# Patient Record
Sex: MALE | Race: BLACK OR AFRICAN AMERICAN | Employment: OTHER
[De-identification: names, ages, dates, MRNs, and addresses within clinical notes are randomized per-mention and may not be internally consistent; named-entity substitution may affect disease eponyms.]

---

## 2017-01-03 DIAGNOSIS — I48.20 CHRONIC ATRIAL FIBRILLATION (HCC): ICD-10-CM

## 2017-01-03 RX ORDER — AMIODARONE HYDROCHLORIDE 200 MG/1
TABLET ORAL
Qty: 60 TABLET | Refills: 2 | Status: SHIPPED | OUTPATIENT
Start: 2017-01-03 | End: 2017-01-12

## 2017-01-04 ENCOUNTER — OFFICE VISIT (OUTPATIENT)
Dept: BARIATRICS/WEIGHT MGMT | Facility: CLINIC | Age: 47
End: 2017-01-04

## 2017-01-04 VITALS
HEIGHT: 70 IN | DIASTOLIC BLOOD PRESSURE: 69 MMHG | SYSTOLIC BLOOD PRESSURE: 113 MMHG | WEIGHT: 282 LBS | BODY MASS INDEX: 40.37 KG/M2 | HEART RATE: 83 BPM

## 2017-01-04 DIAGNOSIS — N18.6 ESRD ON HEMODIALYSIS (HCC): Primary | ICD-10-CM

## 2017-01-04 DIAGNOSIS — I10 ESSENTIAL HYPERTENSION: ICD-10-CM

## 2017-01-04 DIAGNOSIS — I48.20 CHRONIC ATRIAL FIBRILLATION (HCC): ICD-10-CM

## 2017-01-04 DIAGNOSIS — Z99.2 ESRD ON HEMODIALYSIS (HCC): Primary | ICD-10-CM

## 2017-01-04 PROCEDURE — 99213 OFFICE O/P EST LOW 20 MIN: CPT | Performed by: SURGERY

## 2017-01-04 RX ORDER — MIDODRINE HYDROCHLORIDE 5 MG/1
10 TABLET ORAL 3 TIMES DAILY
COMMUNITY
Start: 2016-12-15 | End: 2018-03-19 | Stop reason: DRUGHIGH

## 2017-01-25 ENCOUNTER — TELEPHONE (OUTPATIENT)
Dept: BARIATRICS/WEIGHT MGMT | Facility: CLINIC | Age: 47
End: 2017-01-25

## 2017-01-26 ENCOUNTER — OFFICE VISIT (OUTPATIENT)
Dept: BARIATRICS/WEIGHT MGMT | Facility: CLINIC | Age: 47
End: 2017-01-26

## 2017-01-26 VITALS
RESPIRATION RATE: 20 BRPM | DIASTOLIC BLOOD PRESSURE: 70 MMHG | HEIGHT: 70 IN | SYSTOLIC BLOOD PRESSURE: 110 MMHG | WEIGHT: 276 LBS | HEART RATE: 70 BPM | BODY MASS INDEX: 39.51 KG/M2

## 2017-01-26 DIAGNOSIS — I48.20 CHRONIC ATRIAL FIBRILLATION (HCC): ICD-10-CM

## 2017-01-26 DIAGNOSIS — N18.6 ESRD ON HEMODIALYSIS (HCC): Primary | ICD-10-CM

## 2017-01-26 DIAGNOSIS — Z99.2 ESRD ON HEMODIALYSIS (HCC): Primary | ICD-10-CM

## 2017-01-26 DIAGNOSIS — I10 ESSENTIAL HYPERTENSION: ICD-10-CM

## 2017-01-26 PROCEDURE — 99024 POSTOP FOLLOW-UP VISIT: CPT | Performed by: SURGERY

## 2017-01-26 RX ORDER — LEVOTHYROXINE SODIUM 0.03 MG/1
1 TABLET ORAL DAILY
COMMUNITY
Start: 2017-01-13 | End: 2017-10-02

## 2017-01-30 ENCOUNTER — CARE COORDINATION (OUTPATIENT)
Dept: CARE COORDINATION | Facility: CLINIC | Age: 47
End: 2017-01-30

## 2017-01-31 ENCOUNTER — NURSE ONLY (OUTPATIENT)
Dept: BARIATRICS/WEIGHT MGMT | Facility: CLINIC | Age: 47
End: 2017-01-31

## 2017-01-31 VITALS — BODY MASS INDEX: 39.75 KG/M2 | WEIGHT: 277 LBS

## 2017-01-31 DIAGNOSIS — E66.01 OBESITY, MORBID, BMI 40.0-49.9 (HCC): Primary | ICD-10-CM

## 2017-02-06 ENCOUNTER — NURSE ONLY (OUTPATIENT)
Dept: BARIATRICS/WEIGHT MGMT | Facility: CLINIC | Age: 47
End: 2017-02-06

## 2017-02-06 DIAGNOSIS — E66.01 OBESITY, MORBID, BMI 40.0-49.9 (HCC): Primary | ICD-10-CM

## 2017-02-10 ENCOUNTER — HOSPITAL ENCOUNTER (OUTPATIENT)
Age: 47
Setting detail: SPECIMEN
Discharge: HOME | End: 2017-02-10
Payer: MEDICARE

## 2017-02-10 DIAGNOSIS — Z99.2 ESRD ON HEMODIALYSIS (HCC): ICD-10-CM

## 2017-02-10 DIAGNOSIS — N18.6 ESRD ON HEMODIALYSIS (HCC): ICD-10-CM

## 2017-02-10 LAB
ABSOLUTE EOS #: 0.1 K/UL (ref 0–0.4)
ABSOLUTE LYMPH #: 1.4 K/UL (ref 1–4.8)
ABSOLUTE MONO #: 0.3 K/UL (ref 0.1–1.2)
ALBUMIN SERPL-MCNC: 4.2 G/DL (ref 3.5–5.2)
ALBUMIN/GLOBULIN RATIO: 1.1 (ref 1–2.5)
ALP BLD-CCNC: 61 U/L (ref 40–129)
ALT SERPL-CCNC: 12 U/L (ref 5–41)
ANION GAP SERPL CALCULATED.3IONS-SCNC: 20 MMOL/L (ref 9–17)
AST SERPL-CCNC: 20 U/L
BASOPHILS # BLD: 1 % (ref 0–2)
BASOPHILS ABSOLUTE: 0 K/UL (ref 0–0.2)
BILIRUB SERPL-MCNC: 0.97 MG/DL (ref 0.3–1.2)
BUN BLDV-MCNC: 25 MG/DL (ref 6–20)
BUN/CREAT BLD: ABNORMAL (ref 9–20)
CALCIUM SERPL-MCNC: 9.7 MG/DL (ref 8.6–10.4)
CHLORIDE BLD-SCNC: 92 MMOL/L (ref 98–107)
CHOLESTEROL/HDL RATIO: 3.6
CHOLESTEROL: 284 MG/DL
CO2: 25 MMOL/L (ref 20–31)
CREAT SERPL-MCNC: 9.11 MG/DL (ref 0.7–1.2)
DIFFERENTIAL TYPE: ABNORMAL
EOSINOPHILS RELATIVE PERCENT: 2 % (ref 1–4)
FOLATE: 16.7 NG/ML
GFR AFRICAN AMERICAN: 8 ML/MIN
GFR NON-AFRICAN AMERICAN: 6 ML/MIN
GFR SERPL CREATININE-BSD FRML MDRD: ABNORMAL ML/MIN/{1.73_M2}
GFR SERPL CREATININE-BSD FRML MDRD: ABNORMAL ML/MIN/{1.73_M2}
GLUCOSE BLD-MCNC: 89 MG/DL (ref 70–99)
HCT VFR BLD CALC: 30.2 % (ref 41–53)
HDLC SERPL-MCNC: 80 MG/DL
HEMOGLOBIN: 10.6 G/DL (ref 13.5–17.5)
LDL CHOLESTEROL: 183 MG/DL (ref 0–130)
LYMPHOCYTES # BLD: 32 % (ref 24–44)
MAGNESIUM: 2.2 MG/DL (ref 1.6–2.6)
MCH RBC QN AUTO: 36.5 PG (ref 26–34)
MCHC RBC AUTO-ENTMCNC: 35.3 G/DL (ref 31–37)
MCV RBC AUTO: 103.5 FL (ref 80–100)
MONOCYTES # BLD: 7 % (ref 2–11)
PDW BLD-RTO: 17.3 % (ref 12.5–15.4)
PLATELET # BLD: 175 K/UL (ref 140–450)
PLATELET ESTIMATE: ABNORMAL
PMV BLD AUTO: 8.8 FL (ref 6–12)
POTASSIUM SERPL-SCNC: 4.3 MMOL/L (ref 3.7–5.3)
RBC # BLD: 2.92 M/UL (ref 4.5–5.9)
RBC # BLD: ABNORMAL 10*6/UL
SEG NEUTROPHILS: 58 % (ref 36–66)
SEGMENTED NEUTROPHILS ABSOLUTE COUNT: 2.5 K/UL (ref 1.8–7.7)
SODIUM BLD-SCNC: 137 MMOL/L (ref 135–144)
TOTAL PROTEIN: 7.9 G/DL (ref 6.4–8.3)
TRIGL SERPL-MCNC: 105 MG/DL
VITAMIN B-12: 572 PG/ML (ref 211–946)
VLDLC SERPL CALC-MCNC: ABNORMAL MG/DL (ref 1–30)
WBC # BLD: 4.4 K/UL (ref 3.5–11)
WBC # BLD: ABNORMAL 10*3/UL

## 2017-02-12 LAB
RETINYL PALMITATE: 0.02 MG/L (ref 0–0.1)
VITAMIN A LEVEL: 1.14 MG/L (ref 0.3–1.2)
VITAMIN A, INTERP: NORMAL

## 2017-02-13 LAB — VITAMIN B1 WHOLE BLOOD: 65 NMOL/L (ref 70–180)

## 2017-02-16 ENCOUNTER — OFFICE VISIT (OUTPATIENT)
Dept: BARIATRICS/WEIGHT MGMT | Facility: CLINIC | Age: 47
End: 2017-02-16

## 2017-02-16 VITALS
HEART RATE: 80 BPM | WEIGHT: 277 LBS | BODY MASS INDEX: 39.65 KG/M2 | HEIGHT: 70 IN | DIASTOLIC BLOOD PRESSURE: 74 MMHG | SYSTOLIC BLOOD PRESSURE: 128 MMHG | RESPIRATION RATE: 18 BRPM

## 2017-02-16 DIAGNOSIS — K21.9 GASTROESOPHAGEAL REFLUX DISEASE, ESOPHAGITIS PRESENCE NOT SPECIFIED: ICD-10-CM

## 2017-02-16 DIAGNOSIS — E03.9 HYPOTHYROIDISM, UNSPECIFIED TYPE: ICD-10-CM

## 2017-02-16 DIAGNOSIS — E66.9 OBESITY (BMI 30-39.9): ICD-10-CM

## 2017-02-16 DIAGNOSIS — I26.09 COR PULMONALE, ACUTE (HCC): Chronic | ICD-10-CM

## 2017-02-16 DIAGNOSIS — I48.91 ATRIAL FIBRILLATION, UNSPECIFIED TYPE (HCC): ICD-10-CM

## 2017-02-16 DIAGNOSIS — I89.0 LYMPHEDEMA OF LOWER EXTREMITY, UNSPECIFIED LATERALITY: ICD-10-CM

## 2017-02-16 DIAGNOSIS — Z99.2 ESRD ON HEMODIALYSIS (HCC): ICD-10-CM

## 2017-02-16 DIAGNOSIS — I10 ESSENTIAL HYPERTENSION: Primary | ICD-10-CM

## 2017-02-16 DIAGNOSIS — N18.6 ESRD ON HEMODIALYSIS (HCC): ICD-10-CM

## 2017-02-16 PROCEDURE — G8484 FLU IMMUNIZE NO ADMIN: HCPCS | Performed by: NURSE PRACTITIONER

## 2017-02-16 PROCEDURE — G8427 DOCREV CUR MEDS BY ELIG CLIN: HCPCS | Performed by: NURSE PRACTITIONER

## 2017-02-16 PROCEDURE — 99213 OFFICE O/P EST LOW 20 MIN: CPT | Performed by: NURSE PRACTITIONER

## 2017-02-16 PROCEDURE — G8417 CALC BMI ABV UP PARAM F/U: HCPCS | Performed by: NURSE PRACTITIONER

## 2017-02-16 PROCEDURE — 1036F TOBACCO NON-USER: CPT | Performed by: NURSE PRACTITIONER

## 2017-03-02 ENCOUNTER — TELEPHONE (OUTPATIENT)
Dept: PHARMACY | Age: 47
End: 2017-03-02

## 2017-03-07 ENCOUNTER — CARE COORDINATION (OUTPATIENT)
Dept: CARE COORDINATION | Facility: CLINIC | Age: 47
End: 2017-03-07

## 2017-03-10 ENCOUNTER — HOSPITAL ENCOUNTER (OUTPATIENT)
Age: 47
Discharge: HOME | End: 2017-03-10
Payer: MEDICARE

## 2017-03-10 LAB
PROLACTIN: 2.67 UG/L (ref 4.04–15.2)
TESTOSTERONE TOTAL: 1036 NG/DL (ref 220–1000)
THYROXINE, FREE: 0.93 NG/DL (ref 0.93–1.7)
TSH SERPL DL<=0.05 MIU/L-ACNC: 3.57 MIU/L (ref 0.3–5)

## 2017-03-10 PROCEDURE — 84146 ASSAY OF PROLACTIN: CPT

## 2017-03-10 PROCEDURE — 84439 ASSAY OF FREE THYROXINE: CPT

## 2017-03-10 PROCEDURE — 84443 ASSAY THYROID STIM HORMONE: CPT

## 2017-03-10 PROCEDURE — 36415 COLL VENOUS BLD VENIPUNCTURE: CPT

## 2017-03-10 PROCEDURE — 84403 ASSAY OF TOTAL TESTOSTERONE: CPT

## 2017-03-14 ENCOUNTER — OFFICE VISIT (OUTPATIENT)
Dept: BARIATRICS/WEIGHT MGMT | Age: 47
End: 2017-03-14
Payer: MEDICARE

## 2017-03-14 VITALS
BODY MASS INDEX: 39.94 KG/M2 | SYSTOLIC BLOOD PRESSURE: 126 MMHG | HEIGHT: 70 IN | HEART RATE: 76 BPM | WEIGHT: 279 LBS | RESPIRATION RATE: 18 BRPM | DIASTOLIC BLOOD PRESSURE: 74 MMHG

## 2017-03-14 DIAGNOSIS — E03.9 HYPOTHYROIDISM, UNSPECIFIED TYPE: ICD-10-CM

## 2017-03-14 DIAGNOSIS — K21.9 GASTROESOPHAGEAL REFLUX DISEASE, ESOPHAGITIS PRESENCE NOT SPECIFIED: ICD-10-CM

## 2017-03-14 DIAGNOSIS — I10 ESSENTIAL HYPERTENSION: Primary | ICD-10-CM

## 2017-03-14 DIAGNOSIS — E66.01 MORBID OBESITY WITH BODY MASS INDEX OF 40.0-49.9 (HCC): Chronic | ICD-10-CM

## 2017-03-14 DIAGNOSIS — Z99.2 ESRD ON HEMODIALYSIS (HCC): ICD-10-CM

## 2017-03-14 DIAGNOSIS — I89.0 LYMPHEDEMA OF LOWER EXTREMITY, UNSPECIFIED LATERALITY: ICD-10-CM

## 2017-03-14 DIAGNOSIS — N18.6 ESRD ON HEMODIALYSIS (HCC): ICD-10-CM

## 2017-03-14 PROCEDURE — 99213 OFFICE O/P EST LOW 20 MIN: CPT | Performed by: NURSE PRACTITIONER

## 2017-03-14 RX ORDER — TESTOSTERONE CYPIONATE 200 MG/ML
INJECTION INTRAMUSCULAR
COMMUNITY
Start: 2017-02-20 | End: 2017-10-02

## 2017-03-15 ENCOUNTER — HOSPITAL ENCOUNTER (OUTPATIENT)
Dept: PHARMACY | Age: 47
Setting detail: THERAPIES SERIES
Discharge: HOME | End: 2017-03-15
Payer: MEDICARE

## 2017-03-15 DIAGNOSIS — I48.91 ATRIAL FIBRILLATION, UNSPECIFIED TYPE (HCC): ICD-10-CM

## 2017-03-15 LAB
INR BLD: 3.1
PROTIME: 37.1 SECONDS

## 2017-03-15 PROCEDURE — 85610 PROTHROMBIN TIME: CPT

## 2017-03-15 PROCEDURE — G0463 HOSPITAL OUTPT CLINIC VISIT: HCPCS

## 2017-03-22 ENCOUNTER — OFFICE VISIT (OUTPATIENT)
Dept: INTERNAL MEDICINE CLINIC | Age: 47
End: 2017-03-22
Payer: MEDICARE

## 2017-03-22 VITALS
DIASTOLIC BLOOD PRESSURE: 74 MMHG | HEART RATE: 67 BPM | RESPIRATION RATE: 17 BRPM | WEIGHT: 286 LBS | BODY MASS INDEX: 40.94 KG/M2 | OXYGEN SATURATION: 96 % | HEIGHT: 70 IN | SYSTOLIC BLOOD PRESSURE: 130 MMHG | TEMPERATURE: 98.1 F

## 2017-03-22 DIAGNOSIS — E22.1 HYPERPROLACTINEMIA (HCC): ICD-10-CM

## 2017-03-22 DIAGNOSIS — Z99.2 ESRD ON HEMODIALYSIS (HCC): ICD-10-CM

## 2017-03-22 DIAGNOSIS — I10 ESSENTIAL HYPERTENSION: Primary | ICD-10-CM

## 2017-03-22 DIAGNOSIS — K21.9 GASTROESOPHAGEAL REFLUX DISEASE, ESOPHAGITIS PRESENCE NOT SPECIFIED: ICD-10-CM

## 2017-03-22 DIAGNOSIS — E03.9 HYPOTHYROIDISM, UNSPECIFIED TYPE: ICD-10-CM

## 2017-03-22 DIAGNOSIS — N52.01 ERECTILE DYSFUNCTION DUE TO ARTERIAL INSUFFICIENCY: ICD-10-CM

## 2017-03-22 DIAGNOSIS — E29.1 HYPOGONADISM IN MALE: ICD-10-CM

## 2017-03-22 DIAGNOSIS — E20.8 PTH-RELATED FAMILIAL ISOLATED HYPOPARATHYROIDISM, AUTOSOMAL DOMINANT (HCC): ICD-10-CM

## 2017-03-22 DIAGNOSIS — I48.91 ATRIAL FIBRILLATION, UNSPECIFIED TYPE (HCC): ICD-10-CM

## 2017-03-22 DIAGNOSIS — N18.6 ESRD ON HEMODIALYSIS (HCC): ICD-10-CM

## 2017-03-22 DIAGNOSIS — N40.1 BENIGN PROSTATIC HYPERPLASIA WITH LOWER URINARY TRACT SYMPTOMS, UNSPECIFIED MORPHOLOGY: ICD-10-CM

## 2017-03-22 PROCEDURE — G8484 FLU IMMUNIZE NO ADMIN: HCPCS | Performed by: FAMILY MEDICINE

## 2017-03-22 PROCEDURE — G8427 DOCREV CUR MEDS BY ELIG CLIN: HCPCS | Performed by: FAMILY MEDICINE

## 2017-03-22 PROCEDURE — 99214 OFFICE O/P EST MOD 30 MIN: CPT | Performed by: FAMILY MEDICINE

## 2017-03-22 PROCEDURE — G8417 CALC BMI ABV UP PARAM F/U: HCPCS | Performed by: FAMILY MEDICINE

## 2017-03-22 PROCEDURE — 1036F TOBACCO NON-USER: CPT | Performed by: FAMILY MEDICINE

## 2017-03-22 RX ORDER — MIDODRINE HYDROCHLORIDE 10 MG/1
TABLET ORAL
COMMUNITY
Start: 2017-03-17 | End: 2017-04-17

## 2017-03-22 ASSESSMENT — ENCOUNTER SYMPTOMS
COLOR CHANGE: 0
STRIDOR: 0
COUGH: 0
EYE REDNESS: 0
SORE THROAT: 0
ABDOMINAL DISTENTION: 0
ANAL BLEEDING: 0
SHORTNESS OF BREATH: 0
ABDOMINAL PAIN: 0
BACK PAIN: 0
EYE PAIN: 0
WHEEZING: 0
CONSTIPATION: 0
TROUBLE SWALLOWING: 0
CHEST TIGHTNESS: 0
EYE DISCHARGE: 0
FACIAL SWELLING: 0

## 2017-03-22 ASSESSMENT — PATIENT HEALTH QUESTIONNAIRE - PHQ9
1. LITTLE INTEREST OR PLEASURE IN DOING THINGS: 0
SUM OF ALL RESPONSES TO PHQ9 QUESTIONS 1 & 2: 0
SUM OF ALL RESPONSES TO PHQ QUESTIONS 1-9: 0
2. FEELING DOWN, DEPRESSED OR HOPELESS: 0

## 2017-03-29 ENCOUNTER — HOSPITAL ENCOUNTER (OUTPATIENT)
Dept: PHARMACY | Age: 47
Setting detail: THERAPIES SERIES
Discharge: HOME | End: 2017-03-29
Payer: MEDICARE

## 2017-03-29 DIAGNOSIS — I48.91 ATRIAL FIBRILLATION, UNSPECIFIED TYPE (HCC): ICD-10-CM

## 2017-03-29 LAB
INR BLD: 4.6
PROTIME: 54.9 SECONDS

## 2017-03-29 PROCEDURE — G0463 HOSPITAL OUTPT CLINIC VISIT: HCPCS

## 2017-03-29 PROCEDURE — 85610 PROTHROMBIN TIME: CPT

## 2017-03-31 ENCOUNTER — CARE COORDINATION (OUTPATIENT)
Dept: CARE COORDINATION | Age: 47
End: 2017-03-31

## 2017-04-05 ENCOUNTER — HOSPITAL ENCOUNTER (OUTPATIENT)
Age: 47
Setting detail: SPECIMEN
Discharge: HOME | End: 2017-04-05
Payer: MEDICARE

## 2017-04-05 LAB
PROLACTIN: 4.2 UG/L (ref 4.04–15.2)
TESTOSTERONE TOTAL: 635 NG/DL (ref 220–1000)

## 2017-04-12 ENCOUNTER — TELEPHONE (OUTPATIENT)
Dept: PHARMACY | Age: 47
End: 2017-04-12

## 2017-04-17 RX ORDER — VIT B COMP NO.3/FOLIC/C/BIOTIN 1 MG-60 MG
TABLET ORAL
Qty: 30 TABLET | Refills: 3 | Status: SHIPPED | OUTPATIENT
Start: 2017-04-17 | End: 2017-12-01 | Stop reason: SDUPTHER

## 2017-04-18 ENCOUNTER — OFFICE VISIT (OUTPATIENT)
Dept: BARIATRICS/WEIGHT MGMT | Age: 47
End: 2017-04-18
Payer: MEDICARE

## 2017-04-18 VITALS
WEIGHT: 283 LBS | HEART RATE: 68 BPM | RESPIRATION RATE: 18 BRPM | BODY MASS INDEX: 40.52 KG/M2 | DIASTOLIC BLOOD PRESSURE: 74 MMHG | HEIGHT: 70 IN | SYSTOLIC BLOOD PRESSURE: 126 MMHG

## 2017-04-18 DIAGNOSIS — E03.9 HYPOTHYROIDISM, UNSPECIFIED TYPE: ICD-10-CM

## 2017-04-18 DIAGNOSIS — I48.91 ATRIAL FIBRILLATION, UNSPECIFIED TYPE (HCC): ICD-10-CM

## 2017-04-18 DIAGNOSIS — I10 ESSENTIAL HYPERTENSION: Primary | ICD-10-CM

## 2017-04-18 DIAGNOSIS — E66.01 MORBID OBESITY WITH BODY MASS INDEX OF 40.0-49.9 (HCC): Chronic | ICD-10-CM

## 2017-04-18 DIAGNOSIS — N18.6 ESRD ON HEMODIALYSIS (HCC): ICD-10-CM

## 2017-04-18 DIAGNOSIS — Z99.2 ESRD ON HEMODIALYSIS (HCC): ICD-10-CM

## 2017-04-18 DIAGNOSIS — I89.0 LYMPHEDEMA OF RIGHT LOWER EXTREMITY: ICD-10-CM

## 2017-04-18 DIAGNOSIS — K21.9 GASTROESOPHAGEAL REFLUX DISEASE, ESOPHAGITIS PRESENCE NOT SPECIFIED: ICD-10-CM

## 2017-04-18 PROCEDURE — 99213 OFFICE O/P EST LOW 20 MIN: CPT | Performed by: NURSE PRACTITIONER

## 2017-04-18 PROCEDURE — G8427 DOCREV CUR MEDS BY ELIG CLIN: HCPCS | Performed by: NURSE PRACTITIONER

## 2017-04-18 PROCEDURE — 1036F TOBACCO NON-USER: CPT | Performed by: NURSE PRACTITIONER

## 2017-04-18 PROCEDURE — G8417 CALC BMI ABV UP PARAM F/U: HCPCS | Performed by: NURSE PRACTITIONER

## 2017-04-20 ENCOUNTER — TELEPHONE (OUTPATIENT)
Dept: PHARMACY | Age: 47
End: 2017-04-20

## 2017-05-01 ENCOUNTER — CARE COORDINATION (OUTPATIENT)
Dept: CARE COORDINATION | Age: 47
End: 2017-05-01

## 2017-05-10 ENCOUNTER — HOSPITAL ENCOUNTER (OUTPATIENT)
Dept: PHARMACY | Age: 47
Setting detail: THERAPIES SERIES
Discharge: HOME | End: 2017-05-10
Payer: MEDICARE

## 2017-05-10 DIAGNOSIS — I48.91 ATRIAL FIBRILLATION, UNSPECIFIED TYPE (HCC): ICD-10-CM

## 2017-05-10 LAB
INR BLD: 4.3
PROTIME: 51.8 SECONDS

## 2017-05-10 PROCEDURE — 85610 PROTHROMBIN TIME: CPT

## 2017-05-10 PROCEDURE — 99211 OFF/OP EST MAY X REQ PHY/QHP: CPT

## 2017-05-15 ENCOUNTER — OFFICE VISIT (OUTPATIENT)
Dept: BARIATRICS/WEIGHT MGMT | Age: 47
End: 2017-05-15
Payer: MEDICARE

## 2017-05-15 VITALS
WEIGHT: 289 LBS | HEIGHT: 70 IN | HEART RATE: 68 BPM | SYSTOLIC BLOOD PRESSURE: 102 MMHG | DIASTOLIC BLOOD PRESSURE: 66 MMHG | BODY MASS INDEX: 41.37 KG/M2

## 2017-05-15 DIAGNOSIS — N18.6 ESRD ON HEMODIALYSIS (HCC): ICD-10-CM

## 2017-05-15 DIAGNOSIS — I89.0 LYMPHEDEMA OF LOWER EXTREMITY, UNSPECIFIED LATERALITY: ICD-10-CM

## 2017-05-15 DIAGNOSIS — I95.1 ORTHOSTATIC HYPOTENSION: ICD-10-CM

## 2017-05-15 DIAGNOSIS — K21.9 GASTROESOPHAGEAL REFLUX DISEASE, ESOPHAGITIS PRESENCE NOT SPECIFIED: Primary | ICD-10-CM

## 2017-05-15 DIAGNOSIS — E03.9 HYPOTHYROIDISM, UNSPECIFIED TYPE: ICD-10-CM

## 2017-05-15 DIAGNOSIS — I48.91 ATRIAL FIBRILLATION, UNSPECIFIED TYPE (HCC): ICD-10-CM

## 2017-05-15 DIAGNOSIS — E66.01 MORBID OBESITY WITH BODY MASS INDEX OF 40.0-49.9 (HCC): Chronic | ICD-10-CM

## 2017-05-15 DIAGNOSIS — Z99.2 ESRD ON HEMODIALYSIS (HCC): ICD-10-CM

## 2017-05-15 PROBLEM — I95.9 HYPOTENSION: Status: ACTIVE | Noted: 2017-05-15

## 2017-05-15 PROCEDURE — 1036F TOBACCO NON-USER: CPT | Performed by: NURSE PRACTITIONER

## 2017-05-15 PROCEDURE — G8417 CALC BMI ABV UP PARAM F/U: HCPCS | Performed by: NURSE PRACTITIONER

## 2017-05-15 PROCEDURE — G8427 DOCREV CUR MEDS BY ELIG CLIN: HCPCS | Performed by: NURSE PRACTITIONER

## 2017-05-15 PROCEDURE — 99213 OFFICE O/P EST LOW 20 MIN: CPT | Performed by: NURSE PRACTITIONER

## 2017-05-24 ENCOUNTER — HOSPITAL ENCOUNTER (OUTPATIENT)
Dept: PHARMACY | Age: 47
Setting detail: THERAPIES SERIES
Discharge: HOME | End: 2017-05-24
Payer: MEDICARE

## 2017-05-24 DIAGNOSIS — I48.91 ATRIAL FIBRILLATION, UNSPECIFIED TYPE (HCC): ICD-10-CM

## 2017-05-24 LAB
INR BLD: 2.8
PROTIME: 33.9 SECONDS

## 2017-05-24 PROCEDURE — 99211 OFF/OP EST MAY X REQ PHY/QHP: CPT

## 2017-05-24 PROCEDURE — 85610 PROTHROMBIN TIME: CPT

## 2017-05-26 ENCOUNTER — NURSE ONLY (OUTPATIENT)
Dept: BARIATRICS/WEIGHT MGMT | Age: 47
End: 2017-05-26

## 2017-05-26 DIAGNOSIS — E66.01 MORBID OBESITY DUE TO EXCESS CALORIES (HCC): Primary | ICD-10-CM

## 2017-05-31 PROBLEM — R39.15 URGENCY OF URINATION: Status: ACTIVE | Noted: 2017-05-31

## 2017-06-05 DIAGNOSIS — I48.20 CHRONIC ATRIAL FIBRILLATION (HCC): ICD-10-CM

## 2017-06-06 RX ORDER — WARFARIN SODIUM 5 MG/1
TABLET ORAL
Qty: 45 TABLET | Refills: 5 | Status: SHIPPED | OUTPATIENT
Start: 2017-06-06 | End: 2017-09-13

## 2017-06-14 ENCOUNTER — HOSPITAL ENCOUNTER (OUTPATIENT)
Dept: PHARMACY | Age: 47
Setting detail: THERAPIES SERIES
Discharge: HOME | End: 2017-06-14
Payer: MEDICARE

## 2017-06-14 DIAGNOSIS — I48.91 ATRIAL FIBRILLATION, UNSPECIFIED TYPE (HCC): ICD-10-CM

## 2017-06-14 LAB
INR BLD: 1.9
PROTIME: 22.6 SECONDS

## 2017-06-14 PROCEDURE — 99211 OFF/OP EST MAY X REQ PHY/QHP: CPT

## 2017-06-14 PROCEDURE — 85610 PROTHROMBIN TIME: CPT

## 2017-06-20 ENCOUNTER — OFFICE VISIT (OUTPATIENT)
Dept: BARIATRICS/WEIGHT MGMT | Age: 47
End: 2017-06-20
Payer: MEDICARE

## 2017-06-20 VITALS
DIASTOLIC BLOOD PRESSURE: 70 MMHG | BODY MASS INDEX: 40.52 KG/M2 | HEIGHT: 70 IN | SYSTOLIC BLOOD PRESSURE: 122 MMHG | HEART RATE: 70 BPM | RESPIRATION RATE: 20 BRPM | WEIGHT: 283 LBS

## 2017-06-20 DIAGNOSIS — I89.0 LYMPHEDEMA OF LOWER EXTREMITY, UNSPECIFIED LATERALITY: ICD-10-CM

## 2017-06-20 DIAGNOSIS — I10 ESSENTIAL HYPERTENSION: Primary | ICD-10-CM

## 2017-06-20 DIAGNOSIS — E03.9 HYPOTHYROIDISM, UNSPECIFIED TYPE: ICD-10-CM

## 2017-06-20 DIAGNOSIS — Z99.2 ESRD ON HEMODIALYSIS (HCC): ICD-10-CM

## 2017-06-20 DIAGNOSIS — E66.01 MORBID OBESITY WITH BODY MASS INDEX OF 40.0-49.9 (HCC): Chronic | ICD-10-CM

## 2017-06-20 DIAGNOSIS — I48.91 ATRIAL FIBRILLATION, UNSPECIFIED TYPE (HCC): ICD-10-CM

## 2017-06-20 DIAGNOSIS — N18.6 ESRD ON HEMODIALYSIS (HCC): ICD-10-CM

## 2017-06-20 PROCEDURE — G8427 DOCREV CUR MEDS BY ELIG CLIN: HCPCS | Performed by: NURSE PRACTITIONER

## 2017-06-20 PROCEDURE — 1036F TOBACCO NON-USER: CPT | Performed by: NURSE PRACTITIONER

## 2017-06-20 PROCEDURE — G8417 CALC BMI ABV UP PARAM F/U: HCPCS | Performed by: NURSE PRACTITIONER

## 2017-06-20 PROCEDURE — 99213 OFFICE O/P EST LOW 20 MIN: CPT | Performed by: NURSE PRACTITIONER

## 2017-06-28 ENCOUNTER — HOSPITAL ENCOUNTER (OUTPATIENT)
Dept: PHARMACY | Age: 47
Setting detail: THERAPIES SERIES
Discharge: HOME | End: 2017-06-28
Payer: MEDICARE

## 2017-06-28 DIAGNOSIS — I48.91 ATRIAL FIBRILLATION, UNSPECIFIED TYPE (HCC): ICD-10-CM

## 2017-06-28 LAB
INR BLD: 2.4
PROTIME: 28.8 SECONDS

## 2017-06-28 PROCEDURE — 99211 OFF/OP EST MAY X REQ PHY/QHP: CPT

## 2017-06-28 PROCEDURE — 85610 PROTHROMBIN TIME: CPT

## 2017-07-12 ENCOUNTER — TELEPHONE (OUTPATIENT)
Dept: PHARMACY | Age: 47
End: 2017-07-12

## 2017-07-13 ENCOUNTER — HOSPITAL ENCOUNTER (OUTPATIENT)
Age: 47
Setting detail: SPECIMEN
Discharge: HOME | End: 2017-07-13
Payer: MEDICARE

## 2017-07-13 LAB
HCT VFR BLD CALC: 35.8 % (ref 41–53)
HEMOGLOBIN: 11.9 G/DL (ref 13.5–17.5)
MCH RBC QN AUTO: 33.4 PG (ref 26–34)
MCHC RBC AUTO-ENTMCNC: 33.2 G/DL (ref 31–37)
MCV RBC AUTO: 100.9 FL (ref 80–100)
PDW BLD-RTO: 18 % (ref 12.5–15.4)
PLATELET # BLD: 200 K/UL (ref 140–450)
PMV BLD AUTO: 8.8 FL (ref 6–12)
PROLACTIN: 3.18 UG/L (ref 4.04–15.2)
PROSTATE SPECIFIC ANTIGEN: 0.86 UG/L
RBC # BLD: 3.55 M/UL (ref 4.5–5.9)
TESTOSTERONE TOTAL: 754 NG/DL (ref 220–1000)
THYROXINE, FREE: 1.06 NG/DL (ref 0.93–1.7)
TSH SERPL DL<=0.05 MIU/L-ACNC: 4.27 MIU/L (ref 0.3–5)
VITAMIN D 25-HYDROXY: 35.2 NG/ML (ref 30–100)
WBC # BLD: 4.9 K/UL (ref 3.5–11)

## 2017-07-17 ENCOUNTER — TELEPHONE (OUTPATIENT)
Dept: PHARMACY | Age: 47
End: 2017-07-17

## 2017-07-18 ENCOUNTER — OFFICE VISIT (OUTPATIENT)
Dept: BARIATRICS/WEIGHT MGMT | Age: 47
End: 2017-07-18
Payer: MEDICARE

## 2017-07-18 VITALS
SYSTOLIC BLOOD PRESSURE: 118 MMHG | DIASTOLIC BLOOD PRESSURE: 60 MMHG | HEART RATE: 80 BPM | BODY MASS INDEX: 40.52 KG/M2 | WEIGHT: 283 LBS | HEIGHT: 70 IN

## 2017-07-18 DIAGNOSIS — I48.91 ATRIAL FIBRILLATION, UNSPECIFIED TYPE (HCC): ICD-10-CM

## 2017-07-18 DIAGNOSIS — E03.9 HYPOTHYROIDISM, UNSPECIFIED TYPE: ICD-10-CM

## 2017-07-18 DIAGNOSIS — E66.01 MORBID OBESITY WITH BODY MASS INDEX OF 40.0-49.9 (HCC): Chronic | ICD-10-CM

## 2017-07-18 DIAGNOSIS — I89.0 LYMPHEDEMA OF RIGHT LOWER EXTREMITY: ICD-10-CM

## 2017-07-18 DIAGNOSIS — I10 ESSENTIAL HYPERTENSION: Primary | ICD-10-CM

## 2017-07-18 DIAGNOSIS — Z99.2 ESRD ON HEMODIALYSIS (HCC): ICD-10-CM

## 2017-07-18 DIAGNOSIS — N18.6 ESRD ON HEMODIALYSIS (HCC): ICD-10-CM

## 2017-07-18 DIAGNOSIS — K21.9 GASTROESOPHAGEAL REFLUX DISEASE, ESOPHAGITIS PRESENCE NOT SPECIFIED: ICD-10-CM

## 2017-07-18 PROCEDURE — G8427 DOCREV CUR MEDS BY ELIG CLIN: HCPCS | Performed by: NURSE PRACTITIONER

## 2017-07-18 PROCEDURE — G8417 CALC BMI ABV UP PARAM F/U: HCPCS | Performed by: NURSE PRACTITIONER

## 2017-07-18 PROCEDURE — 99213 OFFICE O/P EST LOW 20 MIN: CPT | Performed by: NURSE PRACTITIONER

## 2017-07-18 PROCEDURE — 1036F TOBACCO NON-USER: CPT | Performed by: NURSE PRACTITIONER

## 2017-07-24 ENCOUNTER — TELEPHONE (OUTPATIENT)
Dept: PHARMACY | Age: 47
End: 2017-07-24

## 2017-07-26 ENCOUNTER — HOSPITAL ENCOUNTER (OUTPATIENT)
Dept: PHARMACY | Age: 47
Setting detail: THERAPIES SERIES
Discharge: HOME | End: 2017-07-26
Payer: MEDICARE

## 2017-07-26 DIAGNOSIS — I48.91 ATRIAL FIBRILLATION, UNSPECIFIED TYPE (HCC): ICD-10-CM

## 2017-07-26 LAB
INR BLD: 2
PROTIME: 24.1 SECONDS

## 2017-07-26 PROCEDURE — 85610 PROTHROMBIN TIME: CPT

## 2017-07-26 PROCEDURE — 99211 OFF/OP EST MAY X REQ PHY/QHP: CPT

## 2017-08-03 DIAGNOSIS — I48.20 CHRONIC ATRIAL FIBRILLATION (HCC): ICD-10-CM

## 2017-08-04 RX ORDER — ASPIRIN 81 MG/1
TABLET ORAL
Qty: 30 TABLET | Refills: 2 | Status: SHIPPED | OUTPATIENT
Start: 2017-08-04 | End: 2017-12-18 | Stop reason: SDUPTHER

## 2017-08-09 ENCOUNTER — HOSPITAL ENCOUNTER (EMERGENCY)
Age: 47
Discharge: HOME | End: 2017-08-09
Attending: EMERGENCY MEDICINE
Payer: MEDICARE

## 2017-08-09 VITALS
SYSTOLIC BLOOD PRESSURE: 129 MMHG | HEIGHT: 70 IN | OXYGEN SATURATION: 98 % | BODY MASS INDEX: 40.52 KG/M2 | DIASTOLIC BLOOD PRESSURE: 73 MMHG | HEART RATE: 88 BPM | RESPIRATION RATE: 16 BRPM | TEMPERATURE: 98.1 F | WEIGHT: 283 LBS

## 2017-08-09 DIAGNOSIS — M25.562 ACUTE PAIN OF LEFT KNEE: Primary | ICD-10-CM

## 2017-08-09 PROCEDURE — G0382 LEV 3 HOSP TYPE B ED VISIT: HCPCS

## 2017-08-09 PROCEDURE — 6370000000 HC RX 637 (ALT 250 FOR IP): Performed by: EMERGENCY MEDICINE

## 2017-08-09 RX ORDER — IBUPROFEN 800 MG/1
800 TABLET ORAL EVERY 8 HOURS PRN
Qty: 12 TABLET | Refills: 0 | Status: SHIPPED | OUTPATIENT
Start: 2017-08-09 | End: 2017-09-13

## 2017-08-09 RX ORDER — IBUPROFEN 800 MG/1
800 TABLET ORAL ONCE
Status: COMPLETED | OUTPATIENT
Start: 2017-08-09 | End: 2017-08-09

## 2017-08-09 RX ADMIN — IBUPROFEN 800 MG: 800 TABLET, FILM COATED ORAL at 13:33

## 2017-08-09 ASSESSMENT — PAIN DESCRIPTION - PAIN TYPE: TYPE: ACUTE PAIN

## 2017-08-09 ASSESSMENT — PAIN DESCRIPTION - LOCATION: LOCATION: KNEE

## 2017-08-09 ASSESSMENT — PAIN SCALES - GENERAL
PAINLEVEL_OUTOF10: 6
PAINLEVEL_OUTOF10: 6

## 2017-08-09 ASSESSMENT — PAIN DESCRIPTION - DESCRIPTORS: DESCRIPTORS: CONSTANT

## 2017-08-09 ASSESSMENT — PAIN DESCRIPTION - FREQUENCY: FREQUENCY: CONTINUOUS

## 2017-08-09 ASSESSMENT — PAIN DESCRIPTION - PROGRESSION: CLINICAL_PROGRESSION: GRADUALLY WORSENING

## 2017-08-09 ASSESSMENT — ENCOUNTER SYMPTOMS
BACK PAIN: 0
COLOR CHANGE: 0

## 2017-08-09 ASSESSMENT — PAIN DESCRIPTION - ORIENTATION: ORIENTATION: LEFT

## 2017-08-10 ENCOUNTER — TELEPHONE (OUTPATIENT)
Dept: INTERNAL MEDICINE CLINIC | Age: 47
End: 2017-08-10

## 2017-08-10 ENCOUNTER — OFFICE VISIT (OUTPATIENT)
Dept: BARIATRICS/WEIGHT MGMT | Age: 47
End: 2017-08-10
Payer: MEDICARE

## 2017-08-10 VITALS
WEIGHT: 283 LBS | HEIGHT: 70 IN | RESPIRATION RATE: 20 BRPM | SYSTOLIC BLOOD PRESSURE: 128 MMHG | BODY MASS INDEX: 40.52 KG/M2 | HEART RATE: 76 BPM | DIASTOLIC BLOOD PRESSURE: 78 MMHG

## 2017-08-10 DIAGNOSIS — E03.9 HYPOTHYROIDISM, UNSPECIFIED TYPE: ICD-10-CM

## 2017-08-10 DIAGNOSIS — Z99.2 ESRD ON HEMODIALYSIS (HCC): ICD-10-CM

## 2017-08-10 DIAGNOSIS — E66.01 MORBID OBESITY WITH BODY MASS INDEX OF 40.0-49.9 (HCC): Chronic | ICD-10-CM

## 2017-08-10 DIAGNOSIS — I89.0 LYMPHEDEMA OF RIGHT LOWER EXTREMITY: ICD-10-CM

## 2017-08-10 DIAGNOSIS — N18.6 ESRD ON HEMODIALYSIS (HCC): ICD-10-CM

## 2017-08-10 DIAGNOSIS — K21.9 GASTROESOPHAGEAL REFLUX DISEASE, ESOPHAGITIS PRESENCE NOT SPECIFIED: ICD-10-CM

## 2017-08-10 DIAGNOSIS — I10 ESSENTIAL HYPERTENSION: Primary | ICD-10-CM

## 2017-08-10 PROCEDURE — G8417 CALC BMI ABV UP PARAM F/U: HCPCS | Performed by: NURSE PRACTITIONER

## 2017-08-10 PROCEDURE — G8427 DOCREV CUR MEDS BY ELIG CLIN: HCPCS | Performed by: NURSE PRACTITIONER

## 2017-08-10 PROCEDURE — 1036F TOBACCO NON-USER: CPT | Performed by: NURSE PRACTITIONER

## 2017-08-10 PROCEDURE — 99213 OFFICE O/P EST LOW 20 MIN: CPT | Performed by: NURSE PRACTITIONER

## 2017-08-14 ENCOUNTER — TELEPHONE (OUTPATIENT)
Dept: INTERNAL MEDICINE CLINIC | Age: 47
End: 2017-08-14

## 2017-08-14 DIAGNOSIS — M25.569 KNEE PAIN, UNSPECIFIED CHRONICITY, UNSPECIFIED LATERALITY: Primary | ICD-10-CM

## 2017-08-16 ENCOUNTER — TELEPHONE (OUTPATIENT)
Dept: BARIATRICS/WEIGHT MGMT | Age: 47
End: 2017-08-16

## 2017-08-17 ENCOUNTER — TELEPHONE (OUTPATIENT)
Dept: BARIATRICS/WEIGHT MGMT | Age: 47
End: 2017-08-17

## 2017-08-17 ENCOUNTER — HOSPITAL ENCOUNTER (EMERGENCY)
Age: 47
Discharge: HOME | End: 2017-08-17
Attending: EMERGENCY MEDICINE
Payer: MEDICARE

## 2017-08-17 VITALS
BODY MASS INDEX: 40.52 KG/M2 | WEIGHT: 283 LBS | RESPIRATION RATE: 18 BRPM | SYSTOLIC BLOOD PRESSURE: 124 MMHG | OXYGEN SATURATION: 98 % | HEART RATE: 96 BPM | HEIGHT: 70 IN | TEMPERATURE: 98.8 F | DIASTOLIC BLOOD PRESSURE: 78 MMHG

## 2017-08-17 DIAGNOSIS — L73.9 FOLLICULITIS: Primary | ICD-10-CM

## 2017-08-17 PROCEDURE — 6370000000 HC RX 637 (ALT 250 FOR IP): Performed by: EMERGENCY MEDICINE

## 2017-08-17 PROCEDURE — G0381 LEV 2 HOSP TYPE B ED VISIT: HCPCS

## 2017-08-17 RX ORDER — CEPHALEXIN 500 MG/1
500 CAPSULE ORAL 4 TIMES DAILY
Qty: 40 CAPSULE | Refills: 0 | Status: SHIPPED | OUTPATIENT
Start: 2017-08-17 | End: 2017-08-27

## 2017-08-17 RX ORDER — CEPHALEXIN 250 MG/1
500 CAPSULE ORAL ONCE
Status: COMPLETED | OUTPATIENT
Start: 2017-08-17 | End: 2017-08-17

## 2017-08-17 RX ADMIN — CEPHALEXIN 500 MG: 250 CAPSULE ORAL at 19:16

## 2017-08-17 ASSESSMENT — ENCOUNTER SYMPTOMS
BLOOD IN STOOL: 0
EYE PAIN: 0
COUGH: 0
VOMITING: 0
SORE THROAT: 0
NAUSEA: 0
SHORTNESS OF BREATH: 0
ABDOMINAL PAIN: 0
DIARRHEA: 0
SINUS PRESSURE: 0
PHOTOPHOBIA: 0
RHINORRHEA: 0

## 2017-08-18 ENCOUNTER — CARE COORDINATION (OUTPATIENT)
Dept: CARE COORDINATION | Age: 47
End: 2017-08-18

## 2017-08-30 ENCOUNTER — HOSPITAL ENCOUNTER (OUTPATIENT)
Dept: PHARMACY | Age: 47
Setting detail: THERAPIES SERIES
Discharge: HOME | End: 2017-08-30
Payer: MEDICARE

## 2017-08-30 DIAGNOSIS — I48.91 ATRIAL FIBRILLATION, UNSPECIFIED TYPE (HCC): ICD-10-CM

## 2017-08-30 LAB
INR BLD: 1.5
PROTIME: 18.5 SECONDS

## 2017-08-30 PROCEDURE — 99211 OFF/OP EST MAY X REQ PHY/QHP: CPT

## 2017-08-30 PROCEDURE — 85610 PROTHROMBIN TIME: CPT

## 2017-09-06 ENCOUNTER — HOSPITAL ENCOUNTER (OUTPATIENT)
Dept: PHARMACY | Age: 47
Setting detail: THERAPIES SERIES
Discharge: HOME | End: 2017-09-06
Payer: MEDICARE

## 2017-09-06 DIAGNOSIS — I48.91 ATRIAL FIBRILLATION, UNSPECIFIED TYPE (HCC): ICD-10-CM

## 2017-09-06 LAB
INR BLD: 2.1
PROTIME: 25 SECONDS

## 2017-09-06 PROCEDURE — 85610 PROTHROMBIN TIME: CPT

## 2017-09-06 PROCEDURE — 99211 OFF/OP EST MAY X REQ PHY/QHP: CPT

## 2017-09-11 ENCOUNTER — NURSE ONLY (OUTPATIENT)
Dept: BARIATRICS/WEIGHT MGMT | Age: 47
End: 2017-09-11

## 2017-09-11 DIAGNOSIS — E66.01 MORBID OBESITY DUE TO EXCESS CALORIES (HCC): Primary | ICD-10-CM

## 2017-09-13 ENCOUNTER — HOSPITAL ENCOUNTER (OUTPATIENT)
Dept: PREADMISSION TESTING | Age: 47
Discharge: HOME | End: 2017-09-13
Payer: MEDICARE

## 2017-09-13 ENCOUNTER — HOSPITAL ENCOUNTER (OUTPATIENT)
Dept: GENERAL RADIOLOGY | Age: 47
Discharge: HOME | End: 2017-09-13
Payer: MEDICARE

## 2017-09-13 VITALS
WEIGHT: 291.45 LBS | BODY MASS INDEX: 41.72 KG/M2 | OXYGEN SATURATION: 98 % | TEMPERATURE: 98.1 F | RESPIRATION RATE: 20 BRPM | DIASTOLIC BLOOD PRESSURE: 73 MMHG | HEIGHT: 70 IN | SYSTOLIC BLOOD PRESSURE: 112 MMHG | HEART RATE: 70 BPM

## 2017-09-13 LAB
ANION GAP SERPL CALCULATED.3IONS-SCNC: 20 MMOL/L (ref 9–17)
BUN BLDV-MCNC: 60 MG/DL (ref 6–20)
BUN/CREAT BLD: ABNORMAL (ref 9–20)
CALCIUM SERPL-MCNC: 9.4 MG/DL (ref 8.6–10.4)
CHLORIDE BLD-SCNC: 88 MMOL/L (ref 98–107)
CO2: 27 MMOL/L (ref 20–31)
CREAT SERPL-MCNC: 11.99 MG/DL (ref 0.7–1.2)
GFR AFRICAN AMERICAN: 5 ML/MIN
GFR NON-AFRICAN AMERICAN: 5 ML/MIN
GFR SERPL CREATININE-BSD FRML MDRD: ABNORMAL ML/MIN/{1.73_M2}
GFR SERPL CREATININE-BSD FRML MDRD: ABNORMAL ML/MIN/{1.73_M2}
GLUCOSE BLD-MCNC: 79 MG/DL (ref 70–99)
HCT VFR BLD CALC: 36.5 % (ref 41–53)
HEMOGLOBIN: 12.2 G/DL (ref 13.5–17.5)
INR BLD: 1.7
MCH RBC QN AUTO: 32.9 PG (ref 26–34)
MCHC RBC AUTO-ENTMCNC: 33.3 G/DL (ref 31–37)
MCV RBC AUTO: 98.7 FL (ref 80–100)
PDW BLD-RTO: 18.6 % (ref 12.5–15.4)
PLATELET # BLD: 180 K/UL (ref 140–450)
PMV BLD AUTO: 8.7 FL (ref 6–12)
POTASSIUM SERPL-SCNC: 5.4 MMOL/L (ref 3.7–5.3)
PROTHROMBIN TIME: 18.5 SEC (ref 9.4–12.6)
RBC # BLD: 3.7 M/UL (ref 4.5–5.9)
SODIUM BLD-SCNC: 135 MMOL/L (ref 135–144)
WBC # BLD: 5.1 K/UL (ref 3.5–11)

## 2017-09-13 PROCEDURE — 80048 BASIC METABOLIC PNL TOTAL CA: CPT

## 2017-09-13 PROCEDURE — 85027 COMPLETE CBC AUTOMATED: CPT

## 2017-09-13 PROCEDURE — G0480 DRUG TEST DEF 1-7 CLASSES: HCPCS

## 2017-09-13 PROCEDURE — 85610 PROTHROMBIN TIME: CPT

## 2017-09-13 PROCEDURE — 71020 XR CHEST STANDARD TWO VW: CPT

## 2017-09-13 PROCEDURE — 93005 ELECTROCARDIOGRAM TRACING: CPT

## 2017-09-13 RX ORDER — WARFARIN SODIUM 5 MG/1
5 TABLET ORAL DAILY
COMMUNITY
End: 2018-04-04 | Stop reason: SDUPTHER

## 2017-09-13 RX ORDER — SODIUM CHLORIDE 9 MG/ML
INJECTION, SOLUTION INTRAVENOUS CONTINUOUS
Status: CANCELLED | OUTPATIENT
Start: 2017-09-13

## 2017-09-14 ENCOUNTER — TELEPHONE (OUTPATIENT)
Dept: BARIATRICS/WEIGHT MGMT | Age: 47
End: 2017-09-14

## 2017-09-14 LAB
EKG ATRIAL RATE: 69 BPM
EKG P AXIS: 48 DEGREES
EKG P-R INTERVAL: 182 MS
EKG Q-T INTERVAL: 418 MS
EKG QRS DURATION: 84 MS
EKG QTC CALCULATION (BAZETT): 447 MS
EKG R AXIS: 6 DEGREES
EKG T AXIS: 41 DEGREES
EKG VENTRICULAR RATE: 69 BPM

## 2017-09-17 LAB
3-OH-COTININE: <2 NG/ML
COTININE: <2 NG/ML
NICOTINE: <2 NG/ML

## 2017-09-18 ENCOUNTER — OFFICE VISIT (OUTPATIENT)
Dept: INTERNAL MEDICINE CLINIC | Age: 47
End: 2017-09-18
Payer: MEDICARE

## 2017-09-18 VITALS
SYSTOLIC BLOOD PRESSURE: 106 MMHG | WEIGHT: 291 LBS | DIASTOLIC BLOOD PRESSURE: 72 MMHG | OXYGEN SATURATION: 98 % | TEMPERATURE: 97.4 F | HEART RATE: 75 BPM | BODY MASS INDEX: 41.66 KG/M2 | HEIGHT: 70 IN

## 2017-09-18 DIAGNOSIS — D68.32 WARFARIN-INDUCED COAGULOPATHY (HCC): ICD-10-CM

## 2017-09-18 DIAGNOSIS — Z01.818 PREOPERATIVE CLEARANCE: ICD-10-CM

## 2017-09-18 DIAGNOSIS — E66.01 MORBID OBESITY WITH BODY MASS INDEX OF 40.0-49.9 (HCC): Chronic | ICD-10-CM

## 2017-09-18 DIAGNOSIS — Z99.2 ESRD ON HEMODIALYSIS (HCC): ICD-10-CM

## 2017-09-18 DIAGNOSIS — T45.515A WARFARIN-INDUCED COAGULOPATHY (HCC): ICD-10-CM

## 2017-09-18 DIAGNOSIS — I48.20 CHRONIC ATRIAL FIBRILLATION (HCC): Primary | ICD-10-CM

## 2017-09-18 DIAGNOSIS — N18.6 ESRD ON HEMODIALYSIS (HCC): ICD-10-CM

## 2017-09-18 DIAGNOSIS — E22.1 HYPERPROLACTINEMIA (HCC): ICD-10-CM

## 2017-09-18 PROCEDURE — G8417 CALC BMI ABV UP PARAM F/U: HCPCS | Performed by: FAMILY MEDICINE

## 2017-09-18 PROCEDURE — G8427 DOCREV CUR MEDS BY ELIG CLIN: HCPCS | Performed by: FAMILY MEDICINE

## 2017-09-18 PROCEDURE — 1036F TOBACCO NON-USER: CPT | Performed by: FAMILY MEDICINE

## 2017-09-18 PROCEDURE — 99214 OFFICE O/P EST MOD 30 MIN: CPT | Performed by: FAMILY MEDICINE

## 2017-09-18 ASSESSMENT — ENCOUNTER SYMPTOMS
EYES NEGATIVE: 1
ALLERGIC/IMMUNOLOGIC NEGATIVE: 1
RESPIRATORY NEGATIVE: 1

## 2017-09-21 ENCOUNTER — TELEPHONE (OUTPATIENT)
Dept: BARIATRICS/WEIGHT MGMT | Age: 47
End: 2017-09-21

## 2017-09-21 ENCOUNTER — TELEPHONE (OUTPATIENT)
Dept: INTERNAL MEDICINE CLINIC | Age: 47
End: 2017-09-21

## 2017-09-25 ENCOUNTER — ANESTHESIA EVENT (OUTPATIENT)
Dept: OPERATING ROOM | Age: 47
DRG: 619 | End: 2017-09-25
Payer: MEDICARE

## 2017-09-25 ENCOUNTER — HOSPITAL ENCOUNTER (INPATIENT)
Age: 47
LOS: 1 days | Discharge: HOME | DRG: 619 | End: 2017-09-26
Attending: SURGERY | Admitting: SURGERY
Payer: MEDICARE

## 2017-09-25 ENCOUNTER — ANESTHESIA (OUTPATIENT)
Dept: OPERATING ROOM | Age: 47
DRG: 619 | End: 2017-09-25
Payer: MEDICARE

## 2017-09-25 VITALS — OXYGEN SATURATION: 100 % | TEMPERATURE: 95.4 F | DIASTOLIC BLOOD PRESSURE: 77 MMHG | SYSTOLIC BLOOD PRESSURE: 132 MMHG

## 2017-09-25 LAB
POC CHLORIDE: 93 MMOL/L (ref 98–107)
POC INR: 1.3
POC POTASSIUM: 4.5 MMOL/L (ref 3.5–4.5)
POC SODIUM: 130 MMOL/L (ref 138–146)
PROTHROMBIN TIME, POC: 15.1 SEC (ref 10.4–14.2)
TOTAL CK: 579 U/L (ref 39–308)

## 2017-09-25 PROCEDURE — 3700000001 HC ADD 15 MINUTES (ANESTHESIA): Performed by: SURGERY

## 2017-09-25 PROCEDURE — S0028 INJECTION, FAMOTIDINE, 20 MG: HCPCS | Performed by: SURGERY

## 2017-09-25 PROCEDURE — 2580000003 HC RX 258: Performed by: SURGERY

## 2017-09-25 PROCEDURE — 2500000003 HC RX 250 WO HCPCS: Performed by: SURGERY

## 2017-09-25 PROCEDURE — 7100000000 HC PACU RECOVERY - FIRST 15 MIN: Performed by: SURGERY

## 2017-09-25 PROCEDURE — 0DB64Z3 EXCISION OF STOMACH, PERCUTANEOUS ENDOSCOPIC APPROACH, VERTICAL: ICD-10-PCS | Performed by: SURGERY

## 2017-09-25 PROCEDURE — 84295 ASSAY OF SERUM SODIUM: CPT

## 2017-09-25 PROCEDURE — 6360000002 HC RX W HCPCS: Performed by: SURGERY

## 2017-09-25 PROCEDURE — 6370000000 HC RX 637 (ALT 250 FOR IP): Performed by: SURGERY

## 2017-09-25 PROCEDURE — 6360000002 HC RX W HCPCS: Performed by: NURSE ANESTHETIST, CERTIFIED REGISTERED

## 2017-09-25 PROCEDURE — 8E0W4CZ ROBOTIC ASSISTED PROCEDURE OF TRUNK REGION, PERCUTANEOUS ENDOSCOPIC APPROACH: ICD-10-PCS | Performed by: SURGERY

## 2017-09-25 PROCEDURE — 2580000003 HC RX 258: Performed by: ANESTHESIOLOGY

## 2017-09-25 PROCEDURE — 88307 TISSUE EXAM BY PATHOLOGIST: CPT

## 2017-09-25 PROCEDURE — 6360000002 HC RX W HCPCS: Performed by: ANESTHESIOLOGY

## 2017-09-25 PROCEDURE — 85610 PROTHROMBIN TIME: CPT

## 2017-09-25 PROCEDURE — 2580000003 HC RX 258: Performed by: NURSE ANESTHETIST, CERTIFIED REGISTERED

## 2017-09-25 PROCEDURE — 82550 ASSAY OF CK (CPK): CPT

## 2017-09-25 PROCEDURE — 2500000003 HC RX 250 WO HCPCS: Performed by: NURSE ANESTHETIST, CERTIFIED REGISTERED

## 2017-09-25 PROCEDURE — 2720000003 HC MISC SUTURE/STAPLES/RELOADS/ETC: Performed by: SURGERY

## 2017-09-25 PROCEDURE — 1200000000 HC SEMI PRIVATE

## 2017-09-25 PROCEDURE — 7100000001 HC PACU RECOVERY - ADDTL 15 MIN: Performed by: SURGERY

## 2017-09-25 PROCEDURE — 3600000009 HC SURGERY ROBOT BASE: Performed by: SURGERY

## 2017-09-25 PROCEDURE — 84132 ASSAY OF SERUM POTASSIUM: CPT

## 2017-09-25 PROCEDURE — 82435 ASSAY OF BLOOD CHLORIDE: CPT

## 2017-09-25 PROCEDURE — 3700000000 HC ANESTHESIA ATTENDED CARE: Performed by: SURGERY

## 2017-09-25 PROCEDURE — S2900 ROBOTIC SURGICAL SYSTEM: HCPCS | Performed by: SURGERY

## 2017-09-25 PROCEDURE — 2720000010 HC SURG SUPPLY STERILE: Performed by: SURGERY

## 2017-09-25 PROCEDURE — 3600000019 HC SURGERY ROBOT ADDTL 15MIN: Performed by: SURGERY

## 2017-09-25 PROCEDURE — L3650 SO 8 ABD RESTRAINT PRE OTS: HCPCS | Performed by: SURGERY

## 2017-09-25 PROCEDURE — 36416 COLLJ CAPILLARY BLOOD SPEC: CPT

## 2017-09-25 PROCEDURE — 43775 LAP SLEEVE GASTRECTOMY: CPT | Performed by: SURGERY

## 2017-09-25 RX ORDER — OXYCODONE HCL 5 MG/5 ML
5 SOLUTION, ORAL ORAL EVERY 4 HOURS PRN
Status: DISCONTINUED | OUTPATIENT
Start: 2017-09-25 | End: 2017-09-26 | Stop reason: HOSPADM

## 2017-09-25 RX ORDER — MEPERIDINE HYDROCHLORIDE 50 MG/ML
12.5 INJECTION INTRAMUSCULAR; INTRAVENOUS; SUBCUTANEOUS EVERY 5 MIN PRN
Status: DISCONTINUED | OUTPATIENT
Start: 2017-09-25 | End: 2017-09-25 | Stop reason: HOSPADM

## 2017-09-25 RX ORDER — ROCURONIUM BROMIDE 10 MG/ML
INJECTION, SOLUTION INTRAVENOUS PRN
Status: DISCONTINUED | OUTPATIENT
Start: 2017-09-25 | End: 2017-09-25 | Stop reason: SDUPTHER

## 2017-09-25 RX ORDER — EPHEDRINE SULFATE 50 MG/ML
INJECTION, SOLUTION INTRAVENOUS PRN
Status: DISCONTINUED | OUTPATIENT
Start: 2017-09-25 | End: 2017-09-25 | Stop reason: SDUPTHER

## 2017-09-25 RX ORDER — SODIUM CHLORIDE, SODIUM LACTATE, POTASSIUM CHLORIDE, CALCIUM CHLORIDE 600; 310; 30; 20 MG/100ML; MG/100ML; MG/100ML; MG/100ML
INJECTION, SOLUTION INTRAVENOUS CONTINUOUS PRN
Status: DISCONTINUED | OUTPATIENT
Start: 2017-09-25 | End: 2017-09-25

## 2017-09-25 RX ORDER — MIDAZOLAM HYDROCHLORIDE 1 MG/ML
1 INJECTION INTRAMUSCULAR; INTRAVENOUS EVERY 10 MIN PRN
Status: DISCONTINUED | OUTPATIENT
Start: 2017-09-25 | End: 2017-09-25 | Stop reason: HOSPADM

## 2017-09-25 RX ORDER — SODIUM CHLORIDE 0.9 % (FLUSH) 0.9 %
10 SYRINGE (ML) INJECTION EVERY 12 HOURS SCHEDULED
Status: DISCONTINUED | OUTPATIENT
Start: 2017-09-25 | End: 2017-09-26 | Stop reason: HOSPADM

## 2017-09-25 RX ORDER — FENTANYL CITRATE 50 UG/ML
25 INJECTION, SOLUTION INTRAMUSCULAR; INTRAVENOUS EVERY 5 MIN PRN
Status: COMPLETED | OUTPATIENT
Start: 2017-09-25 | End: 2017-09-25

## 2017-09-25 RX ORDER — SODIUM CHLORIDE 9 MG/ML
INJECTION, SOLUTION INTRAVENOUS CONTINUOUS
Status: DISCONTINUED | OUTPATIENT
Start: 2017-09-25 | End: 2017-09-25

## 2017-09-25 RX ORDER — ONDANSETRON 2 MG/ML
INJECTION INTRAMUSCULAR; INTRAVENOUS PRN
Status: DISCONTINUED | OUTPATIENT
Start: 2017-09-25 | End: 2017-09-25 | Stop reason: SDUPTHER

## 2017-09-25 RX ORDER — ONDANSETRON 2 MG/ML
4 INJECTION INTRAMUSCULAR; INTRAVENOUS EVERY 6 HOURS PRN
Status: DISCONTINUED | OUTPATIENT
Start: 2017-09-25 | End: 2017-09-26 | Stop reason: HOSPADM

## 2017-09-25 RX ORDER — GLYCOPYRROLATE 0.2 MG/ML
INJECTION INTRAMUSCULAR; INTRAVENOUS PRN
Status: DISCONTINUED | OUTPATIENT
Start: 2017-09-25 | End: 2017-09-25 | Stop reason: SDUPTHER

## 2017-09-25 RX ORDER — SODIUM CHLORIDE 0.9 % (FLUSH) 0.9 %
10 SYRINGE (ML) INJECTION PRN
Status: DISCONTINUED | OUTPATIENT
Start: 2017-09-25 | End: 2017-09-26 | Stop reason: HOSPADM

## 2017-09-25 RX ORDER — OXYCODONE HCL 5 MG/5 ML
10 SOLUTION, ORAL ORAL EVERY 4 HOURS PRN
Status: DISCONTINUED | OUTPATIENT
Start: 2017-09-25 | End: 2017-09-26 | Stop reason: HOSPADM

## 2017-09-25 RX ORDER — LEVOTHYROXINE SODIUM 0.03 MG/1
25 TABLET ORAL DAILY
Status: DISCONTINUED | OUTPATIENT
Start: 2017-09-25 | End: 2017-09-26 | Stop reason: HOSPADM

## 2017-09-25 RX ORDER — SODIUM CHLORIDE 0.9 % (FLUSH) 0.9 %
10 SYRINGE (ML) INJECTION PRN
Status: DISCONTINUED | OUTPATIENT
Start: 2017-09-25 | End: 2017-09-25 | Stop reason: HOSPADM

## 2017-09-25 RX ORDER — SODIUM CHLORIDE 9 MG/ML
INJECTION, SOLUTION INTRAVENOUS CONTINUOUS
Status: DISCONTINUED | OUTPATIENT
Start: 2017-09-25 | End: 2017-09-26 | Stop reason: HOSPADM

## 2017-09-25 RX ORDER — SODIUM CHLORIDE 0.9 % (FLUSH) 0.9 %
10 SYRINGE (ML) INJECTION EVERY 12 HOURS SCHEDULED
Status: DISCONTINUED | OUTPATIENT
Start: 2017-09-25 | End: 2017-09-25 | Stop reason: HOSPADM

## 2017-09-25 RX ORDER — HEPARIN SODIUM 5000 [USP'U]/ML
5000 INJECTION, SOLUTION INTRAVENOUS; SUBCUTANEOUS EVERY 8 HOURS SCHEDULED
Status: DISCONTINUED | OUTPATIENT
Start: 2017-09-25 | End: 2017-09-26 | Stop reason: HOSPADM

## 2017-09-25 RX ORDER — BUPIVACAINE HYDROCHLORIDE 5 MG/ML
INJECTION, SOLUTION EPIDURAL; INTRACAUDAL PRN
Status: DISCONTINUED | OUTPATIENT
Start: 2017-09-25 | End: 2017-09-25 | Stop reason: HOSPADM

## 2017-09-25 RX ORDER — HEPARIN SODIUM 5000 [USP'U]/ML
5000 INJECTION, SOLUTION INTRAVENOUS; SUBCUTANEOUS
Status: COMPLETED | OUTPATIENT
Start: 2017-09-25 | End: 2017-09-25

## 2017-09-25 RX ORDER — FENTANYL CITRATE 50 UG/ML
INJECTION, SOLUTION INTRAMUSCULAR; INTRAVENOUS PRN
Status: DISCONTINUED | OUTPATIENT
Start: 2017-09-25 | End: 2017-09-25 | Stop reason: SDUPTHER

## 2017-09-25 RX ORDER — MAGNESIUM HYDROXIDE 1200 MG/15ML
LIQUID ORAL CONTINUOUS PRN
Status: DISCONTINUED | OUTPATIENT
Start: 2017-09-25 | End: 2017-09-25 | Stop reason: HOSPADM

## 2017-09-25 RX ORDER — AMIODARONE HYDROCHLORIDE 200 MG/1
200 TABLET ORAL DAILY
Status: DISCONTINUED | OUTPATIENT
Start: 2017-09-25 | End: 2017-09-26 | Stop reason: HOSPADM

## 2017-09-25 RX ORDER — PROPOFOL 10 MG/ML
INJECTION, EMULSION INTRAVENOUS PRN
Status: DISCONTINUED | OUTPATIENT
Start: 2017-09-25 | End: 2017-09-25 | Stop reason: SDUPTHER

## 2017-09-25 RX ORDER — LIDOCAINE HYDROCHLORIDE 10 MG/ML
INJECTION, SOLUTION INFILTRATION; PERINEURAL PRN
Status: DISCONTINUED | OUTPATIENT
Start: 2017-09-25 | End: 2017-09-25 | Stop reason: SDUPTHER

## 2017-09-25 RX ORDER — FENTANYL CITRATE 50 UG/ML
50 INJECTION, SOLUTION INTRAMUSCULAR; INTRAVENOUS EVERY 5 MIN PRN
Status: DISCONTINUED | OUTPATIENT
Start: 2017-09-25 | End: 2017-09-25 | Stop reason: HOSPADM

## 2017-09-25 RX ADMIN — FENTANYL CITRATE 25 MCG: 50 INJECTION, SOLUTION INTRAMUSCULAR; INTRAVENOUS at 13:12

## 2017-09-25 RX ADMIN — HEPARIN SODIUM 5000 UNITS: 5000 INJECTION, SOLUTION INTRAVENOUS; SUBCUTANEOUS at 07:46

## 2017-09-25 RX ADMIN — PHENYLEPHRINE HYDROCHLORIDE 100 MCG: 10 INJECTION INTRAMUSCULAR; INTRAVENOUS; SUBCUTANEOUS at 11:11

## 2017-09-25 RX ADMIN — PHENYLEPHRINE HYDROCHLORIDE 100 MCG: 10 INJECTION INTRAMUSCULAR; INTRAVENOUS; SUBCUTANEOUS at 11:19

## 2017-09-25 RX ADMIN — ONDANSETRON 4 MG: 2 INJECTION, SOLUTION INTRAMUSCULAR; INTRAVENOUS at 12:24

## 2017-09-25 RX ADMIN — LIDOCAINE HYDROCHLORIDE 40 MG: 10 INJECTION, SOLUTION INFILTRATION; PERINEURAL at 11:00

## 2017-09-25 RX ADMIN — FENTANYL CITRATE 25 MCG: 50 INJECTION, SOLUTION INTRAMUSCULAR; INTRAVENOUS at 13:02

## 2017-09-25 RX ADMIN — ROCURONIUM BROMIDE 50 MG: 10 INJECTION INTRAVENOUS at 11:00

## 2017-09-25 RX ADMIN — OXYCODONE HYDROCHLORIDE 10 MG: 5 SOLUTION ORAL at 19:13

## 2017-09-25 RX ADMIN — HEPARIN SODIUM 5000 UNITS: 5000 INJECTION, SOLUTION INTRAVENOUS; SUBCUTANEOUS at 21:45

## 2017-09-25 RX ADMIN — SODIUM CHLORIDE: 9 INJECTION, SOLUTION INTRAVENOUS at 07:42

## 2017-09-25 RX ADMIN — PROPOFOL 200 MG: 10 INJECTION, EMULSION INTRAVENOUS at 11:00

## 2017-09-25 RX ADMIN — EPHEDRINE SULFATE 10 MG: 50 INJECTION, SOLUTION INTRAMUSCULAR; INTRAVENOUS; SUBCUTANEOUS at 11:48

## 2017-09-25 RX ADMIN — SODIUM CHLORIDE: 9 INJECTION, SOLUTION INTRAVENOUS at 15:09

## 2017-09-25 RX ADMIN — OXYCODONE HYDROCHLORIDE 10 MG: 5 SOLUTION ORAL at 15:05

## 2017-09-25 RX ADMIN — PHENYLEPHRINE HYDROCHLORIDE 100 MCG: 10 INJECTION INTRAMUSCULAR; INTRAVENOUS; SUBCUTANEOUS at 11:27

## 2017-09-25 RX ADMIN — DEXTROSE MONOHYDRATE 3 G: 50 INJECTION, SOLUTION INTRAVENOUS at 19:38

## 2017-09-25 RX ADMIN — GLYCOPYRROLATE 0.6 MG: 0.2 INJECTION, SOLUTION INTRAMUSCULAR; INTRAVENOUS at 12:24

## 2017-09-25 RX ADMIN — Medication 3 G: at 11:10

## 2017-09-25 RX ADMIN — FENTANYL CITRATE 25 MCG: 50 INJECTION, SOLUTION INTRAMUSCULAR; INTRAVENOUS at 13:07

## 2017-09-25 RX ADMIN — PHENYLEPHRINE HYDROCHLORIDE 100 MCG: 10 INJECTION INTRAMUSCULAR; INTRAVENOUS; SUBCUTANEOUS at 11:07

## 2017-09-25 RX ADMIN — PHENYLEPHRINE HYDROCHLORIDE 20 MCG/MIN: 10 INJECTION INTRAMUSCULAR; INTRAVENOUS; SUBCUTANEOUS at 12:02

## 2017-09-25 RX ADMIN — PHENYLEPHRINE HYDROCHLORIDE 100 MCG: 10 INJECTION INTRAMUSCULAR; INTRAVENOUS; SUBCUTANEOUS at 12:21

## 2017-09-25 RX ADMIN — PHENYLEPHRINE HYDROCHLORIDE 100 MCG: 10 INJECTION INTRAMUSCULAR; INTRAVENOUS; SUBCUTANEOUS at 11:01

## 2017-09-25 RX ADMIN — NEOSTIGMINE METHYLSULFATE 2 MG: 1 INJECTION, SOLUTION INTRAMUSCULAR; INTRAVENOUS; SUBCUTANEOUS at 12:24

## 2017-09-25 RX ADMIN — FENTANYL CITRATE 25 MCG: 50 INJECTION, SOLUTION INTRAMUSCULAR; INTRAVENOUS at 13:17

## 2017-09-25 RX ADMIN — FAMOTIDINE 20 MG: 10 INJECTION, SOLUTION INTRAVENOUS at 19:37

## 2017-09-25 RX ADMIN — EPHEDRINE SULFATE 10 MG: 50 INJECTION, SOLUTION INTRAMUSCULAR; INTRAVENOUS; SUBCUTANEOUS at 11:27

## 2017-09-25 RX ADMIN — FENTANYL CITRATE 100 MCG: 50 INJECTION INTRAMUSCULAR; INTRAVENOUS at 11:00

## 2017-09-25 RX ADMIN — PHENYLEPHRINE HYDROCHLORIDE 200 MCG: 10 INJECTION INTRAMUSCULAR; INTRAVENOUS; SUBCUTANEOUS at 11:24

## 2017-09-25 RX ADMIN — CHLORASEPTIC 1 SPRAY: 1.5 LIQUID ORAL at 21:46

## 2017-09-25 RX ADMIN — OXYCODONE HYDROCHLORIDE 10 MG: 5 SOLUTION ORAL at 23:51

## 2017-09-25 RX ADMIN — FENTANYL CITRATE 25 MCG: 50 INJECTION INTRAMUSCULAR; INTRAVENOUS at 12:40

## 2017-09-25 ASSESSMENT — PAIN SCALES - GENERAL
PAINLEVEL_OUTOF10: 0
PAINLEVEL_OUTOF10: 7
PAINLEVEL_OUTOF10: 7
PAINLEVEL_OUTOF10: 5
PAINLEVEL_OUTOF10: 4
PAINLEVEL_OUTOF10: 2
PAINLEVEL_OUTOF10: 5
PAINLEVEL_OUTOF10: 4
PAINLEVEL_OUTOF10: 6
PAINLEVEL_OUTOF10: 4
PAINLEVEL_OUTOF10: 5
PAINLEVEL_OUTOF10: 7
PAINLEVEL_OUTOF10: 7
PAINLEVEL_OUTOF10: 0
PAINLEVEL_OUTOF10: 6

## 2017-09-25 ASSESSMENT — PAIN DESCRIPTION - PROGRESSION

## 2017-09-25 ASSESSMENT — ENCOUNTER SYMPTOMS: SHORTNESS OF BREATH: 1

## 2017-09-25 ASSESSMENT — PAIN - FUNCTIONAL ASSESSMENT: PAIN_FUNCTIONAL_ASSESSMENT: 0-10

## 2017-09-25 ASSESSMENT — PAIN DESCRIPTION - PAIN TYPE
TYPE: SURGICAL PAIN
TYPE: ACUTE PAIN;SURGICAL PAIN
TYPE: ACUTE PAIN;SURGICAL PAIN

## 2017-09-25 ASSESSMENT — PAIN DESCRIPTION - ORIENTATION: ORIENTATION: RIGHT

## 2017-09-25 ASSESSMENT — PAIN DESCRIPTION - LOCATION
LOCATION: ABDOMEN

## 2017-09-25 ASSESSMENT — PAIN DESCRIPTION - FREQUENCY: FREQUENCY: CONTINUOUS

## 2017-09-25 ASSESSMENT — PAIN DESCRIPTION - DESCRIPTORS: DESCRIPTORS: ACHING;DISCOMFORT

## 2017-09-26 VITALS
HEART RATE: 80 BPM | WEIGHT: 282.19 LBS | SYSTOLIC BLOOD PRESSURE: 112 MMHG | DIASTOLIC BLOOD PRESSURE: 78 MMHG | BODY MASS INDEX: 45.35 KG/M2 | RESPIRATION RATE: 16 BRPM | OXYGEN SATURATION: 96 % | HEIGHT: 66 IN | TEMPERATURE: 98.1 F

## 2017-09-26 LAB
ANION GAP SERPL CALCULATED.3IONS-SCNC: 27 MMOL/L (ref 9–17)
BUN BLDV-MCNC: 63 MG/DL (ref 6–20)
BUN/CREAT BLD: ABNORMAL (ref 9–20)
CALCIUM SERPL-MCNC: 7.9 MG/DL (ref 8.6–10.4)
CHLORIDE BLD-SCNC: 86 MMOL/L (ref 98–107)
CO2: 18 MMOL/L (ref 20–31)
CREAT SERPL-MCNC: 15.72 MG/DL (ref 0.7–1.2)
GFR AFRICAN AMERICAN: 4 ML/MIN
GFR NON-AFRICAN AMERICAN: 3 ML/MIN
GFR SERPL CREATININE-BSD FRML MDRD: ABNORMAL ML/MIN/{1.73_M2}
GFR SERPL CREATININE-BSD FRML MDRD: ABNORMAL ML/MIN/{1.73_M2}
GLUCOSE BLD-MCNC: 89 MG/DL (ref 70–99)
HCT VFR BLD CALC: 33 % (ref 41–53)
HEMOGLOBIN: 11.3 G/DL (ref 13.5–17.5)
MCH RBC QN AUTO: 33.1 PG (ref 26–34)
MCHC RBC AUTO-ENTMCNC: 34.1 G/DL (ref 31–37)
MCV RBC AUTO: 96.8 FL (ref 80–100)
PDW BLD-RTO: 16.7 % (ref 12.5–15.4)
PLATELET # BLD: 142 K/UL (ref 140–450)
PMV BLD AUTO: 8.6 FL (ref 6–12)
POTASSIUM SERPL-SCNC: 5 MMOL/L (ref 3.7–5.3)
RBC # BLD: 3.4 M/UL (ref 4.5–5.9)
SODIUM BLD-SCNC: 131 MMOL/L (ref 135–144)
SURGICAL PATHOLOGY REPORT: NORMAL
TOTAL CK: 634 U/L (ref 39–308)
WBC # BLD: 4.8 K/UL (ref 3.5–11)

## 2017-09-26 PROCEDURE — 5A1D00Z PERFORMANCE OF URINARY FILTRATION, SINGLE: ICD-10-PCS | Performed by: INTERNAL MEDICINE

## 2017-09-26 PROCEDURE — 82550 ASSAY OF CK (CPK): CPT

## 2017-09-26 PROCEDURE — 90937 HEMODIALYSIS REPEATED EVAL: CPT

## 2017-09-26 PROCEDURE — S0028 INJECTION, FAMOTIDINE, 20 MG: HCPCS | Performed by: SURGERY

## 2017-09-26 PROCEDURE — 2500000003 HC RX 250 WO HCPCS: Performed by: SURGERY

## 2017-09-26 PROCEDURE — 36415 COLL VENOUS BLD VENIPUNCTURE: CPT

## 2017-09-26 PROCEDURE — 2580000003 HC RX 258: Performed by: SURGERY

## 2017-09-26 PROCEDURE — 6360000002 HC RX W HCPCS: Performed by: INTERNAL MEDICINE

## 2017-09-26 PROCEDURE — 80048 BASIC METABOLIC PNL TOTAL CA: CPT

## 2017-09-26 PROCEDURE — 85027 COMPLETE CBC AUTOMATED: CPT

## 2017-09-26 PROCEDURE — 6360000002 HC RX W HCPCS: Performed by: SURGERY

## 2017-09-26 PROCEDURE — 6370000000 HC RX 637 (ALT 250 FOR IP): Performed by: SURGERY

## 2017-09-26 RX ORDER — 0.9 % SODIUM CHLORIDE 0.9 %
250 INTRAVENOUS SOLUTION INTRAVENOUS PRN
Status: DISCONTINUED | OUTPATIENT
Start: 2017-09-26 | End: 2017-09-26 | Stop reason: HOSPADM

## 2017-09-26 RX ORDER — 0.9 % SODIUM CHLORIDE 0.9 %
150 INTRAVENOUS SOLUTION INTRAVENOUS PRN
Status: DISCONTINUED | OUTPATIENT
Start: 2017-09-26 | End: 2017-09-26 | Stop reason: HOSPADM

## 2017-09-26 RX ORDER — OXYCODONE HCL 5 MG/5 ML
5 SOLUTION, ORAL ORAL EVERY 6 HOURS PRN
Qty: 140 ML | Refills: 0 | Status: SHIPPED | OUTPATIENT
Start: 2017-09-26 | End: 2017-10-02

## 2017-09-26 RX ORDER — ONDANSETRON 4 MG/1
4 TABLET, FILM COATED ORAL DAILY PRN
Qty: 30 TABLET | Refills: 0 | Status: SHIPPED | OUTPATIENT
Start: 2017-09-26 | End: 2017-10-02

## 2017-09-26 RX ADMIN — DOXERCALCIFEROL 3 MCG: 2 INJECTION, SOLUTION INTRAVENOUS at 15:02

## 2017-09-26 RX ADMIN — DEXTROSE MONOHYDRATE 3 G: 50 INJECTION, SOLUTION INTRAVENOUS at 04:38

## 2017-09-26 RX ADMIN — HEPARIN SODIUM 5000 UNITS: 5000 INJECTION, SOLUTION INTRAVENOUS; SUBCUTANEOUS at 05:38

## 2017-09-26 RX ADMIN — OXYCODONE HYDROCHLORIDE 10 MG: 5 SOLUTION ORAL at 04:38

## 2017-09-26 RX ADMIN — FAMOTIDINE 20 MG: 10 INJECTION, SOLUTION INTRAVENOUS at 10:10

## 2017-09-26 RX ADMIN — LEVOTHYROXINE SODIUM 25 MCG: 25 TABLET ORAL at 10:10

## 2017-09-26 RX ADMIN — OXYCODONE HYDROCHLORIDE 10 MG: 5 SOLUTION ORAL at 16:10

## 2017-09-26 RX ADMIN — OXYCODONE HYDROCHLORIDE 10 MG: 5 SOLUTION ORAL at 10:10

## 2017-09-26 RX ADMIN — AMIODARONE HYDROCHLORIDE 200 MG: 200 TABLET ORAL at 10:10

## 2017-09-26 ASSESSMENT — PAIN DESCRIPTION - PROGRESSION

## 2017-09-26 ASSESSMENT — PAIN SCALES - GENERAL
PAINLEVEL_OUTOF10: 7
PAINLEVEL_OUTOF10: 2
PAINLEVEL_OUTOF10: 5
PAINLEVEL_OUTOF10: 7
PAINLEVEL_OUTOF10: 7
PAINLEVEL_OUTOF10: 4
PAINLEVEL_OUTOF10: 7

## 2017-09-26 ASSESSMENT — PAIN DESCRIPTION - ORIENTATION: ORIENTATION: RIGHT

## 2017-09-26 ASSESSMENT — PAIN DESCRIPTION - LOCATION: LOCATION: ABDOMEN

## 2017-09-26 ASSESSMENT — PAIN DESCRIPTION - PAIN TYPE: TYPE: ACUTE PAIN;SURGICAL PAIN

## 2017-09-27 ENCOUNTER — CARE COORDINATION (OUTPATIENT)
Dept: CASE MANAGEMENT | Age: 47
End: 2017-09-27

## 2017-09-27 ENCOUNTER — TELEPHONE (OUTPATIENT)
Dept: BARIATRICS/WEIGHT MGMT | Age: 47
End: 2017-09-27

## 2017-09-27 ENCOUNTER — TELEPHONE (OUTPATIENT)
Dept: PHARMACY | Age: 47
End: 2017-09-27

## 2017-10-02 ENCOUNTER — HOSPITAL ENCOUNTER (OUTPATIENT)
Dept: PHARMACY | Age: 47
Setting detail: THERAPIES SERIES
Discharge: HOME | End: 2017-10-02
Payer: MEDICARE

## 2017-10-02 DIAGNOSIS — I48.20 CHRONIC ATRIAL FIBRILLATION (HCC): ICD-10-CM

## 2017-10-02 LAB
INR BLD: 2.6
PROTIME: 31.4 SECONDS

## 2017-10-02 PROCEDURE — 99211 OFF/OP EST MAY X REQ PHY/QHP: CPT

## 2017-10-02 PROCEDURE — 85610 PROTHROMBIN TIME: CPT

## 2017-10-02 RX ORDER — AMIODARONE HYDROCHLORIDE 200 MG/1
TABLET ORAL
Qty: 60 TABLET | Refills: 2 | Status: SHIPPED | OUTPATIENT
Start: 2017-10-02 | End: 2018-05-04 | Stop reason: SDUPTHER

## 2017-10-02 NOTE — PROGRESS NOTES
Patient states compliant with regimen. Bleeding or thromboembolic side effects:  none. Significant med or dietary changes:  none. Significant recent illness or disease state changes:  none. PT/INR done in office per protocol. INR today is 2.6, within therapeutic range. Will continue current regimen and RTC in 3 weeks. Patient seen in room # 3. Patient understands dosing directions and information discussed. Dosing schedule given to patient. Progress note sent to referring office.

## 2017-10-04 ENCOUNTER — CARE COORDINATION (OUTPATIENT)
Dept: CASE MANAGEMENT | Age: 47
End: 2017-10-04

## 2017-10-04 ENCOUNTER — OFFICE VISIT (OUTPATIENT)
Dept: BARIATRICS/WEIGHT MGMT | Age: 47
End: 2017-10-04

## 2017-10-04 VITALS
WEIGHT: 280 LBS | BODY MASS INDEX: 40.09 KG/M2 | HEART RATE: 79 BPM | RESPIRATION RATE: 14 BRPM | SYSTOLIC BLOOD PRESSURE: 120 MMHG | TEMPERATURE: 98.1 F | DIASTOLIC BLOOD PRESSURE: 60 MMHG | HEIGHT: 70 IN

## 2017-10-04 DIAGNOSIS — I48.20 CHRONIC ATRIAL FIBRILLATION (HCC): Primary | ICD-10-CM

## 2017-10-04 DIAGNOSIS — N18.6 ESRD ON HEMODIALYSIS (HCC): ICD-10-CM

## 2017-10-04 DIAGNOSIS — Z99.2 ESRD ON HEMODIALYSIS (HCC): ICD-10-CM

## 2017-10-04 PROCEDURE — 99024 POSTOP FOLLOW-UP VISIT: CPT | Performed by: SURGERY

## 2017-10-04 NOTE — PROGRESS NOTES
status    Plan:  Patient to continue to strive for at least 60-80 grams of protien/day, and at least 48-64oz of fluid daily  Reinforced need for regular exercise  Reinforced need for consistency with MVI and Ca  Return in 4 weeks

## 2017-10-04 NOTE — PATIENT INSTRUCTIONS
Multivitamin/Mineral Recommendations    I will. .     Begin taking 2 bariatric multivitamins per day, or as prescribed on the bottle   Begin taking calcium citrate, working up to 3 times daily   Separate all multivitamin and calcium pill/chews by at least 2 hours

## 2017-10-04 NOTE — CARE COORDINATION
[facility] Transitions Follow Up Call    10/4/2017    Patient: Becka Avila  Patient : 1970   MRN: [id number]  Reason for Admission: S/P Gastric Sleeve  ischarge Date: 17 RARS: Risk Score: 15.5       Attempted to reach patient for subsequent transitional call. VM left to return call to [phone] or [phone]. Noted after opening chart that patient has f/u with surgeon today at 1:15 pm.  Left message with contact information and reminding of today's appointment  Care Transitions Subsequent and Final Call    Subsequent and Final Calls   Care Transitions Interventions   Other Interventions:                               Follow Up  Future Appointments  Date Time Provider Jonathan Castro   10/4/2017 1:15 PM Piter Rosales MD bariatric salgado [id number]   10/23/2017 9:20 AM [facility] MEDICATION MGMT [facility]   2017 8:45 AM Cathryn Unger MD Adult pc [facility]   2018 8:00 AM Halima Lara MD [facility] Marquez Perry RN

## 2017-10-04 NOTE — MR AVS SNAPSHOT
After Visit Summary             Dionte Avila   10/4/2017 1:15 PM   Office Visit    Description:  Male : 1970   Provider:  Rubio Ragsdale MD; [facility] BARIATRIC DIETICIAN   Department:  [facility]              Your Follow-Up and Future Appointments         Below is a list of your follow-up and future appointments. This may not be a complete list as you may have made appointments directly with providers that we are not aware of or your providers may have made some for you. Please call your providers to confirm appointments. It is important to keep your appointments. Please bring your current insurance card, photo ID, co-pay, and all medication bottles to your appointment. If self-pay, payment is expected at the time of service. Your To-Do List     Future Appointments Provider Department Dept Phone    10/23/2017 9:20 AM [address] Medication Management [phone]    2017 1:15 PM Rubio Ragsdale MD; [facility] [phone]    Please arrive 15 minutes prior to appointment time, bring insurance card and photo ID.     2017 8:45 AM Ozzy Solano MD [facility] Adult Primary Care [phone]    Please arrive 15 minutes prior to appointment, bring photo ID and insurance card. 2018 8:00 AM Claudette Almaraz MD [facility] [phone]    Please arrive 15 minutes prior to appointment time, bring insurance card and photo ID. Follow-Up    Return in about 1 month (around 2017).          Information from Your Visit        Department     Name Address Phone Fax    [facility] [address] [place] [address] [address] [phone]      You Were Seen for:         Comments    Chronic atrial fibrillation [facility])   [[id number]]         Vital Signs     Blood Pressure Pulse Temperature Respirations Height Weight 120/60 (Site: Left Arm, Position: Sitting, Cuff Size: Small Adult) 79 98.1 °F (36.7 °C) (Oral) 14 5' 10\" (1.778 m) 280 lb (127 kg)    Body Mass Index Smoking Status                40.18 kg/m2 Never Smoker          Additional Information about your Body Mass Index (BMI)           Your BMI as listed above is considered obese (30 or more). BMI is an estimate of body fat, calculated from your height and weight. The higher your BMI, the greater your risk of heart disease, high blood pressure, type 2 diabetes, stroke, gallstones, arthritis, sleep apnea, and certain cancers. BMI is not perfect. It may overestimate body fat in athletes and people who are more muscular. Even a small weight loss (between 5 and 10 percent of your current weight) by decreasing your calorie intake and becoming more physically active will help lower your risk of developing or worsening diseases associated with obesity. Learn more at: [email or web address]          Instructions    Multivitamin/Mineral Recommendations    I will. .     Begin taking 2 bariatric multivitamins per day, or as prescribed on the bottle   Begin taking calcium citrate, working up to 3 times daily   Separate all multivitamin and calcium pill/chews by at least 2 hours              Medications and Orders      Your Current Medications Are              amiodarone (CORDARONE) 200 MG tablet TAKE ONE TABLET BY MOUTH DAILY    warfarin (COUMADIN) 5 MG tablet Take 5 mg by mouth daily Coumadin clinic    ASPIR-LOW 81 MG EC tablet TAKE ONE TABLET BY MOUTH DAILY    B complex-vitamin C-folic acid (NEPHRO-JENNIFER) 1 MG tablet TAKE ONE TABLET BY MOUTH DAILY WITH BREAKFAST    midodrine (PROAMATINE) 5 MG tablet 10 mg 3 times daily For low blood pressure during dialysis    Cholecalciferol (VITAMIN D3) 66606 UNITS CAPS Take 1 capsule by mouth every 30 days       Allergies           No Known Allergies         Additional Information        Basic Information Date Of Birth Sex Race Ethnicity Preferred Language    1970 Male Black Non-/Non  English      Problem List as of 10/4/2017  Date Reviewed: 10/4/2017                Cor pulmonale, acute (HCC) (Chronic)    Morbid obesity with body mass index of 40.0-49.9 (HCC) (Chronic)    Chronic atrial fibrillation (HCC)    Warfarin-induced coagulopathy (HCC)    Urgency of urination    Hypotension    Hyperprolactinemia (HCC)    Erectile dysfunction due to arterial insufficiency    Gastroesophageal reflux disease    ESRD on hemodialysis ([address]    Hypothyroidism    Benign prostatic hyperplasia with lower urinary tract symptoms    Need for vaccination for H flu type B    Encounter regarding vascular access for dialysis for ESRD ([address]    SOB (shortness of breath)    Septic arthritis of shoulder, right ([address]    Cellulitis of lower leg    Lymphedema of leg    Gout      Your Goals as of 10/4/2017 at 1:43 PM                 Lifestyle    get bariatric procedure     Notes    Pt states he already has lap band but that he is getting work up for \"some type of surgery by Dr Augustus Foster". Immunizations as of 10/4/2017     Name Date    Influenza Vaccine, unspecified formulation 10/1/2015    Influenza, Mela Ebbs, 3 Years and older, IM 9/7/2016    Pneumococcal 13-valent Conjugate (Shella Burkitt) 5/6/2016      Preventive Care        Date Due    HIV screening is recommended for all people regardless of risk factors  aged 15-65 years at least once (lifetime) who have never been HIV tested. 6/24/1983    Tetanus Combination Vaccine (1 - Tdap) 6/24/1987    Yearly Flu Vaccine (1) 9/1/2017    Pneumococcal Vaccines (two) for adults aged 19-64  years who are immunocompromised or whose spleen is missing or not working  (2 of 3 - PPSV23) 2/21/2018 (Originally 7/1/2016)    Cholesterol Screening 2/10/2022            MyChart Signup           Our records indicate that you have an active [employer] account. You can view your After Visit Summary by going to [email or web address] and logging in with your Sling username and password. If you don't have a Sling username and password but a parent or guardian has access to your record, the parent or guardian should login with their own Sling username and password and access your record to view the After Visit Summary. Additional Information  If you have questions, please contact the physician practice where you receive care. Remember, Sling is NOT to be used for urgent needs. For medical emergencies, dial 911. For questions regarding your Sling account call [phone]. If you have a clinical question, please call your doctor's office.

## 2017-10-10 ENCOUNTER — CARE COORDINATION (OUTPATIENT)
Dept: CASE MANAGEMENT | Age: 47
End: 2017-10-10

## 2017-10-23 ENCOUNTER — HOSPITAL ENCOUNTER (OUTPATIENT)
Dept: PHARMACY | Age: 47
Setting detail: THERAPIES SERIES
Discharge: HOME | End: 2017-10-23
Payer: MEDICARE

## 2017-10-23 ENCOUNTER — HOSPITAL ENCOUNTER (EMERGENCY)
Age: 47
Discharge: HOME | End: 2017-10-23
Attending: EMERGENCY MEDICINE
Payer: MEDICARE

## 2017-10-23 VITALS
RESPIRATION RATE: 16 BRPM | DIASTOLIC BLOOD PRESSURE: 83 MMHG | BODY MASS INDEX: 38.17 KG/M2 | OXYGEN SATURATION: 100 % | SYSTOLIC BLOOD PRESSURE: 119 MMHG | TEMPERATURE: 97.3 F | HEART RATE: 77 BPM | WEIGHT: 266 LBS

## 2017-10-23 DIAGNOSIS — B02.9 HERPES ZOSTER WITHOUT COMPLICATION: Primary | ICD-10-CM

## 2017-10-23 LAB
INR BLD: 2.6
PROTIME: 30.6 SECONDS

## 2017-10-23 PROCEDURE — 99211 OFF/OP EST MAY X REQ PHY/QHP: CPT

## 2017-10-23 PROCEDURE — 99283 EMERGENCY DEPT VISIT LOW MDM: CPT

## 2017-10-23 PROCEDURE — 85610 PROTHROMBIN TIME: CPT

## 2017-10-23 PROCEDURE — 6370000000 HC RX 637 (ALT 250 FOR IP): Performed by: EMERGENCY MEDICINE

## 2017-10-23 RX ORDER — PROPARACAINE HYDROCHLORIDE 5 MG/ML
1 SOLUTION/ DROPS OPHTHALMIC ONCE
Status: COMPLETED | OUTPATIENT
Start: 2017-10-23 | End: 2017-10-23

## 2017-10-23 RX ORDER — ACYCLOVIR 200 MG/1
800 CAPSULE ORAL ONCE
Status: DISCONTINUED | OUTPATIENT
Start: 2017-10-23 | End: 2017-10-23 | Stop reason: HOSPADM

## 2017-10-23 RX ORDER — CIPROFLOXACIN HYDROCHLORIDE 3.5 MG/ML
1 SOLUTION/ DROPS TOPICAL
COMMUNITY
End: 2017-12-18 | Stop reason: ALTCHOICE

## 2017-10-23 RX ORDER — ACYCLOVIR 800 MG/1
800 TABLET ORAL
Qty: 46 TABLET | Refills: 0 | Status: SHIPPED | OUTPATIENT
Start: 2017-10-23 | End: 2017-11-02

## 2017-10-23 RX ADMIN — PROPARACAINE HYDROCHLORIDE 1 DROP: 5 SOLUTION/ DROPS OPHTHALMIC at 08:07

## 2017-10-23 ASSESSMENT — PAIN DESCRIPTION - DESCRIPTORS: DESCRIPTORS: DISCOMFORT

## 2017-10-23 ASSESSMENT — VISUAL ACUITY
OU: 20/25
OS: 20/40
OD: 20/40

## 2017-10-23 ASSESSMENT — PAIN SCALES - GENERAL: PAINLEVEL_OUTOF10: 7

## 2017-10-23 ASSESSMENT — ENCOUNTER SYMPTOMS
EYE PAIN: 0
EYE REDNESS: 1
SORE THROAT: 0
BACK PAIN: 0
NAUSEA: 0
VOMITING: 0
SHORTNESS OF BREATH: 0

## 2017-10-23 ASSESSMENT — PAIN DESCRIPTION - PAIN TYPE: TYPE: ACUTE PAIN

## 2017-10-23 ASSESSMENT — PAIN DESCRIPTION - ORIENTATION: ORIENTATION: LEFT;RIGHT

## 2017-10-23 ASSESSMENT — PAIN DESCRIPTION - LOCATION: LOCATION: EYE;HEAD

## 2017-10-23 NOTE — ED NOTES
Fluorescein and proparacaine placed at bedside      [facility] [place]  10/23/17 [id number]

## 2017-10-23 NOTE — ED PROVIDER NOTES
[facility]  Faculty Handoff       Handoff taken on the following patient    Pt Name: Coltete Sims  PCP:  Mere Cabello MD      [facility]       Chief Complaint   Patient presents with    Eye Pain    Headache         CURRENT MEDICATIONS     Previous Medications  Previous Medications    AMIODARONE (CORDARONE) 200 MG TABLET    TAKE ONE TABLET BY MOUTH DAILY    ASPIR-LOW 81 MG EC TABLET    TAKE ONE TABLET BY MOUTH DAILY    B COMPLEX-VITAMIN C-FOLIC ACID (NEPHRO-JENNIFER) 1 MG TABLET    TAKE ONE TABLET BY MOUTH DAILY WITH BREAKFAST    CHOLECALCIFEROL (VITAMIN D3) 23607 UNITS CAPS    Take 1 capsule by mouth every 30 days     CIPROFLOXACIN (CILOXAN) 0.3 % OPHTHALMIC SOLUTION    1 drop every 2 hours    GENTAMICIN (GARAMYCIN) 0.3 % OPHTHALMIC OINTMENT    0.5 inches 3 times daily 3 times daily. MIDODRINE (PROAMATINE) 5 MG TABLET    10 mg 3 times daily For low blood pressure during dialysis    WARFARIN (COUMADIN) 5 MG TABLET    Take 5 mg by mouth daily Coumadin clinic       Encounter Medications  Orders Placed This Encounter   Medications    fluorescein ophthalmic strip 1 mg    proparacaine (ALCAINE) 0.5 % ophthalmic solution 1 drop    acyclovir (ZOVIRAX) 800 MG tablet     Sig: Take 1 tablet by mouth 5 times daily for 10 days     Dispense:  46 tablet     Refill:  0    acyclovir (ZOVIRAX) capsule 800 mg       ALLERGIES     has No Known Allergies. RECENT VITALS:   Temp: 97.3 °F (36.3 °C),  Pulse: 77, Resp: 16, BP: 119/83      LABS:  Labs Reviewed - No data to display        PLAN/ TASKS OUTSTANDING     Handoff patient  Verbal report taken  No outstanding tasks    Awaiiting disposition        (Please note that portions of this note were completed with a voice recognition program.  Efforts were made to edit the dictations but occasionally words are mis-transcribed.)    Torres MD, F.A.C.E.P.   Attending Emergency Physician                   Grazyna Cadet MD  10/23/17 0521

## 2017-10-23 NOTE — ED PROVIDER NOTES
[facility]  ED  Emergency Department Encounter  Emergency Medicine Resident     Pt Name: Francine Rivera  MRN: [id number]  Lincolngfjerry 1970  Date of evaluation: 10/23/17  PCP:  Rafat Anne MD    [address] [place] [address]       Chief Complaint   Patient presents with    Eye Pain    Headache       HISTORY OF PRESENT ILLNESS  (Location/Symptom, Timing/Onset, Context/Setting, Quality, Duration, Modifying Factors, Severity.)      Francine Rivera is a 50 y.o. male who presents with Right eye pain. Patient states that this started on Thursday with some discomfort while he was getting his haircut. Patient thought that he had formed body in his eye and was seen at urgent care on Saturday. Patient states that he had dye and examination of his right eye and was discharged home with Cipro and gentamicin drops. Patient states that the pain persisted and now is going to the right side of his forehead into his hairline. Patient denies any vision changes. PAST MEDICAL / SURGICAL / SOCIAL / FAMILY HISTORY      has a past medical history of Anemia; Arthritis; Atrial fibrillation ([address] BPH (benign prostatic hypertrophy); CAD (coronary artery disease); Caffeine use; Cellulitis of lower leg; Chronic back pain; Cor pulmonale, acute ([address] CRF (chronic renal failure); Erectile dysfunction; Gout; Hemodialysis patient ([address] History of blood transfusion; Hyperkalemia; Hypertension; Hypothyroidism; Lymphedema of leg; Obesity; and Thyroid disease. has a past surgical history that includes Appendectomy; Lap Band (2007); Abscess Drainage (Right, 01/20/2014);  cath power picc single (1/24/2014); Dialysis fistula creation (Right, 3/27/15); shoulder surgery (Right, 2014); Bariatric Surgery (01/17/2017); vascular surgery (Right); Endoscopy, colon, diagnostic; Sleeve Gastrectomy (09/25/2017); and Sleeve Gastrectomy (N/A, 9/25/2017).     Social History     Social History    Marital status: Single     Spouse name: N/A    Number of children: N/A    Years of education: N/A     Occupational History    Not on file. Social History Main Topics    Smoking status: Never Smoker    Smokeless tobacco: Never Used    Alcohol use Yes      Comment: 2 drinks friday and sat.  Drug use: No    Sexual activity: Yes     Partners: Female     Other Topics Concern    Not on file     Social History Narrative    No narrative on file       Family History   Problem Relation Age of Onset    Cancer Father      leukemia    Heart Failure Mother     Arthritis Mother     High Blood Pressure Mother     Heart Disease Maternal Grandmother     Stroke Paternal Grandfather        Allergies:  Review of patient's allergies indicates no known allergies. Home Medications:  Prior to Admission medications    Medication Sig Start Date End Date Taking? Authorizing Provider   ciprofloxacin (CILOXAN) 0.3 % ophthalmic solution 1 drop every 2 hours   Yes Historical Provider, MD   gentamicin (GARAMYCIN) 0.3 % ophthalmic ointment 0.5 inches 3 times daily 3 times daily.    Yes Historical Provider, MD   acyclovir (ZOVIRAX) 800 MG tablet Take 1 tablet by mouth 5 times daily for 10 days 10/23/17 11/2/17 Yes John Wilcox MD   amiodarone (CORDARONE) 200 MG tablet TAKE ONE TABLET BY MOUTH DAILY 10/2/17   Zonia Andrew MD   warfarin (COUMADIN) 5 MG tablet Take 5 mg by mouth daily Coumadin clinic    Historical Provider, MD   ASPIR-LOW 81 MG EC tablet TAKE ONE TABLET BY MOUTH DAILY 8/4/17   Chelly Dyer MD   B complex-vitamin C-folic acid (NEPHRO-JENNIFER) 1 MG tablet TAKE ONE TABLET BY MOUTH DAILY WITH BREAKFAST 4/17/17   Chelly Dyer MD   midodrine (PROAMATINE) 5 MG tablet 10 mg 3 times daily For low blood pressure during dialysis 12/15/16   Historical Provider, MD   Cholecalciferol (VITAMIN D3) 43113 UNITS CAPS Take 1 capsule by mouth every 30 days     Historical Provider, MD       REVIEW OF SYSTEMS    (2-9 systems for level 4, 10 or more for level 5)      Review of Systems   Constitutional: Negative for chills and fever. HENT: Negative for sore throat. Eyes: Positive for redness. Negative for pain and visual disturbance. Respiratory: Negative for shortness of breath. Cardiovascular: Negative for chest pain. Gastrointestinal: Negative for nausea and vomiting. Musculoskeletal: Negative for back pain. Skin: Positive for rash. Neurological: Positive for headaches. Negative for dizziness, syncope and weakness. PHYSICAL EXAM   (up to 7 for level 4, 8 or more for level 5)      INITIAL VITALS:   /83   Pulse 77   Temp 97.3 °F (36.3 °C) (Oral)   Resp 16   Wt 266 lb (120.7 kg)   SpO2 100%   BMI 38.17 kg/m²     Physical Exam   Constitutional: He is oriented to person, place, and time. He appears well-developed and well-nourished. HENT:   Head: Normocephalic and atraumatic. Eyes: EOM are normal. Pupils are equal, round, and reactive to light. Right eye exhibits no discharge. Left eye exhibits no discharge. Right conjunctiva is injected. Right conjunctiva has no hemorrhage. No scleral icterus. Fundoscopic exam:       The right eye shows no exudate and no papilledema. Slit lamp exam:       The right eye shows no corneal abrasion, no corneal flare, no corneal ulcer, no foreign body, no fluorescein uptake and no anterior chamber bulge. IOP OD 22, OS 22, Visual acuity OD 20/40, OS 20/40 20/25 bilateral   Neurological: He is alert and oriented to person, place, and time. Skin: Skin is warm. No rash noted.         DIFFERENTIAL  DIAGNOSIS     PLAN (LABS / IMAGING / EKG):  Orders Placed This Encounter   Procedures    Visual acuity screening       MEDICATIONS ORDERED:  Orders Placed This Encounter   Medications    DISCONTD: fluorescein ophthalmic strip 1 mg    proparacaine (ALCAINE) 0.5 % ophthalmic solution 1 drop    acyclovir (ZOVIRAX) 800 MG tablet     Sig: Take 1 tablet by mouth 5 times daily for 10 days     Dispense:  46 tablet     Refill:  0    DISCONTD: acyclovir (ZOVIRAX) capsule 800 mg       DDX: Shingles, corneal abrasion, uveitis, keratitis, EKC    DIAGNOSTIC RESULTS / EMERGENCY DEPARTMENT COURSE / [facility]     LABS:  No results found for this visit on 10/23/17. IMPRESSION: 68-year-old male presenting with right-sided eye pain. Plan for sit lamp examination staining of the eye intraocular pressures visual acuity. Impression possible shingles given that patient is having lesions to in the V1 distribution on the right side of the face with multiple ossicles. RADIOLOGY:  None     EKG  None    EMERGENCY DEPARTMENT COURSE:  723: Patient updated on examination findings. Patient to be started on acyclovir and have follow-up with ophthalmology for further evaluation of his symptoms. Advised patient to continue using the drops and ointment. Patient agreeable with this plan and wishes to be discharged at this time. PROCEDURES:  Procedures    CONSULTS:  None    CRITICAL CARE:  None    FINAL IMPRESSION      1.  Herpes zoster without complication          DISPOSITION / PLAN     DISPOSITION Decision to Discharge    PATIENT REFERRED TO:  Acacia Anderson MD  [address] [address] [address]  [address] [facility] [address]      for further evaluation of your right eye      DISCHARGE MEDICATIONS:  Discharge Medication List as of 10/23/2017  7:35 AM      START taking these medications    Details   acyclovir (ZOVIRAX) 800 MG tablet Take 1 tablet by mouth 5 times daily for 10 days, Disp-46 tablet, R-0Print             Johnna Hood MD  Emergency Medicine Resident    (Please note that portions of this note were completed with a voice recognition program.  Efforts were made to edit the dictations but occasionally words are mis-transcribed.)        Johnna Hood MD  Resident  10/23/17 8981

## 2017-10-24 ENCOUNTER — CARE COORDINATION (OUTPATIENT)
Dept: CARE COORDINATION | Age: 47
End: 2017-10-24

## 2017-10-26 ENCOUNTER — HOSPITAL ENCOUNTER (EMERGENCY)
Age: 47
Discharge: HOME | End: 2017-10-26
Attending: EMERGENCY MEDICINE
Payer: MEDICARE

## 2017-10-26 VITALS
DIASTOLIC BLOOD PRESSURE: 81 MMHG | BODY MASS INDEX: 38.08 KG/M2 | HEIGHT: 70 IN | WEIGHT: 266 LBS | RESPIRATION RATE: 15 BRPM | HEART RATE: 95 BPM | OXYGEN SATURATION: 97 % | TEMPERATURE: 98.2 F | SYSTOLIC BLOOD PRESSURE: 123 MMHG

## 2017-10-26 DIAGNOSIS — B02.9 HERPES ZOSTER WITHOUT COMPLICATION: Primary | ICD-10-CM

## 2017-10-26 PROCEDURE — 99283 EMERGENCY DEPT VISIT LOW MDM: CPT

## 2017-10-26 RX ORDER — PROPARACAINE HYDROCHLORIDE 5 MG/ML
1 SOLUTION/ DROPS OPHTHALMIC ONCE
Status: DISCONTINUED | OUTPATIENT
Start: 2017-10-26 | End: 2017-10-26 | Stop reason: HOSPADM

## 2017-10-26 ASSESSMENT — ENCOUNTER SYMPTOMS
SORE THROAT: 0
COLOR CHANGE: 0
WHEEZING: 0
COUGH: 0
FACIAL SWELLING: 0
PHOTOPHOBIA: 0
SHORTNESS OF BREATH: 0
DIARRHEA: 0
CONSTIPATION: 0
VOMITING: 0
ABDOMINAL PAIN: 0
CHEST TIGHTNESS: 0
NAUSEA: 0
BLOOD IN STOOL: 0
ABDOMINAL DISTENTION: 0
RECTAL PAIN: 0
EYE DISCHARGE: 1
EYE REDNESS: 1

## 2017-10-26 ASSESSMENT — VISUAL ACUITY
OD: 20/30
OS: 20/40

## 2017-10-26 ASSESSMENT — PAIN DESCRIPTION - DESCRIPTORS: DESCRIPTORS: DULL;PRESSURE

## 2017-10-26 ASSESSMENT — PAIN DESCRIPTION - PAIN TYPE: TYPE: ACUTE PAIN

## 2017-10-26 ASSESSMENT — PAIN SCALES - GENERAL: PAINLEVEL_OUTOF10: 4

## 2017-10-26 ASSESSMENT — PAIN DESCRIPTION - LOCATION: LOCATION: EYE

## 2017-10-26 ASSESSMENT — PAIN DESCRIPTION - ORIENTATION: ORIENTATION: RIGHT

## 2017-10-26 NOTE — ED PROVIDER NOTES
diagnostic; Sleeve Gastrectomy (09/25/2017); and Sleeve Gastrectomy (N/A, 9/25/2017). Social History     Social History    Marital status: Single     Spouse name: N/A    Number of children: N/A    Years of education: N/A     Occupational History    Not on file. Social History Main Topics    Smoking status: Never Smoker    Smokeless tobacco: Never Used    Alcohol use Yes      Comment: 2 drinks friday and sat.  Drug use: No    Sexual activity: Yes     Partners: Female     Other Topics Concern    Not on file     Social History Narrative    No narrative on file       Family History   Problem Relation Age of Onset    Cancer Father      leukemia    Heart Failure Mother     Arthritis Mother     High Blood Pressure Mother     Heart Disease Maternal Grandmother     Stroke Paternal Grandfather        Allergies:  Review of patient's allergies indicates no known allergies. Home Medications:  Prior to Admission medications    Medication Sig Start Date End Date Taking? Authorizing Provider   ciprofloxacin (CILOXAN) 0.3 % ophthalmic solution 1 drop every 2 hours    Historical Provider, MD   gentamicin (GARAMYCIN) 0.3 % ophthalmic ointment 0.5 inches 3 times daily 3 times daily.     Historical Provider, MD   acyclovir (ZOVIRAX) 800 MG tablet Take 1 tablet by mouth 5 times daily for 10 days 10/23/17 11/2/17  Bart Jang MD   amiodarone (CORDARONE) 200 MG tablet TAKE ONE TABLET BY MOUTH DAILY 10/2/17   Ermalene Bamberger, MD   warfarin (COUMADIN) 5 MG tablet Take 5 mg by mouth daily Coumadin clinic    Historical Provider, MD   ASPIR-LOW 81 MG EC tablet TAKE ONE TABLET BY MOUTH DAILY 8/4/17   Anu Castañeda MD   B complex-vitamin C-folic acid (NEPHRO-JENNIFER) 1 MG tablet TAKE ONE TABLET BY MOUTH DAILY WITH BREAKFAST 4/17/17   Anu Castañeda MD   midodrine (PROAMATINE) 5 MG tablet 10 mg 3 times daily For low blood pressure during dialysis 12/15/16   Historical Provider, MD   Cholecalciferol yesterday, the patient was in agreement with this plan and discharged in stable condition. PROCEDURES:  None    CONSULTS:  None    CRITICAL CARE:  None    FINAL IMPRESSION      1.  Herpes zoster without complication          DISPOSITION / PLAN     DISPOSITION Decision To Discharge    PATIENT REFERRED TO:  Mina Dorsey MD  [address] [address] [address] [place] [phone]    Schedule an appointment as soon as possible for a visit   As needed    Emma Smith MD  [facility] [address]    Schedule an appointment as soon as possible for a visit today        DISCHARGE MEDICATIONS:  Discharge Medication List as of 10/26/2017  6:20 AM          Wei Castillo DO  Emergency Medicine Resident    (Please note that portions of this note were completed with a voice recognition program.  Efforts were made to edit the dictations but occasionally words are mis-transcribed.)        Wei Castillo DO  10/26/17 6161

## 2017-10-26 NOTE — ED TRIAGE NOTES
Pt arrived to ED reporting right eye swelling that started Thursday 10/19. Pt went to urgent care Saturday 10/21 and was given antibiotics. Pt reports by Sunday he started having headaches and no improvement in symptoms. Pt reports he came to ER Monday 10/23 and was told he has shingles and given script. Pt here today stating symptoms have not improved.

## 2017-10-27 ENCOUNTER — CARE COORDINATION (OUTPATIENT)
Dept: CARE COORDINATION | Age: 47
End: 2017-10-27

## 2017-10-27 NOTE — CARE COORDINATION
Patient was called to follow up with most recent ER visit. There was no answer. A message was left on voicemail to have patient call back regarding ER visit. Office number given [phone].

## 2017-11-01 ENCOUNTER — OFFICE VISIT (OUTPATIENT)
Dept: BARIATRICS/WEIGHT MGMT | Age: 47
End: 2017-11-01

## 2017-11-01 VITALS
DIASTOLIC BLOOD PRESSURE: 70 MMHG | HEART RATE: 70 BPM | BODY MASS INDEX: 38.08 KG/M2 | HEIGHT: 70 IN | WEIGHT: 266 LBS | RESPIRATION RATE: 20 BRPM | SYSTOLIC BLOOD PRESSURE: 110 MMHG

## 2017-11-01 DIAGNOSIS — T45.515A WARFARIN-INDUCED COAGULOPATHY (HCC): ICD-10-CM

## 2017-11-01 DIAGNOSIS — N18.6 ESRD ON HEMODIALYSIS (HCC): ICD-10-CM

## 2017-11-01 DIAGNOSIS — I10 ESSENTIAL HYPERTENSION: ICD-10-CM

## 2017-11-01 DIAGNOSIS — K21.9 GASTROESOPHAGEAL REFLUX DISEASE WITHOUT ESOPHAGITIS: Primary | ICD-10-CM

## 2017-11-01 DIAGNOSIS — Z99.2 ESRD ON HEMODIALYSIS (HCC): ICD-10-CM

## 2017-11-01 DIAGNOSIS — D68.32 WARFARIN-INDUCED COAGULOPATHY (HCC): ICD-10-CM

## 2017-11-01 PROCEDURE — 99024 POSTOP FOLLOW-UP VISIT: CPT | Performed by: SURGERY

## 2017-11-02 NOTE — PROGRESS NOTES
Medical Nutrition Therapy  Metabolic and Bariatric surgery  1 month after surgery follow up note         Pt reports: no issues tolerating anything. Discussed reducing protein shakes to add it protein foods. Pt agreeable. Reviewed multivitamin and calcium recommendtion        Vitals:   Vitals:    11/01/17 1314   BP: 110/70   Site: Left Arm   Position: Sitting   Cuff Size: Large Adult   Pulse: 70   Resp: 20   Weight: 266 lb (120.7 kg)   Height: 5' 10\" (1.778 m)      Body mass index is 38.17 kg/m². Labs reviewed:     Multivitamin/mineral intake:2 Celebrate Multicomplete with iron chewable lozenge daily  Calcium intake:   2BA calcium citrate 500mg chewable lozenge daily  Other:            Nutrition Assessment:   PES: Inadequate food and beverage intake r/t WLS as evidenced by loss of excess body weight lost 25 lbs over 1 month.       Goals   60-80gm of protein  48-64oz of fluid     [x] met     []  Not met        Plan:  Questions answered re diet advancement and tolerance  F/u 3 months after surgery         Sue Cortes
of protien/day, and at least 48-64oz of fluid daily  Reinforced need for regular exercise  Reinforced need for consistency with MVI and Ca  Return in 2 months

## 2017-11-20 ENCOUNTER — APPOINTMENT (OUTPATIENT)
Dept: PHARMACY | Age: 47
End: 2017-11-20
Payer: MEDICARE

## 2017-11-21 ENCOUNTER — HOSPITAL ENCOUNTER (OUTPATIENT)
Dept: PHARMACY | Age: 47
Setting detail: THERAPIES SERIES
Discharge: HOME | End: 2017-11-21
Payer: MEDICARE

## 2017-11-21 DIAGNOSIS — I82.409 DEEP VEIN THROMBOSIS (DVT) OF LOWER EXTREMITY, UNSPECIFIED CHRONICITY, UNSPECIFIED LATERALITY, UNSPECIFIED VEIN (HCC): ICD-10-CM

## 2017-11-21 LAB
INR BLD: 1.3
PROTIME: 16.1 SECONDS

## 2017-11-21 PROCEDURE — 99211 OFF/OP EST MAY X REQ PHY/QHP: CPT

## 2017-11-21 PROCEDURE — 85610 PROTHROMBIN TIME: CPT

## 2017-11-21 NOTE — PROGRESS NOTES
Patient states NON-compliant with regimen. Missed 3 doses because he was sick. Bleeding or thromboembolic side effects:  none. Significant med or dietary changes:  Used ryan seltzer while sick last week. Significant recent illness or disease state changes:  none. PT/INR done in office per protocol. INR today is 1.3, below therapeutic goal 2.0-3.0, explained by 3 missed doses. Will boost today with 10mg and tomorrow with 7.5mg then continue current regimen 5mg every day and RTC in 1 week. Patient seen in room # 2. Patient understands dosing directions and information discussed. Dosing schedule given to patient. Progress note sent to referring office.

## 2017-11-29 ENCOUNTER — HOSPITAL ENCOUNTER (OUTPATIENT)
Dept: PHARMACY | Age: 47
Setting detail: THERAPIES SERIES
Discharge: HOME | End: 2017-11-29
Payer: MEDICARE

## 2017-11-29 DIAGNOSIS — I82.409 DEEP VEIN THROMBOSIS (DVT) OF LOWER EXTREMITY, UNSPECIFIED CHRONICITY, UNSPECIFIED LATERALITY, UNSPECIFIED VEIN (HCC): ICD-10-CM

## 2017-11-29 LAB
INR BLD: 2.2
PROTIME: 27 SECONDS

## 2017-11-29 PROCEDURE — 85610 PROTHROMBIN TIME: CPT

## 2017-11-29 PROCEDURE — 99211 OFF/OP EST MAY X REQ PHY/QHP: CPT

## 2017-11-29 NOTE — PROGRESS NOTES
Patient states compliant with regimen. Bleeding or thromboembolic side effects:  none. Significant med or dietary changes:  none. Significant recent illness or disease state changes:  none. PT/INR done in office per protocol. INR today is 2.2, within therapeutic range 2.0-3.0. Will continue current regimen 5mg every day and RTC in 3 weeks as patient was previously stable on this regimen and the low INR was an isolated event due to missed doses. Patient seen in room # 2. Patient understands dosing directions and information discussed. Dosing schedule given to patient. Progress note sent to referring office.

## 2017-12-01 RX ORDER — VIT B COMP NO.3/FOLIC/C/BIOTIN 1 MG-60 MG
TABLET ORAL
Qty: 30 TABLET | Refills: 1 | Status: SHIPPED | OUTPATIENT
Start: 2017-12-01 | End: 2018-01-31 | Stop reason: SDUPTHER

## 2017-12-18 ENCOUNTER — OFFICE VISIT (OUTPATIENT)
Dept: INTERNAL MEDICINE CLINIC | Age: 47
End: 2017-12-18
Payer: MEDICARE

## 2017-12-18 VITALS
OXYGEN SATURATION: 96 % | RESPIRATION RATE: 15 BRPM | BODY MASS INDEX: 37.14 KG/M2 | HEART RATE: 58 BPM | SYSTOLIC BLOOD PRESSURE: 90 MMHG | WEIGHT: 259.4 LBS | HEIGHT: 70 IN | DIASTOLIC BLOOD PRESSURE: 61 MMHG

## 2017-12-18 DIAGNOSIS — T45.515A WARFARIN-INDUCED COAGULOPATHY (HCC): ICD-10-CM

## 2017-12-18 DIAGNOSIS — M10.00 IDIOPATHIC GOUT, UNSPECIFIED CHRONICITY, UNSPECIFIED SITE: ICD-10-CM

## 2017-12-18 DIAGNOSIS — Z98.84 S/P LAPAROSCOPIC SLEEVE GASTRECTOMY: ICD-10-CM

## 2017-12-18 DIAGNOSIS — E03.8 OTHER SPECIFIED HYPOTHYROIDISM: ICD-10-CM

## 2017-12-18 DIAGNOSIS — I48.20 CHRONIC ATRIAL FIBRILLATION (HCC): ICD-10-CM

## 2017-12-18 DIAGNOSIS — N40.1 BENIGN PROSTATIC HYPERPLASIA WITH LOWER URINARY TRACT SYMPTOMS, SYMPTOM DETAILS UNSPECIFIED: ICD-10-CM

## 2017-12-18 DIAGNOSIS — K21.9 GASTROESOPHAGEAL REFLUX DISEASE WITHOUT ESOPHAGITIS: ICD-10-CM

## 2017-12-18 DIAGNOSIS — D68.32 WARFARIN-INDUCED COAGULOPATHY (HCC): ICD-10-CM

## 2017-12-18 DIAGNOSIS — Z99.2 CKD (CHRONIC KIDNEY DISEASE) REQUIRING CHRONIC DIALYSIS (HCC): ICD-10-CM

## 2017-12-18 DIAGNOSIS — N18.6 CKD (CHRONIC KIDNEY DISEASE) REQUIRING CHRONIC DIALYSIS (HCC): ICD-10-CM

## 2017-12-18 DIAGNOSIS — I10 ESSENTIAL HYPERTENSION: Primary | ICD-10-CM

## 2017-12-18 DIAGNOSIS — R55 NEUROCARDIOGENIC SYNCOPE: ICD-10-CM

## 2017-12-18 PROBLEM — R39.15 URGENCY OF URINATION: Status: RESOLVED | Noted: 2017-05-31 | Resolved: 2017-12-18

## 2017-12-18 PROCEDURE — G8427 DOCREV CUR MEDS BY ELIG CLIN: HCPCS | Performed by: FAMILY MEDICINE

## 2017-12-18 PROCEDURE — 1036F TOBACCO NON-USER: CPT | Performed by: FAMILY MEDICINE

## 2017-12-18 PROCEDURE — 99214 OFFICE O/P EST MOD 30 MIN: CPT | Performed by: FAMILY MEDICINE

## 2017-12-18 PROCEDURE — G8417 CALC BMI ABV UP PARAM F/U: HCPCS | Performed by: FAMILY MEDICINE

## 2017-12-18 PROCEDURE — G8484 FLU IMMUNIZE NO ADMIN: HCPCS | Performed by: FAMILY MEDICINE

## 2017-12-18 RX ORDER — ASPIRIN 81 MG/1
81 TABLET ORAL DAILY
Qty: 90 TABLET | Refills: 1 | Status: SHIPPED | OUTPATIENT
Start: 2017-12-18 | End: 2018-08-02 | Stop reason: SDUPTHER

## 2017-12-18 RX ORDER — ACYCLOVIR 400 MG/1
TABLET ORAL
Refills: 0 | COMMUNITY
Start: 2017-11-13 | End: 2017-12-18 | Stop reason: ALTCHOICE

## 2017-12-18 RX ORDER — CINACALCET HYDROCHLORIDE 30 MG/1
TABLET, COATED ORAL
Status: ON HOLD | COMMUNITY
Start: 2017-11-20 | End: 2018-11-15

## 2017-12-18 RX ORDER — SUCROFERRIC OXYHYDROXIDE 500 MG/1
TABLET, CHEWABLE ORAL
Status: ON HOLD | COMMUNITY
Start: 2017-12-05 | End: 2018-11-15

## 2017-12-18 ASSESSMENT — ENCOUNTER SYMPTOMS
GASTROINTESTINAL NEGATIVE: 1
EYES NEGATIVE: 1
ALLERGIC/IMMUNOLOGIC NEGATIVE: 1
RESPIRATORY NEGATIVE: 1

## 2017-12-18 NOTE — PROGRESS NOTES
Chronic Disease Visit Information    BP Readings from Last 3 Encounters:   12/18/17 90/61   11/01/17 110/70   10/26/17 123/81          Hemoglobin A1C (%)   Date Value   09/07/2016 4.9   12/13/2013 5.2   11/13/2013 5.6     Microalb/Crt. Ratio (mcg/mg creat)   Date Value   10/04/2011 591     LDL Cholesterol (mg/dL)   Date Value   02/10/2017 183 (H)     HDL (mg/dL)   Date Value   02/10/2017 80     BUN (mg/dL)   Date Value   09/26/2017 63 (H)     CREATININE (mg/dL)   Date Value   09/26/2017 15.72 (HH)     Glucose (mg/dL)   Date Value   09/26/2017 89   10/04/2011 91            Have you changed or started any medications since your last visit including any over-the-counter medicines, vitamins, or herbal medicines? no   Are you having any side effects from any of your medications? -  no  Have you stopped taking any of your medications? Is so, why? -  no    Have you seen any other physician or provider since your last visit? No  Have you had any other diagnostic tests since your last visit? No  Have you been seen in the emergency room and/or had an admission to a hospital since we last saw you? No  Have you had your annual diabetic retinal (eye) exam? Yes - Records Requested  Have you had your routine dental cleaning in the past 6 months? no    Have you activated your [facility] account? If not, what are your barriers?  Yes     Patient Care Team:  Kaity Bocanegra MD as PCP - General (Family Medicine)  Kaity Bocanegra MD as PCP - [facility] Attributed Provider  [facility] as Pharmacist  Esa Montero MD as Consulting Physician (Urology)  Caitlyn Lobo MD as Consulting Physician (Nephrology)  Dariela Hackett MD as Consulting Physician (Cardiology)         Medical History Review  Past Medical, Family, and Social History reviewed and does contribute to the patient presenting condition    Health Maintenance   Topic Date Due    HIV screen  06/24/1983    DTaP/Tdap/Td vaccine (1 - Tdap) 06/24/1987    Flu vaccine (1) 09/01/2017    Pneumococcal highest risk (2 of 3 - PPSV23) 02/21/2018 (Originally 7/1/2016)    Lipid screen  02/10/2022

## 2017-12-18 NOTE — PROGRESS NOTES
Subjective:      Patient ID: Peter Feldman is a 50 y.o. male. Hypertension   This is a chronic problem. The current episode started more than 1 month ago. The problem has been gradually improving since onset. The problem is controlled. Associated symptoms include anxiety. Risk factors for coronary artery disease include male gender. Past treatments include beta blockers. The current treatment provides moderate improvement. There are no compliance problems. Hypertensive end-organ damage includes kidney disease. Identifiable causes of hypertension include chronic renal disease. Review of Systems   Constitutional: Negative. HENT: Negative. Eyes: Negative. Respiratory: Negative. Cardiovascular: Negative. Gastrointestinal: Negative. Endocrine: Negative. Musculoskeletal: Negative. Skin: Negative. Allergic/Immunologic: Negative. Neurological: Negative. Hematological: Negative. Psychiatric/Behavioral: Negative. Past family and social history unremarkable. Objective:   Physical Exam   Constitutional: He is oriented to person, place, and time. He appears well-developed and well-nourished. HENT:   Head: Normocephalic and atraumatic. Right Ear: External ear normal.   Left Ear: External ear normal.   Nose: Nose normal.   Mouth/Throat: Oropharynx is clear and moist.   Eyes: Conjunctivae and EOM are normal. Pupils are equal, round, and reactive to light. Neck: Normal range of motion. Neck supple. Cardiovascular: Normal rate, regular rhythm, normal heart sounds and intact distal pulses. Pulmonary/Chest: Effort normal and breath sounds normal.   Abdominal: Soft. Bowel sounds are normal.   Musculoskeletal: Normal range of motion. Neurological: He is alert and oriented to person, place, and time. He has normal reflexes. Skin: Skin is warm and dry. Psychiatric: He has a normal mood and affect.  His behavior is normal. Thought content normal.       Assessment:

## 2017-12-20 ENCOUNTER — HOSPITAL ENCOUNTER (OUTPATIENT)
Dept: PHARMACY | Age: 47
Setting detail: THERAPIES SERIES
Discharge: HOME | End: 2017-12-20
Payer: MEDICARE

## 2017-12-20 DIAGNOSIS — I82.409 DEEP VEIN THROMBOSIS (DVT) OF LOWER EXTREMITY, UNSPECIFIED CHRONICITY, UNSPECIFIED LATERALITY, UNSPECIFIED VEIN (HCC): ICD-10-CM

## 2017-12-20 LAB
INR BLD: 2.7
PROTIME: 32.9 SECONDS

## 2017-12-20 PROCEDURE — 85610 PROTHROMBIN TIME: CPT

## 2017-12-20 PROCEDURE — 99211 OFF/OP EST MAY X REQ PHY/QHP: CPT

## 2017-12-20 NOTE — PROGRESS NOTES
Patient states compliant with regimen. Bleeding or thromboembolic side effects:  none. Significant med or dietary changes:  none. Significant recent illness or disease state changes:  none. PT/INR done in office per protocol. INR today is 2.7, within therapeutic range. Will continue current regimen and RTC in 4 weeks. Patient seen in room # 1. Patient understands dosing directions and information discussed. Dosing schedule given to patient. Progress note sent to referring office.

## 2018-01-02 ENCOUNTER — OFFICE VISIT (OUTPATIENT)
Dept: BARIATRICS/WEIGHT MGMT | Age: 48
End: 2018-01-02
Payer: MEDICARE

## 2018-01-02 VITALS
BODY MASS INDEX: 35.65 KG/M2 | RESPIRATION RATE: 20 BRPM | WEIGHT: 249 LBS | SYSTOLIC BLOOD PRESSURE: 112 MMHG | HEART RATE: 72 BPM | DIASTOLIC BLOOD PRESSURE: 70 MMHG | HEIGHT: 70 IN

## 2018-01-02 DIAGNOSIS — N18.6 ESRD ON HEMODIALYSIS (HCC): Primary | ICD-10-CM

## 2018-01-02 DIAGNOSIS — I48.20 CHRONIC ATRIAL FIBRILLATION (HCC): ICD-10-CM

## 2018-01-02 DIAGNOSIS — Z99.2 ESRD ON HEMODIALYSIS (HCC): Primary | ICD-10-CM

## 2018-01-02 PROCEDURE — 99213 OFFICE O/P EST LOW 20 MIN: CPT | Performed by: SURGERY

## 2018-01-02 NOTE — PROGRESS NOTES
Patient is 3 months s/p sleeve gastrectomy. Overall, doing well. Total weight loss:  42 lbs    Complaints:  none    Consistent with MVI/Ca:  yes    Adequate hydration:  yes    Goal sheet reviewed with patient    Physical Exam:  Vitals:    01/02/18 1059   BP: 112/70   Site: Left Arm   Position: Sitting   Cuff Size: Large Adult   Pulse: 72   Resp: 20   Weight: 249 lb (112.9 kg)   Height: 5' 10\" (1.778 m)     Constitutional:  Vital signs are normal. The patient appears well-developed and well-nourished. Abdominal: Soft. No tenderness. Musculoskeletal:        Right lower leg: Normal. No tenderness and no edema. Left lower leg: Normal. No tenderness and no edema. Lymphadenopathy:     No cervical adenopathy, No Exrtemity Adenopathy. Neurological: The patient is alert and oriented. Skin: Skin is warm, dry and intact. Psychiatric: The patient has a normal mood and affect.  Speech is normal and behavior is normal. Judgment and thought content normal. Cognition and memory are normal.     Assessment:  Patient Active Problem List   Diagnosis    Gout    Cor pulmonale, acute ([address]    Morbid obesity with body mass index of 40.0-49.9 ([address]    Essential hypertension    Encounter regarding vascular access for dialysis for ESRD ([address]    Hypothyroidism    Benign prostatic hyperplasia with lower urinary tract symptoms    Need for vaccination for H flu type B    ESRD on hemodialysis ([address]    Gastroesophageal reflux disease    Hyperprolactinemia ([address]    Erectile dysfunction due to arterial insufficiency    Hypotension    Chronic atrial fibrillation ([address]    Warfarin-induced coagulopathy ([address]    DVT (deep venous thrombosis) ([address]    S/P laparoscopic sleeve gastrectomy     ESRD-on HD-stable  HTN-resolved  Bariatric Surgery status    Plan:  Patient to continue to strive for at least 60-80 grams of protien/day, and at least 48-64oz of fluid daily  Reinforced need for regular exercise  Reinforced need

## 2018-01-03 ENCOUNTER — HOSPITAL ENCOUNTER (OUTPATIENT)
Age: 48
Setting detail: SPECIMEN
Discharge: HOME | End: 2018-01-03
Payer: MEDICARE

## 2018-01-03 DIAGNOSIS — Z99.2 ESRD ON HEMODIALYSIS (HCC): ICD-10-CM

## 2018-01-03 DIAGNOSIS — N18.6 ESRD ON HEMODIALYSIS (HCC): ICD-10-CM

## 2018-01-03 DIAGNOSIS — K21.9 GASTROESOPHAGEAL REFLUX DISEASE WITHOUT ESOPHAGITIS: ICD-10-CM

## 2018-01-03 LAB
ALBUMIN SERPL-MCNC: 4.4 G/DL (ref 3.5–5.2)
ALBUMIN/GLOBULIN RATIO: 1 (ref 1–2.5)
ALP BLD-CCNC: 58 U/L (ref 40–129)
ALT SERPL-CCNC: 13 U/L (ref 5–41)
ANION GAP SERPL CALCULATED.3IONS-SCNC: 23 MMOL/L (ref 9–17)
AST SERPL-CCNC: 20 U/L
BILIRUB SERPL-MCNC: 0.35 MG/DL (ref 0.3–1.2)
BUN BLDV-MCNC: 36 MG/DL (ref 6–20)
BUN/CREAT BLD: ABNORMAL (ref 9–20)
CALCIUM SERPL-MCNC: 8.8 MG/DL (ref 8.6–10.4)
CHLORIDE BLD-SCNC: 88 MMOL/L (ref 98–107)
CO2: 23 MMOL/L (ref 20–31)
CREAT SERPL-MCNC: 9.93 MG/DL (ref 0.7–1.2)
FOLATE: >20 NG/ML
GFR AFRICAN AMERICAN: 7 ML/MIN
GFR NON-AFRICAN AMERICAN: 6 ML/MIN
GFR SERPL CREATININE-BSD FRML MDRD: ABNORMAL ML/MIN/{1.73_M2}
GFR SERPL CREATININE-BSD FRML MDRD: ABNORMAL ML/MIN/{1.73_M2}
GLUCOSE BLD-MCNC: 97 MG/DL (ref 70–99)
HCT VFR BLD CALC: 37.1 % (ref 40.7–50.3)
HEMOGLOBIN: 12.2 G/DL (ref 13–17)
IRON SATURATION: 47 % (ref 20–55)
IRON: 99 UG/DL (ref 59–158)
MAGNESIUM: 2.4 MG/DL (ref 1.6–2.6)
MCH RBC QN AUTO: 32.9 PG (ref 25.2–33.5)
MCHC RBC AUTO-ENTMCNC: 32.9 G/DL (ref 28.4–34.8)
MCV RBC AUTO: 100 FL (ref 82.6–102.9)
PDW BLD-RTO: 15.8 % (ref 11.8–14.4)
PLATELET # BLD: 225 K/UL (ref 138–453)
PMV BLD AUTO: 10.9 FL (ref 8.1–13.5)
POTASSIUM SERPL-SCNC: 4.4 MMOL/L (ref 3.7–5.3)
PTH INTACT: 103.4 PG/ML (ref 15–65)
RBC # BLD: 3.71 M/UL (ref 4.21–5.77)
SODIUM BLD-SCNC: 134 MMOL/L (ref 135–144)
TOTAL IRON BINDING CAPACITY: 210 UG/DL (ref 250–450)
TOTAL PROTEIN: 8.6 G/DL (ref 6.4–8.3)
UNSATURATED IRON BINDING CAPACITY: 111 UG/DL (ref 112–347)
VITAMIN B-12: 702 PG/ML (ref 232–1245)
VITAMIN D 25-HYDROXY: 39.4 NG/ML (ref 30–100)
WBC # BLD: 5.7 K/UL (ref 3.5–11.3)

## 2018-01-05 LAB
RETINYL PALMITATE: 0.03 MG/L (ref 0–0.1)
VITAMIN A LEVEL: 1.52 MG/L (ref 0.3–1.2)
VITAMIN A, INTERP: ABNORMAL
VITAMIN B1 WHOLE BLOOD: 145 NMOL/L (ref 70–180)

## 2018-01-17 ENCOUNTER — TELEPHONE (OUTPATIENT)
Dept: PHARMACY | Age: 48
End: 2018-01-17

## 2018-02-14 ENCOUNTER — HOSPITAL ENCOUNTER (OUTPATIENT)
Dept: PHARMACY | Age: 48
Setting detail: THERAPIES SERIES
Discharge: HOME | End: 2018-02-14
Payer: MEDICARE

## 2018-02-14 DIAGNOSIS — I82.409 DEEP VEIN THROMBOSIS (DVT) OF LOWER EXTREMITY, UNSPECIFIED CHRONICITY, UNSPECIFIED LATERALITY, UNSPECIFIED VEIN (HCC): ICD-10-CM

## 2018-02-14 LAB
INR BLD: 3.3
PROTIME: 39.4 SECONDS

## 2018-02-14 PROCEDURE — 99211 OFF/OP EST MAY X REQ PHY/QHP: CPT

## 2018-02-14 PROCEDURE — 85610 PROTHROMBIN TIME: CPT

## 2018-02-14 NOTE — PROGRESS NOTES
Patient states compliant with regimen. Bleeding or thromboembolic side effects:  none. Significant med or dietary changes:  none. Significant recent illness or disease state changes:  none. PT/INR done in office per protocol. INR goal:  2-3. INR today:  3.3. Plan: Patient had bariatric surgery in September. INR has been on an upward trend since then, therefore I will reduce maintenance regimen at this time by 7%. New regimen of 2.5mg on Wed. only and 5mg all other days of the week. Next INR check in:   2 week(s). Patient seen in room # 3. Patient acknowledges working in [address] [place] [address] with Pharmacist as referred by his/her physician. Patient understands dosing directions and information discussed. Dosing schedule given to patient. Progress note sent to referring office.

## 2018-02-28 ENCOUNTER — HOSPITAL ENCOUNTER (OUTPATIENT)
Dept: PHARMACY | Age: 48
Setting detail: THERAPIES SERIES
Discharge: HOME | End: 2018-02-28
Payer: MEDICARE

## 2018-02-28 DIAGNOSIS — I82.409 DEEP VEIN THROMBOSIS (DVT) OF LOWER EXTREMITY, UNSPECIFIED CHRONICITY, UNSPECIFIED LATERALITY, UNSPECIFIED VEIN (HCC): ICD-10-CM

## 2018-02-28 LAB
INR BLD: 3.4
PROTIME: 41 SECONDS

## 2018-02-28 PROCEDURE — 85610 PROTHROMBIN TIME: CPT

## 2018-02-28 PROCEDURE — 99211 OFF/OP EST MAY X REQ PHY/QHP: CPT

## 2018-03-07 LAB — PROTIME: 27.5 SECONDS

## 2018-03-14 ENCOUNTER — HOSPITAL ENCOUNTER (OUTPATIENT)
Dept: PHARMACY | Age: 48
Setting detail: THERAPIES SERIES
Discharge: HOME | End: 2018-03-14
Payer: MEDICARE

## 2018-03-14 DIAGNOSIS — I82.409 DEEP VEIN THROMBOSIS (DVT) OF LOWER EXTREMITY, UNSPECIFIED CHRONICITY, UNSPECIFIED LATERALITY, UNSPECIFIED VEIN (HCC): ICD-10-CM

## 2018-03-14 LAB — INR BLD: 2.3

## 2018-03-14 PROCEDURE — 99211 OFF/OP EST MAY X REQ PHY/QHP: CPT

## 2018-03-14 PROCEDURE — 85610 PROTHROMBIN TIME: CPT

## 2018-03-14 NOTE — PROGRESS NOTES
Patient states compliant with regimen. Bleeding or thromboembolic side effects:  none. Significant med or dietary changes:  none. Significant recent illness or disease state changes:  none. PT/INR done in office per protocol. Indication for warfarin: Atrial Fibrillation   INR goal:  2-3   INR today:  2.3   Plan: Continue current regimen of 2.5 mg on Monday and Wednesday, and 5 mg all other days. Next INR check in: two week(s). Patient seen in room # two. Patient acknowledges working in [address] [place] [address] with Pharmacist as referred by his/her physician. Patient understands dosing directions and information discussed. Dosing schedule given to patient. Progress note sent to referring office.

## 2018-03-19 ENCOUNTER — OFFICE VISIT (OUTPATIENT)
Dept: INTERNAL MEDICINE CLINIC | Age: 48
End: 2018-03-19
Payer: MEDICARE

## 2018-03-19 ENCOUNTER — HOSPITAL ENCOUNTER (OUTPATIENT)
Age: 48
Setting detail: SPECIMEN
Discharge: HOME | End: 2018-03-19
Payer: MEDICARE

## 2018-03-19 VITALS
WEIGHT: 252 LBS | BODY MASS INDEX: 36.08 KG/M2 | OXYGEN SATURATION: 96 % | HEIGHT: 70 IN | SYSTOLIC BLOOD PRESSURE: 90 MMHG | HEART RATE: 56 BPM | DIASTOLIC BLOOD PRESSURE: 60 MMHG | RESPIRATION RATE: 15 BRPM

## 2018-03-19 DIAGNOSIS — N18.6 ESRD (END STAGE RENAL DISEASE) ON DIALYSIS (HCC): ICD-10-CM

## 2018-03-19 DIAGNOSIS — E66.01 MORBID OBESITY WITH BODY MASS INDEX OF 40.0-49.9 (HCC): Chronic | ICD-10-CM

## 2018-03-19 DIAGNOSIS — I48.20 CHRONIC ATRIAL FIBRILLATION (HCC): ICD-10-CM

## 2018-03-19 DIAGNOSIS — I50.22 HEART FAILURE, SYSTOLIC, CHRONIC (HCC): Primary | ICD-10-CM

## 2018-03-19 DIAGNOSIS — R06.83 SNORES: ICD-10-CM

## 2018-03-19 DIAGNOSIS — R40.0 SOMNOLENCE, DAYTIME: ICD-10-CM

## 2018-03-19 DIAGNOSIS — Z98.84 S/P LAPAROSCOPIC SLEEVE GASTRECTOMY: ICD-10-CM

## 2018-03-19 DIAGNOSIS — Z99.2 ESRD (END STAGE RENAL DISEASE) ON DIALYSIS (HCC): ICD-10-CM

## 2018-03-19 DIAGNOSIS — I10 ESSENTIAL HYPERTENSION: ICD-10-CM

## 2018-03-19 DIAGNOSIS — N25.81 SECONDARY HYPERPARATHYROIDISM OF RENAL ORIGIN (HCC): ICD-10-CM

## 2018-03-19 DIAGNOSIS — T45.515A WARFARIN-INDUCED COAGULOPATHY (HCC): ICD-10-CM

## 2018-03-19 DIAGNOSIS — G47.35 CONGENITAL CENTRAL ALVEOLAR HYPOVENTILATION SYNDROME: ICD-10-CM

## 2018-03-19 DIAGNOSIS — D68.32 WARFARIN-INDUCED COAGULOPATHY (HCC): ICD-10-CM

## 2018-03-19 DIAGNOSIS — M10.00 IDIOPATHIC GOUT, UNSPECIFIED CHRONICITY, UNSPECIFIED SITE: ICD-10-CM

## 2018-03-19 DIAGNOSIS — R55 NEUROCARDIOGENIC SYNCOPE: ICD-10-CM

## 2018-03-19 DIAGNOSIS — E03.8 OTHER SPECIFIED HYPOTHYROIDISM: ICD-10-CM

## 2018-03-19 DIAGNOSIS — K21.9 GASTROESOPHAGEAL REFLUX DISEASE WITHOUT ESOPHAGITIS: ICD-10-CM

## 2018-03-19 LAB — TSH SERPL DL<=0.05 MIU/L-ACNC: 3.88 MIU/L (ref 0.3–5)

## 2018-03-19 PROCEDURE — G8484 FLU IMMUNIZE NO ADMIN: HCPCS | Performed by: FAMILY MEDICINE

## 2018-03-19 PROCEDURE — 1036F TOBACCO NON-USER: CPT | Performed by: FAMILY MEDICINE

## 2018-03-19 PROCEDURE — G8417 CALC BMI ABV UP PARAM F/U: HCPCS | Performed by: FAMILY MEDICINE

## 2018-03-19 PROCEDURE — G8427 DOCREV CUR MEDS BY ELIG CLIN: HCPCS | Performed by: FAMILY MEDICINE

## 2018-03-19 PROCEDURE — 90670 PCV13 VACCINE IM: CPT | Performed by: FAMILY MEDICINE

## 2018-03-19 PROCEDURE — 99214 OFFICE O/P EST MOD 30 MIN: CPT | Performed by: FAMILY MEDICINE

## 2018-03-19 PROCEDURE — G0009 ADMIN PNEUMOCOCCAL VACCINE: HCPCS | Performed by: FAMILY MEDICINE

## 2018-03-19 RX ORDER — MIDODRINE HYDROCHLORIDE 10 MG/1
TABLET ORAL
COMMUNITY
Start: 2018-02-21 | End: 2018-04-04 | Stop reason: SDUPTHER

## 2018-03-19 ASSESSMENT — ENCOUNTER SYMPTOMS
EYES NEGATIVE: 1
GASTROINTESTINAL NEGATIVE: 1
RESPIRATORY NEGATIVE: 1
ALLERGIC/IMMUNOLOGIC NEGATIVE: 1

## 2018-03-19 NOTE — PROGRESS NOTES
Subjective:      Patient ID: Larry Ward is a 50 y.o. male. Hypertension   This is a recurrent problem. The current episode started more than 1 month ago. The problem has been gradually improving since onset. The problem is controlled. Risk factors for coronary artery disease include male gender, obesity, dyslipidemia and sedentary lifestyle. Past treatments include central alpha agonists. The current treatment provides moderate improvement. Hypertensive end-organ damage includes kidney disease. Identifiable causes of hypertension include chronic renal disease and sleep apnea. Review of Systems   Constitutional: Negative. HENT: Negative. Eyes: Negative. Respiratory: Negative. Cardiovascular: Negative. Gastrointestinal: Negative. Endocrine: Negative. Musculoskeletal: Negative. Skin: Negative. Allergic/Immunologic: Negative. Neurological: Negative. Hematological: Negative. Psychiatric/Behavioral: Negative. Past family and social history unremarkable. Objective:   Physical Exam   Constitutional: He is oriented to person, place, and time. He appears well-developed and well-nourished. Morbid obesity with clinical suspicion for sleep apnea   HENT:   Head: Normocephalic and atraumatic. Right Ear: External ear normal.   Left Ear: External ear normal.   Nose: Nose normal.   Mouth/Throat: Oropharynx is clear and moist.   Eyes: Conjunctivae and EOM are normal. Pupils are equal, round, and reactive to light. Neck: Normal range of motion. Neck supple. Cardiovascular: Normal rate, regular rhythm, normal heart sounds and intact distal pulses. Pulmonary/Chest: Effort normal and breath sounds normal.   Abdominal: Soft. Bowel sounds are normal.   Musculoskeletal: Normal range of motion. Neurological: He is alert and oriented to person, place, and time. He has normal reflexes. Skin: Skin is warm and dry. Psychiatric: He has a normal mood and affect.  His behavior

## 2018-03-19 NOTE — PROGRESS NOTES
Chronic Disease Visit Information    BP Readings from Last 3 Encounters:   03/19/18 90/60   01/02/18 112/70   12/18/17 90/61          Hemoglobin A1C (%)   Date Value   09/07/2016 4.9   12/13/2013 5.2   11/13/2013 5.6     Microalb/Crt. Ratio (mcg/mg creat)   Date Value   10/04/2011 591     LDL Cholesterol (mg/dL)   Date Value   02/10/2017 183 (H)     HDL (mg/dL)   Date Value   02/10/2017 80     BUN (mg/dL)   Date Value   01/03/2018 36 (H)     CREATININE (mg/dL)   Date Value   01/03/2018 9.93 (HH)     Glucose (mg/dL)   Date Value   01/03/2018 97   10/04/2011 91            Have you changed or started any medications since your last visit including any over-the-counter medicines, vitamins, or herbal medicines? no   Are you having any side effects from any of your medications? -  no  Have you stopped taking any of your medications? Is so, why? -  no    Have you seen any other physician or provider since your last visit? Yes - Records Obtained  Have you had any other diagnostic tests since your last visit? No  Have you been seen in the emergency room and/or had an admission to a hospital since we last saw you? No  Have you had your annual diabetic retinal (eye) exam? Yes - Records Requested  Have you had your routine dental cleaning in the past 6 months? yes -     Have you activated your [email or web address] account? If not, what are your barriers?  Yes     Patient Care Team:  Karen Overton MD as PCP - General (Family Medicine)  Karen Overton MD as PCP - [facility] Attributed Provider  [facility] as Pharmacist  Marva Andrews MD as Consulting Physician (Urology)  John Damon MD as Consulting Physician (Nephrology)  Morena Lazaro MD as Consulting Physician (Cardiology)         Medical History Review  Past Medical, Family, and Social History reviewed and does contribute to the patient presenting condition    Health Maintenance   Topic Date Due    Pneumococcal highest risk (2 of 3 - PPSV23) 07/01/2016    DTaP/Tdap/Td

## 2018-03-28 ENCOUNTER — HOSPITAL ENCOUNTER (OUTPATIENT)
Dept: PHARMACY | Age: 48
Setting detail: THERAPIES SERIES
Discharge: HOME | End: 2018-03-28
Payer: MEDICARE

## 2018-03-28 DIAGNOSIS — I82.409 DEEP VEIN THROMBOSIS (DVT) OF LOWER EXTREMITY, UNSPECIFIED CHRONICITY, UNSPECIFIED LATERALITY, UNSPECIFIED VEIN (HCC): ICD-10-CM

## 2018-03-28 LAB
INR BLD: 2
INR BLD: 2
PROTIME: 24.6 SECONDS

## 2018-03-28 PROCEDURE — 85610 PROTHROMBIN TIME: CPT

## 2018-03-28 PROCEDURE — 99211 OFF/OP EST MAY X REQ PHY/QHP: CPT

## 2018-03-28 NOTE — PROGRESS NOTES
Medication Management Service  [facility]  [phone]    Visit Date: 3/28/2018     Subjective:       Patient: Melony Lee, 50 y.o., male    Patient seen in clinic for anticoagulation management due to atrial fibrillation. INR goal of 2.0-3.0 with a duration of therapy of indefinite. Patient reports the following:  Adherent with regimen   Missed or extra doses:  none   Bleeding or thromboembolic side effects:  none   Significant medication, dietary, alcohol, or tobacco changes:  none    Significant recent illness, disease state changes, or hospitalization:  none    Upcoming surgeries or procedures:  none             Assessment:     PT/INR done in office per protocol. INR today is 2.0, therapeutic. Plan:      Continue with current regimen of warfarin 2.5 mg on Monday, Wednesday and 5 mg all other days. Using warfarin 5 mg tablets. Recheck INR in three week(s). Patient verbalized understanding of dosing directions and information discussed. Dosing schedule given to patient. Progress note sent to referring office. Patient seen in room # one. Patient acknowledges working in consult agreement with pharmacist as referred by his/her physician.

## 2018-04-03 ENCOUNTER — OFFICE VISIT (OUTPATIENT)
Dept: BARIATRICS/WEIGHT MGMT | Age: 48
End: 2018-04-03
Payer: MEDICARE

## 2018-04-03 VITALS
DIASTOLIC BLOOD PRESSURE: 74 MMHG | BODY MASS INDEX: 39.08 KG/M2 | HEIGHT: 70 IN | RESPIRATION RATE: 20 BRPM | WEIGHT: 273 LBS | HEART RATE: 68 BPM | SYSTOLIC BLOOD PRESSURE: 118 MMHG

## 2018-04-03 DIAGNOSIS — I48.20 CHRONIC ATRIAL FIBRILLATION (HCC): Primary | ICD-10-CM

## 2018-04-03 DIAGNOSIS — Z98.84 S/P LAPAROSCOPIC SLEEVE GASTRECTOMY: ICD-10-CM

## 2018-04-03 DIAGNOSIS — Z99.2 ESRD ON HEMODIALYSIS (HCC): ICD-10-CM

## 2018-04-03 DIAGNOSIS — N18.6 ESRD ON HEMODIALYSIS (HCC): ICD-10-CM

## 2018-04-03 DIAGNOSIS — E55.9 VITAMIN D DEFICIENCY: ICD-10-CM

## 2018-04-03 PROCEDURE — 99213 OFFICE O/P EST LOW 20 MIN: CPT | Performed by: NURSE PRACTITIONER

## 2018-04-03 PROCEDURE — G8427 DOCREV CUR MEDS BY ELIG CLIN: HCPCS | Performed by: NURSE PRACTITIONER

## 2018-04-03 PROCEDURE — 1036F TOBACCO NON-USER: CPT | Performed by: NURSE PRACTITIONER

## 2018-04-03 PROCEDURE — G8417 CALC BMI ABV UP PARAM F/U: HCPCS | Performed by: NURSE PRACTITIONER

## 2018-04-04 RX ORDER — WARFARIN SODIUM 5 MG/1
5 TABLET ORAL DAILY
Qty: 30 TABLET | Refills: 2 | Status: SHIPPED | OUTPATIENT
Start: 2018-04-04 | End: 2018-05-07 | Stop reason: SDUPTHER

## 2018-04-04 RX ORDER — MIDODRINE HYDROCHLORIDE 10 MG/1
10 TABLET ORAL 3 TIMES DAILY
Qty: 90 TABLET | Refills: 1 | Status: SHIPPED | OUTPATIENT
Start: 2018-04-04 | End: 2018-08-02 | Stop reason: SDUPTHER

## 2018-04-18 ENCOUNTER — HOSPITAL ENCOUNTER (OUTPATIENT)
Dept: PHARMACY | Age: 48
Setting detail: THERAPIES SERIES
Discharge: HOME | End: 2018-04-18
Payer: MEDICARE

## 2018-04-18 DIAGNOSIS — I82.409 DEEP VEIN THROMBOSIS (DVT) OF LOWER EXTREMITY, UNSPECIFIED CHRONICITY, UNSPECIFIED LATERALITY, UNSPECIFIED VEIN (HCC): ICD-10-CM

## 2018-04-18 LAB
INR BLD: 2
PROTIME: 23.9 SECONDS

## 2018-04-18 PROCEDURE — 85610 PROTHROMBIN TIME: CPT

## 2018-04-18 PROCEDURE — 99211 OFF/OP EST MAY X REQ PHY/QHP: CPT

## 2018-05-04 DIAGNOSIS — I48.20 CHRONIC ATRIAL FIBRILLATION (HCC): ICD-10-CM

## 2018-05-04 RX ORDER — AMIODARONE HYDROCHLORIDE 200 MG/1
TABLET ORAL
Qty: 60 TABLET | Refills: 2 | Status: SHIPPED | OUTPATIENT
Start: 2018-05-04 | End: 2019-01-02 | Stop reason: SDUPTHER

## 2018-05-07 ENCOUNTER — HOSPITAL ENCOUNTER (OUTPATIENT)
Dept: PHARMACY | Age: 48
Setting detail: THERAPIES SERIES
Discharge: HOME | End: 2018-05-07
Payer: MEDICARE

## 2018-05-07 DIAGNOSIS — I82.409 DEEP VEIN THROMBOSIS (DVT) OF LOWER EXTREMITY, UNSPECIFIED CHRONICITY, UNSPECIFIED LATERALITY, UNSPECIFIED VEIN (HCC): ICD-10-CM

## 2018-05-07 LAB
INR BLD: 2.4
PROTIME: 28.5 SECONDS

## 2018-05-07 PROCEDURE — 85610 PROTHROMBIN TIME: CPT

## 2018-05-07 PROCEDURE — 99211 OFF/OP EST MAY X REQ PHY/QHP: CPT

## 2018-05-07 RX ORDER — WARFARIN SODIUM 5 MG/1
TABLET ORAL
Qty: 90 TABLET | Refills: 1 | Status: SHIPPED | OUTPATIENT
Start: 2018-05-07 | End: 2018-12-03 | Stop reason: SDUPTHER

## 2018-06-04 ENCOUNTER — HOSPITAL ENCOUNTER (OUTPATIENT)
Dept: PHARMACY | Age: 48
Setting detail: THERAPIES SERIES
Discharge: HOME | End: 2018-06-04
Payer: MEDICARE

## 2018-06-04 DIAGNOSIS — I82.409 DEEP VEIN THROMBOSIS (DVT) OF LOWER EXTREMITY, UNSPECIFIED CHRONICITY, UNSPECIFIED LATERALITY, UNSPECIFIED VEIN (HCC): ICD-10-CM

## 2018-06-04 LAB
INR BLD: 1.7
PROTIME: 21 SECONDS

## 2018-06-04 PROCEDURE — 99211 OFF/OP EST MAY X REQ PHY/QHP: CPT

## 2018-06-04 PROCEDURE — 85610 PROTHROMBIN TIME: CPT

## 2018-06-18 ENCOUNTER — OFFICE VISIT (OUTPATIENT)
Dept: INTERNAL MEDICINE CLINIC | Age: 48
End: 2018-06-18
Payer: MEDICARE

## 2018-06-18 VITALS
SYSTOLIC BLOOD PRESSURE: 104 MMHG | RESPIRATION RATE: 16 BRPM | BODY MASS INDEX: 36.19 KG/M2 | OXYGEN SATURATION: 94 % | HEART RATE: 64 BPM | WEIGHT: 252.8 LBS | DIASTOLIC BLOOD PRESSURE: 64 MMHG | HEIGHT: 70 IN

## 2018-06-18 DIAGNOSIS — I10 ESSENTIAL HYPERTENSION: Primary | ICD-10-CM

## 2018-06-18 DIAGNOSIS — I11.0 LVH (LEFT VENTRICULAR HYPERTROPHY) DUE TO HYPERTENSIVE DISEASE, WITH HEART FAILURE (HCC): ICD-10-CM

## 2018-06-18 DIAGNOSIS — E55.9 VITAMIN D DEFICIENCY: ICD-10-CM

## 2018-06-18 DIAGNOSIS — Z99.2 ESRD ON HEMODIALYSIS (HCC): ICD-10-CM

## 2018-06-18 DIAGNOSIS — G47.33 OBSTRUCTIVE SLEEP APNEA OF ADULT: ICD-10-CM

## 2018-06-18 DIAGNOSIS — T45.515A WARFARIN-INDUCED COAGULOPATHY (HCC): ICD-10-CM

## 2018-06-18 DIAGNOSIS — N18.6 ESRD ON HEMODIALYSIS (HCC): ICD-10-CM

## 2018-06-18 DIAGNOSIS — I50.810 RIGHT HEART FAILURE (HCC): ICD-10-CM

## 2018-06-18 DIAGNOSIS — D68.32 WARFARIN-INDUCED COAGULOPATHY (HCC): ICD-10-CM

## 2018-06-18 DIAGNOSIS — E11.21 CONTROLLED TYPE 2 DIABETES MELLITUS WITH DIABETIC NEPHROPATHY, WITHOUT LONG-TERM CURRENT USE OF INSULIN (HCC): ICD-10-CM

## 2018-06-18 DIAGNOSIS — I48.20 CHRONIC ATRIAL FIBRILLATION (HCC): ICD-10-CM

## 2018-06-18 PROBLEM — I89.0 LYMPHEDEMA: Status: ACTIVE | Noted: 2018-06-18

## 2018-06-18 PROBLEM — E11.9 DIABETES MELLITUS (HCC): Status: ACTIVE | Noted: 2018-06-18

## 2018-06-18 PROBLEM — I20.9 ANGINA PECTORIS (HCC): Status: ACTIVE | Noted: 2018-06-18

## 2018-06-18 PROCEDURE — 3046F HEMOGLOBIN A1C LEVEL >9.0%: CPT | Performed by: FAMILY MEDICINE

## 2018-06-18 PROCEDURE — G8417 CALC BMI ABV UP PARAM F/U: HCPCS | Performed by: FAMILY MEDICINE

## 2018-06-18 PROCEDURE — 2022F DILAT RTA XM EVC RTNOPTHY: CPT | Performed by: FAMILY MEDICINE

## 2018-06-18 PROCEDURE — G8427 DOCREV CUR MEDS BY ELIG CLIN: HCPCS | Performed by: FAMILY MEDICINE

## 2018-06-18 PROCEDURE — G8598 ASA/ANTIPLAT THER USED: HCPCS | Performed by: FAMILY MEDICINE

## 2018-06-18 PROCEDURE — 1036F TOBACCO NON-USER: CPT | Performed by: FAMILY MEDICINE

## 2018-06-18 PROCEDURE — 99214 OFFICE O/P EST MOD 30 MIN: CPT | Performed by: FAMILY MEDICINE

## 2018-06-18 ASSESSMENT — ENCOUNTER SYMPTOMS
GASTROINTESTINAL NEGATIVE: 1
ALLERGIC/IMMUNOLOGIC NEGATIVE: 1
EYES NEGATIVE: 1
RESPIRATORY NEGATIVE: 1

## 2018-06-20 ENCOUNTER — HOSPITAL ENCOUNTER (OUTPATIENT)
Dept: PHARMACY | Age: 48
Setting detail: THERAPIES SERIES
Discharge: HOME | End: 2018-06-20
Payer: MEDICARE

## 2018-06-20 DIAGNOSIS — I82.409 DEEP VEIN THROMBOSIS (DVT) OF LOWER EXTREMITY, UNSPECIFIED CHRONICITY, UNSPECIFIED LATERALITY, UNSPECIFIED VEIN (HCC): ICD-10-CM

## 2018-06-20 LAB
INR BLD: 1.9
PROTIME: 22.6 SECONDS

## 2018-06-20 PROCEDURE — 99211 OFF/OP EST MAY X REQ PHY/QHP: CPT

## 2018-06-20 PROCEDURE — 85610 PROTHROMBIN TIME: CPT

## 2018-06-27 ENCOUNTER — HOSPITAL ENCOUNTER (OUTPATIENT)
Dept: NON INVASIVE DIAGNOSTICS | Age: 48
Discharge: HOME | End: 2018-06-27
Payer: MEDICARE

## 2018-06-27 DIAGNOSIS — I50.810 RIGHT HEART FAILURE (HCC): ICD-10-CM

## 2018-06-27 DIAGNOSIS — G47.33 OBSTRUCTIVE SLEEP APNEA OF ADULT: ICD-10-CM

## 2018-06-27 DIAGNOSIS — Z99.2 ESRD ON HEMODIALYSIS (HCC): ICD-10-CM

## 2018-06-27 DIAGNOSIS — I11.0 LVH (LEFT VENTRICULAR HYPERTROPHY) DUE TO HYPERTENSIVE DISEASE, WITH HEART FAILURE (HCC): ICD-10-CM

## 2018-06-27 DIAGNOSIS — N18.6 ESRD ON HEMODIALYSIS (HCC): ICD-10-CM

## 2018-06-27 LAB
LV EF: 55 %
LVEF MODALITY: NORMAL

## 2018-06-27 PROCEDURE — 93306 TTE W/DOPPLER COMPLETE: CPT

## 2018-07-02 ENCOUNTER — OFFICE VISIT (OUTPATIENT)
Dept: BARIATRICS/WEIGHT MGMT | Age: 48
End: 2018-07-02
Payer: MEDICARE

## 2018-07-02 ENCOUNTER — TELEPHONE (OUTPATIENT)
Dept: BARIATRICS/WEIGHT MGMT | Age: 48
End: 2018-07-02

## 2018-07-02 ENCOUNTER — HOSPITAL ENCOUNTER (OUTPATIENT)
Age: 48
Setting detail: SPECIMEN
Discharge: HOME | End: 2018-07-02
Payer: MEDICARE

## 2018-07-02 VITALS
DIASTOLIC BLOOD PRESSURE: 70 MMHG | HEIGHT: 70 IN | RESPIRATION RATE: 20 BRPM | WEIGHT: 246 LBS | SYSTOLIC BLOOD PRESSURE: 112 MMHG | HEART RATE: 72 BPM | BODY MASS INDEX: 35.22 KG/M2

## 2018-07-02 DIAGNOSIS — N18.6 ESRD ON HEMODIALYSIS (HCC): Primary | ICD-10-CM

## 2018-07-02 DIAGNOSIS — E55.9 VITAMIN D DEFICIENCY: ICD-10-CM

## 2018-07-02 DIAGNOSIS — Z99.2 ESRD ON HEMODIALYSIS (HCC): Primary | ICD-10-CM

## 2018-07-02 DIAGNOSIS — D68.32 WARFARIN-INDUCED COAGULOPATHY (HCC): ICD-10-CM

## 2018-07-02 DIAGNOSIS — Z98.84 S/P LAPAROSCOPIC SLEEVE GASTRECTOMY: ICD-10-CM

## 2018-07-02 DIAGNOSIS — I48.20 CHRONIC ATRIAL FIBRILLATION (HCC): ICD-10-CM

## 2018-07-02 DIAGNOSIS — I89.0 LYMPHEDEMA: ICD-10-CM

## 2018-07-02 DIAGNOSIS — N18.6 ESRD ON HEMODIALYSIS (HCC): ICD-10-CM

## 2018-07-02 DIAGNOSIS — Z99.2 ESRD ON HEMODIALYSIS (HCC): ICD-10-CM

## 2018-07-02 DIAGNOSIS — T45.515A WARFARIN-INDUCED COAGULOPATHY (HCC): ICD-10-CM

## 2018-07-02 LAB
ABSOLUTE EOS #: 0.07 K/UL (ref 0–0.44)
ABSOLUTE IMMATURE GRANULOCYTE: <0.03 K/UL (ref 0–0.3)
ABSOLUTE LYMPH #: 1.52 K/UL (ref 1.1–3.7)
ABSOLUTE MONO #: 0.69 K/UL (ref 0.1–1.2)
ALBUMIN SERPL-MCNC: 4 G/DL (ref 3.5–5.2)
ALBUMIN/GLOBULIN RATIO: 1.2 (ref 1–2.5)
ALP BLD-CCNC: 79 U/L (ref 40–129)
ALT SERPL-CCNC: 17 U/L (ref 5–41)
ANION GAP SERPL CALCULATED.3IONS-SCNC: 18 MMOL/L (ref 9–17)
AST SERPL-CCNC: 17 U/L
BASOPHILS # BLD: 0 % (ref 0–2)
BASOPHILS ABSOLUTE: <0.03 K/UL (ref 0–0.2)
BILIRUB SERPL-MCNC: 0.41 MG/DL (ref 0.3–1.2)
BUN BLDV-MCNC: 51 MG/DL (ref 6–20)
BUN/CREAT BLD: ABNORMAL (ref 9–20)
CALCIUM SERPL-MCNC: 9.4 MG/DL (ref 8.6–10.4)
CHLORIDE BLD-SCNC: 92 MMOL/L (ref 98–107)
CHOLESTEROL/HDL RATIO: 3.3
CHOLESTEROL: 221 MG/DL
CO2: 26 MMOL/L (ref 20–31)
CREAT SERPL-MCNC: 11.05 MG/DL (ref 0.7–1.2)
DIFFERENTIAL TYPE: ABNORMAL
EOSINOPHILS RELATIVE PERCENT: 1 % (ref 1–4)
FERRITIN: 1602 UG/L (ref 30–400)
FOLATE: 10.9 NG/ML
GFR AFRICAN AMERICAN: 6 ML/MIN
GFR NON-AFRICAN AMERICAN: 5 ML/MIN
GFR SERPL CREATININE-BSD FRML MDRD: ABNORMAL ML/MIN/{1.73_M2}
GFR SERPL CREATININE-BSD FRML MDRD: ABNORMAL ML/MIN/{1.73_M2}
GLUCOSE BLD-MCNC: 84 MG/DL (ref 70–99)
HCT VFR BLD CALC: 32 % (ref 40.7–50.3)
HDLC SERPL-MCNC: 66 MG/DL
HEMOGLOBIN: 10.2 G/DL (ref 13–17)
IMMATURE GRANULOCYTES: 0 %
IRON SATURATION: 35 % (ref 20–55)
IRON: 69 UG/DL (ref 59–158)
LDL CHOLESTEROL: 141 MG/DL (ref 0–130)
LYMPHOCYTES # BLD: 23 % (ref 24–43)
MAGNESIUM: 2.6 MG/DL (ref 1.6–2.6)
MCH RBC QN AUTO: 33.7 PG (ref 25.2–33.5)
MCHC RBC AUTO-ENTMCNC: 31.9 G/DL (ref 28.4–34.8)
MCV RBC AUTO: 105.6 FL (ref 82.6–102.9)
MONOCYTES # BLD: 11 % (ref 3–12)
NRBC AUTOMATED: 0 PER 100 WBC
PDW BLD-RTO: 14.8 % (ref 11.8–14.4)
PLATELET # BLD: 152 K/UL (ref 138–453)
PLATELET ESTIMATE: ABNORMAL
PMV BLD AUTO: 10.9 FL (ref 8.1–13.5)
POTASSIUM SERPL-SCNC: 4.8 MMOL/L (ref 3.7–5.3)
PTH INTACT: 330.6 PG/ML (ref 15–65)
RBC # BLD: 3.03 M/UL (ref 4.21–5.77)
RBC # BLD: ABNORMAL 10*6/UL
SEG NEUTROPHILS: 65 % (ref 36–65)
SEGMENTED NEUTROPHILS ABSOLUTE COUNT: 4.23 K/UL (ref 1.5–8.1)
SODIUM BLD-SCNC: 136 MMOL/L (ref 135–144)
TOTAL IRON BINDING CAPACITY: 198 UG/DL (ref 250–450)
TOTAL PROTEIN: 7.3 G/DL (ref 6.4–8.3)
TRIGL SERPL-MCNC: 72 MG/DL
TSH SERPL DL<=0.05 MIU/L-ACNC: 2.24 MIU/L (ref 0.3–5)
UNSATURATED IRON BINDING CAPACITY: 129 UG/DL (ref 112–347)
VITAMIN B-12: 424 PG/ML (ref 232–1245)
VITAMIN D 25-HYDROXY: 32.2 NG/ML (ref 30–100)
VLDLC SERPL CALC-MCNC: ABNORMAL MG/DL (ref 1–30)
WBC # BLD: 6.6 K/UL (ref 3.5–11.3)
WBC # BLD: ABNORMAL 10*3/UL

## 2018-07-02 PROCEDURE — G8417 CALC BMI ABV UP PARAM F/U: HCPCS | Performed by: NURSE PRACTITIONER

## 2018-07-02 PROCEDURE — G8427 DOCREV CUR MEDS BY ELIG CLIN: HCPCS | Performed by: NURSE PRACTITIONER

## 2018-07-02 PROCEDURE — 99213 OFFICE O/P EST LOW 20 MIN: CPT | Performed by: NURSE PRACTITIONER

## 2018-07-02 PROCEDURE — 1036F TOBACCO NON-USER: CPT | Performed by: NURSE PRACTITIONER

## 2018-07-02 PROCEDURE — G8598 ASA/ANTIPLAT THER USED: HCPCS | Performed by: NURSE PRACTITIONER

## 2018-07-02 PROCEDURE — 3017F COLORECTAL CA SCREEN DOC REV: CPT | Performed by: NURSE PRACTITIONER

## 2018-07-02 NOTE — PROGRESS NOTES
facility-administered medications for this visit. Vital Signs:  /70 (Site: Left Arm, Position: Sitting, Cuff Size: Large Adult)   Pulse 72   Resp 20   Ht 5' 10\" (1.778 m)   Wt 246 lb (111.6 kg)   BMI 35.30 kg/m²     BMI/Height/Weight:  Body mass index is 35.3 kg/m². Review of Systems - A review of systems was performed. All was negative unless otherwise documented in HPI. Constitutional: Negative for fever, chills and diaphoresis. HENT: Negative for hearing loss and trouble swallowing. Eyes: Negative for photophobia and visual disturbance. Respiratory: Negative for cough, shortness of breath and wheezing. Cardiovascular: Negative for chest pain and palpitations. Gastrointestinal: Negative for nausea, vomiting, abdominal pain, diarrhea, constipation, blood in stool and abdominal distention. Endocrine: Negative for polydipsia, polyphagia and polyuria. Genitourinary: Negative for dysuria, frequency, hematuria and difficulty urinating. Musculoskeletal: Negative for myalgias, joint swelling. Skin: Negative for pallor and rash. Neurological: Negative for dizziness, tremors, light-headedness and headaches. Psychiatric/Behavioral: Negative for sleep disturbance and dysphoric mood. Objective:      Physical Exam   Vital signs reviewed. General: Well-developed and well-nourished. No acute distress. Skin: Warm, dry and intact. HEENT: Normocephalic. EOMs intact. Conjunctivae normal. Neck supple. Cardiovascular: Normal rate, regular rhythm. No murmur. Pulmonary/Chest: Normal effort. Lungs clear to auscultation. No rales, rhonchi or wheezing. Abdominal: Positive bowel sounds. Soft, nontender. Nondistended. No rigidity, rebound, or guarding. Musculoskeletal: Movement x4. Lower extremity edema, right > left. Compression stockings on. Neurological: Gait normal. Alert and oriented to person, place, and time. Psychiatric: Normal mood and affect.  Speech and behavior normal. Judgment and thought content normal. Cognition and memory intact. Assessment:       Diagnosis Orders   1. ESRD on hemodialysis (HCC)  CBC    Comprehensive Metabolic Panel    Ferritin    Iron and TIBC    Lipid Panel    Magnesium    Vitamin D 25 Hydroxy    Zinc    Vitamin B12 & Folate    Vitamin B1    Vitamin A    TSH with Reflex    PTH, Intact    [facility] [address], Jesi Maw, DO*   2. Chronic atrial fibrillation (HCC)  CBC    Comprehensive Metabolic Panel    Ferritin    Iron and TIBC    Lipid Panel    Magnesium    Vitamin D 25 Hydroxy    Zinc    Vitamin B12 & Folate    Vitamin B1    Vitamin A    TSH with Reflex    PTH, Intact    [facility] [address], Jesi Maw, DO*   3. Warfarin-induced coagulopathy (HCC)  CBC    Comprehensive Metabolic Panel    Ferritin    Iron and TIBC    Lipid Panel    Magnesium    Vitamin D 25 Hydroxy    Zinc    Vitamin B12 & Folate    Vitamin B1    Vitamin A    TSH with Reflex    PTH, Intact    [facility] [address], Jesi Maw, DO*   4. S/P laparoscopic sleeve gastrectomy  CBC    Comprehensive Metabolic Panel    Ferritin    Iron and TIBC    Lipid Panel    Magnesium    Vitamin D 25 Hydroxy    Zinc    Vitamin B12 & Folate    Vitamin B1    Vitamin A    TSH with Reflex    PTH, Intact    [facility] [address], Jesi Maw, DO*   5.  Lymphedema  CBC    Comprehensive Metabolic Panel    Ferritin    Iron and TIBC    Lipid Panel    Magnesium    Vitamin D 25 Hydroxy    Zinc    Vitamin B12 & Folate    Vitamin B1    Vitamin A    TSH with Reflex    PTH, Intact    [facility] [address], Jesi Maw, DO*   6. Obesity, Class II, BMI 35-39.9, with comorbidity  CBC    Comprehensive Metabolic Panel    Ferritin    Iron and TIBC    Lipid Panel    Magnesium    Vitamin D 25 Hydroxy    Zinc    Vitamin B12 & Folate    Vitamin B1    Vitamin A    TSH with Reflex    PTH, Intact    [facility] [address] [place], [place]*       Plan:    Dietitian visit today. Patient to continue to increase protein to obtain 60-80g/day, at least 48-64oz of fluid daily, and gradually increase exercise regimen. Referral to GI for screening colonscopy. Follow-up  Return in about 3 months (around 10/2/2018). Orders this encounter:  Orders Placed This Encounter   Procedures    CBC     Standing Status:   Future     Standing Expiration Date:   9/14/2019    Comprehensive Metabolic Panel     Standing Status:   Future     Standing Expiration Date:   9/14/2019    Ferritin     Standing Status:   Future     Standing Expiration Date:   9/14/2019    Iron and TIBC     Standing Status:   Future     Standing Expiration Date:   9/14/2019     Order Specific Question:   Is Patient Fasting? Answer:   no     Order Specific Question:   No of Hours?      Answer:   12    Lipid Panel     Standing Status:   Future     Standing Expiration Date:   9/14/2019     Order Specific Question:   Is Patient Fasting?/# of Hours     Answer:   12hour fast    Magnesium     Standing Status:   Future     Standing Expiration Date:   9/14/2019    Vitamin D 25 Hydroxy     Standing Status:   Future     Standing Expiration Date:   9/14/2019    Zinc     Standing Status:   Future     Standing Expiration Date:   9/14/2019    Vitamin B12 & Folate     Standing Status:   Future     Standing Expiration Date:   9/14/2019    Vitamin B1     Standing Status:   Future     Standing Expiration Date:   9/14/2019    Vitamin A     Standing Status:   Future     Standing Expiration Date:   9/14/2019    TSH with Reflex     Standing Status:   Future     Standing Expiration Date:   9/14/2019    PTH, Intact     Standing Status:   Future     Standing Expiration Date:   9/14/2019    [facility] [address] [place], [place]*     Referral Priority:   Routine     Referral Type:   Consult for Advice and Opinion     Referral Reason:   Specialty Services Required     Referred to Provider:   Caren Hassan MD     Requested Specialty:   Gastroenterology     Number of Visits Requested:   1       Prescriptions this encounter:  No orders of the defined types were placed in this encounter.       Electronically signed by:  James Thibodeaux CNP

## 2018-07-04 LAB
RETINYL PALMITATE: 0.03 MG/L (ref 0–0.1)
VITAMIN A LEVEL: 1.49 MG/L (ref 0.3–1.2)
VITAMIN A, INTERP: ABNORMAL
ZINC: 66 UG/DL (ref 60–120)

## 2018-07-05 LAB — VITAMIN B1 WHOLE BLOOD: 95 NMOL/L (ref 70–180)

## 2018-07-06 ENCOUNTER — HOSPITAL ENCOUNTER (OUTPATIENT)
Dept: PHARMACY | Age: 48
Setting detail: THERAPIES SERIES
Discharge: HOME | End: 2018-07-06
Payer: MEDICARE

## 2018-07-06 DIAGNOSIS — I82.409 DEEP VEIN THROMBOSIS (DVT) OF LOWER EXTREMITY, UNSPECIFIED CHRONICITY, UNSPECIFIED LATERALITY, UNSPECIFIED VEIN (HCC): ICD-10-CM

## 2018-07-06 LAB
INR BLD: 1.9
PROTIME: 22.3 SECONDS

## 2018-07-06 PROCEDURE — 85610 PROTHROMBIN TIME: CPT

## 2018-07-06 PROCEDURE — 99211 OFF/OP EST MAY X REQ PHY/QHP: CPT

## 2018-07-06 NOTE — PROGRESS NOTES
Medication Management Service  [facility]  [phone]    Visit Date: 7/6/2018   Subjective:       Rachel Ugarte is a 46 y.o. male who presents to clinic today for anticoagulation monitoring and adjustment. Patient seen in clinic for warfarin management due to  Indication:   atrial fibrillation and DVT. INR goal: of 2.0-3.0. Duration of therapy: indefinite. Patient reports the following:   Adherent with regimen  Missed or extra doses:  None   Bleeding or thromboembolic side effects:  None  Significant medication, dietary, alcohol, or tobacco changes:  None  Significant recent illness, disease state changes, or hospitalization:  None  Upcoming surgeries or procedures:  None           Assessment and PLAN     PT/INR done in office per protocol. INR today is 1.9, subtherapeutic. No usual cause could be determined. Patient's INR has been consecutively subtherapeutic for 3 visits. Plan: Will increase to warfarin 7.5 mg on Fridays and 5 mg all other days (7.1% increase). Using warfarin 5 mg tablets. Recheck INR in two week(s). Patient seen in room # two. Patient verbalized understanding of dosing directions and information discussed. Dosing schedule given to patient. Progress note sent to referring office. Patient acknowledges working in consult agreement with pharmacist as referred by his/her physician.         Electronically signed by Geoffrey Stockton on 7/6/18 at 8:47 AM

## 2018-07-12 ENCOUNTER — HOSPITAL ENCOUNTER (OUTPATIENT)
Dept: SLEEP CENTER | Age: 48
Discharge: HOME | End: 2018-07-14
Payer: MEDICARE

## 2018-07-12 DIAGNOSIS — G47.33 OSA (OBSTRUCTIVE SLEEP APNEA): Primary | ICD-10-CM

## 2018-07-12 PROCEDURE — 95810 POLYSOM 6/> YRS 4/> PARAM: CPT

## 2018-07-13 VITALS — HEIGHT: 69 IN | WEIGHT: 240 LBS | BODY MASS INDEX: 35.55 KG/M2

## 2018-07-13 ASSESSMENT — SLEEP AND FATIGUE QUESTIONNAIRES
HOW LIKELY ARE YOU TO NOD OFF OR FALL ASLEEP WHILE LYING DOWN TO REST IN THE AFTERNOON WHEN CIRCUMSTANCES PERMIT: 3
HOW LIKELY ARE YOU TO NOD OFF OR FALL ASLEEP WHILE WATCHING TV: 3
HOW LIKELY ARE YOU TO NOD OFF OR FALL ASLEEP WHILE SITTING AND TALKING TO SOMEONE: 0
HOW LIKELY ARE YOU TO NOD OFF OR FALL ASLEEP IN A CAR, WHILE STOPPED FOR A FEW MINUTES IN TRAFFIC: 0
NECK CIRCUMFERENCE (INCHES): 44
ESS TOTAL SCORE: 7
HOW LIKELY ARE YOU TO NOD OFF OR FALL ASLEEP WHEN YOU ARE A PASSENGER IN A CAR FOR AN HOUR WITHOUT A BREAK: 1
HOW LIKELY ARE YOU TO NOD OFF OR FALL ASLEEP WHILE SITTING QUIETLY AFTER LUNCH WITHOUT ALCOHOL: 0
HOW LIKELY ARE YOU TO NOD OFF OR FALL ASLEEP WHILE SITTING AND READING: 0
HOW LIKELY ARE YOU TO NOD OFF OR FALL ASLEEP WHILE SITTING INACTIVE IN A PUBLIC PLACE: 0

## 2018-07-20 ENCOUNTER — HOSPITAL ENCOUNTER (OUTPATIENT)
Dept: PHARMACY | Age: 48
Setting detail: THERAPIES SERIES
Discharge: HOME | End: 2018-07-20
Payer: MEDICARE

## 2018-07-20 DIAGNOSIS — I82.409 DEEP VEIN THROMBOSIS (DVT) OF LOWER EXTREMITY, UNSPECIFIED CHRONICITY, UNSPECIFIED LATERALITY, UNSPECIFIED VEIN (HCC): ICD-10-CM

## 2018-07-20 LAB
INR BLD: 2.8
PROTIME: 33.4 SECONDS

## 2018-07-20 PROCEDURE — 85610 PROTHROMBIN TIME: CPT

## 2018-07-20 PROCEDURE — 99211 OFF/OP EST MAY X REQ PHY/QHP: CPT

## 2018-07-22 LAB — STATUS: NORMAL

## 2018-08-02 DIAGNOSIS — I48.20 CHRONIC ATRIAL FIBRILLATION (HCC): ICD-10-CM

## 2018-08-02 RX ORDER — ASPIRIN 81 MG
TABLET, DELAYED RELEASE (ENTERIC COATED) ORAL
Qty: 90 TABLET | Refills: 1 | Status: SHIPPED | OUTPATIENT
Start: 2018-08-02 | End: 2018-12-28

## 2018-08-02 RX ORDER — MIDODRINE HYDROCHLORIDE 10 MG/1
TABLET ORAL
Qty: 270 TABLET | Refills: 1 | Status: SHIPPED | OUTPATIENT
Start: 2018-08-02 | End: 2019-05-03 | Stop reason: SDUPTHER

## 2018-08-03 ENCOUNTER — HOSPITAL ENCOUNTER (OUTPATIENT)
Dept: PHARMACY | Age: 48
Setting detail: THERAPIES SERIES
Discharge: HOME | End: 2018-08-03
Payer: MEDICARE

## 2018-08-03 DIAGNOSIS — I82.409 DEEP VEIN THROMBOSIS (DVT) OF LOWER EXTREMITY, UNSPECIFIED CHRONICITY, UNSPECIFIED LATERALITY, UNSPECIFIED VEIN (HCC): ICD-10-CM

## 2018-08-03 LAB
INR BLD: 2.4
PROTIME: 29.4 SECONDS

## 2018-08-03 PROCEDURE — 85610 PROTHROMBIN TIME: CPT

## 2018-08-03 PROCEDURE — 99211 OFF/OP EST MAY X REQ PHY/QHP: CPT

## 2018-08-24 ENCOUNTER — HOSPITAL ENCOUNTER (OUTPATIENT)
Dept: PHARMACY | Age: 48
Setting detail: THERAPIES SERIES
Discharge: HOME | End: 2018-08-24
Payer: MEDICARE

## 2018-08-24 DIAGNOSIS — I82.409 DEEP VEIN THROMBOSIS (DVT) OF LOWER EXTREMITY, UNSPECIFIED CHRONICITY, UNSPECIFIED LATERALITY, UNSPECIFIED VEIN (HCC): ICD-10-CM

## 2018-08-24 LAB
INR BLD: 2.6
PROTIME: 30.8 SECONDS

## 2018-08-24 PROCEDURE — 85610 PROTHROMBIN TIME: CPT

## 2018-08-24 PROCEDURE — 99211 OFF/OP EST MAY X REQ PHY/QHP: CPT

## 2018-08-24 NOTE — PROGRESS NOTES
Medication Management Service  [facility]  [phone]    Visit Date: 8/24/2018   Subjective:       Monalisa Johnson is a 46 y.o. male who presents to clinic today for anticoagulation monitoring and adjustment. Patient seen in clinic for warfarin management due to  Indication:   atrial fibrillation and DVT. INR goal: of 2.0-3.0. Duration of therapy: indefinite. Patient reports the following:   Adherent with regimen  Missed or extra doses:  None   Bleeding or thromboembolic side effects:  None  Significant medication, dietary, alcohol, or tobacco changes:  None  Significant recent illness, disease state changes, or hospitalization:  None  Upcoming surgeries or procedures:  None           Assessment and PLAN     PT/INR done in office per protocol. INR today is 2.6, therapeutic. Plan: Will continue current regimen of warfarin 7.5 mg on Friday and 5 mg on all other days. Using warfarin 5 mg tablets. Recheck INR in four week(s). Patient seen in room # two. Patient verbalized understanding of dosing directions and information discussed. Dosing schedule given to patient. Progress note sent to referring office. Patient acknowledges working in consult agreement with pharmacist as referred by his/her physician.         Electronically signed by Pedro Larkin on 8/24/18 at 8:12 AM

## 2018-09-20 ENCOUNTER — TELEPHONE (OUTPATIENT)
Dept: PHARMACY | Age: 48
End: 2018-09-20

## 2018-09-24 ENCOUNTER — HOSPITAL ENCOUNTER (OUTPATIENT)
Dept: PHARMACY | Age: 48
Setting detail: THERAPIES SERIES
Discharge: HOME | End: 2018-09-24
Payer: MEDICARE

## 2018-09-24 DIAGNOSIS — I82.409 DEEP VEIN THROMBOSIS (DVT) OF LOWER EXTREMITY, UNSPECIFIED CHRONICITY, UNSPECIFIED LATERALITY, UNSPECIFIED VEIN (HCC): ICD-10-CM

## 2018-09-24 LAB
INR BLD: 5
PROTIME: 59.9 SECONDS

## 2018-09-24 PROCEDURE — 85610 PROTHROMBIN TIME: CPT

## 2018-09-24 PROCEDURE — 99212 OFFICE O/P EST SF 10 MIN: CPT

## 2018-09-25 ENCOUNTER — TELEPHONE (OUTPATIENT)
Dept: BARIATRICS/WEIGHT MGMT | Age: 48
End: 2018-09-25

## 2018-09-25 ENCOUNTER — HOSPITAL ENCOUNTER (OUTPATIENT)
Age: 48
Setting detail: SPECIMEN
Discharge: HOME | End: 2018-09-25
Payer: MEDICARE

## 2018-09-25 DIAGNOSIS — Z98.84 S/P LAPAROSCOPIC SLEEVE GASTRECTOMY: ICD-10-CM

## 2018-09-25 DIAGNOSIS — I89.0 LYMPHEDEMA: ICD-10-CM

## 2018-09-25 DIAGNOSIS — N18.6 ESRD ON HEMODIALYSIS (HCC): ICD-10-CM

## 2018-09-25 DIAGNOSIS — E11.21 CONTROLLED TYPE 2 DIABETES MELLITUS WITH DIABETIC NEPHROPATHY, WITHOUT LONG-TERM CURRENT USE OF INSULIN (HCC): ICD-10-CM

## 2018-09-25 DIAGNOSIS — I48.20 CHRONIC ATRIAL FIBRILLATION (HCC): ICD-10-CM

## 2018-09-25 DIAGNOSIS — I10 ESSENTIAL HYPERTENSION: ICD-10-CM

## 2018-09-25 DIAGNOSIS — Z99.2 ESRD ON HEMODIALYSIS (HCC): ICD-10-CM

## 2018-09-25 DIAGNOSIS — D68.32 WARFARIN-INDUCED COAGULOPATHY (HCC): ICD-10-CM

## 2018-09-25 DIAGNOSIS — T45.515A WARFARIN-INDUCED COAGULOPATHY (HCC): ICD-10-CM

## 2018-09-25 LAB
ALBUMIN SERPL-MCNC: 5.2 G/DL (ref 3.5–5.2)
ALBUMIN SERPL-MCNC: 5.2 G/DL (ref 3.5–5.2)
ALBUMIN/GLOBULIN RATIO: 1.3 (ref 1–2.5)
ALBUMIN/GLOBULIN RATIO: 1.3 (ref 1–2.5)
ALP BLD-CCNC: 76 U/L (ref 40–129)
ALP BLD-CCNC: 76 U/L (ref 40–129)
ALT SERPL-CCNC: 20 U/L (ref 5–41)
ALT SERPL-CCNC: 20 U/L (ref 5–41)
ANION GAP SERPL CALCULATED.3IONS-SCNC: 19 MMOL/L (ref 9–17)
ANION GAP SERPL CALCULATED.3IONS-SCNC: 19 MMOL/L (ref 9–17)
AST SERPL-CCNC: 30 U/L
AST SERPL-CCNC: 30 U/L
BILIRUB SERPL-MCNC: 0.34 MG/DL (ref 0.3–1.2)
BILIRUB SERPL-MCNC: 0.34 MG/DL (ref 0.3–1.2)
BUN BLDV-MCNC: 18 MG/DL (ref 6–20)
BUN BLDV-MCNC: 18 MG/DL (ref 6–20)
BUN/CREAT BLD: ABNORMAL (ref 9–20)
BUN/CREAT BLD: ABNORMAL (ref 9–20)
CALCIUM SERPL-MCNC: 10.4 MG/DL (ref 8.6–10.4)
CALCIUM SERPL-MCNC: 10.4 MG/DL (ref 8.6–10.4)
CHLORIDE BLD-SCNC: 91 MMOL/L (ref 98–107)
CHLORIDE BLD-SCNC: 91 MMOL/L (ref 98–107)
CHOLESTEROL/HDL RATIO: 3
CHOLESTEROL/HDL RATIO: 3
CHOLESTEROL: 273 MG/DL
CHOLESTEROL: 273 MG/DL
CO2: 31 MMOL/L (ref 20–31)
CO2: 31 MMOL/L (ref 20–31)
CREAT SERPL-MCNC: 6.16 MG/DL (ref 0.7–1.2)
CREAT SERPL-MCNC: 6.16 MG/DL (ref 0.7–1.2)
ESTIMATED AVERAGE GLUCOSE: 80 MG/DL
FERRITIN: 1721 UG/L (ref 30–400)
FOLATE: 16.3 NG/ML
GFR AFRICAN AMERICAN: 12 ML/MIN
GFR AFRICAN AMERICAN: 12 ML/MIN
GFR NON-AFRICAN AMERICAN: 10 ML/MIN
GFR NON-AFRICAN AMERICAN: 10 ML/MIN
GFR SERPL CREATININE-BSD FRML MDRD: ABNORMAL ML/MIN/{1.73_M2}
GLUCOSE BLD-MCNC: 91 MG/DL (ref 70–99)
GLUCOSE BLD-MCNC: 91 MG/DL (ref 70–99)
HAV IGM SER IA-ACNC: ABNORMAL
HBA1C MFR BLD: 4.4 % (ref 4–6)
HCT VFR BLD CALC: 37.9 % (ref 40.7–50.3)
HCT VFR BLD CALC: 37.9 % (ref 40.7–50.3)
HDLC SERPL-MCNC: 90 MG/DL
HDLC SERPL-MCNC: 90 MG/DL
HEMOGLOBIN: 12 G/DL (ref 13–17)
HEMOGLOBIN: 12 G/DL (ref 13–17)
HEPATITIS B CORE IGM ANTIBODY: NONREACTIVE
HEPATITIS B SURFACE ANTIGEN: NONREACTIVE
HEPATITIS C ANTIBODY: NONREACTIVE
HIV AG/AB: NONREACTIVE
IRON SATURATION: 52 % (ref 20–55)
IRON: 156 UG/DL (ref 59–158)
LDL CHOLESTEROL: 165 MG/DL (ref 0–130)
LDL CHOLESTEROL: 165 MG/DL (ref 0–130)
MAGNESIUM: 2.3 MG/DL (ref 1.6–2.6)
MCH RBC QN AUTO: 33.3 PG (ref 25.2–33.5)
MCH RBC QN AUTO: 33.3 PG (ref 25.2–33.5)
MCHC RBC AUTO-ENTMCNC: 31.7 G/DL (ref 28.4–34.8)
MCHC RBC AUTO-ENTMCNC: 31.7 G/DL (ref 28.4–34.8)
MCV RBC AUTO: 105.3 FL (ref 82.6–102.9)
MCV RBC AUTO: 105.3 FL (ref 82.6–102.9)
NRBC AUTOMATED: 0 PER 100 WBC
NRBC AUTOMATED: 0 PER 100 WBC
PDW BLD-RTO: 15.9 % (ref 11.8–14.4)
PDW BLD-RTO: 15.9 % (ref 11.8–14.4)
PLATELET # BLD: 259 K/UL (ref 138–453)
PLATELET # BLD: 259 K/UL (ref 138–453)
PMV BLD AUTO: 10.8 FL (ref 8.1–13.5)
PMV BLD AUTO: 10.8 FL (ref 8.1–13.5)
POTASSIUM SERPL-SCNC: 3.8 MMOL/L (ref 3.7–5.3)
POTASSIUM SERPL-SCNC: 3.8 MMOL/L (ref 3.7–5.3)
PROSTATE SPECIFIC ANTIGEN: 0.52 UG/L
PTH INTACT: 321.5 PG/ML (ref 15–65)
RBC # BLD: 3.6 M/UL (ref 4.21–5.77)
RBC # BLD: 3.6 M/UL (ref 4.21–5.77)
SODIUM BLD-SCNC: 141 MMOL/L (ref 135–144)
SODIUM BLD-SCNC: 141 MMOL/L (ref 135–144)
TOTAL IRON BINDING CAPACITY: 302 UG/DL (ref 250–450)
TOTAL PROTEIN: 9.2 G/DL (ref 6.4–8.3)
TOTAL PROTEIN: 9.2 G/DL (ref 6.4–8.3)
TRIGL SERPL-MCNC: 92 MG/DL
TRIGL SERPL-MCNC: 92 MG/DL
TSH SERPL DL<=0.05 MIU/L-ACNC: 3.94 MIU/L (ref 0.3–5)
TSH SERPL DL<=0.05 MIU/L-ACNC: 3.94 MIU/L (ref 0.3–5)
UNSATURATED IRON BINDING CAPACITY: 146 UG/DL (ref 112–347)
VITAMIN B-12: 641 PG/ML (ref 232–1245)
VITAMIN D 25-HYDROXY: 41.5 NG/ML (ref 30–100)
VITAMIN D 25-HYDROXY: 41.5 NG/ML (ref 30–100)
VLDLC SERPL CALC-MCNC: ABNORMAL MG/DL (ref 1–30)
VLDLC SERPL CALC-MCNC: ABNORMAL MG/DL (ref 1–30)
WBC # BLD: 5.3 K/UL (ref 3.5–11.3)
WBC # BLD: 5.3 K/UL (ref 3.5–11.3)

## 2018-09-27 ENCOUNTER — TELEPHONE (OUTPATIENT)
Dept: INTERNAL MEDICINE CLINIC | Age: 48
End: 2018-09-27

## 2018-09-27 LAB — VDRL, QUANTITATIVE: NEGATIVE

## 2018-09-28 ENCOUNTER — HOSPITAL ENCOUNTER (OUTPATIENT)
Dept: PHARMACY | Age: 48
Setting detail: THERAPIES SERIES
Discharge: HOME | End: 2018-09-28
Payer: MEDICARE

## 2018-09-28 DIAGNOSIS — I82.409 DEEP VEIN THROMBOSIS (DVT) OF LOWER EXTREMITY, UNSPECIFIED CHRONICITY, UNSPECIFIED LATERALITY, UNSPECIFIED VEIN (HCC): ICD-10-CM

## 2018-09-28 LAB
INR BLD: 2.4
PROTIME: 28.5 SECONDS
SEND OUT REPORT: NORMAL
TEST NAME: NORMAL
VITAMIN B1 WHOLE BLOOD: 112 NMOL/L (ref 70–180)

## 2018-09-28 PROCEDURE — 99211 OFF/OP EST MAY X REQ PHY/QHP: CPT

## 2018-09-28 PROCEDURE — 85610 PROTHROMBIN TIME: CPT

## 2018-09-28 RX ORDER — PRAVASTATIN SODIUM 40 MG
40 TABLET ORAL NIGHTLY
COMMUNITY
End: 2021-04-19 | Stop reason: SDUPTHER

## 2018-09-29 LAB
RETINYL PALMITATE: 0.02 MG/L (ref 0–0.1)
VITAMIN A LEVEL: 1.36 MG/L (ref 0.3–1.2)
VITAMIN A, INTERP: ABNORMAL
ZINC: 98 UG/DL (ref 60–120)

## 2018-10-05 ENCOUNTER — HOSPITAL ENCOUNTER (OUTPATIENT)
Dept: PHARMACY | Age: 48
Setting detail: THERAPIES SERIES
Discharge: HOME | End: 2018-10-05
Payer: MEDICARE

## 2018-10-05 DIAGNOSIS — I82.409 DEEP VEIN THROMBOSIS (DVT) OF LOWER EXTREMITY, UNSPECIFIED CHRONICITY, UNSPECIFIED LATERALITY, UNSPECIFIED VEIN (HCC): ICD-10-CM

## 2018-10-05 LAB
INR BLD: 2
PROTIME: 23.7 SECONDS

## 2018-10-05 PROCEDURE — 99211 OFF/OP EST MAY X REQ PHY/QHP: CPT

## 2018-10-05 PROCEDURE — 85610 PROTHROMBIN TIME: CPT

## 2018-10-05 NOTE — PROGRESS NOTES
Medication Management Service  [facility]UL [facility]  [phone]    Visit Date: 10/5/2018   Subjective:       Cb Thompson is a 46 y.o. male who presents to clinic today for anticoagulation monitoring and adjustment. Patient seen in clinic for warfarin management due to  Indication:   atrial fibrillation and DVT  INR goal: of 2.0-3.0. Duration of therapy: indefinite. Patient reports the following:   Adherent with regimen  Missed or extra doses:  None   Bleeding or thromboembolic side effects:  Some bruising that is improving from last visit. Significant medication, dietary, alcohol, or tobacco changes:  None  Significant recent illness, disease state changes, or hospitalization:  None  Upcoming surgeries or procedures:  None           Assessment and PLAN     PT/INR done in office per protocol. INR today is 2.0, therapeutic. Plan: Will continue current regimen of warfarin 2.5 mg Tuesday and 5 mg all other days. Using warfarin 5 mg tablets. Recheck INR in 2 week(s). Patient seen in room # 1. Patient verbalized understanding of dosing directions and information discussed. Dosing schedule given to patient. Progress note sent to referring office. Patient acknowledges working in consult agreement with pharmacist as referred by his/her physician.       Brenden Bryant, PharmD  PGY-2 Ambulatory Care Pharmacy Resident  [facility] ([facility]) Medication Management  Phone: [phone]  10/5/2018 9:20 AM    Electronically signed by Brenden Bryant, [address] [place] [address] on 10/5/18 at 9:19 AM

## 2018-10-16 ENCOUNTER — NURSE ONLY (OUTPATIENT)
Dept: BARIATRICS/WEIGHT MGMT | Age: 48
End: 2018-10-16

## 2018-10-16 ENCOUNTER — OFFICE VISIT (OUTPATIENT)
Dept: BARIATRICS/WEIGHT MGMT | Age: 48
End: 2018-10-16
Payer: MEDICARE

## 2018-10-16 VITALS
HEIGHT: 70 IN | HEART RATE: 70 BPM | BODY MASS INDEX: 34.22 KG/M2 | DIASTOLIC BLOOD PRESSURE: 60 MMHG | WEIGHT: 239 LBS | SYSTOLIC BLOOD PRESSURE: 124 MMHG | RESPIRATION RATE: 20 BRPM

## 2018-10-16 DIAGNOSIS — E55.9 VITAMIN D DEFICIENCY: ICD-10-CM

## 2018-10-16 DIAGNOSIS — N18.6 ESRD ON HEMODIALYSIS (HCC): ICD-10-CM

## 2018-10-16 DIAGNOSIS — E08.00 DIABETES MELLITUS DUE TO UNDERLYING CONDITION WITH HYPEROSMOLARITY WITHOUT COMA, WITHOUT LONG-TERM CURRENT USE OF INSULIN (HCC): Primary | ICD-10-CM

## 2018-10-16 DIAGNOSIS — I48.20 CHRONIC ATRIAL FIBRILLATION (HCC): ICD-10-CM

## 2018-10-16 DIAGNOSIS — Z99.2 ESRD ON HEMODIALYSIS (HCC): ICD-10-CM

## 2018-10-16 PROCEDURE — 1036F TOBACCO NON-USER: CPT | Performed by: SURGERY

## 2018-10-16 PROCEDURE — G8427 DOCREV CUR MEDS BY ELIG CLIN: HCPCS | Performed by: SURGERY

## 2018-10-16 PROCEDURE — 3017F COLORECTAL CA SCREEN DOC REV: CPT | Performed by: SURGERY

## 2018-10-16 PROCEDURE — G8484 FLU IMMUNIZE NO ADMIN: HCPCS | Performed by: SURGERY

## 2018-10-16 PROCEDURE — G8417 CALC BMI ABV UP PARAM F/U: HCPCS | Performed by: SURGERY

## 2018-10-16 PROCEDURE — 99213 OFFICE O/P EST LOW 20 MIN: CPT | Performed by: SURGERY

## 2018-10-16 PROCEDURE — G8598 ASA/ANTIPLAT THER USED: HCPCS | Performed by: SURGERY

## 2018-10-16 NOTE — PROGRESS NOTES
status    Plan:  Patient to continue to strive for at least 60-80 grams of protien/day, and at least 48-64oz of fluid daily  Reinforced need for regular exercise  Reinforced need for consistency with MVI and Ca  Return in 1 year

## 2018-10-17 VITALS — WEIGHT: 239 LBS | BODY MASS INDEX: 34.29 KG/M2

## 2018-10-17 NOTE — PROGRESS NOTES
Medical Nutrition Therapy  Metabolic and Bariatric surgery  One year after surgery         Pt reports: Needs to lose 7lb to get on transplant list        Vitals:   Vitals:    10/17/18 0902   Weight: 239 lb (108.4 kg)      Body mass index is 34.29 kg/m². Labs reviewed:     Multivitamin/mineral intake:2 Celebrate Multicomplete with iron chewable lozenge daily  Calcium intake:   2 calcium daily  Other:            Nutrition Assessment:   PES: Inadequate food and beverage intake r/t WLS as evidenced by loss of excess body weight lost 7 lbs over 3 months. Goals   60-80gm of protein  48-64oz of fluid  Vitamin adherance  Basic adherance to WLS behavious and this document has been scanned into the chart.        [x] met     []  Not met        Plan: Closer follow up was offered to patient at any time; call if desired or let  know at checkout  F/u 18 months post op         Pino Conner

## 2018-10-19 ENCOUNTER — OFFICE VISIT (OUTPATIENT)
Dept: INTERNAL MEDICINE CLINIC | Age: 48
End: 2018-10-19
Payer: MEDICARE

## 2018-10-19 VITALS
WEIGHT: 240 LBS | OXYGEN SATURATION: 99 % | RESPIRATION RATE: 24 BRPM | BODY MASS INDEX: 34.36 KG/M2 | DIASTOLIC BLOOD PRESSURE: 80 MMHG | HEART RATE: 89 BPM | SYSTOLIC BLOOD PRESSURE: 110 MMHG | HEIGHT: 70 IN

## 2018-10-19 DIAGNOSIS — E55.9 VITAMIN D DEFICIENCY: ICD-10-CM

## 2018-10-19 DIAGNOSIS — Z99.2 ESRD ON HEMODIALYSIS (HCC): ICD-10-CM

## 2018-10-19 DIAGNOSIS — I25.10 CORONARY ARTERY DISEASE INVOLVING NATIVE CORONARY ARTERY OF NATIVE HEART WITHOUT ANGINA PECTORIS: ICD-10-CM

## 2018-10-19 DIAGNOSIS — N18.6 ESRD ON HEMODIALYSIS (HCC): ICD-10-CM

## 2018-10-19 DIAGNOSIS — I95.3 HEMODIALYSIS-ASSOCIATED HYPOTENSION: ICD-10-CM

## 2018-10-19 DIAGNOSIS — I50.812 CHRONIC RIGHT-SIDED HEART FAILURE (HCC): ICD-10-CM

## 2018-10-19 DIAGNOSIS — T45.515A WARFARIN-INDUCED COAGULOPATHY (HCC): ICD-10-CM

## 2018-10-19 DIAGNOSIS — N40.1 BENIGN PROSTATIC HYPERPLASIA WITH LOWER URINARY TRACT SYMPTOMS, SYMPTOM DETAILS UNSPECIFIED: ICD-10-CM

## 2018-10-19 DIAGNOSIS — D68.32 WARFARIN-INDUCED COAGULOPATHY (HCC): ICD-10-CM

## 2018-10-19 DIAGNOSIS — I48.20 CHRONIC ATRIAL FIBRILLATION (HCC): Primary | ICD-10-CM

## 2018-10-19 PROBLEM — E22.1 HYPERPROLACTINEMIA (HCC): Status: RESOLVED | Noted: 2017-03-22 | Resolved: 2018-10-19

## 2018-10-19 PROBLEM — G47.33 OBSTRUCTIVE SLEEP APNEA OF ADULT: Status: RESOLVED | Noted: 2018-06-18 | Resolved: 2018-10-19

## 2018-10-19 PROBLEM — E11.9 DIABETES MELLITUS (HCC): Status: RESOLVED | Noted: 2018-06-18 | Resolved: 2018-10-19

## 2018-10-19 PROCEDURE — G8427 DOCREV CUR MEDS BY ELIG CLIN: HCPCS | Performed by: FAMILY MEDICINE

## 2018-10-19 PROCEDURE — G8417 CALC BMI ABV UP PARAM F/U: HCPCS | Performed by: FAMILY MEDICINE

## 2018-10-19 PROCEDURE — 3017F COLORECTAL CA SCREEN DOC REV: CPT | Performed by: FAMILY MEDICINE

## 2018-10-19 PROCEDURE — 1036F TOBACCO NON-USER: CPT | Performed by: FAMILY MEDICINE

## 2018-10-19 PROCEDURE — G8484 FLU IMMUNIZE NO ADMIN: HCPCS | Performed by: FAMILY MEDICINE

## 2018-10-19 PROCEDURE — 99214 OFFICE O/P EST MOD 30 MIN: CPT | Performed by: FAMILY MEDICINE

## 2018-10-19 PROCEDURE — G8598 ASA/ANTIPLAT THER USED: HCPCS | Performed by: FAMILY MEDICINE

## 2018-10-19 ASSESSMENT — PATIENT HEALTH QUESTIONNAIRE - PHQ9
SUM OF ALL RESPONSES TO PHQ QUESTIONS 1-9: 0
SUM OF ALL RESPONSES TO PHQ9 QUESTIONS 1 & 2: 0
1. LITTLE INTEREST OR PLEASURE IN DOING THINGS: 0
SUM OF ALL RESPONSES TO PHQ QUESTIONS 1-9: 0
2. FEELING DOWN, DEPRESSED OR HOPELESS: 0

## 2018-10-22 ENCOUNTER — HOSPITAL ENCOUNTER (OUTPATIENT)
Dept: PHARMACY | Age: 48
Setting detail: THERAPIES SERIES
Discharge: HOME | End: 2018-10-22
Payer: MEDICARE

## 2018-10-22 DIAGNOSIS — I82.409 DEEP VEIN THROMBOSIS (DVT) OF LOWER EXTREMITY, UNSPECIFIED CHRONICITY, UNSPECIFIED LATERALITY, UNSPECIFIED VEIN (HCC): ICD-10-CM

## 2018-10-22 LAB
INR BLD: 2.9
PROTIME: 25.3 SECONDS

## 2018-10-22 PROCEDURE — 99211 OFF/OP EST MAY X REQ PHY/QHP: CPT

## 2018-10-22 PROCEDURE — 85610 PROTHROMBIN TIME: CPT

## 2018-10-22 NOTE — PROGRESS NOTES
Medication Management Service  [facility]  [phone]    Visit Date: 10/22/2018   Subjective:       Bolivar Goldman is a 46 y.o. male who presents to clinic today for anticoagulation monitoring and adjustment. Patient seen in clinic for warfarin management due to  Indication:   atrial fibrillation. INR goal: of 2.0-3.0. Duration of therapy: indefinite. Patient reports the following:   Adherent with regimen  Missed or extra doses:  None   Bleeding or thromboembolic side effects:  None  Significant medication, dietary, alcohol, or tobacco changes:  None  Significant recent illness, disease state changes, or hospitalization:  None  Upcoming surgeries or procedures:  None           Assessment and PLAN     PT/INR done in office per protocol. INR today is 2.9, therapeutic. Plan: Will continue current regimen of warfarin 2.5mg on Tuesdays only and 5mg all other days of the week until he needs to hold in prep for colonoscopy. Needs INR checked the day before. Using warfarin 5 mg tablets. Recheck INR in 3 week(s). Patient seen in room # 3. Patient verbalized understanding of dosing directions and information discussed. Dosing schedule given to patient. Progress note sent to referring office. Patient acknowledges working in consult agreement with pharmacist as referred by his/her physician.       Electronically signed by VIELKA Waggoner [facility] on 10/22/18 at 9:03 AM

## 2018-10-24 RX ORDER — VIT B COMP NO.3/FOLIC/C/BIOTIN 1 MG-60 MG
TABLET ORAL
Qty: 90 TABLET | Refills: 1 | Status: SHIPPED | OUTPATIENT
Start: 2018-10-24 | End: 2019-01-02 | Stop reason: SDUPTHER

## 2018-11-14 ENCOUNTER — HOSPITAL ENCOUNTER (OUTPATIENT)
Dept: PHARMACY | Age: 48
Setting detail: THERAPIES SERIES
Discharge: HOME | End: 2018-11-14
Payer: MEDICARE

## 2018-11-14 DIAGNOSIS — I82.409 DEEP VEIN THROMBOSIS (DVT) OF LOWER EXTREMITY, UNSPECIFIED CHRONICITY, UNSPECIFIED LATERALITY, UNSPECIFIED VEIN (HCC): ICD-10-CM

## 2018-11-14 LAB
INR BLD: 1.8
PROTIME: 21.4 SECONDS

## 2018-11-14 PROCEDURE — 99212 OFFICE O/P EST SF 10 MIN: CPT

## 2018-11-14 PROCEDURE — 85610 PROTHROMBIN TIME: CPT

## 2018-11-14 NOTE — PROGRESS NOTES
Medication Management Service  [facility]UL [facility]  [phone]    Visit Date: 11/14/2018   Subjective:       Blanche Sneed is a 46 y.o. male who presents to clinic today for anticoagulation monitoring and adjustment. Patient seen in clinic for warfarin management due to  Indication:   atrial fibrillation and DVT. INR goal: of 2.0-3.0. Duration of therapy: indefinite. Patient reports the following:   Adherent with regimen  Missed or extra doses:  None   Bleeding or thromboembolic side effects:  None  Significant medication, dietary, alcohol, or tobacco changes:  None  Significant recent illness, disease state changes, or hospitalization:  None  Upcoming surgeries or procedures:  None           Assessment and PLAN     PT/INR done in office per protocol. INR today is 1.8, subtherapeutic as expected since he has been holding warfarin starting this past Sunday due to colonoscopy scheduled for tomorrow. Plan: Will hold dose again today, then if OK with surgeon tomorrow will resume warfarin. Will boost dose to 7.5mg on Saturday and Sunday then continue current regimen of warfarin 2.5mg on Tuesday only and 5mg all other days of the week. Using warfarin 5 mg tablets. Recheck INR in 1 week(s). Patient seen in room # 3. Patient verbalized understanding of dosing directions and information discussed. Dosing schedule given to patient. Progress note sent to referring office. Patient acknowledges working in consult agreement with pharmacist as referred by his/her physician.       Electronically signed by Sumit Pate, [address] [place] [address] on 11/14/18 at 2:12 PM

## 2018-11-15 ENCOUNTER — HOSPITAL ENCOUNTER (OUTPATIENT)
Age: 48
Setting detail: OUTPATIENT SURGERY
Discharge: HOME | End: 2018-11-15
Attending: INTERNAL MEDICINE | Admitting: INTERNAL MEDICINE
Payer: MEDICARE

## 2018-11-15 ENCOUNTER — ANESTHESIA EVENT (OUTPATIENT)
Dept: ENDOSCOPY | Age: 48
End: 2018-11-15
Payer: MEDICARE

## 2018-11-15 ENCOUNTER — ANESTHESIA (OUTPATIENT)
Dept: ENDOSCOPY | Age: 48
End: 2018-11-15
Payer: MEDICARE

## 2018-11-15 VITALS
OXYGEN SATURATION: 99 % | DIASTOLIC BLOOD PRESSURE: 60 MMHG | RESPIRATION RATE: 17 BRPM | SYSTOLIC BLOOD PRESSURE: 79 MMHG

## 2018-11-15 VITALS
TEMPERATURE: 97.7 F | RESPIRATION RATE: 16 BRPM | BODY MASS INDEX: 34.79 KG/M2 | WEIGHT: 243 LBS | OXYGEN SATURATION: 100 % | SYSTOLIC BLOOD PRESSURE: 117 MMHG | HEART RATE: 72 BPM | HEIGHT: 70 IN | DIASTOLIC BLOOD PRESSURE: 75 MMHG

## 2018-11-15 LAB — POC POTASSIUM: 4.1 MMOL/L (ref 3.5–4.5)

## 2018-11-15 PROCEDURE — 7100000010 HC PHASE II RECOVERY - FIRST 15 MIN: Performed by: INTERNAL MEDICINE

## 2018-11-15 PROCEDURE — 2580000003 HC RX 258: Performed by: INTERNAL MEDICINE

## 2018-11-15 PROCEDURE — 3700000001 HC ADD 15 MINUTES (ANESTHESIA): Performed by: INTERNAL MEDICINE

## 2018-11-15 PROCEDURE — 6360000002 HC RX W HCPCS: Performed by: NURSE ANESTHETIST, CERTIFIED REGISTERED

## 2018-11-15 PROCEDURE — 2500000003 HC RX 250 WO HCPCS: Performed by: NURSE ANESTHETIST, CERTIFIED REGISTERED

## 2018-11-15 PROCEDURE — 84132 ASSAY OF SERUM POTASSIUM: CPT

## 2018-11-15 PROCEDURE — 3700000000 HC ANESTHESIA ATTENDED CARE: Performed by: INTERNAL MEDICINE

## 2018-11-15 PROCEDURE — C1773 RET DEV, INSERTABLE: HCPCS | Performed by: INTERNAL MEDICINE

## 2018-11-15 PROCEDURE — 88305 TISSUE EXAM BY PATHOLOGIST: CPT

## 2018-11-15 PROCEDURE — 2709999900 HC NON-CHARGEABLE SUPPLY: Performed by: INTERNAL MEDICINE

## 2018-11-15 PROCEDURE — 3609010600 HC COLONOSCOPY POLYPECTOMY SNARE/COLD BIOPSY: Performed by: INTERNAL MEDICINE

## 2018-11-15 PROCEDURE — 7100000011 HC PHASE II RECOVERY - ADDTL 15 MIN: Performed by: INTERNAL MEDICINE

## 2018-11-15 RX ORDER — LIDOCAINE HYDROCHLORIDE 10 MG/ML
INJECTION, SOLUTION EPIDURAL; INFILTRATION; INTRACAUDAL; PERINEURAL PRN
Status: DISCONTINUED | OUTPATIENT
Start: 2018-11-15 | End: 2018-11-15 | Stop reason: SDUPTHER

## 2018-11-15 RX ORDER — SODIUM CHLORIDE 9 MG/ML
INJECTION, SOLUTION INTRAVENOUS ONCE
Status: COMPLETED | OUTPATIENT
Start: 2018-11-15 | End: 2018-11-15

## 2018-11-15 RX ORDER — PROPOFOL 10 MG/ML
INJECTION, EMULSION INTRAVENOUS PRN
Status: DISCONTINUED | OUTPATIENT
Start: 2018-11-15 | End: 2018-11-15 | Stop reason: SDUPTHER

## 2018-11-15 RX ADMIN — LIDOCAINE HYDROCHLORIDE 50 MG: 10 INJECTION, SOLUTION EPIDURAL; INFILTRATION; INTRACAUDAL at 07:48

## 2018-11-15 RX ADMIN — SODIUM CHLORIDE: 9 INJECTION, SOLUTION INTRAVENOUS at 07:11

## 2018-11-15 RX ADMIN — PROPOFOL 200 MG: 10 INJECTION, EMULSION INTRAVENOUS at 07:48

## 2018-11-15 RX ADMIN — PHENYLEPHRINE HYDROCHLORIDE 100 MCG: 10 INJECTION INTRAVENOUS at 08:19

## 2018-11-15 RX ADMIN — PHENYLEPHRINE HYDROCHLORIDE 100 MCG: 10 INJECTION INTRAVENOUS at 08:22

## 2018-11-15 RX ADMIN — PROPOFOL 200 MG: 10 INJECTION, EMULSION INTRAVENOUS at 08:05

## 2018-11-15 RX ADMIN — PROPOFOL 200 MG: 10 INJECTION, EMULSION INTRAVENOUS at 07:55

## 2018-11-15 RX ADMIN — PHENYLEPHRINE HYDROCHLORIDE 150 MCG: 10 INJECTION INTRAVENOUS at 08:23

## 2018-11-15 ASSESSMENT — PAIN SCALES - GENERAL
PAINLEVEL_OUTOF10: 0
PAINLEVEL_OUTOF10: 0

## 2018-11-15 ASSESSMENT — PAIN - FUNCTIONAL ASSESSMENT
PAIN_FUNCTIONAL_ASSESSMENT: 0-10
PAIN_FUNCTIONAL_ASSESSMENT: 0-10

## 2018-11-15 NOTE — H&P
Specified)    PGF (Not Specified)         Social History  Social History     Social History    Marital status: Single     Spouse name: N/A    Number of children: N/A    Years of education: N/A     Occupational History    Not on file. Social History Main Topics    Smoking status: Never Smoker    Smokeless tobacco: Never Used    Alcohol use Yes      Comment: 2 drinks friday and sat.  Drug use: No    Sexual activity: Yes     Partners: Female     Other Topics Concern    Not on file     Social History Narrative    No narrative on file        Service:No         Hobbies:   [employer] watch on TV     OBJECTIVE:   VITALS:  height is 5' 10\" (1.778 m) and weight is 243 lb (110.2 kg). His oral temperature is 98.1 °F (36.7 °C). His blood pressure is 111/73 and his pulse is 71. His respiration is 12 and oxygen saturation is 97%. CONSTITUTIONAL: Alert and oriented times 3, no acute distress and cooperative to examination. friendly and pleasant , stocky build     SKIN: rash No    HEENT: Head is normocephalic, atraumatic. EOMI, PERRLA    Oral air way :slightly narrow Yes    NECK: neck supple, no lymphadenopathy noted, trachea midline and straight       2+ carotid, no bruit    LUNGS: Chest expands equally bilaterally upon respiration, no accessory muscle used. Ausculation reveals no adventitious breath sounds. CARDIOVASCULAR: \"Heart sounds are normal.  Regular rate and rhythm without murmur,    ABDOMEN: Bowel sounds are present in all four quadrants      GENATALIA:Deferred. NEUROLOGIC: \"CN II-XII are grossly intact.        EXTREMITIES: Pitting edema:  Yes, slight  , + thrill and bruit of right arm fistula   Varicose veins: No     Dorsal pedal/posterior tibial pulses palpable: Yes         Strength:  Normal       Patient Active Problem List   Diagnosis    Obesity, Class II, BMI 35-39.9, with comorbidity    Benign prostatic hyperplasia with lower urinary tract symptoms    ESRD on

## 2018-11-15 NOTE — ANESTHESIA PRE PROCEDURE
Department of Anesthesiology  Preprocedure Note       Name:  Sydney Hughes   Age:  46 y.o.  :  1970                                          MRN:  [id number]         Date:  11/15/2018      Surgeon: Ciaran Bruce):  Joseph Osborn MD    Procedure: Procedure(s):  GASTRECTOMY SLEEVE LAPAROSCOPIC SI ROBOTIC, ENDOSEALER    Medications prior to admission:   Prior to Admission medications    Medication Sig Start Date End Date Taking? Authorizing Provider   B complex-vitamin C-folic acid (NEPHRO-JENNIFER) 1 MG tablet TAKE ONE TABLET BY MOUTH DAILY WITH BREAKFAST 10/24/18   Maria Dolores Mckeon MD   pravastatin (PRAVACHOL) 40 MG tablet Take 40 mg by mouth daily    Historical Provider, MD   ASPIRIN LOW DOSE 81 MG EC tablet TAKE ONE TABLET BY MOUTH DAILY 18   Maria Dolores Mckeno MD   midodrine (PROAMATINE) 10 MG tablet TAKE ONE TABLET BY MOUTH THREE TIMES DAILY 18   Maria Dolores Mckeon MD   warfarin (COUMADIN) 5 MG tablet Take 1/2 to 1 tablet by mouth once daily as directed by Medication Management. 90 tablets = 90 days supply 18   Maria Dolores Mckeon MD   amiodarone (CORDARONE) 200 MG tablet TAKE ONE TABLET BY MOUTH DAILY 18   Maria Dolores Mckeon MD   Elastic Bandages & Supports ([address] [place] [address]) [address] [facility] 1 each by Does not apply route daily as needed (as needed) Right leg below the knee 20-30 HH MM DX 82.409 3/19/18   Maria Dolores Mckeon MD   Multiple Vitamin (MVI, CELEBRATE, CHEWABLE TABLET) Take 1 tablet by mouth 2 times daily     Historical Provider, MD   Calcium-Phosphorus-Vitamin D (CALCIUM GUMMIES PO) Take 500 mg by mouth 2 times daily     Historical Provider, MD   Cholecalciferol (VITAMIN D3) [address]75 UNITS CAPS Take 1 capsule by mouth every 30 days     Historical Provider, MD       Current medications:    No current facility-administered medications for this visit. No current outpatient prescriptions on file.      Facility-Administered Medications Ordered in Other Visits   Medication Dose Route Frequency Provider Last Rate

## 2018-11-16 LAB — SURGICAL PATHOLOGY REPORT: NORMAL

## 2018-11-26 ENCOUNTER — HOSPITAL ENCOUNTER (OUTPATIENT)
Dept: PHARMACY | Age: 48
Setting detail: THERAPIES SERIES
Discharge: HOME | End: 2018-11-26
Payer: MEDICARE

## 2018-11-26 DIAGNOSIS — I82.409 DEEP VEIN THROMBOSIS (DVT) OF LOWER EXTREMITY, UNSPECIFIED CHRONICITY, UNSPECIFIED LATERALITY, UNSPECIFIED VEIN (HCC): ICD-10-CM

## 2018-11-26 LAB
INR BLD: 1.2
PROTIME: 14.2 SECONDS

## 2018-11-26 PROCEDURE — 85610 PROTHROMBIN TIME: CPT

## 2018-11-26 PROCEDURE — 99212 OFFICE O/P EST SF 10 MIN: CPT

## 2018-11-29 PROBLEM — I73.9 PERIPHERAL VASCULAR DISEASE (HCC): Status: ACTIVE | Noted: 2018-08-02

## 2018-12-03 ENCOUNTER — HOSPITAL ENCOUNTER (OUTPATIENT)
Dept: PHARMACY | Age: 48
Setting detail: THERAPIES SERIES
Discharge: HOME | End: 2018-12-03
Payer: MEDICARE

## 2018-12-03 DIAGNOSIS — I82.409 DEEP VEIN THROMBOSIS (DVT) OF LOWER EXTREMITY, UNSPECIFIED CHRONICITY, UNSPECIFIED LATERALITY, UNSPECIFIED VEIN (HCC): ICD-10-CM

## 2018-12-03 LAB
INR BLD: 2.2
PROTIME: 26.7 SECONDS

## 2018-12-03 PROCEDURE — 85610 PROTHROMBIN TIME: CPT

## 2018-12-03 PROCEDURE — 99211 OFF/OP EST MAY X REQ PHY/QHP: CPT

## 2018-12-03 RX ORDER — WARFARIN SODIUM 5 MG/1
TABLET ORAL
Qty: 90 TABLET | Refills: 0 | Status: SHIPPED | OUTPATIENT
Start: 2018-12-03 | End: 2018-12-28

## 2018-12-03 NOTE — TELEPHONE ENCOUNTER
Last visit:10/19/18  Last Med refill:5/7/18    Next Visit Date:  Future Appointments  Date Time Provider Jonathan Castro   12/24/2018 11:00 AM [facility]ICATION [facility]   1/18/2019 10:15 AM Abby Del Real MD Adult pc [address] Maintenance   Topic Date Due    Diabetic foot exam  06/24/1978    Diabetic retinal exam  06/24/1978    Shingles Vaccine (1 of 2 - 2 Dose Series) 06/24/2018    DTaP/Tdap/Td vaccine (1 - Tdap) 01/08/2019 (Originally 6/24/1987)    Flu vaccine (1) 01/15/2019 (Originally 9/1/2018)    Pneumococcal highest risk (2 of 3 - PPSV23) 07/09/2019 (Originally 5/14/2018)    A1C test (Diabetic or Prediabetic)  09/25/2019    Lipid screen  09/25/2019    TSH testing  09/25/2019    Colon cancer screen colonoscopy  11/15/2028    HIV screen  Completed       Hemoglobin A1C (%)   Date Value   09/25/2018 4.4   09/07/2016 4.9   12/13/2013 5.2             ( goal A1C is < 7)   Microalb/Crt.  Ratio (mcg/mg creat)   Date Value   10/04/2011 591     LDL Cholesterol (mg/dL)   Date Value   09/25/2018 165 (H)   09/25/2018 165 (H)       (goal LDL is <100)   AST (U/L)   Date Value   09/25/2018 30   09/25/2018 30     ALT (U/L)   Date Value   09/25/2018 20   09/25/2018 20     BUN (mg/dL)   Date Value   09/25/2018 18   09/25/2018 18     BP Readings from Last 3 Encounters:   11/15/18 117/75   11/15/18 (!) 79/60   10/19/18 110/80          (goal 120/80)    All Future Testing planned in [facility]  Lab Frequency Next Occurrence   CBC Once 01/16/2019   Comprehensive Metabolic Panel Once 47/82/6998   Hemoglobin A1C Once 01/16/2019   Iron and TIBC Once 01/16/2019   Lipid Panel Once 01/16/2019   Magnesium Once 01/16/2019   PTH, Intact Once 01/16/2019   T4 Once 01/16/2019   TSH with Reflex Once 01/16/2019   Vitamin A Once 01/16/2019   Vitamin B1 Once 01/16/2019   Vitamin B12 & Folate Once 01/16/2019   Vitamin D 25 Hydroxy Once 01/16/2019   Zinc Once 01/16/2019               Patient Active Problem

## 2018-12-10 PROBLEM — C18.4 MALIGNANT NEOPLASM OF TRANSVERSE COLON (HCC): Status: ACTIVE | Noted: 2018-12-10

## 2018-12-21 ENCOUNTER — HOSPITAL ENCOUNTER (OUTPATIENT)
Age: 48
Setting detail: SURGERY ADMIT
End: 2018-12-21
Attending: COLON & RECTAL SURGERY | Admitting: COLON & RECTAL SURGERY
Payer: MEDICARE

## 2018-12-24 ENCOUNTER — HOSPITAL ENCOUNTER (OUTPATIENT)
Dept: PHARMACY | Age: 48
Setting detail: THERAPIES SERIES
Discharge: HOME | End: 2018-12-24
Payer: MEDICARE

## 2018-12-24 DIAGNOSIS — I82.409 DEEP VEIN THROMBOSIS (DVT) OF LOWER EXTREMITY, UNSPECIFIED CHRONICITY, UNSPECIFIED LATERALITY, UNSPECIFIED VEIN (HCC): ICD-10-CM

## 2018-12-24 LAB
INR BLD: 2.5
PROTIME: 30.3 SECONDS

## 2018-12-24 PROCEDURE — 85610 PROTHROMBIN TIME: CPT

## 2018-12-24 PROCEDURE — 99211 OFF/OP EST MAY X REQ PHY/QHP: CPT

## 2018-12-28 ENCOUNTER — HOSPITAL ENCOUNTER (OUTPATIENT)
Dept: PREADMISSION TESTING | Age: 48
Discharge: HOME | End: 2019-01-01
Payer: MEDICARE

## 2018-12-28 VITALS
BODY MASS INDEX: 35.79 KG/M2 | HEIGHT: 70 IN | TEMPERATURE: 98.1 F | SYSTOLIC BLOOD PRESSURE: 82 MMHG | WEIGHT: 250 LBS | HEART RATE: 76 BPM | RESPIRATION RATE: 18 BRPM | OXYGEN SATURATION: 97 % | DIASTOLIC BLOOD PRESSURE: 54 MMHG

## 2018-12-28 LAB
ANION GAP SERPL CALCULATED.3IONS-SCNC: 14 MMOL/L (ref 9–17)
BUN BLDV-MCNC: 47 MG/DL (ref 6–20)
CHLORIDE BLD-SCNC: 96 MMOL/L (ref 98–107)
CO2: 30 MMOL/L (ref 20–31)
CREAT SERPL-MCNC: 10.5 MG/DL (ref 0.7–1.2)
EKG ATRIAL RATE: 68 BPM
EKG P AXIS: 47 DEGREES
EKG P-R INTERVAL: 186 MS
EKG Q-T INTERVAL: 418 MS
EKG QRS DURATION: 72 MS
EKG QTC CALCULATION (BAZETT): 444 MS
EKG R AXIS: 15 DEGREES
EKG T AXIS: 59 DEGREES
EKG VENTRICULAR RATE: 68 BPM
GFR AFRICAN AMERICAN: 6 ML/MIN
GFR NON-AFRICAN AMERICAN: 5 ML/MIN
GFR SERPL CREATININE-BSD FRML MDRD: ABNORMAL ML/MIN/{1.73_M2}
GFR SERPL CREATININE-BSD FRML MDRD: ABNORMAL ML/MIN/{1.73_M2}
GLUCOSE BLD-MCNC: 92 MG/DL (ref 70–99)
HCT VFR BLD CALC: 38.8 % (ref 40.7–50.3)
HEMOGLOBIN: 12.6 G/DL (ref 13–17)
INR BLD: 1.7
MCH RBC QN AUTO: 34.2 PG (ref 25.2–33.5)
MCHC RBC AUTO-ENTMCNC: 32.5 G/DL (ref 28.4–34.8)
MCV RBC AUTO: 105.4 FL (ref 82.6–102.9)
NRBC AUTOMATED: 0 PER 100 WBC
PDW BLD-RTO: 14.9 % (ref 11.8–14.4)
PLATELET # BLD: 188 K/UL (ref 138–453)
PMV BLD AUTO: 10.6 FL (ref 8.1–13.5)
POTASSIUM SERPL-SCNC: 5.3 MMOL/L (ref 3.7–5.3)
PROTHROMBIN TIME: 17.1 SEC (ref 9–12)
RBC # BLD: 3.68 M/UL (ref 4.21–5.77)
SODIUM BLD-SCNC: 140 MMOL/L (ref 135–144)
WBC # BLD: 5.5 K/UL (ref 3.5–11.3)

## 2018-12-28 PROCEDURE — 80051 ELECTROLYTE PANEL: CPT

## 2018-12-28 PROCEDURE — 82947 ASSAY GLUCOSE BLOOD QUANT: CPT

## 2018-12-28 PROCEDURE — 82565 ASSAY OF CREATININE: CPT

## 2018-12-28 PROCEDURE — 86901 BLOOD TYPING SEROLOGIC RH(D): CPT

## 2018-12-28 PROCEDURE — 86850 RBC ANTIBODY SCREEN: CPT

## 2018-12-28 PROCEDURE — 85027 COMPLETE CBC AUTOMATED: CPT

## 2018-12-28 PROCEDURE — 86920 COMPATIBILITY TEST SPIN: CPT

## 2018-12-28 PROCEDURE — 93005 ELECTROCARDIOGRAM TRACING: CPT

## 2018-12-28 PROCEDURE — 84520 ASSAY OF UREA NITROGEN: CPT

## 2018-12-28 PROCEDURE — 86900 BLOOD TYPING SEROLOGIC ABO: CPT

## 2018-12-28 PROCEDURE — 85610 PROTHROMBIN TIME: CPT

## 2018-12-28 PROCEDURE — 36415 COLL VENOUS BLD VENIPUNCTURE: CPT

## 2018-12-28 RX ORDER — SODIUM CHLORIDE, SODIUM LACTATE, POTASSIUM CHLORIDE, CALCIUM CHLORIDE 600; 310; 30; 20 MG/100ML; MG/100ML; MG/100ML; MG/100ML
1000 INJECTION, SOLUTION INTRAVENOUS CONTINUOUS
Status: CANCELLED | OUTPATIENT
Start: 2018-12-28

## 2018-12-28 RX ORDER — WARFARIN SODIUM 5 MG/1
5 TABLET ORAL
COMMUNITY
End: 2019-02-27 | Stop reason: DRUGHIGH

## 2018-12-28 RX ORDER — ASPIRIN 81 MG/1
81 TABLET ORAL DAILY
COMMUNITY
End: 2019-08-05 | Stop reason: SDUPTHER

## 2018-12-28 RX ORDER — WARFARIN SODIUM 5 MG/1
2.5 TABLET ORAL
COMMUNITY
End: 2019-02-27 | Stop reason: DRUGHIGH

## 2019-01-02 ENCOUNTER — OFFICE VISIT (OUTPATIENT)
Dept: INTERNAL MEDICINE CLINIC | Age: 49
End: 2019-01-02
Payer: MEDICARE

## 2019-01-02 VITALS
WEIGHT: 253.8 LBS | HEIGHT: 70 IN | BODY MASS INDEX: 36.33 KG/M2 | OXYGEN SATURATION: 100 % | DIASTOLIC BLOOD PRESSURE: 80 MMHG | SYSTOLIC BLOOD PRESSURE: 120 MMHG | RESPIRATION RATE: 16 BRPM | HEART RATE: 64 BPM

## 2019-01-02 DIAGNOSIS — N18.6 ESRD ON HEMODIALYSIS (HCC): ICD-10-CM

## 2019-01-02 DIAGNOSIS — C18.4 MALIGNANT NEOPLASM OF TRANSVERSE COLON (HCC): ICD-10-CM

## 2019-01-02 DIAGNOSIS — Z98.84 S/P LAPAROSCOPIC SLEEVE GASTRECTOMY: ICD-10-CM

## 2019-01-02 DIAGNOSIS — D68.32 WARFARIN-INDUCED COAGULOPATHY (HCC): ICD-10-CM

## 2019-01-02 DIAGNOSIS — Z99.2 ESRD ON HEMODIALYSIS (HCC): ICD-10-CM

## 2019-01-02 DIAGNOSIS — E55.9 VITAMIN D DEFICIENCY: ICD-10-CM

## 2019-01-02 DIAGNOSIS — N40.1 BENIGN PROSTATIC HYPERPLASIA WITH LOWER URINARY TRACT SYMPTOMS, SYMPTOM DETAILS UNSPECIFIED: ICD-10-CM

## 2019-01-02 DIAGNOSIS — Z01.818 PRE-OP EXAMINATION: ICD-10-CM

## 2019-01-02 DIAGNOSIS — I20.9 ANGINA PECTORIS (HCC): ICD-10-CM

## 2019-01-02 DIAGNOSIS — I50.812 CHRONIC RIGHT-SIDED HEART FAILURE (HCC): ICD-10-CM

## 2019-01-02 DIAGNOSIS — I48.20 CHRONIC ATRIAL FIBRILLATION (HCC): Primary | ICD-10-CM

## 2019-01-02 DIAGNOSIS — T45.515A WARFARIN-INDUCED COAGULOPATHY (HCC): ICD-10-CM

## 2019-01-02 PROCEDURE — 99214 OFFICE O/P EST MOD 30 MIN: CPT | Performed by: FAMILY MEDICINE

## 2019-01-02 PROCEDURE — G8417 CALC BMI ABV UP PARAM F/U: HCPCS | Performed by: FAMILY MEDICINE

## 2019-01-02 PROCEDURE — G8427 DOCREV CUR MEDS BY ELIG CLIN: HCPCS | Performed by: FAMILY MEDICINE

## 2019-01-02 PROCEDURE — 1036F TOBACCO NON-USER: CPT | Performed by: FAMILY MEDICINE

## 2019-01-02 PROCEDURE — G8598 ASA/ANTIPLAT THER USED: HCPCS | Performed by: FAMILY MEDICINE

## 2019-01-02 PROCEDURE — G8484 FLU IMMUNIZE NO ADMIN: HCPCS | Performed by: FAMILY MEDICINE

## 2019-01-02 PROCEDURE — 3017F COLORECTAL CA SCREEN DOC REV: CPT | Performed by: FAMILY MEDICINE

## 2019-01-02 RX ORDER — AMIODARONE HYDROCHLORIDE 200 MG/1
TABLET ORAL
Qty: 60 TABLET | Refills: 2 | Status: SHIPPED | OUTPATIENT
Start: 2019-01-02 | End: 2019-08-19 | Stop reason: SDUPTHER

## 2019-01-02 RX ORDER — CHOLECALCIFEROL (VITAMIN D3) 1250 MCG
1 CAPSULE ORAL
Qty: 1 CAPSULE | Refills: 2 | Status: SHIPPED | OUTPATIENT
Start: 2019-01-02 | End: 2019-05-09 | Stop reason: SDUPTHER

## 2019-01-02 RX ORDER — VIT B COMP NO.3/FOLIC/C/BIOTIN 1 MG-60 MG
TABLET ORAL
Qty: 90 TABLET | Refills: 1 | Status: ON HOLD | OUTPATIENT
Start: 2019-01-02 | End: 2019-01-27 | Stop reason: HOSPADM

## 2019-01-02 ASSESSMENT — ENCOUNTER SYMPTOMS
RESPIRATORY NEGATIVE: 1
ALLERGIC/IMMUNOLOGIC NEGATIVE: 1
BACK PAIN: 1
GASTROINTESTINAL NEGATIVE: 1
EYES NEGATIVE: 1

## 2019-01-17 ENCOUNTER — TELEPHONE (OUTPATIENT)
Dept: PHARMACY | Age: 49
End: 2019-01-17

## 2019-01-21 ENCOUNTER — ANESTHESIA EVENT (OUTPATIENT)
Dept: OPERATING ROOM | Age: 49
DRG: 329 | End: 2019-01-21
Payer: MEDICARE

## 2019-01-22 ENCOUNTER — ANESTHESIA (OUTPATIENT)
Dept: OPERATING ROOM | Age: 49
DRG: 329 | End: 2019-01-22
Payer: MEDICARE

## 2019-01-22 ENCOUNTER — HOSPITAL ENCOUNTER (INPATIENT)
Age: 49
LOS: 6 days | Discharge: HOME | DRG: 329 | End: 2019-01-28
Attending: COLON & RECTAL SURGERY | Admitting: COLON & RECTAL SURGERY
Payer: MEDICARE

## 2019-01-22 VITALS — TEMPERATURE: 96.8 F | SYSTOLIC BLOOD PRESSURE: 100 MMHG | OXYGEN SATURATION: 100 % | DIASTOLIC BLOOD PRESSURE: 57 MMHG

## 2019-01-22 DIAGNOSIS — G89.18 PAIN ASSOCIATED WITH SURGICAL PROCEDURE: Primary | ICD-10-CM

## 2019-01-22 PROBLEM — Z90.49 STATUS POST PARTIAL COLECTOMY: Status: ACTIVE | Noted: 2019-01-22

## 2019-01-22 LAB
ANION GAP SERPL CALCULATED.3IONS-SCNC: 18 MMOL/L (ref 9–17)
BUN BLDV-MCNC: 37 MG/DL (ref 6–20)
BUN/CREAT BLD: ABNORMAL (ref 9–20)
CALCIUM IONIZED: 1.06 MMOL/L (ref 1.13–1.33)
CALCIUM SERPL-MCNC: 8.3 MG/DL (ref 8.6–10.4)
CHLORIDE BLD-SCNC: 96 MMOL/L (ref 98–107)
CO2: 20 MMOL/L (ref 20–31)
CREAT SERPL-MCNC: 10.31 MG/DL (ref 0.7–1.2)
GFR AFRICAN AMERICAN: 6 ML/MIN
GFR NON-AFRICAN AMERICAN: 5 ML/MIN
GFR SERPL CREATININE-BSD FRML MDRD: ABNORMAL ML/MIN/{1.73_M2}
GFR SERPL CREATININE-BSD FRML MDRD: ABNORMAL ML/MIN/{1.73_M2}
GLUCOSE BLD-MCNC: 154 MG/DL (ref 70–99)
HCT VFR BLD CALC: 35.3 %
HEMOGLOBIN: 11.4 GM/DL
MAGNESIUM: 1.8 MG/DL (ref 1.6–2.6)
POC INR: 1.2
POC POTASSIUM: 4.1 MMOL/L (ref 3.5–4.5)
POTASSIUM SERPL-SCNC: 4.5 MMOL/L (ref 3.7–5.3)
PROTHROMBIN TIME, POC: 14.5 SEC (ref 10.4–14.2)
SODIUM BLD-SCNC: 134 MMOL/L (ref 135–144)

## 2019-01-22 PROCEDURE — 2580000003 HC RX 258: Performed by: SURGERY

## 2019-01-22 PROCEDURE — 2500000003 HC RX 250 WO HCPCS: Performed by: SPECIALIST

## 2019-01-22 PROCEDURE — 80048 BASIC METABOLIC PNL TOTAL CA: CPT

## 2019-01-22 PROCEDURE — 85610 PROTHROMBIN TIME: CPT

## 2019-01-22 PROCEDURE — 83735 ASSAY OF MAGNESIUM: CPT

## 2019-01-22 PROCEDURE — 6360000002 HC RX W HCPCS: Performed by: SURGERY

## 2019-01-22 PROCEDURE — 3700000001 HC ADD 15 MINUTES (ANESTHESIA): Performed by: COLON & RECTAL SURGERY

## 2019-01-22 PROCEDURE — 2700000000 HC OXYGEN THERAPY PER DAY

## 2019-01-22 PROCEDURE — 6360000002 HC RX W HCPCS

## 2019-01-22 PROCEDURE — 2000000000 HC ICU R&B

## 2019-01-22 PROCEDURE — 2720000010 HC SURG SUPPLY STERILE: Performed by: COLON & RECTAL SURGERY

## 2019-01-22 PROCEDURE — 84132 ASSAY OF SERUM POTASSIUM: CPT

## 2019-01-22 PROCEDURE — 6370000000 HC RX 637 (ALT 250 FOR IP): Performed by: SURGERY

## 2019-01-22 PROCEDURE — 2709999900 HC NON-CHARGEABLE SUPPLY: Performed by: COLON & RECTAL SURGERY

## 2019-01-22 PROCEDURE — P9041 ALBUMIN (HUMAN),5%, 50ML: HCPCS | Performed by: ANESTHESIOLOGY

## 2019-01-22 PROCEDURE — 2500000003 HC RX 250 WO HCPCS: Performed by: ANESTHESIOLOGY

## 2019-01-22 PROCEDURE — 7100000000 HC PACU RECOVERY - FIRST 15 MIN: Performed by: COLON & RECTAL SURGERY

## 2019-01-22 PROCEDURE — 88309 TISSUE EXAM BY PATHOLOGIST: CPT

## 2019-01-22 PROCEDURE — L0625 LO FLEX L1-BELOW L5 PRE OTS: HCPCS | Performed by: COLON & RECTAL SURGERY

## 2019-01-22 PROCEDURE — 88341 IMHCHEM/IMCYTCHM EA ADD ANTB: CPT

## 2019-01-22 PROCEDURE — 2580000003 HC RX 258: Performed by: COLON & RECTAL SURGERY

## 2019-01-22 PROCEDURE — 7100000001 HC PACU RECOVERY - ADDTL 15 MIN: Performed by: COLON & RECTAL SURGERY

## 2019-01-22 PROCEDURE — 85014 HEMATOCRIT: CPT

## 2019-01-22 PROCEDURE — 6360000002 HC RX W HCPCS: Performed by: ANESTHESIOLOGY

## 2019-01-22 PROCEDURE — 0DTG0ZZ RESECTION OF LEFT LARGE INTESTINE, OPEN APPROACH: ICD-10-PCS | Performed by: COLON & RECTAL SURGERY

## 2019-01-22 PROCEDURE — 6360000002 HC RX W HCPCS: Performed by: SPECIALIST

## 2019-01-22 PROCEDURE — 87641 MR-STAPH DNA AMP PROBE: CPT

## 2019-01-22 PROCEDURE — 3600000016 HC SURGERY LEVEL 6 ADDTL 15MIN: Performed by: COLON & RECTAL SURGERY

## 2019-01-22 PROCEDURE — 3600000006 HC SURGERY LEVEL 6 BASE: Performed by: COLON & RECTAL SURGERY

## 2019-01-22 PROCEDURE — 3700000000 HC ANESTHESIA ATTENDED CARE: Performed by: COLON & RECTAL SURGERY

## 2019-01-22 PROCEDURE — 0DJD8ZZ INSPECTION OF LOWER INTESTINAL TRACT, VIA NATURAL OR ARTIFICIAL OPENING ENDOSCOPIC: ICD-10-PCS | Performed by: COLON & RECTAL SURGERY

## 2019-01-22 PROCEDURE — 88342 IMHCHEM/IMCYTCHM 1ST ANTB: CPT

## 2019-01-22 PROCEDURE — C9113 INJ PANTOPRAZOLE SODIUM, VIA: HCPCS | Performed by: SURGERY

## 2019-01-22 PROCEDURE — 85018 HEMOGLOBIN: CPT

## 2019-01-22 PROCEDURE — 2580000003 HC RX 258: Performed by: SPECIALIST

## 2019-01-22 PROCEDURE — 82330 ASSAY OF CALCIUM: CPT

## 2019-01-22 RX ORDER — CALCIUM CHLORIDE 100 MG/ML
1 INJECTION INTRAVENOUS; INTRAVENTRICULAR ONCE
Status: DISCONTINUED | OUTPATIENT
Start: 2019-01-23 | End: 2019-01-23

## 2019-01-22 RX ORDER — SODIUM CHLORIDE 9 MG/ML
INJECTION, SOLUTION INTRAVENOUS CONTINUOUS
Status: DISCONTINUED | OUTPATIENT
Start: 2019-01-22 | End: 2019-01-22

## 2019-01-22 RX ORDER — MAGNESIUM SULFATE 1 G/100ML
1 INJECTION INTRAVENOUS PRN
Status: DISCONTINUED | OUTPATIENT
Start: 2019-01-22 | End: 2019-01-23

## 2019-01-22 RX ORDER — HYDROCODONE BITARTRATE AND ACETAMINOPHEN 5; 325 MG/1; MG/1
1 TABLET ORAL EVERY 6 HOURS PRN
Qty: 40 TABLET | Refills: 0 | Status: SHIPPED | OUTPATIENT
Start: 2019-01-22 | End: 2019-01-29

## 2019-01-22 RX ORDER — PROPOFOL 10 MG/ML
INJECTION, EMULSION INTRAVENOUS PRN
Status: DISCONTINUED | OUTPATIENT
Start: 2019-01-22 | End: 2019-01-22 | Stop reason: SDUPTHER

## 2019-01-22 RX ORDER — LIDOCAINE HYDROCHLORIDE 10 MG/ML
INJECTION, SOLUTION INFILTRATION; PERINEURAL PRN
Status: DISCONTINUED | OUTPATIENT
Start: 2019-01-22 | End: 2019-01-22 | Stop reason: SDUPTHER

## 2019-01-22 RX ORDER — FENTANYL CITRATE 50 UG/ML
INJECTION, SOLUTION INTRAMUSCULAR; INTRAVENOUS
Status: COMPLETED
Start: 2019-01-22 | End: 2019-01-22

## 2019-01-22 RX ORDER — SODIUM CHLORIDE 9 MG/ML
INJECTION, SOLUTION INTRAVENOUS CONTINUOUS PRN
Status: DISCONTINUED | OUTPATIENT
Start: 2019-01-22 | End: 2019-01-22 | Stop reason: SDUPTHER

## 2019-01-22 RX ORDER — FENTANYL CITRATE 50 UG/ML
25 INJECTION, SOLUTION INTRAMUSCULAR; INTRAVENOUS EVERY 5 MIN PRN
Status: DISCONTINUED | OUTPATIENT
Start: 2019-01-22 | End: 2019-01-22 | Stop reason: HOSPADM

## 2019-01-22 RX ORDER — ROCURONIUM BROMIDE 10 MG/ML
INJECTION, SOLUTION INTRAVENOUS PRN
Status: DISCONTINUED | OUTPATIENT
Start: 2019-01-22 | End: 2019-01-22 | Stop reason: SDUPTHER

## 2019-01-22 RX ORDER — DOCUSATE SODIUM 100 MG/1
100 CAPSULE, LIQUID FILLED ORAL 2 TIMES DAILY PRN
Qty: 20 CAPSULE | Refills: 0 | Status: ON HOLD | OUTPATIENT
Start: 2019-01-22 | End: 2019-02-19 | Stop reason: ALTCHOICE

## 2019-01-22 RX ORDER — KETOROLAC TROMETHAMINE 15 MG/ML
15 INJECTION, SOLUTION INTRAMUSCULAR; INTRAVENOUS EVERY 6 HOURS
Status: DISCONTINUED | OUTPATIENT
Start: 2019-01-22 | End: 2019-01-24

## 2019-01-22 RX ORDER — MIDAZOLAM HYDROCHLORIDE 1 MG/ML
INJECTION INTRAMUSCULAR; INTRAVENOUS PRN
Status: DISCONTINUED | OUTPATIENT
Start: 2019-01-22 | End: 2019-01-22 | Stop reason: SDUPTHER

## 2019-01-22 RX ORDER — SODIUM CHLORIDE, SODIUM LACTATE, POTASSIUM CHLORIDE, CALCIUM CHLORIDE 600; 310; 30; 20 MG/100ML; MG/100ML; MG/100ML; MG/100ML
INJECTION, SOLUTION INTRAVENOUS CONTINUOUS
Status: DISCONTINUED | OUTPATIENT
Start: 2019-01-22 | End: 2019-01-22

## 2019-01-22 RX ORDER — FENTANYL CITRATE 50 UG/ML
25 INJECTION, SOLUTION INTRAMUSCULAR; INTRAVENOUS
Status: DISCONTINUED | OUTPATIENT
Start: 2019-01-22 | End: 2019-01-28 | Stop reason: HOSPADM

## 2019-01-22 RX ORDER — MORPHINE SULFATE 4 MG/ML
4 INJECTION, SOLUTION INTRAMUSCULAR; INTRAVENOUS
Status: DISCONTINUED | OUTPATIENT
Start: 2019-01-22 | End: 2019-01-22

## 2019-01-22 RX ORDER — SODIUM CHLORIDE 0.9 % (FLUSH) 0.9 %
10 SYRINGE (ML) INJECTION EVERY 12 HOURS SCHEDULED
Status: DISCONTINUED | OUTPATIENT
Start: 2019-01-22 | End: 2019-01-28 | Stop reason: HOSPADM

## 2019-01-22 RX ORDER — EPHEDRINE SULFATE 50 MG/ML
INJECTION, SOLUTION INTRAVENOUS PRN
Status: DISCONTINUED | OUTPATIENT
Start: 2019-01-22 | End: 2019-01-22 | Stop reason: SDUPTHER

## 2019-01-22 RX ORDER — ALVIMOPAN 12 MG/1
12 CAPSULE ORAL ONCE
Status: COMPLETED | OUTPATIENT
Start: 2019-01-22 | End: 2019-01-22

## 2019-01-22 RX ORDER — PANTOPRAZOLE SODIUM 40 MG/10ML
40 INJECTION, POWDER, LYOPHILIZED, FOR SOLUTION INTRAVENOUS DAILY
Status: DISCONTINUED | OUTPATIENT
Start: 2019-01-22 | End: 2019-01-24

## 2019-01-22 RX ORDER — FENTANYL CITRATE 50 UG/ML
50 INJECTION, SOLUTION INTRAMUSCULAR; INTRAVENOUS
Status: DISCONTINUED | OUTPATIENT
Start: 2019-01-22 | End: 2019-01-28 | Stop reason: HOSPADM

## 2019-01-22 RX ORDER — MIDODRINE HYDROCHLORIDE 5 MG/1
10 TABLET ORAL 3 TIMES DAILY
Status: DISCONTINUED | OUTPATIENT
Start: 2019-01-22 | End: 2019-01-28 | Stop reason: HOSPADM

## 2019-01-22 RX ORDER — HYDROCODONE BITARTRATE AND ACETAMINOPHEN 5; 325 MG/1; MG/1
2 TABLET ORAL EVERY 4 HOURS PRN
Status: DISCONTINUED | OUTPATIENT
Start: 2019-01-22 | End: 2019-01-28 | Stop reason: HOSPADM

## 2019-01-22 RX ORDER — POTASSIUM CHLORIDE 20MEQ/15ML
40 LIQUID (ML) ORAL PRN
Status: DISCONTINUED | OUTPATIENT
Start: 2019-01-22 | End: 2019-01-23

## 2019-01-22 RX ORDER — FENTANYL CITRATE 50 UG/ML
25 INJECTION, SOLUTION INTRAMUSCULAR; INTRAVENOUS ONCE
Status: COMPLETED | OUTPATIENT
Start: 2019-01-22 | End: 2019-01-22

## 2019-01-22 RX ORDER — POTASSIUM CHLORIDE 7.45 MG/ML
10 INJECTION INTRAVENOUS PRN
Status: DISCONTINUED | OUTPATIENT
Start: 2019-01-22 | End: 2019-01-23

## 2019-01-22 RX ORDER — ALVIMOPAN 12 MG/1
12 CAPSULE ORAL 2 TIMES DAILY
Status: DISCONTINUED | OUTPATIENT
Start: 2019-01-23 | End: 2019-01-25

## 2019-01-22 RX ORDER — FENTANYL CITRATE 50 UG/ML
INJECTION, SOLUTION INTRAMUSCULAR; INTRAVENOUS PRN
Status: DISCONTINUED | OUTPATIENT
Start: 2019-01-22 | End: 2019-01-22 | Stop reason: SDUPTHER

## 2019-01-22 RX ORDER — SODIUM CHLORIDE 0.9 % (FLUSH) 0.9 %
10 SYRINGE (ML) INJECTION PRN
Status: DISCONTINUED | OUTPATIENT
Start: 2019-01-22 | End: 2019-01-28 | Stop reason: HOSPADM

## 2019-01-22 RX ORDER — POTASSIUM CHLORIDE 20 MEQ/1
40 TABLET, EXTENDED RELEASE ORAL PRN
Status: DISCONTINUED | OUTPATIENT
Start: 2019-01-22 | End: 2019-01-23

## 2019-01-22 RX ORDER — 0.9 % SODIUM CHLORIDE 0.9 %
10 VIAL (ML) INJECTION DAILY
Status: DISCONTINUED | OUTPATIENT
Start: 2019-01-22 | End: 2019-01-24

## 2019-01-22 RX ORDER — ONDANSETRON 2 MG/ML
4 INJECTION INTRAMUSCULAR; INTRAVENOUS EVERY 6 HOURS PRN
Status: DISCONTINUED | OUTPATIENT
Start: 2019-01-22 | End: 2019-01-28 | Stop reason: HOSPADM

## 2019-01-22 RX ORDER — ALBUMIN, HUMAN INJ 5% 5 %
25 SOLUTION INTRAVENOUS ONCE
Status: COMPLETED | OUTPATIENT
Start: 2019-01-22 | End: 2019-01-22

## 2019-01-22 RX ORDER — MORPHINE SULFATE 10 MG/ML
6 INJECTION, SOLUTION INTRAMUSCULAR; INTRAVENOUS
Status: DISCONTINUED | OUTPATIENT
Start: 2019-01-22 | End: 2019-01-22

## 2019-01-22 RX ORDER — ONDANSETRON 4 MG/1
4 TABLET, FILM COATED ORAL EVERY 8 HOURS PRN
Qty: 20 TABLET | Refills: 0 | Status: SHIPPED | OUTPATIENT
Start: 2019-01-22 | End: 2019-08-05

## 2019-01-22 RX ORDER — HYDROCODONE BITARTRATE AND ACETAMINOPHEN 5; 325 MG/1; MG/1
1 TABLET ORAL EVERY 4 HOURS PRN
Status: DISCONTINUED | OUTPATIENT
Start: 2019-01-22 | End: 2019-01-28 | Stop reason: HOSPADM

## 2019-01-22 RX ORDER — SODIUM CHLORIDE, SODIUM LACTATE, POTASSIUM CHLORIDE, CALCIUM CHLORIDE 600; 310; 30; 20 MG/100ML; MG/100ML; MG/100ML; MG/100ML
INJECTION, SOLUTION INTRAVENOUS CONTINUOUS PRN
Status: COMPLETED | OUTPATIENT
Start: 2019-01-22 | End: 2019-01-22

## 2019-01-22 RX ORDER — MORPHINE SULFATE 4 MG/ML
INJECTION, SOLUTION INTRAMUSCULAR; INTRAVENOUS
Status: COMPLETED
Start: 2019-01-22 | End: 2019-01-22

## 2019-01-22 RX ORDER — LIDOCAINE HYDROCHLORIDE 10 MG/ML
1 INJECTION, SOLUTION EPIDURAL; INFILTRATION; INTRACAUDAL; PERINEURAL
Status: DISCONTINUED | OUTPATIENT
Start: 2019-01-22 | End: 2019-01-22 | Stop reason: HOSPADM

## 2019-01-22 RX ORDER — GLYCOPYRROLATE 1 MG/5 ML
SYRINGE (ML) INTRAVENOUS PRN
Status: DISCONTINUED | OUTPATIENT
Start: 2019-01-22 | End: 2019-01-22 | Stop reason: SDUPTHER

## 2019-01-22 RX ORDER — SODIUM CHLORIDE 9 MG/ML
INJECTION, SOLUTION INTRAVENOUS CONTINUOUS
Status: DISCONTINUED | OUTPATIENT
Start: 2019-01-22 | End: 2019-01-24

## 2019-01-22 RX ADMIN — PHENYLEPHRINE HYDROCHLORIDE 100 MCG: 10 INJECTION INTRAVENOUS at 15:02

## 2019-01-22 RX ADMIN — CEFOXITIN SODIUM 2 G: 2 POWDER, FOR SOLUTION INTRAVENOUS at 22:31

## 2019-01-22 RX ADMIN — PHENYLEPHRINE HYDROCHLORIDE 200 MCG: 10 INJECTION INTRAVENOUS at 13:24

## 2019-01-22 RX ADMIN — MORPHINE SULFATE 4 MG: 4 INJECTION, SOLUTION INTRAMUSCULAR; INTRAVENOUS at 18:28

## 2019-01-22 RX ADMIN — MIDAZOLAM HYDROCHLORIDE 1 MG: 1 INJECTION, SOLUTION INTRAMUSCULAR; INTRAVENOUS at 13:00

## 2019-01-22 RX ADMIN — FENTANYL CITRATE 50 MCG: 50 INJECTION INTRAMUSCULAR; INTRAVENOUS at 13:20

## 2019-01-22 RX ADMIN — PROPOFOL 40 MG: 10 INJECTION, EMULSION INTRAVENOUS at 13:01

## 2019-01-22 RX ADMIN — PANTOPRAZOLE SODIUM 40 MG: 40 INJECTION, POWDER, FOR SOLUTION INTRAVENOUS at 19:28

## 2019-01-22 RX ADMIN — PHENYLEPHRINE HYDROCHLORIDE 200 MCG: 10 INJECTION INTRAVENOUS at 13:27

## 2019-01-22 RX ADMIN — Medication 10 ML: at 19:28

## 2019-01-22 RX ADMIN — Medication 10 ML: at 20:28

## 2019-01-22 RX ADMIN — CEFOXITIN SODIUM 2 G: 2 POWDER, FOR SOLUTION INTRAVENOUS at 15:08

## 2019-01-22 RX ADMIN — SODIUM CHLORIDE: 9 INJECTION, SOLUTION INTRAVENOUS at 11:34

## 2019-01-22 RX ADMIN — PHENYLEPHRINE HYDROCHLORIDE 100 MCG: 10 INJECTION INTRAVENOUS at 15:07

## 2019-01-22 RX ADMIN — HYDROCODONE BITARTRATE AND ACETAMINOPHEN 2 TABLET: 5; 325 TABLET ORAL at 20:22

## 2019-01-22 RX ADMIN — ALVIMOPAN 12 MG: 12 CAPSULE ORAL at 12:02

## 2019-01-22 RX ADMIN — PROPOFOL 40 MG: 10 INJECTION, EMULSION INTRAVENOUS at 13:06

## 2019-01-22 RX ADMIN — EPHEDRINE SULFATE 10 MG: 50 INJECTION, SOLUTION INTRAMUSCULAR; INTRAVENOUS; SUBCUTANEOUS at 14:08

## 2019-01-22 RX ADMIN — FENTANYL CITRATE 50 MCG: 50 INJECTION INTRAMUSCULAR; INTRAVENOUS at 16:03

## 2019-01-22 RX ADMIN — EPHEDRINE SULFATE 10 MG: 50 INJECTION, SOLUTION INTRAMUSCULAR; INTRAVENOUS; SUBCUTANEOUS at 15:54

## 2019-01-22 RX ADMIN — PHENYLEPHRINE HYDROCHLORIDE 200 MCG: 10 INJECTION INTRAVENOUS at 14:16

## 2019-01-22 RX ADMIN — MORPHINE SULFATE 4 MG: 4 INJECTION INTRAVENOUS at 18:28

## 2019-01-22 RX ADMIN — FENTANYL CITRATE 25 MCG: 50 INJECTION, SOLUTION INTRAMUSCULAR; INTRAVENOUS at 23:23

## 2019-01-22 RX ADMIN — FENTANYL CITRATE 50 MCG: 50 INJECTION INTRAMUSCULAR; INTRAVENOUS at 14:53

## 2019-01-22 RX ADMIN — PROPOFOL 100 MG: 10 INJECTION, EMULSION INTRAVENOUS at 13:20

## 2019-01-22 RX ADMIN — PHENYLEPHRINE HYDROCHLORIDE 200 MCG: 10 INJECTION INTRAVENOUS at 13:11

## 2019-01-22 RX ADMIN — Medication 1 MG: at 16:37

## 2019-01-22 RX ADMIN — MIDAZOLAM HYDROCHLORIDE 1 MG: 1 INJECTION, SOLUTION INTRAMUSCULAR; INTRAVENOUS at 13:20

## 2019-01-22 RX ADMIN — PHENYLEPHRINE HYDROCHLORIDE 200 MCG: 10 INJECTION INTRAVENOUS at 13:33

## 2019-01-22 RX ADMIN — ALBUMIN (HUMAN) 25 G: 12.5 INJECTION, SOLUTION INTRAVENOUS at 17:26

## 2019-01-22 RX ADMIN — EPHEDRINE SULFATE 10 MG: 50 INJECTION, SOLUTION INTRAMUSCULAR; INTRAVENOUS; SUBCUTANEOUS at 14:25

## 2019-01-22 RX ADMIN — PHENYLEPHRINE HYDROCHLORIDE 50 MCG/MIN: 10 INJECTION INTRAVENOUS at 13:53

## 2019-01-22 RX ADMIN — PROPOFOL 40 MG: 10 INJECTION, EMULSION INTRAVENOUS at 13:10

## 2019-01-22 RX ADMIN — ROCURONIUM BROMIDE 50 MG: 10 INJECTION INTRAVENOUS at 13:20

## 2019-01-22 RX ADMIN — EPHEDRINE SULFATE 10 MG: 50 INJECTION, SOLUTION INTRAMUSCULAR; INTRAVENOUS; SUBCUTANEOUS at 15:01

## 2019-01-22 RX ADMIN — SODIUM CHLORIDE: 9 INJECTION, SOLUTION INTRAVENOUS at 12:56

## 2019-01-22 RX ADMIN — CEFOXITIN SODIUM 2 G: 2 POWDER, FOR SOLUTION INTRAVENOUS at 13:13

## 2019-01-22 RX ADMIN — PHENYLEPHRINE HYDROCHLORIDE 100 MCG: 10 INJECTION INTRAVENOUS at 15:54

## 2019-01-22 RX ADMIN — Medication 1 MG: at 16:36

## 2019-01-22 RX ADMIN — FENTANYL CITRATE 50 MCG: 50 INJECTION INTRAMUSCULAR; INTRAVENOUS at 13:01

## 2019-01-22 RX ADMIN — FENTANYL CITRATE 50 MCG: 50 INJECTION INTRAMUSCULAR; INTRAVENOUS at 13:43

## 2019-01-22 RX ADMIN — SUGAMMADEX 200 MG: 100 INJECTION, SOLUTION INTRAVENOUS at 17:07

## 2019-01-22 RX ADMIN — KETOROLAC TROMETHAMINE 15 MG: 15 INJECTION, SOLUTION INTRAMUSCULAR; INTRAVENOUS at 19:24

## 2019-01-22 RX ADMIN — MIDODRINE HYDROCHLORIDE 10 MG: 5 TABLET ORAL at 20:22

## 2019-01-22 RX ADMIN — LIDOCAINE HYDROCHLORIDE 50 MG: 10 INJECTION, SOLUTION INFILTRATION; PERINEURAL at 13:20

## 2019-01-22 RX ADMIN — EPHEDRINE SULFATE 10 MG: 50 INJECTION, SOLUTION INTRAMUSCULAR; INTRAVENOUS; SUBCUTANEOUS at 14:16

## 2019-01-22 RX ADMIN — PROPOFOL 40 MG: 10 INJECTION, EMULSION INTRAVENOUS at 13:03

## 2019-01-22 RX ADMIN — ROCURONIUM BROMIDE 30 MG: 10 INJECTION INTRAVENOUS at 14:53

## 2019-01-22 RX ADMIN — ROCURONIUM BROMIDE 25 MG: 10 INJECTION INTRAVENOUS at 15:28

## 2019-01-22 ASSESSMENT — PULMONARY FUNCTION TESTS
PIF_VALUE: 21
PIF_VALUE: 20
PIF_VALUE: 21
PIF_VALUE: 2
PIF_VALUE: 21
PIF_VALUE: 21
PIF_VALUE: 22
PIF_VALUE: 18
PIF_VALUE: 21
PIF_VALUE: 23
PIF_VALUE: 18
PIF_VALUE: 23
PIF_VALUE: 44
PIF_VALUE: 23
PIF_VALUE: 21
PIF_VALUE: 20
PIF_VALUE: 21
PIF_VALUE: 18
PIF_VALUE: 23
PIF_VALUE: 22
PIF_VALUE: 23
PIF_VALUE: 22
PIF_VALUE: 22
PIF_VALUE: 0
PIF_VALUE: 20
PIF_VALUE: 20
PIF_VALUE: 21
PIF_VALUE: 22
PIF_VALUE: 20
PIF_VALUE: 21
PIF_VALUE: 20
PIF_VALUE: 7
PIF_VALUE: 21
PIF_VALUE: 22
PIF_VALUE: 20
PIF_VALUE: 0
PIF_VALUE: 21
PIF_VALUE: 23
PIF_VALUE: 21
PIF_VALUE: 21
PIF_VALUE: 20
PIF_VALUE: 22
PIF_VALUE: 21
PIF_VALUE: 22
PIF_VALUE: 23
PIF_VALUE: 21
PIF_VALUE: 20
PIF_VALUE: 21
PIF_VALUE: 22
PIF_VALUE: 24
PIF_VALUE: 0
PIF_VALUE: 2
PIF_VALUE: 22
PIF_VALUE: 30
PIF_VALUE: 21
PIF_VALUE: 21
PIF_VALUE: 23
PIF_VALUE: 21
PIF_VALUE: 22
PIF_VALUE: 21
PIF_VALUE: 22
PIF_VALUE: 22
PIF_VALUE: 23
PIF_VALUE: 22
PIF_VALUE: 21
PIF_VALUE: 23
PIF_VALUE: 17
PIF_VALUE: 21
PIF_VALUE: 22
PIF_VALUE: 22
PIF_VALUE: 0
PIF_VALUE: 21
PIF_VALUE: 22
PIF_VALUE: 21
PIF_VALUE: 22
PIF_VALUE: 8
PIF_VALUE: 21
PIF_VALUE: 22
PIF_VALUE: 9
PIF_VALUE: 21
PIF_VALUE: 0
PIF_VALUE: 23
PIF_VALUE: 0
PIF_VALUE: 0
PIF_VALUE: 22
PIF_VALUE: 1
PIF_VALUE: 21
PIF_VALUE: 18
PIF_VALUE: 21
PIF_VALUE: 21
PIF_VALUE: 22
PIF_VALUE: 21
PIF_VALUE: 22
PIF_VALUE: 22
PIF_VALUE: 6
PIF_VALUE: 18
PIF_VALUE: 0
PIF_VALUE: 20
PIF_VALUE: 18
PIF_VALUE: 0
PIF_VALUE: 22
PIF_VALUE: 9
PIF_VALUE: 21
PIF_VALUE: 21
PIF_VALUE: 18
PIF_VALUE: 23
PIF_VALUE: 22
PIF_VALUE: 24
PIF_VALUE: 22
PIF_VALUE: 21
PIF_VALUE: 21
PIF_VALUE: 20
PIF_VALUE: 22
PIF_VALUE: 23
PIF_VALUE: 21
PIF_VALUE: 8
PIF_VALUE: 18
PIF_VALUE: 22
PIF_VALUE: 22
PIF_VALUE: 20
PIF_VALUE: 22
PIF_VALUE: 22
PIF_VALUE: 20
PIF_VALUE: 21
PIF_VALUE: 22
PIF_VALUE: 20
PIF_VALUE: 23
PIF_VALUE: 0
PIF_VALUE: 24
PIF_VALUE: 0
PIF_VALUE: 1
PIF_VALUE: 22
PIF_VALUE: 21
PIF_VALUE: 18
PIF_VALUE: 21
PIF_VALUE: 20
PIF_VALUE: 20
PIF_VALUE: 22
PIF_VALUE: 0
PIF_VALUE: 22
PIF_VALUE: 20
PIF_VALUE: 22
PIF_VALUE: 21
PIF_VALUE: 3
PIF_VALUE: 26
PIF_VALUE: 21
PIF_VALUE: 0
PIF_VALUE: 22
PIF_VALUE: 21
PIF_VALUE: 18
PIF_VALUE: 14
PIF_VALUE: 22
PIF_VALUE: 21
PIF_VALUE: 22
PIF_VALUE: 22
PIF_VALUE: 23
PIF_VALUE: 21
PIF_VALUE: 22
PIF_VALUE: 22
PIF_VALUE: 0
PIF_VALUE: 23
PIF_VALUE: 22
PIF_VALUE: 21
PIF_VALUE: 0
PIF_VALUE: 23
PIF_VALUE: 21
PIF_VALUE: 2
PIF_VALUE: 21
PIF_VALUE: 22
PIF_VALUE: 21
PIF_VALUE: 21
PIF_VALUE: 22
PIF_VALUE: 23
PIF_VALUE: 21
PIF_VALUE: 21
PIF_VALUE: 22
PIF_VALUE: 20
PIF_VALUE: 21
PIF_VALUE: 8
PIF_VALUE: 22
PIF_VALUE: 21
PIF_VALUE: 0
PIF_VALUE: 20
PIF_VALUE: 22
PIF_VALUE: 0
PIF_VALUE: 0
PIF_VALUE: 23
PIF_VALUE: 39
PIF_VALUE: 23
PIF_VALUE: 20
PIF_VALUE: 21
PIF_VALUE: 22
PIF_VALUE: 21
PIF_VALUE: 23
PIF_VALUE: 21
PIF_VALUE: 1
PIF_VALUE: 21
PIF_VALUE: 0
PIF_VALUE: 22
PIF_VALUE: 22
PIF_VALUE: 21
PIF_VALUE: 21
PIF_VALUE: 22
PIF_VALUE: 23
PIF_VALUE: 23
PIF_VALUE: 20
PIF_VALUE: 22
PIF_VALUE: 21
PIF_VALUE: 22
PIF_VALUE: 6
PIF_VALUE: 23

## 2019-01-22 ASSESSMENT — PAIN DESCRIPTION - LOCATION
LOCATION: ABDOMEN

## 2019-01-22 ASSESSMENT — PAIN DESCRIPTION - PAIN TYPE
TYPE: ACUTE PAIN;SURGICAL PAIN

## 2019-01-22 ASSESSMENT — PAIN SCALES - GENERAL
PAINLEVEL_OUTOF10: 8
PAINLEVEL_OUTOF10: 10
PAINLEVEL_OUTOF10: 10
PAINLEVEL_OUTOF10: 8
PAINLEVEL_OUTOF10: 8
PAINLEVEL_OUTOF10: 10
PAINLEVEL_OUTOF10: 8

## 2019-01-22 ASSESSMENT — PAIN - FUNCTIONAL ASSESSMENT: PAIN_FUNCTIONAL_ASSESSMENT: 0-10

## 2019-01-22 NOTE — OP NOTE
without any difficulty. At 70 centimeter kristen, residual polyp was identified. There was clear tattoo still that was visualized from previous colonoscopy. Scope was withdrawn slowly. No additional polyp was noted. Colon was desufflated and colonoscopy was removed. Patient was then positioned on the OR table in supine position. General anesthesia was started per anesthesia. IV Cefoxitin was given pre-operatively. Time out was called. Abdomen was prepped and draped in sterile fashion. Midline incision was made using #10 blade from subxiphoid down to omayra-umbilicus region. Electrocautery was used to get through subcutaneous tissue down to fascia layer in careful manner. Fascia was divided and abdomen was entered in safe manner. We did identify the tattooed region in distal transverse colon as seen on colonoscopy. Decision was made to proceed with left hemicolectomy. Of note, there was no sign of liver mets on gross exam.    Left hemicolectomy was done by first mobilizing proximal transverse colon by taking down omentum and pericolonic fat from right abdominal wall and hepatic flexure. Then we created a mesenteric window where resection was going to be made within mid proximal transverse colon. Mesentery was carefully divided using LigaSure device. Mid proximal transverse colon was then divided using 75 mm MARK stapler on blue load. We then mobilized distal transverse colon towards splenic flexure I taking down gastrocolic ligament and greater omentum. Distal descending colon was then mobilized by taking down line of Toldt. Mesenteric window was created on distal descending colon. Distal descending colon was then divided using the countour curved stapler on green load. Mesentery of proximal descending colon was then divided using LigaSure and careful manner. Good amount of time was spent on taking down splenic flexure due to omental adhesion to spleen.   Amount of time spent was approximated to be around 30-45 minutes. Once splenic flexure was completely mobilized, specimen was then removed and was sent to pathology for further evaluation. Good hemostasis was obtained within left upper quadrant area. Our attention was geared towards proceeding with an anastomosis. As stated previously, proximal transverse colon was already mobilized and proximal transverse colon stump reached left lower quadrant without much tension. Distal descending colonic stump was also mobilized for safe anastomosis. Functional end-to-end colonic anastomosis was carried out by throwing a stay suture using 4-0 silk about 7 cm distal to stapled line on tenia coli. Colotomy was then created on both colonic stump by opening up the tip of staple line. Anastomosis was then done using MARK stapler on blue load by lining up tenia coli for the anastomosis. Then TA stapler was used to close the common channel. Staple line was reapproximated using 4-0 silk at the anastomotic site in Lembert fashion, anteriorly. Then 4-0 silk was used to thoroughly in figure-of-eight stitches on common channel staple line. Left upper quadrant area was once again checked. Excellent stasis was obtained. Copious irrigation was done using warm normal saline. Gowns, gloves and instruments were changed her bowel protocol. Fascia was then closed using 0 Prolene suture. Then subcutaneous tissue was irrigated using normal saline. Staple was used to close the skin. [place] dressing was then applied over the incision site. All instrument, needle, and sponge counts were correct. Patient tolerated the procedure well without any complications. Patient was extubated and transferred to PACU in stable condition.       Dr. Pat Landau was present for the entirety of case.        Zoey Heaton DO  Date: 1/22/2019  Time: 4:27 PM  I Dr. Pat Landau was present throughout and performed the entire procedure. Garcia Taveras  Colorectal Surgery

## 2019-01-22 NOTE — DISCHARGE INSTR - COC
Continuity of Care Form    Patient Name: Dot Kehr   :  1970  MRN:  [id number]    Admit date:  2019  Discharge date:  19    Code Status Order: Prior   Advance Directives:   [address] [facility] Documentation     Date/Time Healthcare Directive Type of Healthcare Directive Copy in [address] Agent's Name Healthcare Agent's Phone Number    19 1102  No, patient does not have an advance directive for healthcare treatment -- -- -- -- --          Admitting Physician:  Heydi Sanford MD  PCP: Radha Stockton MD    Discharging Nurse: [facility] Unit/Room#: 02998 [facility] Phone Number: [phone]    Emergency Contact:   Extended Emergency Contact Information  Primary Emergency Contact: Linda Mendozasolitario  Address: N/A   [address] Phone: [phone]  Work Phone: [phone]  Mobile Phone: [phone]  Relation: Brother/Sister    Past Surgical History:  Past Surgical History:   Procedure Laterality Date    ABSCESS DRAINAGE Right 2014    I&D Right Shoulder, Right shoulder arthroscopy, I&D AC Joint    APPENDECTOMY      COLONOSCOPY      DIALYSIS FISTULA CREATION Right 3/27/15    rt wrist    ENDOSCOPY, COLON, DIAGNOSTIC      UGI    GASTRIC BAND REMOVAL  2017    subcutaneous port    HC CATH POWER PICC SINGLE  2014         LAP BAND  2007    GA COLSC FLX W/RMVL OF TUMOR POLYP LESION SNARE TQ N/A 11/15/2018    COLONOSCOPY POLYPECTOMY SNARE/COLD BIOPSY performed by Isi Roberts MD at [address] [place] [address] Right     SLEEVE GASTRECTOMY N/A 2017    GASTRECTOMY SLEEVE LAPAROSCOPIC SI ROBOTIC, ENDOSEALER performed by Lucinda Hinojosa MD at [address] [place] [address] Right     leg tumor removal/ dr. Garald Lesches       Immunization History:   Immunization History   Administered Date(s) Administered    Influenza Vaccine, unspecified formulation 10/01/2015    Influenza,

## 2019-01-23 LAB
ANION GAP SERPL CALCULATED.3IONS-SCNC: 17 MMOL/L (ref 9–17)
BUN BLDV-MCNC: 42 MG/DL (ref 6–20)
BUN/CREAT BLD: ABNORMAL (ref 9–20)
CALCIUM IONIZED: 1.14 MMOL/L (ref 1.13–1.33)
CALCIUM SERPL-MCNC: 9.4 MG/DL (ref 8.6–10.4)
CHLORIDE BLD-SCNC: 97 MMOL/L (ref 98–107)
CO2: 21 MMOL/L (ref 20–31)
CREAT SERPL-MCNC: 11.39 MG/DL (ref 0.7–1.2)
GFR AFRICAN AMERICAN: 6 ML/MIN
GFR NON-AFRICAN AMERICAN: 5 ML/MIN
GFR SERPL CREATININE-BSD FRML MDRD: ABNORMAL ML/MIN/{1.73_M2}
GFR SERPL CREATININE-BSD FRML MDRD: ABNORMAL ML/MIN/{1.73_M2}
GLUCOSE BLD-MCNC: 124 MG/DL (ref 70–99)
HCT VFR BLD CALC: 29.6 % (ref 40.7–50.3)
HEMOGLOBIN: 9.6 G/DL (ref 13–17)
INR BLD: 1.1
MAGNESIUM: 1.8 MG/DL (ref 1.6–2.6)
MCH RBC QN AUTO: 34.2 PG (ref 25.2–33.5)
MCHC RBC AUTO-ENTMCNC: 32.4 G/DL (ref 28.4–34.8)
MCV RBC AUTO: 105.3 FL (ref 82.6–102.9)
MRSA, DNA, NASAL: NORMAL
NRBC AUTOMATED: 0 PER 100 WBC
PDW BLD-RTO: 14.9 % (ref 11.8–14.4)
PLATELET # BLD: 178 K/UL (ref 138–453)
PMV BLD AUTO: 9.9 FL (ref 8.1–13.5)
POTASSIUM SERPL-SCNC: 5.3 MMOL/L (ref 3.7–5.3)
PROTHROMBIN TIME: 11.6 SEC (ref 9–12)
RBC # BLD: 2.81 M/UL (ref 4.21–5.77)
SODIUM BLD-SCNC: 135 MMOL/L (ref 135–144)
SPECIMEN DESCRIPTION: NORMAL
WBC # BLD: 18.7 K/UL (ref 3.5–11.3)

## 2019-01-23 PROCEDURE — 6360000002 HC RX W HCPCS: Performed by: SURGERY

## 2019-01-23 PROCEDURE — 99291 CRITICAL CARE FIRST HOUR: CPT | Performed by: INTERNAL MEDICINE

## 2019-01-23 PROCEDURE — 97162 PT EVAL MOD COMPLEX 30 MIN: CPT

## 2019-01-23 PROCEDURE — 5A1D70Z PERFORMANCE OF URINARY FILTRATION, INTERMITTENT, LESS THAN 6 HOURS PER DAY: ICD-10-PCS | Performed by: INTERNAL MEDICINE

## 2019-01-23 PROCEDURE — 97530 THERAPEUTIC ACTIVITIES: CPT

## 2019-01-23 PROCEDURE — 82330 ASSAY OF CALCIUM: CPT

## 2019-01-23 PROCEDURE — 83735 ASSAY OF MAGNESIUM: CPT

## 2019-01-23 PROCEDURE — 85027 COMPLETE CBC AUTOMATED: CPT

## 2019-01-23 PROCEDURE — 2580000003 HC RX 258: Performed by: SURGERY

## 2019-01-23 PROCEDURE — 90937 HEMODIALYSIS REPEATED EVAL: CPT

## 2019-01-23 PROCEDURE — 6370000000 HC RX 637 (ALT 250 FOR IP): Performed by: SURGERY

## 2019-01-23 PROCEDURE — 6360000002 HC RX W HCPCS: Performed by: STUDENT IN AN ORGANIZED HEALTH CARE EDUCATION/TRAINING PROGRAM

## 2019-01-23 PROCEDURE — 6360000002 HC RX W HCPCS: Performed by: INTERNAL MEDICINE

## 2019-01-23 PROCEDURE — P9046 ALBUMIN (HUMAN), 25%, 20 ML: HCPCS | Performed by: INTERNAL MEDICINE

## 2019-01-23 PROCEDURE — 2500000003 HC RX 250 WO HCPCS: Performed by: STUDENT IN AN ORGANIZED HEALTH CARE EDUCATION/TRAINING PROGRAM

## 2019-01-23 PROCEDURE — 85610 PROTHROMBIN TIME: CPT

## 2019-01-23 PROCEDURE — 80048 BASIC METABOLIC PNL TOTAL CA: CPT

## 2019-01-23 PROCEDURE — 2060000000 HC ICU INTERMEDIATE R&B

## 2019-01-23 PROCEDURE — 2580000003 HC RX 258: Performed by: STUDENT IN AN ORGANIZED HEALTH CARE EDUCATION/TRAINING PROGRAM

## 2019-01-23 PROCEDURE — C9113 INJ PANTOPRAZOLE SODIUM, VIA: HCPCS | Performed by: SURGERY

## 2019-01-23 PROCEDURE — 2000000000 HC ICU R&B

## 2019-01-23 PROCEDURE — 90935 HEMODIALYSIS ONE EVALUATION: CPT

## 2019-01-23 RX ORDER — 0.9 % SODIUM CHLORIDE 0.9 %
150 INTRAVENOUS SOLUTION INTRAVENOUS PRN
Status: DISCONTINUED | OUTPATIENT
Start: 2019-01-23 | End: 2019-01-28 | Stop reason: HOSPADM

## 2019-01-23 RX ORDER — ALBUMIN (HUMAN) 12.5 G/50ML
25 SOLUTION INTRAVENOUS ONCE
Status: COMPLETED | OUTPATIENT
Start: 2019-01-23 | End: 2019-01-23

## 2019-01-23 RX ORDER — 0.9 % SODIUM CHLORIDE 0.9 %
250 INTRAVENOUS SOLUTION INTRAVENOUS PRN
Status: DISCONTINUED | OUTPATIENT
Start: 2019-01-23 | End: 2019-01-28 | Stop reason: HOSPADM

## 2019-01-23 RX ADMIN — PANTOPRAZOLE SODIUM 40 MG: 40 INJECTION, POWDER, FOR SOLUTION INTRAVENOUS at 09:25

## 2019-01-23 RX ADMIN — SODIUM CHLORIDE: 9 INJECTION, SOLUTION INTRAVENOUS at 18:02

## 2019-01-23 RX ADMIN — CEFOXITIN SODIUM 1 G: 1 POWDER, FOR SOLUTION INTRAVENOUS at 21:05

## 2019-01-23 RX ADMIN — ALBUMIN (HUMAN) 25 G: 0.25 INJECTION, SOLUTION INTRAVENOUS at 18:21

## 2019-01-23 RX ADMIN — FENTANYL CITRATE 50 MCG: 50 INJECTION, SOLUTION INTRAMUSCULAR; INTRAVENOUS at 06:59

## 2019-01-23 RX ADMIN — FENTANYL CITRATE 50 MCG: 50 INJECTION, SOLUTION INTRAMUSCULAR; INTRAVENOUS at 03:46

## 2019-01-23 RX ADMIN — Medication 10 ML: at 21:07

## 2019-01-23 RX ADMIN — ALVIMOPAN 12 MG: 12 CAPSULE ORAL at 09:29

## 2019-01-23 RX ADMIN — FENTANYL CITRATE 50 MCG: 50 INJECTION, SOLUTION INTRAMUSCULAR; INTRAVENOUS at 01:02

## 2019-01-23 RX ADMIN — HYDROCODONE BITARTRATE AND ACETAMINOPHEN 1 TABLET: 5; 325 TABLET ORAL at 15:50

## 2019-01-23 RX ADMIN — CALCIUM CHLORIDE 1 G: 100 INJECTION, SOLUTION INTRAVENOUS; INTRAVENTRICULAR at 01:06

## 2019-01-23 RX ADMIN — HYDROCODONE BITARTRATE AND ACETAMINOPHEN 1 TABLET: 5; 325 TABLET ORAL at 11:02

## 2019-01-23 RX ADMIN — MIDODRINE HYDROCHLORIDE 10 MG: 5 TABLET ORAL at 18:52

## 2019-01-23 RX ADMIN — ALVIMOPAN 12 MG: 12 CAPSULE ORAL at 21:05

## 2019-01-23 RX ADMIN — MIDODRINE HYDROCHLORIDE 10 MG: 5 TABLET ORAL at 09:51

## 2019-01-23 RX ADMIN — KETOROLAC TROMETHAMINE 15 MG: 15 INJECTION, SOLUTION INTRAMUSCULAR; INTRAVENOUS at 06:58

## 2019-01-23 RX ADMIN — Medication 10 ML: at 09:19

## 2019-01-23 RX ADMIN — KETOROLAC TROMETHAMINE 15 MG: 15 INJECTION, SOLUTION INTRAMUSCULAR; INTRAVENOUS at 12:20

## 2019-01-23 RX ADMIN — CEFOXITIN SODIUM 2 G: 2 POWDER, FOR SOLUTION INTRAVENOUS at 06:14

## 2019-01-23 RX ADMIN — KETOROLAC TROMETHAMINE 15 MG: 15 INJECTION, SOLUTION INTRAMUSCULAR; INTRAVENOUS at 01:03

## 2019-01-23 RX ADMIN — MIDODRINE HYDROCHLORIDE 10 MG: 5 TABLET ORAL at 13:04

## 2019-01-23 RX ADMIN — KETOROLAC TROMETHAMINE 15 MG: 15 INJECTION, SOLUTION INTRAMUSCULAR; INTRAVENOUS at 18:54

## 2019-01-23 RX ADMIN — HYDROCODONE BITARTRATE AND ACETAMINOPHEN 2 TABLET: 5; 325 TABLET ORAL at 21:05

## 2019-01-23 RX ADMIN — Medication 10 ML: at 09:26

## 2019-01-23 ASSESSMENT — PAIN SCALES - GENERAL
PAINLEVEL_OUTOF10: 0
PAINLEVEL_OUTOF10: 7
PAINLEVEL_OUTOF10: 10
PAINLEVEL_OUTOF10: 7
PAINLEVEL_OUTOF10: 8
PAINLEVEL_OUTOF10: 8
PAINLEVEL_OUTOF10: 7
PAINLEVEL_OUTOF10: 7
PAINLEVEL_OUTOF10: 10
PAINLEVEL_OUTOF10: 10
PAINLEVEL_OUTOF10: 9
PAINLEVEL_OUTOF10: 9
PAINLEVEL_OUTOF10: 5
PAINLEVEL_OUTOF10: 8
PAINLEVEL_OUTOF10: 9
PAINLEVEL_OUTOF10: 5

## 2019-01-23 ASSESSMENT — PAIN DESCRIPTION - PAIN TYPE
TYPE: ACUTE PAIN;SURGICAL PAIN

## 2019-01-23 ASSESSMENT — PAIN DESCRIPTION - LOCATION
LOCATION: ABDOMEN

## 2019-01-23 NOTE — PROGRESS NOTES
Physical Therapy    Facility/Department: [address] MICU  Initial Assessment    NAME: Sally Avila  : 1970  MRN: [id number]    Date of Service: 2019    Discharge Recommendations:  Home with assist PRN   PT Equipment Recommendations  Equipment Needed: No    Patient Diagnosis(es): The encounter diagnosis was Pain associated with surgical procedure. has a past medical history of Anemia; Arthritis; Atrial fibrillation ([facility] [address] BPH (benign prostatic hypertrophy); CAD (coronary artery disease); Caffeine use; Cellulitis of lower leg; Chronic back pain; Colon cancer ([facility] [address] Cor pulmonale, acute ([facility] [address] CRF (chronic renal failure); Erectile dysfunction; Gout; Hemodialysis patient ([facility] [address] History of blood transfusion; Hyperkalemia; Hyperlipidemia; Hypertension; Hypotension; Hypothyroidism; Lymphedema of leg; Obesity; and Thyroid disease. has a past surgical history that includes Appendectomy; Lap Band (); Abscess Drainage (Right, 2014); hc cath power picc single (2014); Dialysis fistula creation (Right, 3/27/15); shoulder surgery (Right, ); Bariatric Surgery (2017); Endoscopy, colon, diagnostic; Sleeve Gastrectomy (N/A, 2017); pr colsc flx w/rmvl of tumor polyp lesion snare tq (N/A, 11/15/2018); vascular surgery (Right); Colonoscopy; Abdominal exploration surgery (2019); and Colonoscopy (2019).     Restrictions  Restrictions/Precautions  Restrictions/Precautions: Surgical Protocols  Required Braces or Orthoses?: No  Vision/Hearing  Vision: Within Functional Limits  Hearing: Within functional limits     Subjective  General  Patient assessed for rehabilitation services?: Yes  Family / Caregiver Present: No  Follows Commands: Within Functional Limits  Pain Screening  Patient Currently in Pain: Yes  Pain Assessment  Pain Assessment: 0-10  Pain Level: 9  Pain Type: Acute pain;Surgical pain  Pain Location: Abdomen  Vital Signs  Patient Currently in Pain: Yes Orientation  Orientation  Overall Orientation Status: Within Normal Limits  Social/Functional History  Social/Functional History  Lives With: Alone  Type of Home: House  Home Layout: One level  Home Access: Stairs to enter with rails  Entrance Stairs - Number of Steps: 4  Bathroom Shower/Tub: Tub/Shower unit  Bathroom Toilet: Standard  Bathroom Accessibility: Accessible  ADL Assistance: Independent  Homemaking Assistance: Independent  Homemaking Responsibilities: No  Ambulation Assistance: Independent  Transfer Assistance: Independent  Active : Yes  Occupation: Unemployed    Objective    AROM RLE (degrees)  RLE AROM: WNL  AROM LLE (degrees)  LLE AROM : WNL  AROM RUE (degrees)  RUE AROM : WNL  AROM LUE (degrees)  LUE AROM : WNL  Strength RLE  Strength RLE: WFL  Strength LLE  Strength LLE: WFL  Strength RUE  Strength RUE: WFL  Strength LUE  Strength LUE: WFL  Motor Control  Gross Motor?: WNL  Sensation  Overall Sensation Status: WNL  Bed mobility  Supine to Sit: Supervision  Sit to Supine: Supervision  Scooting: Supervision  Transfers  Sit to Stand: Supervision  Stand to sit: Supervision  Ambulation  Ambulation?: Yes  Ambulation 1  Surface: level tile  Device: No Device  Assistance: Supervision  Quality of Gait: Normal fabio  Distance: 800 ft  Stairs/Curb  Stairs?: No     Balance  Posture: Good  Sitting - Static: Good  Sitting - Dynamic: Good  Standing - Static: Good  Standing - Dynamic: Good        Assessment   Assessment: Pt demonstrated safe independent mobility  Prognosis: Good  Decision Making: Medium Complexity  Patient Education: PT POC, encouraged ambulation  REQUIRES PT FOLLOW UP: No  Activity Tolerance  Activity Tolerance: Patient Tolerated treatment well         Plan   Plan  Plan Comment: Pt is discharged from PT  Safety Devices  Type of devices: Left in chair, Call light within reach, Nurse notified    AM-PAC Score     AM-PAC Inpatient Mobility without Stair Climbing Raw Score : 20  AM-PAC

## 2019-01-23 NOTE — CONSULTS
Mother     Heart Disease Maternal Grandmother     Stroke Paternal Grandfather            REVIEW OF SYSTEMS (ROS):  Review of Systems - Negative except mentioned in HPI   General ROS: negative  Respiratory ROS: no cough, shortness of breath, or wheezing  Cardiovascular ROS: no chest pain or dyspnea on exertion  Gastrointestinal ROS: abd pain  Musculoskeletal ROS: negative  Neurological ROS: negative  Dermatological ROS: negative      Physical Exam:    Vitals: BP (!) 86/50   Pulse 87   Temp 97.7 °F (36.5 °C)   Resp 17   Ht 5' 10\" (1.778 m)   Wt 247 lb 3.2 oz (112.1 kg)   SpO2 98%   BMI 35.47 kg/m²     Body weight:   Wt Readings from Last 3 Encounters:   01/22/19 247 lb 3.2 oz (112.1 kg)   01/02/19 253 lb 12.8 oz (115.1 kg)   12/28/18 250 lb (113.4 kg)       Body Mass Index : Body mass index is 35.47 kg/m². PHYSICAL EXAMINATION :  Constitutional: Appears well, NAD  EENT: EOMI, dry oral membranes  Neck: Supple, symmetrical, trachea midline  Respiratory: clear to auscultation, no wheezes or rales and unlabored breathing  Cardiovascular: regular rate and rhythm, normal S1, S2, no murmur noted and 2+ pulses throughout  Abdomen: soft, midline dressing CDI, TTP around dressing, minimal BS  Neurological: alert, oriented x3  Extremities:  peripheral pulses normal, no pedal edema, no clubbing or cyanosis      Laboratory findings:-    CBC:   Recent Labs      01/22/19   1537   HGB  11.4     BMP:  No results for input(s): NA, K, CL, CO2, BUN, CREATININE, GLUCOSE in the last 72 hours. S. Calcium:No results for input(s): CALCIUM in the last 72 hours. S. Ionized Calcium:No results for input(s): IONCA in the last 72 hours. S. Magnesium:No results for input(s): MG in the last 72 hours. S. Phosphorus:No results for input(s): PHOS in the last 72 hours. S. Glucose:No results for input(s): POCGLU in the last 72 hours. Glycosylated hemoglobin A1C: No results for input(s): LABA1C in the last 72 hours.   INR:   Recent

## 2019-01-23 NOTE — PROGRESS NOTES
General Surgery:  Daily Progress Note          POD # 1 s/p exploratory laparotomy, left hemicolectomy, mobilization of splenic flexure, colonoscopy          PATIENT NAME: Genaro Avila     TODAY'S DATE: 1/23/2019, 6:07 AM    SUBJECTIVE:     Pt seen and examined at bedside. Patient became hypotensive with SBP 80-90s, responded with increase in fluids. Other VS normal and stable. Pt states overall pain is getting better. Denies fever, chills, nausea, vomiting, chest pain, and SOB. Tolerating ice chips. Has not been OOB yet. Denies flatus or BM yet. OBJECTIVE:   VITALS:  /65   Pulse 88   Temp 98.2 °F (36.8 °C) (Oral)   Resp 18   Ht 5' 10\" (1.778 m)   Wt 247 lb 3.2 oz (112.1 kg)   SpO2 98%   BMI 35.47 kg/m²      INTAKE/OUTPUT:      Intake/Output Summary (Last 24 hours) at 01/23/19 5723  Last data filed at 01/23/19 0000   Gross per 24 hour   Intake             3448 ml   Output              500 ml   Net             2948 ml       PHYSICAL EXAM:  General Appearance: awake, alert, oriented, in no acute distress  HEENT:  Normocephalic, atraumatic, mucus membranes moist   Heart: Heart regular rate and rhythm  Lungs: No chest wall tenderness. Abdomen: soft, nondistended, appropriately TTP; no guarding, rebound or rigidity; dressing intact without obvious bleeding or drainage   Incision: clean dry and intact   Extremities: No cyanosis, pitting edema, rashes noted. Skin: Skin color, texture, turgor normal. No rashes or lesions. Data:  CBC:   Recent Labs      01/22/19   1537  01/23/19   0505   WBC   --   18.7*   HGB  11.4  9.6*   PLT   --   178     BMP:  Recent Labs      01/22/19   2048  01/23/19   0505   NA  134*  135   K  4.5  5.3   CL  96*  97*   CO2  20  21   BUN  37*  42*   CREATININE  10.31*  11.39*   GLUCOSE  154*  124*     Hepatic: No results for input(s): AST, ALT, ALB, ALKPHOS, BILITOT, BILIDIR in the last 72 hours.   Coagulation:   Recent Labs      01/22/19   1131  01/23/19   0505   INR

## 2019-01-23 NOTE — CARE COORDINATION
Case Management Initial Discharge Plan  Cindy Avila,             Met with:patient to discuss discharge plans. Information verified: address, contacts, phone number, , insurance Yes  PCP: Sebastian Murillo MD  Date of last visit: 19    Insurance Provider: Medicare & Medicaid    Discharge Planning    Living Arrangements:  Alone   Support Systems:  Family Members, Friends/Neighbors    Home has 1 stories  4 stairs to climb to get into front door, 0 stairs to climb to reach second floor  Location of bedroom/bathroom in home same level    Patient able to perform ADL's:Independent    Current Services (outpatient & in home) na  DME equipment: na  DME provider: [facility]eron    Pharmacy: [address] [place] [address]   Potential Assistance Purchasing Medications:  No  Does patient want to participate in local refill/ meds to beds program?  No    Potential Assistance Needed:       Patient agreeable to home care: No  [place] of choice provided:  n/a    Prior SNF/Rehab Placement and Facility: na  Agreeable to SNF/Rehab: No  [place] of choice provided: n/a   Evaluation: n/a    Expected Discharge date:     Patient expects to be discharged to:  Home  Follow Up Appointment: Best Day/ Time:      Transportation provider: Self or Family  Transportation arrangements needed for discharge: No    Readmission Risk              Risk of Unplanned Readmission:        10             Does patient have a readmission risk score greater than 14?: No  If yes, follow-up appointment must be made within 7 days of discharge. Discharge Plan: Declines needs at this time, will continue to follow if needs change.           Electronically signed by Clementine Marshall RN on 19 at 9:20 AM

## 2019-01-23 NOTE — CONSULTS
Nephrology Consult Note    Reason for Consult:  ESRD  Requesting Physician: Critical care  Chief Complaint:  S/P Colectomy  History Obtained From: Patient and chart review  History of Present Illness: This is a 46 y.o. male who has a long history of diabetes, morbid obesity status post bariatric surgery, history of atrial fibrillation. Patient has end-stage renal disease and requires hemodialysis on Monday Wednesday Friday. Patient was planning for transplant evaluation. For the procedure/transplantation he underwent colonoscopy which shows colonic mass. He underwent left hemicolectomy on 1/22/19. Postprocedure his blood pressure was low. Patient chronically runs low blood pressure on hemodialysis. Patient was admitted to ICU. He received albumin as well as normal saline that resulted in improvement in his blood pressure. Nephrology was consulted for continuation of hemodialysis    Patient denies any nausea and vomiting. He denies any increase in abdominal pain. His blood pressure remained stable he's not on any pressors at this point. Past Medical History:        Diagnosis Date    Anemia     Arthritis     right shoulder/ PCP Dr. Anthony Villareal    Atrial fibrillation ([address]     [address] BPH (benign prostatic hypertrophy)     CAD (coronary artery disease)     Dr. Ramon Moeller    Caffeine use     1 coffee, 2 tea/day    Cellulitis of lower leg     right    Chronic back pain     Colon cancer ([address]     colon    Cor pulmonale, acute ([address] 12/30/2014    CRF (chronic renal failure)     [facility]un [facility]. Tues/ thurs/ sat/ right arm fistula    Erectile dysfunction     Gout     Hemodialysis patient ([address]     tues/ thurs/ sat/ DR. Petersen/ fistula right lower arm    History of blood transfusion     no reaction    Hyperkalemia 12/24/2013    Hyperlipidemia     Hypertension     Hypotension     Hypothyroidism     Lymphedema of leg 500 ml   Net             3211 ml     Patient Vitals for the past 96 hrs (Last 3 readings):   Weight   01/23/19 0700 247 lb 12.8 oz (112.4 kg)   01/22/19 1815 247 lb 3.2 oz (112.1 kg)   01/22/19 1101 250 lb (113.4 kg)     Physical Exam:  General appearance:awake and alert ×3  Skin: warm and dry, no rash or erythema  Eyes: conjunctivae normal and sclera anicteric  ENT: :no thrush or pharyngeal congestion    Neck: no carotid bruit  Pulmonary:bilateral air entry and clear to auscultation  Cardiovascular:S1 and S2 audible no S3   Abdomen: soft nontender, bowel sounds present, no organomegaly,  no ascites  Extremities: no edema    Labs:   CBC:  Recent Labs      01/22/19   1537  01/23/19   0505   WBC   --   18.7*   RBC   --   2.81*   HGB  11.4  9.6*   HCT  35.3  29.6*   MCV   --   105.3*   MCH   --   34.2*   MCHC   --   32.4   RDW   --   14.9*   PLT   --   178   MPV   --   9.9      BMP: Recent Labs      01/22/19   2048  01/23/19   0505   NA  134*  135   K  4.5  5.3   CL  96*  97*   CO2  20  21   BUN  37*  42*   CREATININE  10.31*  11.39*   GLUCOSE  154*  124*   CALCIUM  8.3*  9.4        Phosphorus:  No results for input(s): PHOS in the last 72 hours. Magnesium:   Recent Labs      01/22/19 2048 01/23/19   0505   MG  1.8  1.8     Assessment:  1. End-stage renal disease on maintenance hemodialysis regular dialysis days are Monday Wednesday Friday  2. Status post left hemicolectomy  3. Chronic hypertension  4. Post surgery hypertension treated with albumin and normal saline  5. History of paroxysmal A. Fib  6. Diabetes    Plan:  1. Dialysis today as ordered  2. Patient can be transferred out of ICU from renal standpoint  3. Will dialyze him without heparin  Thank you for the consultation. Please do not hesitate to call with questions.     Electronically signed by Memo Freire MD on 1/23/2019 at 11:04 AM

## 2019-01-23 NOTE — PROGRESS NOTES
12/20/2013    GLUCOSEU NEGATIVE 12/20/2013    KETUA NEGATIVE 12/20/2013    AMORPHOUS NOT REPORTED 12/20/2013       HgBA1c:    Lab Results   Component Value Date    LABA1C 4.4 09/25/2018     TSH:    Lab Results   Component Value Date    TSH 3.94 09/25/2018    TSH 3.94 09/25/2018     Lactic Acid: No results found for: LACTA   Troponin: No results for input(s): TROPONINI in the last 72 hours. Patient Active Problem List   Diagnosis    Obesity, Class II, BMI 35-39.9, with comorbidity    Benign prostatic hyperplasia with lower urinary tract symptoms    [facility]    Erectile dysfunction due to arterial insufficiency    Hypotension    Chronic atrial fibrillation (HCC)    Warfarin-induced coagulopathy ([facility]    DVT (deep venous thrombosis) (HCC)    S/P laparoscopic sleeve gastrectomy    Vitamin D deficiency    Angina pectoris ([facility]    Lymphedema    Right heart failure ([facility]    Peripheral vascular disease ([facility]    Malignant neoplasm of transverse colon ([facility]    Status post partial colectomy       ASSESSMENT/PLAN:     WEAN PER PROTOCOL:  [] No   [] Yes  [x] N/A    ICU PROPHYLAXIS:  Stress ulcer:  [x] PPI Agent  [] [id number] [] Sucralfate  [] Other:  VTE:   [] Enoxaparin  [] Unfract. Heparin Subcut  [x] EPC Cuffs    NUTRITION:  [x] NPO [] Tube Feeding (Specify: ) [] TPN  [] PO    HOME MEDS RECONCILED: [x] No  [] Yes    CONSULTATION NEEDED:  [] No  [x] Yes    FAMILY UPDATED:    [x] No  [] Yes    TRANSFER OUT OF ICU:   [] No  [x] Yes      Distal transverse colon cancer s/p ex lap, left hemicolectomy, mobilization of splenic flexure, colonoscopy  -Gen surg following  -Diet: NPO, ok sips with meds  -Pain control: toradol, fentanyl prn, norco prn  -Post op abx  -Hgb stable     Post operative hypotension. Improving.  -500cc blood loss from surg. Received 25g albumin. Started on Arjun.  -Weaned off arjun once in ICU.   -IVF changed to 50cc/hr via Nephro team  -Patient runs low BP at home.  Restart home midodrine. Monitor.     ESRD on HD Tu, Th, Sat  -Last session Mon 1/21  -Nephro following     Paroxysmal Afib  -HR currently NSR  -Resume home amio  -Communicate with surg team about when ok to resume full dose home warfarin     HLD  -On home statin     GI Prophylaxis: Protonix     DVT Prophylaxis: EPC, defer to surg team for restart of home warfarin.        Dispo: BP improved, monitor. Doing well post op. Defer to surg for start of home warfarin. Ok to transfer to step down.         Onur Lowery DO  Critical Care Resident, PGY-1  [place] [id number], [facility]

## 2019-01-23 NOTE — CARE COORDINATION
[address] Transitions Interview     2019    Patient: Severo Avila Patient : 1970   MRN: [id number]  Reason for Admission: Left Hemicolectomy  RARS: Readmission Risk Score: 10       Spoke with: Armond Avila    Met with patient in room at bedside, he is feeling much better today. Patient is known to care transitions from previous admissions. Patient plans to return home at discharge, declines need for home care. Contact information for CTC given to patient, will follow patient at discharge. Readmission Risk  Patient Active Problem List   Diagnosis    Obesity, Class II, BMI 35-39.9, with comorbidity    Benign prostatic hyperplasia with lower urinary tract symptoms    ESRD on hemodialysis ([address]    Erectile dysfunction due to arterial insufficiency    Hypotension    Chronic atrial fibrillation (HCC)    Warfarin-induced coagulopathy (HCC)    DVT (deep venous thrombosis) (HCC)    S/P laparoscopic sleeve gastrectomy    Vitamin D deficiency    [facility]    Lymphedema    Right heart failure ([facility]    Peripheral vascular disease ([facility]    Malignant neoplasm of transverse colon ([facility]    Status post partial colectomy       Inpatient Assessment  Care Transitions Summary    Care Transitions Inpatient Review  Medication Review  Do you have all of your prescriptions and are they filled?:  Yes   Barriers to Medication Adherence:  None  Are you able to afford your medications?:  Yes  How often do you have difficulty taking your medications?:  I always take them as prescribed. Housing Review  Who do you live with?:  Alone  Are you an active caregiver in your home?:  No  Social Support  Do you have a ?:  No  Do you have a [facility]?:  No  Durable Medical Equipment  Functional Review  Ability to seek help/take action for Emergent/Urgent situations i.e. fire, crime, inclement weather or health crisis. :  Independent  Ability handle personal hygiene

## 2019-01-23 NOTE — PROGRESS NOTES
Smoking Cessation - topics covered   []  Health Risks  []  Benefits of Quitting   []  Smoking Cessation  [x]  Patient has no history of tobacco use  []  Patient is former smoker. [x]  No need for tobacco cessation education. []  Booklet given  []  Patient verbalizes understanding. []  Patient denies need for tobacco cessation education. []  Unable to meet with patient today. Will follow up as able.   Kelvin Proctor  6:52 AM

## 2019-01-24 LAB
ANION GAP SERPL CALCULATED.3IONS-SCNC: 16 MMOL/L (ref 9–17)
BUN BLDV-MCNC: 24 MG/DL (ref 6–20)
BUN/CREAT BLD: ABNORMAL (ref 9–20)
CALCIUM IONIZED: 1.06 MMOL/L (ref 1.13–1.33)
CALCIUM SERPL-MCNC: 8.5 MG/DL (ref 8.6–10.4)
CHLORIDE BLD-SCNC: 97 MMOL/L (ref 98–107)
CO2: 24 MMOL/L (ref 20–31)
CREAT SERPL-MCNC: 7.21 MG/DL (ref 0.7–1.2)
GFR AFRICAN AMERICAN: 10 ML/MIN
GFR NON-AFRICAN AMERICAN: 8 ML/MIN
GFR SERPL CREATININE-BSD FRML MDRD: ABNORMAL ML/MIN/{1.73_M2}
GFR SERPL CREATININE-BSD FRML MDRD: ABNORMAL ML/MIN/{1.73_M2}
GLUCOSE BLD-MCNC: 90 MG/DL (ref 70–99)
HCT VFR BLD CALC: 21.8 % (ref 40.7–50.3)
HCT VFR BLD CALC: 24.7 % (ref 40.7–50.3)
HEMOGLOBIN: 7.4 G/DL (ref 13–17)
HEMOGLOBIN: 7.8 G/DL (ref 13–17)
INR BLD: 1.3
MAGNESIUM: 1.6 MG/DL (ref 1.6–2.6)
MCH RBC QN AUTO: 34.4 PG (ref 25.2–33.5)
MCHC RBC AUTO-ENTMCNC: 31.6 G/DL (ref 28.4–34.8)
MCV RBC AUTO: 108.8 FL (ref 82.6–102.9)
NRBC AUTOMATED: 0 PER 100 WBC
PDW BLD-RTO: 15.1 % (ref 11.8–14.4)
PLATELET # BLD: 153 K/UL (ref 138–453)
PMV BLD AUTO: 10.2 FL (ref 8.1–13.5)
POTASSIUM SERPL-SCNC: 4.4 MMOL/L (ref 3.7–5.3)
PROTHROMBIN TIME: 13.3 SEC (ref 9–12)
RBC # BLD: 2.27 M/UL (ref 4.21–5.77)
SODIUM BLD-SCNC: 137 MMOL/L (ref 135–144)
WBC # BLD: 17 K/UL (ref 3.5–11.3)

## 2019-01-24 PROCEDURE — 80048 BASIC METABOLIC PNL TOTAL CA: CPT

## 2019-01-24 PROCEDURE — 2580000003 HC RX 258: Performed by: STUDENT IN AN ORGANIZED HEALTH CARE EDUCATION/TRAINING PROGRAM

## 2019-01-24 PROCEDURE — 36416 COLLJ CAPILLARY BLOOD SPEC: CPT

## 2019-01-24 PROCEDURE — 2580000003 HC RX 258: Performed by: COLON & RECTAL SURGERY

## 2019-01-24 PROCEDURE — 83735 ASSAY OF MAGNESIUM: CPT

## 2019-01-24 PROCEDURE — 6370000000 HC RX 637 (ALT 250 FOR IP): Performed by: SURGERY

## 2019-01-24 PROCEDURE — 2000000000 HC ICU R&B

## 2019-01-24 PROCEDURE — 85014 HEMATOCRIT: CPT

## 2019-01-24 PROCEDURE — 36415 COLL VENOUS BLD VENIPUNCTURE: CPT

## 2019-01-24 PROCEDURE — C9113 INJ PANTOPRAZOLE SODIUM, VIA: HCPCS | Performed by: SURGERY

## 2019-01-24 PROCEDURE — 6360000002 HC RX W HCPCS: Performed by: SURGERY

## 2019-01-24 PROCEDURE — 85610 PROTHROMBIN TIME: CPT

## 2019-01-24 PROCEDURE — 86900 BLOOD TYPING SEROLOGIC ABO: CPT

## 2019-01-24 PROCEDURE — 2580000003 HC RX 258: Performed by: SURGERY

## 2019-01-24 PROCEDURE — 85018 HEMOGLOBIN: CPT

## 2019-01-24 PROCEDURE — 99291 CRITICAL CARE FIRST HOUR: CPT | Performed by: INTERNAL MEDICINE

## 2019-01-24 PROCEDURE — 82330 ASSAY OF CALCIUM: CPT

## 2019-01-24 PROCEDURE — 85027 COMPLETE CBC AUTOMATED: CPT

## 2019-01-24 PROCEDURE — P9016 RBC LEUKOCYTES REDUCED: HCPCS

## 2019-01-24 PROCEDURE — 36430 TRANSFUSION BLD/BLD COMPNT: CPT

## 2019-01-24 RX ORDER — 0.9 % SODIUM CHLORIDE 0.9 %
500 INTRAVENOUS SOLUTION INTRAVENOUS ONCE
Status: COMPLETED | OUTPATIENT
Start: 2019-01-24 | End: 2019-01-24

## 2019-01-24 RX ORDER — AMIODARONE HYDROCHLORIDE 200 MG/1
200 TABLET ORAL DAILY
Status: DISCONTINUED | OUTPATIENT
Start: 2019-01-24 | End: 2019-01-28 | Stop reason: HOSPADM

## 2019-01-24 RX ORDER — MAGNESIUM SULFATE 1 G/100ML
2 INJECTION INTRAVENOUS ONCE
Status: COMPLETED | OUTPATIENT
Start: 2019-01-24 | End: 2019-01-24

## 2019-01-24 RX ORDER — 0.9 % SODIUM CHLORIDE 0.9 %
250 INTRAVENOUS SOLUTION INTRAVENOUS ONCE
Status: COMPLETED | OUTPATIENT
Start: 2019-01-24 | End: 2019-01-24

## 2019-01-24 RX ORDER — 0.9 % SODIUM CHLORIDE 0.9 %
250 INTRAVENOUS SOLUTION INTRAVENOUS ONCE
Status: COMPLETED | OUTPATIENT
Start: 2019-01-24 | End: 2019-01-25

## 2019-01-24 RX ORDER — PRAVASTATIN SODIUM 20 MG
40 TABLET ORAL NIGHTLY
Status: DISCONTINUED | OUTPATIENT
Start: 2019-01-24 | End: 2019-01-28 | Stop reason: HOSPADM

## 2019-01-24 RX ADMIN — ALVIMOPAN 12 MG: 12 CAPSULE ORAL at 08:31

## 2019-01-24 RX ADMIN — PANTOPRAZOLE SODIUM 40 MG: 40 INJECTION, POWDER, FOR SOLUTION INTRAVENOUS at 08:31

## 2019-01-24 RX ADMIN — SODIUM CHLORIDE 500 ML: 9 INJECTION, SOLUTION INTRAVENOUS at 13:39

## 2019-01-24 RX ADMIN — CEFOXITIN SODIUM 1 G: 1 POWDER, FOR SOLUTION INTRAVENOUS at 05:18

## 2019-01-24 RX ADMIN — SODIUM CHLORIDE 500 ML: 9 INJECTION, SOLUTION INTRAVENOUS at 21:30

## 2019-01-24 RX ADMIN — KETOROLAC TROMETHAMINE 15 MG: 15 INJECTION, SOLUTION INTRAMUSCULAR; INTRAVENOUS at 00:30

## 2019-01-24 RX ADMIN — PRAVASTATIN SODIUM 40 MG: 20 TABLET ORAL at 21:22

## 2019-01-24 RX ADMIN — MAGNESIUM SULFATE HEPTAHYDRATE 2 G: 1 INJECTION, SOLUTION INTRAVENOUS at 06:32

## 2019-01-24 RX ADMIN — CALCIUM GLUCONATE 1 G: 98 INJECTION, SOLUTION INTRAVENOUS at 18:51

## 2019-01-24 RX ADMIN — MIDODRINE HYDROCHLORIDE 10 MG: 5 TABLET ORAL at 13:43

## 2019-01-24 RX ADMIN — MIDODRINE HYDROCHLORIDE 10 MG: 5 TABLET ORAL at 21:22

## 2019-01-24 RX ADMIN — Medication 10 ML: at 08:07

## 2019-01-24 RX ADMIN — SODIUM CHLORIDE 250 ML: 9 INJECTION, SOLUTION INTRAVENOUS at 12:45

## 2019-01-24 RX ADMIN — MIDODRINE HYDROCHLORIDE 10 MG: 5 TABLET ORAL at 08:19

## 2019-01-24 RX ADMIN — SODIUM CHLORIDE: 9 INJECTION, SOLUTION INTRAVENOUS at 04:15

## 2019-01-24 RX ADMIN — Medication 10 ML: at 21:22

## 2019-01-24 RX ADMIN — KETOROLAC TROMETHAMINE 15 MG: 15 INJECTION, SOLUTION INTRAMUSCULAR; INTRAVENOUS at 06:11

## 2019-01-24 RX ADMIN — SODIUM CHLORIDE 250 ML: 9 INJECTION, SOLUTION INTRAVENOUS at 08:45

## 2019-01-24 RX ADMIN — CALCIUM GLUCONATE 1 G: 98 INJECTION, SOLUTION INTRAVENOUS at 08:07

## 2019-01-24 RX ADMIN — ALVIMOPAN 12 MG: 12 CAPSULE ORAL at 21:22

## 2019-01-24 RX ADMIN — SODIUM CHLORIDE 250 ML: 9 INJECTION, SOLUTION INTRAVENOUS at 18:10

## 2019-01-24 ASSESSMENT — PAIN SCALES - GENERAL
PAINLEVEL_OUTOF10: 4
PAINLEVEL_OUTOF10: 4
PAINLEVEL_OUTOF10: 0
PAINLEVEL_OUTOF10: 0
PAINLEVEL_OUTOF10: 3
PAINLEVEL_OUTOF10: 4
PAINLEVEL_OUTOF10: 2
PAINLEVEL_OUTOF10: 0

## 2019-01-24 ASSESSMENT — ENCOUNTER SYMPTOMS
WHEEZING: 0
SHORTNESS OF BREATH: 0
VOMITING: 0
ABDOMINAL PAIN: 1
COUGH: 0
NAUSEA: 0

## 2019-01-24 ASSESSMENT — PAIN DESCRIPTION - PAIN TYPE
TYPE: ACUTE PAIN;SURGICAL PAIN

## 2019-01-24 ASSESSMENT — PAIN DESCRIPTION - ORIENTATION: ORIENTATION: MID

## 2019-01-24 ASSESSMENT — PAIN DESCRIPTION - LOCATION
LOCATION: ABDOMEN

## 2019-01-24 NOTE — PROGRESS NOTES
Critical Care Team - Daily Progress Note      Date and time: 1/24/2019 4:04 PM  Patient's name:  Rowena Boo  Medical Record Number: [id number]  Patient's account/billing number: [de-identified]  Patient's YOB: 1970  Age: 46 y.o. Date of Admission: 1/22/2019 10:41 AM  Length of stay during current admission: 2      Primary Care Physician: Vickie King MD  ICU Attending Physician: Dr. Edilma Mccarty Status: Full Code    Reason for ICU admission: Postoperative hypotension      SUBJECTIVE:     OVERNIGHT EVENTS:         No acute events overnight  Abdominal pain is better, started on clear liquid diet, passing gas, no bowel movement yet  Hypotensive this morning, SBP in 60s, complained of lightheadedness, dizzy, given 250 mL bolus and 10 mg Midodrine, improved to 80s.   Received another 250 mL bolus for hypotension, surgery came by and ordered 500 ml bolus  Tachycardic on sitting, concern for orthostatic hypotension  Last dialysis yesterday for 2 hr 40 minutes   Potassium normal, 4.4, bicarb 24, leukocytosis stable 17, afebrile      AWAKE & FOLLOWING COMMANDS:  [] No   [x] Yes    CURRENT VENTILATION STATUS:     [] Ventilator  [] BIPAP  [x] Nasal Cannula [] Room Air      IF INTUBATED, ET TUBE MARKING AT LOWER LIP:       cms    SECRETIONS Amount:  [] Small [] Moderate  [] Large  [] None  Color:     [] White [] Colored  [] Bloody    SEDATION:  RAAS Score:  [] Propofol gtt  [] Versed gtt  [] Ativan gtt   [x] No Sedation    PARALYZED:  [x] No    [] Yes    DIARRHEA:                [x] No                [] Yes  (C. Difficile status: [] positive                                                                                                                       [] negative                                                                                                                     [] pending)    VASOPRESSORS:  [x] No    [] Yes    If yes -   [] Levophed       [] Dopamine     [] Vasopressin       [] Dobutamine  [] Phenylephrine         [] Epinephrine    CENTRAL LINES:     [x] No   [] Yes   (Date of Insertion:   )           If yes -     [] Right IJ     [] Left IJ [] Right Femoral [] Left Femoral                   [] Right Subclavian [] Left Subclavian       BERRY'S CATHETER:   [] No   [] Yes  (Date of Insertion:   )     URINE OUTPUT:            [] Good   [] Low              [] Anuric      OBJECTIVE:     VITAL SIGNS:  BP (!) 86/59   Pulse 89   Temp 98.4 °F (36.9 °C) (Oral)   Resp 18   Ht 5' 10\" (1.778 m)   Wt 250 lb 3.6 oz (113.5 kg)   SpO2 93%   BMI 35.90 kg/m²   Tmax over 24 hours:  Temp (24hrs), Av.4 °F (36.9 °C), Min:98.2 °F (36.8 °C), Max:98.4 °F (36.9 °C)      Patient Vitals for the past 6 hrs:   BP Temp Temp src Pulse Resp SpO2   19 1545 (!) 86/59 - - 89 18 93 %   19 1530 (!) 89/52 - - 90 16 (!) 86 %   19 1515 (!) 81/58 - - 88 20 90 %   19 1500 82/61 - - 89 17 (!) 89 %   19 1445 (!) 72/45 - - 88 20 92 %   19 1430 96/77 - - 92 17 95 %   19 1415 107/68 - - 93 20 93 %   19 1400 (!) 103/59 - - 93 20 95 %   19 1345 107/75 - - 96 18 95 %   19 1330 87/63 - - 99 22 94 %   19 1325 84/60 - - 90 19 95 %   19 1315 (!) 67/49 - - 89 19 93 %   19 1300 (!) 73/49 - - 106 19 -   19 1247 (!) 77/49 - - - - -   19 1246 - - - 113 22 96 %   19 1245 (!) 66/47 - - 112 22 95 %   19 1230 (!) 79/47 - - 113 19 95 %   19 1215 (!) 85/47 - - 117 20 98 %   19 1200 (!) 90/57 98.4 °F (36.9 °C) Oral 116 18 96 %   19 1145 (!) 83/50 - - 97 24 93 %   19 1130 (!) 87/41 - - 93 19 94 %   19 1115 (!) 80/42 - - 92 26 93 %   19 1110 (!) 89/41 - - 97 24 94 %   19 1104 104/63 - - 100 17 94 %   19 1100 88/60 - - 92 19 94 %   19 1045 87/62 - - 92 19 93 %   19 1030 (!) 99/59 - - 92 19 -   19 1015 96/60 - - 91 20 -         Intake/Output Summary (Last 24 hours) at 19 1604  Last data filed at 01/24/19 1545   Gross per 24 hour   Intake             3959 ml   Output             1700 ml   Net             2259 ml     Wt Readings from Last 2 Encounters:   01/24/19 250 lb 3.6 oz (113.5 kg)   01/02/19 253 lb 12.8 oz (115.1 kg)     Body mass index is 35.9 kg/m². Review of Systems   Constitutional: Negative for chills and fever. Respiratory: Negative for cough, shortness of breath and wheezing. Cardiovascular: Negative for chest pain and leg swelling. Gastrointestinal: Positive for abdominal pain. Negative for nausea and vomiting. Genitourinary: Negative for flank pain. Neurological: Negative for dizziness, seizures, weakness and light-headedness. Psychiatric/Behavioral: Negative for agitation and confusion.          PHYSICAL EXAMINATION:    General appearance - alert, well appearing, and in no distress  Mental status - alert, oriented to person, place, and time  Eyes - pupils equal and reactive, extraocular eye movements intact  Mouth - mucous membranes moist, pharynx normal without lesions  Neck - supple, no significant adenopathy  Chest - clear to auscultation, no wheezes, rales or rhonchi, symmetric air entry  Heart - normal rate, regular rhythm, normal S1, S2, no murmurs, rubs, clicks or gallops  Abdomen - soft, nontender, mildly distended, no masses or organomegaly  Neurological - alert, oriented, normal speech, no focal findings or movement disorder noted}  Extremities - peripheral pulses normal, no pedal edema, no clubbing or cyanosis     Any additional physical findings:    MEDICATIONS:    Scheduled Meds:   amiodarone  200 mg Oral Daily    pravastatin  40 mg Oral Nightly    midodrine  10 mg Oral TID    sodium chloride flush  10 mL Intravenous 2 times per day    alvimopan  12 mg Oral BID     Continuous Infusions:    PRN Meds:     sodium chloride 250 mL PRN   sodium chloride 150 mL PRN   sodium chloride flush 10 mL PRN   ondansetron 4 mg Q6H PRN   HYDROcodone 5 mg - acetaminophen 1 tablet Q4H PRN   Or     HYDROcodone 5 mg - acetaminophen 2 tablet Q4H PRN   fentanNYL 25 mcg Q1H PRN   Or     fentanNYL 50 mcg Q1H PRN         VENT SETTINGS (Comprehensive) (if applicable): Additional Respiratory  Assessments  Pulse: 89  Resp: 18  SpO2: 93 %  Oral Care: Mouthwash    ABGs:     Laboratory findings:    Complete Blood Count: Recent Labs      01/23/19 0505  01/24/19   0516  01/24/19   1431   WBC  18.7*  17.0*   --    HGB  9.6*  7.8*  7.4*   HCT  29.6*  24.7*  21.8*   PLT  178  153   --         Last 3 Blood Glucose:   Recent Labs      01/22/19 2048 01/23/19   0505  01/24/19   0516   GLUCOSE  154*  124*  90        PT/INR:    Lab Results   Component Value Date    PROTIME 13.3 01/24/2019    PROTIME 14.5 01/22/2019    INR 1.3 01/24/2019     PTT:    Lab Results   Component Value Date    APTT 32.3 12/04/2015       Comprehensive Metabolic Profile:   Recent Labs      01/22/19 2048 01/23/19   0505  01/24/19   0516   NA  134*  135  137   K  4.5  5.3  4.4   CL  96*  97*  97*   CO2  20  21  24   BUN  37*  42*  24*   CREATININE  10.31*  11.39*  7.21*   GLUCOSE  154*  124*  90   CALCIUM  8.3*  9.4  8.5*      Magnesium:   Lab Results   Component Value Date    MG 1.6 01/24/2019     Phosphorus:   Lab Results   Component Value Date    PHOS 5.2 01/05/2015     Ionized Calcium:   Lab Results   Component Value Date    CAION 1.06 01/24/2019        Urinalysis:     Troponin: No results for input(s): TROPONINI in the last 72 hours.     Microbiology:    Cultures during this admission:     Blood cultures:                 [] None drawn      [] Negative             []  Positive (Details:  )  Urine Culture:                   [] None drawn      [] Negative             []  Positive (Details:  )  Sputum Culture:               [] None drawn       [] Negative             []  Positive (Details:  )   Endotracheal aspirate:     [] None drawn       [] Negative             []  Positive (Details:  )     Other pertinent Labs: PM    Please note that this chart was generated using voice recognition Dragon dictation software. Although every effort was made to ensure the accuracy of this automated transcription, some errors in transcription may have occurred.

## 2019-01-24 NOTE — PROGRESS NOTES
NEPHROLOGY PROGRESS NOTE      SUBJECTIVE     Patient going for dialysis again today. Blood pressures low in the am. Given 250 ml of Saline bolus. Patient stated they usually run low in 110 range. Wound care per Gen Surg. Tolerating orally liquids sips now.     OBJECTIVE     Vitals:    01/24/19 0815 01/24/19 0830 01/24/19 0845 01/24/19 0900   BP: (!) 74/34 (!) 80/39 (!) 87/51 90/60   Pulse: 98 95 96 105   Resp: 16 19 21 26   Temp:       TempSrc:       SpO2: 94%      Weight:       Height:         24HR INTAKE/OUTPUT:    Intake/Output Summary (Last 24 hours) at 01/24/19 1002  Last data filed at 01/24/19 0400   Gross per 24 hour   Intake             3141 ml   Output             1700 ml   Net             1441 ml       Physical Exam:  General appearance: Awake and alertx3  Skin: Warm to touch  ENT:No congestion   Neck: no carotid bruit  Pulmonary:bilateral air entry and clear to auscultation  Cardiovascular:S1 and S2 audible no S3   Abdomen: soft nontender, bowel sounds present, no organomegaly,  no ascites  Extremities: no edema    MEDICATIONS     Scheduled Meds:    amiodarone  200 mg Oral Daily    pravastatin  40 mg Oral Nightly    midodrine  10 mg Oral TID    sodium chloride flush  10 mL Intravenous 2 times per day    pantoprazole  40 mg Intravenous Daily    And    sodium chloride (PF)  10 mL Intravenous Daily    alvimopan  12 mg Oral BID     Continuous Infusions:   PRN Meds:  sodium chloride, sodium chloride, sodium chloride flush, ondansetron, HYDROcodone 5 mg - acetaminophen **OR** HYDROcodone 5 mg - acetaminophen, fentanNYL **OR** fentanNYL  Home Meds:                Prescriptions Prior to Admission: amiodarone (CORDARONE) 200 MG tablet, TAKE ONE TABLET BY MOUTH DAILY  B complex-vitamin C-folic acid (NEPHRO-JENNIFER) 1 MG tablet, TAKE ONE TABLET BY MOUTH DAILY WITH BREAKFAST  pravastatin (PRAVACHOL) 40 MG tablet, Take 40 mg by mouth nightly   midodrine (PROAMATINE) 10 MG tablet, TAKE ONE TABLET BY MOUTH THREE TIMES DAILY  Multiple Vitamin (MVI, CELEBRATE, CHEWABLE TABLET), Take 1 tablet by mouth 2 times daily   Calcium-Phosphorus-Vitamin D (CALCIUM GUMMIES PO), Take 500 mg by mouth 2 times daily   Cholecalciferol (VITAMIN D3) 77343 units CAPS, Take 1 capsule by mouth every 30 days  aspirin EC 81 MG EC tablet, Take 81 mg by mouth daily Pt. Not instructed to stop prior to OR  warfarin (COUMADIN) 5 MG tablet, Take 2.5 mg by mouth every 7 days Tuesday/ pt. Will stop 1-4-18 for OR  warfarin (COUMADIN) 5 MG tablet, Take 5 mg by mouth Six times weekly Mon/wed/thurs/fri/sat/sun/  Pt. Will stop 1-4-18 for OR/ coumadin clinic [facility]  Elastic Bandages & Supports (MEDICAL COMPRESSION STOCKINGS) MISC, 1 each by Does not apply route daily as needed (as needed) Right leg below the knee 20-30 HH MM DX 82.409    INVESTIGATIONS     Last 3 CMP:  Recent Labs      01/22/19   2048  01/23/19   0505  01/24/19   0516   NA  134*  135  137   K  4.5  5.3  4.4   CL  96*  97*  97*   CO2  20  21  24   BUN  37*  42*  24*   CREATININE  10.31*  11.39*  7.21*   CALCIUM  8.3*  9.4  8.5*       Last 3 CBC:  Recent Labs      01/22/19   1537  01/23/19   0505  01/24/19   0516   WBC   --   18.7*  17.0*   RBC   --   2.81*  2.27*   HGB  11.4  9.6*  7.8*   HCT  35.3  29.6*  24.7*   MCV   --   105.3*  108.8*   MCH   --   34.2*  34.4*   MCHC   --   32.4  31.6   RDW   --   14.9*  15.1*   PLT   --   178  153   MPV   --   9.9  10.2       ASSESSMENT   1. End-stage renal disease on maintenance hemodialysis regular dialysis days are Monday Wednesday Friday  2. Status post left hemicolectomy Post Op day 2.  3.  Chronic hypotension: This morning his blood pressure was as low as 60 but patient was asymptomatic. He did receive fluid bolus with improvement in symptoms  4. Post surgery hypertension treated with albumin and normal saline  5. History of paroxysmal A. Fib on anticoagulation with coumadin. Hold for now. 6.  Diabetes    PLAN   1.  no dialysis today  2.

## 2019-01-24 NOTE — PLAN OF CARE
Problem: Falls - Risk of:  Goal: Absence of physical injury  Absence of physical injury   Outcome: Ongoing      Problem: Anxiety/Stress:  Goal: Level of anxiety will decrease  Level of anxiety will decrease   Outcome: Ongoing      Problem:  Bowel Function - Altered:  Goal: Bowel elimination is within specified parameters  Bowel elimination is within specified parameters   Outcome: Ongoing      Problem: Cardiac Output - Decreased:  Goal: Hemodynamic stability will improve  Hemodynamic stability will improve   Outcome: Ongoing      Problem: Nutrition Deficit:  Goal: Ability to achieve adequate nutritional intake will improve  Ability to achieve adequate nutritional intake will improve   Outcome: Ongoing      Problem: Skin Integrity - Impaired:  Goal: Will show no infection signs and symptoms  Will show no infection signs and symptoms   Outcome: Ongoing      Problem: Sleep Pattern Disturbance:  Goal: Appears well-rested  Appears well-rested   Outcome: Ongoing      Problem: Pain:  Goal: Control of acute pain  Control of acute pain   Outcome: Ongoing

## 2019-01-24 NOTE — PROGRESS NOTES
Surgery Progress Note            PATIENT NAME: Maye Avila     TODAY'S DATE: 1/24/2019, 6:12 AM      SUBJECTIVE:    Pt seen and examined at bedside. Tolerable pain. Tolerating sips of water. Ambulating. Denies N/V/F/C. No flatus/BM yet. Had HD yesterday. No other complaints noted.        OBJECTIVE:   VITALS:  BP (!) 88/52   Pulse 93   Temp 98.2 °F (36.8 °C) (Oral)   Resp 21   Ht 5' 10\" (1.778 m)   Wt 250 lb 3.6 oz (113.5 kg)   SpO2 94%   BMI 35.90 kg/m²      INTAKE/OUTPUT:      Intake/Output Summary (Last 24 hours) at 01/24/19 0612  Last data filed at 01/24/19 0400   Gross per 24 hour   Intake             3141 ml   Output             1700 ml   Net             1441 ml                     CONSTITUTIONAL:  NAD, A&O x 3  LUNGS:  CTA b/l  CARDIOVASCULAR:  S1S2  ABDOMEN: Soft, nondistended, appropriately TTP, midline incision C/D/I      Data:  CBC:   Lab Results   Component Value Date    WBC 17.0 01/24/2019    RBC 2.27 01/24/2019    HGB 7.8 01/24/2019    HCT 24.7 01/24/2019    .8 01/24/2019    MCH 34.4 01/24/2019    MCHC 31.6 01/24/2019    RDW 15.1 01/24/2019     01/24/2019    MPV 10.2 01/24/2019     BMP:    Lab Results   Component Value Date     01/24/2019    K 4.4 01/24/2019    CL 97 01/24/2019    CO2 24 01/24/2019    BUN 24 01/24/2019    LABALBU 5.2 09/25/2018    LABALBU 5.2 09/25/2018    LABALBU 4.2 10/04/2011    CREATININE 7.21 01/24/2019    CALCIUM 8.5 01/24/2019    GFRAA 10 01/24/2019    LABGLOM 8 01/24/2019    GLUCOSE 90 01/24/2019    GLUCOSE 91 10/04/2011             ASSESSMENT   Patient Active Problem List   Diagnosis    Obesity, Class II, BMI 35-39.9, with comorbidity    Benign prostatic hyperplasia with lower urinary tract symptoms    ESRD on hemodialysis ([address]    Erectile dysfunction due to arterial insufficiency    Hypotension    Chronic atrial fibrillation (HCC)    Warfarin-induced coagulopathy (HCC)    DVT (deep venous thrombosis) (HCC)    S/P laparoscopic sleeve gastrectomy    Vitamin D deficiency    Angina pectoris (HCC)    Lymphedema    Right heart failure (HCC)    Peripheral vascular disease (HCC)    Malignant neoplasm of transverse colon (HCC)    Status post partial colectomy    Pain associated with surgical procedure       1. 46 y.o. male POD#2 s/p exploratory laparotomy, left hemicolectomy, mobilization of splenic flexure, and intraop colonoscopy due to distal transverse colon residual polyp, well differentiated adenocarcinoma with positive margin and submucosal invasion  2. Hx of ESRD, on HD. 3. Hx of a fib  4. Acute blood loss anemia, 500 cc EBL intraop      Plan  1. Will start CLD. 2. Continue Entereg until BM occurs. 3. Heplock. 4. Pain control with Norco and Fentanyl PRN. 5. Encourage ambulation and IS usages. PT/OT. 6. Resume home meds. 7. Will hold off Coumadin and DVT prophylaxis for one more day due to H/H of 7.8 (9.6). 500 cc EBL intraop. If stable tomorrow, will start AC. 8. Transfer to step/down. Waiting for bed. 9. Nephrology on board for HD during this admission. Appreciate your input. 10. Critical care on board for medical management. Appreciate your input. 11. F/u path report. Electronically signed by Carmella Head DO  on 1/24/2019 at [address] AM   I Dr. Lorenzo Wilde saw and examined the patient. I have edited the above and agree with the above. Garcia Taveras  Colorectal Surgery

## 2019-01-25 LAB
ABO/RH: NORMAL
ABO/RH: NORMAL
ANION GAP SERPL CALCULATED.3IONS-SCNC: 18 MMOL/L (ref 9–17)
ANTIBODY SCREEN: NEGATIVE
ANTIBODY SCREEN: NEGATIVE
ARM BAND NUMBER: NORMAL
ARM BAND NUMBER: NORMAL
BLD PROD TYP BPU: NORMAL
BLOOD BANK SPECIMEN: NORMAL
BUN BLDV-MCNC: 36 MG/DL (ref 6–20)
BUN/CREAT BLD: ABNORMAL (ref 9–20)
CALCIUM SERPL-MCNC: 8.9 MG/DL (ref 8.6–10.4)
CHLORIDE BLD-SCNC: 94 MMOL/L (ref 98–107)
CO2: 22 MMOL/L (ref 20–31)
CREAT SERPL-MCNC: 10.11 MG/DL (ref 0.7–1.2)
CROSSMATCH RESULT: NORMAL
DISPENSE STATUS BLOOD BANK: NORMAL
EXPIRATION DATE: NORMAL
EXPIRATION DATE: NORMAL
GFR AFRICAN AMERICAN: 7 ML/MIN
GFR NON-AFRICAN AMERICAN: 5 ML/MIN
GFR SERPL CREATININE-BSD FRML MDRD: ABNORMAL ML/MIN/{1.73_M2}
GFR SERPL CREATININE-BSD FRML MDRD: ABNORMAL ML/MIN/{1.73_M2}
GLUCOSE BLD-MCNC: 81 MG/DL (ref 70–99)
HCT VFR BLD CALC: 23.4 % (ref 40.7–50.3)
HCT VFR BLD CALC: 25 % (ref 40.7–50.3)
HEMOGLOBIN: 7.7 G/DL (ref 13–17)
HEMOGLOBIN: 8.3 G/DL (ref 13–17)
INR BLD: 1.3
MCH RBC QN AUTO: 33.6 PG (ref 25.2–33.5)
MCHC RBC AUTO-ENTMCNC: 32.9 G/DL (ref 28.4–34.8)
MCV RBC AUTO: 102.2 FL (ref 82.6–102.9)
NRBC AUTOMATED: 0 PER 100 WBC
PDW BLD-RTO: 16.2 % (ref 11.8–14.4)
PLATELET # BLD: 167 K/UL (ref 138–453)
PMV BLD AUTO: 10.4 FL (ref 8.1–13.5)
POTASSIUM SERPL-SCNC: 4.5 MMOL/L (ref 3.7–5.3)
PROTHROMBIN TIME: 13.2 SEC (ref 9–12)
RBC # BLD: 2.29 M/UL (ref 4.21–5.77)
SODIUM BLD-SCNC: 134 MMOL/L (ref 135–144)
TRANSFUSION STATUS: NORMAL
UNIT DIVISION: 0
UNIT NUMBER: NORMAL
WBC # BLD: 19.7 K/UL (ref 3.5–11.3)

## 2019-01-25 PROCEDURE — 86850 RBC ANTIBODY SCREEN: CPT

## 2019-01-25 PROCEDURE — 2580000003 HC RX 258: Performed by: SURGERY

## 2019-01-25 PROCEDURE — 6370000000 HC RX 637 (ALT 250 FOR IP): Performed by: SURGERY

## 2019-01-25 PROCEDURE — 80048 BASIC METABOLIC PNL TOTAL CA: CPT

## 2019-01-25 PROCEDURE — 85018 HEMOGLOBIN: CPT

## 2019-01-25 PROCEDURE — 85014 HEMATOCRIT: CPT

## 2019-01-25 PROCEDURE — 85027 COMPLETE CBC AUTOMATED: CPT

## 2019-01-25 PROCEDURE — 86900 BLOOD TYPING SEROLOGIC ABO: CPT

## 2019-01-25 PROCEDURE — 36415 COLL VENOUS BLD VENIPUNCTURE: CPT

## 2019-01-25 PROCEDURE — 6370000000 HC RX 637 (ALT 250 FOR IP): Performed by: INTERNAL MEDICINE

## 2019-01-25 PROCEDURE — 86901 BLOOD TYPING SEROLOGIC RH(D): CPT

## 2019-01-25 PROCEDURE — 94762 N-INVAS EAR/PLS OXIMTRY CONT: CPT

## 2019-01-25 PROCEDURE — 85610 PROTHROMBIN TIME: CPT

## 2019-01-25 PROCEDURE — 99233 SBSQ HOSP IP/OBS HIGH 50: CPT | Performed by: INTERNAL MEDICINE

## 2019-01-25 PROCEDURE — 90935 HEMODIALYSIS ONE EVALUATION: CPT

## 2019-01-25 PROCEDURE — 2060000000 HC ICU INTERMEDIATE R&B

## 2019-01-25 PROCEDURE — 90937 HEMODIALYSIS REPEATED EVAL: CPT

## 2019-01-25 RX ORDER — MIDODRINE HYDROCHLORIDE 5 MG/1
10 TABLET ORAL PRN
Status: ACTIVE | OUTPATIENT
Start: 2019-01-25 | End: 2019-01-25

## 2019-01-25 RX ORDER — MIDODRINE HYDROCHLORIDE 5 MG/1
10 TABLET ORAL PRN
Status: DISPENSED | OUTPATIENT
Start: 2019-01-25 | End: 2019-01-25

## 2019-01-25 RX ADMIN — AMIODARONE HYDROCHLORIDE 200 MG: 200 TABLET ORAL at 07:51

## 2019-01-25 RX ADMIN — MIDODRINE HYDROCHLORIDE 10 MG: 5 TABLET ORAL at 09:19

## 2019-01-25 RX ADMIN — HYDROCODONE BITARTRATE AND ACETAMINOPHEN 1 TABLET: 5; 325 TABLET ORAL at 17:58

## 2019-01-25 RX ADMIN — Medication 10 ML: at 20:36

## 2019-01-25 RX ADMIN — MIDODRINE HYDROCHLORIDE 10 MG: 5 TABLET ORAL at 15:15

## 2019-01-25 RX ADMIN — MIDODRINE HYDROCHLORIDE 10 MG: 5 TABLET ORAL at 07:50

## 2019-01-25 RX ADMIN — MIDODRINE HYDROCHLORIDE 10 MG: 5 TABLET ORAL at 20:36

## 2019-01-25 RX ADMIN — Medication 10 ML: at 07:50

## 2019-01-25 RX ADMIN — PRAVASTATIN SODIUM 40 MG: 20 TABLET ORAL at 20:36

## 2019-01-25 ASSESSMENT — PAIN DESCRIPTION - LOCATION: LOCATION: ABDOMEN

## 2019-01-25 ASSESSMENT — PAIN SCALES - GENERAL
PAINLEVEL_OUTOF10: 2
PAINLEVEL_OUTOF10: 4
PAINLEVEL_OUTOF10: 4
PAINLEVEL_OUTOF10: 0

## 2019-01-25 ASSESSMENT — ENCOUNTER SYMPTOMS
COUGH: 0
NAUSEA: 0
SHORTNESS OF BREATH: 0
VOMITING: 0
ABDOMINAL PAIN: 1
WHEEZING: 0

## 2019-01-25 ASSESSMENT — PAIN DESCRIPTION - PAIN TYPE: TYPE: SURGICAL PAIN

## 2019-01-25 NOTE — PROGRESS NOTES
Calcium-Phosphorus-Vitamin D (CALCIUM GUMMIES PO), Take 500 mg by mouth 2 times daily   Cholecalciferol (VITAMIN D3) 92055 units CAPS, Take 1 capsule by mouth every 30 days  aspirin EC 81 MG EC tablet, Take 81 mg by mouth daily Pt. Not instructed to stop prior to OR  warfarin (COUMADIN) 5 MG tablet, Take 2.5 mg by mouth every 7 days Tuesday/ pt. Will stop 1-4-18 for OR  warfarin (COUMADIN) 5 MG tablet, Take 5 mg by mouth Six times weekly Mon/wed/thurs/fri/sat/sun/  Pt. Will stop 1-4-18 for OR/ coumadin clinic [facility]  Elastic Bandages & Supports (MEDICAL COMPRESSION STOCKINGS) MISC, 1 each by Does not apply route daily as needed (as needed) Right leg below the knee 20-30 HH MM DX 82.409    INVESTIGATIONS     Last 3 CMP:    Recent Labs      01/23/19   0505  01/24/19   0516  01/25/19   0556   NA  135  137  134*   K  5.3  4.4  4.5   CL  97*  97*  94*   CO2  21  24  22   BUN  42*  24*  36*   CREATININE  11.39*  7.21*  10.11*   CALCIUM  9.4  8.5*  8.9       Last 3 CBC:  Recent Labs      01/23/19   0505  01/24/19   0516  01/24/19   1431  01/25/19   0556   WBC  18.7*  17.0*   --   19.7*   RBC  2.81*  2.27*   --   2.29*   HGB  9.6*  7.8*  7.4*  7.7*   HCT  29.6*  24.7*  21.8*  23.4*   MCV  105.3*  108.8*   --   102.2   MCH  34.2*  34.4*   --   33.6*   MCHC  32.4  31.6   --   32.9   RDW  14.9*  15.1*   --   16.2*   PLT  178  153   --   167   MPV  9.9  10.2   --   10.4       ASSESSMENT   1. End-stage renal disease on maintenance hemodialysis regular dialysis days are Monday Wednesday Friday. Patient is off his schedule. We will dialyze him on Saturday and then his next dialysis will be on Monday. 2.  Status post left hemicolectomy Post Op day 3. Postprocedure patient developed hypotension which is under adequate control  3. Chronic hypotension: Slowly improving  4. Post surgery hypertension treated with albumin and normal saline  5. History of paroxysmal A. Fib on anticoagulation with coumadin. Hold for now.   6.

## 2019-01-25 NOTE — PROGRESS NOTES
pravastatin     6.   EPC cuffs for DVT prophylaxis      Lalita Rubalcava DO            Critical care resident,  Department of Internal Medicine/ Critical care  [facility])             1/25/2019, 11:28 AM

## 2019-01-25 NOTE — PLAN OF CARE
Problem: Risk for Impaired Skin Integrity  Goal: Tissue integrity - skin and mucous membranes  Structural intactness and normal physiological function of skin and  mucous membranes. Outcome: Ongoing      Problem: Falls - Risk of:  Goal: Will remain free from falls  Will remain free from falls   Outcome: Ongoing      Problem: Discharge Planning:  Goal: Discharged to appropriate level of care  Discharged to appropriate level of care   Outcome: Ongoing      Problem:  Bowel Function - Altered:  Goal: Bowel elimination is within specified parameters  Bowel elimination is within specified parameters   Outcome: Ongoing      Problem: Cardiac Output - Decreased:  Goal: Hemodynamic stability will improve  Hemodynamic stability will improve   Outcome: Ongoing

## 2019-01-25 NOTE — PROGRESS NOTES
[facility]  Occupational Therapy Not Seen Note    DATE: 2019  Name: Miller Avila  : 1970  MRN: [id number]    Patient not available for Occupational Therapy due to:    [] Testing:    [] Hemodialysis    [] Blood Transfusion in Progress    []Refusal by Patient:    [] Surgery/Procedure:    [] Strict Bedrest    [] Sedation    [] Spine Precautions     [] Pt being transferred to palliative care at this time. Spoke with pt/family and OT services to be defered. [x] per pt, pt independent with functional mobility and functional tasks. Pt reported no concerns with ability to complete daily tasks once d/c.  Pt with no OT acute care needs at this time, will defer OT eval.    Signature: Juan Mosley OTR/L

## 2019-01-25 NOTE — PROGRESS NOTES
DVT (deep venous thrombosis) (HCC)    S/P laparoscopic sleeve gastrectomy    Vitamin D deficiency    Angina pectoris (HCC)    Lymphedema    Right heart failure (HCC)    Peripheral vascular disease (HCC)    Malignant neoplasm of transverse colon (HCC)    Status post partial colectomy    Pain associated with surgical procedure       1. 46 y.o. male POD#3 s/p exploratory laparotomy, left hemicolectomy, mobilization of splenic flexure, and intraop colonoscopy due to distal transverse colon residual polyp, well differentiated adenocarcinoma with positive margin and submucosal invasion  2. Hx of ESRD, on HD. 3. Hx of a fib  4. Acute blood loss anemia, 500 cc EBL intraop      Plan  1. Will advance to FLD. 2. D/c Entereg. 3. Pain control with Norco and Fentanyl PRN. 4. Encourage ambulation and IS usages. PT/OT. 5. Resume home meds. 6. Patient's s/p 1U of pRBC transfusion yesterday evening. H/H this AM at 7.7. Will check H/H at noon again. Will hold off starting AC at this time. 7. Nephrology on board for HD during this admission. Appreciate your input. 8. Critical care on board for medical management. Appreciate your input. 9. F/u path report. 10. Transfer to step/down unit today if patient continues to do well and critical care in agreement. Electronically signed by Davis Rodirguez DO  on 1/25/2019 at 6:20 AM   I Dr. Jb Rivera saw and examined the patient. I have edited the above and agree with the above. Garcia Taveras  Colorectal Surgery

## 2019-01-26 PROBLEM — I95.89 CHRONIC HYPOTENSION: Status: ACTIVE | Noted: 2017-05-15

## 2019-01-26 LAB
ANION GAP SERPL CALCULATED.3IONS-SCNC: 18 MMOL/L (ref 9–17)
BUN BLDV-MCNC: 28 MG/DL (ref 6–20)
BUN/CREAT BLD: ABNORMAL (ref 9–20)
CALCIUM SERPL-MCNC: 9.2 MG/DL (ref 8.6–10.4)
CHLORIDE BLD-SCNC: 96 MMOL/L (ref 98–107)
CO2: 23 MMOL/L (ref 20–31)
CREAT SERPL-MCNC: 7.75 MG/DL (ref 0.7–1.2)
GFR AFRICAN AMERICAN: 9 ML/MIN
GFR NON-AFRICAN AMERICAN: 7 ML/MIN
GFR SERPL CREATININE-BSD FRML MDRD: ABNORMAL ML/MIN/{1.73_M2}
GFR SERPL CREATININE-BSD FRML MDRD: ABNORMAL ML/MIN/{1.73_M2}
GLUCOSE BLD-MCNC: 78 MG/DL (ref 70–99)
HCT VFR BLD CALC: 23.7 % (ref 40.7–50.3)
HEMOGLOBIN: 7.6 G/DL (ref 13–17)
INR BLD: 1.2
MCH RBC QN AUTO: 33.9 PG (ref 25.2–33.5)
MCHC RBC AUTO-ENTMCNC: 32.1 G/DL (ref 28.4–34.8)
MCV RBC AUTO: 105.8 FL (ref 82.6–102.9)
NRBC AUTOMATED: 0.1 PER 100 WBC
PDW BLD-RTO: 16.2 % (ref 11.8–14.4)
PLATELET # BLD: 222 K/UL (ref 138–453)
PMV BLD AUTO: 10.5 FL (ref 8.1–13.5)
POTASSIUM SERPL-SCNC: 4.6 MMOL/L (ref 3.7–5.3)
PROTHROMBIN TIME: 12.2 SEC (ref 9–12)
RBC # BLD: 2.24 M/UL (ref 4.21–5.77)
SODIUM BLD-SCNC: 137 MMOL/L (ref 135–144)
WBC # BLD: 17.4 K/UL (ref 3.5–11.3)

## 2019-01-26 PROCEDURE — 6370000000 HC RX 637 (ALT 250 FOR IP): Performed by: SURGERY

## 2019-01-26 PROCEDURE — 6370000000 HC RX 637 (ALT 250 FOR IP): Performed by: INTERNAL MEDICINE

## 2019-01-26 PROCEDURE — 2060000000 HC ICU INTERMEDIATE R&B

## 2019-01-26 PROCEDURE — 90937 HEMODIALYSIS REPEATED EVAL: CPT

## 2019-01-26 PROCEDURE — 94762 N-INVAS EAR/PLS OXIMTRY CONT: CPT

## 2019-01-26 PROCEDURE — 85610 PROTHROMBIN TIME: CPT

## 2019-01-26 PROCEDURE — 36415 COLL VENOUS BLD VENIPUNCTURE: CPT

## 2019-01-26 PROCEDURE — 99232 SBSQ HOSP IP/OBS MODERATE 35: CPT | Performed by: INTERNAL MEDICINE

## 2019-01-26 PROCEDURE — 85027 COMPLETE CBC AUTOMATED: CPT

## 2019-01-26 PROCEDURE — 90935 HEMODIALYSIS ONE EVALUATION: CPT

## 2019-01-26 PROCEDURE — 2580000003 HC RX 258: Performed by: SURGERY

## 2019-01-26 PROCEDURE — 80048 BASIC METABOLIC PNL TOTAL CA: CPT

## 2019-01-26 RX ORDER — MIDODRINE HYDROCHLORIDE 5 MG/1
10 TABLET ORAL PRN
Status: DISCONTINUED | OUTPATIENT
Start: 2019-01-26 | End: 2019-01-28 | Stop reason: HOSPADM

## 2019-01-26 RX ADMIN — MIDODRINE HYDROCHLORIDE 10 MG: 5 TABLET ORAL at 12:12

## 2019-01-26 RX ADMIN — MIDODRINE HYDROCHLORIDE 10 MG: 5 TABLET ORAL at 21:22

## 2019-01-26 RX ADMIN — AMIODARONE HYDROCHLORIDE 200 MG: 200 TABLET ORAL at 09:16

## 2019-01-26 RX ADMIN — Medication 10 ML: at 21:23

## 2019-01-26 RX ADMIN — Medication 10 ML: at 09:18

## 2019-01-26 RX ADMIN — MIDODRINE HYDROCHLORIDE 10 MG: 5 TABLET ORAL at 09:16

## 2019-01-26 RX ADMIN — PRAVASTATIN SODIUM 40 MG: 20 TABLET ORAL at 21:47

## 2019-01-26 NOTE — PROGRESS NOTES
hypertension treated with albumin and normal saline  5. History of paroxysmal A. Fib on anticoagulation with coumadin. Hold for now. 6.  Diabetes    PLAN   1. Dialysis as ordered  2. Continue ProAmatine  3. Short and modified dialysis today Will continue to follow. Please do not hesitate to call with questions      MD Luis Fernando Hunter         1/26/2019, 11:38 AM  [facility]    . Attending Physician Statement  I have discussed the care of [address] [place] [address]., including pertinent history and exam findings with the resident/fellow. I have reviewed the key elements of all parts of the encounter with the resident/fellow. I have seen and examined the patient with the resident/fellow. I agree with the assessment and plan and status of the problem list as documented.    Carla Drew

## 2019-01-27 LAB
ANION GAP SERPL CALCULATED.3IONS-SCNC: 17 MMOL/L (ref 9–17)
BUN BLDV-MCNC: 31 MG/DL (ref 6–20)
BUN/CREAT BLD: ABNORMAL (ref 9–20)
CALCIUM SERPL-MCNC: 9.5 MG/DL (ref 8.6–10.4)
CHLORIDE BLD-SCNC: 93 MMOL/L (ref 98–107)
CO2: 25 MMOL/L (ref 20–31)
CREAT SERPL-MCNC: 7.65 MG/DL (ref 0.7–1.2)
GFR AFRICAN AMERICAN: 9 ML/MIN
GFR NON-AFRICAN AMERICAN: 8 ML/MIN
GFR SERPL CREATININE-BSD FRML MDRD: ABNORMAL ML/MIN/{1.73_M2}
GFR SERPL CREATININE-BSD FRML MDRD: ABNORMAL ML/MIN/{1.73_M2}
GLUCOSE BLD-MCNC: 74 MG/DL (ref 70–99)
HCT VFR BLD CALC: 22.4 % (ref 40.7–50.3)
HEMOGLOBIN: 7.3 G/DL (ref 13–17)
INR BLD: 1.2
MCH RBC QN AUTO: 33.2 PG (ref 25.2–33.5)
MCHC RBC AUTO-ENTMCNC: 32.6 G/DL (ref 28.4–34.8)
MCV RBC AUTO: 101.8 FL (ref 82.6–102.9)
NRBC AUTOMATED: 0.5 PER 100 WBC
PDW BLD-RTO: 15.8 % (ref 11.8–14.4)
PLATELET # BLD: 313 K/UL (ref 138–453)
PMV BLD AUTO: 9.9 FL (ref 8.1–13.5)
POTASSIUM SERPL-SCNC: 4.2 MMOL/L (ref 3.7–5.3)
PROTHROMBIN TIME: 13 SEC (ref 9–12)
RBC # BLD: 2.2 M/UL (ref 4.21–5.77)
SODIUM BLD-SCNC: 135 MMOL/L (ref 135–144)
WBC # BLD: 16.7 K/UL (ref 3.5–11.3)

## 2019-01-27 PROCEDURE — 85027 COMPLETE CBC AUTOMATED: CPT

## 2019-01-27 PROCEDURE — 6360000002 HC RX W HCPCS: Performed by: SURGERY

## 2019-01-27 PROCEDURE — 85610 PROTHROMBIN TIME: CPT

## 2019-01-27 PROCEDURE — 36415 COLL VENOUS BLD VENIPUNCTURE: CPT

## 2019-01-27 PROCEDURE — 80048 BASIC METABOLIC PNL TOTAL CA: CPT

## 2019-01-27 PROCEDURE — 2060000000 HC ICU INTERMEDIATE R&B

## 2019-01-27 PROCEDURE — 6370000000 HC RX 637 (ALT 250 FOR IP): Performed by: SURGERY

## 2019-01-27 PROCEDURE — 99232 SBSQ HOSP IP/OBS MODERATE 35: CPT | Performed by: INTERNAL MEDICINE

## 2019-01-27 PROCEDURE — 94760 N-INVAS EAR/PLS OXIMETRY 1: CPT

## 2019-01-27 PROCEDURE — 2580000003 HC RX 258: Performed by: SURGERY

## 2019-01-27 RX ORDER — LANOLIN ALCOHOL/MO/W.PET/CERES
325 CREAM (GRAM) TOPICAL
Qty: 90 TABLET | Refills: 0 | Status: ON HOLD | OUTPATIENT
Start: 2019-01-27 | End: 2019-02-19 | Stop reason: ALTCHOICE

## 2019-01-27 RX ORDER — MULTIVITAMIN WITH FOLIC ACID 400 MCG
1 TABLET ORAL 2 TIMES DAILY
Status: DISCONTINUED | OUTPATIENT
Start: 2019-01-27 | End: 2019-01-28 | Stop reason: HOSPADM

## 2019-01-27 RX ORDER — WARFARIN SODIUM 7.5 MG/1
7.5 TABLET ORAL
Status: COMPLETED | OUTPATIENT
Start: 2019-01-27 | End: 2019-01-27

## 2019-01-27 RX ORDER — ASPIRIN 81 MG/1
81 TABLET ORAL DAILY
Status: DISCONTINUED | OUTPATIENT
Start: 2019-01-27 | End: 2019-01-28 | Stop reason: HOSPADM

## 2019-01-27 RX ORDER — HEPARIN SODIUM 5000 [USP'U]/ML
5000 INJECTION, SOLUTION INTRAVENOUS; SUBCUTANEOUS EVERY 8 HOURS SCHEDULED
Status: DISCONTINUED | OUTPATIENT
Start: 2019-01-27 | End: 2019-01-28 | Stop reason: HOSPADM

## 2019-01-27 RX ORDER — CHOLECALCIFEROL (VITAMIN D3) 10 MCG
TABLET ORAL DAILY
Status: DISCONTINUED | OUTPATIENT
Start: 2019-01-27 | End: 2019-01-28 | Stop reason: HOSPADM

## 2019-01-27 RX ORDER — WARFARIN SODIUM 2.5 MG/1
2.5 TABLET ORAL
Status: DISCONTINUED | OUTPATIENT
Start: 2019-01-27 | End: 2019-01-27 | Stop reason: ALTCHOICE

## 2019-01-27 RX ORDER — WARFARIN SODIUM 5 MG/1
5 TABLET ORAL DAILY
Status: DISCONTINUED | OUTPATIENT
Start: 2019-01-27 | End: 2019-01-27 | Stop reason: ALTCHOICE

## 2019-01-27 RX ADMIN — VITAMIN D, TAB 1000IU (100/BT) 1000 UNITS: 25 TAB at 10:20

## 2019-01-27 RX ADMIN — THERA TABS 1 TABLET: TAB at 20:20

## 2019-01-27 RX ADMIN — MIDODRINE HYDROCHLORIDE 10 MG: 5 TABLET ORAL at 20:20

## 2019-01-27 RX ADMIN — HEPARIN SODIUM 5000 UNITS: 5000 INJECTION INTRAVENOUS; SUBCUTANEOUS at 20:21

## 2019-01-27 RX ADMIN — HEPARIN SODIUM 5000 UNITS: 5000 INJECTION INTRAVENOUS; SUBCUTANEOUS at 10:21

## 2019-01-27 RX ADMIN — Medication 10 ML: at 09:00

## 2019-01-27 RX ADMIN — Medication 1 TABLET: at 20:21

## 2019-01-27 RX ADMIN — THERA TABS 1 TABLET: TAB at 10:20

## 2019-01-27 RX ADMIN — PRAVASTATIN SODIUM 40 MG: 20 TABLET ORAL at 20:20

## 2019-01-27 RX ADMIN — ASCORBIC ACID, THIAMINE MONONITRATE,RIBOFLAVIN, NIACINAMIDE, PYRIDOXINE HYDROCHLORIDE, FOLIC ACID, CYANOCOBALAMIN, BIOTIN, CALCIUM PANTOTHENATE, 1 MG: 100; 1.5; 1.7; 20; 10; 1; 6000; 150000; 5 CAPSULE, LIQUID FILLED ORAL at 10:24

## 2019-01-27 RX ADMIN — ASPIRIN 81 MG: 81 TABLET ORAL at 10:21

## 2019-01-27 RX ADMIN — Medication 1 TABLET: at 13:14

## 2019-01-27 RX ADMIN — HEPARIN SODIUM 5000 UNITS: 5000 INJECTION INTRAVENOUS; SUBCUTANEOUS at 13:14

## 2019-01-27 RX ADMIN — MIDODRINE HYDROCHLORIDE 10 MG: 5 TABLET ORAL at 13:14

## 2019-01-27 RX ADMIN — AMIODARONE HYDROCHLORIDE 200 MG: 200 TABLET ORAL at 08:17

## 2019-01-27 RX ADMIN — MIDODRINE HYDROCHLORIDE 10 MG: 5 TABLET ORAL at 08:17

## 2019-01-27 RX ADMIN — WARFARIN SODIUM 7.5 MG: 7.5 TABLET ORAL at 18:30

## 2019-01-27 RX ADMIN — Medication 10 ML: at 20:21

## 2019-01-27 NOTE — PROGRESS NOTES
NEPHROLOGY PROGRESS NOTE      SUBJECTIVE     Patient was seen and examined on hemodialysis. He was lying comfortably. His blood pressure was under good control. Dialysis orders were reviewed.     OBJECTIVE     Vitals:    01/26/19 2350 01/27/19 0418 01/27/19 0431 01/27/19 0722   BP: (!) 98/56 (!) 69/31 (!) 85/39 104/63   Pulse: 80 73 74 76   Resp: 18 16 17 15   Temp: 98.5 °F (36.9 °C) 98.6 °F (37 °C)  98.7 °F (37.1 °C)   TempSrc: Oral Oral  Oral   SpO2: 99% 100%  100%   Weight:       Height:         24HR INTAKE/OUTPUT:      Intake/Output Summary (Last 24 hours) at 01/27/19 1148  Last data filed at 01/26/19 1249   Gross per 24 hour   Intake              350 ml   Output             2440 ml   Net            -2090 ml       Physical Exam:  General appearance: Lying flat alert and oriented ×3 Skin: Warm to touch  ENT:No congestion   Neck: no carotid bruit  Pulmonary:bilateral air entry and clear to auscultation  Cardiovascular:S1 and S2 audible no S3   Abdomen: soft nontender, bowel sounds present, no organomegaly,  no ascites  Extremities: no edema    MEDICATIONS     Scheduled Meds:    heparin (porcine)  5,000 Units Subcutaneous 3 times per day    aspirin EC  81 mg Oral Daily    multivitamin  1 tablet Oral BID    warfarin  2.5 mg Oral Q7 Days    warfarin  5 mg Oral Daily    vitamin D  1,000 Units Oral Q30 Days    calcium carbonate-vitamin D  1 tablet Oral BID    b complex-C-folic acid   Oral Daily    amiodarone  200 mg Oral Daily    pravastatin  40 mg Oral Nightly    midodrine  10 mg Oral TID    sodium chloride flush  10 mL Intravenous 2 times per day     Continuous Infusions:   PRN Meds:  midodrine, sodium chloride, sodium chloride, sodium chloride flush, ondansetron, HYDROcodone 5 mg - acetaminophen **OR** HYDROcodone 5 mg - acetaminophen, fentanNYL **OR** fentanNYL  Home Meds:                Prescriptions Prior to Admission: amiodarone (CORDARONE) 200 MG tablet, TAKE ONE TABLET BY MOUTH DAILY  pravastatin (PRAVACHOL) 40 MG tablet, Take 40 mg by mouth nightly   midodrine (PROAMATINE) 10 MG tablet, TAKE ONE TABLET BY MOUTH THREE TIMES DAILY  Multiple Vitamin (MVI, CELEBRATE, CHEWABLE TABLET), Take 1 tablet by mouth 2 times daily   Calcium-Phosphorus-Vitamin D (CALCIUM GUMMIES PO), Take 500 mg by mouth 2 times daily   Cholecalciferol (VITAMIN D3) 29551 units CAPS, Take 1 capsule by mouth every 30 days  [DISCONTINUED] B complex-vitamin C-folic acid (NEPHRO-JENNIFER) 1 MG tablet, TAKE ONE TABLET BY MOUTH DAILY WITH BREAKFAST  aspirin EC 81 MG EC tablet, Take 81 mg by mouth daily Pt. Not instructed to stop prior to OR  warfarin (COUMADIN) 5 MG tablet, Take 2.5 mg by mouth every 7 days Tuesday/ pt. Will stop 1-4-18 for OR  warfarin (COUMADIN) 5 MG tablet, Take 5 mg by mouth Six times weekly Mon/wed/thurs/fri/sat/sun/  Pt. Will stop 1-4-18 for OR/ coumadin clinic [facility]  Elastic Bandages & Supports (MEDICAL COMPRESSION STOCKINGS) MISC, 1 each by Does not apply route daily as needed (as needed) Right leg below the knee 20-30 HH MM DX 82.409    INVESTIGATIONS     Last 3 CMP:    Recent Labs      01/25/19   0556  01/26/19   0545  01/27/19   0448   NA  134*  137  135   K  4.5  4.6  4.2   CL  94*  96*  93*   CO2  22  23  25   BUN  36*  28*  31*   CREATININE  10.11*  7.75*  7.65*   CALCIUM  8.9  9.2  9.5       Last 3 CBC:  Recent Labs      01/25/19   0556  01/25/19   1303  01/26/19   0545  01/27/19   0448   WBC  19.7*   --   17.4*  16.7*   RBC  2.29*   --   2.24*  2.20*   HGB  7.7*  8.3*  7.6*  7.3*   HCT  23.4*  25.0*  23.7*  22.4*   MCV  102.2   --   105.8*  101.8   MCH  33.6*   --   33.9*  33.2   MCHC  32.9   --   32.1  32.6   RDW  16.2*   --   16.2*  15.8*   PLT  167   --   222  313   MPV  10.4   --   10.5  9.9       ASSESSMENT   1. End-stage renal disease on maintenance hemodialysis regular dialysis days are Monday Wednesday Friday. Patient is off his schedule. Patient was seen on dialysis and ultrafiltration was reviewed.   2. Status post left hemicolectomy Post Op day 4.  3.  Chronic hypotension: This morning his blood pressure was as low as 60 but patient was asymptomatic. He did receive fluid bolus with improvement in symptoms  4. Post surgery hypertension treated with albumin and normal saline  5. History of paroxysmal A. Fib on anticoagulation with coumadin. Hold for now. 6.  Diabetes    PLAN   1. Dialysis as ordered  2. Continue ProAmatine  3. Short and modified dialysis today Will continue to follow.       Please do not hesitate to call with questions        Guille Stanton

## 2019-01-27 NOTE — PLAN OF CARE
Problem: Falls - Risk of:  Goal: Will remain free from falls  Will remain free from falls   Outcome: Ongoing  Patient assessed for fall risk and fall precautions initiated as needed. Patient instructed about safety devices and allowed to make decisions related to safey. Environment kept free of clutter and adequate lighting provided. Bed in lowest position with brakes locked. Call light in reach. Patient ID band correct and in place. Patient transferred with appropriate methods. Patient free of falls during shift. Will continue to monitor.

## 2019-01-27 NOTE — PROGRESS NOTES
Surgery Progress Note            PATIENT NAME: Charlie Avila     TODAY'S DATE: 1/27/2019, 5:58 AM      SUBJECTIVE:    Pt seen and examined at bedside. No acute events overnight. Afebrile. Pt states pain is well controlled. Denies nausea, vomiting, fever, and chills. Tolerating soft diet. Having BMs. Had HD yesterday. OBJECTIVE:   VITALS:  BP (!) 85/39   Pulse 74   Temp 98.6 °F (37 °C) (Oral)   Resp 17   Ht 5' 10\" (1.778 m)   Wt 243 lb 6.2 oz (110.4 kg)   SpO2 100%   BMI 34.92 kg/m²      INTAKE/OUTPUT:      Intake/Output Summary (Last 24 hours) at 01/27/19 0558  Last data filed at 01/26/19 1249   Gross per 24 hour   Intake              820 ml   Output             2440 ml   Net            -1620 ml                     CONSTITUTIONAL:  NAD, A&O x 3  LUNGS:  No increased WOB  CARDIOVASCULAR:  RRR  ABDOMEN: Soft, nondistended, appropriately TTP, midline incision C/D/I. No induration, erythema or drainage noted. Data:  CBC:   Lab Results   Component Value Date    WBC 17.4 01/26/2019    RBC 2.24 01/26/2019    HGB 7.6 01/26/2019    HCT 23.7 01/26/2019    .8 01/26/2019    MCH 33.9 01/26/2019    MCHC 32.1 01/26/2019    RDW 16.2 01/26/2019     01/26/2019    MPV 10.5 01/26/2019     BMP:    Lab Results   Component Value Date     01/26/2019    K 4.6 01/26/2019    CL 96 01/26/2019    CO2 23 01/26/2019    BUN 28 01/26/2019    LABALBU 5.2 09/25/2018    LABALBU 5.2 09/25/2018    LABALBU 4.2 10/04/2011    CREATININE 7.75 01/26/2019    CALCIUM 9.2 01/26/2019    GFRAA 9 01/26/2019    LABGLOM 7 01/26/2019    GLUCOSE 78 01/26/2019    GLUCOSE 91 10/04/2011     PATH:  Low-grade colonic adenocarcinoma.       Carcinoma invades through the muscularis propria into subserosal soft   tissue.       Margins, negative for tumor.       Mesenteric lymph nodes, negative for metastatic carcinoma (0/13).        Diverticulosis.           ASSESSMENT   Patient Active Problem List   Diagnosis    Obesity, Class

## 2019-01-28 ENCOUNTER — TELEPHONE (OUTPATIENT)
Dept: PHARMACY | Age: 49
End: 2019-01-28

## 2019-01-28 VITALS
WEIGHT: 243.39 LBS | BODY MASS INDEX: 34.84 KG/M2 | RESPIRATION RATE: 18 BRPM | TEMPERATURE: 98 F | OXYGEN SATURATION: 99 % | DIASTOLIC BLOOD PRESSURE: 54 MMHG | HEIGHT: 70 IN | SYSTOLIC BLOOD PRESSURE: 98 MMHG | HEART RATE: 79 BPM

## 2019-01-28 LAB
ANION GAP SERPL CALCULATED.3IONS-SCNC: 30 MMOL/L (ref 9–17)
BUN BLDV-MCNC: 53 MG/DL (ref 6–20)
BUN/CREAT BLD: ABNORMAL (ref 9–20)
CALCIUM SERPL-MCNC: 10.3 MG/DL (ref 8.6–10.4)
CHLORIDE BLD-SCNC: 91 MMOL/L (ref 98–107)
CO2: 22 MMOL/L (ref 20–31)
CREAT SERPL-MCNC: 10.1 MG/DL (ref 0.7–1.2)
GFR AFRICAN AMERICAN: 7 ML/MIN
GFR NON-AFRICAN AMERICAN: 5 ML/MIN
GFR SERPL CREATININE-BSD FRML MDRD: ABNORMAL ML/MIN/{1.73_M2}
GFR SERPL CREATININE-BSD FRML MDRD: ABNORMAL ML/MIN/{1.73_M2}
GLUCOSE BLD-MCNC: 87 MG/DL (ref 70–99)
HCT VFR BLD CALC: 24.6 % (ref 40.7–50.3)
HEMOGLOBIN: 8 G/DL (ref 13–17)
INR BLD: 1.3
MCH RBC QN AUTO: 33.2 PG (ref 25.2–33.5)
MCHC RBC AUTO-ENTMCNC: 32.5 G/DL (ref 28.4–34.8)
MCV RBC AUTO: 102.1 FL (ref 82.6–102.9)
NRBC AUTOMATED: 0.6 PER 100 WBC
PDW BLD-RTO: 15.6 % (ref 11.8–14.4)
PLATELET # BLD: 422 K/UL (ref 138–453)
PMV BLD AUTO: 9.5 FL (ref 8.1–13.5)
POTASSIUM SERPL-SCNC: 4.7 MMOL/L (ref 3.7–5.3)
PROTHROMBIN TIME: 13.2 SEC (ref 9–12)
RBC # BLD: 2.41 M/UL (ref 4.21–5.77)
SODIUM BLD-SCNC: 143 MMOL/L (ref 135–144)
WBC # BLD: 14.5 K/UL (ref 3.5–11.3)

## 2019-01-28 PROCEDURE — 80048 BASIC METABOLIC PNL TOTAL CA: CPT

## 2019-01-28 PROCEDURE — 85610 PROTHROMBIN TIME: CPT

## 2019-01-28 PROCEDURE — 85027 COMPLETE CBC AUTOMATED: CPT

## 2019-01-28 PROCEDURE — 36415 COLL VENOUS BLD VENIPUNCTURE: CPT

## 2019-01-28 PROCEDURE — 99232 SBSQ HOSP IP/OBS MODERATE 35: CPT | Performed by: INTERNAL MEDICINE

## 2019-01-28 PROCEDURE — 6360000002 HC RX W HCPCS: Performed by: SURGERY

## 2019-01-28 RX ADMIN — HEPARIN SODIUM 5000 UNITS: 5000 INJECTION INTRAVENOUS; SUBCUTANEOUS at 05:25

## 2019-01-28 ASSESSMENT — ENCOUNTER SYMPTOMS
ABDOMINAL PAIN: 1
SHORTNESS OF BREATH: 0
CONSTIPATION: 0
COUGH: 0
BLOOD IN STOOL: 0
NAUSEA: 0
VOMITING: 0

## 2019-01-28 NOTE — CARE COORDINATION
[place] [facility]  Written by: Mariposa Murrell RN    Patient Name: Camelia Haider  Attending Provider: No att. providers found  Admit Date: 2019 10:41 AM  MRN: [id number]  Account: [de-identified]                     : 1970  Discharge Date: 2019      Disposition: home  Follows with coumadin clinic at [place] [facility], RN

## 2019-01-28 NOTE — PROGRESS NOTES
([phone]       No results found for: POCPH, PHART, PH, POCPCO2, DYK6XWM, PCO2, POCPO2, PO2ART, PO2, POCHCO3, FBW6HVD, HCO3, NBEA, PBEA, BEART, BE, THGBART, THB, AWD8KOV, POEO8QON, Y6NVXAFG, O2SAT, FIO2    Radiology / [place]:  See above      Assessment:        Primary Problem  Principal Problem:    Adenocarcinoma, colon ([facility] [address]  Active Problems:    ESRD on hemodialysis ([facility] [address]    Chronic hypotension    Chronic atrial fibrillation ([facility] [address]    Malignant neoplasm of transverse colon ([facility] [address]    Status post partial colectomy    Pain associated with surgical procedure  Resolved Problems:    * No resolved hospital problems. *      Plan:        Discharge planning in progress  Surgical follow-up as scheduled  Blood Pressure - Monitor and control, midodrine  Anticoagulation per cardiology  Dialysis / renal evaluation as scheduled  Blood Pressure - Monitor and control   Anemia w/u on outpatient basis is suggested    Titrate Coumadin to maintain INR between 2 and 3, Coumadin clinic follow-up  Will discharge when arrangements complete and ok with other services.   Follow-up with PCP in one week, Hemalatha Nichols MD  Notify PCP of discharge     IP CONSULT TO NEPHROLOGY  IP CONSULT TO CRITICAL CARE  IP CONSULT TO CASE MANAGEMENT  PHARMACY TO SANNA Michelle DO    1/28/2019    10:30 AM

## 2019-01-29 ENCOUNTER — CARE COORDINATION (OUTPATIENT)
Dept: CASE MANAGEMENT | Age: 49
End: 2019-01-29

## 2019-01-29 DIAGNOSIS — Z90.49 STATUS POST PARTIAL COLECTOMY: Primary | ICD-10-CM

## 2019-01-29 LAB — SURGICAL PATHOLOGY REPORT: NORMAL

## 2019-01-29 PROCEDURE — 1111F DSCHRG MED/CURRENT MED MERGE: CPT | Performed by: FAMILY MEDICINE

## 2019-01-31 ENCOUNTER — HOSPITAL ENCOUNTER (INPATIENT)
Age: 49
LOS: 1 days | Discharge: HOME | DRG: 204 | End: 2019-02-01
Attending: EMERGENCY MEDICINE | Admitting: INTERNAL MEDICINE
Payer: MEDICARE

## 2019-01-31 ENCOUNTER — APPOINTMENT (OUTPATIENT)
Dept: GENERAL RADIOLOGY | Age: 49
DRG: 204 | End: 2019-01-31
Payer: MEDICARE

## 2019-01-31 DIAGNOSIS — R77.8 ELEVATED TROPONIN: ICD-10-CM

## 2019-01-31 DIAGNOSIS — N18.6 ESRD (END STAGE RENAL DISEASE) ON DIALYSIS (HCC): ICD-10-CM

## 2019-01-31 DIAGNOSIS — J18.9 PNEUMONIA DUE TO ORGANISM: Primary | ICD-10-CM

## 2019-01-31 DIAGNOSIS — Z99.2 ESRD (END STAGE RENAL DISEASE) ON DIALYSIS (HCC): ICD-10-CM

## 2019-01-31 DIAGNOSIS — C18.4 MALIGNANT NEOPLASM OF TRANSVERSE COLON (HCC): ICD-10-CM

## 2019-01-31 DIAGNOSIS — Z98.890 POSTOPERATIVE STATE: ICD-10-CM

## 2019-01-31 PROBLEM — R91.8 LUNG INFILTRATE: Status: ACTIVE | Noted: 2019-01-31

## 2019-01-31 LAB
ABSOLUTE EOS #: 0.19 K/UL (ref 0–0.44)
ABSOLUTE IMMATURE GRANULOCYTE: 0.14 K/UL (ref 0–0.3)
ABSOLUTE LYMPH #: 1.48 K/UL (ref 1.1–3.7)
ABSOLUTE MONO #: 0.96 K/UL (ref 0.1–1.2)
ALBUMIN SERPL-MCNC: 3.9 G/DL (ref 3.5–5.2)
ALBUMIN/GLOBULIN RATIO: 0.8 (ref 1–2.5)
ALP BLD-CCNC: 68 U/L (ref 40–129)
ALT SERPL-CCNC: 21 U/L (ref 5–41)
ANION GAP SERPL CALCULATED.3IONS-SCNC: 19 MMOL/L (ref 9–17)
AST SERPL-CCNC: 26 U/L
BASOPHILS # BLD: 0 % (ref 0–2)
BASOPHILS ABSOLUTE: 0.05 K/UL (ref 0–0.2)
BILIRUB SERPL-MCNC: 0.34 MG/DL (ref 0.3–1.2)
BNP INTERPRETATION: ABNORMAL
BUN BLDV-MCNC: 22 MG/DL (ref 6–20)
BUN/CREAT BLD: ABNORMAL (ref 9–20)
CALCIUM SERPL-MCNC: 9.2 MG/DL (ref 8.6–10.4)
CHLORIDE BLD-SCNC: 90 MMOL/L (ref 98–107)
CO2: 26 MMOL/L (ref 20–31)
CREAT SERPL-MCNC: 5.91 MG/DL (ref 0.7–1.2)
CULTURE: NORMAL
CULTURE: NORMAL
DIFFERENTIAL TYPE: ABNORMAL
EKG ATRIAL RATE: 79 BPM
EKG P AXIS: 33 DEGREES
EKG P-R INTERVAL: 184 MS
EKG Q-T INTERVAL: 392 MS
EKG QRS DURATION: 76 MS
EKG QTC CALCULATION (BAZETT): 449 MS
EKG R AXIS: 15 DEGREES
EKG T AXIS: 85 DEGREES
EKG VENTRICULAR RATE: 79 BPM
EOSINOPHILS RELATIVE PERCENT: 1 % (ref 1–4)
GFR AFRICAN AMERICAN: 12 ML/MIN
GFR NON-AFRICAN AMERICAN: 10 ML/MIN
GFR SERPL CREATININE-BSD FRML MDRD: ABNORMAL ML/MIN/{1.73_M2}
GFR SERPL CREATININE-BSD FRML MDRD: ABNORMAL ML/MIN/{1.73_M2}
GLUCOSE BLD-MCNC: 94 MG/DL (ref 70–99)
HCT VFR BLD CALC: 27.7 % (ref 40.7–50.3)
HEMOGLOBIN: 8.9 G/DL (ref 13–17)
IMMATURE GRANULOCYTES: 1 %
INR BLD: 2.3
LACTIC ACID, SEPSIS WHOLE BLOOD: 1.8 MMOL/L (ref 0.5–1.9)
LACTIC ACID, SEPSIS WHOLE BLOOD: 1.8 MMOL/L (ref 0.5–1.9)
LACTIC ACID, SEPSIS: NORMAL MMOL/L (ref 0.5–1.9)
LACTIC ACID, SEPSIS: NORMAL MMOL/L (ref 0.5–1.9)
LIPASE: 117 U/L (ref 13–60)
LYMPHOCYTES # BLD: 11 % (ref 24–43)
Lab: NORMAL
Lab: NORMAL
MAGNESIUM: 2 MG/DL (ref 1.6–2.6)
MCH RBC QN AUTO: 33 PG (ref 25.2–33.5)
MCHC RBC AUTO-ENTMCNC: 32.1 G/DL (ref 28.4–34.8)
MCV RBC AUTO: 102.6 FL (ref 82.6–102.9)
MONOCYTES # BLD: 7 % (ref 3–12)
MRSA, DNA, NASAL: NORMAL
NRBC AUTOMATED: 0.2 PER 100 WBC
PARTIAL THROMBOPLASTIN TIME: 48.9 SEC (ref 20.5–30.5)
PDW BLD-RTO: 15.7 % (ref 11.8–14.4)
PLATELET # BLD: 644 K/UL (ref 138–453)
PLATELET ESTIMATE: ABNORMAL
PMV BLD AUTO: 9.9 FL (ref 8.1–13.5)
POTASSIUM SERPL-SCNC: 3.9 MMOL/L (ref 3.7–5.3)
PRO-BNP: 5001 PG/ML
PROCALCITONIN: 1.92 NG/ML
PROTHROMBIN TIME: 23.7 SEC (ref 9–12)
RBC # BLD: 2.7 M/UL (ref 4.21–5.77)
RBC # BLD: ABNORMAL 10*6/UL
SEG NEUTROPHILS: 79 % (ref 36–65)
SEGMENTED NEUTROPHILS ABSOLUTE COUNT: 10.57 K/UL (ref 1.5–8.1)
SODIUM BLD-SCNC: 135 MMOL/L (ref 135–144)
SPECIMEN DESCRIPTION: NORMAL
STATUS: NORMAL
STATUS: NORMAL
TOTAL PROTEIN: 8.5 G/DL (ref 6.4–8.3)
TROPONIN INTERP: ABNORMAL
TROPONIN INTERP: ABNORMAL
TROPONIN T: ABNORMAL NG/ML
TROPONIN T: ABNORMAL NG/ML
TROPONIN, HIGH SENSITIVITY: 42 NG/L (ref 0–22)
TROPONIN, HIGH SENSITIVITY: 61 NG/L (ref 0–22)
WBC # BLD: 13.4 K/UL (ref 3.5–11.3)
WBC # BLD: ABNORMAL 10*3/UL

## 2019-01-31 PROCEDURE — 2580000003 HC RX 258: Performed by: EMERGENCY MEDICINE

## 2019-01-31 PROCEDURE — 83605 ASSAY OF LACTIC ACID: CPT

## 2019-01-31 PROCEDURE — 1200000000 HC SEMI PRIVATE

## 2019-01-31 PROCEDURE — 87086 URINE CULTURE/COLONY COUNT: CPT

## 2019-01-31 PROCEDURE — 6370000000 HC RX 637 (ALT 250 FOR IP): Performed by: INTERNAL MEDICINE

## 2019-01-31 PROCEDURE — 84484 ASSAY OF TROPONIN QUANT: CPT

## 2019-01-31 PROCEDURE — 83690 ASSAY OF LIPASE: CPT

## 2019-01-31 PROCEDURE — 71045 X-RAY EXAM CHEST 1 VIEW: CPT

## 2019-01-31 PROCEDURE — 85730 THROMBOPLASTIN TIME PARTIAL: CPT

## 2019-01-31 PROCEDURE — 85610 PROTHROMBIN TIME: CPT

## 2019-01-31 PROCEDURE — 85025 COMPLETE CBC W/AUTO DIFF WBC: CPT

## 2019-01-31 PROCEDURE — 99222 1ST HOSP IP/OBS MODERATE 55: CPT | Performed by: INTERNAL MEDICINE

## 2019-01-31 PROCEDURE — 84145 PROCALCITONIN (PCT): CPT

## 2019-01-31 PROCEDURE — 76937 US GUIDE VASCULAR ACCESS: CPT

## 2019-01-31 PROCEDURE — 87040 BLOOD CULTURE FOR BACTERIA: CPT

## 2019-01-31 PROCEDURE — 80053 COMPREHEN METABOLIC PANEL: CPT

## 2019-01-31 PROCEDURE — 6360000002 HC RX W HCPCS: Performed by: INTERNAL MEDICINE

## 2019-01-31 PROCEDURE — 36415 COLL VENOUS BLD VENIPUNCTURE: CPT

## 2019-01-31 PROCEDURE — 96365 THER/PROPH/DIAG IV INF INIT: CPT

## 2019-01-31 PROCEDURE — 6360000002 HC RX W HCPCS: Performed by: EMERGENCY MEDICINE

## 2019-01-31 PROCEDURE — 83735 ASSAY OF MAGNESIUM: CPT

## 2019-01-31 PROCEDURE — 87641 MR-STAPH DNA AMP PROBE: CPT

## 2019-01-31 PROCEDURE — 83880 ASSAY OF NATRIURETIC PEPTIDE: CPT

## 2019-01-31 PROCEDURE — 2580000003 HC RX 258: Performed by: INTERNAL MEDICINE

## 2019-01-31 PROCEDURE — 99285 EMERGENCY DEPT VISIT HI MDM: CPT

## 2019-01-31 PROCEDURE — 93005 ELECTROCARDIOGRAM TRACING: CPT

## 2019-01-31 RX ORDER — ACETAMINOPHEN 325 MG/1
650 TABLET ORAL EVERY 4 HOURS PRN
Status: DISCONTINUED | OUTPATIENT
Start: 2019-01-31 | End: 2019-02-01 | Stop reason: HOSPADM

## 2019-01-31 RX ORDER — SODIUM CHLORIDE 0.9 % (FLUSH) 0.9 %
10 SYRINGE (ML) INJECTION EVERY 12 HOURS SCHEDULED
Status: DISCONTINUED | OUTPATIENT
Start: 2019-01-31 | End: 2019-02-01 | Stop reason: HOSPADM

## 2019-01-31 RX ORDER — SODIUM CHLORIDE 9 MG/ML
INJECTION, SOLUTION INTRAVENOUS CONTINUOUS
Status: DISCONTINUED | OUTPATIENT
Start: 2019-01-31 | End: 2019-01-31

## 2019-01-31 RX ORDER — AMIODARONE HYDROCHLORIDE 200 MG/1
200 TABLET ORAL DAILY
Status: DISCONTINUED | OUTPATIENT
Start: 2019-02-01 | End: 2019-02-01 | Stop reason: HOSPADM

## 2019-01-31 RX ORDER — SODIUM CHLORIDE 0.9 % (FLUSH) 0.9 %
10 SYRINGE (ML) INJECTION PRN
Status: DISCONTINUED | OUTPATIENT
Start: 2019-01-31 | End: 2019-02-01 | Stop reason: HOSPADM

## 2019-01-31 RX ORDER — ONDANSETRON 2 MG/ML
4 INJECTION INTRAMUSCULAR; INTRAVENOUS EVERY 6 HOURS PRN
Status: DISCONTINUED | OUTPATIENT
Start: 2019-01-31 | End: 2019-02-01 | Stop reason: HOSPADM

## 2019-01-31 RX ORDER — NICOTINE 21 MG/24HR
1 PATCH, TRANSDERMAL 24 HOURS TRANSDERMAL DAILY PRN
Status: DISCONTINUED | OUTPATIENT
Start: 2019-01-31 | End: 2019-02-01 | Stop reason: HOSPADM

## 2019-01-31 RX ORDER — HYDROCODONE BITARTRATE AND ACETAMINOPHEN 5; 325 MG/1; MG/1
1 TABLET ORAL EVERY 6 HOURS PRN
COMMUNITY
End: 2021-01-08 | Stop reason: ALTCHOICE

## 2019-01-31 RX ORDER — HEPARIN SODIUM 5000 [USP'U]/ML
5000 INJECTION, SOLUTION INTRAVENOUS; SUBCUTANEOUS EVERY 8 HOURS SCHEDULED
Status: DISCONTINUED | OUTPATIENT
Start: 2019-01-31 | End: 2019-01-31

## 2019-01-31 RX ORDER — IPRATROPIUM BROMIDE AND ALBUTEROL SULFATE 2.5; .5 MG/3ML; MG/3ML
1 SOLUTION RESPIRATORY (INHALATION) EVERY 6 HOURS PRN
Status: DISCONTINUED | OUTPATIENT
Start: 2019-01-31 | End: 2019-02-01 | Stop reason: HOSPADM

## 2019-01-31 RX ORDER — PRAVASTATIN SODIUM 20 MG
40 TABLET ORAL NIGHTLY
Status: DISCONTINUED | OUTPATIENT
Start: 2019-01-31 | End: 2019-02-01 | Stop reason: HOSPADM

## 2019-01-31 RX ORDER — IPRATROPIUM BROMIDE AND ALBUTEROL SULFATE 2.5; .5 MG/3ML; MG/3ML
1 SOLUTION RESPIRATORY (INHALATION)
Status: DISCONTINUED | OUTPATIENT
Start: 2019-01-31 | End: 2019-01-31

## 2019-01-31 RX ORDER — HYDROCODONE BITARTRATE AND ACETAMINOPHEN 5; 325 MG/1; MG/1
1 TABLET ORAL EVERY 4 HOURS PRN
Status: DISCONTINUED | OUTPATIENT
Start: 2019-01-31 | End: 2019-02-01 | Stop reason: HOSPADM

## 2019-01-31 RX ORDER — HYDROCODONE BITARTRATE AND ACETAMINOPHEN 5; 325 MG/1; MG/1
2 TABLET ORAL EVERY 4 HOURS PRN
Status: DISCONTINUED | OUTPATIENT
Start: 2019-01-31 | End: 2019-02-01 | Stop reason: HOSPADM

## 2019-01-31 RX ORDER — ALBUTEROL SULFATE 2.5 MG/3ML
2.5 SOLUTION RESPIRATORY (INHALATION)
Status: DISCONTINUED | OUTPATIENT
Start: 2019-01-31 | End: 2019-02-01 | Stop reason: HOSPADM

## 2019-01-31 RX ORDER — WARFARIN SODIUM 2.5 MG/1
2.5 TABLET ORAL
Status: DISCONTINUED | OUTPATIENT
Start: 2019-02-05 | End: 2019-02-01

## 2019-01-31 RX ORDER — MIDODRINE HYDROCHLORIDE 5 MG/1
10 TABLET ORAL 3 TIMES DAILY
Status: DISCONTINUED | OUTPATIENT
Start: 2019-01-31 | End: 2019-02-01 | Stop reason: HOSPADM

## 2019-01-31 RX ORDER — BISACODYL 10 MG
10 SUPPOSITORY, RECTAL RECTAL DAILY PRN
Status: DISCONTINUED | OUTPATIENT
Start: 2019-01-31 | End: 2019-02-01 | Stop reason: HOSPADM

## 2019-01-31 RX ORDER — WARFARIN SODIUM 5 MG/1
5 TABLET ORAL
Status: DISCONTINUED | OUTPATIENT
Start: 2019-01-31 | End: 2019-02-01

## 2019-01-31 RX ORDER — DOCUSATE SODIUM 100 MG/1
100 CAPSULE, LIQUID FILLED ORAL 2 TIMES DAILY
Status: DISCONTINUED | OUTPATIENT
Start: 2019-01-31 | End: 2019-02-01 | Stop reason: HOSPADM

## 2019-01-31 RX ADMIN — PIPERACILLIN AND TAZOBACTAM 3.38 G: 3; .375 INJECTION, POWDER, LYOPHILIZED, FOR SOLUTION INTRAVENOUS; PARENTERAL at 23:34

## 2019-01-31 RX ADMIN — VANCOMYCIN HYDROCHLORIDE 1750 MG: 1 INJECTION, POWDER, LYOPHILIZED, FOR SOLUTION INTRAVENOUS at 14:47

## 2019-01-31 RX ADMIN — PIPERACILLIN AND TAZOBACTAM 3.38 G: 3; .375 INJECTION, POWDER, LYOPHILIZED, FOR SOLUTION INTRAVENOUS; PARENTERAL at 13:49

## 2019-01-31 RX ADMIN — PRAVASTATIN SODIUM 40 MG: 20 TABLET ORAL at 22:07

## 2019-01-31 RX ADMIN — WARFARIN SODIUM 5 MG: 5 TABLET ORAL at 22:36

## 2019-01-31 RX ADMIN — HYDROCODONE BITARTRATE AND ACETAMINOPHEN 1 TABLET: 5; 325 TABLET ORAL at 18:51

## 2019-01-31 RX ADMIN — DOCUSATE SODIUM 100 MG: 100 CAPSULE, LIQUID FILLED ORAL at 22:07

## 2019-01-31 RX ADMIN — IPRATROPIUM BROMIDE AND ALBUTEROL SULFATE 1 AMPULE: .5; 3 SOLUTION RESPIRATORY (INHALATION) at 17:08

## 2019-01-31 RX ADMIN — MIDODRINE HYDROCHLORIDE 10 MG: 5 TABLET ORAL at 22:09

## 2019-01-31 RX ADMIN — Medication 10 ML: at 22:07

## 2019-01-31 ASSESSMENT — ENCOUNTER SYMPTOMS
NAUSEA: 0
VOMITING: 0
ABDOMINAL PAIN: 1
DIARRHEA: 0
SORE THROAT: 0
EYE PAIN: 0
COUGH: 0
SHORTNESS OF BREATH: 1

## 2019-01-31 ASSESSMENT — PAIN DESCRIPTION - ORIENTATION: ORIENTATION: RIGHT

## 2019-01-31 ASSESSMENT — PAIN DESCRIPTION - FREQUENCY: FREQUENCY: INTERMITTENT

## 2019-01-31 ASSESSMENT — PAIN DESCRIPTION - DESCRIPTORS: DESCRIPTORS: SHARP

## 2019-01-31 ASSESSMENT — PAIN SCALES - GENERAL
PAINLEVEL_OUTOF10: 0
PAINLEVEL_OUTOF10: 4
PAINLEVEL_OUTOF10: 6
PAINLEVEL_OUTOF10: 4
PAINLEVEL_OUTOF10: 6

## 2019-01-31 ASSESSMENT — PAIN DESCRIPTION - LOCATION: LOCATION: ABDOMEN

## 2019-01-31 ASSESSMENT — PAIN DESCRIPTION - PAIN TYPE
TYPE: ACUTE PAIN
TYPE: ACUTE PAIN

## 2019-01-31 ASSESSMENT — PAIN DESCRIPTION - ONSET: ONSET: ON-GOING

## 2019-02-01 ENCOUNTER — APPOINTMENT (OUTPATIENT)
Dept: GENERAL RADIOLOGY | Age: 49
DRG: 204 | End: 2019-02-01
Payer: MEDICARE

## 2019-02-01 ENCOUNTER — APPOINTMENT (OUTPATIENT)
Dept: CT IMAGING | Age: 49
DRG: 204 | End: 2019-02-01
Payer: MEDICARE

## 2019-02-01 VITALS
HEIGHT: 70 IN | BODY MASS INDEX: 35.36 KG/M2 | SYSTOLIC BLOOD PRESSURE: 110 MMHG | WEIGHT: 247 LBS | TEMPERATURE: 97.8 F | DIASTOLIC BLOOD PRESSURE: 58 MMHG | RESPIRATION RATE: 17 BRPM | HEART RATE: 75 BPM | OXYGEN SATURATION: 99 %

## 2019-02-01 LAB
ANION GAP SERPL CALCULATED.3IONS-SCNC: 18 MMOL/L (ref 9–17)
BUN BLDV-MCNC: 32 MG/DL (ref 6–20)
BUN/CREAT BLD: ABNORMAL (ref 9–20)
CALCIUM SERPL-MCNC: 9.4 MG/DL (ref 8.6–10.4)
CHLORIDE BLD-SCNC: 91 MMOL/L (ref 98–107)
CO2: 27 MMOL/L (ref 20–31)
CREAT SERPL-MCNC: 8.24 MG/DL (ref 0.7–1.2)
CULTURE: NORMAL
GFR AFRICAN AMERICAN: 8 ML/MIN
GFR NON-AFRICAN AMERICAN: 7 ML/MIN
GFR SERPL CREATININE-BSD FRML MDRD: ABNORMAL ML/MIN/{1.73_M2}
GFR SERPL CREATININE-BSD FRML MDRD: ABNORMAL ML/MIN/{1.73_M2}
GLUCOSE BLD-MCNC: 84 MG/DL (ref 70–99)
HCT VFR BLD CALC: 24.5 % (ref 40.7–50.3)
HEMOGLOBIN: 8 G/DL (ref 13–17)
INR BLD: 3.2
Lab: NORMAL
MCH RBC QN AUTO: 33.5 PG (ref 25.2–33.5)
MCHC RBC AUTO-ENTMCNC: 32.7 G/DL (ref 28.4–34.8)
MCV RBC AUTO: 102.5 FL (ref 82.6–102.9)
NRBC AUTOMATED: 0 PER 100 WBC
PDW BLD-RTO: 15.5 % (ref 11.8–14.4)
PLATELET # BLD: 684 K/UL (ref 138–453)
PMV BLD AUTO: 8.8 FL (ref 8.1–13.5)
POTASSIUM SERPL-SCNC: 3.9 MMOL/L (ref 3.7–5.3)
PROTHROMBIN TIME: 31.6 SEC (ref 9–12)
RBC # BLD: 2.39 M/UL (ref 4.21–5.77)
SODIUM BLD-SCNC: 136 MMOL/L (ref 135–144)
SPECIMEN DESCRIPTION: NORMAL
STATUS: NORMAL
WBC # BLD: 12.4 K/UL (ref 3.5–11.3)

## 2019-02-01 PROCEDURE — 97162 PT EVAL MOD COMPLEX 30 MIN: CPT

## 2019-02-01 PROCEDURE — 85027 COMPLETE CBC AUTOMATED: CPT

## 2019-02-01 PROCEDURE — 94762 N-INVAS EAR/PLS OXIMTRY CONT: CPT

## 2019-02-01 PROCEDURE — 97165 OT EVAL LOW COMPLEX 30 MIN: CPT

## 2019-02-01 PROCEDURE — 99222 1ST HOSP IP/OBS MODERATE 55: CPT | Performed by: INTERNAL MEDICINE

## 2019-02-01 PROCEDURE — 99232 SBSQ HOSP IP/OBS MODERATE 35: CPT | Performed by: INTERNAL MEDICINE

## 2019-02-01 PROCEDURE — 71046 X-RAY EXAM CHEST 2 VIEWS: CPT

## 2019-02-01 PROCEDURE — 6360000004 HC RX CONTRAST MEDICATION: Performed by: INTERNAL MEDICINE

## 2019-02-01 PROCEDURE — 97535 SELF CARE MNGMENT TRAINING: CPT

## 2019-02-01 PROCEDURE — 85610 PROTHROMBIN TIME: CPT

## 2019-02-01 PROCEDURE — 80048 BASIC METABOLIC PNL TOTAL CA: CPT

## 2019-02-01 PROCEDURE — 97530 THERAPEUTIC ACTIVITIES: CPT

## 2019-02-01 PROCEDURE — 2580000003 HC RX 258: Performed by: INTERNAL MEDICINE

## 2019-02-01 PROCEDURE — 36415 COLL VENOUS BLD VENIPUNCTURE: CPT

## 2019-02-01 PROCEDURE — 6370000000 HC RX 637 (ALT 250 FOR IP): Performed by: INTERNAL MEDICINE

## 2019-02-01 PROCEDURE — 6360000002 HC RX W HCPCS: Performed by: INTERNAL MEDICINE

## 2019-02-01 PROCEDURE — 71260 CT THORAX DX C+: CPT

## 2019-02-01 RX ADMIN — MIDODRINE HYDROCHLORIDE 10 MG: 5 TABLET ORAL at 14:16

## 2019-02-01 RX ADMIN — IOVERSOL 75 ML: 741 INJECTION INTRA-ARTERIAL; INTRAVENOUS at 13:30

## 2019-02-01 RX ADMIN — DOCUSATE SODIUM 100 MG: 100 CAPSULE, LIQUID FILLED ORAL at 08:18

## 2019-02-01 RX ADMIN — MIDODRINE HYDROCHLORIDE 10 MG: 5 TABLET ORAL at 08:18

## 2019-02-01 RX ADMIN — PIPERACILLIN AND TAZOBACTAM 3.38 G: 3; .375 INJECTION, POWDER, LYOPHILIZED, FOR SOLUTION INTRAVENOUS; PARENTERAL at 07:28

## 2019-02-01 ASSESSMENT — PAIN DESCRIPTION - LOCATION: LOCATION: ABDOMEN

## 2019-02-01 ASSESSMENT — PAIN DESCRIPTION - ORIENTATION: ORIENTATION: MID

## 2019-02-01 ASSESSMENT — PAIN DESCRIPTION - PAIN TYPE: TYPE: ACUTE PAIN

## 2019-02-01 ASSESSMENT — PAIN SCALES - GENERAL: PAINLEVEL_OUTOF10: 2

## 2019-02-01 ASSESSMENT — PAIN DESCRIPTION - ONSET: ONSET: ON-GOING

## 2019-02-01 ASSESSMENT — PAIN DESCRIPTION - DESCRIPTORS: DESCRIPTORS: ACHING

## 2019-02-01 ASSESSMENT — PAIN DESCRIPTION - FREQUENCY: FREQUENCY: INTERMITTENT

## 2019-02-02 ENCOUNTER — CARE COORDINATION (OUTPATIENT)
Dept: CASE MANAGEMENT | Age: 49
End: 2019-02-02

## 2019-02-02 DIAGNOSIS — R91.8 LUNG INFILTRATE: Primary | ICD-10-CM

## 2019-02-02 PROCEDURE — 1111F DSCHRG MED/CURRENT MED MERGE: CPT | Performed by: FAMILY MEDICINE

## 2019-02-04 ENCOUNTER — HOSPITAL ENCOUNTER (OUTPATIENT)
Dept: PHARMACY | Age: 49
Setting detail: THERAPIES SERIES
Discharge: HOME | End: 2019-02-04
Payer: COMMERCIAL

## 2019-02-04 DIAGNOSIS — I82.409 DEEP VEIN THROMBOSIS (DVT) OF LOWER EXTREMITY, UNSPECIFIED CHRONICITY, UNSPECIFIED LATERALITY, UNSPECIFIED VEIN (HCC): ICD-10-CM

## 2019-02-04 LAB
INR BLD: 4.9
PROTIME: 58.3 SECONDS

## 2019-02-04 PROCEDURE — 99212 OFFICE O/P EST SF 10 MIN: CPT

## 2019-02-04 PROCEDURE — 85610 PROTHROMBIN TIME: CPT

## 2019-02-06 ENCOUNTER — CARE COORDINATION (OUTPATIENT)
Dept: CASE MANAGEMENT | Age: 49
End: 2019-02-06

## 2019-02-06 LAB
CULTURE: NORMAL
CULTURE: NORMAL
Lab: NORMAL
Lab: NORMAL
SPECIMEN DESCRIPTION: NORMAL
SPECIMEN DESCRIPTION: NORMAL
STATUS: NORMAL
STATUS: NORMAL

## 2019-02-11 ENCOUNTER — HOSPITAL ENCOUNTER (OUTPATIENT)
Dept: PHARMACY | Age: 49
Setting detail: THERAPIES SERIES
Discharge: HOME | End: 2019-02-11
Payer: COMMERCIAL

## 2019-02-11 DIAGNOSIS — I82.409 DEEP VEIN THROMBOSIS (DVT) OF LOWER EXTREMITY, UNSPECIFIED CHRONICITY, UNSPECIFIED LATERALITY, UNSPECIFIED VEIN (HCC): ICD-10-CM

## 2019-02-11 LAB
INR BLD: 5.9
PROTIME: 71 SECONDS

## 2019-02-11 PROCEDURE — 85610 PROTHROMBIN TIME: CPT

## 2019-02-11 PROCEDURE — 99212 OFFICE O/P EST SF 10 MIN: CPT

## 2019-02-18 ENCOUNTER — TELEPHONE (OUTPATIENT)
Dept: INTERNAL MEDICINE CLINIC | Age: 49
End: 2019-02-18

## 2019-02-18 ENCOUNTER — HOSPITAL ENCOUNTER (OUTPATIENT)
Dept: PHARMACY | Age: 49
Setting detail: THERAPIES SERIES
Discharge: HOME | End: 2019-02-18
Payer: COMMERCIAL

## 2019-02-18 DIAGNOSIS — I82.409 DEEP VEIN THROMBOSIS (DVT) OF LOWER EXTREMITY, UNSPECIFIED CHRONICITY, UNSPECIFIED LATERALITY, UNSPECIFIED VEIN (HCC): ICD-10-CM

## 2019-02-18 LAB
INR BLD: 7.2
PROTHROMBIN TIME: 64.9 SEC (ref 9–12)

## 2019-02-18 PROCEDURE — 85610 PROTHROMBIN TIME: CPT

## 2019-02-18 PROCEDURE — 99212 OFFICE O/P EST SF 10 MIN: CPT

## 2019-02-18 PROCEDURE — 36415 COLL VENOUS BLD VENIPUNCTURE: CPT

## 2019-02-19 ENCOUNTER — HOSPITAL ENCOUNTER (OUTPATIENT)
Age: 49
Setting detail: OBSERVATION
Discharge: HOME | End: 2019-02-20
Attending: EMERGENCY MEDICINE | Admitting: EMERGENCY MEDICINE
Payer: COMMERCIAL

## 2019-02-19 DIAGNOSIS — T82.838A HEMORRHAGE OF ARTERIOVENOUS FISTULA, INITIAL ENCOUNTER (HCC): Primary | ICD-10-CM

## 2019-02-19 DIAGNOSIS — R79.1 ELEVATED INR: ICD-10-CM

## 2019-02-19 PROBLEM — T82.9XXA COMPLICATION OF AV DIALYSIS FISTULA, INITIAL ENCOUNTER: Status: ACTIVE | Noted: 2019-02-19

## 2019-02-19 LAB
HCT VFR BLD CALC: 24.6 % (ref 40.7–50.3)
HEMOGLOBIN: 7.8 G/DL (ref 13–17)
INR BLD: 7
MCH RBC QN AUTO: 32.2 PG (ref 25.2–33.5)
MCHC RBC AUTO-ENTMCNC: 31.7 G/DL (ref 28.4–34.8)
MCV RBC AUTO: 101.7 FL (ref 82.6–102.9)
NRBC AUTOMATED: 1.1 PER 100 WBC
PDW BLD-RTO: 17.2 % (ref 11.8–14.4)
PLATELET # BLD: 521 K/UL (ref 138–453)
PMV BLD AUTO: 8.7 FL (ref 8.1–13.5)
PROTHROMBIN TIME: 63.1 SEC (ref 9–12)
RBC # BLD: 2.42 M/UL (ref 4.21–5.77)
WBC # BLD: 9.9 K/UL (ref 3.5–11.3)

## 2019-02-19 PROCEDURE — 2580000003 HC RX 258: Performed by: NURSE PRACTITIONER

## 2019-02-19 PROCEDURE — 6370000000 HC RX 637 (ALT 250 FOR IP): Performed by: NURSE PRACTITIONER

## 2019-02-19 PROCEDURE — 85610 PROTHROMBIN TIME: CPT

## 2019-02-19 PROCEDURE — 85027 COMPLETE CBC AUTOMATED: CPT

## 2019-02-19 PROCEDURE — G0378 HOSPITAL OBSERVATION PER HR: HCPCS

## 2019-02-19 PROCEDURE — 99284 EMERGENCY DEPT VISIT MOD MDM: CPT

## 2019-02-19 RX ORDER — ONDANSETRON 4 MG/1
4 TABLET, ORALLY DISINTEGRATING ORAL EVERY 8 HOURS PRN
Status: DISCONTINUED | OUTPATIENT
Start: 2019-02-19 | End: 2019-02-20 | Stop reason: HOSPADM

## 2019-02-19 RX ORDER — ASPIRIN 81 MG/1
81 TABLET ORAL DAILY
Status: DISCONTINUED | OUTPATIENT
Start: 2019-02-20 | End: 2019-02-20 | Stop reason: HOSPADM

## 2019-02-19 RX ORDER — ACETAMINOPHEN 325 MG/1
650 TABLET ORAL EVERY 4 HOURS PRN
Status: DISCONTINUED | OUTPATIENT
Start: 2019-02-19 | End: 2019-02-20 | Stop reason: HOSPADM

## 2019-02-19 RX ORDER — HYDROCODONE BITARTRATE AND ACETAMINOPHEN 5; 325 MG/1; MG/1
1 TABLET ORAL EVERY 6 HOURS PRN
Status: DISCONTINUED | OUTPATIENT
Start: 2019-02-19 | End: 2019-02-20 | Stop reason: HOSPADM

## 2019-02-19 RX ORDER — SODIUM CHLORIDE 0.9 % (FLUSH) 0.9 %
10 SYRINGE (ML) INJECTION PRN
Status: DISCONTINUED | OUTPATIENT
Start: 2019-02-19 | End: 2019-02-20 | Stop reason: HOSPADM

## 2019-02-19 RX ORDER — LANOLIN ALCOHOL/MO/W.PET/CERES
325 CREAM (GRAM) TOPICAL
Status: DISCONTINUED | OUTPATIENT
Start: 2019-02-20 | End: 2019-02-20 | Stop reason: HOSPADM

## 2019-02-19 RX ORDER — PRAVASTATIN SODIUM 20 MG
40 TABLET ORAL NIGHTLY
Status: DISCONTINUED | OUTPATIENT
Start: 2019-02-19 | End: 2019-02-20 | Stop reason: HOSPADM

## 2019-02-19 RX ORDER — AMIODARONE HYDROCHLORIDE 200 MG/1
200 TABLET ORAL DAILY
Status: DISCONTINUED | OUTPATIENT
Start: 2019-02-20 | End: 2019-02-20 | Stop reason: HOSPADM

## 2019-02-19 RX ORDER — SODIUM CHLORIDE 0.9 % (FLUSH) 0.9 %
10 SYRINGE (ML) INJECTION EVERY 12 HOURS SCHEDULED
Status: DISCONTINUED | OUTPATIENT
Start: 2019-02-19 | End: 2019-02-20 | Stop reason: HOSPADM

## 2019-02-19 RX ORDER — PHYTONADIONE 5 MG/1
5 TABLET ORAL ONCE
Status: COMPLETED | OUTPATIENT
Start: 2019-02-19 | End: 2019-02-19

## 2019-02-19 RX ADMIN — Medication 10 ML: at 23:43

## 2019-02-19 RX ADMIN — PHYTONADIONE 5 MG: 5 TABLET ORAL at 20:01

## 2019-02-19 RX ADMIN — PRAVASTATIN SODIUM 40 MG: 20 TABLET ORAL at 23:43

## 2019-02-19 ASSESSMENT — ENCOUNTER SYMPTOMS: SHORTNESS OF BREATH: 0

## 2019-02-19 ASSESSMENT — PAIN SCALES - GENERAL: PAINLEVEL_OUTOF10: 0

## 2019-02-20 VITALS
HEIGHT: 70 IN | DIASTOLIC BLOOD PRESSURE: 63 MMHG | RESPIRATION RATE: 14 BRPM | HEART RATE: 72 BPM | OXYGEN SATURATION: 96 % | BODY MASS INDEX: 34.36 KG/M2 | WEIGHT: 240 LBS | TEMPERATURE: 98.6 F | SYSTOLIC BLOOD PRESSURE: 93 MMHG

## 2019-02-20 LAB
ABSOLUTE EOS #: 0.17 K/UL (ref 0–0.44)
ABSOLUTE IMMATURE GRANULOCYTE: 0.04 K/UL (ref 0–0.3)
ABSOLUTE LYMPH #: 1.6 K/UL (ref 1.1–3.7)
ABSOLUTE MONO #: 0.93 K/UL (ref 0.1–1.2)
ANION GAP SERPL CALCULATED.3IONS-SCNC: 16 MMOL/L (ref 9–17)
BASOPHILS # BLD: 1 % (ref 0–2)
BASOPHILS ABSOLUTE: 0.05 K/UL (ref 0–0.2)
BUN BLDV-MCNC: 29 MG/DL (ref 6–20)
BUN/CREAT BLD: ABNORMAL (ref 9–20)
CALCIUM SERPL-MCNC: 9.8 MG/DL (ref 8.6–10.4)
CHLORIDE BLD-SCNC: 93 MMOL/L (ref 98–107)
CO2: 28 MMOL/L (ref 20–31)
CREAT SERPL-MCNC: 8.15 MG/DL (ref 0.7–1.2)
DIFFERENTIAL TYPE: ABNORMAL
EOSINOPHILS RELATIVE PERCENT: 2 % (ref 1–4)
GFR AFRICAN AMERICAN: 9 ML/MIN
GFR NON-AFRICAN AMERICAN: 7 ML/MIN
GFR SERPL CREATININE-BSD FRML MDRD: ABNORMAL ML/MIN/{1.73_M2}
GFR SERPL CREATININE-BSD FRML MDRD: ABNORMAL ML/MIN/{1.73_M2}
GLUCOSE BLD-MCNC: 81 MG/DL (ref 70–99)
HCT VFR BLD CALC: 26.5 % (ref 40.7–50.3)
HEMOGLOBIN: 8.1 G/DL (ref 13–17)
IMMATURE GRANULOCYTES: 1 %
INR BLD: 4.2
INR BLD: 5.8
LYMPHOCYTES # BLD: 20 % (ref 24–43)
MCH RBC QN AUTO: 32.5 PG (ref 25.2–33.5)
MCHC RBC AUTO-ENTMCNC: 30.6 G/DL (ref 28.4–34.8)
MCV RBC AUTO: 106.4 FL (ref 82.6–102.9)
MONOCYTES # BLD: 12 % (ref 3–12)
NRBC AUTOMATED: 0.9 PER 100 WBC
PARTIAL THROMBOPLASTIN TIME: 73.8 SEC (ref 20.5–30.5)
PDW BLD-RTO: 17.8 % (ref 11.8–14.4)
PLATELET # BLD: 609 K/UL (ref 138–453)
PLATELET ESTIMATE: ABNORMAL
PMV BLD AUTO: 9 FL (ref 8.1–13.5)
POTASSIUM SERPL-SCNC: 4.3 MMOL/L (ref 3.7–5.3)
PROTHROMBIN TIME: 39.8 SEC (ref 9–12)
PROTHROMBIN TIME: 52.8 SEC (ref 9–12)
RBC # BLD: 2.49 M/UL (ref 4.21–5.77)
RBC # BLD: ABNORMAL 10*6/UL
SEG NEUTROPHILS: 64 % (ref 36–65)
SEGMENTED NEUTROPHILS ABSOLUTE COUNT: 5.18 K/UL (ref 1.5–8.1)
SODIUM BLD-SCNC: 137 MMOL/L (ref 135–144)
WBC # BLD: 8 K/UL (ref 3.5–11.3)
WBC # BLD: ABNORMAL 10*3/UL

## 2019-02-20 PROCEDURE — 85025 COMPLETE CBC W/AUTO DIFF WBC: CPT

## 2019-02-20 PROCEDURE — 2500000003 HC RX 250 WO HCPCS: Performed by: EMERGENCY MEDICINE

## 2019-02-20 PROCEDURE — 36415 COLL VENOUS BLD VENIPUNCTURE: CPT

## 2019-02-20 PROCEDURE — 2580000003 HC RX 258: Performed by: NURSE PRACTITIONER

## 2019-02-20 PROCEDURE — 80048 BASIC METABOLIC PNL TOTAL CA: CPT

## 2019-02-20 PROCEDURE — 85610 PROTHROMBIN TIME: CPT

## 2019-02-20 PROCEDURE — G0378 HOSPITAL OBSERVATION PER HR: HCPCS

## 2019-02-20 PROCEDURE — 85730 THROMBOPLASTIN TIME PARTIAL: CPT

## 2019-02-20 PROCEDURE — 6370000000 HC RX 637 (ALT 250 FOR IP): Performed by: NURSE PRACTITIONER

## 2019-02-20 RX ADMIN — Medication 10 ML: at 08:34

## 2019-02-20 RX ADMIN — FERROUS SULFATE TAB EC 325 MG (65 MG FE EQUIVALENT) 325 MG: 325 (65 FE) TABLET DELAYED RESPONSE at 11:49

## 2019-02-20 RX ADMIN — THROMBIN, TOPICAL (BOVINE): KIT at 11:50

## 2019-02-20 RX ADMIN — AMIODARONE HYDROCHLORIDE 200 MG: 200 TABLET ORAL at 08:33

## 2019-02-20 RX ADMIN — FERROUS SULFATE TAB EC 325 MG (65 MG FE EQUIVALENT) 325 MG: 325 (65 FE) TABLET DELAYED RESPONSE at 08:33

## 2019-02-20 ASSESSMENT — PAIN SCALES - GENERAL: PAINLEVEL_OUTOF10: 0

## 2019-02-22 ENCOUNTER — HOSPITAL ENCOUNTER (OUTPATIENT)
Dept: PHARMACY | Age: 49
Setting detail: THERAPIES SERIES
Discharge: HOME | End: 2019-02-22
Payer: COMMERCIAL

## 2019-02-22 DIAGNOSIS — I82.409 DEEP VEIN THROMBOSIS (DVT) OF LOWER EXTREMITY, UNSPECIFIED CHRONICITY, UNSPECIFIED LATERALITY, UNSPECIFIED VEIN (HCC): ICD-10-CM

## 2019-02-22 LAB
INR BLD: 2.3
PROTIME: 27.7 SECONDS

## 2019-02-22 PROCEDURE — 99211 OFF/OP EST MAY X REQ PHY/QHP: CPT

## 2019-02-22 PROCEDURE — 85610 PROTHROMBIN TIME: CPT

## 2019-02-27 ENCOUNTER — HOSPITAL ENCOUNTER (OUTPATIENT)
Dept: PHARMACY | Age: 49
Setting detail: THERAPIES SERIES
Discharge: HOME | End: 2019-02-27
Payer: COMMERCIAL

## 2019-02-27 DIAGNOSIS — I82.409 DEEP VEIN THROMBOSIS (DVT) OF LOWER EXTREMITY, UNSPECIFIED CHRONICITY, UNSPECIFIED LATERALITY, UNSPECIFIED VEIN (HCC): ICD-10-CM

## 2019-02-27 LAB
INR BLD: 2.6
PROTIME: 31.3 SECONDS

## 2019-02-27 PROCEDURE — 99212 OFFICE O/P EST SF 10 MIN: CPT

## 2019-02-27 PROCEDURE — 85610 PROTHROMBIN TIME: CPT

## 2019-02-27 RX ORDER — WARFARIN SODIUM 2 MG/1
TABLET ORAL
Qty: 35 TABLET | Refills: 2 | Status: SHIPPED | OUTPATIENT
Start: 2019-02-27 | End: 2019-05-14 | Stop reason: SDUPTHER

## 2019-03-06 ENCOUNTER — HOSPITAL ENCOUNTER (OUTPATIENT)
Dept: PHARMACY | Age: 49
Setting detail: THERAPIES SERIES
Discharge: HOME | End: 2019-03-06
Payer: COMMERCIAL

## 2019-03-06 DIAGNOSIS — I82.409 DEEP VEIN THROMBOSIS (DVT) OF LOWER EXTREMITY, UNSPECIFIED CHRONICITY, UNSPECIFIED LATERALITY, UNSPECIFIED VEIN (HCC): ICD-10-CM

## 2019-03-06 LAB
INR BLD: 2.5
PROTIME: 30.2 SECONDS

## 2019-03-06 PROCEDURE — 99211 OFF/OP EST MAY X REQ PHY/QHP: CPT

## 2019-03-06 PROCEDURE — 85610 PROTHROMBIN TIME: CPT

## 2019-03-20 ENCOUNTER — HOSPITAL ENCOUNTER (OUTPATIENT)
Dept: PHARMACY | Age: 49
Setting detail: THERAPIES SERIES
Discharge: HOME | End: 2019-03-20
Payer: COMMERCIAL

## 2019-03-20 DIAGNOSIS — I82.409 DEEP VEIN THROMBOSIS (DVT) OF LOWER EXTREMITY, UNSPECIFIED CHRONICITY, UNSPECIFIED LATERALITY, UNSPECIFIED VEIN (HCC): ICD-10-CM

## 2019-03-20 LAB
INR BLD: 1.8
PROTIME: 21.4 SECONDS

## 2019-03-20 PROCEDURE — 99212 OFFICE O/P EST SF 10 MIN: CPT

## 2019-03-20 PROCEDURE — 85610 PROTHROMBIN TIME: CPT

## 2019-03-27 DIAGNOSIS — I48.20 CHRONIC ATRIAL FIBRILLATION (HCC): ICD-10-CM

## 2019-03-28 RX ORDER — ASPIRIN 81 MG
TABLET, DELAYED RELEASE (ENTERIC COATED) ORAL
Qty: 90 TABLET | Refills: 1 | Status: SHIPPED | OUTPATIENT
Start: 2019-03-28

## 2019-04-01 ENCOUNTER — OFFICE VISIT (OUTPATIENT)
Dept: INTERNAL MEDICINE CLINIC | Age: 49
End: 2019-04-01
Payer: COMMERCIAL

## 2019-04-01 VITALS
WEIGHT: 246 LBS | RESPIRATION RATE: 20 BRPM | OXYGEN SATURATION: 98 % | DIASTOLIC BLOOD PRESSURE: 80 MMHG | HEART RATE: 82 BPM | BODY MASS INDEX: 35.22 KG/M2 | HEIGHT: 70 IN | SYSTOLIC BLOOD PRESSURE: 110 MMHG

## 2019-04-01 DIAGNOSIS — Z99.2 ESRD ON HEMODIALYSIS (HCC): ICD-10-CM

## 2019-04-01 DIAGNOSIS — E55.9 VITAMIN D DEFICIENCY: ICD-10-CM

## 2019-04-01 DIAGNOSIS — D68.32 WARFARIN-INDUCED COAGULOPATHY (HCC): ICD-10-CM

## 2019-04-01 DIAGNOSIS — T45.515A WARFARIN-INDUCED COAGULOPATHY (HCC): ICD-10-CM

## 2019-04-01 DIAGNOSIS — I50.812 CHRONIC RIGHT-SIDED HEART FAILURE (HCC): ICD-10-CM

## 2019-04-01 DIAGNOSIS — Z98.84 S/P LAPAROSCOPIC SLEEVE GASTRECTOMY: ICD-10-CM

## 2019-04-01 DIAGNOSIS — I73.9 PERIPHERAL VASCULAR DISEASE (HCC): ICD-10-CM

## 2019-04-01 DIAGNOSIS — Z90.49 STATUS POST PARTIAL COLECTOMY: ICD-10-CM

## 2019-04-01 DIAGNOSIS — I48.20 CHRONIC ATRIAL FIBRILLATION (HCC): Primary | ICD-10-CM

## 2019-04-01 DIAGNOSIS — N52.01 ERECTILE DYSFUNCTION DUE TO ARTERIAL INSUFFICIENCY: ICD-10-CM

## 2019-04-01 DIAGNOSIS — N40.1 BENIGN PROSTATIC HYPERPLASIA WITH LOWER URINARY TRACT SYMPTOMS, SYMPTOM DETAILS UNSPECIFIED: ICD-10-CM

## 2019-04-01 DIAGNOSIS — I95.89 CHRONIC HYPOTENSION: ICD-10-CM

## 2019-04-01 DIAGNOSIS — C18.4 MALIGNANT NEOPLASM OF TRANSVERSE COLON (HCC): ICD-10-CM

## 2019-04-01 DIAGNOSIS — I89.0 LYMPHEDEMA: ICD-10-CM

## 2019-04-01 DIAGNOSIS — N18.6 ESRD ON HEMODIALYSIS (HCC): ICD-10-CM

## 2019-04-01 PROBLEM — T82.9XXA COMPLICATION OF AV DIALYSIS FISTULA, INITIAL ENCOUNTER: Status: RESOLVED | Noted: 2019-02-19 | Resolved: 2019-04-01

## 2019-04-01 PROBLEM — R91.8 LUNG INFILTRATE: Status: RESOLVED | Noted: 2019-01-31 | Resolved: 2019-04-01

## 2019-04-01 PROCEDURE — 3017F COLORECTAL CA SCREEN DOC REV: CPT | Performed by: FAMILY MEDICINE

## 2019-04-01 PROCEDURE — 99214 OFFICE O/P EST MOD 30 MIN: CPT | Performed by: FAMILY MEDICINE

## 2019-04-01 PROCEDURE — G8417 CALC BMI ABV UP PARAM F/U: HCPCS | Performed by: FAMILY MEDICINE

## 2019-04-01 PROCEDURE — 1036F TOBACCO NON-USER: CPT | Performed by: FAMILY MEDICINE

## 2019-04-01 PROCEDURE — G8598 ASA/ANTIPLAT THER USED: HCPCS | Performed by: FAMILY MEDICINE

## 2019-04-01 PROCEDURE — G8427 DOCREV CUR MEDS BY ELIG CLIN: HCPCS | Performed by: FAMILY MEDICINE

## 2019-04-01 ASSESSMENT — PATIENT HEALTH QUESTIONNAIRE - PHQ9
1. LITTLE INTEREST OR PLEASURE IN DOING THINGS: 0
SUM OF ALL RESPONSES TO PHQ QUESTIONS 1-9: 0
SUM OF ALL RESPONSES TO PHQ9 QUESTIONS 1 & 2: 0
2. FEELING DOWN, DEPRESSED OR HOPELESS: 0
SUM OF ALL RESPONSES TO PHQ QUESTIONS 1-9: 0

## 2019-04-01 ASSESSMENT — ENCOUNTER SYMPTOMS
BACK PAIN: 1
EYES NEGATIVE: 1
GASTROINTESTINAL NEGATIVE: 1
RESPIRATORY NEGATIVE: 1
ALLERGIC/IMMUNOLOGIC NEGATIVE: 1

## 2019-04-01 NOTE — PROGRESS NOTES
Subjective:      Patient ID: Doris Birmingham is a 46 y.o. male. Hypertension   This is a chronic problem. The current episode started more than 1 month ago. The problem has been gradually improving since onset. The problem is controlled. Associated symptoms include anxiety. Risk factors for coronary artery disease include sedentary lifestyle, male gender, obesity and dyslipidemia. Treatments tried: Amiodarone. The current treatment provides moderate improvement. There are no compliance problems. Hypertensive end-organ damage includes kidney disease and heart failure. Identifiable causes of hypertension include chronic renal disease. Review of Systems   Constitutional: Negative. HENT: Negative. Eyes: Negative. Respiratory: Negative. Cardiovascular: Negative. Gastrointestinal: Negative. Endocrine: Negative. Musculoskeletal: Positive for arthralgias and back pain. Skin: Negative. Allergic/Immunologic: Negative. Neurological: Negative. Hematological: Negative. Psychiatric/Behavioral: Negative. Past family and social history unremarkable. Objective:   Physical Exam   Constitutional: He is oriented to person, place, and time. He appears well-developed and well-nourished. Obesity. Status post sleeve gastrectomy with loss of 75 pound of weight   HENT:   Head: Normocephalic and atraumatic. Right Ear: External ear normal.   Left Ear: External ear normal.   Nose: Nose normal.   Mouth/Throat: Oropharynx is clear and moist.   Eyes: Pupils are equal, round, and reactive to light. Conjunctivae and EOM are normal.   Neck: Normal range of motion. Neck supple. Cardiovascular: Normal rate, regular rhythm, normal heart sounds and intact distal pulses. Pulmonary/Chest: Effort normal and breath sounds normal.   Abdominal: Soft. Bowel sounds are normal.   Musculoskeletal: Normal range of motion.    Muscle skeletal pain  Positive bruit right upper extremity AV fistula asymptomatic  Secondary hyperparathyroidism with underlying end-stage renal disease. Continue phosphate binder  Hyperlipidemia on statin that he is tolerating well  Low vitamin D. Continue replacement  Med list reviewed advised to continue  Further recommendations to follow  Available labs reviewed, discussed with patient, question answered  This note is created with a voice recognition program and while intend to generate a document that accurately reflects the content of the visit, no guarantee can be provided that every mistake has been identified and corrected by editing.                   Nicki Ruvalcaba MD

## 2019-04-01 NOTE — PROGRESS NOTES
Chronic Disease Visit Information    BP Readings from Last 3 Encounters:   02/20/19 93/63   02/01/19 (!) 110/58   01/28/19 (!) 98/54          Hemoglobin A1C (%)   Date Value   09/25/2018 4.4   09/07/2016 4.9   12/13/2013 5.2     Microalb/Crt. Ratio (mcg/mg creat)   Date Value   10/04/2011 591     LDL Cholesterol (mg/dL)   Date Value   09/25/2018 165 (H)   09/25/2018 165 (H)     HDL (mg/dL)   Date Value   09/25/2018 90   09/25/2018 90     BUN (mg/dL)   Date Value   02/20/2019 29 (H)     CREATININE (mg/dL)   Date Value   02/20/2019 8.15 (HH)     Glucose (mg/dL)   Date Value   02/20/2019 81   10/04/2011 91            Have you changed or started any medications since your last visit including any over-the-counter medicines, vitamins, or herbal medicines? no   Are you having any side effects from any of your medications? -  no  Have you stopped taking any of your medications? Is so, why? -  no    Have you seen any other physician or provider since your last visit? No  Have you had any other diagnostic tests since your last visit? No  Have you been seen in the emergency room and/or had an admission to a hospital since we last saw you? No  Have you had your annual diabetic retinal (eye) exam? No  Have you had your routine dental cleaning in the past 6 months? no    Have you activated your [employer] account? If not, what are your barriers?  Yes     Patient Care Team:  Martyn Cowden, MD as PCP - General (Family Medicine)  Martyn Cowden, MD as PCP - S Attributed Provider  Hubert Thompson  Coumadin [facility] as Pharmacist  Karl Spears MD as Consulting Physician (Urology)  Balbina García MD as Consulting Physician (Nephrology)  Danis Holley MD as Consulting Physician (Cardiology)         Medical History Review  Past Medical, Family, and Social History reviewed and does not contribute to the patient presenting condition    Health Maintenance   Topic Date Due    Diabetic retinal exam  06/24/1978    Hepatitis B Vaccine (1 of 3 - Risk

## 2019-04-03 ENCOUNTER — HOSPITAL ENCOUNTER (OUTPATIENT)
Dept: PHARMACY | Age: 49
Setting detail: THERAPIES SERIES
Discharge: HOME | End: 2019-04-03
Payer: COMMERCIAL

## 2019-04-03 DIAGNOSIS — I82.409 DEEP VEIN THROMBOSIS (DVT) OF LOWER EXTREMITY, UNSPECIFIED CHRONICITY, UNSPECIFIED LATERALITY, UNSPECIFIED VEIN (HCC): ICD-10-CM

## 2019-04-03 LAB
INR BLD: 1.7
PROTIME: 20.7 SECONDS

## 2019-04-03 PROCEDURE — 99212 OFFICE O/P EST SF 10 MIN: CPT

## 2019-04-03 PROCEDURE — 85610 PROTHROMBIN TIME: CPT

## 2019-04-03 NOTE — PROGRESS NOTES
Medication Management Service, Warfarin Management  RAUL GARCIA [facility], [phone]  Visit Date: 4/3/2019   Subjective:    Vitaliy Cook is a 46 y.o. male who presents to clinic today for anticoagulation monitoring and adjustment. Patient seen in clinic for warfarin management due to  Indication:   atrial fibrillation. INR goal: of 2.0-3.0. Duration of therapy: indefinite. Patient reports the following:   Adherent with regimen  Missed or extra doses:  None   Bleeding or thromboembolic side effects:  None  Significant medication, dietary, alcohol, or tobacco changes:  None  Significant recent illness, disease state changes, or hospitalization:  None  Upcoming surgeries or procedures:  None           Assessment and PLAN   PT/INR done in office per protocol. INR today is 1.7, subtherapeutic. Patient denies usual causes for low INR, interestingly enough patient has had a decreased appetite and intake so would have expected a higher INR. Plan:  Patient reports appetite improving and in the past he was on much higher weekly doses. INR has been low for two consecutive visits, will boost dose today to 4mg, then increase maintenance regimen by 6% to 3mg on Mon, Wed, Fri only and 2mg all other days of the week. Using warfarin 2 mg tablets. Recheck INR in 2.5 week(s). Patient seen in room # 3. Patient verbalized understanding of dosing directions and information discussed. Dosing schedule given to patient. Progress note sent to referring office. Patient acknowledges working in consult agreement with pharmacist as referred by his/her physician.       Electronically signed by Eleanor Yanes RPh, ARSENIO  Clinical Pharmacist Medication Management  4/3/2019  9:12 AM

## 2019-04-22 ENCOUNTER — HOSPITAL ENCOUNTER (OUTPATIENT)
Dept: PHARMACY | Age: 49
Setting detail: THERAPIES SERIES
Discharge: HOME | End: 2019-04-22
Payer: COMMERCIAL

## 2019-04-22 DIAGNOSIS — I82.409 DEEP VEIN THROMBOSIS (DVT) OF LOWER EXTREMITY, UNSPECIFIED CHRONICITY, UNSPECIFIED LATERALITY, UNSPECIFIED VEIN (HCC): ICD-10-CM

## 2019-04-22 LAB
INR BLD: 1.6
PROTIME: 18.9 SECONDS

## 2019-04-22 PROCEDURE — 85610 PROTHROMBIN TIME: CPT

## 2019-04-22 PROCEDURE — 99212 OFFICE O/P EST SF 10 MIN: CPT

## 2019-04-22 NOTE — PROGRESS NOTES
Medication Management Service, Warfarin Management  RAUL GARCIA [facility], [phone]  Visit Date: 4/22/2019   Subjective:    Melissa Monroy is a 46 y.o. male who presents to clinic today for anticoagulation monitoring and adjustment. Patient seen in clinic for warfarin management due to  Indication:   atrial fibrillation. INR goal: of 2.0-3.0. Duration of therapy: indefinite. Patient reports the following:   Adherent with regimen  Missed or extra doses:  None   Bleeding or thromboembolic side effects:  None  Significant medication, dietary, alcohol, or tobacco changes: Patient reports his diet is still steadily getting back to normal.  Significant recent illness, disease state changes, or hospitalization:  None  Upcoming surgeries or procedures:  None           Assessment and PLAN   PT/INR done in office per protocol. INR today is 1.6, subtherapeutic. Patient denies usual causes for low INR. Patient was on 32.5 mg of warfarin weekly prior to surgery and stable. He has been slowly regaining his strength and appetite which I suspect has been having a significant impact on his warfarin needs. Plan:  INR has been low for three consecutive visits. Will be aggressive with dosing and recheck in one week to re adjust if needed. New regimen will be 3 mg Sunday, Tuesday, Thursday and 4 mg all other days to = 25 mg of warfarin weekly. Using warfarin 2 mg tablets. Recheck INR in 1 week(s). Patient seen in room #23. Patient verbalized understanding of dosing directions and information discussed. Dosing schedule given to patient. Progress note sent to referring office. Patient acknowledges working in consult agreement with pharmacist as referred by his/her physician. [address] [place] [address]  Ph., CACP, Clinical Pharmacist  Anticoagulation Services, [facility] Coumadin Clinic  4/22/2019  8:57 AM

## 2019-04-29 ENCOUNTER — HOSPITAL ENCOUNTER (OUTPATIENT)
Dept: PHARMACY | Age: 49
Setting detail: THERAPIES SERIES
Discharge: HOME | End: 2019-04-29
Payer: COMMERCIAL

## 2019-04-29 DIAGNOSIS — I82.409 DEEP VEIN THROMBOSIS (DVT) OF LOWER EXTREMITY, UNSPECIFIED CHRONICITY, UNSPECIFIED LATERALITY, UNSPECIFIED VEIN (HCC): ICD-10-CM

## 2019-04-29 LAB
INR BLD: 2.1
PROTIME: 25.3 SECONDS

## 2019-04-29 PROCEDURE — 99211 OFF/OP EST MAY X REQ PHY/QHP: CPT

## 2019-04-29 PROCEDURE — 85610 PROTHROMBIN TIME: CPT

## 2019-04-29 NOTE — PROGRESS NOTES
Medication Management Service, Warfarin Management  [address], [phone]  Visit Date: 4/29/2019   Subjective:    Atiya Silver is a 46 y.o. male who presents to clinic today for anticoagulation monitoring and adjustment. Patient seen in clinic for warfarin management due to  Indication:   atrial fibrillation. INR goal: of 2.0-3.0. Duration of therapy: indefinite. Patient reports the following:   Adherent with regimen  Missed or extra doses:  None   Bleeding or thromboembolic side effects:  None  Significant medication, dietary, alcohol, or tobacco changes: Patient reports his diet is still not 100% but, continues to improve. .  Significant recent illness, disease state changes, or hospitalization:  None  Upcoming surgeries or procedures:  None           Assessment and PLAN   PT/INR done in office per protocol. INR today is 2.1, therapeutic. Plan:  Patient is at the lower end of his target and his diet is still improving which may cause the INR to go down further. In light of this, I will increase the regimen by 4 % weekly to 3 mg Sunday, Thursday and 4 mg all other days to = 26 mg of warfarin weekly. Using warfarin 2 mg tablets. Recheck INR in 2 week(s). Patient seen in room #2. Patient verbalized understanding of dosing directions and information discussed. Dosing schedule given to patient. Progress note sent to referring office. Patient acknowledges working in consult agreement with pharmacist as referred by his/her physician. [address] [place] [address]  Ph., CACP, Clinical Pharmacist  Anticoagulation Services, [facility] Coumadin Clinic  4/29/2019  8:37 AM

## 2019-05-04 RX ORDER — MIDODRINE HYDROCHLORIDE 10 MG/1
TABLET ORAL
Qty: 270 TABLET | Refills: 0 | Status: SHIPPED | OUTPATIENT
Start: 2019-05-04 | End: 2019-10-09 | Stop reason: SDUPTHER

## 2019-05-09 RX ORDER — ERGOCALCIFEROL 1.25 MG/1
CAPSULE ORAL
Qty: 1 CAPSULE | Refills: 5 | Status: SHIPPED | OUTPATIENT
Start: 2019-05-09 | End: 2019-12-31

## 2019-05-09 NOTE — TELEPHONE ENCOUNTER
Last visit: 4/1/19      Next Visit Date:  Future Appointments   Date Time Provider Jonathan Castro   5/13/2019  8:40 AM [facility]   8/5/2019  8:30 AM Arielle Craft MD [facility] [address] Maintenance   Topic Date Due    Shingles Vaccine (1 of 2) 01/31/2020 (Originally 6/24/2018)    Hepatitis B Vaccine (1 of 3 - Risk Recombivax 3-dose series) 04/01/2020 (Originally 6/24/1987)    Diabetic retinal exam  04/10/2020 (Originally 6/24/1978)    DTaP/Tdap/Td vaccine (1 - Tdap) 04/16/2020 (Originally 6/24/1987)    Pneumococcal 0-64 years Vaccine (2 of 3 - PPSV23) 04/30/2020 (Originally 5/14/2018)    A1C test (Diabetic or Prediabetic)  09/25/2019    Lipid screen  09/25/2019    TSH testing  09/25/2019    Diabetic foot exam  01/02/2020    Colon cancer screen colonoscopy  01/22/2029    Flu vaccine  Completed    HIV screen  Completed    HPV vaccine  Aged Out       Hemoglobin A1C (%)   Date Value   09/25/2018 4.4   09/07/2016 4.9   12/13/2013 5.2             ( goal A1C is < 7)   Microalb/Crt.  Ratio (mcg/mg creat)   Date Value   10/04/2011 591     LDL Cholesterol (mg/dL)   Date Value   09/25/2018 165 (H)   09/25/2018 165 (H)       (goal LDL is <100)   AST (U/L)   Date Value   01/31/2019 26     ALT (U/L)   Date Value   01/31/2019 21     BUN (mg/dL)   Date Value   02/20/2019 29 (H)     BP Readings from Last 3 Encounters:   04/01/19 110/80   02/20/19 93/63   02/01/19 (!) 110/58          (goal 120/80)    All Future Testing planned in [facility]  Lab Frequency Next Occurrence               Patient Active Problem List:     Obesity, Class II, BMI 35-39.9, with comorbidity     Benign prostatic hyperplasia with lower urinary tract symptoms     ESRD on hemodialysis (HCC)     Erectile dysfunction due to arterial insufficiency     Chronic hypotension     Chronic atrial fibrillation (HCC)     Warfarin-induced coagulopathy (HCC)     DVT (deep venous thrombosis) (HCC)     S/P laparoscopic sleeve gastrectomy     Vitamin D deficiency     Angina pectoris (HCC)     Lymphedema     Right heart failure ([address]     Peripheral vascular disease (HCC)     Malignant neoplasm of transverse colon (HCC)     Status post partial colectomy

## 2019-05-13 ENCOUNTER — HOSPITAL ENCOUNTER (OUTPATIENT)
Dept: PHARMACY | Age: 49
Setting detail: THERAPIES SERIES
Discharge: HOME | End: 2019-05-13
Payer: COMMERCIAL

## 2019-05-13 DIAGNOSIS — I82.409 DEEP VEIN THROMBOSIS (DVT) OF LOWER EXTREMITY, UNSPECIFIED CHRONICITY, UNSPECIFIED LATERALITY, UNSPECIFIED VEIN (HCC): ICD-10-CM

## 2019-05-13 LAB
INR BLD: 2.3
PROTIME: 27.2 SECONDS

## 2019-05-13 PROCEDURE — 99211 OFF/OP EST MAY X REQ PHY/QHP: CPT

## 2019-05-13 PROCEDURE — 85610 PROTHROMBIN TIME: CPT

## 2019-05-13 NOTE — PROGRESS NOTES
Medication Management Service, Warfarin Management  RAUL GARCIA [facility], [phone]  Visit Date: 5/13/2019   Subjective:    Diana Marc is a 46 y.o. male who presents to clinic today for anticoagulation monitoring and adjustment. Patient seen in clinic for warfarin management due to  Indication:   atrial fibrillation. INR goal: of 2.0-3.0. Duration of therapy: indefinite. Patient reports the following:   Adherent with regimen  Missed or extra doses:  None   Bleeding or thromboembolic side effects:  None  Significant medication, dietary, alcohol, or tobacco changes:None  Significant recent illness, disease state changes, or hospitalization:  None  Upcoming surgeries or procedures:  None           Assessment and PLAN   PT/INR done in office per protocol. INR today is 2.3, therapeutic. Plan:  Continue current regimen of warfarin 3 mg Sunday, Thursday and 4 mg all other days. Using warfarin 2 mg tablets. Recheck INR in 3 week(s). Patient seen in room #2. Patient verbalized understanding of dosing directions and information discussed. Dosing schedule given to patient. Progress note sent to referring office. Patient acknowledges working in consult agreement with pharmacist as referred by his/her physician. [address] [place] [address]  Ph., CACP, Clinical Pharmacist  Anticoagulation Services, [address] [place] [address] Coumadin Clinic  5/13/2019  8:55 AM

## 2019-05-14 NOTE — TELEPHONE ENCOUNTER
Last visit: 4/1/19      Next Visit Date:  Future Appointments   Date Time Provider Jonathan Castro   6/3/2019  8:40 AM [facility]ICATION MGMT [facility]   8/5/2019  8:30 AM Yuan Bello MD [facility] [address] Maintenance   Topic Date Due    Shingles Vaccine (1 of 2) 01/31/2020 (Originally 6/24/2018)    Hepatitis B Vaccine (1 of 3 - Risk Recombivax 3-dose series) 04/01/2020 (Originally 6/24/1987)    Diabetic retinal exam  04/10/2020 (Originally 6/24/1978)    DTaP/Tdap/Td vaccine (1 - Tdap) 04/16/2020 (Originally 6/24/1987)    Pneumococcal 0-64 years Vaccine (2 of 3 - PPSV23) 04/30/2020 (Originally 5/14/2018)    A1C test (Diabetic or Prediabetic)  09/25/2019    Lipid screen  09/25/2019    TSH testing  09/25/2019    Diabetic foot exam  01/02/2020    Colon cancer screen colonoscopy  01/22/2029    Flu vaccine  Completed    HIV screen  Completed    HPV vaccine  Aged Out       Hemoglobin A1C (%)   Date Value   09/25/2018 4.4   09/07/2016 4.9   12/13/2013 5.2             ( goal A1C is < 7)   Microalb/Crt.  Ratio (mcg/mg creat)   Date Value   10/04/2011 591     LDL Cholesterol (mg/dL)   Date Value   09/25/2018 165 (H)   09/25/2018 165 (H)       (goal LDL is <100)   AST (U/L)   Date Value   01/31/2019 26     ALT (U/L)   Date Value   01/31/2019 21     BUN (mg/dL)   Date Value   02/20/2019 29 (H)     BP Readings from Last 3 Encounters:   04/01/19 110/80   02/20/19 93/63   02/01/19 (!) 110/58          (goal 120/80)    All Future Testing planned in [facility]  Lab Frequency Next Occurrence               Patient Active Problem List:     Obesity, Class II, BMI 35-39.9, with comorbidity     Benign prostatic hyperplasia with lower urinary tract symptoms     ESRD on hemodialysis (HCC)     Erectile dysfunction due to arterial insufficiency     Chronic hypotension     Chronic atrial fibrillation (HCC)     Warfarin-induced coagulopathy (HCC)     DVT (deep venous thrombosis) (HCC)     S/P laparoscopic sleeve gastrectomy     Vitamin D deficiency     Angina pectoris (HCC)     Lymphedema     Right heart failure ([address]     Peripheral vascular disease (HCC)     Malignant neoplasm of transverse colon (HCC)     Status post partial colectomy

## 2019-05-15 RX ORDER — WARFARIN SODIUM 2 MG/1
TABLET ORAL
Qty: 90 TABLET | Refills: 2 | Status: SHIPPED | OUTPATIENT
Start: 2019-05-15 | End: 2019-10-21 | Stop reason: SDUPTHER

## 2019-06-03 ENCOUNTER — HOSPITAL ENCOUNTER (OUTPATIENT)
Dept: PHARMACY | Age: 49
Setting detail: THERAPIES SERIES
Discharge: HOME | End: 2019-06-03
Payer: COMMERCIAL

## 2019-06-03 DIAGNOSIS — I82.409 DEEP VEIN THROMBOSIS (DVT) OF LOWER EXTREMITY, UNSPECIFIED CHRONICITY, UNSPECIFIED LATERALITY, UNSPECIFIED VEIN (HCC): ICD-10-CM

## 2019-06-03 LAB
INR BLD: 2.1
PROTIME: 25.6 SECONDS

## 2019-06-03 PROCEDURE — 99211 OFF/OP EST MAY X REQ PHY/QHP: CPT

## 2019-06-03 PROCEDURE — 85610 PROTHROMBIN TIME: CPT

## 2019-06-03 NOTE — PROGRESS NOTES
Medication Management Service, Warfarin Management  RAUL GARCIA [facility], [phone]  Visit Date: 6/3/2019   Subjective:    Kendal Gallegos is a 46 y.o. male who presents to clinic today for anticoagulation monitoring and adjustment. Patient seen in clinic for warfarin management due to  Indication:   atrial fibrillation. INR goal: of 2.0-3.0. Duration of therapy: indefinite. Patient reports the following:   Adherent with regimen  Missed or extra doses:  None   Bleeding or thromboembolic side effects:  None  Significant medication, dietary, alcohol, or tobacco changes:  None  Significant recent illness, disease state changes, or hospitalization:  None  Upcoming surgeries or procedures:  None           Assessment and PLAN   PT/INR done in office per protocol. INR today is 2.1,  therapeutic. Plan: Will continue current regimen of warfarin 3mg on Sunday, Thursday only and 4mg all other days of the week. Using warfarin 2 mg tablets. Recheck INR in 3 week(s). Patient seen in room # 3. Patient verbalized understanding of dosing directions and information discussed. Dosing schedule given to patient. Progress note sent to referring office. Patient acknowledges working in consult agreement with pharmacist as referred by his/her physician.       Electronically signed by Mady Loaiza RPh, ARSENIO  Clinical Pharmacist Medication Management  6/3/2019  9:50 AM

## 2019-06-24 ENCOUNTER — HOSPITAL ENCOUNTER (OUTPATIENT)
Dept: PHARMACY | Age: 49
Setting detail: THERAPIES SERIES
Discharge: HOME | End: 2019-06-24
Payer: COMMERCIAL

## 2019-06-24 DIAGNOSIS — I82.409 DEEP VEIN THROMBOSIS (DVT) OF LOWER EXTREMITY, UNSPECIFIED CHRONICITY, UNSPECIFIED LATERALITY, UNSPECIFIED VEIN (HCC): ICD-10-CM

## 2019-06-24 LAB
INR BLD: 2.3
PROTIME: 27.7 SECONDS

## 2019-06-24 PROCEDURE — 85610 PROTHROMBIN TIME: CPT

## 2019-06-24 PROCEDURE — 99211 OFF/OP EST MAY X REQ PHY/QHP: CPT

## 2019-06-24 NOTE — PROGRESS NOTES
Medication Management Service, Warfarin Management  RAUL GARCIA [facility], [phone]  Visit Date: 6/24/2019   Subjective:    Crystal Lorenzana is a 44 y.o. male who presents to clinic today for anticoagulation monitoring and adjustment. Patient seen in clinic for warfarin management due to  Indication:   atrial fibrillation and DVT. INR goal: of 2.0-3.0. Duration of therapy: indefinite. Patient reports the following:   Adherent with regimen  Missed or extra doses:  None   Bleeding or thromboembolic side effects:  None  Significant medication, dietary, alcohol, or tobacco changes: The patient admits to having 2 shots of alcohol last night, and he normally doesn't drink. Significant recent illness, disease state changes, or hospitalization:  None  Upcoming surgeries or procedures:  None           Assessment and PLAN   PT/INR done in office per protocol. INR today is 2.3, therapeutic, up from 2.1 on 6/3/19. Increase likely due to alcohol consumption. Plan: Will continue current regimen of warfarin 3 mg every Sunday and Thursday, and 4 mg all other days. Using warfarin 2 mg tablets. Recheck INR in 4 week(s). Patient seen in room # 1. Patient verbalized understanding of dosing directions and information discussed. Dosing schedule given to patient. Progress note sent to referring office. Patient acknowledges working in consult agreement with pharmacist as referred by his/her physician.       Electronically signed by Jessie Doss on 6/24/19 at 8:47 AM

## 2019-07-22 ENCOUNTER — HOSPITAL ENCOUNTER (OUTPATIENT)
Dept: PHARMACY | Age: 49
Setting detail: THERAPIES SERIES
Discharge: HOME | End: 2019-07-22
Payer: COMMERCIAL

## 2019-07-22 DIAGNOSIS — I82.409 DEEP VEIN THROMBOSIS (DVT) OF LOWER EXTREMITY, UNSPECIFIED CHRONICITY, UNSPECIFIED LATERALITY, UNSPECIFIED VEIN (HCC): ICD-10-CM

## 2019-07-22 LAB
INR BLD: 1.5
PROTIME: 17.6 SECONDS

## 2019-07-22 PROCEDURE — 99212 OFFICE O/P EST SF 10 MIN: CPT

## 2019-07-22 PROCEDURE — 85610 PROTHROMBIN TIME: CPT

## 2019-08-05 ENCOUNTER — OFFICE VISIT (OUTPATIENT)
Dept: INTERNAL MEDICINE CLINIC | Age: 49
End: 2019-08-05
Payer: COMMERCIAL

## 2019-08-05 VITALS
DIASTOLIC BLOOD PRESSURE: 86 MMHG | HEART RATE: 69 BPM | WEIGHT: 251.2 LBS | HEIGHT: 70 IN | BODY MASS INDEX: 35.96 KG/M2 | SYSTOLIC BLOOD PRESSURE: 128 MMHG | OXYGEN SATURATION: 99 %

## 2019-08-05 DIAGNOSIS — I73.9 PERIPHERAL VASCULAR DISEASE (HCC): ICD-10-CM

## 2019-08-05 DIAGNOSIS — T45.515A WARFARIN-INDUCED COAGULOPATHY (HCC): ICD-10-CM

## 2019-08-05 DIAGNOSIS — E55.9 VITAMIN D DEFICIENCY: ICD-10-CM

## 2019-08-05 DIAGNOSIS — C18.4 MALIGNANT NEOPLASM OF TRANSVERSE COLON (HCC): ICD-10-CM

## 2019-08-05 DIAGNOSIS — N40.1 BENIGN PROSTATIC HYPERPLASIA WITH LOWER URINARY TRACT SYMPTOMS, SYMPTOM DETAILS UNSPECIFIED: Primary | ICD-10-CM

## 2019-08-05 DIAGNOSIS — D68.32 WARFARIN-INDUCED COAGULOPATHY (HCC): ICD-10-CM

## 2019-08-05 DIAGNOSIS — N52.01 ERECTILE DYSFUNCTION DUE TO ARTERIAL INSUFFICIENCY: ICD-10-CM

## 2019-08-05 DIAGNOSIS — I82.91 CHRONIC DEEP VEIN THROMBOSIS (DVT) OF NON-EXTREMITY VEIN: ICD-10-CM

## 2019-08-05 DIAGNOSIS — Z98.84 S/P LAPAROSCOPIC SLEEVE GASTRECTOMY: ICD-10-CM

## 2019-08-05 DIAGNOSIS — Z90.49 STATUS POST PARTIAL COLECTOMY: ICD-10-CM

## 2019-08-05 DIAGNOSIS — N18.6 ESRD ON HEMODIALYSIS (HCC): ICD-10-CM

## 2019-08-05 DIAGNOSIS — I48.20 CHRONIC ATRIAL FIBRILLATION (HCC): ICD-10-CM

## 2019-08-05 DIAGNOSIS — I95.89 CHRONIC HYPOTENSION: ICD-10-CM

## 2019-08-05 DIAGNOSIS — I89.0 LYMPHEDEMA: ICD-10-CM

## 2019-08-05 DIAGNOSIS — I50.812 CHRONIC RIGHT-SIDED HEART FAILURE (HCC): ICD-10-CM

## 2019-08-05 DIAGNOSIS — Z99.2 ESRD ON HEMODIALYSIS (HCC): ICD-10-CM

## 2019-08-05 PROBLEM — I20.9 ANGINA PECTORIS (HCC): Status: RESOLVED | Noted: 2018-06-18 | Resolved: 2019-08-05

## 2019-08-05 PROCEDURE — 1036F TOBACCO NON-USER: CPT | Performed by: FAMILY MEDICINE

## 2019-08-05 PROCEDURE — 3017F COLORECTAL CA SCREEN DOC REV: CPT | Performed by: FAMILY MEDICINE

## 2019-08-05 PROCEDURE — G8427 DOCREV CUR MEDS BY ELIG CLIN: HCPCS | Performed by: FAMILY MEDICINE

## 2019-08-05 PROCEDURE — G8598 ASA/ANTIPLAT THER USED: HCPCS | Performed by: FAMILY MEDICINE

## 2019-08-05 PROCEDURE — G8417 CALC BMI ABV UP PARAM F/U: HCPCS | Performed by: FAMILY MEDICINE

## 2019-08-05 PROCEDURE — 99214 OFFICE O/P EST MOD 30 MIN: CPT | Performed by: FAMILY MEDICINE

## 2019-08-05 ASSESSMENT — ENCOUNTER SYMPTOMS
ALLERGIC/IMMUNOLOGIC NEGATIVE: 1
EYES NEGATIVE: 1
RESPIRATORY NEGATIVE: 1
GASTROINTESTINAL NEGATIVE: 1

## 2019-08-05 NOTE — PROGRESS NOTES
Subjective:      Patient ID: Suyapa Uribe is a 44 y.o. male. Hyperlipidemia   This is a chronic problem. The current episode started more than 1 month ago. The problem is controlled. Recent lipid tests were reviewed and are normal. Exacerbating diseases include obesity. Current antihyperlipidemic treatment includes statins. The current treatment provides moderate improvement of lipids. There are no compliance problems. Risk factors for coronary artery disease include dyslipidemia, obesity, male sex and hypertension. Review of Systems   Constitutional: Negative. HENT: Negative. Eyes: Negative. Respiratory: Negative. Cardiovascular: Negative. Gastrointestinal: Negative. Endocrine: Negative. Musculoskeletal: Positive for arthralgias. Skin: Negative. Allergic/Immunologic: Negative. Neurological: Negative. Hematological: Negative. Psychiatric/Behavioral: Negative. Past family and social history unremarkable. Diagnosis Orders   1. Benign prostatic hyperplasia with lower urinary tract symptoms, symptom details unspecified     2. ESRD on hemodialysis ([address]     3. Erectile dysfunction due to arterial insufficiency     4. Chronic hypotension     5. Chronic atrial fibrillation (HCC)     6. Warfarin-induced coagulopathy ([address]     7. Chronic deep vein thrombosis (DVT) of non-extremity vein     8. S/P laparoscopic sleeve gastrectomy     9. Vitamin D deficiency     10. Lymphedema     11. Chronic right-sided heart failure ([address]     12. Peripheral vascular disease ([address]     13. Malignant neoplasm of transverse colon ([address]     14. Status post partial colectomy           Objective:   Physical Exam   Constitutional: He is oriented to person, place, and time. He appears well-developed and well-nourished. Obesity status post bariatric procedure   HENT:   Head: Normocephalic and atraumatic.    Right Ear: External ear normal.   Left Ear: External ear normal.   Nose: Nose normal. Mouth/Throat: Oropharynx is clear and moist.   Eyes: Pupils are equal, round, and reactive to light. Conjunctivae and EOM are normal.   Neck: Normal range of motion. Neck supple. Cardiovascular: Normal rate, regular rhythm, normal heart sounds and intact distal pulses. Stable congestive heart failure   Pulmonary/Chest: Effort normal and breath sounds normal.   Abdominal: Soft. Bowel sounds are normal.   Musculoskeletal: Normal range of motion. Positive bruit AV fistula right upper extremity   Neurological: He is alert and oriented to person, place, and time. He has normal reflexes. Skin: Skin is warm and dry. Psychiatric: He has a normal mood and affect. His behavior is normal. Thought content normal.   Nursing note and vitals reviewed. Assessment:       Diagnosis Orders   1. Benign prostatic hyperplasia with lower urinary tract symptoms, symptom details unspecified     2. ESRD on hemodialysis ([address]     3. Erectile dysfunction due to arterial insufficiency     4. Chronic hypotension     5. Chronic atrial fibrillation (HCC)     6. Warfarin-induced coagulopathy ([address]     7. Chronic deep vein thrombosis (DVT) of non-extremity vein     8. S/P laparoscopic sleeve gastrectomy     9. Vitamin D deficiency     10. Lymphedema     11. Chronic right-sided heart failure ([address]     12. Peripheral vascular disease ([address]     13. Malignant neoplasm of transverse colon ([address]     14. Status post partial colectomy             Plan:      57-year-old pleasant -American male returns for follow-up. He does not voice any new distress, afebrile hemodynamically stable, clinical examination is benign  Underlying history of end-stage renal disease hemodialysis dependent x3/week that he is tolerating well. Episode of postdialysis hypotension that is treated with ProAmatine  Hyperlipidemia start that he is tolerating well  Morbid obesity with history of bariatric surgery with a loss of 100 pounds.   History of colon cancer. Being closely observed by colorectal surgery. No active treatment. Regular bowel movements  Lymphedema right lower extremity on compression stockings. Chronic A. fib. Rate controlled on amiodarone. Coagulopathy on Coumadin titrated by Coumadin clinic. Recent adjustment because of INR being 1.5. We aim to keep it between 2 and 3. He is counseled on Coumadin compatible diet  History of congestive heart failure. No recent exacerbation. He denies dyspnea orthopnea paroxysmal nocturnal dyspnea or lower extremity edema. Chronic anemia with underlying end-stage renal disease. Hemoglobin is 8.1. He denies tobacco, excessive alcohol or illicit drug use  Depression screen is negative  Asymptomatic BPH. He is established with urology  Med list reviewed advised to continue  Further recommendations to follow  This note is created with a voice recognition program and while intend to generate a document that accurately reflects the content of the visit, no guarantee can be provided that every mistake has been identified and corrected by editing.           Nacho Rudolph MD

## 2019-08-07 ENCOUNTER — HOSPITAL ENCOUNTER (OUTPATIENT)
Dept: PHARMACY | Age: 49
Setting detail: THERAPIES SERIES
Discharge: HOME | End: 2019-08-07
Payer: COMMERCIAL

## 2019-08-07 DIAGNOSIS — I82.91 CHRONIC DEEP VEIN THROMBOSIS (DVT) OF NON-EXTREMITY VEIN: ICD-10-CM

## 2019-08-07 LAB
INR BLD: 2.2
INR BLD: NORMAL
PROTIME: 26.6 SECONDS
PROTIME: NORMAL SECONDS

## 2019-08-07 PROCEDURE — 85610 PROTHROMBIN TIME: CPT

## 2019-08-07 PROCEDURE — 99211 OFF/OP EST MAY X REQ PHY/QHP: CPT

## 2019-08-07 NOTE — PROGRESS NOTES
Medication Management Service, Warfarin Management  RAUL GARCIA [facility], [phone]  Visit Date: 8/7/2019   Subjective:    Dedrick Jhaveri is a 44 y.o. male who presents to clinic today for anticoagulation monitoring and adjustment. Patient seen in clinic for warfarin management due to  Indication:   atrial fibrillation. INR goal: of 2.0-3.0. Duration of therapy: indefinite. Patient reports the following:   Adherent with regimen  Missed or extra doses:  None   Bleeding or thromboembolic side effects:  None  Significant medication, dietary, alcohol, or tobacco changes:None  Significant recent illness, disease state changes, or hospitalization:  None  Upcoming surgeries or procedures:  None           Assessment and PLAN   PT/INR done in office per protocol. INR today is 2.2, therapeutic. Plan:  Continue current regimen of warfarin 3 mg Sunday, Thursday and 4 mg all other days. Using warfarin 2 mg tablets. Recheck INR in 4 week(s). Patient seen in room #1. Patient verbalized understanding of dosing directions and information discussed. Dosing schedule given to patient. Progress note sent to referring office. Patient acknowledges working in consult agreement with pharmacist as referred by his/her physician. [address] [place] [address]  Ph., CACP, Clinical Pharmacist  Anticoagulation Services, [facility]  8/7/2019  8:57 AM

## 2019-08-19 DIAGNOSIS — I48.20 CHRONIC ATRIAL FIBRILLATION (HCC): ICD-10-CM

## 2019-08-21 RX ORDER — AMIODARONE HYDROCHLORIDE 200 MG/1
TABLET ORAL
Qty: 90 TABLET | Refills: 1 | Status: SHIPPED | OUTPATIENT
Start: 2019-08-21 | End: 2020-12-30 | Stop reason: ALTCHOICE

## 2019-08-22 DIAGNOSIS — I48.20 CHRONIC ATRIAL FIBRILLATION (HCC): Primary | ICD-10-CM

## 2019-08-22 DIAGNOSIS — I82.403 DEEP VEIN THROMBOSIS (DVT) OF BOTH LOWER EXTREMITIES, UNSPECIFIED CHRONICITY, UNSPECIFIED VEIN (HCC): ICD-10-CM

## 2019-09-03 RX ORDER — VIT B COMP NO.3/FOLIC/C/BIOTIN 1 MG-60 MG
TABLET ORAL
Qty: 90 TABLET | Refills: 1 | Status: SHIPPED | OUTPATIENT
Start: 2019-09-03 | End: 2021-01-08

## 2019-09-03 NOTE — TELEPHONE ENCOUNTER
coagulopathy ([facility]     DVT (deep venous thrombosis) (HCC)     S/P laparoscopic sleeve gastrectomy     Vitamin D deficiency     Lymphedema     Right heart failure ([facility]     Peripheral vascular disease (HCC)     Malignant neoplasm of transverse colon (HCC)     Status post partial colectomy

## 2019-09-04 ENCOUNTER — HOSPITAL ENCOUNTER (OUTPATIENT)
Dept: PHARMACY | Age: 49
Setting detail: THERAPIES SERIES
Discharge: HOME | End: 2019-09-04
Payer: COMMERCIAL

## 2019-09-04 DIAGNOSIS — I82.403 DEEP VEIN THROMBOSIS (DVT) OF BOTH LOWER EXTREMITIES, UNSPECIFIED CHRONICITY, UNSPECIFIED VEIN (HCC): ICD-10-CM

## 2019-09-04 LAB
INR BLD: 1.5
PROTIME: 18.1 SECONDS

## 2019-09-04 PROCEDURE — 85610 PROTHROMBIN TIME: CPT

## 2019-09-04 PROCEDURE — 99212 OFFICE O/P EST SF 10 MIN: CPT

## 2019-09-18 ENCOUNTER — HOSPITAL ENCOUNTER (OUTPATIENT)
Dept: PHARMACY | Age: 49
Setting detail: THERAPIES SERIES
Discharge: HOME | End: 2019-09-18
Payer: COMMERCIAL

## 2019-09-18 DIAGNOSIS — I82.403 DEEP VEIN THROMBOSIS (DVT) OF BOTH LOWER EXTREMITIES, UNSPECIFIED CHRONICITY, UNSPECIFIED VEIN (HCC): ICD-10-CM

## 2019-09-18 LAB
INR BLD: 2.6
PROTIME: 30.8 SECONDS

## 2019-09-18 PROCEDURE — 99212 OFFICE O/P EST SF 10 MIN: CPT

## 2019-09-18 PROCEDURE — 85610 PROTHROMBIN TIME: CPT

## 2019-09-18 RX ORDER — CINACALCET 30 MG/1
30 TABLET, FILM COATED ORAL
COMMUNITY

## 2019-09-26 ENCOUNTER — TELEPHONE (OUTPATIENT)
Dept: BARIATRICS/WEIGHT MGMT | Age: 49
End: 2019-09-26

## 2019-10-04 ENCOUNTER — APPOINTMENT (OUTPATIENT)
Dept: GENERAL RADIOLOGY | Age: 49
End: 2019-10-04
Payer: COMMERCIAL

## 2019-10-04 ENCOUNTER — HOSPITAL ENCOUNTER (EMERGENCY)
Age: 49
Discharge: HOME | End: 2019-10-04
Attending: EMERGENCY MEDICINE
Payer: COMMERCIAL

## 2019-10-04 VITALS
SYSTOLIC BLOOD PRESSURE: 109 MMHG | BODY MASS INDEX: 34.44 KG/M2 | RESPIRATION RATE: 14 BRPM | HEART RATE: 89 BPM | WEIGHT: 240 LBS | OXYGEN SATURATION: 99 % | DIASTOLIC BLOOD PRESSURE: 76 MMHG | TEMPERATURE: 98.2 F

## 2019-10-04 DIAGNOSIS — S61.402D OPEN WOUND OF LEFT HAND WITHOUT FOREIGN BODY, UNSPECIFIED WOUND TYPE, SUBSEQUENT ENCOUNTER: Primary | ICD-10-CM

## 2019-10-04 PROCEDURE — 99283 EMERGENCY DEPT VISIT LOW MDM: CPT

## 2019-10-04 PROCEDURE — 73130 X-RAY EXAM OF HAND: CPT

## 2019-10-04 PROCEDURE — 90715 TDAP VACCINE 7 YRS/> IM: CPT | Performed by: STUDENT IN AN ORGANIZED HEALTH CARE EDUCATION/TRAINING PROGRAM

## 2019-10-04 PROCEDURE — 90471 IMMUNIZATION ADMIN: CPT | Performed by: STUDENT IN AN ORGANIZED HEALTH CARE EDUCATION/TRAINING PROGRAM

## 2019-10-04 PROCEDURE — 6360000002 HC RX W HCPCS: Performed by: STUDENT IN AN ORGANIZED HEALTH CARE EDUCATION/TRAINING PROGRAM

## 2019-10-04 RX ADMIN — TETANUS TOXOID, REDUCED DIPHTHERIA TOXOID AND ACELLULAR PERTUSSIS VACCINE, ADSORBED 0.5 ML: 5; 2.5; 8; 8; 2.5 SUSPENSION INTRAMUSCULAR at 12:51

## 2019-10-04 ASSESSMENT — PAIN DESCRIPTION - ONSET: ONSET: ON-GOING

## 2019-10-04 ASSESSMENT — PAIN DESCRIPTION - DESCRIPTORS: DESCRIPTORS: ACHING

## 2019-10-04 ASSESSMENT — PAIN DESCRIPTION - PAIN TYPE: TYPE: ACUTE PAIN

## 2019-10-04 ASSESSMENT — PAIN DESCRIPTION - PROGRESSION: CLINICAL_PROGRESSION: GRADUALLY WORSENING

## 2019-10-04 ASSESSMENT — PAIN DESCRIPTION - LOCATION: LOCATION: FINGER (COMMENT WHICH ONE)

## 2019-10-04 ASSESSMENT — PAIN DESCRIPTION - ORIENTATION: ORIENTATION: LEFT

## 2019-10-04 ASSESSMENT — PAIN SCALES - GENERAL: PAINLEVEL_OUTOF10: 7

## 2019-10-09 ENCOUNTER — HOSPITAL ENCOUNTER (OUTPATIENT)
Dept: PHARMACY | Age: 49
Setting detail: THERAPIES SERIES
Discharge: HOME | End: 2019-10-09
Payer: COMMERCIAL

## 2019-10-09 DIAGNOSIS — I82.403 DEEP VEIN THROMBOSIS (DVT) OF BOTH LOWER EXTREMITIES, UNSPECIFIED CHRONICITY, UNSPECIFIED VEIN (HCC): ICD-10-CM

## 2019-10-09 LAB
INR BLD: 2.3
PROTIME: 27 SECONDS

## 2019-10-09 PROCEDURE — 85610 PROTHROMBIN TIME: CPT

## 2019-10-09 PROCEDURE — 99211 OFF/OP EST MAY X REQ PHY/QHP: CPT

## 2019-10-12 RX ORDER — MIDODRINE HYDROCHLORIDE 10 MG/1
TABLET ORAL
Qty: 270 TABLET | Refills: 0 | Status: SHIPPED | OUTPATIENT
Start: 2019-10-12 | End: 2020-04-30

## 2019-10-14 ENCOUNTER — OFFICE VISIT (OUTPATIENT)
Dept: INTERNAL MEDICINE CLINIC | Age: 49
End: 2019-10-14
Payer: COMMERCIAL

## 2019-10-14 VITALS
HEIGHT: 70 IN | SYSTOLIC BLOOD PRESSURE: 120 MMHG | DIASTOLIC BLOOD PRESSURE: 80 MMHG | WEIGHT: 256 LBS | OXYGEN SATURATION: 98 % | HEART RATE: 77 BPM | BODY MASS INDEX: 36.65 KG/M2 | RESPIRATION RATE: 20 BRPM

## 2019-10-14 DIAGNOSIS — N52.01 ERECTILE DYSFUNCTION DUE TO ARTERIAL INSUFFICIENCY: ICD-10-CM

## 2019-10-14 DIAGNOSIS — Z90.49 STATUS POST PARTIAL COLECTOMY: ICD-10-CM

## 2019-10-14 DIAGNOSIS — B07.9 VIRAL WART ON FINGER: Primary | ICD-10-CM

## 2019-10-14 DIAGNOSIS — I89.0 LYMPHEDEMA: ICD-10-CM

## 2019-10-14 DIAGNOSIS — I50.812 CHRONIC RIGHT-SIDED HEART FAILURE (HCC): ICD-10-CM

## 2019-10-14 DIAGNOSIS — I48.20 CHRONIC ATRIAL FIBRILLATION (HCC): ICD-10-CM

## 2019-10-14 DIAGNOSIS — E55.9 VITAMIN D DEFICIENCY: ICD-10-CM

## 2019-10-14 DIAGNOSIS — N18.6 ESRD ON HEMODIALYSIS (HCC): ICD-10-CM

## 2019-10-14 DIAGNOSIS — D68.32 WARFARIN-INDUCED COAGULOPATHY (HCC): ICD-10-CM

## 2019-10-14 DIAGNOSIS — I82.5Z3 CHRONIC DEEP VEIN THROMBOSIS (DVT) OF DISTAL VEIN OF BOTH LOWER EXTREMITIES (HCC): ICD-10-CM

## 2019-10-14 DIAGNOSIS — C18.4 MALIGNANT NEOPLASM OF TRANSVERSE COLON (HCC): ICD-10-CM

## 2019-10-14 DIAGNOSIS — I95.89 CHRONIC HYPOTENSION: ICD-10-CM

## 2019-10-14 DIAGNOSIS — Z98.84 S/P LAPAROSCOPIC SLEEVE GASTRECTOMY: ICD-10-CM

## 2019-10-14 DIAGNOSIS — Z99.2 ESRD ON HEMODIALYSIS (HCC): ICD-10-CM

## 2019-10-14 DIAGNOSIS — I73.9 PERIPHERAL VASCULAR DISEASE (HCC): ICD-10-CM

## 2019-10-14 DIAGNOSIS — M12.811 ROTATOR CUFF ARTHROPATHY OF RIGHT SHOULDER: ICD-10-CM

## 2019-10-14 DIAGNOSIS — T45.515A WARFARIN-INDUCED COAGULOPATHY (HCC): ICD-10-CM

## 2019-10-14 DIAGNOSIS — N40.1 BENIGN PROSTATIC HYPERPLASIA WITH LOWER URINARY TRACT SYMPTOMS, SYMPTOM DETAILS UNSPECIFIED: ICD-10-CM

## 2019-10-14 DIAGNOSIS — Z00.00 HEALTH CARE MAINTENANCE: ICD-10-CM

## 2019-10-14 LAB — HBA1C MFR BLD: 5.2 %

## 2019-10-14 PROCEDURE — G8598 ASA/ANTIPLAT THER USED: HCPCS | Performed by: FAMILY MEDICINE

## 2019-10-14 PROCEDURE — G8417 CALC BMI ABV UP PARAM F/U: HCPCS | Performed by: FAMILY MEDICINE

## 2019-10-14 PROCEDURE — 3017F COLORECTAL CA SCREEN DOC REV: CPT | Performed by: FAMILY MEDICINE

## 2019-10-14 PROCEDURE — 83036 HEMOGLOBIN GLYCOSYLATED A1C: CPT | Performed by: FAMILY MEDICINE

## 2019-10-14 PROCEDURE — G8427 DOCREV CUR MEDS BY ELIG CLIN: HCPCS | Performed by: FAMILY MEDICINE

## 2019-10-14 PROCEDURE — 99214 OFFICE O/P EST MOD 30 MIN: CPT | Performed by: FAMILY MEDICINE

## 2019-10-14 PROCEDURE — 1036F TOBACCO NON-USER: CPT | Performed by: FAMILY MEDICINE

## 2019-10-14 PROCEDURE — G8484 FLU IMMUNIZE NO ADMIN: HCPCS | Performed by: FAMILY MEDICINE

## 2019-10-14 ASSESSMENT — ENCOUNTER SYMPTOMS
RESPIRATORY NEGATIVE: 1
EYES NEGATIVE: 1
ALLERGIC/IMMUNOLOGIC NEGATIVE: 1
GASTROINTESTINAL NEGATIVE: 1

## 2019-10-21 RX ORDER — WARFARIN SODIUM 2 MG/1
TABLET ORAL
Qty: 180 TABLET | Refills: 1 | Status: SHIPPED | OUTPATIENT
Start: 2019-10-21 | End: 2020-05-12

## 2019-10-29 ENCOUNTER — HOSPITAL ENCOUNTER (OUTPATIENT)
Dept: GENERAL RADIOLOGY | Age: 49
Discharge: HOME | End: 2019-10-31
Payer: COMMERCIAL

## 2019-10-29 ENCOUNTER — HOSPITAL ENCOUNTER (OUTPATIENT)
Age: 49
Discharge: HOME | End: 2019-10-31
Payer: COMMERCIAL

## 2019-10-29 ENCOUNTER — OFFICE VISIT (OUTPATIENT)
Dept: ORTHOPEDIC SURGERY | Age: 49
End: 2019-10-29
Payer: COMMERCIAL

## 2019-10-29 VITALS — HEIGHT: 70 IN | BODY MASS INDEX: 36.64 KG/M2 | WEIGHT: 255.95 LBS

## 2019-10-29 DIAGNOSIS — M75.42 IMPINGEMENT SYNDROME OF LEFT SHOULDER: ICD-10-CM

## 2019-10-29 DIAGNOSIS — M75.42 IMPINGEMENT SYNDROME OF LEFT SHOULDER: Primary | ICD-10-CM

## 2019-10-29 DIAGNOSIS — M75.112 NONTRAUMATIC INCOMPLETE TEAR OF LEFT ROTATOR CUFF: ICD-10-CM

## 2019-10-29 PROCEDURE — G8484 FLU IMMUNIZE NO ADMIN: HCPCS | Performed by: ORTHOPAEDIC SURGERY

## 2019-10-29 PROCEDURE — 1036F TOBACCO NON-USER: CPT | Performed by: ORTHOPAEDIC SURGERY

## 2019-10-29 PROCEDURE — G8598 ASA/ANTIPLAT THER USED: HCPCS | Performed by: ORTHOPAEDIC SURGERY

## 2019-10-29 PROCEDURE — 73030 X-RAY EXAM OF SHOULDER: CPT

## 2019-10-29 PROCEDURE — 99203 OFFICE O/P NEW LOW 30 MIN: CPT | Performed by: ORTHOPAEDIC SURGERY

## 2019-10-29 PROCEDURE — G8427 DOCREV CUR MEDS BY ELIG CLIN: HCPCS | Performed by: ORTHOPAEDIC SURGERY

## 2019-10-29 PROCEDURE — 3017F COLORECTAL CA SCREEN DOC REV: CPT | Performed by: ORTHOPAEDIC SURGERY

## 2019-10-29 PROCEDURE — G8417 CALC BMI ABV UP PARAM F/U: HCPCS | Performed by: ORTHOPAEDIC SURGERY

## 2019-11-06 ENCOUNTER — HOSPITAL ENCOUNTER (OUTPATIENT)
Dept: PHARMACY | Age: 49
Setting detail: THERAPIES SERIES
Discharge: HOME | End: 2019-11-06
Payer: COMMERCIAL

## 2019-11-06 DIAGNOSIS — I82.5Z3 CHRONIC DEEP VEIN THROMBOSIS (DVT) OF DISTAL VEIN OF BOTH LOWER EXTREMITIES (HCC): ICD-10-CM

## 2019-11-06 LAB
INR BLD: 2.2
PROTIME: 26.1 SECONDS

## 2019-11-06 PROCEDURE — 85610 PROTHROMBIN TIME: CPT

## 2019-11-06 PROCEDURE — 99211 OFF/OP EST MAY X REQ PHY/QHP: CPT

## 2019-11-11 ENCOUNTER — HOSPITAL ENCOUNTER (OUTPATIENT)
Dept: MRI IMAGING | Age: 49
Discharge: HOME | End: 2019-11-13
Payer: COMMERCIAL

## 2019-11-11 DIAGNOSIS — M75.112 NONTRAUMATIC INCOMPLETE TEAR OF LEFT ROTATOR CUFF: ICD-10-CM

## 2019-11-11 PROCEDURE — 73221 MRI JOINT UPR EXTREM W/O DYE: CPT

## 2019-11-26 ENCOUNTER — OFFICE VISIT (OUTPATIENT)
Dept: ORTHOPEDIC SURGERY | Age: 49
End: 2019-11-26
Payer: COMMERCIAL

## 2019-11-26 VITALS — WEIGHT: 255 LBS | HEIGHT: 70 IN | BODY MASS INDEX: 36.51 KG/M2

## 2019-11-26 DIAGNOSIS — M75.42 IMPINGEMENT SYNDROME OF LEFT SHOULDER: Primary | ICD-10-CM

## 2019-11-26 PROCEDURE — G8484 FLU IMMUNIZE NO ADMIN: HCPCS | Performed by: ORTHOPAEDIC SURGERY

## 2019-11-26 PROCEDURE — G8598 ASA/ANTIPLAT THER USED: HCPCS | Performed by: ORTHOPAEDIC SURGERY

## 2019-11-26 PROCEDURE — 99213 OFFICE O/P EST LOW 20 MIN: CPT | Performed by: ORTHOPAEDIC SURGERY

## 2019-11-26 PROCEDURE — 3017F COLORECTAL CA SCREEN DOC REV: CPT | Performed by: ORTHOPAEDIC SURGERY

## 2019-11-26 PROCEDURE — 1036F TOBACCO NON-USER: CPT | Performed by: ORTHOPAEDIC SURGERY

## 2019-11-26 PROCEDURE — G8427 DOCREV CUR MEDS BY ELIG CLIN: HCPCS | Performed by: ORTHOPAEDIC SURGERY

## 2019-11-26 PROCEDURE — G8417 CALC BMI ABV UP PARAM F/U: HCPCS | Performed by: ORTHOPAEDIC SURGERY

## 2019-11-26 PROCEDURE — 20610 DRAIN/INJ JOINT/BURSA W/O US: CPT | Performed by: ORTHOPAEDIC SURGERY

## 2019-11-26 RX ORDER — LIDOCAINE HYDROCHLORIDE 10 MG/ML
5 INJECTION, SOLUTION INFILTRATION; PERINEURAL ONCE
Status: COMPLETED | OUTPATIENT
Start: 2019-11-26 | End: 2019-11-26

## 2019-11-26 RX ORDER — METHYLPREDNISOLONE ACETATE 40 MG/ML
40 INJECTION, SUSPENSION INTRA-ARTICULAR; INTRALESIONAL; INTRAMUSCULAR; SOFT TISSUE ONCE
Status: COMPLETED | OUTPATIENT
Start: 2019-11-26 | End: 2019-11-26

## 2019-11-26 RX ADMIN — METHYLPREDNISOLONE ACETATE 40 MG: 40 INJECTION, SUSPENSION INTRA-ARTICULAR; INTRALESIONAL; INTRAMUSCULAR; SOFT TISSUE at 15:23

## 2019-11-26 RX ADMIN — LIDOCAINE HYDROCHLORIDE 5 ML: 10 INJECTION, SOLUTION INFILTRATION; PERINEURAL at 15:22

## 2019-11-27 ENCOUNTER — HOSPITAL ENCOUNTER (OUTPATIENT)
Dept: PHARMACY | Age: 49
Setting detail: THERAPIES SERIES
Discharge: HOME | End: 2019-11-27
Payer: COMMERCIAL

## 2019-11-27 DIAGNOSIS — I82.5Z3 CHRONIC DEEP VEIN THROMBOSIS (DVT) OF DISTAL VEIN OF BOTH LOWER EXTREMITIES (HCC): ICD-10-CM

## 2019-11-27 LAB
INR BLD: 1.3
PROTIME: 15.5 SECONDS

## 2019-11-27 PROCEDURE — 85610 PROTHROMBIN TIME: CPT

## 2019-11-27 PROCEDURE — 99212 OFFICE O/P EST SF 10 MIN: CPT

## 2019-12-11 ENCOUNTER — HOSPITAL ENCOUNTER (OUTPATIENT)
Dept: PHARMACY | Age: 49
Setting detail: THERAPIES SERIES
Discharge: HOME | End: 2019-12-11
Payer: COMMERCIAL

## 2019-12-11 DIAGNOSIS — I82.5Z3 CHRONIC DEEP VEIN THROMBOSIS (DVT) OF DISTAL VEIN OF BOTH LOWER EXTREMITIES (HCC): ICD-10-CM

## 2019-12-11 LAB
INR BLD: 3
PROTIME: 36.6 SECONDS

## 2019-12-11 PROCEDURE — 99211 OFF/OP EST MAY X REQ PHY/QHP: CPT

## 2019-12-11 PROCEDURE — 85610 PROTHROMBIN TIME: CPT

## 2019-12-31 RX ORDER — ERGOCALCIFEROL 1.25 MG/1
CAPSULE ORAL
Qty: 1 CAPSULE | Refills: 3 | Status: SHIPPED | OUTPATIENT
Start: 2019-12-31 | End: 2020-10-08 | Stop reason: SDUPTHER

## 2020-01-03 ENCOUNTER — HOSPITAL ENCOUNTER (OUTPATIENT)
Dept: PHARMACY | Age: 50
Setting detail: THERAPIES SERIES
Discharge: HOME | End: 2020-01-03
Payer: COMMERCIAL

## 2020-01-03 LAB
INR BLD: 2.8
PROTIME: 33.6 SECONDS

## 2020-01-03 PROCEDURE — 85610 PROTHROMBIN TIME: CPT

## 2020-01-03 PROCEDURE — 99211 OFF/OP EST MAY X REQ PHY/QHP: CPT

## 2020-01-03 NOTE — PROGRESS NOTES
Medication Management Service, Warfarin Management  RAUL GARCIA [facility], [phone]  Visit Date: 1/3/2020   Subjective:    Nadeem Santillan is a 44 y.o. male who presents to clinic today for anticoagulation monitoring and adjustment. Patient seen in clinic for warfarin management due to  Indication:   atrial fibrillation. INR goal: of 2.0-3.0. Duration of therapy: indefinite. Patient reports the following:   Adherent with regimen  Missed or extra doses:  None   Bleeding or thromboembolic side effects:  None  Significant medication, dietary, alcohol, or tobacco changes:  Diet remains normal and without significant change. Significant recent illness, disease state changes, or hospitalization:  None  Upcoming surgeries or procedures:  None           Assessment and PLAN   PT/INR done in office per protocol. INR today is 2.8      Plan: Will continue current regimen of warfarin 3 mg on Sunday and Thursday, and 4 mg on all other days using warfarin 2 mg tablets. Recheck INR in 4 week(s). Patient seen in room #2. Patient verbalized understanding of dosing directions and information discussed. Dosing schedule given to patient. Progress note sent to referring office. Patient acknowledges working in consult agreement with pharmacist as referred by his/her physician.       Electronically signed by Remedios Gallegos RPH on 1/3/20 at 8:29 AM

## 2020-01-14 ENCOUNTER — OFFICE VISIT (OUTPATIENT)
Dept: INTERNAL MEDICINE CLINIC | Age: 50
End: 2020-01-14
Payer: COMMERCIAL

## 2020-01-14 VITALS
BODY MASS INDEX: 35.48 KG/M2 | OXYGEN SATURATION: 97 % | HEIGHT: 70 IN | SYSTOLIC BLOOD PRESSURE: 90 MMHG | HEART RATE: 81 BPM | RESPIRATION RATE: 20 BRPM | WEIGHT: 247.8 LBS | DIASTOLIC BLOOD PRESSURE: 80 MMHG | TEMPERATURE: 96.9 F

## 2020-01-14 PROBLEM — M75.42 IMPINGEMENT SYNDROME OF LEFT SHOULDER: Status: RESOLVED | Noted: 2019-10-29 | Resolved: 2020-01-14

## 2020-01-14 PROCEDURE — G8484 FLU IMMUNIZE NO ADMIN: HCPCS | Performed by: FAMILY MEDICINE

## 2020-01-14 PROCEDURE — G8417 CALC BMI ABV UP PARAM F/U: HCPCS | Performed by: FAMILY MEDICINE

## 2020-01-14 PROCEDURE — G8427 DOCREV CUR MEDS BY ELIG CLIN: HCPCS | Performed by: FAMILY MEDICINE

## 2020-01-14 PROCEDURE — 99214 OFFICE O/P EST MOD 30 MIN: CPT | Performed by: FAMILY MEDICINE

## 2020-01-14 PROCEDURE — 1036F TOBACCO NON-USER: CPT | Performed by: FAMILY MEDICINE

## 2020-01-14 PROCEDURE — 3017F COLORECTAL CA SCREEN DOC REV: CPT | Performed by: FAMILY MEDICINE

## 2020-01-14 ASSESSMENT — PATIENT HEALTH QUESTIONNAIRE - PHQ9
SUM OF ALL RESPONSES TO PHQ QUESTIONS 1-9: 0
2. FEELING DOWN, DEPRESSED OR HOPELESS: 0
1. LITTLE INTEREST OR PLEASURE IN DOING THINGS: 0
SUM OF ALL RESPONSES TO PHQ9 QUESTIONS 1 & 2: 0
SUM OF ALL RESPONSES TO PHQ QUESTIONS 1-9: 0

## 2020-01-14 ASSESSMENT — ENCOUNTER SYMPTOMS
EYES NEGATIVE: 1
BACK PAIN: 1
ALLERGIC/IMMUNOLOGIC NEGATIVE: 1
RESPIRATORY NEGATIVE: 1
GASTROINTESTINAL NEGATIVE: 1

## 2020-01-14 NOTE — PROGRESS NOTES
ventricular tachycardia  Pulmonary:      Effort: Pulmonary effort is normal.      Breath sounds: Normal breath sounds. Abdominal:      General: Bowel sounds are normal.      Palpations: Abdomen is soft. Genitourinary:     Comments: End-stage renal disease hemodialysis dependent on 3/week  Musculoskeletal: Normal range of motion. Comments: Degenerative polyarthralgia     Skin:     General: Skin is warm and dry. Neurological:      Mental Status: He is alert and oriented to person, place, and time. Deep Tendon Reflexes: Reflexes are normal and symmetric. Psychiatric:         Behavior: Behavior normal.         Thought Content: Thought content normal.         Assessment:       Diagnosis Orders   1. Warfarin-induced coagulopathy ([address]     2. ESRD on hemodialysis ([address]     3. Vitamin D deficiency     4. Malignant neoplasm of transverse colon ([address]     5. Status post partial colectomy     6. S/P laparoscopic sleeve gastrectomy     7. Rotator cuff arthropathy of right shoulder     8. Chronic right-sided heart failure ([address]     9. Peripheral vascular disease ([address]     10. Lymphedema     11. Erectile dysfunction due to arterial insufficiency     12. Deep vein thrombosis (DVT) of both lower extremities, unspecified chronicity, unspecified vein (HCC)     13. Chronic hypotension     14. Chronic atrial fibrillation     15. Benign prostatic hyperplasia with lower urinary tract symptoms, symptom details unspecified     16. Screening for hyperlipidemia             Plan:      33-year-old pleasant -American male returns for follow-up. He denies any distress, afebrile hemodynamically stable  History of chronic hypertension on ProAmatine 5 mg p.o. 3 times daily. Continue compression stocking. No apparent sign of orthostatics  Chronic A. fib. Rate controlled. Continue amiodarone, Coreg and titrated by Coumadin clinic.   He is counseled on Coumadin compatible diet  Hyperlipidemia and statin that he is

## 2020-01-31 ENCOUNTER — HOSPITAL ENCOUNTER (OUTPATIENT)
Dept: PHARMACY | Age: 50
Setting detail: THERAPIES SERIES
Discharge: HOME | End: 2020-01-31
Payer: COMMERCIAL

## 2020-01-31 LAB
INR BLD: 2
PROTIME: 23.5 SECONDS

## 2020-01-31 PROCEDURE — 85610 PROTHROMBIN TIME: CPT

## 2020-01-31 PROCEDURE — 99211 OFF/OP EST MAY X REQ PHY/QHP: CPT

## 2020-01-31 NOTE — PROGRESS NOTES
Medication Management Service, Warfarin Management  RAUL GARCIA [facility], [phone]  Visit Date: 1/31/2020   Subjective:    Jessica Goncalves is a 44 y.o. male who presents to clinic today for anticoagulation monitoring and adjustment. Patient seen in clinic for warfarin management due to  Indication:   atrial fibrillation. INR goal: of 2.0-3.0. Duration of therapy: indefinite. Patient reports the following:   Adherent with regimen  Missed or extra doses:  None   Bleeding or thromboembolic side effects:  None  Significant medication, dietary, alcohol, or tobacco changes:  None  Significant recent illness, disease state changes, or hospitalization:  None  Upcoming surgeries or procedures:  None           Assessment and PLAN   PT/INR done in office per protocol. INR today is 2.0      Plan: Will continue current regimen of warfarin 3 mg on Sunday and Thursday, and 4 mg on all other days using warfarin 2 mg tablets. Recheck INR in 6 week(s). Patient seen in room #2. Patient verbalized understanding of dosing directions and information discussed. Dosing schedule given to patient. Progress note sent to referring office. Patient acknowledges working in consult agreement with pharmacist as referred by his/her physician. [address] [place] [address]  Ph., CACP, Clinical Pharmacist  Anticoagulation Services, [facility]0 [facility]  1/31/2020  8:31 AM

## 2020-03-03 ENCOUNTER — TELEPHONE (OUTPATIENT)
Dept: PHARMACY | Age: 50
End: 2020-03-03

## 2020-03-03 NOTE — TELEPHONE ENCOUNTER
Patient has a colonoscopy scheduled March 20, 2020. His is cleared for surgery and to hold warfarin for four days prior and to resume warfarin as soon as possible after procedure.

## 2020-03-11 ENCOUNTER — HOSPITAL ENCOUNTER (OUTPATIENT)
Dept: CT IMAGING | Age: 50
Discharge: HOME | End: 2020-03-13
Payer: COMMERCIAL

## 2020-03-11 PROCEDURE — 6360000004 HC RX CONTRAST MEDICATION: Performed by: COLON & RECTAL SURGERY

## 2020-03-11 PROCEDURE — 74176 CT ABD & PELVIS W/O CONTRAST: CPT

## 2020-03-11 RX ADMIN — IOHEXOL 50 ML: 240 INJECTION, SOLUTION INTRATHECAL; INTRAVASCULAR; INTRAVENOUS; ORAL at 13:59

## 2020-03-13 ENCOUNTER — HOSPITAL ENCOUNTER (OUTPATIENT)
Dept: PHARMACY | Age: 50
Setting detail: THERAPIES SERIES
Discharge: HOME | End: 2020-03-13
Payer: COMMERCIAL

## 2020-03-13 LAB
INR BLD: 1.6
PROTIME: 19.1 SECONDS

## 2020-03-13 PROCEDURE — 99213 OFFICE O/P EST LOW 20 MIN: CPT

## 2020-03-13 PROCEDURE — 85610 PROTHROMBIN TIME: CPT

## 2020-03-13 NOTE — PROGRESS NOTES
Medication Management Service, Warfarin Management  RAUL GARCIA [facility], [phone]  Visit Date: 3/13/2020   Subjective:   Jelani Acosta is a 44 y.o. male who presents to clinic today for anticoagulation monitoring and adjustment. Patient seen in clinic for warfarin management due to  Indication:   atrial fibrillation and DVT. INR goal: of 2.0-3.0. Duration of therapy: indefinite. Patient reports the following:   Adherent with regimen  Missed or extra doses:  None   Bleeding or thromboembolic side effects:  None  Significant medication, dietary, alcohol, or tobacco changes: hold warfarin for 4 days and aspirin for 7 days prior to surgery   Significant recent illness, disease state changes, or hospitalization:  None  Upcoming surgeries or procedures:  Colonoscopy on 03/20/2020. Patient will need to hold warfarin for 4 days. No bridging needed. Assessment and PLAN   PT/INR done in office per protocol. INR today is 1.6, subtherapeutic. Patient denies usual causes of decrease in INR. Plan:  New regimen: do a one time boost today using 6 mg warfarin instead of 4 mg then take 4 mg on Saturday and 3 mg on Sunday. Hold warfarin for 4 days before surgery on 03/20/2020: hold on Monday, Tuesday, Wednesday Thursday and Friday. Resume warfarin on Saturday 03/21/2020 with 6 mg then continue on 3 mg every Sunday, Thursday and 4 mg all other days. Using warfarin 2 mg tablets. Recheck INR in 2 week(s). Patient seen in room # 1. ED. OP. = patient was reeducated on INR goal.     Patient verbalized understanding of dosing directions and information discussed. Dosing schedule given to patient. Progress note sent to referring office. Patient acknowledges working in consult agreement with pharmacist as referred by his/her physician.       Electronically signed by Elis Taylor on 3/13/20 at 8:56 AM EDT

## 2020-03-18 ENCOUNTER — TELEPHONE (OUTPATIENT)
Dept: PHARMACY | Age: 50
End: 2020-03-18

## 2020-03-26 ENCOUNTER — TELEPHONE (OUTPATIENT)
Dept: PHARMACY | Age: 50
End: 2020-03-26

## 2020-03-26 ENCOUNTER — HOSPITAL ENCOUNTER (OUTPATIENT)
Age: 50
Setting detail: SPECIMEN
Discharge: HOME | End: 2020-03-26
Payer: COMMERCIAL

## 2020-03-26 LAB
POC INR: 1.8
PROTHROMBIN TIME, POC: 21.9 SEC (ref 9.6–14.4)

## 2020-03-26 NOTE — TELEPHONE ENCOUNTER
Standing order for PT/INR has been placed for remote monitoring of INR given efforts to reduce the spread of COVID-19.

## 2020-04-09 ENCOUNTER — HOSPITAL ENCOUNTER (OUTPATIENT)
Age: 50
Setting detail: SPECIMEN
Discharge: HOME | End: 2020-04-09
Payer: COMMERCIAL

## 2020-04-09 LAB
POC INR: 1.8
PROTHROMBIN TIME, POC: 21.6 SEC (ref 9.6–14.4)

## 2020-04-10 ENCOUNTER — HOSPITAL ENCOUNTER (OUTPATIENT)
Dept: PHARMACY | Age: 50
Setting detail: THERAPIES SERIES
Discharge: HOME | End: 2020-04-10
Payer: COMMERCIAL

## 2020-04-10 PROCEDURE — 99211 OFF/OP EST MAY X REQ PHY/QHP: CPT

## 2020-04-16 ENCOUNTER — TELEPHONE (OUTPATIENT)
Dept: PHARMACY | Age: 50
End: 2020-04-16

## 2020-04-30 ENCOUNTER — TELEPHONE (OUTPATIENT)
Dept: PHARMACY | Age: 50
End: 2020-04-30

## 2020-04-30 ENCOUNTER — HOSPITAL ENCOUNTER (OUTPATIENT)
Age: 50
Setting detail: SPECIMEN
Discharge: HOME | End: 2020-04-30
Payer: COMMERCIAL

## 2020-04-30 RX ORDER — MIDODRINE HYDROCHLORIDE 10 MG/1
TABLET ORAL
Qty: 270 TABLET | Refills: 0 | Status: SHIPPED | OUTPATIENT
Start: 2020-04-30 | End: 2021-03-15

## 2020-04-30 NOTE — TELEPHONE ENCOUNTER
Patient was a No Call No Show for INR lab draw and appointment today. Called and left voicemail.     Sita Lora, PharmD, 4/30/2020 1:43 PM

## 2020-04-30 NOTE — TELEPHONE ENCOUNTER
Last visit: 1/14/2020  Last Med refill: 10/12/19  Does patient have enough medication for 72 hours: NA    Next Visit Date:  Future Appointments   Date Time Provider Jonathan Castro   6/8/2020  8:45 AM Cece Lindsey  [address] Maintenance   Topic Date Due    Diabetic retinal exam  06/24/1978    Hepatitis B vaccine (1 of 3 - Risk 3-dose series) 06/24/1987    Shingles Vaccine (1 of 2) 06/24/2018    Annual Wellness Visit (AWV)  05/29/2019    Lipid screen  09/25/2019    TSH testing  09/25/2019    Pneumococcal 0-64 years Vaccine (2 of 3 - PPSV23) 04/30/2020 (Originally 5/14/2018)    Flu vaccine (Season Ended) 10/14/2020 (Originally 9/1/2020)    Diabetic foot exam  01/14/2021 (Originally 1/2/2020)    A1C test (Diabetic or Prediabetic)  10/14/2020    Colon cancer screen colonoscopy  01/22/2029    DTaP/Tdap/Td vaccine (2 - Td) 10/04/2029    HIV screen  Completed    Hepatitis A vaccine  Aged Out    Hib vaccine  Aged Out    Meningococcal (ACWY) vaccine  Aged Out       Hemoglobin A1C (%)   Date Value   10/14/2019 5.2   09/25/2018 4.4   09/07/2016 4.9             ( goal A1C is < 7)   Microalb/Crt.  Ratio (mcg/mg creat)   Date Value   10/04/2011 591     LDL Cholesterol (mg/dL)   Date Value   09/25/2018 165 (H)   09/25/2018 165 (H)       (goal LDL is <100)   AST (U/L)   Date Value   01/31/2019 26     ALT (U/L)   Date Value   01/31/2019 21     BUN (mg/dL)   Date Value   02/20/2019 29 (H)     BP Readings from Last 3 Encounters:   01/14/20 90/80   10/14/19 120/80   10/04/19 109/76          (goal 120/80)    All Future Testing planned in [facility]  Lab Frequency Next Occurrence   POCT INR Once a week 5/1/2020, 5/8/2020, 5/15/2020, 5/22/2020, 5/29/2020, 6/5/2020, 6/12/2020, 6/19/2020, 6/26/2020, 7/3/2020, 7/10/2020, 7/17/2020               Patient Active Problem List:     Obesity, Class II, BMI 35-39.9, with comorbidity     Benign prostatic hyperplasia with lower urinary tract symptoms     ESRD on hemodialysis [facility])     Erectile dysfunction due to arterial insufficiency     Chronic hypotension     Chronic atrial fibrillation     Warfarin-induced coagulopathy (HCC)     DVT (deep venous thrombosis) (HCC)     S/P laparoscopic sleeve gastrectomy     Vitamin D deficiency     Lymphedema     Right heart failure (HCC)     Peripheral vascular disease ([facility] [address]     Malignant neoplasm of transverse colon (HCC)     Status post partial colectomy     Rotator cuff arthropathy of right shoulder

## 2020-05-04 ENCOUNTER — HOSPITAL ENCOUNTER (OUTPATIENT)
Dept: PHARMACY | Age: 50
Setting detail: THERAPIES SERIES
Discharge: HOME | End: 2020-05-04
Payer: COMMERCIAL

## 2020-05-04 LAB — INR BLD: 1.6

## 2020-05-04 PROCEDURE — 99211 OFF/OP EST MAY X REQ PHY/QHP: CPT

## 2020-05-12 RX ORDER — WARFARIN SODIUM 2 MG/1
TABLET ORAL
Qty: 180 TABLET | Refills: 0 | Status: SHIPPED | OUTPATIENT
Start: 2020-05-12 | End: 2020-08-18

## 2020-05-14 ENCOUNTER — HOSPITAL ENCOUNTER (OUTPATIENT)
Dept: PHARMACY | Age: 50
Setting detail: THERAPIES SERIES
Discharge: HOME | End: 2020-05-14
Payer: COMMERCIAL

## 2020-05-14 ENCOUNTER — HOSPITAL ENCOUNTER (OUTPATIENT)
Age: 50
Setting detail: SPECIMEN
Discharge: HOME | End: 2020-05-14
Payer: COMMERCIAL

## 2020-05-14 LAB
POC INR: 2
PROTHROMBIN TIME, POC: 23.7 SEC (ref 9.6–14.4)

## 2020-05-14 PROCEDURE — 99211 OFF/OP EST MAY X REQ PHY/QHP: CPT

## 2020-05-14 NOTE — PROGRESS NOTES
Warfarin Management Telephone Visit    PT/INR done in outpatient lab on 5/14, resulting INR is 2.0 which is therapeutic. Plan:  Warfarin regimen will be continued at current dose of warfarin 3 mg Sunday, Thursday, and 4 mg all other days of the week. Will have next INR drawn in outpatient lab in 3 weeks on 6/4 AM, with telephone follow up on 6/4. Verified ample supply of warfarin on hand and / or adequate refills available. Encouraged patient to call with any medication or health changes, questions or concerns. Patient given dosing directions and follow up appointment. Progress note routed to referring physician's office.     Toshia Olson, PharmD, 5/14/2020 2:05 PM    CLINICAL PHARMACY CONSULT: MED RECONCILIATION/REVIEW ADDENDUM    For Pharmacy Admin Tracking Only    PHSO: No  Total # of Interventions Recommended: 0  - Maintenance Safety Lab Monitoring #: 1  Time Spent (min): [address], PharmD  [address]

## 2020-06-04 ENCOUNTER — HOSPITAL ENCOUNTER (OUTPATIENT)
Age: 50
Setting detail: SPECIMEN
Discharge: HOME | End: 2020-06-04
Payer: COMMERCIAL

## 2020-06-04 ENCOUNTER — HOSPITAL ENCOUNTER (OUTPATIENT)
Dept: PHARMACY | Age: 50
Setting detail: THERAPIES SERIES
Discharge: HOME | End: 2020-06-04
Payer: COMMERCIAL

## 2020-06-04 LAB
POC INR: 2.4
PROTHROMBIN TIME, POC: 28.4 SEC (ref 9.6–14.4)

## 2020-06-04 PROCEDURE — 99211 OFF/OP EST MAY X REQ PHY/QHP: CPT

## 2020-06-04 NOTE — PROGRESS NOTES
Warfarin Management Telephone Visit    PT/INR done in outpatient lab on 6/4/2020, resulting INR is 2.4 which is therapeutic. Plan:  Warfarin regimen will be continued at current dose of 3mg on Sun, Thurs only and 4mg all other days of the week. Will have next INR checked with IN CLINIC visit in 4 weeks on 7/2/2020. Verified ample supply of warfarin on hand and / or adequate refills available. Encouraged patient to call with any medication or health changes, questions or concerns. Patient given dosing directions and follow up appointment. Progress note routed to referring physician's office.     Adriana Seymour RPh, ARSENIO  Clinical Pharmacist Medication Management  6/4/2020  1:39 PM      CLINICAL PHARMACY CONSULT: MED RECONCILIATION/REVIEW ADDENDUM    For Pharmacy Admin Tracking Only    PHSO: No  Total # of Interventions Recommended: 0  - Maintenance Safety Lab Monitoring #: 1  Total Interventions Accepted: 0  Time Spent (min): Summer Bush, ARSENIO  Clinical Pharmacist Medication Management  6/4/2020  1:40 PM

## 2020-06-05 LAB
POC INR: 1.6
PROTHROMBIN TIME, POC: 19 SEC (ref 9.6–14.4)

## 2020-06-08 ENCOUNTER — HOSPITAL ENCOUNTER (EMERGENCY)
Age: 50
Discharge: HOME | End: 2020-06-08
Attending: EMERGENCY MEDICINE
Payer: COMMERCIAL

## 2020-06-08 ENCOUNTER — OFFICE VISIT (OUTPATIENT)
Dept: INTERNAL MEDICINE CLINIC | Age: 50
End: 2020-06-08
Payer: COMMERCIAL

## 2020-06-08 ENCOUNTER — HOSPITAL ENCOUNTER (OUTPATIENT)
Age: 50
Setting detail: SPECIMEN
Discharge: HOME | End: 2020-06-08
Payer: COMMERCIAL

## 2020-06-08 VITALS
WEIGHT: 271 LBS | RESPIRATION RATE: 16 BRPM | BODY MASS INDEX: 38.88 KG/M2 | DIASTOLIC BLOOD PRESSURE: 65 MMHG | HEART RATE: 83 BPM | TEMPERATURE: 98.1 F | OXYGEN SATURATION: 96 % | SYSTOLIC BLOOD PRESSURE: 111 MMHG

## 2020-06-08 VITALS
DIASTOLIC BLOOD PRESSURE: 84 MMHG | BODY MASS INDEX: 39 KG/M2 | HEIGHT: 70 IN | RESPIRATION RATE: 18 BRPM | SYSTOLIC BLOOD PRESSURE: 122 MMHG | HEART RATE: 73 BPM | OXYGEN SATURATION: 98 % | TEMPERATURE: 97.1 F | WEIGHT: 272.4 LBS

## 2020-06-08 LAB
ABSOLUTE EOS #: 0.24 K/UL (ref 0–0.44)
ABSOLUTE IMMATURE GRANULOCYTE: 0.03 K/UL (ref 0–0.3)
ABSOLUTE LYMPH #: 2.34 K/UL (ref 1.1–3.7)
ABSOLUTE MONO #: 0.93 K/UL (ref 0.1–1.2)
ALBUMIN SERPL-MCNC: 3.5 G/DL (ref 3.5–5.2)
ALBUMIN SERPL-MCNC: 3.8 G/DL (ref 3.5–5.2)
ALBUMIN/GLOBULIN RATIO: 1.1 (ref 1–2.5)
ALBUMIN/GLOBULIN RATIO: 1.1 (ref 1–2.5)
ALP BLD-CCNC: 113 U/L (ref 40–129)
ALP BLD-CCNC: 116 U/L (ref 40–129)
ALT SERPL-CCNC: 17 U/L (ref 5–41)
ALT SERPL-CCNC: 19 U/L (ref 5–41)
ANION GAP SERPL CALCULATED.3IONS-SCNC: 27 MMOL/L (ref 9–17)
ANION GAP SERPL CALCULATED.3IONS-SCNC: 30 MMOL/L (ref 9–17)
AST SERPL-CCNC: 25 U/L
AST SERPL-CCNC: 27 U/L
BASOPHILS # BLD: 0 % (ref 0–2)
BASOPHILS ABSOLUTE: 0.03 K/UL (ref 0–0.2)
BILIRUB SERPL-MCNC: 0.28 MG/DL (ref 0.3–1.2)
BILIRUB SERPL-MCNC: 0.31 MG/DL (ref 0.3–1.2)
BUN BLDV-MCNC: 127 MG/DL (ref 6–20)
BUN BLDV-MCNC: 136 MG/DL (ref 6–20)
BUN/CREAT BLD: ABNORMAL (ref 9–20)
BUN/CREAT BLD: ABNORMAL (ref 9–20)
CALCIUM IONIZED: 1.06 MMOL/L (ref 1.13–1.33)
CALCIUM SERPL-MCNC: 8.7 MG/DL (ref 8.6–10.4)
CALCIUM SERPL-MCNC: 8.8 MG/DL (ref 8.6–10.4)
CHLORIDE BLD-SCNC: 90 MMOL/L (ref 98–107)
CHLORIDE BLD-SCNC: 95 MMOL/L (ref 98–107)
CHOLESTEROL/HDL RATIO: 4.7
CHOLESTEROL: 225 MG/DL
CO2: 16 MMOL/L (ref 20–31)
CO2: 16 MMOL/L (ref 20–31)
CREAT SERPL-MCNC: 23.01 MG/DL (ref 0.7–1.2)
CREAT SERPL-MCNC: 23.59 MG/DL (ref 0.7–1.2)
DIFFERENTIAL TYPE: ABNORMAL
EOSINOPHILS RELATIVE PERCENT: 3 % (ref 1–4)
ESTIMATED AVERAGE GLUCOSE: 126 MG/DL
GFR AFRICAN AMERICAN: 2 ML/MIN
GFR AFRICAN AMERICAN: 3 ML/MIN
GFR NON-AFRICAN AMERICAN: 2 ML/MIN
GFR NON-AFRICAN AMERICAN: 2 ML/MIN
GFR SERPL CREATININE-BSD FRML MDRD: ABNORMAL ML/MIN/{1.73_M2}
GLUCOSE BLD-MCNC: 104 MG/DL (ref 70–99)
GLUCOSE BLD-MCNC: 85 MG/DL (ref 70–99)
HBA1C MFR BLD: 6 % (ref 4–6)
HCT VFR BLD CALC: 28.1 % (ref 40.7–50.3)
HCT VFR BLD CALC: 28.5 % (ref 40.7–50.3)
HDLC SERPL-MCNC: 48 MG/DL
HEMOGLOBIN: 9.1 G/DL (ref 13–17)
HEMOGLOBIN: 9.3 G/DL (ref 13–17)
IMMATURE GRANULOCYTES: 0 %
LDL CHOLESTEROL: 153 MG/DL (ref 0–130)
LYMPHOCYTES # BLD: 29 % (ref 24–43)
MAGNESIUM: 2.3 MG/DL (ref 1.6–2.6)
MCH RBC QN AUTO: 34.1 PG (ref 25.2–33.5)
MCH RBC QN AUTO: 35 PG (ref 25.2–33.5)
MCHC RBC AUTO-ENTMCNC: 31.9 G/DL (ref 28.4–34.8)
MCHC RBC AUTO-ENTMCNC: 33.1 G/DL (ref 28.4–34.8)
MCV RBC AUTO: 105.6 FL (ref 82.6–102.9)
MCV RBC AUTO: 106.7 FL (ref 82.6–102.9)
MONOCYTES # BLD: 12 % (ref 3–12)
NRBC AUTOMATED: 0 PER 100 WBC
NRBC AUTOMATED: 0 PER 100 WBC
PDW BLD-RTO: 13.7 % (ref 11.8–14.4)
PDW BLD-RTO: 13.7 % (ref 11.8–14.4)
PLATELET # BLD: 244 K/UL (ref 138–453)
PLATELET # BLD: 269 K/UL (ref 138–453)
PLATELET ESTIMATE: ABNORMAL
PMV BLD AUTO: 10.5 FL (ref 8.1–13.5)
PMV BLD AUTO: 10.7 FL (ref 8.1–13.5)
POTASSIUM SERPL-SCNC: 5.1 MMOL/L (ref 3.7–5.3)
POTASSIUM SERPL-SCNC: 5.6 MMOL/L (ref 3.7–5.3)
PROSTATE SPECIFIC ANTIGEN: 0.41 UG/L
RBC # BLD: 2.66 M/UL (ref 4.21–5.77)
RBC # BLD: 2.67 M/UL (ref 4.21–5.77)
RBC # BLD: ABNORMAL 10*6/UL
SEG NEUTROPHILS: 56 % (ref 36–65)
SEGMENTED NEUTROPHILS ABSOLUTE COUNT: 4.5 K/UL (ref 1.5–8.1)
SODIUM BLD-SCNC: 136 MMOL/L (ref 135–144)
SODIUM BLD-SCNC: 138 MMOL/L (ref 135–144)
TOTAL PROTEIN: 6.8 G/DL (ref 6.4–8.3)
TOTAL PROTEIN: 7.2 G/DL (ref 6.4–8.3)
TRIGL SERPL-MCNC: 121 MG/DL
TSH SERPL DL<=0.05 MIU/L-ACNC: 6.91 MIU/L (ref 0.3–5)
VLDLC SERPL CALC-MCNC: ABNORMAL MG/DL (ref 1–30)
WBC # BLD: 7 K/UL (ref 3.5–11.3)
WBC # BLD: 8.1 K/UL (ref 3.5–11.3)
WBC # BLD: ABNORMAL 10*3/UL

## 2020-06-08 PROCEDURE — 83735 ASSAY OF MAGNESIUM: CPT

## 2020-06-08 PROCEDURE — 96374 THER/PROPH/DIAG INJ IV PUSH: CPT

## 2020-06-08 PROCEDURE — 99283 EMERGENCY DEPT VISIT LOW MDM: CPT

## 2020-06-08 PROCEDURE — 85025 COMPLETE CBC W/AUTO DIFF WBC: CPT

## 2020-06-08 PROCEDURE — 2580000003 HC RX 258: Performed by: STUDENT IN AN ORGANIZED HEALTH CARE EDUCATION/TRAINING PROGRAM

## 2020-06-08 PROCEDURE — G8427 DOCREV CUR MEDS BY ELIG CLIN: HCPCS | Performed by: FAMILY MEDICINE

## 2020-06-08 PROCEDURE — 3017F COLORECTAL CA SCREEN DOC REV: CPT | Performed by: FAMILY MEDICINE

## 2020-06-08 PROCEDURE — 1036F TOBACCO NON-USER: CPT | Performed by: FAMILY MEDICINE

## 2020-06-08 PROCEDURE — 80053 COMPREHEN METABOLIC PANEL: CPT

## 2020-06-08 PROCEDURE — G8417 CALC BMI ABV UP PARAM F/U: HCPCS | Performed by: FAMILY MEDICINE

## 2020-06-08 PROCEDURE — 6370000000 HC RX 637 (ALT 250 FOR IP): Performed by: STUDENT IN AN ORGANIZED HEALTH CARE EDUCATION/TRAINING PROGRAM

## 2020-06-08 PROCEDURE — 99214 OFFICE O/P EST MOD 30 MIN: CPT | Performed by: FAMILY MEDICINE

## 2020-06-08 PROCEDURE — 82330 ASSAY OF CALCIUM: CPT

## 2020-06-08 PROCEDURE — 93005 ELECTROCARDIOGRAM TRACING: CPT | Performed by: STUDENT IN AN ORGANIZED HEALTH CARE EDUCATION/TRAINING PROGRAM

## 2020-06-08 RX ORDER — DEXTROSE MONOHYDRATE 25 G/50ML
25 INJECTION, SOLUTION INTRAVENOUS ONCE
Status: COMPLETED | OUTPATIENT
Start: 2020-06-08 | End: 2020-06-08

## 2020-06-08 RX ADMIN — DEXTROSE MONOHYDRATE 25 G: 25 INJECTION, SOLUTION INTRAVENOUS at 20:52

## 2020-06-08 RX ADMIN — INSULIN HUMAN 10 UNITS: 100 INJECTION, SOLUTION PARENTERAL at 20:52

## 2020-06-08 NOTE — PROGRESS NOTES
Stocking   3. Erectile dysfunction due to arterial insufficiency     4. Chronic hypotension  Compression Stocking   5. Chronic atrial fibrillation  CBC    Comprehensive Metabolic Panel    Hemoglobin A1C    Lipid Panel    TSH without Reflex    Psa screening   6. Warfarin-induced coagulopathy (HCC)  CBC    Comprehensive Metabolic Panel    Hemoglobin A1C    Lipid Panel    TSH without Reflex    Psa screening   7. Acute deep vein thrombosis (DVT) of right lower extremity, unspecified vein (HCC)     8. S/P laparoscopic sleeve gastrectomy     9. Vitamin D deficiency     10. Lymphedema  Compression Stocking   11. Malignant neoplasm of transverse colon ([facility] [address]     12. Screening for hyperlipidemia     13. Chronic right-sided heart failure (HCC)     14. Peripheral vascular disease (HCC)  CBC    Comprehensive Metabolic Panel    Hemoglobin A1C    Lipid Panel    TSH without Reflex    Psa screening   15. Status post partial colectomy     16. Rotator cuff arthropathy of right shoulder             Plan:      49-year-old pleasant -American male returns for follow-up. He does not voice any distress, afebrile hemodynamically stable  Hyperlipidemia statin that he is tolerating well  Hypertension well-controlled to amiodarone. Consume less than 2 g of salt a day  End-stage renal disease. Home peritoneal dialysis that he is tolerating well  History of chronic hypertension on ProAmatine. Fall precaution is advised he is not expressing any orthostatic signs  Chronic A. fib. Rate controlled on Coumadin. INR is 2.4 he is counseled on Coumadin compatible diet  History of chronic DVT. Patient is asymptomatic  Right lower extremity lymphedema. Baseline stable. No tenderness. Continue compression stocking. New orders placed in  Hyperlipidemia on statin that he is tolerating well  History of sleeve gastrectomy. Patient states that he lost about 60 pounds however because of the COVID-19 quarantine, he has gained a few pounds.   He

## 2020-06-09 ENCOUNTER — CARE COORDINATION (OUTPATIENT)
Dept: CARE COORDINATION | Age: 50
End: 2020-06-09

## 2020-06-09 NOTE — ED PROVIDER NOTES
[facility]  Emergency Department Encounter  Emergency Medicine Resident         This patient was evaluated in the Emergency Department for symptoms described in the history of present illness. He/she was evaluated in the context of the global COVID-19 pandemic, which necessitated consideration that the patient might be at risk for infection with the SARS-CoV-2 virus that causes COVID-19. Institutional protocols and algorithms that pertain to the evaluation of patients at risk for COVID-19 are in a state of rapid change based on information released by regulatory bodies including the CDC and federal and state organizations. These policies and algorithms were followed during the patient's care in the ED. Pt Name: Haydee Hairston  MRN: [id number]  [place] 1970  Date of evaluation: 6/8/20  PCP:  Anthony Diaz MD    [address] [place] [address]       Chief Complaint   Patient presents with    Other     Ab labs, sen by pcp        HISTORY OF PRESENT ILLNESS  (Location/Symptom, Timing/Onset, Context/Setting, Quality, Duration, Modifying Factors, Severity.)    Haydee Hairston is a 44 y.o. male who presents with abnormal lab value of BUN/creatinine and potassium. Patient was sent in by his primary care doctor Dr. Kary Santana, patient is denying any symptoms at this time. He has past medical history of CKD on peritoneal dialysis daily. Patient has coronary artery disease hypertension hyperlipidemia and diabetes. Chronic atrial fibrillation for which she is rate controlled on Coumadin. Follows with Dr. Montague Pro nephoroplogy.        PAST MEDICAL / SURGICAL / SOCIAL / FAMILY HISTORY    has a past medical history of Anemia, Arthritis, Atrial fibrillation (HCC), BPH (benign prostatic hypertrophy), CAD (coronary artery disease), Caffeine use, Cellulitis of lower leg, Chronic back pain, Colon cancer (HCC), Cor pulmonale, acute ([facility] CRF (chronic renal failure), Erectile dysfunction, Gout, Hemodialysis patient ([facility] [address] medications on file       Dr. Suzi Parr.  1970 [facility]  Emergency Medicine Resident Physician, PGY-2    (Please note that portions of this note were completed with a voice recognition program.  Efforts were made to edit the dictations but occasionally words are mis-transcribed.)         Shelby Wolf DO  Resident  06/08/20 2146       Shelby Wolf DO  Resident  06/08/20 215

## 2020-06-09 NOTE — CARE COORDINATION
symptoms and risk factors. Patient referred for LOOP Program:   Patient's preferred e-mail:  [email or web address]  Patient's preferred phone number: [phone]  Care Plan: self monitoring  LOOP Provider: Cahs/Fong    Patient was seen by PCP yesterday and had labs and was sent to the ED with Creatinine elevation. Patient states he is currently at hemodialysis and being seen by Dr Tracey Grimaldo. States he feels just fine.    Enrolled patient in Loop Program.

## 2020-06-09 NOTE — ED PROVIDER NOTES
[address] [facility]     Emergency Department     Faculty Attestation    I performed a history and physical examination of the patient and discussed management with the resident. I reviewed the residents note and agree with the documented findings including all diagnostic interpretations and plan of care. Any areas of disagreement are noted on the chart. I was personally present for the key portions of any procedures. I have documented in the chart those procedures where I was not present during the key portions. I have reviewed the emergency nurses triage note. I agree with the chief complaint, past medical history, past surgical history, allergies, medications, social and family history as documented unless otherwise noted below. Documentation of the HPI, Physical Exam and Medical Decision Making performed by scribmerle is based on my personal performance of the HPI, PE and MDM. For Physician Assistant/ Nurse Practitioner cases/documentation I have personally evaluated this patient and have completed at least one if not all key elements of the E/M (history, physical exam, and MDM). Additional findings are as noted. This patient was evaluated in the Emergency Department for symptoms described in the history of present illness. He/she was evaluated in the context of the global COVID-19 pandemic, which necessitated consideration that the patient might be at risk for infection with the SARS-CoV-2 virus that causes COVID-19. Institutional protocols and algorithms that pertain to the evaluation of patients at risk for COVID-19 are in a state of rapid change based on information released by regulatory bodies including the CDC and federal and state organizations. These policies and algorithms were followed during the patient's care in the ED. Primary Care Physician: Sabina Oneli MD    History:  This is a 44 y.o. male who presents to the Emergency Department with

## 2020-06-10 LAB
EKG ATRIAL RATE: 85 BPM
EKG P AXIS: 50 DEGREES
EKG P-R INTERVAL: 192 MS
EKG Q-T INTERVAL: 386 MS
EKG QRS DURATION: 80 MS
EKG QTC CALCULATION (BAZETT): 459 MS
EKG R AXIS: 31 DEGREES
EKG T AXIS: 67 DEGREES
EKG VENTRICULAR RATE: 85 BPM

## 2020-06-10 PROCEDURE — 93010 ELECTROCARDIOGRAM REPORT: CPT | Performed by: INTERNAL MEDICINE

## 2020-06-24 RX ORDER — FOLIC ACID/VIT B COMPLEX AND C 0.8 MG
TABLET ORAL
Qty: 90 TABLET | Refills: 1 | Status: SHIPPED | OUTPATIENT
Start: 2020-06-24 | End: 2021-02-01

## 2020-06-29 ENCOUNTER — TELEPHONE (OUTPATIENT)
Dept: FAMILY MEDICINE CLINIC | Age: 50
End: 2020-06-29

## 2020-06-30 ENCOUNTER — TELEPHONE (OUTPATIENT)
Dept: PHARMACY | Age: 50
End: 2020-06-30

## 2020-06-30 NOTE — TELEPHONE ENCOUNTER
Patient called to inform us he having an outpatient surgery to remove a catheter. He wants to now how long to hold his warfarin. I advised the surgeon or physician performing the procedure usually decides how long to hold warfarin based on the bleeding risk. Then either his pcp or cardiologist or both greta medical clearance to procede with the surgery. Patient has already reached out to his cardiologist for clearance. I advised him to also ask about holding his aspirin. He is currently waiting for a call back. Upon reviewing his chart, I found he held warfarin for 3 days prior to a fistula creation in April 2020. I advised starting to hold warfarin today for a three day hold after speaking to his cardiologist.  Patient agreed to call back in the morning with an update. [address] [place] [address]  Ph., CACP, Clinical Pharmacist  Anticoagulation Services, [facility], [address] [facility]  6/30/2020  1:25 PM

## 2020-07-02 ENCOUNTER — TELEPHONE (OUTPATIENT)
Dept: PHARMACY | Age: 50
End: 2020-07-02

## 2020-07-02 ENCOUNTER — APPOINTMENT (OUTPATIENT)
Dept: PHARMACY | Age: 50
End: 2020-07-02
Payer: COMMERCIAL

## 2020-07-14 ENCOUNTER — TELEPHONE (OUTPATIENT)
Dept: PHARMACY | Age: 50
End: 2020-07-14

## 2020-07-22 ENCOUNTER — HOSPITAL ENCOUNTER (OUTPATIENT)
Dept: PHARMACY | Age: 50
Setting detail: THERAPIES SERIES
Discharge: HOME | End: 2020-07-22
Payer: COMMERCIAL

## 2020-07-22 VITALS — TEMPERATURE: 97.3 F

## 2020-07-22 LAB
INR BLD: 1.8
PROTIME: 21.4 SECONDS

## 2020-07-22 PROCEDURE — 85610 PROTHROMBIN TIME: CPT

## 2020-07-22 PROCEDURE — 99212 OFFICE O/P EST SF 10 MIN: CPT

## 2020-07-22 NOTE — PROGRESS NOTES
Medication Management Service, Warfarin Management  RAUL GARCIA [facility], [phone]  Visit Date: 7/22/2020   Subjective:   Pravin Scott is a 44 y.o. male who presents to clinic today for anticoagulation monitoring and adjustment. Patient seen in clinic for warfarin management due to  Indication:   atrial fibrillation and DVT. INR goal: of 2.0-3.0. Duration of therapy: indefinite. Assessment and PLAN   PT/INR done in office per protocol. INR today is 1.8, subtherapeutic. No cause identified. Plan: Will increase current regimen of warfarin by 3.8% to 3 mg every Sunday; 4 mg all other days of the week. Using warfarin 2 mg tablets. Recheck INR in 2 week(s) (08/05/2020)  Patient seen in room # 1. COVID screening complete and temperature recorded. Patient verbalized understanding of dosing directions and information discussed. Dosing schedule given to patient. Progress note sent to referring office. Patient acknowledges working in consult agreement with pharmacist as referred by his/her physician.       Electronically signed by Rich Jean-Baptiste on 7/22/20 at 9:48 AM EDT    CLINICAL PHARMACY CONSULT: MED RECONCILIATION/REVIEW [place]  22. Tracking Only    PHSO: No  Total # of Interventions Recommended: 1  - Increased Dose #: 1  - Maintenance Safety Lab Monitoring #: 1  Total Interventions Accepted: 1  Time Spent (min): 20    Rich Jean-Baptiste, Student Pharmacist

## 2020-08-05 ENCOUNTER — HOSPITAL ENCOUNTER (EMERGENCY)
Age: 50
Discharge: HOME | End: 2020-08-05
Attending: EMERGENCY MEDICINE
Payer: COMMERCIAL

## 2020-08-05 VITALS
DIASTOLIC BLOOD PRESSURE: 70 MMHG | TEMPERATURE: 97.9 F | HEIGHT: 70 IN | BODY MASS INDEX: 37.22 KG/M2 | WEIGHT: 260 LBS | HEART RATE: 98 BPM | RESPIRATION RATE: 16 BRPM | OXYGEN SATURATION: 98 % | SYSTOLIC BLOOD PRESSURE: 102 MMHG

## 2020-08-05 LAB
ANION GAP SERPL CALCULATED.3IONS-SCNC: 19 MMOL/L (ref 9–17)
BUN BLDV-MCNC: 49 MG/DL (ref 6–20)
BUN/CREAT BLD: ABNORMAL (ref 9–20)
CALCIUM SERPL-MCNC: 8.9 MG/DL (ref 8.6–10.4)
CHLORIDE BLD-SCNC: 91 MMOL/L (ref 98–107)
CO2: 28 MMOL/L (ref 20–31)
CREAT SERPL-MCNC: 9.89 MG/DL (ref 0.7–1.2)
GFR AFRICAN AMERICAN: 7 ML/MIN
GFR NON-AFRICAN AMERICAN: 6 ML/MIN
GFR SERPL CREATININE-BSD FRML MDRD: ABNORMAL ML/MIN/{1.73_M2}
GFR SERPL CREATININE-BSD FRML MDRD: ABNORMAL ML/MIN/{1.73_M2}
GLUCOSE BLD-MCNC: 104 MG/DL (ref 70–99)
HCT VFR BLD CALC: 39.6 % (ref 40.7–50.3)
HEMOGLOBIN: 12.8 G/DL (ref 13–17)
INR BLD: 1.6
MCH RBC QN AUTO: 34.4 PG (ref 25.2–33.5)
MCHC RBC AUTO-ENTMCNC: 32.3 G/DL (ref 28.4–34.8)
MCV RBC AUTO: 106.5 FL (ref 82.6–102.9)
NRBC AUTOMATED: 0 PER 100 WBC
PDW BLD-RTO: 15.9 % (ref 11.8–14.4)
PLATELET # BLD: 354 K/UL (ref 138–453)
PMV BLD AUTO: 9.4 FL (ref 8.1–13.5)
POTASSIUM SERPL-SCNC: 5.9 MMOL/L (ref 3.7–5.3)
PROTHROMBIN TIME: 16.2 SEC (ref 9–12)
RBC # BLD: 3.72 M/UL (ref 4.21–5.77)
SODIUM BLD-SCNC: 138 MMOL/L (ref 135–144)
WBC # BLD: 7.9 K/UL (ref 3.5–11.3)

## 2020-08-05 PROCEDURE — 10060 I&D ABSCESS SIMPLE/SINGLE: CPT

## 2020-08-05 PROCEDURE — 85610 PROTHROMBIN TIME: CPT

## 2020-08-05 PROCEDURE — 80048 BASIC METABOLIC PNL TOTAL CA: CPT

## 2020-08-05 PROCEDURE — 85027 COMPLETE CBC AUTOMATED: CPT

## 2020-08-05 PROCEDURE — 99283 EMERGENCY DEPT VISIT LOW MDM: CPT

## 2020-08-05 PROCEDURE — 2500000003 HC RX 250 WO HCPCS: Performed by: STUDENT IN AN ORGANIZED HEALTH CARE EDUCATION/TRAINING PROGRAM

## 2020-08-05 RX ORDER — DOXYCYCLINE HYCLATE 100 MG/1
100 CAPSULE ORAL 2 TIMES DAILY
Qty: 20 CAPSULE | Refills: 0 | Status: SHIPPED | OUTPATIENT
Start: 2020-08-05 | End: 2020-08-15

## 2020-08-05 RX ORDER — LIDOCAINE HYDROCHLORIDE AND EPINEPHRINE 10; 10 MG/ML; UG/ML
20 INJECTION, SOLUTION INFILTRATION; PERINEURAL ONCE
Status: COMPLETED | OUTPATIENT
Start: 2020-08-05 | End: 2020-08-05

## 2020-08-05 RX ORDER — ACETAMINOPHEN 500 MG
1000 TABLET ORAL EVERY 6 HOURS PRN
Qty: 180 TABLET | Refills: 0 | Status: SHIPPED | OUTPATIENT
Start: 2020-08-05 | End: 2021-01-08

## 2020-08-05 RX ORDER — ACETAMINOPHEN 500 MG
1000 TABLET ORAL ONCE
Status: DISCONTINUED | OUTPATIENT
Start: 2020-08-05 | End: 2020-08-05 | Stop reason: HOSPADM

## 2020-08-05 RX ADMIN — LIDOCAINE HYDROCHLORIDE,EPINEPHRINE BITARTRATE 20 ML: 10; .01 INJECTION, SOLUTION INFILTRATION; PERINEURAL at 07:51

## 2020-08-05 ASSESSMENT — ENCOUNTER SYMPTOMS
SHORTNESS OF BREATH: 0
DIARRHEA: 0
SINUS PRESSURE: 0
COUGH: 0
CONSTIPATION: 0
EYE ITCHING: 0
EYE PAIN: 0
SINUS PAIN: 0
SORE THROAT: 0
ABDOMINAL DISTENTION: 0
NAUSEA: 0
ABDOMINAL PAIN: 0

## 2020-08-05 ASSESSMENT — PAIN DESCRIPTION - ORIENTATION: ORIENTATION: RIGHT

## 2020-08-05 ASSESSMENT — PAIN DESCRIPTION - LOCATION: LOCATION: BUTTOCKS

## 2020-08-05 ASSESSMENT — PAIN SCALES - GENERAL: PAINLEVEL_OUTOF10: 8

## 2020-08-05 ASSESSMENT — PAIN DESCRIPTION - PAIN TYPE: TYPE: ACUTE PAIN

## 2020-08-05 NOTE — ED PROVIDER NOTES
[facility] ED  Emergency Department Encounter  EmergencyMedicine Resident     Pt Name:Hugh Avila  MRN: [id number]  Birthdate 1970  Date of evaluation: 8/5/20  PCP:  Monique Krishna MD    CHIEF COMPLAINT       Chief Complaint   Patient presents with    Abscess       HISTORY OF PRESENT ILLNESS  (Location/Symptom, Timing/Onset, Context/Setting, Quality, Duration, Modifying Factors, Severity.)      Christopher Orantes is a 44 y.o. male who presents with right buttock abscess of one month duration. He has never had this before and has never been seen for this before. Intermittently drains spontaneously. No fevers, cp, sob, abd pain, bowel movements have been normal. He is a 3x/week dialysis patient, LUE fistula. PAST MEDICAL / SURGICAL / SOCIAL / FAMILY HISTORY      has a past medical history of Anemia, Arthritis, Atrial fibrillation (HCC), BPH (benign prostatic hypertrophy), CAD (coronary artery disease), Caffeine use, Cellulitis of lower leg, Chronic back pain, Colon cancer ([address] Cor pulmonale, acute ([address] CRF (chronic renal failure), Erectile dysfunction, Gout, Hemodialysis patient ([address] History of blood transfusion, Hyperkalemia, Hyperlipidemia, Hypertension, Hypotension, Hypothyroidism, Lymphedema of leg, Obesity, and Thyroid disease. Reviewed      has a past surgical history that includes Appendectomy; Lap Band (2007); Abscess Drainage (Right, 01/20/2014); hc cath power picc single (1/24/2014); Dialysis fistula creation (Right, 3/27/15); shoulder surgery (Right, 2014); Bariatric Surgery (01/17/2017); Endoscopy, colon, diagnostic; Sleeve Gastrectomy (N/A, 9/25/2017); pr colsc flx w/rmvl of tumor polyp lesion snare tq (N/A, 11/15/2018); vascular surgery (Right); Colonoscopy; Abdominal exploration surgery (01/22/2019); Colonoscopy (01/22/2019); Small intestine surgery (N/A, 1/22/2019); and Colonoscopy (N/A, 1/22/2019).   Reviewed     Social History     Socioeconomic History    Marital status: Single     Spouse name: Not on file    Number of children: Not on file    Years of education: Not on file    Highest education level: Not on file   Occupational History    Not on file   Social Needs    Financial resource strain: Not on file    Food insecurity     Worry: Not on file     Inability: Not on file    Transportation needs     Medical: Not on file     Non-medical: Not on file   Tobacco Use    Smoking status: Never Smoker    Smokeless tobacco: Never Used   Substance and Sexual Activity    Alcohol use: Yes     Alcohol/week: 3.0 standard drinks     Types: 3 Glasses of wine per week     Comment: weekend    Drug use: No    Sexual activity: Yes     Partners: Female   Lifestyle    Physical activity     Days per week: Not on file     Minutes per session: Not on file    Stress: Not on file   Relationships    Social connections     Talks on phone: Not on file     Gets together: Not on file     Attends Faith service: Not on file     Active member of club or organization: Not on file     Attends meetings of clubs or organizations: Not on file     Relationship status: Not on file    Intimate partner violence     Fear of current or ex partner: Not on file     Emotionally abused: Not on file     Physically abused: Not on file     Forced sexual activity: Not on file   Other Topics Concern    Not on file   Social History Narrative    Not on file       Family History   Problem Relation Age of Onset    Cancer Father         leukemia    Heart Failure Mother     Arthritis Mother     High Blood Pressure Mother     Heart Disease Maternal Grandmother     Stroke Paternal Grandfather        Allergies:  Patient has no known allergies. Home Medications:  Prior to Admission medications    Medication Sig Start Date End Date Taking?  Authorizing Provider   doxycycline hyclate (VIBRAMYCIN) 100 MG capsule Take 1 capsule by mouth 2 times daily for 10 days 8/5/20 8/15/20 Yes Fabienne Trinidad, DO acetaminophen (TYLENOL) 500 MG tablet Take 2 tablets by mouth every 6 hours as needed for Pain or Fever 8/5/20  Yes Fabienne Samss, DO   B Complex-C-Folic Acid (NEPHRO-JENNIFER) 0.8 MG TABS TAKE ONE TABLET BY MOUTH DAILY WITH BREAKFAST 6/24/20   Manan Alvarez MD   warfarin (COUMADIN) 2 MG tablet TAKE 1 AND 1/2 OR 2 TABLETS (OR AS DIRECTED BY OFFICE) BY MOUTH ONCE DAILY 5/12/20   Manan Alvarez MD   midodrine (PROAMATINE) 10 MG tablet TAKE ONE TABLET BY MOUTH THREE TIMES DAILY 4/30/20   Manan Alvarez MD   vitamin D (ERGOCALCIFEROL) 1.25 MG (87962 UT) CAPS capsule TAKE ONE CAPSULE BY MOUTH ONCE A MONTH 12/31/19   Manan Alvarez MD   cinacalcet (SENSIPAR) 30 MG tablet Take 30 mg by mouth daily    Historical Provider, MD   B complex-vitamin C-folic acid (NEPHRO-JENNIFER) 1 MG tablet TAKE ONE TABLET BY MOUTH DAILY WITH BREAKFAST 9/3/19   Manan Alvarez MD   amiodarone (CORDARONE) 200 MG tablet TAKE ONE TABLET BY MOUTH DAILY 8/21/19   Manan Alvarez MD   ASPIRIN LOW DOSE 81 MG EC tablet TAKE ONE TABLET BY MOUTH DAILY 3/28/19   Manan Alvarez MD   HYDROcodone-acetaminophen (NORCO) 5-325 MG per tablet Take 1 tablet by mouth every 6 hours as needed for Pain. Brown Dangelo Historical Provider, MD   pravastatin (PRAVACHOL) 40 MG tablet Take 40 mg by mouth nightly     Historical Provider, MD   Elastic Bandages & Supports ([facility]) [address] [facility] 1 each by Does not apply route daily as needed (as needed) Right leg below the knee 20-30 HH MM DX 82.409 3/19/18   Manan Alvarez MD   Multiple Vitamin (MVI, CELEBRATE, CHEWABLE TABLET) Take 1 tablet by mouth 2 times daily     Historical Provider, MD       REVIEW OF SYSTEMS    (2-9 systems for level 4, 10 or more for level 5)      Review of Systems   Constitutional: Negative for activity change, chills and fever. HENT: Negative for congestion, sinus pressure, sinus pain and sore throat. Eyes: Negative for pain and itching. Respiratory: Negative for cough and shortness of breath. Cardiovascular: Negative for chest pain. Gastrointestinal: Negative for abdominal distention, abdominal pain, constipation, diarrhea and nausea. Endocrine: Negative for polyuria. Genitourinary: Negative for dysuria and frequency. Musculoskeletal: Negative for arthralgias. Skin: Negative for rash. Neurological: Negative for light-headedness and headaches.        PHYSICAL EXAM   (up to 7 for level 4, 8 or more for level 5)      INITIAL VITALS:   /70   Pulse 98   Temp 97.9 °F (36.6 °C) (Oral)   Resp 16   Ht 5' 10\" (1.778 m)   Wt 260 lb (117.9 kg)   SpO2 98%   BMI 37.31 kg/m²     Physical Exam   GEN: alert, oriented, calm, cooperative, in no acute distress, answering questions appropriately  HEENT: no scleral icterus or conjunctival injection, trachea midline  CV: regular, normal rate, no murmurs on auscultation, no peripheral edema, radial pulses intact  RESP: clear to auscultation without wheezes, or rhonchi, breathing comfortably on RA  ABD: soft, non-tender, nondistended   MSK: moves all extremities spontaneously  NEURO: CN II-XII grossly intact, distal sensation intact, 5/5 strength throughout  DERM: no bruises, rash or hematoma noted, right gluteal abscess noted, tracks up along the gluteal crease but no concern for sphincter involvement, not omayra-anal.     DIFFERENTIAL  DIAGNOSIS     PLAN (LABS / Alverna Rumble / EKG):  Orders Placed This Encounter   Procedures    CBC    BASIC METABOLIC PANEL       MEDICATIONS ORDERED:  Orders Placed This Encounter   Medications    acetaminophen (TYLENOL) tablet 1,000 mg    lidocaine-EPINEPHrine 1 percent-1:839214 injection 20 mL    doxycycline hyclate (VIBRAMYCIN) 100 MG capsule     Sig: Take 1 capsule by mouth 2 times daily for 10 days     Dispense:  20 capsule     Refill:  0    acetaminophen (TYLENOL) 500 MG tablet     Sig: Take 2 tablets by mouth every 6 hours as needed for Pain or Fever     Dispense:  180 tablet     Refill:  0       DDX: right I&D  5cc 1% lido w/epi  Vertical incision made over area of greatest fluctuance, wound irrigated w/NS. Cavity packed with 1/2\" packing strip and dressed with clean, dry gauze. CONSULTS:  None    CRITICAL CARE:  Please see attending note    FINAL IMPRESSION      1.  Abscess          DISPOSITION / PLAN     DISPOSITION Decision To Discharge 08/05/2020 08:14:27 AM      PATIENT REFERRED TO:  Peggy Membreno MD  [address] [address] [place] [phone]    In 3 days  As needed, If symptoms worsen      DISCHARGE MEDICATIONS:  Discharge Medication List as of 8/5/2020  8:09 AM      START taking these medications    Details   doxycycline hyclate (VIBRAMYCIN) 100 MG capsule Take 1 capsule by mouth 2 times daily for 10 days, Disp-20 capsule,R-0Print      acetaminophen (TYLENOL) 500 MG tablet Take 2 tablets by mouth every 6 hours as needed for Pain or Fever, Disp-180 tablet,R-0Print             Wili Juarez DO  Emergency Medicine Resident    (Please note that portions of thisnote were completed with a voice recognition program.  Efforts were made to edit the dictations but occasionally words are mis-transcribed.)      Wili Juarez DO  Resident  08/05/20 0950

## 2020-08-05 NOTE — ED TRIAGE NOTES
Pt c/o increase in right buttocks abscess x 1 month, states that pain to right inner cheek abscess is worsened and that it is difficult for him to sit, serosanguinous drng with foul odor noted, Dr Brooke Freire at bedside

## 2020-08-05 NOTE — ED NOTES
Dr. Braulio Morris and Dr. Ana Maria Vera at bedside to drain pt's abscess on his right buttocks, writer at bedside for chaperone for entire procedure     Christie Caledron RN  08/05/20 [id number]

## 2020-08-05 NOTE — ED PROVIDER NOTES
Care of [address] [place] [address] was assumed from previous attending and is being seen for Abscess  . The patient's initial evaluation and plan have been discussed with the prior provider who initially evaluated the patient. Handoff taken on the following patient from prior Attending Physician:    Ghazal Christine    I was available and discussed any additional care issues that arose and coordinated the management plans with the resident(s) caring for the patient during my duty period. Any areas of disagreement with residents documentation of care or procedures are noted on the chart. I was personally present for the key portions of any/all procedures during my duty period. I have documented in the chart those procedures where I was not present during the key portions. EMERGENCY DEPARTMENT COURSE / MEDICAL DECISION MAKING:       MEDICATIONS GIVEN:  Orders Placed This Encounter   Medications    acetaminophen (TYLENOL) tablet 1,000 mg    lidocaine-EPINEPHrine 1 percent-1:829341 injection 20 mL       LABS / RADIOLOGY:     Labs Reviewed   CBC - Abnormal; Notable for the following components:       Result Value    RBC 3.72 (*)     Hemoglobin 12.8 (*)     Hematocrit 39.6 (*)     .5 (*)     MCH 34.4 (*)     RDW 15.9 (*)     All other components within normal limits   BASIC METABOLIC PANEL - Abnormal; Notable for the following components:    Glucose 104 (*)     BUN 49 (*)     CREATININE 9.89 (*)     Potassium 5.9 (*)     Chloride 91 (*)     Anion Gap 19 (*)     GFR Non- 6 (*)     GFR  7 (*)     All other components within normal limits       No results found. RECENT VITALS:     Temp: 97.9 °F (36.6 °C),  Pulse: 98, Resp: 16, BP: 102/70, SpO2: 98 %    This patient is a 44 y.o. Male with rectal abscess    OUTSTANDING TASKS / RECOMMENDATIONS:    1. I/D  2.  Abx  3. discharge      Michele Blackwood DO  Attending Emergency Physician  [facility]       Cale Sibley DO  08/05/20 8087

## 2020-08-18 RX ORDER — WARFARIN SODIUM 2 MG/1
TABLET ORAL
Qty: 180 TABLET | Refills: 0 | Status: SHIPPED | OUTPATIENT
Start: 2020-08-18 | End: 2020-12-10 | Stop reason: ALTCHOICE

## 2020-08-18 NOTE — TELEPHONE ENCOUNTER
Breanne Griffith is calling to request a refill on the following medication(s):    Medication Request:  Last filled 5/12/2020 90 days with 0 RF  Requested Prescriptions     Pending Prescriptions Disp Refills    warfarin (COUMADIN) 2 MG tablet [Pharmacy Med Name: WARFARIN 2MG TABLET] 180 tablet 0     Sig: TAKE 1 AND 1/2 TABLETS OR 2 TABLETS (OR AS DIRECTED BY OFFICE) BY MOUTH ONCE DAILY       Last Visit Date (If Applicable):  1/5/0060    Next Visit Date:    10/8/2020

## 2020-08-19 ENCOUNTER — HOSPITAL ENCOUNTER (OUTPATIENT)
Dept: PHARMACY | Age: 50
Setting detail: THERAPIES SERIES
Discharge: HOME | End: 2020-08-19
Payer: COMMERCIAL

## 2020-08-19 VITALS — TEMPERATURE: 97.3 F

## 2020-08-19 LAB
INR BLD: 2.2
PROTIME: 25.9 SECONDS

## 2020-08-19 PROCEDURE — 85610 PROTHROMBIN TIME: CPT

## 2020-08-19 PROCEDURE — 99211 OFF/OP EST MAY X REQ PHY/QHP: CPT

## 2020-08-19 NOTE — PROGRESS NOTES
Medication Management Service, Warfarin Management  RAUL GARCIA [facility], [phone]  Visit Date: 8/19/2020   Subjective:   Linette Suarez is a 44 y.o. male who presents to clinic today for anticoagulation monitoring and adjustment. Patient seen in clinic for warfarin management due to  Indication:   atrial fibrillation and DVT. INR goal: of 2.0-3.0. Duration of therapy: indefinite. Assessment and PLAN   PT/INR done in office per protocol. INR today is 2.2, therapeutic. Plan: Will continue current regimen of warfarin 5 mg every Thu; 4 mg all other days of the week. Using warfarin 2 mg tablets. Recheck INR in 3 week(s). Patient seen in room # 1. COVID screening complete and temperature recorded. Patient verbalized understanding of dosing directions and information discussed. Dosing schedule given to patient. Progress note sent to referring office. Patient acknowledges working in consult agreement with pharmacist as referred by his/her physician.       Electronically signed by Demarcus Choi on 8/19/20 at 8:57 AM EDT    CLINICAL PHARMACY CONSULT: MED RECONCILIATION/REVIEW [place]  22. Tracking Only    PHSO: No  Total # of Interventions Recommended: 0  - Maintenance Safety Lab Monitoring #: 1  Total Interventions Accepted: 0  Time Spent (min): [address], PharmD  [address]

## 2020-08-27 ENCOUNTER — APPOINTMENT (OUTPATIENT)
Dept: GENERAL RADIOLOGY | Age: 50
DRG: 177 | End: 2020-08-27
Payer: COMMERCIAL

## 2020-08-27 ENCOUNTER — HOSPITAL ENCOUNTER (INPATIENT)
Age: 50
LOS: 7 days | Discharge: HOME HEALTH | DRG: 177 | End: 2020-09-03
Attending: EMERGENCY MEDICINE | Admitting: INTERNAL MEDICINE
Payer: COMMERCIAL

## 2020-08-27 PROBLEM — R09.02 HYPOXEMIA: Status: ACTIVE | Noted: 2020-08-27

## 2020-08-27 PROBLEM — U07.1 COVID-19: Status: ACTIVE | Noted: 2020-08-27

## 2020-08-27 PROBLEM — R55 SYNCOPE: Status: ACTIVE | Noted: 2020-08-27

## 2020-08-27 LAB
ABSOLUTE EOS #: <0.03 K/UL (ref 0–0.44)
ABSOLUTE IMMATURE GRANULOCYTE: 0.06 K/UL (ref 0–0.3)
ABSOLUTE LYMPH #: 2.26 K/UL (ref 1.1–3.7)
ABSOLUTE MONO #: 0.82 K/UL (ref 0.1–1.2)
ALBUMIN SERPL-MCNC: 3.8 G/DL (ref 3.5–5.2)
ALBUMIN/GLOBULIN RATIO: 0.9 (ref 1–2.5)
ALLEN TEST: ABNORMAL
ALP BLD-CCNC: 78 U/L (ref 40–129)
ALT SERPL-CCNC: 90 U/L (ref 5–41)
ANION GAP SERPL CALCULATED.3IONS-SCNC: 20 MMOL/L (ref 9–17)
ANION GAP: 9 MMOL/L (ref 7–16)
AST SERPL-CCNC: 107 U/L
BASOPHILS # BLD: 0 % (ref 0–2)
BASOPHILS ABSOLUTE: 0.03 K/UL (ref 0–0.2)
BILIRUB SERPL-MCNC: 0.43 MG/DL (ref 0.3–1.2)
BNP INTERPRETATION: ABNORMAL
BUN BLDV-MCNC: 17 MG/DL (ref 6–20)
BUN/CREAT BLD: ABNORMAL (ref 9–20)
CALCIUM SERPL-MCNC: 8.3 MG/DL (ref 8.6–10.4)
CHLORIDE BLD-SCNC: 89 MMOL/L (ref 98–107)
CO2: 24 MMOL/L (ref 20–31)
CREAT SERPL-MCNC: 7.62 MG/DL (ref 0.7–1.2)
DIFFERENTIAL TYPE: ABNORMAL
EOSINOPHILS RELATIVE PERCENT: 0 % (ref 1–4)
FIO2: ABNORMAL
GFR AFRICAN AMERICAN: 9 ML/MIN
GFR NON-AFRICAN AMERICAN: 8 ML/MIN
GFR NON-AFRICAN AMERICAN: ABNORMAL ML/MIN
GFR SERPL CREATININE-BSD FRML MDRD: ABNORMAL ML/MIN
GFR SERPL CREATININE-BSD FRML MDRD: ABNORMAL ML/MIN/{1.73_M2}
GLUCOSE BLD-MCNC: 110 MG/DL (ref 74–100)
GLUCOSE BLD-MCNC: 93 MG/DL (ref 75–110)
GLUCOSE BLD-MCNC: 95 MG/DL (ref 70–99)
HCO3 VENOUS: 31.2 MMOL/L (ref 22–29)
HCT VFR BLD CALC: 41.9 % (ref 40.7–50.3)
HEMOGLOBIN: 13.9 G/DL (ref 13–17)
IMMATURE GRANULOCYTES: 1 %
INR BLD: 1.3
LACTIC ACID, SEPSIS WHOLE BLOOD: 4.3 MMOL/L (ref 0.5–1.9)
LACTIC ACID, SEPSIS: ABNORMAL MMOL/L (ref 0.5–1.9)
LYMPHOCYTES # BLD: 34 % (ref 24–43)
MAGNESIUM: 2.3 MG/DL (ref 1.6–2.6)
MCH RBC QN AUTO: 34.8 PG (ref 25.2–33.5)
MCHC RBC AUTO-ENTMCNC: 33.2 G/DL (ref 28.4–34.8)
MCV RBC AUTO: 105 FL (ref 82.6–102.9)
MODE: ABNORMAL
MONOCYTES # BLD: 12 % (ref 3–12)
NEGATIVE BASE EXCESS, VEN: ABNORMAL (ref 0–2)
NRBC AUTOMATED: 0.4 PER 100 WBC
O2 DEVICE/FLOW/%: ABNORMAL
O2 SAT, VEN: 58 % (ref 60–85)
PARTIAL THROMBOPLASTIN TIME: 31.2 SEC (ref 20.5–30.5)
PATIENT TEMP: ABNORMAL
PCO2, VEN: 47.1 MM HG (ref 41–51)
PDW BLD-RTO: 15.5 % (ref 11.8–14.4)
PH VENOUS: 7.43 (ref 7.32–7.43)
PLATELET # BLD: 219 K/UL (ref 138–453)
PLATELET ESTIMATE: ABNORMAL
PMV BLD AUTO: 10.8 FL (ref 8.1–13.5)
PO2, VEN: 30 MM HG (ref 30–50)
POC CHLORIDE: 93 MMOL/L (ref 98–107)
POC CREATININE: 8.34 MG/DL (ref 0.51–1.19)
POC HEMATOCRIT: 45 % (ref 41–53)
POC HEMOGLOBIN: 15.3 G/DL (ref 13.5–17.5)
POC IONIZED CALCIUM: 0.86 MMOL/L (ref 1.15–1.33)
POC LACTIC ACID: 4.25 MMOL/L (ref 0.56–1.39)
POC PCO2 TEMP: ABNORMAL MM HG
POC PH TEMP: ABNORMAL
POC PO2 TEMP: ABNORMAL MM HG
POC POTASSIUM: 6.5 MMOL/L (ref 3.5–4.5)
POC SODIUM: 133 MMOL/L (ref 138–146)
POSITIVE BASE EXCESS, VEN: 6 (ref 0–3)
POTASSIUM SERPL-SCNC: 4.2 MMOL/L (ref 3.7–5.3)
PRO-BNP: 2377 PG/ML
PROTHROMBIN TIME: 13.1 SEC (ref 9–12)
RBC # BLD: 3.99 M/UL (ref 4.21–5.77)
RBC # BLD: ABNORMAL 10*6/UL
SAMPLE SITE: ABNORMAL
SARS-COV-2, PCR: ABNORMAL
SARS-COV-2, RAPID: DETECTED
SARS-COV-2: ABNORMAL
SEG NEUTROPHILS: 53 % (ref 36–65)
SEGMENTED NEUTROPHILS ABSOLUTE COUNT: 3.51 K/UL (ref 1.5–8.1)
SODIUM BLD-SCNC: 133 MMOL/L (ref 135–144)
SOURCE: ABNORMAL
THYROXINE, FREE: 0.9 NG/DL (ref 0.93–1.7)
TOTAL CO2, VENOUS: 33 MMOL/L (ref 23–30)
TOTAL PROTEIN: 8.2 G/DL (ref 6.4–8.3)
TROPONIN INTERP: ABNORMAL
TROPONIN INTERP: ABNORMAL
TROPONIN T: ABNORMAL NG/ML
TROPONIN T: ABNORMAL NG/ML
TROPONIN, HIGH SENSITIVITY: 87 NG/L (ref 0–22)
TROPONIN, HIGH SENSITIVITY: 98 NG/L (ref 0–22)
TSH SERPL DL<=0.05 MIU/L-ACNC: 8.73 MIU/L (ref 0.3–5)
WBC # BLD: 6.7 K/UL (ref 3.5–11.3)
WBC # BLD: ABNORMAL 10*3/UL

## 2020-08-27 PROCEDURE — 2580000003 HC RX 258: Performed by: GENERAL PRACTICE

## 2020-08-27 PROCEDURE — 82565 ASSAY OF CREATININE: CPT

## 2020-08-27 PROCEDURE — 6360000002 HC RX W HCPCS: Performed by: GENERAL PRACTICE

## 2020-08-27 PROCEDURE — 86403 PARTICLE AGGLUT ANTBDY SCRN: CPT

## 2020-08-27 PROCEDURE — 82803 BLOOD GASES ANY COMBINATION: CPT

## 2020-08-27 PROCEDURE — 84439 ASSAY OF FREE THYROXINE: CPT

## 2020-08-27 PROCEDURE — 85730 THROMBOPLASTIN TIME PARTIAL: CPT

## 2020-08-27 PROCEDURE — 83605 ASSAY OF LACTIC ACID: CPT

## 2020-08-27 PROCEDURE — 87205 SMEAR GRAM STAIN: CPT

## 2020-08-27 PROCEDURE — 99222 1ST HOSP IP/OBS MODERATE 55: CPT | Performed by: NURSE PRACTITIONER

## 2020-08-27 PROCEDURE — 87150 DNA/RNA AMPLIFIED PROBE: CPT

## 2020-08-27 PROCEDURE — 99285 EMERGENCY DEPT VISIT HI MDM: CPT

## 2020-08-27 PROCEDURE — 93005 ELECTROCARDIOGRAM TRACING: CPT | Performed by: GENERAL PRACTICE

## 2020-08-27 PROCEDURE — 84484 ASSAY OF TROPONIN QUANT: CPT

## 2020-08-27 PROCEDURE — 83880 ASSAY OF NATRIURETIC PEPTIDE: CPT

## 2020-08-27 PROCEDURE — 87040 BLOOD CULTURE FOR BACTERIA: CPT

## 2020-08-27 PROCEDURE — 84295 ASSAY OF SERUM SODIUM: CPT

## 2020-08-27 PROCEDURE — 84132 ASSAY OF SERUM POTASSIUM: CPT

## 2020-08-27 PROCEDURE — 1200000000 HC SEMI PRIVATE

## 2020-08-27 PROCEDURE — 82330 ASSAY OF CALCIUM: CPT

## 2020-08-27 PROCEDURE — 80053 COMPREHEN METABOLIC PANEL: CPT

## 2020-08-27 PROCEDURE — 6360000002 HC RX W HCPCS: Performed by: STUDENT IN AN ORGANIZED HEALTH CARE EDUCATION/TRAINING PROGRAM

## 2020-08-27 PROCEDURE — 84443 ASSAY THYROID STIM HORMONE: CPT

## 2020-08-27 PROCEDURE — 83735 ASSAY OF MAGNESIUM: CPT

## 2020-08-27 PROCEDURE — 71045 X-RAY EXAM CHEST 1 VIEW: CPT

## 2020-08-27 PROCEDURE — 2000000000 HC ICU R&B

## 2020-08-27 PROCEDURE — U0002 COVID-19 LAB TEST NON-CDC: HCPCS

## 2020-08-27 PROCEDURE — 85610 PROTHROMBIN TIME: CPT

## 2020-08-27 PROCEDURE — 85014 HEMATOCRIT: CPT

## 2020-08-27 PROCEDURE — 85025 COMPLETE CBC W/AUTO DIFF WBC: CPT

## 2020-08-27 PROCEDURE — 82947 ASSAY GLUCOSE BLOOD QUANT: CPT

## 2020-08-27 PROCEDURE — 82435 ASSAY OF BLOOD CHLORIDE: CPT

## 2020-08-27 RX ORDER — ACETAMINOPHEN 325 MG/1
650 TABLET ORAL EVERY 4 HOURS PRN
Status: DISCONTINUED | OUTPATIENT
Start: 2020-08-27 | End: 2020-09-03 | Stop reason: HOSPADM

## 2020-08-27 RX ORDER — ACETAMINOPHEN 325 MG/1
650 TABLET ORAL EVERY 6 HOURS PRN
Status: DISCONTINUED | OUTPATIENT
Start: 2020-08-27 | End: 2020-09-03 | Stop reason: HOSPADM

## 2020-08-27 RX ORDER — POLYETHYLENE GLYCOL 3350 17 G/17G
17 POWDER, FOR SOLUTION ORAL DAILY PRN
Status: DISCONTINUED | OUTPATIENT
Start: 2020-08-27 | End: 2020-09-03 | Stop reason: HOSPADM

## 2020-08-27 RX ORDER — SODIUM CHLORIDE 0.9 % (FLUSH) 0.9 %
10 SYRINGE (ML) INJECTION PRN
Status: DISCONTINUED | OUTPATIENT
Start: 2020-08-27 | End: 2020-08-27 | Stop reason: SDUPTHER

## 2020-08-27 RX ORDER — DEXAMETHASONE SODIUM PHOSPHATE 10 MG/ML
10 INJECTION INTRAMUSCULAR; INTRAVENOUS ONCE
Status: COMPLETED | OUTPATIENT
Start: 2020-08-27 | End: 2020-08-27

## 2020-08-27 RX ORDER — DEXAMETHASONE SODIUM PHOSPHATE 4 MG/ML
INJECTION, SOLUTION INTRA-ARTICULAR; INTRALESIONAL; INTRAMUSCULAR; INTRAVENOUS; SOFT TISSUE
Status: DISPENSED
Start: 2020-08-27 | End: 2020-08-28

## 2020-08-27 RX ORDER — WARFARIN SODIUM 2.5 MG/1
2.5 TABLET ORAL DAILY
Status: DISCONTINUED | OUTPATIENT
Start: 2020-08-27 | End: 2020-09-02

## 2020-08-27 RX ORDER — ACETAMINOPHEN 650 MG/1
650 SUPPOSITORY RECTAL EVERY 6 HOURS PRN
Status: DISCONTINUED | OUTPATIENT
Start: 2020-08-27 | End: 2020-09-03 | Stop reason: HOSPADM

## 2020-08-27 RX ORDER — PROMETHAZINE HYDROCHLORIDE 25 MG/1
12.5 TABLET ORAL EVERY 6 HOURS PRN
Status: DISCONTINUED | OUTPATIENT
Start: 2020-08-27 | End: 2020-09-03 | Stop reason: HOSPADM

## 2020-08-27 RX ORDER — SODIUM CHLORIDE 0.9 % (FLUSH) 0.9 %
10 SYRINGE (ML) INJECTION EVERY 12 HOURS SCHEDULED
Status: DISCONTINUED | OUTPATIENT
Start: 2020-08-27 | End: 2020-08-27 | Stop reason: SDUPTHER

## 2020-08-27 RX ORDER — SODIUM CHLORIDE 0.9 % (FLUSH) 0.9 %
10 SYRINGE (ML) INJECTION EVERY 12 HOURS SCHEDULED
Status: DISCONTINUED | OUTPATIENT
Start: 2020-08-27 | End: 2020-09-03 | Stop reason: HOSPADM

## 2020-08-27 RX ORDER — SODIUM CHLORIDE 0.9 % (FLUSH) 0.9 %
10 SYRINGE (ML) INJECTION PRN
Status: DISCONTINUED | OUTPATIENT
Start: 2020-08-27 | End: 2020-09-03 | Stop reason: HOSPADM

## 2020-08-27 RX ORDER — HEPARIN SODIUM 5000 [USP'U]/ML
5000 INJECTION, SOLUTION INTRAVENOUS; SUBCUTANEOUS EVERY 8 HOURS SCHEDULED
Status: DISCONTINUED | OUTPATIENT
Start: 2020-08-27 | End: 2020-08-29

## 2020-08-27 RX ORDER — AMIODARONE HYDROCHLORIDE 200 MG/1
200 TABLET ORAL DAILY
Status: DISCONTINUED | OUTPATIENT
Start: 2020-08-27 | End: 2020-09-03 | Stop reason: HOSPADM

## 2020-08-27 RX ORDER — MIDODRINE HYDROCHLORIDE 5 MG/1
10 TABLET ORAL
Status: DISCONTINUED | OUTPATIENT
Start: 2020-08-28 | End: 2020-09-03

## 2020-08-27 RX ORDER — PRAVASTATIN SODIUM 20 MG
40 TABLET ORAL NIGHTLY
Status: DISCONTINUED | OUTPATIENT
Start: 2020-08-27 | End: 2020-08-27

## 2020-08-27 RX ORDER — ONDANSETRON 2 MG/ML
4 INJECTION INTRAMUSCULAR; INTRAVENOUS EVERY 6 HOURS PRN
Status: DISCONTINUED | OUTPATIENT
Start: 2020-08-27 | End: 2020-09-03 | Stop reason: HOSPADM

## 2020-08-27 RX ADMIN — Medication 500 MG: at 17:57

## 2020-08-27 RX ADMIN — DEXAMETHASONE SODIUM PHOSPHATE 10 MG: 10 INJECTION INTRAMUSCULAR; INTRAVENOUS at 17:57

## 2020-08-27 RX ADMIN — CEFTRIAXONE SODIUM 1 G: 1 INJECTION, POWDER, FOR SOLUTION INTRAMUSCULAR; INTRAVENOUS at 16:40

## 2020-08-27 ASSESSMENT — ENCOUNTER SYMPTOMS
BLOOD IN STOOL: 0
ABDOMINAL PAIN: 0
COUGH: 0
CONSTIPATION: 0
TROUBLE SWALLOWING: 0
NAUSEA: 0
NAUSEA: 1
SHORTNESS OF BREATH: 1
DIARRHEA: 1
VOMITING: 1
EYES NEGATIVE: 1
SORE THROAT: 0

## 2020-08-27 ASSESSMENT — PAIN SCALES - GENERAL: PAINLEVEL_OUTOF10: 0

## 2020-08-27 NOTE — ED PROVIDER NOTES
[facility]  Emergency Department  Emergency Medicine Resident Sign-out     Care of Altaf Moody was assumed from Dr. Shae Barnes and is being seen for Respiratory Distress (Pt c/o SOB x 5 -7 days, with loss of appetite, nausea, and vomiting. )  . The patient's initial evaluation and plan have been discussed with the prior provider who initially evaluated the patient.      EMERGENCY DEPARTMENT COURSE / MEDICAL DECISION MAKING:       MEDICATIONS GIVEN:  Orders Placed This Encounter   Medications    cefTRIAXone (ROCEPHIN) 1 g IVPB in 50 mL D5W minibag    DISCONTD: azithromycin (ZITHROMAX) 500 mg in dextrose 5% 250 mL IVPB    azithromycin (ZITHROMAX) 500 mg in dextrose 5% 250 mL IVPB       LABS / RADIOLOGY:     Labs Reviewed   TROPONIN - Abnormal; Notable for the following components:       Result Value    Troponin, High Sensitivity 87 (*)     All other components within normal limits   TROPONIN - Abnormal; Notable for the following components:    Troponin, High Sensitivity 98 (*)     All other components within normal limits   CBC WITH AUTO DIFFERENTIAL - Abnormal; Notable for the following components:    RBC 3.99 (*)     .0 (*)     MCH 34.8 (*)     RDW 15.5 (*)     NRBC Automated 0.4 (*)     Eosinophils % 0 (*)     Immature Granulocytes 1 (*)     All other components within normal limits   COVID-19 - Abnormal; Notable for the following components:    SARS-CoV-2, Rapid DETECTED (*)     All other components within normal limits   BRAIN NATRIURETIC PEPTIDE - Abnormal; Notable for the following components:    Pro-BNP 2,377 (*)     All other components within normal limits   COMPREHENSIVE METABOLIC PANEL - Abnormal; Notable for the following components:    CREATININE 7.62 (*)     Calcium 8.3 (*)     Sodium 133 (*)     Chloride 89 (*)     Anion Gap 20 (*)     ALT 90 (*)      (*)     Albumin/Globulin Ratio 0.9 (*)     GFR Non- 8 (*)     GFR  9 (*) All other components within normal limits   LACTATE, SEPSIS - Abnormal; Notable for the following components:    Lactic Acid, Sepsis, Whole Blood 4.3 (*)     All other components within normal limits   PROTIME-INR - Abnormal; Notable for the following components:    Protime 13.1 (*)     All other components within normal limits   APTT - Abnormal; Notable for the following components:    PTT 31.2 (*)     All other components within normal limits   TSH WITH REFLEX - Abnormal; Notable for the following components:    TSH 8.73 (*)     All other components within normal limits   T4, FREE - Abnormal; Notable for the following components:    Thyroxine, Free 0.90 (*)     All other components within normal limits   CULTURE, BLOOD 1   CULTURE, BLOOD 1   MAGNESIUM   POC BLOOD GAS AND CHEMISTRY   POC GLUCOSE FINGERSTICK       Xr Chest Portable    Result Date: 8/27/2020  EXAMINATION: ONE XRAY VIEW OF THE CHEST 8/27/2020 1:59 pm COMPARISON: February 1, 2019, chest examination HISTORY: ORDERING SYSTEM PROVIDED HISTORY: syncope TECHNOLOGIST PROVIDED HISTORY: syncope Reason for Exam: Syncope/  AP erect/  gp used/  port. Acuity: Unknown Type of Exam: Unknown FINDINGS: Borderline cardiomegaly Moderate patchy right basilar infiltrate with mild left lingular and possible left basilar infiltrate. Limited penetration of the left lower lung. Clear upper lungs No significant pleural process Degenerative changes of the right acromioclavicular joint and thoracic spine     Limited left retrocardiac assessment. Mild streaky lingular atelectasis Moderate patchy right basilar infiltrate, pneumonia the likely etiology follow changes to resolution RECOMMENDATION: Upright well penetrated PA view of the chest for more accurate assessment of the left lung base       RECENT VITALS:     Temp: 100.7 °F (38.2 °C),  Pulse: 86, Resp: 13, BP: (!) 122/50, SpO2: (!) 84 %    ED Course as of Aug 27 1746   Thu Aug 27, 2020   1657 Poor IV access.   Troponins trending up. Attending would like PE study. Consider central line. Will discuss. [MS]   1655 Consider Decadron    [MS]      ED Course User Index  [MS] Rosie Ramirez DO       This patient is a 44 y.o. Male with end-stage renal dialysis. Tuesday Thursday Saturday. got full dialysis today. Decreased responsiveness. Found to be hypoxic with a temperature of 100.7. Has COVID. On 3 L nasal cannula. Remaining hypoxic. Patient found to have questionable infiltrate. Started on Rocephin and azithromycin. Will get CT PE. Consider steroids. Convalescent plasma patient potentially. Patient transferred to floor prior to PE study. Will get PE study on floor. Nephrology agreeable to getting PE study if indicated. OUTSTANDING TASKS / RECOMMENDATIONS:    1. CT PE, discussed with nephrology  2. Admission     FINAL IMPRESSION:     1. COVID-19    2. Syncope and collapse    3.  Pneumonia due to organism        DISPOSITION:         DISPOSITION:  []  Discharge   []  Transfer -    [x]  Admission -     []  Against Medical Advice   []  Eloped   FOLLOW-UP: Madeline Calderón MD  [address] [address] [facility]  [phone]           DISCHARGE MEDICATIONS: New Prescriptions    No medications on file          Rosie Ramirez DO  Emergency Medicine Resident  [facility]       Rosie Ramirez [place]  Resident  08/28/20 1421

## 2020-08-27 NOTE — H&P
[facility]  Office: [address], DO, Erich Garsia, DO, Mica Philippe, DO, Kandice Everett Blood, DO, Cecil Dc MD, Claudia Peralta MD, Johny Hagen MD, Edgar Dupree MD, Nirav Joaquin MD, Demarcus Beaulieu MD, Minerva Boyle MD, Gonzalo Velasco MD, hSanon Victoria MD, Alex Maria, DO, Kierra Thompson MD, Monika Mcgrath MD, Marla Huynh DO, Inge Valdez MD,  Judy Carlos, DO, Kelly Narayan MD, Felix Alexandre MD, [facility], [facility], CNP, Jose Manuel Yun, CNP, Sukhwinder Polanco, CNS, Noah Barthel, CNP, Autumn Limon, CNP, Deric Kelly, CNP, Acacia Bone, CNP, Duyen Sibley, CNP, Yasir Weber PA-C, [facility], [facility], Lokesh Crain, CNP, Carly Luo, CNP, Marilou Aquino, CNP, David Douglas, CNP, Ced Sparks, CNP         [facility]   [address]    HISTORY AND PHYSICAL EXAMINATION            Date:   8/27/2020  Patient name:  Saeed Avila  Date of admission:  8/27/2020 12:37 PM  MRN:   [id number]  Account:  [de-identified]  YOB: 1970  PCP:    Nirmal Allan MD  Room:   21/21  Code Status:    Prior    Chief Complaint:     Chief Complaint   Patient presents with    Respiratory Distress     Pt c/o SOB x 5 -7 days, with loss of appetite, nausea, and vomiting. History Obtained From:     patient, family member - sister, electronic medical record    History of Present Illness:     Kenia Holland is a 44 y.o. Non-/non  male who presents with Respiratory Distress (Pt c/o SOB x 5 -7 days, with loss of appetite, nausea, and vomiting. )   and is admitted to the hospital for the management of Syncope. Mr. Sabrina Perez presents to ED today via EMS with complaints of syncopal episode. Patient stated he received hemodialysis this morning, drove home, and awoke to his neighbor at his car door.   He he was told by his neighbor that he had been sitting there for \"a little while\", the neighbor was concerned and called EMS for transport to ED for evaluation. He does complain of some respiratory distress over the last week with shortness of breath but denies cough or fever. He complains of some sensitivity to his scalp, as well as general aching and malaise. He also has had decreased appetite, nausea and vomiting over the past 7 days. He states he was tested for COVID-19 in June and he was found to be negative. He lives at home with his son. He is compliant with his medications. Independent in his ADLs. Takes his prescribed medications. He drives himself to hemodialysis on Tuesdays Thursdays and Saturdays. He is resting at this time without any complaints. ED course of treatment found him to be COVID-19 positive with an elevated troponin at 87 and a lactic acid at 4.3. He initially was requiring supplemental oxygen at 3 L and had a fever of 100.7 Fahrenheit    Past Medical History:     Past Medical History:   Diagnosis Date    Anemia     Arthritis     right shoulder/ PCP Dr. Suzan Carrington    Atrial fibrillation ([address]     [address] BPH (benign prostatic hypertrophy)     CAD (coronary artery disease)     Dr. Lisha Norris    Caffeine use     1 coffee, 2 tea/day    Cellulitis of lower leg     right    Chronic back pain     Colon cancer ([address]     colon    Cor pulmonale, acute ([address] 12/30/2014    CRF (chronic renal failure)     dr. Basil Salinas on [employer]. Tues/ thurs/ sat/ right arm fistula    Erectile dysfunction     Gout     Hemodialysis patient ([address]     tues/ thurs/ sat/ DR. Petersen/ fistula right lower arm    History of blood transfusion     no reaction    Hyperkalemia 12/24/2013    Hyperlipidemia     Hypertension     Hypotension     Hypothyroidism     Lymphedema of leg     right    Obesity     Thyroid disease         Past Surgical History:     Past Surgical History:   Procedure Laterality Date    ABDOMINAL EXPLORATION SURGERY  01/22/2019    ABDOMINAL EXPLORATION, LEFT HEMICOLECTOMY, MOBILIZATION OF SPLENIC FLEXURE     ABSCESS DRAINAGE Right 01/20/2014    I&D Right Shoulder, Right shoulder arthroscopy, I&D AC Joint    APPENDECTOMY      COLONOSCOPY      COLONOSCOPY  01/22/2019    COLONOSCOPY N/A 1/22/2019    COLONOSCOPY- GI UNIT SCHEDULED performed by Madhu Moeller MD at [facility] Right 3/27/15    rt wrist    ENDOSCOPY, COLON, DIAGNOSTIC      UGI    GASTRIC BAND REMOVAL  01/17/2017    subcutaneous port    HC CATH POWER PICC SINGLE  1/24/2014         LAP BAND  2007    PA COLSC FLX W/RMVL OF TUMOR POLYP LESION SNARE TQ N/A 11/15/2018    COLONOSCOPY POLYPECTOMY SNARE/COLD BIOPSY performed by Nissa Wyatt MD at [facility] Right 2014    SLEEVE GASTRECTOMY N/A 9/25/2017    GASTRECTOMY SLEEVE LAPAROSCOPIC SI ROBOTIC, ENDOSEALER performed by Seema Smith MD at [facility] N/A 1/22/2019    ABDOMINAL EXPLORATION, LEFT HEMICOLECTOMY, MOBILIZATION OF SPLENIC FLEXURE performed by Madhu Moeller MD at [facility] Right     leg tumor removal/ dr. Miller Hernandez        Medications Prior to Admission:     Prior to Admission medications    Medication Sig Start Date End Date Taking?  Authorizing Provider   warfarin (COUMADIN) 2 MG tablet TAKE 1 AND 1/2 TABLETS OR 2 TABLETS (OR AS DIRECTED BY OFFICE) BY MOUTH ONCE DAILY 8/18/20   Adolfo Hamm MD   acetaminophen (TYLENOL) 500 MG tablet Take 2 tablets by mouth every 6 hours as needed for Pain or Fever 8/5/20   Fabienne Trinidad, DO   B Complex-C-Folic Acid (NEPHRO-JENNIFER) 0.8 MG TABS TAKE ONE TABLET BY MOUTH DAILY WITH BREAKFAST 6/24/20   Adolfo Hamm MD   midodrine (PROAMATINE) 10 MG tablet TAKE ONE TABLET BY MOUTH THREE TIMES DAILY 4/30/20   Adolfo Hamm MD   vitamin D (ERGOCALCIFEROL) 1.25 MG (42783 UT) CAPS capsule TAKE ONE CAPSULE BY MOUTH ONCE A MONTH 12/31/19   Adolfo Hamm MD   cinacalcet (SENSIPAR) 30 MG tablet Take 30 mg by mouth Genitourinary:        Anuric   Musculoskeletal: Positive for myalgias. Skin: Positive for wound. Neurological: Positive for headaches. Hematological: Negative. Psychiatric/Behavioral: Negative. Physical Exam:   BP (!) 122/50   Pulse 86   Temp 100.7 °F (38.2 °C) (Oral)   Resp 13   Ht 5' 10\" (1.778 m)   Wt 260 lb (117.9 kg)   SpO2 (!) 84%   BMI 37.31 kg/m²   Temp (24hrs), Av.7 °F (38.2 °C), Min:100.7 °F (38.2 °C), Max:100.7 °F (38.2 °C)    Recent Labs     20  1242   POCGLU 93     No intake or output data in the 24 hours ending 20 1707    Physical Exam  Vitals signs and nursing note reviewed. Constitutional:       Appearance: Normal appearance. He is not ill-appearing or toxic-appearing. HENT:      Head: Normocephalic. Mouth/Throat:      Mouth: Mucous membranes are dry. Pharynx: Oropharynx is clear. No oropharyngeal exudate. Eyes:      Extraocular Movements: Extraocular movements intact. Conjunctiva/sclera:      Right eye: Right conjunctiva is injected. Left eye: Left conjunctiva is injected. Pupils: Pupils are equal, round, and reactive to light. Neck:      Musculoskeletal: Normal range of motion and neck supple. Cardiovascular:      Rate and Rhythm: Normal rate and regular rhythm. Pulses:           Radial pulses are 2+ on the right side and 2+ on the left side. Heart sounds: Normal heart sounds, S1 normal and S2 normal.      Comments: Thick, discolored lower legs bilaterally with some edema to RLE. Hx lymphedema. Right forearm AV fistula with + bruit/thrill  Pulmonary:      Breath sounds: Normal breath sounds. Abdominal:      General: Bowel sounds are normal. There is no distension. Palpations: Abdomen is soft. Tenderness: There is no abdominal tenderness. There is no right CVA tenderness or left CVA tenderness. Comments: Midline abdominal scar with  lots of scarring across lower abdomen.     Musculoskeletal: Normal range of motion. Right lower le+ Edema present. Lymphadenopathy:      Cervical: No cervical adenopathy. Right cervical: No superficial, deep or posterior cervical adenopathy. Left cervical: No superficial, deep or posterior cervical adenopathy. Skin:     General: Skin is warm and dry. Capillary Refill: Capillary refill takes less than 2 seconds. Neurological:      Mental Status: He is alert and oriented to person, place, and time. Psychiatric:         Behavior: Behavior is cooperative.          Investigations:      Laboratory Testing:  Recent Results (from the past 24 hour(s))   POC Glucose Fingerstick    Collection Time: 20 12:42 PM   Result Value Ref Range    POC Glucose 93 75 - 110 mg/dL   CBC Auto Differential    Collection Time: 20  1:23 PM   Result Value Ref Range    WBC 6.7 3.5 - 11.3 k/uL    RBC 3.99 (L) 4.21 - 5.77 m/uL    Hemoglobin 13.9 13.0 - 17.0 g/dL    Hematocrit 41.9 40.7 - 50.3 %    .0 (H) 82.6 - 102.9 fL    MCH 34.8 (H) 25.2 - 33.5 pg    MCHC 33.2 28.4 - 34.8 g/dL    RDW 15.5 (H) 11.8 - 14.4 %    Platelets 468 336 - 981 k/uL    MPV 10.8 8.1 - 13.5 fL    NRBC Automated 0.4 (H) 0.0 per 100 WBC    Differential Type NOT REPORTED     Seg Neutrophils 53 36 - 65 %    Lymphocytes 34 24 - 43 %    Monocytes 12 3 - 12 %    Eosinophils % 0 (L) 1 - 4 %    Basophils 0 0 - 2 %    Immature Granulocytes 1 (H) 0 %    Segs Absolute 3.51 1.50 - 8.10 k/uL    Absolute Lymph # 2.26 1.10 - 3.70 k/uL    Absolute Mono # 0.82 0.10 - 1.20 k/uL    Absolute Eos # <0.03 0.00 - 0.44 k/uL    Basophils Absolute 0.03 0.00 - 0.20 k/uL    Absolute Immature Granulocyte 0.06 0.00 - 0.30 k/uL    WBC Morphology NOT REPORTED     RBC Morphology ANISOCYTOSIS PRESENT     Platelet Estimate NOT REPORTED    Troponin    Collection Time: 20  1:25 PM   Result Value Ref Range    Troponin, High Sensitivity 87 (HH) 0 - 22 ng/L    Troponin T NOT REPORTED <0.03 ng/mL    Troponin Interp NOT REPORTED    COVID-19    Collection Time: 08/27/20  1:26 PM    Specimen: Other   Result Value Ref Range    SARS-CoV-2          SARS-CoV-2, Rapid DETECTED (A) Not Detected    Source . NASOPHARYNGEAL SWAB     SARS-CoV-2, PCR         Brain Natriuretic Peptide    Collection Time: 08/27/20  1:42 PM   Result Value Ref Range    Pro-BNP 2,377 (H) <300 pg/mL    BNP Interpretation Pro-BNP Reference Range:    Comprehensive Metabolic Panel    Collection Time: 08/27/20  1:42 PM   Result Value Ref Range    Glucose 95 70 - 99 mg/dL    BUN 17 6 - 20 mg/dL    CREATININE 7.62 (HH) 0.70 - 1.20 mg/dL    Bun/Cre Ratio NOT REPORTED 9 - 20    Calcium 8.3 (L) 8.6 - 10.4 mg/dL    Sodium 133 (L) 135 - 144 mmol/L    Potassium 4.2 3.7 - 5.3 mmol/L    Chloride 89 (L) 98 - 107 mmol/L    CO2 24 20 - 31 mmol/L    Anion Gap 20 (H) 9 - 17 mmol/L    Alkaline Phosphatase 78 40 - 129 U/L    ALT 90 (H) 5 - 41 U/L     (H) <40 U/L    Total Bilirubin 0.43 0.3 - 1.2 mg/dL    Total Protein 8.2 6.4 - 8.3 g/dL    Alb 3.8 3.5 - 5.2 g/dL    Albumin/Globulin Ratio 0.9 (L) 1.0 - 2.5    GFR Non- 8 (L) >60 mL/min    GFR  9 (L) >60 mL/min    GFR Comment          GFR Staging NOT REPORTED    Lactate, Sepsis    Collection Time: 08/27/20  1:42 PM   Result Value Ref Range    Lactic Acid, Sepsis NOT REPORTED 0.5 - 1.9 mmol/L    Lactic Acid, Sepsis, Whole Blood 4.3 (H) 0.5 - 1.9 mmol/L   Magnesium    Collection Time: 08/27/20  1:42 PM   Result Value Ref Range    Magnesium 2.3 1.6 - 2.6 mg/dL   Protime-INR    Collection Time: 08/27/20  1:42 PM   Result Value Ref Range    Protime 13.1 (H) 9.0 - 12.0 sec    INR 1.3    APTT    Collection Time: 08/27/20  1:42 PM   Result Value Ref Range    PTT 31.2 (H) 20.5 - 30.5 sec   TSH with Reflex    Collection Time: 08/27/20  1:42 PM   Result Value Ref Range    TSH 8.73 (H) 0.30 - 5.00 mIU/L   T4, Free    Collection Time: 08/27/20  1:42 PM   Result Value Ref Range    Thyroxine, Free 0.90 (L) 0.93 - 1.70 ng/dL   Troponin    Collection Time: 08/27/20  3:32 PM   Result Value Ref Range    Troponin, High Sensitivity 98 (HH) 0 - 22 ng/L    Troponin T NOT REPORTED <0.03 ng/mL    Troponin Interp NOT REPORTED        Imaging/Diagnostics:  Xr Chest Portable    Result Date: 8/27/2020  Limited left retrocardiac assessment. Mild streaky lingular atelectasis Moderate patchy right basilar infiltrate, pneumonia the likely etiology follow changes to resolution RECOMMENDATION: Upright well penetrated PA view of the chest for more accurate assessment of the left lung base       Assessment :      Hospital Problems           Last Modified POA    * (Principal) Syncope 8/27/2020 Yes    ESRD on hemodialysis ([facility] 8/27/2020 Yes    Chronic hypotension 8/27/2020 Yes    COVID-19 8/27/2020 Yes    Hypoxemia 8/27/2020 Yes          Plan:     Patient status inpatient in the Med/Surge    Consult infectious disease. Appreciate input r/e Covid-19. Orthostatic vital signs  Consult nephrology. Maintain daily weights, I&O and Tuesday Thursday Saturday hemodialysis schedule. Supplemental oxygen as needed  Trend lactic acid. Consult cardiology. Trend troponin level every 6 hours x24 hours  Monitor and treat chronic hypotension. Continue home medications  Consult wound care. R/E right buttocks wound    Consultations:   IP CONSULT TO HOSPITALIST  IP CONSULT TO NEPHROLOGY  IP CONSULT TO NEPHROLOGY  IP CONSULT TO CARDIOLOGY    Patient is admitted as inpatient status because of co-morbidities listed above, severity of signs and symptoms as outlined, requirement for current medical therapies and most importantly because of direct risk to patient if care not provided in a hospital setting. Expected length of stay > 48 hours.     KVNG Grayson NP  8/27/2020  5:07 PM    Copy sent to Dr. Roxanne Ku MD

## 2020-08-27 NOTE — ED NOTES
Dr. Jessica Cramer ok for pt to be transported to the floor before CTA and can be done as routine d/t low suspicion of PE.       Clemente Morales RN  08/27/20 [id number]

## 2020-08-27 NOTE — ED PROVIDER NOTES
[facility] ED  Emergency Department Encounter  EmergencyMedicine Resident     Pt Name:Hugh Avila  MRN: [id number]  Birthdate 1970  Date of evaluation: 8/27/20  PCP:  Evans Talley MD    [address]       Chief Complaint   Patient presents with    Respiratory Distress     Pt c/o SOB x 5 -7 days, with loss of appetite, nausea, and vomiting. HISTORY OF PRESENT ILLNESS  (Location/Symptom, Timing/Onset, Context/Setting, Quality, Duration, Modifying Factors, Severity.)      Mikey Andrew is a 44 y.o. male who presents with altered mental status, shortness of breath, syncope. Patient is accompanied by his sister. Sister states that patient went to get dialysis today completed his dialysis which he goes on Tuesday Thursday Saturdays. However after dialysis patient was altered complaining of shortness of breath, presented emergency department in the waiting room patient syncopized in a wheelchair, was unresponsive, was brought back and moved to the stretcher, patient's vital signs were stable, patient protecting his airway, after lying on the stretcher for approximately 2 minutes patient became alert and oriented x3 answering questions appropriately satting 95% on 3 L. Patient states he has had increased fatigue, shortness of breath worse with lying down. Denies any chest pain, dizziness, headaches, change in vision, abdominal pain. PAST MEDICAL / SURGICAL / SOCIAL / FAMILY HISTORY      has a past medical history of Anemia, Arthritis, Atrial fibrillation (HCC), BPH (benign prostatic hypertrophy), CAD (coronary artery disease), Caffeine use, Cellulitis of lower leg, Chronic back pain, Colon cancer ([place]), Cor pulmonale, acute ([place]), CRF (chronic renal failure), Erectile dysfunction, Gout, Hemodialysis patient ([place]), History of blood transfusion, Hyperkalemia, Hyperlipidemia, Hypertension, Hypotension, Hypothyroidism, Lymphedema of leg, Obesity, and Thyroid disease.      has a Cortney Guillory MD   Multiple Vitamin (MVI, CELEBRATE, CHEWABLE TABLET) Take 1 tablet by mouth 2 times daily     Historical Provider, MD       REVIEW OF SYSTEMS    (2-9 systems for level 4, 10 or more for level 5)      Review of Systems   Constitutional: Positive for activity change. Negative for chills and fever. HENT: Negative for sore throat and trouble swallowing. Eyes: Negative for visual disturbance. Respiratory: Positive for shortness of breath. Negative for cough. Cardiovascular: Positive for leg swelling. Negative for chest pain. Gastrointestinal: Negative for abdominal pain and nausea. Genitourinary:        Patient does not make urine   Musculoskeletal: Positive for gait problem. Skin: Negative for rash. Neurological: Positive for syncope. Negative for dizziness, seizures, weakness and headaches. PHYSICAL EXAM   (up to 7 for level 4, 8 or more for level 5)      INITIAL VITALS:   BP (!) 122/50   Pulse 86   Temp 100.7 °F (38.2 °C) (Oral)   Resp 13   Ht 5' 10\" (1.778 m)   Wt 260 lb (117.9 kg)   SpO2 (!) 84%   BMI 37.31 kg/m²     Physical Exam  Constitutional:       Comments: Upon initial evaluation patient was not alert, was stuporous, GCS of 8, obese, appears ill and in acute distress, is not diaphoretic. After getting patient on stretcher patient became responsive with a GCS of 15, satting 95% on 3 L nasal cannula, obese   HENT:      Mouth/Throat:      Mouth: Mucous membranes are moist.   Eyes:      Extraocular Movements: Extraocular movements intact. Conjunctiva/sclera: Conjunctivae normal.      Pupils: Pupils are equal, round, and reactive to light. Neck:      Musculoskeletal: No neck rigidity or muscular tenderness. Cardiovascular:      Rate and Rhythm: Normal rate. Pulses: Normal pulses. Pulmonary:      Effort: Pulmonary effort is normal. No respiratory distress. Breath sounds: Normal breath sounds. No wheezing.    Abdominal:      General: Abdomen is flat.      Palpations: There is no mass. Tenderness: There is no abdominal tenderness. There is no guarding. Musculoskeletal:         General: No swelling or tenderness. Right lower leg: Edema present. Left lower leg: Edema present. Neurological:      General: No focal deficit present. Mental Status: He is oriented to person, place, and time.    Psychiatric:         Mood and Affect: Mood normal.         DIFFERENTIAL  DIAGNOSIS     PLAN (Geoffrey Angelina / IMAGING / EKG):  Orders Placed This Encounter   Procedures    Culture, Blood 1    Culture, Blood 1    XR CHEST PORTABLE    Troponin    CBC Auto Differential    COVID-19    Brain Natriuretic Peptide    Comprehensive Metabolic Panel    Lactate, Sepsis    Magnesium    Protime-INR    APTT    TSH with Reflex    T4, Free    Telemetry Monitoring    Inpatient consult to Hospitalist    POC Blood Gas and Chemistry    POC Glucose Fingerstick    EKG 12 Lead    PATIENT STATUS (FROM ED OR OR/PROCEDURAL) Inpatient       MEDICATIONS ORDERED:  Orders Placed This Encounter   Medications    cefTRIAXone (ROCEPHIN) 1 g IVPB in 50 mL D5W minibag    azithromycin (ZITHROMAX) 500 mg in dextrose 5% 250 mL IVPB       DDX: Syncope, seizure, ACS/MI, sepsis, COVID, pneumonia, PE    DIAGNOSTIC RESULTS / EMERGENCY DEPARTMENT COURSE / MDM   :  Results for orders placed or performed during the hospital encounter of 08/27/20   Troponin   Result Value Ref Range    Troponin, High Sensitivity 87 (HH) 0 - 22 ng/L    Troponin T NOT REPORTED <0.03 ng/mL    Troponin Interp NOT REPORTED    CBC Auto Differential   Result Value Ref Range    WBC 6.7 3.5 - 11.3 k/uL    RBC 3.99 (L) 4.21 - 5.77 m/uL    Hemoglobin 13.9 13.0 - 17.0 g/dL    Hematocrit 41.9 40.7 - 50.3 %    .0 (H) 82.6 - 102.9 fL    MCH 34.8 (H) 25.2 - 33.5 pg    MCHC 33.2 28.4 - 34.8 g/dL    RDW 15.5 (H) 11.8 - 14.4 %    Platelets 596 312 - 054 k/uL    MPV 10.8 8.1 - 13.5 fL    NRBC Automated 0.4 (H) 0.0 per 100 WBC    Differential Type NOT REPORTED     Seg Neutrophils 53 36 - 65 %    Lymphocytes 34 24 - 43 %    Monocytes 12 3 - 12 %    Eosinophils % 0 (L) 1 - 4 %    Basophils 0 0 - 2 %    Immature Granulocytes 1 (H) 0 %    Segs Absolute 3.51 1.50 - 8.10 k/uL    Absolute Lymph # 2.26 1.10 - 3.70 k/uL    Absolute Mono # 0.82 0.10 - 1.20 k/uL    Absolute Eos # <0.03 0.00 - 0.44 k/uL    Basophils Absolute 0.03 0.00 - 0.20 k/uL    Absolute Immature Granulocyte 0.06 0.00 - 0.30 k/uL    WBC Morphology NOT REPORTED     RBC Morphology ANISOCYTOSIS PRESENT     Platelet Estimate NOT REPORTED    COVID-19    Specimen: Other   Result Value Ref Range    SARS-CoV-2          SARS-CoV-2, Rapid DETECTED (A) Not Detected    Source . NASOPHARYNGEAL SWAB     SARS-CoV-2, PCR         Brain Natriuretic Peptide   Result Value Ref Range    Pro-BNP 2,377 (H) <300 pg/mL    BNP Interpretation Pro-BNP Reference Range:    Comprehensive Metabolic Panel   Result Value Ref Range    Glucose 95 70 - 99 mg/dL    BUN 17 6 - 20 mg/dL    CREATININE 7.62 (HH) 0.70 - 1.20 mg/dL    Bun/Cre Ratio NOT REPORTED 9 - 20    Calcium 8.3 (L) 8.6 - 10.4 mg/dL    Sodium 133 (L) 135 - 144 mmol/L    Potassium 4.2 3.7 - 5.3 mmol/L    Chloride 89 (L) 98 - 107 mmol/L    CO2 24 20 - 31 mmol/L    Anion Gap 20 (H) 9 - 17 mmol/L    Alkaline Phosphatase 78 40 - 129 U/L    ALT 90 (H) 5 - 41 U/L     (H) <40 U/L    Total Bilirubin 0.43 0.3 - 1.2 mg/dL    Total Protein 8.2 6.4 - 8.3 g/dL    Alb 3.8 3.5 - 5.2 g/dL    Albumin/Globulin Ratio 0.9 (L) 1.0 - 2.5    GFR Non- 8 (L) >60 mL/min    GFR  9 (L) >60 mL/min    GFR Comment          GFR Staging NOT REPORTED    Lactate, Sepsis   Result Value Ref Range    Lactic Acid, Sepsis NOT REPORTED 0.5 - 1.9 mmol/L    Lactic Acid, Sepsis, Whole Blood 4.3 (H) 0.5 - 1.9 mmol/L   Magnesium   Result Value Ref Range    Magnesium 2.3 1.6 - 2.6 mg/dL   Protime-INR   Result Value Ref Range    Protime 13.1 (H) 9.0 - creatinine will not obtain CT PE protocol, discussed with Karon, who will evaluate patient will hold heparin at this time. Patient mated to [employer]. Chest x-ray concerning for pneumonia. Patient started on 1 g of ceftriaxone and 500 mg of azithromycin      PROCEDURES:  None    CONSULTS:  IP CONSULT TO HOSPITALIST    CRITICAL CARE:  None    FINAL IMPRESSION      1. COVID-19    2. Syncope and collapse    3.  Pneumonia due to organism          DISPOSITION / PLAN     DISPOSITION Admitted 08/27/2020 02:39:39 PM      PATIENT REFERRED TO:  Cata Charles MD  [address] [id number]  [phone]            DISCHARGE MEDICATIONS:  New Prescriptions    No medications on file       Keshia Marquez DO  Emergency Medicine Resident    (Please note that portions of thisnote were completed with a voice recognition program.  Efforts were made to edit the dictations but occasionally words are mis-transcribed.)     Keshia Marquez DO  Resident  08/27/20 [id number]

## 2020-08-27 NOTE — ED NOTES
Meal tray ordered for room @ [address] [place] [address] [address], [address]289.      Charlie Shea RN  08/27/20 5742

## 2020-08-27 NOTE — ED PROVIDER NOTES
[address] [facility]     Emergency Department     Faculty Attestation    I performed a history and physical examination of the patient and discussed management with the resident. I have reviewed and agree with the residents findings including all diagnostic interpretations, and treatment plans as written. Any areas of disagreement are noted on the chart. I was personally present for the key portions of any procedures. I have documented in the chart those procedures where I was not present during the key portions. I have reviewed the emergency nurses triage note. I agree with the chief complaint, past medical history, past surgical history, allergies, medications, social and family history as documented unless otherwise noted below. Documentation of the HPI, Physical Exam and Medical Decision Making performed by scribmerle is based on my personal performance of the HPI, PE and MDM. For Physician Assistant/ Nurse Practitioner cases/documentation I have personally evaluated this patient and have completed at least one if not all key elements of the E/M (history, physical exam, and MDM). Additional findings are as noted. Patient initially triaged with shortness of breath and low-grade fever. Was brought back to the room unresponsive. Did have a pulse. Upon getting him placed on the gurney he did begin to wake up and is conversant at this time. He is accompanied by his sister who reports that he has not been feeling well the past week. Has not been eating as much. He is end-stage renal dialysis patient does not make urine was dialyzed today for the full amount. He was found unresponsive in the car by a neighbor and EMS was called but he did not want to come to the hospital.  And he had his sister bring him instead. Upon arrival he was noted to have a temp of 100.7. He is in no respiratory distress.     Obese male, fistula noted to the right arm with thrill palpated, abdomen obese but soft nontender to palpation, patient awake and alert at this time. Heart rate in the 80s. No respiratory distress. Concern for syncopal episodes. We will plan on cardiac work-up likely image of his head, EKG troponins and likely admission. Septic work-up given fever and likely antibiotics. Blood cx to be obtained. Also will plan to r/o COVID    EKG Interpretation    Interpreted by me    EKG shows normal sinus rhythm, with normal axis PACs noted. No ST elevations or depressions noted, .   Ventricular rate of 87        ElisbeMOSHE Farooq.O, M.P.H  Attending Emergency Medicine Physician         Darline Bueno,   08/27/20 6034

## 2020-08-27 NOTE — ED NOTES
Report called to Adam Andrew on [address]. All questions answered.  Patient ready to be transported      Ladi Quinonez RN  08/27/20 2840

## 2020-08-27 NOTE — ED TRIAGE NOTES
Pt presents to ED with sister with complaints of \"patient being off, not feeling well, and not eating\" for 5-7 days. Pt states he \"has a wound on my butt that is being packed, and I think I have an infection\". Pt c/o diarrhea, nausea, vomiting. Pt is a dialysis patient and has a fistula in his right wrist.  Pt is A&O x 4, placed on full monitor, placed on 3 L O2 via nc. Pt is febrile, short of breath.

## 2020-08-27 NOTE — ED TRIAGE NOTES
[id number]  Pt to room 21 via Triage. Anita Alvarez,  placing pt to room when pt passed out/became unresponsive, called for help, Writer to room, with multiple personnel pt unresponsive to sternal rub. Weak thready carotid pulse present. Lifted to bed help of multi personnel. Dr Katty Law and Dr. Sandi Way at bedside. Placed to monitoring devices. Spontaneous resp present. Monitor showing sinus, BP hypotensive. Pt becoming more awake. FSBS done and 20G left hand placed. Pt's sister with pt, stating this is what pt did in the car on way here.   Pt just completed dialysis treatment today

## 2020-08-28 ENCOUNTER — APPOINTMENT (OUTPATIENT)
Dept: CT IMAGING | Age: 50
DRG: 177 | End: 2020-08-28
Payer: COMMERCIAL

## 2020-08-28 PROBLEM — R78.81 BACTEREMIA DUE TO METHICILLIN RESISTANT STAPHYLOCOCCUS AUREUS: Status: ACTIVE | Noted: 2020-08-28

## 2020-08-28 PROBLEM — B95.62 BACTEREMIA DUE TO METHICILLIN RESISTANT STAPHYLOCOCCUS AUREUS: Status: ACTIVE | Noted: 2020-08-28

## 2020-08-28 LAB
ANION GAP SERPL CALCULATED.3IONS-SCNC: 22 MMOL/L (ref 9–17)
BUN BLDV-MCNC: 32 MG/DL (ref 6–20)
BUN/CREAT BLD: ABNORMAL (ref 9–20)
CALCIUM SERPL-MCNC: 8.8 MG/DL (ref 8.6–10.4)
CHLORIDE BLD-SCNC: 88 MMOL/L (ref 98–107)
CO2: 26 MMOL/L (ref 20–31)
CREAT SERPL-MCNC: 10.16 MG/DL (ref 0.7–1.2)
GFR AFRICAN AMERICAN: 7 ML/MIN
GFR NON-AFRICAN AMERICAN: 5 ML/MIN
GFR SERPL CREATININE-BSD FRML MDRD: ABNORMAL ML/MIN/{1.73_M2}
GFR SERPL CREATININE-BSD FRML MDRD: ABNORMAL ML/MIN/{1.73_M2}
GLUCOSE BLD-MCNC: 129 MG/DL (ref 70–99)
HCT VFR BLD CALC: 42.3 % (ref 40.7–50.3)
HEMOGLOBIN: 13.5 G/DL (ref 13–17)
INR BLD: 1.2
MCH RBC QN AUTO: 34.2 PG (ref 25.2–33.5)
MCHC RBC AUTO-ENTMCNC: 31.9 G/DL (ref 28.4–34.8)
MCV RBC AUTO: 107.1 FL (ref 82.6–102.9)
NRBC AUTOMATED: 0.7 PER 100 WBC
PDW BLD-RTO: 15.1 % (ref 11.8–14.4)
PLATELET # BLD: 259 K/UL (ref 138–453)
PMV BLD AUTO: 10 FL (ref 8.1–13.5)
POTASSIUM SERPL-SCNC: 4.8 MMOL/L (ref 3.7–5.3)
PROTHROMBIN TIME: 12.5 SEC (ref 9–12)
RBC # BLD: 3.95 M/UL (ref 4.21–5.77)
SODIUM BLD-SCNC: 136 MMOL/L (ref 135–144)
TROPONIN INTERP: ABNORMAL
TROPONIN T: ABNORMAL NG/ML
TROPONIN, HIGH SENSITIVITY: 86 NG/L (ref 0–22)
WBC # BLD: 3 K/UL (ref 3.5–11.3)

## 2020-08-28 PROCEDURE — 6360000004 HC RX CONTRAST MEDICATION: Performed by: INTERNAL MEDICINE

## 2020-08-28 PROCEDURE — 80048 BASIC METABOLIC PNL TOTAL CA: CPT

## 2020-08-28 PROCEDURE — 6360000002 HC RX W HCPCS: Performed by: NURSE PRACTITIONER

## 2020-08-28 PROCEDURE — 2580000003 HC RX 258: Performed by: NURSE PRACTITIONER

## 2020-08-28 PROCEDURE — 99212 OFFICE O/P EST SF 10 MIN: CPT

## 2020-08-28 PROCEDURE — 99232 SBSQ HOSP IP/OBS MODERATE 35: CPT | Performed by: INTERNAL MEDICINE

## 2020-08-28 PROCEDURE — 99233 SBSQ HOSP IP/OBS HIGH 50: CPT | Performed by: INTERNAL MEDICINE

## 2020-08-28 PROCEDURE — 85027 COMPLETE CBC AUTOMATED: CPT

## 2020-08-28 PROCEDURE — 71260 CT THORAX DX C+: CPT

## 2020-08-28 PROCEDURE — 6370000000 HC RX 637 (ALT 250 FOR IP): Performed by: GENERAL PRACTICE

## 2020-08-28 PROCEDURE — 1200000000 HC SEMI PRIVATE

## 2020-08-28 PROCEDURE — 99222 1ST HOSP IP/OBS MODERATE 55: CPT | Performed by: INTERNAL MEDICINE

## 2020-08-28 PROCEDURE — 85610 PROTHROMBIN TIME: CPT

## 2020-08-28 PROCEDURE — 84484 ASSAY OF TROPONIN QUANT: CPT

## 2020-08-28 RX ADMIN — SODIUM CHLORIDE, PRESERVATIVE FREE 10 ML: 5 INJECTION INTRAVENOUS at 08:00

## 2020-08-28 RX ADMIN — IOHEXOL 75 ML: 350 INJECTION, SOLUTION INTRAVENOUS at 16:56

## 2020-08-28 RX ADMIN — HEPARIN SODIUM 5000 UNITS: 5000 INJECTION INTRAVENOUS; SUBCUTANEOUS at 22:17

## 2020-08-28 RX ADMIN — HEPARIN SODIUM 5000 UNITS: 5000 INJECTION INTRAVENOUS; SUBCUTANEOUS at 01:03

## 2020-08-28 RX ADMIN — MIDODRINE HYDROCHLORIDE 10 MG: 5 TABLET ORAL at 17:08

## 2020-08-28 RX ADMIN — HEPARIN SODIUM 5000 UNITS: 5000 INJECTION INTRAVENOUS; SUBCUTANEOUS at 08:00

## 2020-08-28 RX ADMIN — MIDODRINE HYDROCHLORIDE 10 MG: 5 TABLET ORAL at 12:00

## 2020-08-28 RX ADMIN — MIDODRINE HYDROCHLORIDE 10 MG: 5 TABLET ORAL at 07:59

## 2020-08-28 RX ADMIN — AMIODARONE HYDROCHLORIDE 200 MG: 200 TABLET ORAL at 07:59

## 2020-08-28 RX ADMIN — HEPARIN SODIUM 5000 UNITS: 5000 INJECTION INTRAVENOUS; SUBCUTANEOUS at 14:23

## 2020-08-28 RX ADMIN — WARFARIN SODIUM 2.5 MG: 2.5 TABLET ORAL at 17:08

## 2020-08-28 RX ADMIN — SODIUM CHLORIDE, PRESERVATIVE FREE 10 ML: 5 INJECTION INTRAVENOUS at 21:57

## 2020-08-28 RX ADMIN — WARFARIN SODIUM 2.5 MG: 2.5 TABLET ORAL at 01:03

## 2020-08-28 ASSESSMENT — PAIN SCALES - GENERAL
PAINLEVEL_OUTOF10: 0
PAINLEVEL_OUTOF10: 0

## 2020-08-28 NOTE — PROGRESS NOTES
Physician Progress Note      Teofilo Leavitt  CSN #:                  [id number]  :                       1970  ADMIT DATE:       2020 12:37 PM  [address] [place] DATE:  RESPONDING  PROVIDER #:        Anali Chavarria MD          QUERY TEXT:    Patient admitted with syncope. noted to have ESRD on HD and received full tx   on  and passed out in care after returning home. If possible, please   document in progress notes and discharge summary if you are evaluating and/or   treating any of the following: The medical record reflects the following:  Risk Factors: ESRD on HD. chronic hypotension  Clinical Indicators: syncope - passed out in car at home and EMS called and   then pt to ED with sister driving him. periods of verbal unresponsiveness but   able to move and arousable and converses, SBP- 100-145, full HD treatment on    with ongoing SOB, cough, poor appetite for past week, poor oral intake,   no demand ischemia or heart issues per cardiology PN and signed off, trops -   87-98 - no EKG changes  Treatment: cardiology consult, HD, monitor, labs    If you are in need of further assistance, or have a question, you can contact   me at cell -[phone] - office- [phone]. I am usually on line from    6:30 - 3:00. Thank Jesse Solares RN, CDI  Options provided:  -- Syncope due to hypovolemia  -- Syncope due to a hypotension  -- Syncope due to ESRD  -- Other - I will add my own diagnosis  -- Disagree - Not applicable / Not valid  -- Disagree - Clinically unable to determine / Unknown  -- Refer to Clinical Documentation Reviewer    PROVIDER RESPONSE TEXT:    The syncope is due to ESRD.     Query created by: Lin Brown on 2020 11:15 AM      Electronically signed by:  Anali Chavarria MD 2020 4:06 PM

## 2020-08-28 NOTE — PROGRESS NOTES
[address] [facility] Wound Ostomy  Nurse  Consult Note       NAME:  Liana Avila  MEDICAL RECORD NUMBER:  [id number]  AGE: 44 y.o. GENDER: male  : 1970  TODAY'S DATE:  2020    Subjective   Reason for [address] Nurse Evaluation and Assessment:   Left buttock wound, s/p I&D of abscess in the ED about 3 weeks ago. Kristina Brown is a 44 y.o. male referred by:   [x] Physician  [] Nursing  [] Other:     Wound Identification:  Wound Type: non-healing/non-surgical  Contributing Factors: chronic pressure        PAST MEDICAL HISTORY        Diagnosis Date    Anemia     Arthritis     right shoulder/ PCP Dr. Rell Rae    Atrial fibrillation ([address]     [address] BPH (benign prostatic hypertrophy)     CAD (coronary artery disease)     Dr. Jeani Runner    Caffeine use     1 coffee, 2 tea/day    Cellulitis of lower leg     right    Chronic back pain     Colon cancer ([address]     colon    Cor pulmonale, acute ([address] 2014    CRF (chronic renal failure)     dr. Lotus Eddy on [place]. Tues/ thurs/ sat/ right arm fistula    Erectile dysfunction     Gout     Hemodialysis patient ([address]     tues/ thurs/ sat/ DR. Petersen/ fistula right lower arm    History of blood transfusion     no reaction    Hyperkalemia 2013    Hyperlipidemia     Hypertension     Hypotension     Hypothyroidism     Lymphedema of leg     right    Obesity     Thyroid disease        PAST SURGICAL HISTORY    Past Surgical History:   Procedure Laterality Date    ABDOMINAL EXPLORATION SURGERY  2019    ABDOMINAL EXPLORATION, LEFT HEMICOLECTOMY, MOBILIZATION OF SPLENIC FLEXURE     ABSCESS DRAINAGE Right 2014    I&D Right Shoulder, Right shoulder arthroscopy, I&D AC Joint    APPENDECTOMY      COLONOSCOPY      COLONOSCOPY  2019    COLONOSCOPY N/A 2019    COLONOSCOPY- GI UNIT SCHEDULED performed by Jean-Claude Lester MD at [address] [place] [address] Right 3/27/15    rt wrist    ENDOSCOPY, COLON, DIAGNOSTIC      UGI    GASTRIC BAND REMOVAL  01/17/2017    subcutaneous port    HC CATH POWER PICC SINGLE  1/24/2014         LAP BAND  2007    SC COLSC FLX W/RMVL OF TUMOR POLYP LESION SNARE TQ N/A 11/15/2018    COLONOSCOPY POLYPECTOMY SNARE/COLD BIOPSY performed by Telly Aguayo MD at [address] [place] [address] Right 2014    SLEEVE GASTRECTOMY N/A 9/25/2017    GASTRECTOMY SLEEVE LAPAROSCOPIC SI ROBOTIC, ENDOSEALER performed by Justine Steinberg MD at [place] [address] N/A 1/22/2019    ABDOMINAL EXPLORATION, LEFT HEMICOLECTOMY, MOBILIZATION OF SPLENIC FLEXURE performed by Jazmin Bird MD at [facility] Right     leg tumor removal/ dr. Junaid Lyman    Family History   Problem Relation Age of Onset    Cancer Father         leukemia    Heart Failure Mother     Arthritis Mother     High Blood Pressure Mother     Heart Disease Maternal Grandmother     Stroke Paternal Grandfather        SOCIAL HISTORY    Social History     Tobacco Use    Smoking status: Never Smoker    Smokeless tobacco: Never Used   Substance Use Topics    Alcohol use: Yes     Alcohol/week: 3.0 standard drinks     Types: 3 Glasses of wine per week     Comment: weekend    Drug use: No       ALLERGIES    No Known Allergies    MEDICATIONS    No current facility-administered medications on file prior to encounter.       Current Outpatient Medications on File Prior to Encounter   Medication Sig Dispense Refill    warfarin (COUMADIN) 2 MG tablet TAKE 1 AND 1/2 TABLETS OR 2 TABLETS (OR AS DIRECTED BY OFFICE) BY MOUTH ONCE DAILY 180 tablet 0    acetaminophen (TYLENOL) 500 MG tablet Take 2 tablets by mouth every 6 hours as needed for Pain or Fever 180 tablet 0    B Complex-C-Folic Acid (NEPHRO-JENNIFER) 0.8 MG TABS TAKE ONE TABLET BY MOUTH DAILY WITH BREAKFAST 90 tablet 1    midodrine (PROAMATINE) 10 MG tablet TAKE ONE TABLET BY MOUTH THREE TIMES DAILY 270 tablet 0    vitamin D (ERGOCALCIFEROL) 1.25 MG (64247 UT) CAPS capsule TAKE ONE CAPSULE BY MOUTH ONCE A MONTH 1 capsule 3    cinacalcet (SENSIPAR) 30 MG tablet Take 30 mg by mouth daily      B complex-vitamin C-folic acid (NEPHRO-JENNIFER) 1 MG tablet TAKE ONE TABLET BY MOUTH DAILY WITH BREAKFAST 90 tablet 1    amiodarone (CORDARONE) 200 MG tablet TAKE ONE TABLET BY MOUTH DAILY 90 tablet 1    ASPIRIN LOW DOSE 81 MG EC tablet TAKE ONE TABLET BY MOUTH DAILY 90 tablet 1    HYDROcodone-acetaminophen (NORCO) 5-325 MG per tablet Take 1 tablet by mouth every 6 hours as needed for Pain. Gordo Sims pravastatin (PRAVACHOL) 40 MG tablet Take 40 mg by mouth nightly       Elastic Bandages & Supports (MEDICAL COMPRESSION STOCKINGS) MISC 1 each by Does not apply route daily as needed (as needed) Right leg below the knee 20-30 HH MM DX 82.409 1 each 0    Multiple Vitamin (MVI, CELEBRATE, CHEWABLE TABLET) Take 1 tablet by mouth 2 times daily          Objective    BP (!) 128/44   Pulse 70   Temp 98.8 °F (37.1 °C) (Oral)   Resp 18   Ht 5' 10\" (1.778 m)   Wt 256 lb 13.4 oz (116.5 kg)   SpO2 99%   BMI 36.85 kg/m²     LABS:  WBC:    Lab Results   Component Value Date    WBC 3.0 08/28/2020     H/H:    Lab Results   Component Value Date    HGB 13.5 08/28/2020    HCT 42.3 08/28/2020     PTT:    Lab Results   Component Value Date    APTT 31.2 08/27/2020   [APTT}  PT/INR:    Lab Results   Component Value Date    PROTIME 12.5 08/28/2020    PROTIME 25.9 08/19/2020    PROTIME 28.4 06/04/2020    INR 1.2 08/28/2020     HgBA1c:    Lab Results   Component Value Date    LABA1C 6.0 06/08/2020       Assessment   Walter Risk Score: Walter Scale Score: 21    Patient Active Problem List   Diagnosis Code    Obesity, Class II, BMI 35-39.9, with comorbidity [id number]    Benign prostatic hyperplasia with lower urinary tract symptoms N40.1    ESRD on hemodialysis (HCC) N18.6, Z99.2    Erectile dysfunction due to arterial insufficiency N52.01    Chronic hypotension I95.89    Chronic atrial fibrillation I48.20    Warfarin-induced coagulopathy (HCC) D68.32, T45.515A    DVT (deep venous thrombosis) (HCC) I82.409    S/P laparoscopic sleeve gastrectomy Z98.84    Vitamin D deficiency E55.9    Lymphedema I89.0    Right heart failure (HCC) I50.810    Peripheral vascular disease (HCC) I73.9    Malignant neoplasm of transverse colon (HCC) C18.4    Status post partial colectomy Z90.49    Rotator cuff arthropathy of right shoulder M12.811    Syncope R55    COVID-19 U07.1    Hypoxemia R09.02    Bacteremia due to methicillin resistant Staphylococcus aureus R78.81       Measurements:  Wound 08/28/20 Buttocks Left s/p abscess I&D (Active)   Wound Image   08/28/20 1423   Wound Non-Healing Surgical 08/28/20 1423   Dressing Changed Changed/New 08/28/20 1423   Dressing/Treatment Alginate with Ag;Silicone border; Foam 08/28/20 1423   Wound Cleansed Rinsed/Irrigated with saline 08/28/20 1423   Dressing Change Due 08/30/20 08/28/20 1423   Wound Length (cm) 1.7 cm 08/28/20 1423   Wound Width (cm) 0.8 cm 08/28/20 1423   Wound Depth (cm) 0.3 cm 08/28/20 1423   Wound Surface Area (cm^2) 1.36 cm^2 08/28/20 1423   Wound Volume (cm^3) 0.41 cm^3 08/28/20 1423   Wound Assessment Clean;Red 08/28/20 1423   Drainage Amount Moderate 08/28/20 1423   Drainage Description Serosanguinous 08/28/20 1423   Odor None 08/28/20 1423   Carlota-wound Assessment Intact 08/28/20 1423   Number of days: 0     Incision 01/22/19 Abdomen (Active)   Number of days: 583        Response to treatment:  Well tolerated by patient. Plan   Plan of Care: Wound 08/28/20 Buttocks Left s/p abscess I&D-Dressing/Treatment: (P) Alginate with Ag, Silicone border, Foam     LEFT BUTTOCK:  Irrigate with saline. Tuck Opticell AG into wound and cover with 4x4 bordered foam dressing. Change every 2 days.     Specialty Bed Required : N/A   [] Low Air Loss   [] Pressure Redistribution  [] Fluid Immersion  [] Bariatric  [] Total Pressure Relief  [] Other:     Current Diet: DIET GENERAL; Renal    Discharge Plan:  Placement for patient upon discharge: home with support    Patient appropriate for Outpatient [address]: No      Patient/Caregiver Teaching:  Level of patient/caregiver understanding able to:   [] Indicates understanding       [] Needs reinforcement  [] Unsuccessful      [x] Verbal Understanding  [] Demonstrated understanding       [] No evidence of learning  [] Refused teaching         [] N/A     MALIK NORMAN, RN, [employer]

## 2020-08-28 NOTE — CONSULTS
[facility]   Consultation Note               Today's Date: 8/28/2020  Patient Name: Vijay Davidson  Date of admission: 8/27/2020 12:37 PM  Patient's age: 44 y.o., 1970  Admission Dx: Syncope, unspecified syncope type [R55]    Reason for Consult:  Elevated troponin    Requesting Physician: Farida Lowery MD    CHIEF COMPLAINT:    Chief Complaint   Patient presents with    Respiratory Distress     Pt c/o SOB x 5 -7 days, with loss of appetite, nausea, and vomiting. History Obtained From:  patient, electronic medical record    HISTORY OF PRESENT ILLNESS:      The patient is a 44 y.o.  male who was admitted to the hospital after a syncopal episode. Patient reportedly had hemodialysis yesterday and drove back home. He does not recall passing out but woke up when his neighbour approached him. Reports malaise, dyspnea and nausea for the last few days. He tested positive for COVID-19 and was admitted to the [facility] floor. No CP. Troponin 87>98>86  EKG - NSR with APCs, no acute st changes    Past Medical History:   has a past medical history of Anemia, Arthritis, Atrial fibrillation ([address] BPH (benign prostatic hypertrophy), CAD (coronary artery disease), Caffeine use, Cellulitis of lower leg, Chronic back pain, Colon cancer ([address] Cor pulmonale, acute ([address] CRF (chronic renal failure), Erectile dysfunction, Gout, Hemodialysis patient ([address] History of blood transfusion, Hyperkalemia, Hyperlipidemia, Hypertension, Hypotension, Hypothyroidism, Lymphedema of leg, Obesity, and Thyroid disease. Past Surgical History:   has a past surgical history that includes Appendectomy; Lap Band (2007); Abscess Drainage (Right, 01/20/2014); hc cath power picc single (1/24/2014); Dialysis fistula creation (Right, 3/27/15); shoulder surgery (Right, 2014); Bariatric Surgery (01/17/2017);  Endoscopy, colon, diagnostic; Sleeve Gastrectomy (N/A, 9/25/2017); pr colsc flx w/rmvl of tumor polyp lesion snare tq (N/A, 11/15/2018); vascular surgery (Right); Colonoscopy; Abdominal exploration surgery (01/22/2019); Colonoscopy (01/22/2019); Small intestine surgery (N/A, 1/22/2019); and Colonoscopy (N/A, 1/22/2019). Home Medications:    Prior to Admission medications    Medication Sig Start Date End Date Taking? Authorizing Provider   warfarin (COUMADIN) 2 MG tablet TAKE 1 AND 1/2 TABLETS OR 2 TABLETS (OR AS DIRECTED BY OFFICE) BY MOUTH ONCE DAILY 8/18/20   Delmy Rothman MD   acetaminophen (TYLENOL) 500 MG tablet Take 2 tablets by mouth every 6 hours as needed for Pain or Fever 8/5/20   Fabienne Trinidad,    B Complex-C-Folic Acid (NEPHRO-JENNIFER) 0.8 MG TABS TAKE ONE TABLET BY MOUTH DAILY WITH BREAKFAST 6/24/20   Delmy Rothman MD   midodrine (PROAMATINE) 10 MG tablet TAKE ONE TABLET BY MOUTH THREE TIMES DAILY 4/30/20   Delmy oRthman MD   vitamin D (ERGOCALCIFEROL) 1.25 MG (22709 UT) CAPS capsule TAKE ONE CAPSULE BY MOUTH ONCE A MONTH 12/31/19   Delmy Rothman MD   cinacalcet (SENSIPAR) 30 MG tablet Take 30 mg by mouth daily    Historical Provider, MD   B complex-vitamin C-folic acid (NEPHRO-JENNIFER) 1 MG tablet TAKE ONE TABLET BY MOUTH DAILY WITH BREAKFAST 9/3/19   Delmy Rothman MD   amiodarone (CORDARONE) 200 MG tablet TAKE ONE TABLET BY MOUTH DAILY 8/21/19   Delmy Rothman MD   ASPIRIN LOW DOSE 81 MG EC tablet TAKE ONE TABLET BY MOUTH DAILY 3/28/19   Delmy Rothman MD   HYDROcodone-acetaminophen (NORCO) 5-325 MG per tablet Take 1 tablet by mouth every 6 hours as needed for Pain. Laurie Paris     Historical Provider, MD   pravastatin (PRAVACHOL) 40 MG tablet Take 40 mg by mouth nightly     Historical Provider, MD   Elastic Bandages & Supports ([address]) [address] [facility] 1 each by Does not apply route daily as needed (as needed) Right leg below the knee 20-30 HH MM DX 82.409 3/19/18   Delmy Rothman MD   Multiple Vitamin (MVI, CELEBRATE, CHEWABLE TABLET) Take 1 tablet by mouth 2 times daily     Historical Provider, MD      Current Facility-Administered Medications: acetaminophen (TYLENOL) tablet 650 mg, 650 mg, Oral, Q4H PRN  amiodarone (CORDARONE) tablet 200 mg, 200 mg, Oral, Daily  midodrine (PROAMATINE) tablet 10 mg, 10 mg, Oral, TID WC  warfarin (COUMADIN) tablet 2.5 mg, 2.5 mg, Oral, Daily  sodium chloride flush 0.9 % injection 10 mL, 10 mL, Intravenous, 2 times per day  sodium chloride flush 0.9 % injection 10 mL, 10 mL, Intravenous, PRN  acetaminophen (TYLENOL) tablet 650 mg, 650 mg, Oral, Q6H PRN **OR** acetaminophen (TYLENOL) suppository 650 mg, 650 mg, Rectal, Q6H PRN  polyethylene glycol (GLYCOLAX) packet 17 g, 17 g, Oral, Daily PRN  promethazine (PHENERGAN) tablet 12.5 mg, 12.5 mg, Oral, Q6H PRN **OR** ondansetron (ZOFRAN) injection 4 mg, 4 mg, Intravenous, Q6H PRN  heparin (porcine) injection 5,000 Units, 5,000 Units, Subcutaneous, 3 times per day  warfarin (COUMADIN) daily dosing (placeholder), , Other, RX Placeholder      Allergies:  Patient has no known allergies. Social History:   reports that he has never smoked. He has never used smokeless tobacco. He reports current alcohol use of about 3.0 standard drinks of alcohol per week. He reports that he does not use drugs. Family History: family history includes Arthritis in his mother; Cancer in his father; Heart Disease in his maternal grandmother; Heart Failure in his mother; High Blood Pressure in his mother; Stroke in his paternal grandfather. No h/o sudden cardiac death. No for premature CAD      REVIEW OF SYSTEMS:    · Constitutional: there has been no unanticipated weight loss. · Eyes: No visual changes or diplopia. · ENT: No Headaches  · Cardiovascular: see above  · Respiratory: No previous pulmonary problems, No cough  · Gastrointestinal: No abdominal pain. No change in bowel or bladder habits. · Genitourinary: No dysuria, trouble voiding, or hematuria. · Musculoskeletal:  No gait disturbance, No weakness or joint complaints.   · Integumentary: No rash or pruritis. · Neurological: No headache, diplopia      PHYSICAL EXAM:      BP (!) 124/54   Pulse 70   Temp 98.7 °F (37.1 °C) (Oral)   Resp 18   Ht 5' 10\" (1.778 m)   Wt 256 lb 13.4 oz (116.5 kg)   SpO2 99%   BMI 36.85 kg/m²    Constitutional and General Appearance: Alert, no distress  · Deferred due to COVID positive status. Refer to primary teams notes for detailed examination. DATA:    Diagnostics:    Labs:     CBC:   Recent Labs     08/27/20  1323 08/28/20  0701   WBC 6.7 3.0*   HGB 13.9 13.5   HCT 41.9 42.3    259     BMP:   Recent Labs     08/27/20  1342 08/28/20  0701   * 136   K 4.2 4.8   CO2 24 26   BUN 17 32*   CREATININE 7.62* 10.16*   LABGLOM 8* 5*   GLUCOSE 95 129*     PT/INR:   Recent Labs     08/27/20  1342 08/28/20  0701   PROTIME 13.1* 12.5*   INR 1.3 1.2     APTT:  Recent Labs     08/27/20  1342   APTT 31.2*     CARDIAC ENZYMES:  Recent Labs     08/27/20  1325 08/27/20  1532 08/28/20  0701   TROPHS 87* 98* 86*       Recent Labs     08/27/20  1325 08/27/20  1532 08/28/20  0701   TROPONINT NOT REPORTED NOT REPORTED NOT REPORTED     FASTING LIPID PANEL:  Lab Results   Component Value Date    HDL 48 06/08/2020    TRIG 121 06/08/2020     LIVER PROFILE:  Recent Labs     08/27/20  1342   *   ALT 90*   LABALBU 3.8         EKG: sinus, PACs    ECHO: 6/27/18  Left ventricle is normal in size. Global left ventricular systolic function  is normal. Estimated ejection fraction is 55 % . Left ventricular wall  thickness is at the upper limits of normal in size. Left atrium is mildly dilated. Aortic valve is sclerotic but opens well. Mild to moderate aortic insufficiency. Trivial mitral regurgitation. Trivial tricuspid regurgitation. Trivial pulmonic insufficiency. Aortic root is mildly dilated at 3.9 cm. The ascending aorta is normal in  size. IMPRESSION:    1. Syncope  2. Troponin elevation 87>98>86  3. COVID-19 pna  4.  Paroxysmal afib - on coumadin, on amio, in sinus rhythm  5. PVD  6. Hx of DVT  7. HLD  8. Hypotension - on midodrine  9. ESRD on HD  10. Obesity      RECOMMENDATIONS:  1. Troponin elevation in setting of demand and ESRD. No EKG changes to suggest ACS. Can stop trending. 2. Monitor on tele while in hospital.   3. Check orthostatics. Possible syncope in setting of hypovolemia (recent low appetite, vomiting, recent dialysis). 4. Resume AC. 5. Avoid Qtc prolonging medications. 6. No further cardiac work up needed at this point. Follow up as outpatient with cardiologist. Will sign off. Thank you for allowing us to participate in [address] [place] [address]. Will follow with you. Electronically signed on 08/28/20 at 9:38 AM by:    Zhao Jimenez MD   Fellow, [address] [place] [address]      Attending Physician Statement  I have discussed the case of [address] [place] [address] Blvd. including pertinent history and exam findings with the resident. I agree with the assessment, plan and orders as documented by the resident With changes made to the note.      Electronically signed by Guilherme Prado MD on 8/28/2020 at 2:13 PM.    [facility]      [phone]

## 2020-08-28 NOTE — CARE COORDINATION
Case Management Initial Discharge Plan  Arlet Avila,             Met with:patient to discuss discharge plans. Information verified: address, contacts, phone number, , insurance Yes    Emergency Contact/Next of Kin name & number: Henrique Shoemaker [phone]    PCP: Curt Murrell MD  Date of last visit: 2020    Insurance Provider: Asa Christina    Discharge Planning    Living Arrangements:  Children   Support Systems:  Children, Family Members    Home has 1 stories  4 stairs to climb to get into front door, n/a stairs to climb to reach second floor  Location of bedroom/bathroom in home main    Patient able to perform ADL's:Independent    Current Services (outpatient & in home) none  DME equipment: none  DME provider: none    Receiving oral anticoagulation therapy? Yes    If indicated:   Physician managing anticoagulation treatment: PCP  Where does patient obtain lab work for ATC treatment? [facility]      Potential Assistance Needed:  N/A    Patient agreeable to home care: No  [place] of choice provided:  n/a    Prior SNF/Rehab Placement and Facility: [facility]  Agreeable to SNF/Rehab: No  [place] of choice provided: n/a     Evaluation: n/a    Expected Discharge date:       Patient expects to be discharged to:  home  Follow Up Appointment: Best Day/ Time:      Transportation provider:   Transportation arrangements needed for discharge: No    Readmission Risk              Risk of Unplanned Readmission:        17             Does patient have a readmission risk score greater than 14?: Yes  If yes, follow-up appointment must be made within 7 days of discharge. Goals of Care:  No longer feeling like he is going to pass out. Discharge Plan: home independent, lives with son. Will need pcp appt.            Electronically signed by Maryann Farley RN on 20 at 11:32 AM EDT

## 2020-08-28 NOTE — CONSULTS
Renal Consult Note    Patient :  Genesis Mcelroy; 44 y.o. MRN# [id number]  Location:  3247/9950-55  Attending:  Mary Lennon MD  Admit Date:  8/27/2020   Hospital Day: 1    Reason for Consult:     Asked by Dr Mary Lennon MD to see for VALERIA/Elevated Creatinine. History Obtained From:     patient    HD Access:     previous  Right AV fistula    HD Unit:     [facility]    Nephrologist:     Nicola    Dry Weight:     115    Admission Weight:     116 kgs    History of Present Illness:     Genesis Mcelroy; 44 y.o. male with past medical history as mentioned below presented to the hospital with the chief complaint of sudden loss of consciousness yesterday after dialysis. He has known history of ESRD on hemodialysis Tuesday Thursday Saturday at the [facility] unit via upper extremity AV fistula. He had his dialysis per schedule. Thereafter he drove home. He does not remember after that and apparently was found by his neighbor to be sitting at his car door unresponsive. EMS was called and he was brought into the hospital.  Total syncopal episode was about 1 to 2 minutes. In the last week or so he has been complaining of generalized aches and malaise also has been complaining of decreased appetite nausea and vomiting for about a week or so. He was checked for COVID-19 in June was found to be negative. On presentation to the hospital COVID-19 was done which came back positive. Chest x-ray did show streaky lesion in the right lower lobe. Nephrology has been consulted for recommendation of renal placement therapy. At the time valuation he denies any complaints. No shortness of breath orthopnea. No cough or phlegm. No fever chills. No nausea vomiting. Past History/Allergies? Social History:     Past Medical History:   Diagnosis Date    Anemia     Arthritis     right shoulder/ PCP Dr. Esvin Russell    Atrial fibrillation ([facility]     [address] BPH (benign prostatic hypertrophy)     CAD (coronary artery disease)     Dr. Kang [place]    Caffeine use     1 coffee, 2 tea/day    Cellulitis of lower leg     right    Chronic back pain     Colon cancer ([facility]     colon    Cor pulmonale, acute ([facility] 12/30/2014    CRF (chronic renal failure)     dr. Rosio Akins [place]. Tues/ thurs/ sat/ right arm fistula    Erectile dysfunction     Gout     Hemodialysis patient ([facility]     tues/ thurs/ sat/ DR. Petersen/ fistula right lower arm    History of blood transfusion     no reaction    Hyperkalemia 12/24/2013    Hyperlipidemia     Hypertension     Hypotension     Hypothyroidism     Lymphedema of leg     right    Obesity     Thyroid disease        No Known Allergies    Social History     Socioeconomic History    Marital status: Single     Spouse name: Not on file    Number of children: Not on file    Years of education: Not on file    Highest education level: Not on file   Occupational History    Not on file   Social Needs    Financial resource strain: Not on file    Food insecurity     Worry: Not on file     Inability: Not on file    Transportation needs     Medical: Not on file     Non-medical: Not on file   Tobacco Use    Smoking status: Never Smoker    Smokeless tobacco: Never Used   Substance and Sexual Activity    Alcohol use:  Yes     Alcohol/week: 3.0 standard drinks     Types: 3 Glasses of wine per week     Comment: weekend    Drug use: No    Sexual activity: Yes     Partners: Female   Lifestyle    Physical activity     Days per week: Not on file     Minutes per session: Not on file    Stress: Not on file   Relationships    Social connections     Talks on phone: Not on file     Gets together: Not on file     Attends Tenriism service: Not on file     Active member of club or organization: Not on file     Attends meetings of clubs or organizations: Not on file     Relationship status: Not on file    Intimate partner violence     Fear of current or ex partner: Not on file Emotionally abused: Not on file     Physically abused: Not on file     Forced sexual activity: Not on file   Other Topics Concern    Not on file   Social History Narrative    Not on file       Family History:        Family History   Problem Relation Age of Onset    Cancer Father         leukemia    Heart Failure Mother     Arthritis Mother     High Blood Pressure Mother     Heart Disease Maternal Grandmother     Stroke Paternal Grandfather        Outpatient Medications:     Medications Prior to Admission: warfarin (COUMADIN) 2 MG tablet, TAKE 1 AND 1/2 TABLETS OR 2 TABLETS (OR AS DIRECTED BY OFFICE) BY MOUTH ONCE DAILY  acetaminophen (TYLENOL) 500 MG tablet, Take 2 tablets by mouth every 6 hours as needed for Pain or Fever  B Complex-C-Folic Acid (NEPHRO-JENNIFER) 0.8 MG TABS, TAKE ONE TABLET BY MOUTH DAILY WITH BREAKFAST  midodrine (PROAMATINE) 10 MG tablet, TAKE ONE TABLET BY MOUTH THREE TIMES DAILY  vitamin D (ERGOCALCIFEROL) 1.25 MG (57188 UT) CAPS capsule, TAKE ONE CAPSULE BY MOUTH ONCE A MONTH  cinacalcet (SENSIPAR) 30 MG tablet, Take 30 mg by mouth daily  B complex-vitamin C-folic acid (NEPHRO-JENNIFER) 1 MG tablet, TAKE ONE TABLET BY MOUTH DAILY WITH BREAKFAST  amiodarone (CORDARONE) 200 MG tablet, TAKE ONE TABLET BY MOUTH DAILY  ASPIRIN LOW DOSE 81 MG EC tablet, TAKE ONE TABLET BY MOUTH DAILY  HYDROcodone-acetaminophen (NORCO) 5-325 MG per tablet, Take 1 tablet by mouth every 6 hours as needed for Pain. .  pravastatin (PRAVACHOL) 40 MG tablet, Take 40 mg by mouth nightly   Elastic Bandages & Supports (MEDICAL COMPRESSION STOCKINGS) MISC, 1 each by Does not apply route daily as needed (as needed) Right leg below the knee 20-30 HH MM DX 82.409  Multiple Vitamin (MVI, CELEBRATE, CHEWABLE TABLET), Take 1 tablet by mouth 2 times daily     Current Medications:     Scheduled Meds:    amiodarone  200 mg Oral Daily    midodrine  10 mg Oral TID WC    warfarin  2.5 mg Oral Daily    sodium chloride flush  10 mL Intravenous 2 times per day    heparin (porcine)  5,000 Units Subcutaneous 3 times per day    warfarin (COUMADIN) daily dosing (placeholder)   Other RX Placeholder     Continuous Infusions:   PRN Meds:  acetaminophen, sodium chloride flush, acetaminophen **OR** acetaminophen, polyethylene glycol, promethazine **OR** ondansetron    Review of Systems:     Constitutional: No fever, no chills, no lethargy, no weakness. HEENT:  No headache, otalgia, itchy eyes, nasal discharge or sore throat. Cardiac:  No chest pain, dyspnea, orthopnea or PND. Chest:  No cough, phlegm or wheezing. Abdomen:  No abdominal pain, nausea or vomiting. Neuro:  No focal weakness, abnormal movements orseizure like activity. Skin:   No rashes, no itching. :   No hematuria, no pyuria, no dysuria, no flank pain. Extremities:  No swelling or joint pains. ROS was otherwise negative except as mentioned in the [address] Input/Output:     No intake/output data recorded. Patient Vitals for the past 96 hrs (Last 3 readings):   Weight   20 256 lb 13.4 oz (116.5 kg)   20 1244 260 lb (117.9 kg)       Vital Signs:   Temperature:  Temp: 98.8 °F (37.1 °C)  TMax:   Temp (24hrs), Av.8 °F (37.1 °C), Min:98.3 °F (36.8 °C), Max:99.3 °F (37.4 °C)    Respirations:  Resp: 18  Pulse:   Pulse: 70  BP:    BP: (!) 128/44  BP Range: Systolic (88ERX), XJZ:223 , Min:111 , PES:177       Diastolic (73ZQG), UDJ:49, Min:44, Max:60      Physical Examination:     General:  AAO x 3, speaking in full sentences, no accessory muscle use. HEENT: Atraumatic, normocephalic, no throat congestion, moist mucosa. Eyes:   Pupils equal, round and reactive to light, EOMI. Neck:   No JVD, no thyromegaly, no lymphadenopathy. Chest:  Bilateral vesicular breath sounds, no rales or wheezes. Cardiac:  S1 S2 RR, no murmurs, gallops or rubs, JVP not raised. Abdomen: Soft, non-tender, no masses or organomegaly, BS audible.   :   No suprapubic or flank tenderness. Neuro:  AAO x 3, No FND. SKIN:  No rashes, good skin turgor. Extremities:  No edema, palpable peripheral pulses, no calf tenderness. Right AV fistula has good thrill and bruit  Labs:       Recent Labs     08/27/20  1323 08/28/20  0701   WBC 6.7 3.0*   RBC 3.99* 3.95*   HGB 13.9 13.5   HCT 41.9 42.3   .0* 107.1*   MCH 34.8* 34.2*   MCHC 33.2 31.9   RDW 15.5* 15.1*    259   MPV 10.8 10.0      BMP:   Recent Labs     08/27/20  1247 08/27/20  1342 08/28/20  0701   NA  --  133* 136   K  --  4.2 4.8   CL  --  89* 88*   CO2  --  24 26   BUN  --  17 32*   CREATININE 8.34* 7.62* 10.16*   GLUCOSE  --  95 129*   CALCIUM  --  8.3* 8.8      Phosphorus:   No results for input(s): PHOS in the last 72 hours. Magnesium:    Recent Labs     08/27/20  1342   MG 2.3     Albumin:    Recent Labs     08/27/20  1342   LABALBU 3.8     BNP:      Lab Results   Component Value Date     02/03/2014     PTH:     Lab Results   Component Value Date    IPTH 321.5 09/25/2018     Blood cultures:  No results found for: [facility]    Urinalysis/Chemistries:      Lab Results   Component Value Date    NITRU NEGATIVE 12/20/2013    COLORU CATHY 12/20/2013    PHUR 5.0 12/20/2013    WBCUA 50  12/20/2013    RBCUA 10 TO 20 12/20/2013    MUCUS 1+ 12/20/2013    TRICHOMONAS NOT REPORTED 12/20/2013    YEAST NOT REPORTED 12/20/2013    BACTERIA FEW 12/20/2013    SPECGRAV 1.016 12/20/2013    LEUKOCYTESUR MOD 12/20/2013    UROBILINOGEN Normal 12/20/2013    BILIRUBINUR NEGATIVE 12/20/2013    GLUCOSEU NEGATIVE 12/20/2013    KETUA NEGATIVE 12/20/2013    AMORPHOUS NOT REPORTED 12/20/2013       Radiology:     CXR:     Assessment:     1. ESRD on Hemodialysis. His regular HD days are Tuesday Thursday Saturday at [facility] hemodialysis facility using right AV fistula under [facility]ola. His dry weight is 115 kg. Admitted with 116 kg  2. Anemia of chronic disease stable  3. Secondary hyperparathyroidism  4. Hypertension  5.   New onset syncope preceded by vague systemic symptoms work-up in progress cardiology evaluation noted, they do not feel any further work-up is necessary  6. COVID-19 pneumonia without any overt respiratory failure  7.  1 out of 2 blood culture positive for coag negative staph likely contaminant await ID recommendations    Plan:   1. HD in the morning as per schedule. 2. Strict Input and Output, Daily weigh and document in the chart. 3. Low Potassium, Low phosphorus and low salt diet. Fluids to be restricted to 1500ml/day. 4. IV Aranesp/Epogen for anemia of chronic disease with HD weekly. 5. IV Zemplar per protocol for secondary hyperparathyroidism with HD thrice a week. 6.  Await ID recommendations  7. No objection for CT angiogram to rule out PE although clinically does not meet criteria     Nutrition   Please ensure that patient is on a renal diet/TF. Thank you for the consultation. Please do not hesitate to contact us for any further questions/concerns. We will continue to follow along with you.

## 2020-08-28 NOTE — PROGRESS NOTES
[facility]  Office: [phone]  Bert Flavors, DO, Sophia Quick, DO, Erin Spann, DO, Yung Carrera Blood, DO, Mercedez Hernandez MD, Kailyn Batista MD, Queen Azael MD, Kristan Rollins MD, Nir Brooke MD, Jen Dumont MD, Stormy Diop MD, Xander Wise MD, Shanon Perry MD, Fabiola Powell DO, Gee Lima MD, Hilton Redman MD, Gabe Molina DO, Adithya Harding MD,  Bernabe Avitia DO, Maddie Morin MD, Erlinda Masy MD, Rohan Valentino, Anabelle Gee, CNP, Mariaelena Mendez, CNP, Howard Leon, CNS, Vu Hoff, CNP, Alvaro Reyes, CNP, Naa Denney, CNP, Partha Ulloa, CNP, Hernandez Hale, CNP, Cami Moctezuma PA-C, Socorro Dey, ARCELIA, Rafita Lobo, CNP, Gabbi Pope, CNP, Yolanda Perez, [facility] [place], Aaron Baltazar, CNP, Lynda Wills, [facility]   [address] [facility]    Progress Note    8/28/2020    4:59 PM    Name:   Melinda Luis  MRN:     [id number]     Acct:      [de-identified]   Room:   [facility] Day:  1  Admit Date:  8/27/2020 12:37 PM    PCP:   Baljeet Garg MD  Code Status:  Full Code    Subjective:     C/C:   Chief Complaint   Patient presents with    Respiratory Distress     Pt c/o SOB x 5 -7 days, with loss of appetite, nausea, and vomiting. Syncope on found to be positive for COVID  Interval History Status: improved. Seen and examined face-to-face,  Patient sitting in the bed comfortably,  Mild cough and had some shortness of breath in the last couple days but now he is feeling better, not on any oxygen,  Factious disease-nephrology and cardiology on board,      Brief History:      Mr. Aria Noyola presents to ED today via EMS with complaints of syncopal episode. Patient stated he received hemodialysis this morning, drove home, and awoke to his neighbor at his car door.   He he was told by his neighbor that he had been sitting there for \"a little while\", the neighbor was concerned and called EMS for transport to ED for evaluation. He does complain of some respiratory distress over the last week with shortness of breath but denies cough or fever. He complains of some sensitivity to his scalp, as well as general aching and malaise. He also has had decreased appetite, nausea and vomiting over the past 7 days. He states he was tested for COVID-19 in June and he was found to be negative. He lives at home with his son. He is compliant with his medications. Independent in his ADLs. Takes his prescribed medications. He drives himself to hemodialysis on Tuesdays Thursdays and Saturdays. He is resting at this time without any complaints.     ED course of treatment found him to be COVID-19 positive with an elevated troponin at 87 and a lactic acid at 4.3. He initially was requiring supplemental oxygen at 3 L and had a fever of 100.7 Fahrenheit,    At this time is doing fine,  No chest pain no shortness of breath, mild cough,  Blood cultures are positive for coag negative staph methicillin-resistant,  Repeat blood cultures sent,  CT of the chest for PE rule out is pending      Review of Systems:     Constitutional:  negative for chills, fevers, sweats  Respiratory:  negative for cough, dyspnea on exertion, shortness of breath, wheezing  Cardiovascular:  negative for chest pain, chest pressure/discomfort, lower extremity edema, palpitations  Gastrointestinal:  negative for abdominal pain, constipation, diarrhea, nausea, vomiting  Neurological:  negative for dizziness, headache    Medications:      Allergies:  No Known Allergies    Current Meds:   Scheduled Meds:    amiodarone  200 mg Oral Daily    midodrine  10 mg Oral TID WC    warfarin  2.5 mg Oral Daily    sodium chloride flush  10 mL Intravenous 2 times per day    heparin (porcine)  5,000 Units Subcutaneous 3 times per day    warfarin (COUMADIN) daily dosing (placeholder)   Other RX Placeholder     Continuous Infusions:   PRN Meds: acetaminophen, sodium chloride flush, acetaminophen **OR** acetaminophen, polyethylene glycol, promethazine **OR** ondansetron    Data:     Past Medical History:   has a past medical history of Anemia, Arthritis, Atrial fibrillation ([facility] BPH (benign prostatic hypertrophy), CAD (coronary artery disease), Caffeine use, Cellulitis of lower leg, Chronic back pain, Colon cancer ([facility] Cor pulmonale, acute ([facility] CRF (chronic renal failure), Erectile dysfunction, Gout, Hemodialysis patient ([facility] History of blood transfusion, Hyperkalemia, Hyperlipidemia, Hypertension, Hypotension, Hypothyroidism, Lymphedema of leg, Obesity, and Thyroid disease. Social History:   reports that he has never smoked. He has never used smokeless tobacco. He reports current alcohol use of about 3.0 standard drinks of alcohol per week. He reports that he does not use drugs. Family History:   Family History   Problem Relation Age of Onset    Cancer Father         leukemia    Heart Failure Mother     Arthritis Mother     High Blood Pressure Mother     Heart Disease Maternal Grandmother     Stroke Paternal Grandfather        Vitals:  BP (!) 128/44   Pulse 70   Temp 98.8 °F (37.1 °C) (Oral)   Resp 18   Ht 5' 10\" (1.778 m)   Wt 256 lb 13.4 oz (116.5 kg)   SpO2 99%   BMI 36.85 kg/m²   Temp (24hrs), Av.8 °F (37.1 °C), Min:98.3 °F (36.8 °C), Max:99.3 °F (37.4 °C)    Recent Labs     20  1242 20  1247   POCGLU 93 110*       I/O (24Hr):   No intake or output data in the 24 hours ending 20 1659    Labs:  Hematology:  Recent Labs     20  1323 20  1342 20  0701   WBC 6.7  --  3.0*   RBC 3.99*  --  3.95*   HGB 13.9  --  13.5   HCT 41.9  --  42.3   .0*  --  107.1*   MCH 34.8*  --  34.2*   MCHC 33.2  --  31.9   RDW 15.5*  --  15.1*     --  259   MPV 10.8  --  10.0   INR  --  1.3 1.2     Chemistry:  Recent Labs     20  1247 20  1325 20  1342 20  1532 20  0701   NA  --   --  133*  --  136   K  -- hepatomegaly, splenomegaly  Extremities:  no edema, redness, tenderness in the calves  Skin:  no gross lesions, rashes, induration    Assessment:        Hospital Problems           Last Modified POA    * (Principal) Syncope 8/27/2020 Yes    Benign prostatic hyperplasia with lower urinary tract symptoms 8/28/2020 Yes    ESRD on hemodialysis ([facility] 8/27/2020 Yes    Chronic hypotension 8/27/2020 Yes    Chronic atrial fibrillation 8/28/2020 Yes    [facility] 8/28/2020 Yes    S/P laparoscopic sleeve gastrectomy 8/28/2020 Yes    Status post partial colectomy 8/28/2020 Yes    COVID-19 8/27/2020 Yes    Hypoxemia 8/27/2020 Yes    Bacteremia due to methicillin resistant Staphylococcus aureus 8/28/2020 Yes          Plan:        Syncope post dialysis most likely due to hypovolemia, cardiology on board, troponin rise possible secondary to end-stage renal disease, no further testing cardiology wise advised,  Appreciate nephrology input for end-stage renal disease on dialysis,  CT of the chest for PE will be done soon,  Infectious disease input pending, patient has MRSA bacteremia, repeat blood cultures,  Hypoxia resolved at this time,  Full CODE STATUS,  Continue home medication  Nelson Jaramillo MD  8/28/2020  4:59 PM

## 2020-08-28 NOTE — CONSULTS
[facility] - Initial Consult Note COVID 19 Patient  Today's Date and Time: 8/28/2020, 5:23 PM    Impression :   COVID 19 Suspect  COVID 19 Confirmed Infection 8-27-20. Syncope  Respiratory distress  Single blood culture with Coagulase negative Staphylococci . Possible contamination    Recommendations:   Monitor off antibiotics  Repeat blood cultures x 2- 1 hr apart  Clinical Research will approach patient to explore if he qualifies for any of the COVID 19 treatment protocols. Remdesivir excluded because of renal failure  Will see if he qualifies for Plasma      Medical Decision Making/Summary/Discussion:8/28/2020     Patient admitted with suspected COVID 19 infection  Covid test confirmed positive. Single blood culture with Coagulase negative Staphylococci -Possible contaminant    Infection Control Recommendations   [place] Precautions  Airborne isolation  Droplet Isolation    Antimicrobial Stewardship Recommendations     Discontinuation of therapy  Coordination of Outpatient Care:   Estimated Length of IV antimicrobials:TBD  Patient will need Midline Catheter Insertion: TBD  Patient will need PICC line Insertion: No  Patient will need: Home IV , [place],  SNF,  LTAC:TBD  Patient will need outpatient wound care:No    Chief complaint/reason for consultation:   Concern for COVID infection    History of Present Illness:   Aury Palmer is a 44y.o.-year-old  male who was initially admitted on 8/27/2020. Patient seen at the request of . INITIAL HISTORY:    Patient presented through ER with complaints of syncope. He suffers from ESRD and had gone to HD. Returned home where his neighbor found him in his car after not getting out of the car. Patient complained of SOB over a one week period of time. He also described nausea, vomiting and decreased appetite over one week. When evaluated in ER he was found to be COVID 19 positive.  Temp was (benign prostatic hypertrophy)     CAD (coronary artery disease)     Dr. Aeln Pena    Caffeine use     1 coffee, 2 tea/day    Cellulitis of lower leg     right    Chronic back pain     Colon cancer ([facility] [address]     colon    Cor pulmonale, acute ([facility] [address] 12/30/2014    CRF (chronic renal failure)     dr. Laureano Chew on laskey. Tues/ thurs/ sat/ right arm fistula    Erectile dysfunction     Gout     Hemodialysis patient ([facility] [address]     tues/ thurs/ sat/ DR. Petersen/ fistula right lower arm    History of blood transfusion     no reaction    Hyperkalemia 12/24/2013    Hyperlipidemia     Hypertension     Hypotension     Hypothyroidism     Lymphedema of leg     right    Obesity     Thyroid disease        Past Surgical  History:     Past Surgical History:   Procedure Laterality Date    ABDOMINAL EXPLORATION SURGERY  01/22/2019    ABDOMINAL EXPLORATION, LEFT HEMICOLECTOMY, MOBILIZATION OF SPLENIC FLEXURE     ABSCESS DRAINAGE Right 01/20/2014    I&D Right Shoulder, Right shoulder arthroscopy, I&D AC Joint    APPENDECTOMY      COLONOSCOPY      COLONOSCOPY  01/22/2019    COLONOSCOPY N/A 1/22/2019    COLONOSCOPY- GI UNIT SCHEDULED performed by Yolanda Hairston MD at [facility] Right 3/27/15    rt wrist    ENDOSCOPY, COLON, DIAGNOSTIC      UGI    GASTRIC BAND REMOVAL  01/17/2017    subcutaneous port    HC CATH POWER PICC SINGLE  1/24/2014         LAP BAND  2007    MI COLSC FLX W/RMVL OF TUMOR POLYP LESION SNARE TQ N/A 11/15/2018    COLONOSCOPY POLYPECTOMY SNARE/COLD BIOPSY performed by Naa Ferreira MD at [facility] Right 2014    SLEEVE GASTRECTOMY N/A 9/25/2017    GASTRECTOMY SLEEVE LAPAROSCOPIC SI ROBOTIC, ENDOSEALER performed by Manjula Tristan MD at [facility] N/A 1/22/2019    ABDOMINAL EXPLORATION, LEFT HEMICOLECTOMY, MOBILIZATION OF SPLENIC FLEXURE performed by Yolanda Hairston MD at [address] Right     leg tumor removal/ dr. Bharti Santana       Medications:      amiodarone  200 mg Oral Daily    midodrine  10 mg Oral TID WC    warfarin  2.5 mg Oral Daily    sodium chloride flush  10 mL Intravenous 2 times per day    heparin (porcine)  5,000 Units Subcutaneous 3 times per day    warfarin (COUMADIN) daily dosing (placeholder)   Other RX Placeholder       Social History:     Social History     Socioeconomic History    Marital status: Single     Spouse name: Not on file    Number of children: Not on file    Years of education: Not on file    Highest education level: Not on file   Occupational History    Not on file   Social Needs    Financial resource strain: Not on file    Food insecurity     Worry: Not on file     Inability: Not on file    Transportation needs     Medical: Not on file     Non-medical: Not on file   Tobacco Use    Smoking status: Never Smoker    Smokeless tobacco: Never Used   Substance and Sexual Activity    Alcohol use:  Yes     Alcohol/week: 3.0 standard drinks     Types: 3 Glasses of wine per week     Comment: weekend    Drug use: No    Sexual activity: Yes     Partners: Female   Lifestyle    Physical activity     Days per week: Not on file     Minutes per session: Not on file    Stress: Not on file   Relationships    Social connections     Talks on phone: Not on file     Gets together: Not on file     Attends Anabaptism service: Not on file     Active member of club or organization: Not on file     Attends meetings of clubs or organizations: Not on file     Relationship status: Not on file    Intimate partner violence     Fear of current or ex partner: Not on file     Emotionally abused: Not on file     Physically abused: Not on file     Forced sexual activity: Not on file   Other Topics Concern    Not on file   Social History Narrative    Not on file       Family History:     Family History   Problem Relation Age of Onset    Cancer Father         leukemia    Heart Failure Mother  Arthritis Mother     High Blood Pressure Mother     Heart Disease Maternal Grandmother     Stroke Paternal Grandfather         Allergies:   Patient has no known allergies. Review of Systems:     Constitutional: No fevers or chills. No systemic complaints  Head: No headaches  Eyes: No double vision or blurry vision. No conjunctival inflammation. ENT: No sore throat or runny nose. . No hearing loss, tinnitus or vertigo. Cardiovascular: No chest pain or palpitations. shortness of breath. No ROBIN. syncope  Lung: shortness of breath, cough. No sputum production  Abdomen: Nausea, vomiting, no diarrhea, or abdominal pain. Peter Salter No cramps. Genitourinary: ESRD  Musculoskeletal: No muscle aches or pains. No joint effusions, swelling or deformities  Hematologic: No bleeding or bruising. Neurologic: No headache, weakness, numbness, or tingling. Integument: No rash, no ulcers. Psychiatric: No depression. Endocrine: No polyuria, no polydipsia, no polyphagia. Physical Examination :     Patient Vitals for the past 8 hrs:   BP Temp Temp src Pulse Resp   08/28/20 1645 (!) 133/54 -- -- 62 14   08/28/20 1200 (!) 128/44 98.8 °F (37.1 °C) Oral -- --     General Appearance: Awake, alert, and in no apparent distress  Head:  Normocephalic, no trauma  Eyes: Pupils equal, round, reactive to light and accommodation; extraocular movements intact; sclera anicteric; conjunctivae pink. No embolic phenomena. ENT: Oropharynx clear, without erythema, exudate, or thrush. No tenderness of sinuses. Mouth/throat: mucosa pink and moist. No lesions. Dentition in good repair. Neck:Supple, without lymphadenopathy. Thyroid normal, No bruits. Pulmonary/Chest: Clear to auscultation, without wheezes, rales, or rhonchi. No dullness to percussion. Cardiovascular: Regular rate and rhythm without murmurs, rubs, or gallops. Abdomen: Soft, non tender.  Bowel sounds normal. No organomegaly  All four Extremities: No cyanosis, clubbing, edema, or effusions. Neurologic: No gross sensory or motor deficits. Skin: Warm and dry with good turgor. No signs of peripheral arterial or venous insufficiency. No ulcerations. No open wounds. Medical Decision Making -Laboratory:   I have independently reviewed/ordered the following labs:    CBC with Differential:   Recent Labs     08/27/20  1323 08/28/20  0701   WBC 6.7 3.0*   HGB 13.9 13.5   HCT 41.9 42.3    259   LYMPHOPCT 34  --    MONOPCT 12  --      BMP:   Recent Labs     08/27/20  1342 08/28/20  0701   * 136   K 4.2 4.8   CL 89* 88*   CO2 24 26   BUN 17 32*   CREATININE 7.62* 10.16*   MG 2.3  --      Hepatic Function Panel:   Recent Labs     08/27/20  1342   PROT 8.2   LABALBU 3.8   BILITOT 0.43   ALKPHOS 78   ALT 90*   *     No results for input(s): RPR in the last 72 hours. No results for input(s): HIV in the last 72 hours. No results for input(s): BC in the last 72 hours. Lab Results   Component Value Date    MUCUS 1+ 12/20/2013    RBC 3.95 08/28/2020    TRICHOMONAS NOT REPORTED 12/20/2013    WBC 3.0 08/28/2020    YEAST NOT REPORTED 12/20/2013    TURBIDITY CLOUDY 12/20/2013     Lab Results   Component Value Date    CREATININE 10.16 08/28/2020    GLUCOSE 129 08/28/2020    GLUCOSE 91 10/04/2011       Medical Decision Making-Imaging:     EXAMINATION:    ONE XRAY VIEW OF THE CHEST         8/27/2020 1:59 pm         COMPARISON:    February 1, 2019, chest examination         HISTORY:    ORDERING SYSTEM PROVIDED HISTORY: syncope    TECHNOLOGIST PROVIDED HISTORY:    syncope    Reason for Exam: Syncope/  AP erect/  gp used/  port.     Acuity: Unknown    Type of Exam: Unknown         FINDINGS:    Borderline cardiomegaly         Moderate patchy right basilar infiltrate with mild left lingular and possible    left basilar infiltrate.  Limited penetration of the left lower lung.  Clear    upper lungs         No significant pleural process         Degenerative changes of the right acromioclavicular joint and thoracic spine              Impression    Limited left retrocardiac assessment.  Mild streaky lingular atelectasis         Moderate patchy right basilar infiltrate, pneumonia the likely etiology    follow changes to resolution         RECOMMENDATION:    Upright well penetrated PA view of the chest for more accurate assessment of    the left lung base                EXAMINATION:    CTA OF THE CHEST 8/28/2020 3:55 pm         TECHNIQUE:    CTA of the chest was performed after the administration of intravenous    contrast.  Multiplanar reformatted images are provided for review.  MIP    images are provided for review. Dose modulation, iterative reconstruction,    and/or weight based adjustment of the mA/kV was utilized to reduce the    radiation dose to as low as reasonably achievable.         COMPARISON:    None         HISTORY:    ORDERING SYSTEM PROVIDED HISTORY: Hypoxic, COVID, rule out PE    TECHNOLOGIST PROVIDED HISTORY:    Hypoxic, COVID, rule out PE         FINDINGS:    Pulmonary Arteries: Pulmonary arteries are adequately opacified for    evaluation.  No evidence of intraluminal filling defect to suggest pulmonary    embolism.  Main pulmonary artery is normal in caliber.         Mediastinum: No mediastinal adenopathy, acute aortic abnormality, or    pericardial effusion.  Mild cardiomegaly is noted.  Coronary artery    calcification is present.  Moderate calcification of the aortic arch is seen.         Lungs/pleura: There has been interval development of right upper lobe solid    nodules measuring 7.5 mm and 4.5 mm image 33 series 3.  Considerable    multifocal scattered areas of consolidation are present throughout the    remainder of the lung fields bilaterally without effusion, consistent with    atypical pneumonitis and diagnosis of COVID-19.  Tracheobronchial tree is    patent.  No effusion is present.         Upper Abdomen: Atherosclerotic calcification of the aorta and branches is    noted.  Included kidneys appear small.  There is a small cyst in the right    kidney.         Soft Tissues/Bones: Multilevel spondylosis in the thoracic spine is noted. No acute osseous or soft tissue abnormality.              Impression    1.  No evidence of pulmonary embolus.         2.  Atherosclerotic disease including coronary arteries.  Mild cardiomegaly.         3.  Extensive multifocal scattered ground-glass areas of consolidation    consistent with COVID-19.         4.  Other findings as above.             Medical Decision [id number]:       Medical Decision Making-Other:     Note:  Labs, medications, radiologic studies were reviewed with personal review of films  Large amounts of data were reviewed  Discussed with nursing Staff, Discharge planner  Infection Control and Prevention measures reviewed  All prior entries were reviewed  Administer medications as ordered  Prognosis: Guarded  Discharge planning reviewed  Follow up as outpatient. Thank you for allowing us to participate in the care of this patient. Please call with questions.     Rashid Madison MD  Pager: [phone] - Office: [phone]

## 2020-08-29 PROBLEM — R91.8 PULMONARY NODULES: Status: ACTIVE | Noted: 2020-08-29

## 2020-08-29 LAB
ABO/RH: NORMAL
ANION GAP SERPL CALCULATED.3IONS-SCNC: 25 MMOL/L (ref 9–17)
BUN BLDV-MCNC: 54 MG/DL (ref 6–20)
BUN/CREAT BLD: ABNORMAL (ref 9–20)
CALCIUM SERPL-MCNC: 8.4 MG/DL (ref 8.6–10.4)
CHLORIDE BLD-SCNC: 87 MMOL/L (ref 98–107)
CO2: 21 MMOL/L (ref 20–31)
CREAT SERPL-MCNC: 13.46 MG/DL (ref 0.7–1.2)
EKG ATRIAL RATE: 84 BPM
EKG P AXIS: 52 DEGREES
EKG P-R INTERVAL: 172 MS
EKG Q-T INTERVAL: 404 MS
EKG QRS DURATION: 68 MS
EKG QTC CALCULATION (BAZETT): 477 MS
EKG R AXIS: 73 DEGREES
EKG T AXIS: 43 DEGREES
EKG VENTRICULAR RATE: 84 BPM
GFR AFRICAN AMERICAN: 5 ML/MIN
GFR NON-AFRICAN AMERICAN: 4 ML/MIN
GFR SERPL CREATININE-BSD FRML MDRD: ABNORMAL ML/MIN/{1.73_M2}
GFR SERPL CREATININE-BSD FRML MDRD: ABNORMAL ML/MIN/{1.73_M2}
GLUCOSE BLD-MCNC: 156 MG/DL (ref 70–99)
INR BLD: 1.5
POTASSIUM SERPL-SCNC: 4.1 MMOL/L (ref 3.7–5.3)
PROTHROMBIN TIME: 15.2 SEC (ref 9–12)
SODIUM BLD-SCNC: 133 MMOL/L (ref 135–144)
TROPONIN INTERP: ABNORMAL
TROPONIN T: ABNORMAL NG/ML
TROPONIN, HIGH SENSITIVITY: 83 NG/L (ref 0–22)

## 2020-08-29 PROCEDURE — 6360000002 HC RX W HCPCS: Performed by: NURSE PRACTITIONER

## 2020-08-29 PROCEDURE — 90935 HEMODIALYSIS ONE EVALUATION: CPT

## 2020-08-29 PROCEDURE — 84484 ASSAY OF TROPONIN QUANT: CPT

## 2020-08-29 PROCEDURE — 85610 PROTHROMBIN TIME: CPT

## 2020-08-29 PROCEDURE — 80048 BASIC METABOLIC PNL TOTAL CA: CPT

## 2020-08-29 PROCEDURE — 85027 COMPLETE CBC AUTOMATED: CPT

## 2020-08-29 PROCEDURE — 99232 SBSQ HOSP IP/OBS MODERATE 35: CPT | Performed by: INTERNAL MEDICINE

## 2020-08-29 PROCEDURE — 86901 BLOOD TYPING SEROLOGIC RH(D): CPT

## 2020-08-29 PROCEDURE — 5A1D70Z PERFORMANCE OF URINARY FILTRATION, INTERMITTENT, LESS THAN 6 HOURS PER DAY: ICD-10-PCS | Performed by: INTERNAL MEDICINE

## 2020-08-29 PROCEDURE — 87040 BLOOD CULTURE FOR BACTERIA: CPT

## 2020-08-29 PROCEDURE — 6370000000 HC RX 637 (ALT 250 FOR IP): Performed by: GENERAL PRACTICE

## 2020-08-29 PROCEDURE — 1200000000 HC SEMI PRIVATE

## 2020-08-29 PROCEDURE — 2580000003 HC RX 258: Performed by: NURSE PRACTITIONER

## 2020-08-29 PROCEDURE — 86900 BLOOD TYPING SEROLOGIC ABO: CPT

## 2020-08-29 RX ORDER — 0.9 % SODIUM CHLORIDE 0.9 %
20 INTRAVENOUS SOLUTION INTRAVENOUS ONCE
Status: COMPLETED | OUTPATIENT
Start: 2020-08-29 | End: 2020-08-30

## 2020-08-29 RX ORDER — HEPARIN SODIUM 5000 [USP'U]/ML
7500 INJECTION, SOLUTION INTRAVENOUS; SUBCUTANEOUS EVERY 8 HOURS SCHEDULED
Status: DISCONTINUED | OUTPATIENT
Start: 2020-08-29 | End: 2020-09-03 | Stop reason: HOSPADM

## 2020-08-29 RX ADMIN — MIDODRINE HYDROCHLORIDE 10 MG: 5 TABLET ORAL at 17:09

## 2020-08-29 RX ADMIN — HEPARIN SODIUM 5000 UNITS: 5000 INJECTION INTRAVENOUS; SUBCUTANEOUS at 08:34

## 2020-08-29 RX ADMIN — ACETAMINOPHEN 650 MG: 325 TABLET ORAL at 21:18

## 2020-08-29 RX ADMIN — SODIUM CHLORIDE, PRESERVATIVE FREE 10 ML: 5 INJECTION INTRAVENOUS at 11:53

## 2020-08-29 RX ADMIN — HEPARIN SODIUM 7500 UNITS: 5000 INJECTION INTRAVENOUS; SUBCUTANEOUS at 22:00

## 2020-08-29 RX ADMIN — WARFARIN SODIUM 2.5 MG: 2.5 TABLET ORAL at 17:09

## 2020-08-29 RX ADMIN — HEPARIN SODIUM 5000 UNITS: 5000 INJECTION INTRAVENOUS; SUBCUTANEOUS at 17:00

## 2020-08-29 RX ADMIN — MIDODRINE HYDROCHLORIDE 10 MG: 5 TABLET ORAL at 08:34

## 2020-08-29 RX ADMIN — AMIODARONE HYDROCHLORIDE 200 MG: 200 TABLET ORAL at 08:34

## 2020-08-29 RX ADMIN — MIDODRINE HYDROCHLORIDE 10 MG: 5 TABLET ORAL at 13:40

## 2020-08-29 RX ADMIN — SODIUM CHLORIDE, PRESERVATIVE FREE 10 ML: 5 INJECTION INTRAVENOUS at 21:17

## 2020-08-29 ASSESSMENT — PAIN DESCRIPTION - FREQUENCY: FREQUENCY: CONTINUOUS

## 2020-08-29 ASSESSMENT — PAIN DESCRIPTION - ORIENTATION: ORIENTATION: MID

## 2020-08-29 ASSESSMENT — PAIN SCALES - GENERAL
PAINLEVEL_OUTOF10: 0
PAINLEVEL_OUTOF10: 0
PAINLEVEL_OUTOF10: 7
PAINLEVEL_OUTOF10: 0

## 2020-08-29 ASSESSMENT — PAIN DESCRIPTION - PROGRESSION: CLINICAL_PROGRESSION: GRADUALLY IMPROVING

## 2020-08-29 ASSESSMENT — PAIN DESCRIPTION - DESCRIPTORS: DESCRIPTORS: CRAMPING;DISCOMFORT

## 2020-08-29 ASSESSMENT — PAIN DESCRIPTION - ONSET: ONSET: ON-GOING

## 2020-08-29 ASSESSMENT — PAIN DESCRIPTION - LOCATION: LOCATION: ABDOMEN

## 2020-08-29 ASSESSMENT — PAIN DESCRIPTION - PAIN TYPE: TYPE: ACUTE PAIN

## 2020-08-29 NOTE — PROGRESS NOTES
[facility] - Initial Consult Note COVID 19 Patient  Today's Date and Time: 8/29/2020, 11:23 AM    Impression :   COVID 19 Suspect  COVID 19 Confirmed Infection 8-27-20. Syncope  Respiratory distress  Single blood culture with Coagulase negative Staphylococci . Possible contamination    Recommendations:   Monitor off antibiotics  Repeat blood cultures x 2- 1 hr apart, so far single SCN blood culture is a contamination  Clinical Research will approach patient to explore if he qualifies for any of the COVID 19 treatment protocols. Remdesivir excluded because of renal failure  Was not eligible for nitric oxide study, or other research studies  Will see if he qualifies for Plasma      Medical Decision Making/Summary/Discussion:8/29/2020     Patient admitted with suspected COVID 19 infection  Covid test confirmed positive. Single blood culture with Coagulase negative Staphylococci -Possible contaminant    Infection Control Recommendations   [place] Precautions  Airborne isolation  Droplet Isolation    Antimicrobial Stewardship Recommendations     Discontinuation of therapy  Coordination of Outpatient Care:   Estimated Length of IV antimicrobials:TBD  Patient will need Midline Catheter Insertion: TBD  Patient will need PICC line Insertion: No  Patient will need: Home IV , [place],  SNF,  LTAC:TBD  Patient will need outpatient wound care:No    Chief complaint/reason for consultation:   Concern for COVID infection    History of Present Illness:   Demarcus Cabello is a 44y.o.-year-old  male who was initially admitted on 8/27/2020. Patient seen at the request of . INITIAL HISTORY:    Patient presented through ER with complaints of syncope. He suffers from ESRD and had gone to HD. Returned home where his neighbor found him in his car after not getting out of the car. Patient complained of SOB over a one week period of time.  He also described nausea, vomiting and decreased appetite over one week. When evaluated in ER he was found to be COVID 19 positive. Temp was 100.7 F. He was hypoxic and required Nasal 02 at 3 L/min. Lactic acid was elevated at 4.3. Troponin also was elevated. Patient admitted because of concerns with COVID 19.    CURRENT EVALUATION : 8/29/2020  Due to the current efforts to prevent transmission of COVID-19 and also the need to preserve PPE for other caregivers, a face-to-face encounter with the patient was not performed. That being said, all relevant records and diagnostic tests were reviewed, including laboratory results and imaging. Patient was evaluated from the window, called on the phone, and chart reviewed, disc w  involved healthcare workers. Patient is on hemodialysis,  Rather quiet, appropriate, saturating 95% with nasal cannula oxygen. Remdesevir has been waived due to his renal failure  Blood culture positive once 8/27 likely contamination SCN      Patient exhibiting respiratory distress. Yes  Respiratory secretions: No    Patient receiving supplemental oxygen. Yes NC 3 L/min  02 sat 97  RR 14      QTc: 477      NEWS Score: 0-4 Low risk group; 5-6: Medium risk group; 7 or above: High risk group  Parameters 3 2 1 0 1 2 3   Age    < 65   = 65   RR = 8  9-11 12-20  21-24 = 25   O2 Sats = 91 92-93 94-95 = 96      Suppl O2  Yes  No      SBP = 90  101-110 111-219   = 220   HR = 40  41-50 51-90  111-130 = 131   Consciousness    Alert   Drowsiness, lethargy, or confusion   Temperature = 35.0 C (95.0 F)  35.1-36.0 C 95.1-96.9 F 36.1-38.0 C 97.0-100.4 F 38.1-39.0 C 100.5-102.3 F = 39.1 C = 102.4 F      NEWS Score:  8-28-20: 2 Low risk    Overall Daily Picture:   Unchanged    Presence of secondary bacterial Infection:  Yes  Additional antibiotics: No    Labs, X rays reviewed: 8/29/2020    BUN:32  Cr:10.1    WBC:3.0  Hb:13.5  Plat: 259    Absolute Neutrophils:3.5  Absolute Lymphocytes:2.26  Neutrophil/Lymphocyte Ratio: 1.54 Low risk    CRP:  Ferritin:  LDH:     Pro Calcitonin:  Troponin 87-->98-->86    Cultures:  Urine:    Blood:  8-27-20: Coagulase negative Staphylococci  X 1  Sputum :    Wound:      CXR:   8-27-20 Patchy infiltrates bilaterally  CAT:  8-27-20: Multifocal ground glass opacities compatible with COVID      Discussed with patient, RN, CC, IM. I have personally reviewed the past medical history, past surgical history, medications, social history, and family history, and I have updated the database accordingly. Past Medical History:     Past Medical History:   Diagnosis Date    Anemia     Arthritis     right shoulder/ PCP Dr. Esvin Russell    Atrial fibrillation ([facility]     [address] BPH (benign prostatic hypertrophy)     CAD (coronary artery disease)     Dr. Thomas Betancur    Caffeine use     1 coffee, 2 tea/day    Cellulitis of lower leg     right    Chronic back pain     Colon cancer ([facility]     colon    Cor pulmonale, acute ([facility] 12/30/2014    CRF (chronic renal failure)     dr. Dianne Arreguin on [place]. Tues/ thurs/ sat/ right arm fistula    Erectile dysfunction     Gout     Hemodialysis patient ([facility]     tues/ thurs/ sat/ DR. Petersen/ fistula right lower arm    History of blood transfusion     no reaction    Hyperkalemia 12/24/2013    Hyperlipidemia     Hypertension     Hypotension     Hypothyroidism     Lymphedema of leg     right    Obesity     Thyroid disease        Past Surgical  History:     Past Surgical History:   Procedure Laterality Date    ABDOMINAL EXPLORATION SURGERY  01/22/2019    ABDOMINAL EXPLORATION, LEFT HEMICOLECTOMY, MOBILIZATION OF SPLENIC FLEXURE     ABSCESS DRAINAGE Right 01/20/2014    I&D Right Shoulder, Right shoulder arthroscopy, I&D AC Joint    APPENDECTOMY      COLONOSCOPY      COLONOSCOPY  01/22/2019    COLONOSCOPY N/A 1/22/2019    COLONOSCOPY- GI UNIT SCHEDULED performed by Sabrina Ramos MD at [address] [place] [address] Right 3/27/15    rt wrist    ENDOSCOPY, COLON, DIAGNOSTIC      UGI    GASTRIC BAND REMOVAL  01/17/2017    subcutaneous port    HC CATH POWER PICC SINGLE  1/24/2014         LAP BAND  2007    HI COLSC FLX W/RMVL OF TUMOR POLYP LESION SNARE TQ N/A 11/15/2018    COLONOSCOPY POLYPECTOMY SNARE/COLD BIOPSY performed by Moriah Lara MD at [facility] Right 2014    SLEEVE GASTRECTOMY N/A 9/25/2017    GASTRECTOMY SLEEVE LAPAROSCOPIC SI ROBOTIC, ENDOSEALER performed by Mak Fowler MD at [facility] N/A 1/22/2019    ABDOMINAL EXPLORATION, LEFT HEMICOLECTOMY, MOBILIZATION OF SPLENIC FLEXURE performed by Israel Mcgee MD at [facility] Right     leg tumor removal/ dr. Edvin Brush       Medications:      amiodarone  200 mg Oral Daily    midodrine  10 mg Oral TID WC    warfarin  2.5 mg Oral Daily    sodium chloride flush  10 mL Intravenous 2 times per day    heparin (porcine)  5,000 Units Subcutaneous 3 times per day    warfarin (COUMADIN) daily dosing (placeholder)   Other RX Placeholder       Social History:     Social History     Socioeconomic History    Marital status: Single     Spouse name: Not on file    Number of children: Not on file    Years of education: Not on file    Highest education level: Not on file   Occupational History    Not on file   Social Needs    Financial resource strain: Not on file    Food insecurity     Worry: Not on file     Inability: Not on file    Transportation needs     Medical: Not on file     Non-medical: Not on file   Tobacco Use    Smoking status: Never Smoker    Smokeless tobacco: Never Used   Substance and Sexual Activity    Alcohol use:  Yes     Alcohol/week: 3.0 standard drinks     Types: 3 Glasses of wine per week     Comment: weekend    Drug use: No    Sexual activity: Yes     Partners: Female   Lifestyle    Physical activity     Days per week: Not on file     Minutes per session: Not on file  Stress: Not on file   Relationships    Social connections     Talks on phone: Not on file     Gets together: Not on file     Attends [employer] service: Not on file     Active member of club or organization: Not on file     Attends meetings of clubs or organizations: Not on file     Relationship status: Not on file    Intimate partner violence     Fear of current or ex partner: Not on file     Emotionally abused: Not on file     Physically abused: Not on file     Forced sexual activity: Not on file   Other Topics Concern    Not on file   Social History Narrative    Not on file       Family History:     Family History   Problem Relation Age of Onset    Cancer Father         leukemia    Heart Failure Mother     Arthritis Mother     High Blood Pressure Mother     Heart Disease Maternal Grandmother     Stroke Paternal Grandfather         Allergies:   Patient has no known allergies. Review of Systems:     Review of system difficult, patient very sluggish at this time    Constitutional: No fevers or chills. No systemic complaints  Head: No headaches  Eyes: No double vision or blurry vision. No conjunctival inflammation. ENT: No sore throat or runny nose. . No hearing loss, tinnitus or vertigo. Cardiovascular: No chest pain or palpitations. shortness of breath. No ROBIN. syncope  Lung: shortness of breath, cough. No sputum production  Abdomen: Nausea, vomiting, no diarrhea, or abdominal pain. Crystal Estela No cramps. Genitourinary: ESRD  Musculoskeletal: No muscle aches or pains. No joint effusions, swelling or deformities  Hematologic: No bleeding or bruising. Neurologic: No headache, weakness, numbness, or tingling. Integument: No rash, no ulcers. Psychiatric: No depression. Endocrine: No polyuria, no polydipsia, no polyphagia.     Physical Examination :     Patient Vitals for the past 8 hrs:   BP Temp Temp src Pulse Resp SpO2 Weight   08/29/20 1115 (!) 145/80 -- -- 76 -- -- --   08/29/20 1055 -- -- -- 72 -- -- ALKPHOS 78   ALT 90*   *     No results for input(s): RPR in the last 72 hours. No results for input(s): HIV in the last 72 hours. No results for input(s): BC in the last 72 hours. Lab Results   Component Value Date    MUCUS 1+ 12/20/2013    RBC 3.95 08/28/2020    TRICHOMONAS NOT REPORTED 12/20/2013    WBC 3.0 08/28/2020    YEAST NOT REPORTED 12/20/2013    TURBIDITY CLOUDY 12/20/2013     Lab Results   Component Value Date    CREATININE 10.16 08/28/2020    GLUCOSE 129 08/28/2020    GLUCOSE 91 10/04/2011       Medical Decision Making-Imaging:     EXAMINATION:    ONE XRAY VIEW OF THE CHEST         8/27/2020 1:59 pm         COMPARISON:    February 1, 2019, chest examination         HISTORY:    ORDERING SYSTEM PROVIDED HISTORY: syncope    TECHNOLOGIST PROVIDED HISTORY:    syncope    Reason for Exam: Syncope/  AP erect/  gp used/  port. Acuity: Unknown    Type of Exam: Unknown         FINDINGS:    Borderline cardiomegaly         Moderate patchy right basilar infiltrate with mild left lingular and possible    left basilar infiltrate.  Limited penetration of the left lower lung.  Clear    upper lungs         No significant pleural process         Degenerative changes of the right acromioclavicular joint and thoracic spine              Impression    Limited left retrocardiac assessment.  Mild streaky lingular atelectasis         Moderate patchy right basilar infiltrate, pneumonia the likely etiology    follow changes to resolution         RECOMMENDATION:    Upright well penetrated PA view of the chest for more accurate assessment of    the left lung base                EXAMINATION:    CTA OF THE CHEST 8/28/2020 3:55 pm         TECHNIQUE:    CTA of the chest was performed after the administration of intravenous    contrast.  Multiplanar reformatted images are provided for review.  MIP    images are provided for review.  Dose modulation, iterative reconstruction,    and/or weight based adjustment of the mA/kV was utilized to reduce the    radiation dose to as low as reasonably achievable.         COMPARISON:    None         HISTORY:    ORDERING SYSTEM PROVIDED HISTORY: Hypoxic, COVID, rule out PE    TECHNOLOGIST PROVIDED HISTORY:    Hypoxic, COVID, rule out PE         FINDINGS:    Pulmonary Arteries: Pulmonary arteries are adequately opacified for    evaluation.  No evidence of intraluminal filling defect to suggest pulmonary    embolism.  Main pulmonary artery is normal in caliber.         Mediastinum: No mediastinal adenopathy, acute aortic abnormality, or    pericardial effusion.  Mild cardiomegaly is noted.  Coronary artery    calcification is present.  Moderate calcification of the aortic arch is seen.         Lungs/pleura: There has been interval development of right upper lobe solid    nodules measuring 7.5 mm and 4.5 mm image 33 series 3.  Considerable    multifocal scattered areas of consolidation are present throughout the    remainder of the lung fields bilaterally without effusion, consistent with    atypical pneumonitis and diagnosis of COVID-19.  Tracheobronchial tree is    patent.  No effusion is present.         Upper Abdomen: Atherosclerotic calcification of the aorta and branches is    noted.  Included kidneys appear small.  There is a small cyst in the right    kidney.         Soft Tissues/Bones: Multilevel spondylosis in the thoracic spine is noted.     No acute osseous or soft tissue abnormality.              Impression    1.  No evidence of pulmonary embolus.         2.  Atherosclerotic disease including coronary arteries.  Mild cardiomegaly.         3.  Extensive multifocal scattered ground-glass areas of consolidation    consistent with COVID-19.         4.  Other findings as above.             Medical Decision [id number]:       Medical Decision Making-Other:     Note:  Labs, medications, radiologic studies were reviewed with personal review of films  Large amounts of data were

## 2020-08-29 NOTE — PROGRESS NOTES
or flank tenderness. Neuro:  AAO x 3, No FND. SKIN:  No rashes, good skin turgor. Extremities:  No edema. Labs      Recent Labs     08/27/20  1323 08/28/20  0701   WBC 6.7 3.0*   RBC 3.99* 3.95*   HGB 13.9 13.5   HCT 41.9 42.3   .0* 107.1*   MCH 34.8* 34.2*   MCHC 33.2 31.9   RDW 15.5* 15.1*    259   MPV 10.8 10.0      BMP:   Recent Labs     08/27/20  1247 08/27/20  1342 08/28/20  0701   NA  --  133* 136   K  --  4.2 4.8   CL  --  89* 88*   CO2  --  24 26   BUN  --  17 32*   CREATININE 8.34* 7.62* 10.16*   GLUCOSE  --  95 129*   CALCIUM  --  8.3* 8.8      Magnesium:    Recent Labs     08/27/20  1342   MG 2.3     Albumin:    Recent Labs     08/27/20  1342   LABALBU 3.8     BNP:      Lab Results   Component Value Date     02/03/2014     PTH:     Lab Results   Component Value Date    IPTH 321.5 09/25/2018     Urinalysis/Chemistries      Lab Results   Component Value Date    NITRU NEGATIVE 12/20/2013    COLORU CATHY 12/20/2013    PHUR 5.0 12/20/2013    WBCUA 50  12/20/2013    RBCUA 10 TO 20 12/20/2013    MUCUS 1+ 12/20/2013    TRICHOMONAS NOT REPORTED 12/20/2013    YEAST NOT REPORTED 12/20/2013    BACTERIA FEW 12/20/2013    SPECGRAV 1.016 12/20/2013    LEUKOCYTESUR MOD 12/20/2013    UROBILINOGEN Normal 12/20/2013    BILIRUBINUR NEGATIVE 12/20/2013    GLUCOSEU NEGATIVE 12/20/2013    KETUA NEGATIVE 12/20/2013    AMORPHOUS NOT REPORTED 12/20/2013       Radiology    Reviewed as available. Assessment    1. ESRD on Hemodialysis. His regular HD days are Tuesday Thursday Saturday at [facility] hemodialysis facility using right AV fistula under [place]ola. His dry weight is 115 kg.   2. Anemia of chronic disease stable  3. Secondary hyperparathyroidism  4. Hypertension  5. New onset syncope preceded by vague systemic symptoms work-up in progress cardiology evaluation noted, they do not feel any further work-up is necessary  6.   COVID-19 pneumonia without any overt respiratory failure  7.  1 out of 2 blood culture positive for coag negative staph likely contaminant await ID recommendations    Plan   1. HD today per TTS schedule. Orders were confirmed with HD nurse. 2.  COVID-19 treatment per ID.  3.  BMP in a.m.  4.  Will follow. Patient's nurse was updated. Nutrition   Renal Diet/TF      Thank you. Please call with any questions. Electronically signed by Darryle Fulling, CNP, KVNG - CNP on 8/29/2020 at 9:40 AM   [facility]. Attending Physician Statement  I have discussed the care of this patient, including pertinent history and exam findings, with the Resident/CNP. I have reviewed and edited the key elements of all parts of the encounter with the Resident/CNP. I agree with the assessment, plan and orders as documented by the Resident/CNP. Nishant K. Dellis Klinefelter, MD   Nephrology [address] [place] [address]

## 2020-08-29 NOTE — RESEARCH
Requested by Josefina Failing to evaluate for research protocols. Ineligible for Elvira (nitric oxide) vs Placebo due to chronic dialysis. Doesn't meet for IPLogic Otilimab vs placebo due to dialysis and ESRD. Doesn't qualify for [facility] study as Troponin high.

## 2020-08-29 NOTE — PROGRESS NOTES
[facility]  Office: [address], DO, Romario Hernandezmerle, DO, Charlotteen Barton, DO, Greg End Blood, DO, Barby Hudson MD, Allie Mohr MD, Kareem Espino MD, Nicolette Caldwell MD, Ambreen Garibay MD, Becky Osuna MD, Elvis Camacho MD, Melany Moser MD, Shanon Tillman MD, Chacorta Fatima, DO, Abrahan Moeller MD, Nyoka Hodgkins, MD, Shelia Keller DO, Jadene Boast, MD,  Maggi Holland, DO, Osmin Contreras MD, Duran De Guzman MD, Radha Ferrara, Mulugeta Samuels, CNP, Yoli Mdeel, CNP, Savage Ga, CNS, Flavia Osei, CNP, Karen Winters, CNP, Afshin Foot, CNP, Justice Gamma, CNP, Roberta Carmona, CNP, Grace Moran PAYomairaC, Cathryn Noguera, ARCELIA, Collin Barnes, CNP, Janae Dunn, CNP, Ana Romano, CNP, Jewel Bynum, CNP, Vonda Holloway, CNP         [facility]   [address] [facility]    Progress Note    8/29/2020    12:33 PM    Name:   Adla Maher  MRN:     [id number]     Acct:      [de-identified]   Room:   [facility]/3017-01   Day:  2  Admit Date:  8/27/2020 12:37 PM    PCP:   Curt Murrell MD  Code Status:  Full Code    Subjective:     C/C:   Chief Complaint   Patient presents with    Respiratory Distress     Pt c/o SOB x 5 -7 days, with loss of appetite, nausea, and vomiting. Syncope on found to be positive for COVID  Interval History Status: improved. Seen and examined face-to-face,  Patient sitting in the bed comfortably,  Infectious disease, nephrology and cardiology on board,  Patient denies any chest pain, mild shortness of breath at this time, getting dialyzed today      Brief History:      Mr. Payal Howard presents to ED today via EMS with complaints of syncopal episode. Patient stated he received hemodialysis this morning, drove home, and awoke to his neighbor at his car door. He he was told by his neighbor that he had been sitting there for \"a little while\", the neighbor was concerned and called EMS for transport to ED for evaluation.   He does complain of some respiratory distress over the last week with shortness of breath but denies cough or fever. He complains of some sensitivity to his scalp, as well as general aching and malaise. He also has had decreased appetite, nausea and vomiting over the past 7 days. He states he was tested for COVID-19 in June and he was found to be negative. He lives at home with his son. He is compliant with his medications. Independent in his ADLs. Takes his prescribed medications. He drives himself to hemodialysis on Tuesdays Thursdays and Saturdays. He is resting at this time without any complaints.     ED course of treatment found him to be COVID-19 positive with an elevated troponin at 87 and a lactic acid at 4.3. He initially was requiring supplemental oxygen at 3 L and had a fever of 100.7 Fahrenheit,    At this time is doing fine,  No chest pain no shortness of breath, mild cough,  Blood cultures are positive for coag negative staph methicillin-resistant,  Repeat blood cultures sent,  CT of the chest for PE negative,  Infectious disease managing the treatment,        Review of Systems:     Constitutional:  negative for chills, fevers, sweats  Respiratory:  negative for cough, dyspnea on exertion, shortness of breath, wheezing  Cardiovascular:  negative for chest pain, chest pressure/discomfort, lower extremity edema, palpitations  Gastrointestinal:  negative for abdominal pain, constipation, diarrhea, nausea, vomiting  Neurological:  negative for dizziness, headache    Medications:      Allergies:  No Known Allergies    Current Meds:   Scheduled Meds:    amiodarone  200 mg Oral Daily    midodrine  10 mg Oral TID WC    warfarin  2.5 mg Oral Daily    sodium chloride flush  10 mL Intravenous 2 times per day    heparin (porcine)  5,000 Units Subcutaneous 3 times per day    warfarin (COUMADIN) daily dosing (placeholder)   Other RX Placeholder     Continuous Infusions:   PRN Meds: acetaminophen, sodium chloride flush, acetaminophen **OR** acetaminophen, polyethylene glycol, promethazine **OR** ondansetron    Data:     Past Medical History:   has a past medical history of Anemia, Arthritis, Atrial fibrillation ([facility] BPH (benign prostatic hypertrophy), CAD (coronary artery disease), Caffeine use, Cellulitis of lower leg, Chronic back pain, [facility] Cor pulmonale, acute ([facility] CRF (chronic renal failure), Erectile dysfunction, Gout, Hemodialysis patient ([facility] History of blood transfusion, Hyperkalemia, Hyperlipidemia, Hypertension, Hypotension, Hypothyroidism, Lymphedema of leg, Obesity, and Thyroid disease. Social History:   reports that he has never smoked. He has never used smokeless tobacco. He reports current alcohol use of about 3.0 standard drinks of alcohol per week. He reports that he does not use drugs. Family History:   Family History   Problem Relation Age of Onset    Cancer Father         leukemia    Heart Failure Mother     Arthritis Mother     High Blood Pressure Mother     Heart Disease Maternal Grandmother     Stroke Paternal Grandfather        Vitals:  BP (!) 114/99   Pulse 88   Temp 98.4 °F (36.9 °C) (Oral)   Resp 12   Ht 5' 10\" (1.778 m)   Wt 253 lb 12 oz (115.1 kg)   SpO2 92%   BMI 36.41 kg/m²   Temp (24hrs), Av.5 °F (36.9 °C), Min:98.4 °F (36.9 °C), Max:98.6 °F (37 °C)    Recent Labs     20  1242 20  1247   POCGLU 93 110*       I/O (24Hr):     Intake/Output Summary (Last 24 hours) at 2020 1233  Last data filed at 2020 1839  Gross per 24 hour   Intake 700 ml   Output 0 ml   Net 700 ml       Labs:  Hematology:  Recent Labs     20  1323 20  1342 20  0701 20  0950   WBC 6.7  --  3.0*  --    RBC 3.99*  --  3.95*  --    HGB 13.9  --  13.5  --    HCT 41.9  --  42.3  --    .0*  --  107.1*  --    MCH 34.8*  --  34.2*  --    MCHC 33.2  --  31.9  --    RDW 15.5*  --  15.1*  --      --  259  --    MPV 10.8  -- 10.0  --    INR  --  1.3 1.2 1.5     Chemistry:  Recent Labs     08/27/20  1342 08/27/20  1532 08/28/20  0701 08/29/20  0950   *  --  136 133*   K 4.2  --  4.8 4.1   CL 89*  --  88* 87*   CO2 24  --  26 21   GLUCOSE 95  --  129* 156*   BUN 17  --  32* 54*   CREATININE 7.62*  --  10.16* 13.46*   MG 2.3  --   --   --    ANIONGAP 20*  --  22* 25*   LABGLOM 8*  --  5* 4*   GFRAA 9*  --  7* 5*   CALCIUM 8.3*  --  8.8 8.4*   PROBNP 2,377*  --   --   --    TROPHS  --  98* 86* 83*     Recent Labs     08/27/20  1242 08/27/20  1247 08/27/20  1342   PROT  --   --  8.2   LABALBU  --   --  3.8   TSH  --   --  8.73*   AST  --   --  107*   ALT  --   --  90*   ALKPHOS  --   --  78   BILITOT  --   --  0.43   POCGLU 93 110*  --      ABG:  Lab Results   Component Value Date    FIO2 NOT REPORTED 08/27/2020     Lab Results   Component Value Date/Time    SPECIAL R FEMORAL 11MLS 08/27/2020 03:20 PM     Lab Results   Component Value Date/Time    CULTURE (A) 08/27/2020 03:20 PM     POSITIVE Blood Culture Results called to and read back by: JESSICA MARES AT 1010 ON 8/28/20    CULTURE  08/27/2020 03:20 PM     DIRECT GRAM STAIN FROM BOTTLE: GRAM POSITIVE COCCI IN CLUSTERS AND GRAM POSITIVE RODS    CULTURE (A) 08/27/2020 03:20 PM     Coagulase negative Staphylococcus species detected by PCR, mecA gene detected (Methicillin Resistant Organism)    CULTURE STAPHYLOCOCCUS SPECIES (A) 08/27/2020 03:20 PM       Radiology:  Xr Chest Portable    Result Date: 8/27/2020  Limited left retrocardiac assessment.   Mild streaky lingular atelectasis Moderate patchy right basilar infiltrate, pneumonia the likely etiology follow changes to resolution RECOMMENDATION: Upright well penetrated PA view of the chest for more accurate assessment of the left lung base       Physical Examination:        General appearance:  alert, cooperative and no distress  Mental Status:  oriented to person, place and time and normal affect  Lungs:  clear to auscultation bilaterally,

## 2020-08-29 NOTE — PROGRESS NOTES
Pharmacy Note  COVID-19 Anticoagulation Adjustment    Salvatore Obando is a 44 y.o. male. Pharmacist assessment of anticoagulation in a SARS-CoV-2 positive patient. Recent Labs     08/28/20  0701 08/29/20  0950   BUN 32* 54*       Recent Labs     08/28/20  0701 08/29/20  0950   CREATININE 10.16* 13.46*     No results for input(s): DDIMER in the last 72 hours. Estimated Creatinine Clearance: 8 mL/min (A) (based on SCr of 13.46 mg/dL [facility])). Height:   Ht Readings from Last 1 Encounters:   08/27/20 5' 10\" (1.778 m)     Weight:  Wt Readings from Last 1 Encounters:   08/29/20 251 lb 1.7 oz (113.9 kg)     BMI:  Body mass index is 36.03 kg/m². Per the [facility] [place] Anticoagulation Algorithm for COVID-19 positive or clinically-suspected patients, the following adjustment has been made per P&T Guidelines:             DVT prophylaxis adjusted to subcutaneous heparin 7,500 units every 8 hours.     Thank you,  Janny Guerrero, PharmD

## 2020-08-29 NOTE — PROGRESS NOTES
Dialysis Post Treatment Note  Vitals:    08/29/20 1315   BP: 114/74   Pulse: 87   Resp: 23   Temp: 99.8 °F (37.7 °C)   SpO2: (!) 80%     Pre-Weight = 115.1kg  Post-weight = Weight: 251 lb 1.7 oz (113.9 kg)  Total Liters Processed = Total Liters Processed (l/min): 91.8 l/min  Rinseback Volume (mL) = Rinseback Volume (ml): 250 ml  Net Removal (mL) = @[id number]  Patient's dry weight= 118kg  Type of access used= Right AVF  Length of treatment=225mins    Pt tolerated tx well until the last 20 mins, started experiencing abd cramping and pain; 300 NS bolus given but no relief. Pt asked several times to be taken off, writer term tx 15 mins early. Pt relaxing w/ no acute distress; report given to floor nurse Erika HORAN. Electronically signed by Yuridia Lafleur RN on 8/29/2020 at 2:03 PM

## 2020-08-30 PROBLEM — J12.82 PNEUMONIA DUE TO COVID-19 VIRUS: Status: ACTIVE | Noted: 2020-08-30

## 2020-08-30 PROBLEM — U07.1 PNEUMONIA DUE TO COVID-19 VIRUS: Status: ACTIVE | Noted: 2020-08-30

## 2020-08-30 LAB
ANION GAP SERPL CALCULATED.3IONS-SCNC: 17 MMOL/L (ref 9–17)
BUN BLDV-MCNC: 32 MG/DL (ref 6–20)
BUN/CREAT BLD: ABNORMAL (ref 9–20)
CALCIUM SERPL-MCNC: 8.5 MG/DL (ref 8.6–10.4)
CHLORIDE BLD-SCNC: 88 MMOL/L (ref 98–107)
CO2: 26 MMOL/L (ref 20–31)
CREAT SERPL-MCNC: 9.74 MG/DL (ref 0.7–1.2)
CULTURE: ABNORMAL
GFR AFRICAN AMERICAN: 7 ML/MIN
GFR NON-AFRICAN AMERICAN: 6 ML/MIN
GFR SERPL CREATININE-BSD FRML MDRD: ABNORMAL ML/MIN/{1.73_M2}
GFR SERPL CREATININE-BSD FRML MDRD: ABNORMAL ML/MIN/{1.73_M2}
GLUCOSE BLD-MCNC: 84 MG/DL (ref 70–99)
HCT VFR BLD CALC: 41.6 % (ref 40.7–50.3)
HEMOGLOBIN: 13.5 G/DL (ref 13–17)
INR BLD: 1.5
Lab: ABNORMAL
MCH RBC QN AUTO: 33.6 PG (ref 25.2–33.5)
MCHC RBC AUTO-ENTMCNC: 32.5 G/DL (ref 28.4–34.8)
MCV RBC AUTO: 103.5 FL (ref 82.6–102.9)
NRBC AUTOMATED: 0.5 PER 100 WBC
PDW BLD-RTO: 15.1 % (ref 11.8–14.4)
PLATELET # BLD: 301 K/UL (ref 138–453)
PMV BLD AUTO: 10.1 FL (ref 8.1–13.5)
POTASSIUM SERPL-SCNC: 4.3 MMOL/L (ref 3.7–5.3)
PROTHROMBIN TIME: 16 SEC (ref 9–12)
RBC # BLD: 4.02 M/UL (ref 4.21–5.77)
SODIUM BLD-SCNC: 131 MMOL/L (ref 135–144)
SPECIMEN DESCRIPTION: ABNORMAL
TROPONIN INTERP: ABNORMAL
TROPONIN T: ABNORMAL NG/ML
TROPONIN, HIGH SENSITIVITY: 107 NG/L (ref 0–22)
WBC # BLD: 6.3 K/UL (ref 3.5–11.3)

## 2020-08-30 PROCEDURE — 80048 BASIC METABOLIC PNL TOTAL CA: CPT

## 2020-08-30 PROCEDURE — 99231 SBSQ HOSP IP/OBS SF/LOW 25: CPT | Performed by: INTERNAL MEDICINE

## 2020-08-30 PROCEDURE — 2580000003 HC RX 258: Performed by: NURSE PRACTITIONER

## 2020-08-30 PROCEDURE — 6360000002 HC RX W HCPCS: Performed by: NURSE PRACTITIONER

## 2020-08-30 PROCEDURE — 84484 ASSAY OF TROPONIN QUANT: CPT

## 2020-08-30 PROCEDURE — 99233 SBSQ HOSP IP/OBS HIGH 50: CPT | Performed by: INTERNAL MEDICINE

## 2020-08-30 PROCEDURE — 2580000003 HC RX 258: Performed by: INTERNAL MEDICINE

## 2020-08-30 PROCEDURE — 85027 COMPLETE CBC AUTOMATED: CPT

## 2020-08-30 PROCEDURE — 99232 SBSQ HOSP IP/OBS MODERATE 35: CPT | Performed by: INTERNAL MEDICINE

## 2020-08-30 PROCEDURE — 85610 PROTHROMBIN TIME: CPT

## 2020-08-30 PROCEDURE — 1200000000 HC SEMI PRIVATE

## 2020-08-30 PROCEDURE — 6370000000 HC RX 637 (ALT 250 FOR IP): Performed by: GENERAL PRACTICE

## 2020-08-30 RX ADMIN — WARFARIN SODIUM 2.5 MG: 2.5 TABLET ORAL at 16:56

## 2020-08-30 RX ADMIN — MIDODRINE HYDROCHLORIDE 10 MG: 5 TABLET ORAL at 08:07

## 2020-08-30 RX ADMIN — ACETAMINOPHEN 650 MG: 325 TABLET ORAL at 21:20

## 2020-08-30 RX ADMIN — MIDODRINE HYDROCHLORIDE 10 MG: 5 TABLET ORAL at 12:04

## 2020-08-30 RX ADMIN — HEPARIN SODIUM 7500 UNITS: 5000 INJECTION INTRAVENOUS; SUBCUTANEOUS at 17:00

## 2020-08-30 RX ADMIN — SODIUM CHLORIDE, PRESERVATIVE FREE 10 ML: 5 INJECTION INTRAVENOUS at 11:54

## 2020-08-30 RX ADMIN — HEPARIN SODIUM 7500 UNITS: 5000 INJECTION INTRAVENOUS; SUBCUTANEOUS at 05:56

## 2020-08-30 RX ADMIN — SODIUM CHLORIDE, PRESERVATIVE FREE 10 ML: 5 INJECTION INTRAVENOUS at 21:20

## 2020-08-30 RX ADMIN — SODIUM CHLORIDE 20 ML: 0.9 INJECTION, SOLUTION INTRAVENOUS at 11:20

## 2020-08-30 RX ADMIN — AMIODARONE HYDROCHLORIDE 200 MG: 200 TABLET ORAL at 08:07

## 2020-08-30 RX ADMIN — HEPARIN SODIUM 7500 UNITS: 5000 INJECTION INTRAVENOUS; SUBCUTANEOUS at 21:30

## 2020-08-30 ASSESSMENT — PAIN SCALES - GENERAL
PAINLEVEL_OUTOF10: 0
PAINLEVEL_OUTOF10: 0

## 2020-08-30 NOTE — PROGRESS NOTES
[facility]  Office: [address], DO, Chintan Hearnr, DO, Jong Narrow, DO, Allendalecarson Lorenzana Blood, DO, Francis Bullock MD, Shaan Scott MD, Cali Newman MD, Erin Mosher MD, Argelia Tavares MD, Yaneth Hale MD, Mckinley Arita MD, Yamel Crystal MD, Shanon Loya MD, Ponce Bonner, DO, John Ho MD, Adithya Garza MD, Juan Calle, DO, Rick Whittaker MD,  Charlie Kate, DO, Katelyn Marquis MD, Nilson Dee MD, Erin Mahoney, Yaya Garza, CNP, Rufina Warner, CNP, Laura Councilman, CNS, Noe Easley, CNP, Alf Flynn, CNP, Melba Fitzgerald, CNP, Micki Hicks, CNP, Stephie Vang, CNP, Bertrand Li PA-C, Terrell Cleveland, [facility], Milton Coronado, CNP, Laly Child, CNP, Irineo Starch, CNP, Rosalia Copper, CNP, Lily Caprice, [address]   [address] [facility]    Progress Note    8/30/2020    5:23 PM    Name:   Kristina Brown  MRN:     [id number]     Acct:      [de-identified]   Room:   [facility]/3017-01   Day:  3  Admit Date:  8/27/2020 12:37 PM    PCP:   Adan Stern MD  Code Status:  Full Code    Subjective:     C/C:   Chief Complaint   Patient presents with    Respiratory Distress     Pt c/o SOB x 5 -7 days, with loss of appetite, nausea, and vomiting. Syncope on found to be positive for COVID  Interval History Status: improved. Seen and examined face-to-face,  Patient laying in the bed comfortably,  Getting plasma today,  Appreciate nephrology, infectious disease, cardiology help      Brief History:      Mr. Sidra Smith presents to ED today via EMS with complaints of syncopal episode. Patient stated he received hemodialysis this morning, drove home, and awoke to his neighbor at his car door. He he was told by his neighbor that he had been sitting there for \"a little while\", the neighbor was concerned and called EMS for transport to ED for evaluation.   He does complain of some respiratory distress over the last week with shortness of breath but denies cough or fever. He complains of some sensitivity to his scalp, as well as general aching and malaise. He also has had decreased appetite, nausea and vomiting over the past 7 days. He states he was tested for COVID-19 in June and he was found to be negative. He lives at home with his son. He is compliant with his medications. Independent in his ADLs. Takes his prescribed medications. He drives himself to hemodialysis on Tuesdays Thursdays and Saturdays. He is resting at this time without any complaints.     ED course of treatment found him to be COVID-19 positive with an elevated troponin at 87 and a lactic acid at 4.3. He initially was requiring supplemental oxygen at 3 L and had a fever of 100.7 Fahrenheit,    At this time is doing fine,  No chest pain no shortness of breath, mild cough,  Blood cultures are positive for coag negative staph methicillin-resistant,  Repeat blood cultures sent,  CT of the chest for PE negative,  Infectious disease managing the treatment,        Review of Systems:     Constitutional:  negative for chills, fevers, sweats  Respiratory:  negative for cough, dyspnea on exertion, shortness of breath, wheezing  Cardiovascular:  negative for chest pain, chest pressure/discomfort, lower extremity edema, palpitations  Gastrointestinal:  negative for abdominal pain, constipation, diarrhea, nausea, vomiting  Neurological:  negative for dizziness, headache    Medications:      Allergies:  No Known Allergies    Current Meds:   Scheduled Meds:    heparin (porcine)  7,500 Units Subcutaneous 3 times per day    amiodarone  200 mg Oral Daily    midodrine  10 mg Oral TID WC    warfarin  2.5 mg Oral Daily    sodium chloride flush  10 mL Intravenous 2 times per day    warfarin (COUMADIN) daily dosing (placeholder)   Other RX Placeholder     Continuous Infusions:   PRN Meds: acetaminophen, sodium chloride flush, acetaminophen **OR** acetaminophen, polyethylene glycol, promethazine **OR** ondansetron    Data:     Past Medical History:   has a past medical history of Anemia, Arthritis, Atrial fibrillation ([facility] BPH (benign prostatic hypertrophy), CAD (coronary artery disease), Caffeine use, Cellulitis of lower leg, Chronic back pain, Colon cancer ([facility] Cor pulmonale, acute ([facility] CRF (chronic renal failure), Erectile dysfunction, Gout, Hemodialysis patient ([facility] History of blood transfusion, Hyperkalemia, Hyperlipidemia, Hypertension, Hypotension, Hypothyroidism, Lymphedema of leg, Obesity, and Thyroid disease. Social History:   reports that he has never smoked. He has never used smokeless tobacco. He reports current alcohol use of about 3.0 standard drinks of alcohol per week. He reports that he does not use drugs. Family History:   Family History   Problem Relation Age of Onset    Cancer Father         leukemia    Heart Failure Mother     Arthritis Mother     High Blood Pressure Mother     Heart Disease Maternal Grandmother     Stroke Paternal Grandfather        Vitals:  BP (!) 151/73   Pulse 65   Temp 99.3 °F (37.4 °C) (Oral)   Resp 15   Ht 5' 10\" (1.778 m)   Wt 254 lb 10.1 oz (115.5 kg)   SpO2 (!) 86%   BMI 36.54 kg/m²   Temp (24hrs), Av.3 °F (37.4 °C), Min:98.5 °F (36.9 °C), Max:101.6 °F (38.7 °C)    No results for input(s): POCGLU in the last 72 hours. I/O (24Hr):     Intake/Output Summary (Last 24 hours) at 2020 1723  Last data filed at 2020 0022  Gross per 24 hour   Intake 400 ml   Output --   Net 400 ml       Labs:  Hematology:  Recent Labs     20  0701 20  0950 20  0556   WBC 3.0*  --  6.3   RBC 3.95*  --  4.02*   HGB 13.5  --  13.5   HCT 42.3  --  41.6   .1*  --  103.5*   MCH 34.2*  --  33.6*   MCHC 31.9  --  32.5   RDW 15.1*  --  15.1*     --  301   MPV 10.0  --  10.1   INR 1.2 1.5 1.5     Chemistry:  Recent Labs     20  0701 20  0950 20  0556    133* 131*   K 4.8 4.1 4.3   CL 88* 87* 88*   CO2 26 21 26   GLUCOSE 129* 156* 84   BUN 32* 54* 32*   CREATININE 10.16* 13.46* 9.74*   ANIONGAP 22* 25* 17   LABGLOM 5* 4* 6*   GFRAA 7* 5* 7*   CALCIUM 8.8 8.4* 8.5*   TROPHS 86* 83* 107*     No results for input(s): PROT, LABALBU, LABA1C, R4ZKLGS, X7GOBMJ, FT4, TSH, AST, ALT, LDH, GGT, ALKPHOS, LABGGT, BILITOT, BILIDIR, AMMONIA, AMYLASE, LIPASE, LACTATE, CHOL, HDL, LDLCHOLESTEROL, CHOLHDLRATIO, TRIG, VLDL, WCF18IN, PHENYTOIN, PHENYF, URICACID, POCGLU in the last 72 hours. ABG:  Lab Results   Component Value Date    FIO2 NOT REPORTED 08/27/2020     Lab Results   Component Value Date/Time    SPECIAL NOT REPORTED 08/29/2020 09:51 AM     Lab Results   Component Value Date/Time    CULTURE NO GROWTH 1 DAY 08/29/2020 09:51 AM       Radiology:  [facility] Chest Portable    Result Date: 8/27/2020  Limited left retrocardiac assessment.   Mild streaky lingular atelectasis Moderate patchy right basilar infiltrate, pneumonia the likely etiology follow changes to resolution RECOMMENDATION: Upright well penetrated PA view of the chest for more accurate assessment of the left lung base       Physical Examination:        General appearance:  alert, cooperative and no distress  Mental Status:  oriented to person, place and time and normal affect  Lungs:  clear to auscultation bilaterally, normal effort  Heart:  regular rate and rhythm, no murmur  Abdomen:  soft, nontender, nondistended, normal bowel sounds, no masses, hepatomegaly, splenomegaly  Extremities:  no edema, redness, tenderness in the calves  Skin:  no gross lesions, rashes, induration    Assessment:        Hospital Problems           Last Modified POA    * (Principal) Syncope 8/27/2020 Yes    Benign prostatic hyperplasia with lower urinary tract symptoms 8/28/2020 Yes    ESRD on hemodialysis ([facility] 8/27/2020 Yes    Chronic hypotension 8/27/2020 Yes    Chronic atrial fibrillation 8/28/2020 Yes    DVT (deep venous thrombosis) ([facility] 8/28/2020 Yes    S/P laparoscopic sleeve gastrectomy 8/28/2020 Yes    Status post partial colectomy 8/28/2020 Yes    COVID-19 8/27/2020 Yes    Hypoxemia 8/27/2020 Yes    Pulmonary nodules 8/29/2020 Yes    Pneumonia due to COVID-19 virus 8/30/2020 Yes          Plan:        Syncope post dialysis most likely due to hypovolemia, cardiology on board, troponin rise possible secondary to end-stage renal disease, no further testing cardiology wise advised,  COVID-19 pneumonia, treatment per infectious disease, plasma will be given today  Appreciate nephrology input for end-stage renal disease on dialysis,  CT of the chest for PE is negative  Infectious disease input appreciated,  1 out of 2 blood cultures were positive for coag negative, possible contamination, other blood cultures are pending  Hypoxia resolved at this time,  Full CODE STATUS,  Continue home medication      Mary Lennon MD  8/30/2020  5:23 PM

## 2020-08-30 NOTE — PROGRESS NOTES
over a one week period of time. He also described nausea, vomiting and decreased appetite over one week. When evaluated in ER he was found to be COVID 19 positive. Temp was 100.7 F. He was hypoxic and required Nasal 02 at 3 L/min. Lactic acid was elevated at 4.3. Troponin also was elevated. Patient admitted because of concerns with COVID 19.    CURRENT EVALUATION : 8/30/2020    Patient evaluated and examined in the ICU. Afebrile  VS stable  Has improved after fluid removal with HD on 8-29-20  Received convalescent plasma 8-29-20    Patient exhibiting respiratory distress. Yes  Respiratory secretions: No    Patient receiving supplemental oxygen. Yes NC 3-->2 L/min  02 sat 97-->91  RR 14  -->13    QTc: 477      NEWS Score: 0-4 Low risk group; 5-6: Medium risk group; 7 or above: High risk group  Parameters 3 2 1 0 1 2 3   Age    < 65   = 65   RR = 8  9-11 12-20  21-24 = 25   O2 Sats = 91 92-93 94-95 = 96      Suppl O2  Yes  No      SBP = 90  101-110 111-219   = 220   HR = 40  41-50 51-90  111-130 = 131   Consciousness    Alert   Drowsiness, lethargy, or confusion   Temperature = 35.0 C (95.0 F)  35.1-36.0 C 95.1-96.9 F 36.1-38.0 C 97.0-100.4 F 38.1-39.0 C 100.5-102.3 F = 39.1 C = 102.4 F      NEWS Score:  8-28-20: 2 Low risk    Overall Daily Picture:   Unchanged    Presence of secondary bacterial Infection:  Yes  Additional antibiotics: No    Labs, X rays reviewed: 8/30/2020    BUN:32  Cr:10.1-->9.7    WBC:3.0-->6.3  Hb:13.5  Plat: 259-->301    Absolute Neutrophils:3.5  Absolute Lymphocytes:2.26  Neutrophil/Lymphocyte Ratio: 1.54 Low risk    CRP:  Ferritin:  LDH:     Pro Calcitonin:  Troponin 87-->98-->86    Cultures:  Urine:    Blood:  8-27-20: Coagulase negative Staphylococci  X 1  Sputum :    Wound:      CXR:   8-27-20 Patchy infiltrates bilaterally  CAT:  8-27-20: Multifocal ground glass opacities compatible with COVID      Discussed with patient, RN, CC, IM.     I have personally reviewed the past medical history, past surgical history, medications, social history, and family history, and I have updated the database accordingly. Past Medical History:     Past Medical History:   Diagnosis Date    Anemia     Arthritis     right shoulder/ PCP Dr. Esvin Russell    Atrial fibrillation ([facility]     [address] BPH (benign prostatic hypertrophy)     CAD (coronary artery disease)     Dr. Thomas Betancur    Caffeine use     1 coffee, 2 tea/day    Cellulitis of lower leg     right    Chronic back pain     Colon cancer ([facility]     colon    Cor pulmonale, acute ([facility] 12/30/2014    CRF (chronic renal failure)     dr. Dianne Arreguin on [place]. Tues/ thurs/ sat/ right arm fistula    Erectile dysfunction     Gout     Hemodialysis patient ([facility]     tues/ thurs/ sat/ DR. Petersen/ fistula right lower arm    History of blood transfusion     no reaction    Hyperkalemia 12/24/2013    Hyperlipidemia     Hypertension     Hypotension     Hypothyroidism     Lymphedema of leg     right    Obesity     Thyroid disease        Past Surgical  History:     Past Surgical History:   Procedure Laterality Date    ABDOMINAL EXPLORATION SURGERY  01/22/2019    ABDOMINAL EXPLORATION, LEFT HEMICOLECTOMY, MOBILIZATION OF SPLENIC FLEXURE     ABSCESS DRAINAGE Right 01/20/2014    I&D Right Shoulder, Right shoulder arthroscopy, I&D AC Joint    APPENDECTOMY      COLONOSCOPY      COLONOSCOPY  01/22/2019    COLONOSCOPY N/A 1/22/2019    COLONOSCOPY- GI UNIT SCHEDULED performed by Sabrina Ramos MD at [facility] Right 3/27/15    rt wrist    ENDOSCOPY, COLON, DIAGNOSTIC      UGI    GASTRIC BAND REMOVAL  01/17/2017    subcutaneous port    HC CATH POWER PICC SINGLE  1/24/2014         LAP BAND  2007    CT COLSC FLX W/RMVL OF TUMOR POLYP LESION SNARE TQ N/A 11/15/2018    COLONOSCOPY POLYPECTOMY SNARE/COLD BIOPSY performed by Corey Chang MD at [address] [place] [address] partner: Not on file     Emotionally abused: Not on file     Physically abused: Not on file     Forced sexual activity: Not on file   Other Topics Concern    Not on file   Social History Narrative    Not on file       Family History:     Family History   Problem Relation Age of Onset    Cancer Father         leukemia    Heart Failure Mother     Arthritis Mother     High Blood Pressure Mother     Heart Disease Maternal Grandmother     Stroke Paternal Grandfather         Allergies:   Patient has no known allergies. Review of Systems:     Constitutional: No fevers or chills. No systemic complaints  Head: No headaches  Eyes: No double vision or blurry vision. No conjunctival inflammation. ENT: No sore throat or runny nose. . No hearing loss, tinnitus or vertigo. Cardiovascular: No chest pain or palpitations. shortness of breath. No ROBIN. syncope  Lung: shortness of breath, cough. No sputum production  Abdomen: Nausea, vomiting, no diarrhea, or abdominal pain. Peter Salter No cramps. Genitourinary: ESRD  Musculoskeletal: No muscle aches or pains. No joint effusions, swelling or deformities  Hematologic: No bleeding or bruising. Neurologic: No headache, weakness, numbness, or tingling. Integument: No rash, no ulcers. Psychiatric: No depression. Endocrine: No polyuria, no polydipsia, no polyphagia.     Physical Examination :     Patient Vitals for the past 8 hrs:   BP Temp Temp src Pulse Resp SpO2 Weight   08/30/20 1130 (!) 115/58 98.9 °F (37.2 °C) -- 69 13 91 % --   08/30/20 1119 (!) 114/55 98.9 °F (37.2 °C) -- 70 16 -- --   08/30/20 1116 (!) 114/55 -- -- 69 20 100 % --   08/30/20 0807 (!) 116/55 98.5 °F (36.9 °C) -- 75 13 98 % --   08/30/20 0536 (!) 113/51 99.2 °F (37.3 °C) Oral 70 19 100 % --   08/30/20 0534 -- -- -- -- -- -- 254 lb 10.1 oz (115.5 kg)     General Appearance: Awake, alert, and in no apparent distress  Head:  Normocephalic, no trauma  Eyes: Pupils equal, round, reactive to light and accommodation; extraocular movements intact; sclera anicteric; conjunctivae pink. No embolic phenomena. ENT: Oropharynx clear, without erythema, exudate, or thrush. No tenderness of sinuses. Mouth/throat: mucosa pink and moist. No lesions. Dentition in good repair. Neck:Supple, without lymphadenopathy. Thyroid normal, No bruits. Pulmonary/Chest: Clear to auscultation, without wheezes, rales, or rhonchi. No dullness to percussion. Cardiovascular: Regular rate and rhythm without murmurs, rubs, or gallops. Abdomen: Soft, non tender. Bowel sounds normal. No organomegaly  All four Extremities: No cyanosis, clubbing, edema, or effusions. Neurologic: No gross sensory or motor deficits. Skin: Warm and dry with good turgor. No signs of peripheral arterial or venous insufficiency. No ulcerations. No open wounds. Medical Decision Making -Laboratory:   I have independently reviewed/ordered the following labs:    CBC with Differential:   Recent Labs     08/27/20  1323 08/28/20  0701 08/30/20  0556   WBC 6.7 3.0* 6.3   HGB 13.9 13.5 13.5   HCT 41.9 42.3 41.6    259 301   LYMPHOPCT 34  --   --    MONOPCT 12  --   --      BMP:   Recent Labs     08/27/20  1342  08/29/20  0950 08/30/20  0556   *   < > 133* 131*   K 4.2   < > 4.1 4.3   CL 89*   < > 87* 88*   CO2 24   < > 21 26   BUN 17   < > 54* 32*   CREATININE 7.62*   < > 13.46* 9.74*   MG 2.3  --   --   --     < > = values in this interval not displayed. Hepatic Function Panel:   Recent Labs     08/27/20  1342   PROT 8.2   LABALBU 3.8   BILITOT 0.43   ALKPHOS 78   ALT 90*   *     No results for input(s): RPR in the last 72 hours. No results for input(s): HIV in the last 72 hours. No results for input(s): BC in the last 72 hours.   Lab Results   Component Value Date    MUCUS 1+ 12/20/2013    RBC 4.02 08/30/2020    TRICHOMONAS NOT REPORTED 12/20/2013    WBC 6.3 08/30/2020    YEAST NOT REPORTED 12/20/2013    TURBIDITY CLOUDY 12/20/2013     Lab Results   Component Value Date    CREATININE 9.74 08/30/2020    GLUCOSE 84 08/30/2020    GLUCOSE 91 10/04/2011       Medical Decision Making-Imaging:     EXAMINATION:    ONE XRAY VIEW OF THE CHEST         8/27/2020 1:59 pm         COMPARISON:    February 1, 2019, chest examination         HISTORY:    ORDERING SYSTEM PROVIDED HISTORY: syncope    TECHNOLOGIST PROVIDED HISTORY:    syncope    Reason for Exam: Syncope/  AP erect/  gp used/  port. Acuity: Unknown    Type of Exam: Unknown         FINDINGS:    Borderline cardiomegaly         Moderate patchy right basilar infiltrate with mild left lingular and possible    left basilar infiltrate.  Limited penetration of the left lower lung.  Clear    upper lungs         No significant pleural process         Degenerative changes of the right acromioclavicular joint and thoracic spine              Impression    Limited left retrocardiac assessment.  Mild streaky lingular atelectasis         Moderate patchy right basilar infiltrate, pneumonia the likely etiology    follow changes to resolution         RECOMMENDATION:    Upright well penetrated PA view of the chest for more accurate assessment of    the left lung base                EXAMINATION:    CTA OF THE CHEST 8/28/2020 3:55 pm         TECHNIQUE:    CTA of the chest was performed after the administration of intravenous    contrast.  Multiplanar reformatted images are provided for review.  MIP    images are provided for review.  Dose modulation, iterative reconstruction,    and/or weight based adjustment of the mA/kV was utilized to reduce the    radiation dose to as low as reasonably achievable.         COMPARISON:    None         HISTORY:    ORDERING SYSTEM PROVIDED HISTORY: Hypoxic, COVID, rule out PE    TECHNOLOGIST PROVIDED HISTORY:    Hypoxic, COVID, rule out PE         FINDINGS:    Pulmonary Arteries: Pulmonary arteries are adequately opacified for    evaluation.  No evidence of intraluminal filling defect to suggest pulmonary embolism.  Main pulmonary artery is normal in caliber.         Mediastinum: No mediastinal adenopathy, acute aortic abnormality, or    pericardial effusion.  Mild cardiomegaly is noted.  Coronary artery    calcification is present.  Moderate calcification of the aortic arch is seen.         Lungs/pleura: There has been interval development of right upper lobe solid    nodules measuring 7.5 mm and 4.5 mm image 33 series 3.  Considerable    multifocal scattered areas of consolidation are present throughout the    remainder of the lung fields bilaterally without effusion, consistent with    atypical pneumonitis and diagnosis of COVID-19.  Tracheobronchial tree is    patent.  No effusion is present.         Upper Abdomen: Atherosclerotic calcification of the aorta and branches is    noted.  Included kidneys appear small.  There is a small cyst in the right    kidney.         Soft Tissues/Bones: Multilevel spondylosis in the thoracic spine is noted. No acute osseous or soft tissue abnormality.              Impression    1.  No evidence of pulmonary embolus.         2.  Atherosclerotic disease including coronary arteries.  Mild cardiomegaly.         3.  Extensive multifocal scattered ground-glass areas of consolidation    consistent with COVID-19.         4.  Other findings as above.             Medical Decision [id number]:       Medical Decision Making-Other:     Note:  Labs, medications, radiologic studies were reviewed with personal review of films  Large amounts of data were reviewed  Discussed with nursing Staff, Discharge planner  Infection Control and Prevention measures reviewed  All prior entries were reviewed  Administer medications as ordered  Prognosis: Guarded  Discharge planning reviewed  Follow up as outpatient. Thank you for allowing us to participate in the care of this patient. Please call with questions.     Taina Koo MD  Pager: [phone] - Office: [phone]

## 2020-08-30 NOTE — PROGRESS NOTES
Renal Progress Note    Patient:  Román Encarnacion; 44 y.o. MRN# [id number]  Location:  [address] [place], [place] [address]-  Attending:  Chang Damian MD  Admit Date:  2020   Hospital Day: 3    Subjective   Patient was seen and examined. No acute events overnight. Vital signs stable   No urine output recorded over the last 24 hours. HD yesterday. Removed 1.2 L. Given 300 mL saline bolus. Labs reviewed. Na 131 was 133 yesterday. Normokalemia. Hgb 13.5. Objective   VS: BP (!) 116/55   Pulse 75   Temp 98.5 °F (36.9 °C)   Resp 13   Ht 5' 10\" (1.778 m)   Wt 254 lb 10.1 oz (115.5 kg)   SpO2 98%   BMI 36.54 kg/m²   MAXIMUM TEMPERATURE OVER 24 HRS:  Temp (24hrs), Av.6 °F (37.6 °C), Min:98.5 °F (36.9 °C), Max:101.6 °F (38.7 °C)    24 HR BLOOD PRESSURE RANGE:  Systolic (29SES), AHT:143 , Min:69 , OWX:789   ; Diastolic (59MBY), HMV:88, Min:44, Max:99    24 HR INTAKE/OUTPUT:      Intake/Output Summary (Last 24 hours) at 2020 0841  Last data filed at 2020 0022  Gross per 24 hour   Intake 700 ml   Output 1220 ml   Net -520 ml     WEIGHT:   Patient Vitals for the past 96 hrs (Last 3 readings):   Weight   20 0534 254 lb 10.1 oz (115.5 kg)   20 1315 251 lb 1.7 oz (113.9 kg)   20 0830 253 lb 12 oz (115.1 kg)       Current Medications    Scheduled Meds:    sodium chloride  20 mL Intravenous Once    heparin (porcine)  7,500 Units Subcutaneous 3 times per day    amiodarone  200 mg Oral Daily    midodrine  10 mg Oral TID WC    warfarin  2.5 mg Oral Daily    sodium chloride flush  10 mL Intravenous 2 times per day    warfarin (COUMADIN) daily dosing (placeholder)   Other RX Placeholder     Continuous Infusions:     Physical Examination       General:  AAO x 3, speaking in full sentences, no accessory muscle use. HEENT: Atraumatic, normocephalic, no throat congestion, moist mucosa. Eyes:   Pupils equal, round and reactive to light, EOMI.   Neck:   Supple  Chest:   Bilateral vesicular breath sounds, no rales or wheezes. Cardiac:  S1 S2 RR, no murmurs, gallops or rubs. Abdomen: Soft, non-tender, no masses or organomegaly, BS audible. :   No suprapubic or flank tenderness. Neuro:  AAO x 3, No FND. SKIN:  No rashes, good skin turgor. Extremities:  No edema. Labs      Recent Labs     08/27/20  1323 08/28/20  0701 08/30/20  0556   WBC 6.7 3.0* 6.3   RBC 3.99* 3.95* 4.02*   HGB 13.9 13.5 13.5   HCT 41.9 42.3 41.6   .0* 107.1* 103.5*   MCH 34.8* 34.2* 33.6*   MCHC 33.2 31.9 32.5   RDW 15.5* 15.1* 15.1*    259 301   MPV 10.8 10.0 10.1      BMP:   Recent Labs     08/28/20  0701 08/29/20  0950 08/30/20  0556    133* 131*   K 4.8 4.1 4.3   CL 88* 87* 88*   CO2 26 21 26   BUN 32* 54* 32*   CREATININE 10.16* 13.46* 9.74*   GLUCOSE 129* 156* 84   CALCIUM 8.8 8.4* 8.5*      Magnesium:    Recent Labs     08/27/20  1342   MG 2.3     Albumin:    Recent Labs     08/27/20  1342   LABALBU 3.8     BNP:      Lab Results   Component Value Date     02/03/2014     PTH:     Lab Results   Component Value Date    IPTH 321.5 09/25/2018     Urinalysis/Chemistries      Lab Results   Component Value Date    NITRU NEGATIVE 12/20/2013    COLORU CATHY 12/20/2013    PHUR 5.0 12/20/2013    WBCUA 50  12/20/2013    RBCUA 10 TO 20 12/20/2013    MUCUS 1+ 12/20/2013    TRICHOMONAS NOT REPORTED 12/20/2013    YEAST NOT REPORTED 12/20/2013    BACTERIA FEW 12/20/2013    SPECGRAV 1.016 12/20/2013    LEUKOCYTESUR MOD 12/20/2013    UROBILINOGEN Normal 12/20/2013    BILIRUBINUR NEGATIVE 12/20/2013    GLUCOSEU NEGATIVE 12/20/2013    KETUA NEGATIVE 12/20/2013    AMORPHOUS NOT REPORTED 12/20/2013       Radiology    Reviewed as available. Assessment    1. ESRD on Hemodialysis. His regular HD days are Tuesday Thursday Saturday at [facility] hemodialysis facility using right AV fistula under Nicola. His dry weight is 115 kg.   2. Anemia of chronic disease stable  3. Secondary hyperparathyroidism  4. Hypertension  5.   New onset syncope preceded by vague systemic symptoms work-up in progress cardiology evaluation noted, they do not feel any further work-up is necessary  6. COVID-19 pneumonia without any overt respiratory failure  7.  1 out of 2 blood culture positive for coag negative staph likely contaminant await ID recommendations    Plan   1. HD today per TTS schedule. Next on Tuesday per schedule. 2.  COVID-19 treatment per ID.  3.  BMP in a.m.  4.  Will follow. Patient's nurse was updated. Nutrition   Renal Diet/TF      Thank you. Please call with any questions. Electronically signed by Gianna Head CNP, APRN - CNP on 8/30/2020 at 8:41 AM   [facility]. Attending Physician Statement  I have discussed the care of this patient, including pertinent history and exam findings, with the Resident/CNP. I have reviewed and edited the key elements of all parts of the encounter with the Resident/CNP. I agree with the assessment, plan and orders as documented by the Resident/CNP. Milo Khan MD   Nephrology [address] [place] [address] Drive    This note is created with the assistance of a speech-recognition program. While intending to generate a document that actually reflects the content of the visit, no guarantees can be provided that every mistake has been identified and corrected by editing.

## 2020-08-31 LAB
ANION GAP SERPL CALCULATED.3IONS-SCNC: 20 MMOL/L (ref 9–17)
BLD PROD TYP BPU: NORMAL
BUN BLDV-MCNC: 50 MG/DL (ref 6–20)
BUN/CREAT BLD: ABNORMAL (ref 9–20)
CALCIUM SERPL-MCNC: 8.4 MG/DL (ref 8.6–10.4)
CHLORIDE BLD-SCNC: 87 MMOL/L (ref 98–107)
CO2: 25 MMOL/L (ref 20–31)
CREAT SERPL-MCNC: 12.63 MG/DL (ref 0.7–1.2)
DISPENSE STATUS BLOOD BANK: NORMAL
GFR AFRICAN AMERICAN: 5 ML/MIN
GFR NON-AFRICAN AMERICAN: 4 ML/MIN
GFR SERPL CREATININE-BSD FRML MDRD: ABNORMAL ML/MIN/{1.73_M2}
GFR SERPL CREATININE-BSD FRML MDRD: ABNORMAL ML/MIN/{1.73_M2}
GLUCOSE BLD-MCNC: 92 MG/DL (ref 70–99)
INR BLD: 1.6
POTASSIUM SERPL-SCNC: 4.5 MMOL/L (ref 3.7–5.3)
PROTHROMBIN TIME: 16.3 SEC (ref 9–12)
SODIUM BLD-SCNC: 132 MMOL/L (ref 135–144)
TRANSFUSION STATUS: NORMAL
TROPONIN INTERP: ABNORMAL
TROPONIN T: ABNORMAL NG/ML
TROPONIN, HIGH SENSITIVITY: 109 NG/L (ref 0–22)
UNIT DIVISION: 0
UNIT NUMBER: NORMAL

## 2020-08-31 PROCEDURE — 99233 SBSQ HOSP IP/OBS HIGH 50: CPT | Performed by: INTERNAL MEDICINE

## 2020-08-31 PROCEDURE — 99231 SBSQ HOSP IP/OBS SF/LOW 25: CPT | Performed by: INTERNAL MEDICINE

## 2020-08-31 PROCEDURE — 6360000002 HC RX W HCPCS: Performed by: NURSE PRACTITIONER

## 2020-08-31 PROCEDURE — 2580000003 HC RX 258: Performed by: NURSE PRACTITIONER

## 2020-08-31 PROCEDURE — 1200000000 HC SEMI PRIVATE

## 2020-08-31 PROCEDURE — 84484 ASSAY OF TROPONIN QUANT: CPT

## 2020-08-31 PROCEDURE — 6370000000 HC RX 637 (ALT 250 FOR IP): Performed by: GENERAL PRACTICE

## 2020-08-31 PROCEDURE — 85610 PROTHROMBIN TIME: CPT

## 2020-08-31 PROCEDURE — 36415 COLL VENOUS BLD VENIPUNCTURE: CPT

## 2020-08-31 PROCEDURE — 80048 BASIC METABOLIC PNL TOTAL CA: CPT

## 2020-08-31 PROCEDURE — 99232 SBSQ HOSP IP/OBS MODERATE 35: CPT | Performed by: INTERNAL MEDICINE

## 2020-08-31 RX ADMIN — WARFARIN SODIUM 2.5 MG: 2.5 TABLET ORAL at 17:24

## 2020-08-31 RX ADMIN — HEPARIN SODIUM 7500 UNITS: 5000 INJECTION INTRAVENOUS; SUBCUTANEOUS at 22:00

## 2020-08-31 RX ADMIN — SODIUM CHLORIDE, PRESERVATIVE FREE 10 ML: 5 INJECTION INTRAVENOUS at 08:23

## 2020-08-31 RX ADMIN — ACETAMINOPHEN 650 MG: 325 TABLET ORAL at 20:50

## 2020-08-31 RX ADMIN — HEPARIN SODIUM 7500 UNITS: 5000 INJECTION INTRAVENOUS; SUBCUTANEOUS at 05:30

## 2020-08-31 RX ADMIN — AMIODARONE HYDROCHLORIDE 200 MG: 200 TABLET ORAL at 08:23

## 2020-08-31 RX ADMIN — HEPARIN SODIUM 7500 UNITS: 5000 INJECTION INTRAVENOUS; SUBCUTANEOUS at 17:30

## 2020-08-31 RX ADMIN — ACETAMINOPHEN 650 MG: 325 TABLET ORAL at 08:24

## 2020-08-31 RX ADMIN — MIDODRINE HYDROCHLORIDE 10 MG: 5 TABLET ORAL at 08:23

## 2020-08-31 RX ADMIN — SODIUM CHLORIDE, PRESERVATIVE FREE 10 ML: 5 INJECTION INTRAVENOUS at 20:50

## 2020-08-31 ASSESSMENT — PAIN SCALES - GENERAL
PAINLEVEL_OUTOF10: 0
PAINLEVEL_OUTOF10: 0

## 2020-08-31 NOTE — PROGRESS NOTES
[facility]  Office: [address], DO, Yannick Reese, DO, Smita Abdullahi, DO, Hermelindo Roldan Blood, DO, Valeria Park MD, Merrill Lanza MD, Judy Page MD, Wei Medellin MD, Caroline Lawson MD, Lidia Lozano MD, Josephine Bedoya MD, Marie Pollock MD, Shanon Cosby MD, Papito Mckeon, DO, Chacorta Brothers MD, Aleah Hines MD, Oni Dejesus, DO, Roddy Musa MD,  Ramírez Lynn, DO, Izzy Gonzalez MD, Arturo Noble MD, Maximino Pac, Carmelina Gee, CNP, Miriam Serna, CNP, Kavon Lyle, CNS, Yumiko Boyd, CNP, Dai Paul, CNP, Karlene Gilbert, CNP, Sean Velez, CNP, Domenic Loomis, CNP, Nixon Cash PA-C, Michael Zhu, [facility], Naveed Hernandez, CNP, Latha Orantes, CNP, Ariel Mclain, CNP, Erika Grubbs, CNP, Yong Mutton, [address]   [address]    Progress Note    8/31/2020    4:35 PM    Name:   Teo Mendenhall  MRN:     [id number]     Acct:      [de-identified]   Room:   [facility]/3017-01   Day:  4  Admit Date:  8/27/2020 12:37 PM    PCP:   Jessica Cushing, MD  Code Status:  Full Code    Subjective:     C/C:   Chief Complaint   Patient presents with    Respiratory Distress     Pt c/o SOB x 5 -7 days, with loss of appetite, nausea, and vomiting. Syncope on found to be positive for COVID  Interval History Status: improved. Seen and examined face-to-face,  Had fever this morning  Got 1 unit of plasma yesterday  [address] nephrology, infectious disease, cardiology help  Infectious disease to decide about discharging the patient      Brief History:      Mr. Deejay Salamanca presents to ED today via EMS with complaints of syncopal episode. Patient stated he received hemodialysis this morning, drove home, and awoke to his neighbor at his car door. He he was told by his neighbor that he had been sitting there for \"a little while\", the neighbor was concerned and called EMS for transport to ED for evaluation.   He does complain of some respiratory distress over the last week with shortness of breath but denies cough or fever. He complains of some sensitivity to his scalp, as well as general aching and malaise. He also has had decreased appetite, nausea and vomiting over the past 7 days. He states he was tested for COVID-19 in June and he was found to be negative. He lives at home with his son. He is compliant with his medications. Independent in his ADLs. Takes his prescribed medications. He drives himself to hemodialysis on Tuesdays Thursdays and Saturdays. He is resting at this time without any complaints.     ED course of treatment found him to be COVID-19 positive with an elevated troponin at 87 and a lactic acid at 4.3. He initially was requiring supplemental oxygen at 3 L and had a fever of 100.7 Fahrenheit,    At this time is doing fine,  No chest pain no shortness of breath, mild cough,  Blood cultures are positive for coag negative staph methicillin-resistant,  Repeat blood cultures sent, negative, possibility of contamination as per infectious disease  CT of the chest for PE negative,  Infectious disease managing the treatment,  Patient had fever this morning,    Cardiology did not think the syncope had anything to do with cardiac etiology, could be hypotension due to dialysis and they signed off        Review of Systems:     Constitutional:  fever 101 this morning  Respiratory:  negative for cough, dyspnea on exertion, shortness of breath, wheezing  Cardiovascular:  negative for chest pain, chest pressure/discomfort, lower extremity edema, palpitations  Gastrointestinal:  negative for abdominal pain, constipation, diarrhea, nausea, vomiting  Neurological:  negative for dizziness, headache    Medications:      Allergies:  No Known Allergies    Current Meds:   Scheduled Meds:    heparin (porcine)  7,500 Units Subcutaneous 3 times per day    amiodarone  200 mg Oral Daily    midodrine  10 mg Oral TID WC    warfarin  2.5 mg Oral Daily    sodium chloride flush  10 mL Intravenous 2 times per day    warfarin (COUMADIN) daily dosing (placeholder)   Other RX Placeholder     Continuous Infusions:   PRN Meds: acetaminophen, sodium chloride flush, acetaminophen **OR** acetaminophen, polyethylene glycol, promethazine **OR** ondansetron    Data:     Past Medical History:   has a past medical history of Anemia, Arthritis, Atrial fibrillation ([facility] BPH (benign prostatic hypertrophy), CAD (coronary artery disease), Caffeine use, Cellulitis of lower leg, Chronic back pain, Colon cancer ([facility] Cor pulmonale, acute ([facility] CRF (chronic renal failure), Erectile dysfunction, Gout, Hemodialysis patient ([facility] History of blood transfusion, Hyperkalemia, Hyperlipidemia, Hypertension, Hypotension, Hypothyroidism, Lymphedema of leg, Obesity, and Thyroid disease. Social History:   reports that he has never smoked. He has never used smokeless tobacco. He reports current alcohol use of about 3.0 standard drinks of alcohol per week. He reports that he does not use drugs. Family History:   Family History   Problem Relation Age of Onset    Cancer Father         leukemia    Heart Failure Mother     Arthritis Mother     High Blood Pressure Mother     Heart Disease Maternal Grandmother     Stroke Paternal Grandfather        Vitals:  BP (!) 144/72   Pulse 76   Temp 99.1 °F (37.3 °C) (Oral)   Resp 16   Ht 5' 10\" (1.778 m)   Wt 255 lb 11.7 oz (116 kg)   SpO2 94%   BMI 36.69 kg/m²   Temp (24hrs), Av.3 °F (37.9 °C), Min:99.1 °F (37.3 °C), Max:101.8 °F (38.8 °C)    No results for input(s): POCGLU in the last 72 hours. I/O (24Hr):   No intake or output data in the 24 hours ending 20 6038    Labs:  Hematology:  Recent Labs     20  0950 20  0556 20  0522   WBC  --  6.3  --    RBC  --  4.02*  --    HGB  --  13.5  --    HCT  --  41.6  --    MCV  --  103.5*  --    MCH  --  33.6*  --    MCHC  --  32.5  --    RDW  --  15.1*  -- PLT  --  301  --    MPV  --  10.1  --    INR 1.5 1.5 1.6     Chemistry:  Recent Labs     08/29/20  0950 08/30/20  0556 08/31/20  0522 08/31/20  0837   * 131*  --  132*   K 4.1 4.3  --  4.5   CL 87* 88*  --  87*   CO2 21 26  --  25   GLUCOSE 156* 84  --  92   BUN 54* 32*  --  50*   CREATININE 13.46* 9.74*  --  12.63*   ANIONGAP 25* 17  --  20*   LABGLOM 4* 6*  --  4*   GFRAA 5* 7*  --  5*   CALCIUM 8.4* 8.5*  --  8.4*   TROPHS 83* 107* 109*  --      No results for input(s): PROT, LABALBU, LABA1C, X5LAAXK, V7GPLTX, FT4, TSH, AST, ALT, LDH, GGT, ALKPHOS, LABGGT, BILITOT, BILIDIR, AMMONIA, AMYLASE, LIPASE, LACTATE, CHOL, HDL, LDLCHOLESTEROL, CHOLHDLRATIO, TRIG, VLDL, WDS77VK, PHENYTOIN, PHENYF, URICACID, POCGLU in the last 72 hours. ABG:  Lab Results   Component Value Date    FIO2 NOT REPORTED 08/27/2020     Lab Results   Component Value Date/Time    SPECIAL NOT REPORTED 08/29/2020 09:51 AM     Lab Results   Component Value Date/Time    CULTURE NO GROWTH 2 DAYS 08/29/2020 09:51 AM       Radiology:  [facility] Chest Portable    Result Date: 8/27/2020  Limited left retrocardiac assessment.   Mild streaky lingular atelectasis Moderate patchy right basilar infiltrate, pneumonia the likely etiology follow changes to resolution RECOMMENDATION: Upright well penetrated PA view of the chest for more accurate assessment of the left lung base       Physical Examination:        General appearance:  alert, cooperative and no distress  Mental Status:  oriented to person, place and time and normal affect  Lungs: Mild basal crackles  Heart:  regular rate and rhythm, no murmur  Abdomen:  soft, nontender, nondistended, normal bowel sounds, no masses, hepatomegaly, splenomegaly  Extremities:  no edema, redness, tenderness in the calves  Skin:  no gross lesions, rashes, induration    Assessment:        Hospital Problems           Last Modified POA    * (Principal) Syncope 8/27/2020 Yes    Benign prostatic hyperplasia with lower urinary

## 2020-08-31 NOTE — PROGRESS NOTES
11/15/2018    COLONOSCOPY POLYPECTOMY SNARE/COLD BIOPSY performed by Radha Coley MD at [facility] 2014    SLEEVE GASTRECTOMY N/A 9/25/2017    GASTRECTOMY SLEEVE LAPAROSCOPIC SI ROBOTIC, ENDOSEALER performed by General Jono MD at [facility] N/A 1/22/2019    ABDOMINAL EXPLORATION, LEFT HEMICOLECTOMY, MOBILIZATION OF SPLENIC FLEXURE performed by Linette Coto MD at [facility]     leg tumor removal/ dr. Shai Ramirez       Medications:      heparin (porcine)  7,500 Units Subcutaneous 3 times per day    amiodarone  200 mg Oral Daily    midodrine  10 mg Oral TID WC    warfarin  2.5 mg Oral Daily    sodium chloride flush  10 mL Intravenous 2 times per day    warfarin (COUMADIN) daily dosing (placeholder)   Other RX Placeholder       Social History:     Social History     Socioeconomic History    Marital status: Single     Spouse name: Not on file    Number of children: Not on file    Years of education: Not on file    Highest education level: Not on file   Occupational History    Not on file   Social Needs    Financial resource strain: Not on file    Food insecurity     Worry: Not on file     Inability: Not on file    Transportation needs     Medical: Not on file     Non-medical: Not on file   Tobacco Use    Smoking status: Never Smoker    Smokeless tobacco: Never Used   Substance and Sexual Activity    Alcohol use:  Yes     Alcohol/week: 3.0 standard drinks     Types: 3 Glasses of wine per week     Comment: weekend    Drug use: No    Sexual activity: Yes     Partners: Female   Lifestyle    Physical activity     Days per week: Not on file     Minutes per session: Not on file    Stress: Not on file   Relationships    Social connections     Talks on phone: Not on file     Gets together: Not on file     Attends Hoahaoism service: Not on file     Active member of club or organization: Not on file     Attends meetings of sinuses. Mouth/throat: mucosa pink and moist. No lesions. Dentition in good repair. Neck:Supple, without lymphadenopathy. Thyroid normal, No bruits. Pulmonary/Chest: Clear to auscultation, without wheezes, rales, or rhonchi. No dullness to percussion. Cardiovascular: Regular rate and rhythm without murmurs, rubs, or gallops. Abdomen: Soft, non tender. Bowel sounds normal. No organomegaly  All four Extremities: No cyanosis, clubbing, edema, or effusions. Neurologic: No gross sensory or motor deficits. Skin: Warm and dry with good turgor. No signs of peripheral arterial or venous insufficiency. No ulcerations. No open wounds. Medical Decision Making -Laboratory:   I have independently reviewed/ordered the following labs:    CBC with Differential:   Recent Labs     08/30/20  0556   WBC 6.3   HGB 13.5   HCT 41.6        BMP:   Recent Labs     08/30/20  0556 08/31/20  0837   * 132*   K 4.3 4.5   CL 88* 87*   CO2 26 25   BUN 32* 50*   CREATININE 9.74* 12.63*     Hepatic Function Panel:   No results for input(s): PROT, LABALBU, BILIDIR, IBILI, BILITOT, ALKPHOS, ALT, AST in the last 72 hours. No results for input(s): RPR in the last 72 hours. No results for input(s): HIV in the last 72 hours. No results for input(s): BC in the last 72 hours. Lab Results   Component Value Date    MUCUS 1+ 12/20/2013    RBC 4.02 08/30/2020    TRICHOMONAS NOT REPORTED 12/20/2013    WBC 6.3 08/30/2020    YEAST NOT REPORTED 12/20/2013    TURBIDITY CLOUDY 12/20/2013     Lab Results   Component Value Date    CREATININE 12.63 08/31/2020    GLUCOSE 92 08/31/2020    GLUCOSE 91 10/04/2011       Medical Decision Making-Imaging:     EXAMINATION:    ONE XRAY VIEW OF THE CHEST         8/27/2020 1:59 pm         COMPARISON:    February 1, 2019, chest examination         HISTORY:    ORDERING SYSTEM PROVIDED HISTORY: syncope    TECHNOLOGIST PROVIDED HISTORY:    syncope    Reason for Exam: Syncope/  AP erect/  gp used/  port. Acuity: Unknown    Type of Exam: Unknown         FINDINGS:    Borderline cardiomegaly         Moderate patchy right basilar infiltrate with mild left lingular and possible    left basilar infiltrate.  Limited penetration of the left lower lung.  Clear    upper lungs         No significant pleural process         Degenerative changes of the right acromioclavicular joint and thoracic spine              Impression    Limited left retrocardiac assessment.  Mild streaky lingular atelectasis         Moderate patchy right basilar infiltrate, pneumonia the likely etiology    follow changes to resolution         RECOMMENDATION:    Upright well penetrated PA view of the chest for more accurate assessment of    the left lung base                EXAMINATION:    CTA OF THE CHEST 8/28/2020 3:55 pm         TECHNIQUE:    CTA of the chest was performed after the administration of intravenous    contrast.  Multiplanar reformatted images are provided for review.  MIP    images are provided for review. Dose modulation, iterative reconstruction,    and/or weight based adjustment of the mA/kV was utilized to reduce the    radiation dose to as low as reasonably achievable.         COMPARISON:    None         HISTORY:    ORDERING SYSTEM PROVIDED HISTORY: Hypoxic, COVID, rule out PE    TECHNOLOGIST PROVIDED HISTORY:    Hypoxic, COVID, rule out PE         FINDINGS:    Pulmonary Arteries: Pulmonary arteries are adequately opacified for    evaluation.  No evidence of intraluminal filling defect to suggest pulmonary    embolism.  Main pulmonary artery is normal in caliber.         Mediastinum: No mediastinal adenopathy, acute aortic abnormality, or    pericardial effusion.  Mild cardiomegaly is noted.  Coronary artery    calcification is present.  Moderate calcification of the aortic arch is seen.         Lungs/pleura: There has been interval development of right upper lobe solid    nodules measuring 7.5 mm and 4.5 mm image 33 series 3.  Considerable    multifocal scattered areas of consolidation are present throughout the    remainder of the lung fields bilaterally without effusion, consistent with    atypical pneumonitis and diagnosis of COVID-19.  Tracheobronchial tree is    patent.  No effusion is present.         Upper Abdomen: Atherosclerotic calcification of the aorta and branches is    noted.  Included kidneys appear small.  There is a small cyst in the right    kidney.         Soft Tissues/Bones: Multilevel spondylosis in the thoracic spine is noted. No acute osseous or soft tissue abnormality.              Impression    1.  No evidence of pulmonary embolus.         2.  Atherosclerotic disease including coronary arteries.  Mild cardiomegaly.         3.  Extensive multifocal scattered ground-glass areas of consolidation    consistent with COVID-19.         4.  Other findings as above.             Medical Decision [id number]:       Medical Decision Making-Other:     Note:  Labs, medications, radiologic studies were reviewed with personal review of films  Large amounts of data were reviewed  Discussed with nursing Staff, Discharge planner  Infection Control and Prevention measures reviewed  All prior entries were reviewed  Administer medications as ordered  Prognosis: Guarded  Discharge planning reviewed  Follow up as outpatient. Thank you for allowing us to participate in the care of this patient. Please call with questions. Daniel Harper DPM     ATTESTATION:    I have discussed the case, including pertinent history and exam findings with the residents and students. I have seen and examined the patient and the key elements of the encounter have been performed by me. I was present when the student obtained his information or examined the patient. I have reviewed the laboratory data, other diagnostic studies and discussed them with the residents. I have updated the medical record where necessary.     I agree with the assessment, plan and orders as documented by the resident/ student.     Chiquita Bernstein MD.    Pager: [phone] - Office: [phone]

## 2020-08-31 NOTE — CARE COORDINATION
Spoke with Lashell Moran at [employer], since pt is Covid positive, will have to run TTS to arrive by 8:30am. Will update clinic when closer to discharge, updated AVS.

## 2020-08-31 NOTE — PROGRESS NOTES
workers as required. Labs      Recent Labs     08/30/20  0556   WBC 6.3   RBC 4.02*   HGB 13.5   HCT 41.6   .5*   MCH 33.6*   MCHC 32.5   RDW 15.1*      MPV 10.1      BMP:   Recent Labs     08/29/20  0950 08/30/20  0556 08/31/20  0837   * 131* 132*   K 4.1 4.3 4.5   CL 87* 88* 87*   CO2 21 26 25   BUN 54* 32* 50*   CREATININE 13.46* 9.74* 12.63*   GLUCOSE 156* 84 92   CALCIUM 8.4* 8.5* 8.4*      BNP:      Lab Results   Component Value Date     02/03/2014     PTH:     Lab Results   Component Value Date    IPTH 321.5 09/25/2018     Urinalysis/Chemistries      Lab Results   Component Value Date    NITRU NEGATIVE 12/20/2013    COLORU CATHY 12/20/2013    PHUR 5.0 12/20/2013    WBCUA 50  12/20/2013    RBCUA 10 TO 20 12/20/2013    MUCUS 1+ 12/20/2013    TRICHOMONAS NOT REPORTED 12/20/2013    YEAST NOT REPORTED 12/20/2013    BACTERIA FEW 12/20/2013    SPECGRAV 1.016 12/20/2013    LEUKOCYTESUR MOD 12/20/2013    UROBILINOGEN Normal 12/20/2013    BILIRUBINUR NEGATIVE 12/20/2013    GLUCOSEU NEGATIVE 12/20/2013    KETUA NEGATIVE 12/20/2013    AMORPHOUS NOT REPORTED 12/20/2013       Radiology    Reviewed as available. Assessment    1. ESRD on Hemodialysis. His regular HD days are Tuesday Thursday Saturday at [facility] using right AV fistula under Nicola. His dry weight is 115 kg.   2. Anemia of chronic disease stable  3. Secondary hyperparathyroidism  4. Hypertension  5. New onset syncope preceded by vague systemic symptoms work-up in progress cardiology evaluation noted, they do not feel any further work-up is necessary  6. COVID-19 pneumonia without any overt respiratory failure  7.  1 out of 2 blood culture positive for coag negative staph likely contaminant. Plan   1. No acute need for dialysis today. Will get regular dialysis in a.m. as per TTS schedule. 2.  COVID-19 treatment per ID.  3.  BMP in a.m.  4.  Will follow. Patient's nurse was updated. Nutrition   Renal Diet/TF    Thank you. Please call with any questions. Milo Ramirez MD   Nephrology [address] [place] [address] Drive    This note is created with the assistance of a speech-recognition program. While intending to generate a document that actually reflects the content of the visit, no guarantees can be provided that every mistake has been identified and corrected by editing.

## 2020-08-31 NOTE — PROGRESS NOTES
Physician Progress Note      Tom Grace  CSN #:                  [id number]  :                       1970  ADMIT DATE:       2020 12:37 PM  [address] [place] DATE:  RESPONDING  PROVIDER #:        Lauro Buenrostro MD          QUERY TEXT:    Pt admitted with ESRD and hypovolemia. Pt noted to have respiratory distress   with need of O2 support. If possible, please document in the progress notes   and discharge summary if you are evaluating and/or treating any of the   following: The medical record reflects the following:  Risk Factors: covid pneumonia, ESRD with HD, hypovolemia, hypoxemia,   respriatory distress  Clinical Indicators: pt has several episodes of shallow breathing and in need   of O2 support, repostioning pt. and opening airway seem to relieve respiratory   distress,  RR- 18-24, spo2 @ 89-90 with O2 @3 L/NC. shallow breathing with   improvement  Treatment: O2 support, monitor, IV - Rocpehin Zithromax,    If you are in need of further assistance, or have a question, you can contact   me at cell -[phone] - [facility]- [phone]. I am usually on line from    6:30 - 3:00. Thank Gelacio Quinn RN, CDI  Options provided:  -- Acute respiratory failure with hypoxia  -- Acute respiratory failure with hypercapnia  -- Other - I will add my own diagnosis  -- Disagree - Not applicable / Not valid  -- Disagree - Clinically unable to determine / Unknown  -- Refer to Clinical Documentation Reviewer    PROVIDER RESPONSE TEXT:    This patient is in acute respiratory failure with hypoxia.     Query created by: Domonique Garzon on 2020 11:04 AM      Electronically signed by:  Lauro Buenrostro MD 2020 12:46 PM

## 2020-08-31 NOTE — PLAN OF CARE
Ongoing  Goal: Maintain absence of muscle cramping  8/31/2020 1648 by Michael Ybarra RN  Outcome: Ongoing  8/31/2020 0700 by Lesly Duque RN  Outcome: Ongoing  Goal: Maintain normal serum potassium, sodium, calcium, phosphorus, and pH  8/31/2020 1648 by Michael Ybarra RN  Outcome: Ongoing  8/31/2020 0700 by Lesly Duque RN  Outcome: Ongoing     Problem: Loneliness or Risk for Loneliness  Goal: Demonstrate positive use of time alone when socialization is not possible  8/31/2020 1648 by Michael Ybarra RN  Outcome: Ongoing  8/31/2020 0700 by Lesly Duque RN  Outcome: Ongoing     Problem: Fatigue  Goal: Verbalize increase energy and improved vitality  8/31/2020 1648 by Michael Ybarra RN  Outcome: Ongoing  8/31/2020 0700 by Lesly Duque RN  Outcome: Ongoing

## 2020-09-01 LAB
ANION GAP SERPL CALCULATED.3IONS-SCNC: 25 MMOL/L (ref 9–17)
BUN BLDV-MCNC: 64 MG/DL (ref 6–20)
BUN/CREAT BLD: ABNORMAL (ref 9–20)
CALCIUM SERPL-MCNC: 8.7 MG/DL (ref 8.6–10.4)
CHLORIDE BLD-SCNC: 87 MMOL/L (ref 98–107)
CO2: 20 MMOL/L (ref 20–31)
CREAT SERPL-MCNC: 15 MG/DL (ref 0.7–1.2)
GFR AFRICAN AMERICAN: 4 ML/MIN
GFR NON-AFRICAN AMERICAN: 3 ML/MIN
GFR SERPL CREATININE-BSD FRML MDRD: ABNORMAL ML/MIN/{1.73_M2}
GFR SERPL CREATININE-BSD FRML MDRD: ABNORMAL ML/MIN/{1.73_M2}
GLUCOSE BLD-MCNC: 67 MG/DL (ref 70–99)
INR BLD: 2
POTASSIUM SERPL-SCNC: 5.1 MMOL/L (ref 3.7–5.3)
PROTHROMBIN TIME: 20.6 SEC (ref 9–12)
SODIUM BLD-SCNC: 132 MMOL/L (ref 135–144)
TROPONIN INTERP: ABNORMAL
TROPONIN T: ABNORMAL NG/ML
TROPONIN, HIGH SENSITIVITY: 106 NG/L (ref 0–22)

## 2020-09-01 PROCEDURE — 6360000002 HC RX W HCPCS: Performed by: NURSE PRACTITIONER

## 2020-09-01 PROCEDURE — 90935 HEMODIALYSIS ONE EVALUATION: CPT | Performed by: INTERNAL MEDICINE

## 2020-09-01 PROCEDURE — 2580000003 HC RX 258: Performed by: NURSE PRACTITIONER

## 2020-09-01 PROCEDURE — 99233 SBSQ HOSP IP/OBS HIGH 50: CPT | Performed by: INTERNAL MEDICINE

## 2020-09-01 PROCEDURE — 99232 SBSQ HOSP IP/OBS MODERATE 35: CPT | Performed by: INTERNAL MEDICINE

## 2020-09-01 PROCEDURE — 84484 ASSAY OF TROPONIN QUANT: CPT

## 2020-09-01 PROCEDURE — 90935 HEMODIALYSIS ONE EVALUATION: CPT

## 2020-09-01 PROCEDURE — 6370000000 HC RX 637 (ALT 250 FOR IP): Performed by: GENERAL PRACTICE

## 2020-09-01 PROCEDURE — 1200000000 HC SEMI PRIVATE

## 2020-09-01 PROCEDURE — 85610 PROTHROMBIN TIME: CPT

## 2020-09-01 PROCEDURE — 36415 COLL VENOUS BLD VENIPUNCTURE: CPT

## 2020-09-01 PROCEDURE — 80048 BASIC METABOLIC PNL TOTAL CA: CPT

## 2020-09-01 RX ADMIN — SODIUM CHLORIDE, PRESERVATIVE FREE 10 ML: 5 INJECTION INTRAVENOUS at 20:39

## 2020-09-01 RX ADMIN — ACETAMINOPHEN 650 MG: 325 TABLET ORAL at 20:37

## 2020-09-01 RX ADMIN — SODIUM CHLORIDE, PRESERVATIVE FREE 10 ML: 5 INJECTION INTRAVENOUS at 09:16

## 2020-09-01 RX ADMIN — MIDODRINE HYDROCHLORIDE 10 MG: 5 TABLET ORAL at 09:12

## 2020-09-01 RX ADMIN — ACETAMINOPHEN 650 MG: 325 TABLET ORAL at 11:51

## 2020-09-01 RX ADMIN — WARFARIN SODIUM 2.5 MG: 2.5 TABLET ORAL at 20:37

## 2020-09-01 RX ADMIN — HEPARIN SODIUM 7500 UNITS: 5000 INJECTION INTRAVENOUS; SUBCUTANEOUS at 20:48

## 2020-09-01 RX ADMIN — HEPARIN SODIUM 7500 UNITS: 5000 INJECTION INTRAVENOUS; SUBCUTANEOUS at 05:31

## 2020-09-01 ASSESSMENT — PAIN SCALES - GENERAL
PAINLEVEL_OUTOF10: 0

## 2020-09-01 NOTE — PLAN OF CARE
Problem: Airway Clearance - Ineffective  Goal: Achieve or maintain patent airway  Outcome: Ongoing     Problem: Gas Exchange - Impaired  Goal: Absence of hypoxia  Outcome: Ongoing     Problem: Gas Exchange - Impaired  Goal: Promote optimal lung function  Outcome: Ongoing     Problem: Breathing Pattern - Ineffective  Goal: Ability to achieve and maintain a regular respiratory rate  Outcome: Ongoing     Problem: Body Temperature -  Risk of, Imbalanced  Goal: Ability to maintain a body temperature within defined limits  Outcome: Ongoing     Problem:  Body Temperature -  Risk of, Imbalanced  Goal: Will regain or maintain usual level of consciousness  Outcome: Ongoing     Problem: Isolation Precautions - Risk of Spread of Infection  Goal: Prevent transmission of infection  Outcome: Ongoing     Problem: Risk for Fluid Volume Deficit  Goal: Maintain normal heart rhythm  Outcome: Ongoing     Problem: Risk for Fluid Volume Deficit  Goal: Maintain absence of muscle cramping  Outcome: Ongoing     Problem: Falls - Risk of:  Goal: Will remain free from falls  Description: Will remain free from falls  Outcome: Ongoing     Problem: Falls - Risk of:  Goal: Absence of physical injury  Description: Absence of physical injury  Outcome: Ongoing

## 2020-09-01 NOTE — PROGRESS NOTES
[facility]  Office: [address], DO, Sadi Brie, DO, Riaz Mendozaaver, DO, Lisa Sher Blood, DO, Safia Kim MD, Ramesh Eaton MD, Anahy Arce MD, Chilo Wang MD, Sherrie Saucedo MD, Ricardo Velazco MD, Awilda Thorne MD, Charity Conley MD, Shanon Richardson MD, Aleisha Moe, DO, Viviana Pacheco MD, Rafat Breen MD, Brian Ontiveros DO, Xuan Arce MD,  Caesar Nunes DO, Donald Mayo MD, Fito Tejeda MD, Mookie Green, CNP, [facility], [facility], Lugene Nose, CNP, Monica Shell, CNS, Richelle Ayoub, CNP, Alpha Grain, CNP, Peg Jeanne, CNP, Daniela Carlson, CNP, Mehrdad Voss, CNP, Brie Burgos PA-C, [place], ARCELIA, Sachin Kumar, CNP, Gill Van, CNP, Jai Diaz, CNP, Dilip López, [facility], Gonzalez Aguilar, [address]   [facility]    Progress Note    9/1/2020    9:45 AM    Name:   Kostas Ayoub  MRN:     [id number]     Acct:      [de-identified]   Room:   3017/3017-01   Day:  5  Admit Date:  8/27/2020 12:37 PM    PCP:   Roque Alcaraz MD  Code Status:  Full Code    Subjective:     C/C:   Chief Complaint   Patient presents with    Respiratory Distress     Pt c/o SOB x 5 -7 days, with loss of appetite, nausea, and vomiting. Syncope on found to be positive for COVID  Interval History Status: improved. Pt seen and evaluated at bedside this morning. Patient reports feeling well this morning. He denies any complaints at this time. Brief History:      Mr. Birdia Riedel presents to ED today via EMS with complaints of syncopal episode. Patient stated he received hemodialysis this morning, drove home, and awoke to his neighbor at his car door. He he was told by his neighbor that he had been sitting there for \"a little while\", the neighbor was concerned and called EMS for transport to ED for evaluation.   He does complain of some respiratory distress over the last week with shortness of breath but denies cough or fever. He complains of some sensitivity to his scalp, as well as general aching and malaise. He also has had decreased appetite, nausea and vomiting over the past 7 days. He states he was tested for COVID-19 in June and he was found to be negative. He lives at home with his son. He is compliant with his medications. Independent in his ADLs. Takes his prescribed medications. He drives himself to hemodialysis on Tuesdays Thursdays and Saturdays. He is resting at this time without any complaints.     ED course of treatment found him to be COVID-19 positive with an elevated troponin at 87 and a lactic acid at 4.3. He initially was requiring supplemental oxygen at 3 L and had a fever of 100.7 Fahrenheit,    At this time is doing fine,  No chest pain no shortness of breath, mild cough,  Blood cultures are positive for coag negative staph methicillin-resistant,  Repeat blood cultures sent, negative, possibility of contamination as per infectious disease  CT of the chest for PE negative,  Infectious disease managing the treatment,  Patient had fever this morning,    Cardiology did not think the syncope had anything to do with cardiac etiology, could be hypotension due to dialysis and they signed off        Review of Systems:     Constitutional:  fever 101 this morning  Respiratory:  negative for cough, dyspnea on exertion, shortness of breath, wheezing  Cardiovascular:  negative for chest pain, chest pressure/discomfort, lower extremity edema, palpitations  Gastrointestinal:  negative for abdominal pain, constipation, diarrhea, nausea, vomiting  Neurological:  negative for dizziness, headache    Medications:      Allergies:  No Known Allergies    Current Meds:   Scheduled Meds:    heparin (porcine)  7,500 Units Subcutaneous 3 times per day    amiodarone  200 mg Oral Daily    midodrine  10 mg Oral TID WC    warfarin  2.5 mg Oral Daily    sodium chloride flush  10 mL Intravenous 2 times per day    warfarin (COUMADIN) daily dosing (placeholder)   Other RX Placeholder     Continuous Infusions:   PRN Meds: acetaminophen, sodium chloride flush, acetaminophen **OR** acetaminophen, polyethylene glycol, promethazine **OR** ondansetron    Data:     Past Medical History:   has a past medical history of Anemia, Arthritis, Atrial fibrillation ([facility] BPH (benign prostatic hypertrophy), CAD (coronary artery disease), Caffeine use, Cellulitis of lower leg, Chronic back pain, Colon cancer ([facility] Cor pulmonale, acute ([facility] CRF (chronic renal failure), Erectile dysfunction, Gout, Hemodialysis patient ([facility] History of blood transfusion, Hyperkalemia, Hyperlipidemia, Hypertension, Hypotension, Hypothyroidism, Lymphedema of leg, Obesity, and Thyroid disease. Social History:   reports that he has never smoked. He has never used smokeless tobacco. He reports current alcohol use of about 3.0 standard drinks of alcohol per week. He reports that he does not use drugs. Family History:   Family History   Problem Relation Age of Onset    Cancer Father         leukemia    Heart Failure Mother     Arthritis Mother     High Blood Pressure Mother     Heart Disease Maternal Grandmother     Stroke Paternal Grandfather        Vitals:  BP (!) 140/71   Pulse 79   Temp 99.5 °F (37.5 °C) (Oral)   Resp 17   Ht 5' 10\" (1.778 m)   Wt 260 lb 5.8 oz (118.1 kg)   SpO2 93%   BMI 37.36 kg/m²   Temp (24hrs), Av.4 °F (37.4 °C), Min:98.9 °F (37.2 °C), Max:100.4 °F (38 °C)    No results for input(s): POCGLU in the last 72 hours. I/O (24Hr):   No intake or output data in the 24 hours ending 20 0945    Labs:  Hematology:  Recent Labs     20  0556 20  0522 20  0603   WBC 6.3  --   --    RBC 4.02*  --   --    HGB 13.5  --   --    HCT 41.6  --   --    .5*  --   --    MCH 33.6*  --   --    MCHC 32.5  --   --    RDW 15.1*  --   --      --   --    MPV 10.1  --   --    INR 1.5 1.6 2.0 Chemistry:  Recent Labs     08/30/20  0556 08/31/20  0522 08/31/20  0837 09/01/20  0603   *  --  132* 132*   K 4.3  --  4.5 5.1   CL 88*  --  87* 87*   CO2 26  --  25 20   GLUCOSE 84  --  92 67*   BUN 32*  --  50* 64*   CREATININE 9.74*  --  12.63* 15.00*   ANIONGAP 17  --  20* 25*   LABGLOM 6*  --  4* 3*   GFRAA 7*  --  5* 4*   CALCIUM 8.5*  --  8.4* 8.7   TROPHS 107* 109*  --  106*     No results for input(s): PROT, LABALBU, LABA1C, I4MESCI, T3NVGWC, FT4, TSH, AST, ALT, LDH, GGT, ALKPHOS, LABGGT, BILITOT, BILIDIR, AMMONIA, AMYLASE, LIPASE, LACTATE, CHOL, HDL, LDLCHOLESTEROL, CHOLHDLRATIO, TRIG, VLDL, CDN24WG, PHENYTOIN, PHENYF, URICACID, POCGLU in the last 72 hours. ABG:  Lab Results   Component Value Date    FIO2 NOT REPORTED 08/27/2020     Lab Results   Component Value Date/Time    SPECIAL NOT REPORTED 08/29/2020 09:51 AM     Lab Results   Component Value Date/Time    CULTURE NO GROWTH 2 DAYS 08/29/2020 09:51 AM       Radiology:  [facility] Chest Portable    Result Date: 8/27/2020  Limited left retrocardiac assessment.   Mild streaky lingular atelectasis Moderate patchy right basilar infiltrate, pneumonia the likely etiology follow changes to resolution RECOMMENDATION: Upright well penetrated PA view of the chest for more accurate assessment of the left lung base       Physical Examination:        General appearance:  alert, cooperative and no distress  Mental Status:  oriented to person, place and time and normal affect  Lungs: Mild basal crackles  Heart:  regular rate and rhythm, no murmur  Abdomen:  soft, nontender, nondistended, normal bowel sounds, no masses, hepatomegaly, splenomegaly  Extremities:  no edema, redness, tenderness in the calves  Skin:  no gross lesions, rashes, induration    Assessment:        Hospital Problems           Last Modified POA    * (Principal) Syncope 8/27/2020 Yes    Benign prostatic hyperplasia with lower urinary tract symptoms 8/28/2020 Yes    ESRD on hemodialysis ([address] 8/27/2020 Yes    Chronic hypotension 8/27/2020 Yes    Chronic atrial fibrillation 8/28/2020 Yes    DVT (deep venous thrombosis) ([facility] 8/28/2020 Yes    S/P laparoscopic sleeve gastrectomy 8/28/2020 Yes    Status post partial colectomy 8/28/2020 Yes    COVID-19 8/27/2020 Yes    Hypoxemia 8/27/2020 Yes    Pulmonary nodules 8/29/2020 Yes    Pneumonia due to COVID-19 virus 8/30/2020 Yes          Plan:        Syncope  -post dialysis most likely due to hypovolemia  -cardiology on board  -troponin rise possible secondary to end-stage renal disease, no further testing per cardiology     COVID-19 pneumonia  -treatment per infectious disease  -1 unit plasma was given August 30th     ESRD  Appreciate nephrology input for end-stage renal disease on dialysis,    Hyponatremia  - On dialysis    Hyperkalemia  - on Dialysis    AGMA  - On Dialysis    Full CODE STATUS,  Continue home medication    Disposition as per infectious disease      Eugenia Sorto MD  9/1/2020  9:45 AM

## 2020-09-01 NOTE — PROGRESS NOTES
Writer spoke with pt's sister Marley Preciado to provide an update of current plan of care. All questions answered.

## 2020-09-01 NOTE — PROGRESS NOTES
Dialysis Post Treatment Note  Vitals:    09/01/20 1853   BP: 130/60   Pulse: 98   Resp: 20   Temp: 100.9 °F (38.3 °C)   SpO2: 95%     Pt tolerated tx well, no complications noted.     Pre-Weight = 114.9 KG  Post-weight = Weight: 250 lb 14.1 oz (113.8 kg)  Total Liters Processed = Total Liters Processed (l/min): 96.8 l/min  Rinseback Volume (mL) = Rinseback Volume (ml): 300 ml  Net Removal (mL) =1100 mL  Type of access used= RT AVF  Length of treatment=4 hours

## 2020-09-01 NOTE — PLAN OF CARE
Problem: Airway Clearance - Ineffective  Goal: Achieve or maintain patent airway  9/1/2020 0145 by Musa Gross RN  Outcome: Ongoing  8/31/2020 1648 by Elis Bacon RN  Outcome: Ongoing     Problem: Gas Exchange - Impaired  Goal: Absence of hypoxia  9/1/2020 0145 by Musa Gross RN  Outcome: Ongoing  8/31/2020 1648 by Elis Bacon RN  Outcome: Ongoing  Goal: Promote optimal lung function  9/1/2020 0145 by Musa Gross RN  Outcome: Ongoing  8/31/2020 1648 by Elis Bacon RN  Outcome: Ongoing     Problem: Breathing Pattern - Ineffective  Goal: Ability to achieve and maintain a regular respiratory rate  9/1/2020 0145 by Musa Gross RN  Outcome: Ongoing  8/31/2020 1648 by Elis Bacon RN  Outcome: Ongoing     Problem:  Body Temperature -  Risk of, Imbalanced  Goal: Ability to maintain a body temperature within defined limits  9/1/2020 0145 by Musa Gross RN  Outcome: Ongoing  8/31/2020 1648 by Elis Bacon RN  Outcome: Ongoing  Goal: Will regain or maintain usual level of consciousness  9/1/2020 0145 by Musa Gross RN  Outcome: Ongoing  8/31/2020 1648 by Elis Bacon RN  Outcome: Ongoing  Goal: Complications related to the disease process, condition or treatment will be avoided or minimized  9/1/2020 0145 by Musa Gross RN  Outcome: Ongoing  8/31/2020 1648 by Elis Bacon RN  Outcome: Ongoing     Problem: Isolation Precautions - Risk of Spread of Infection  Goal: Prevent transmission of infection  9/1/2020 0145 by Musa Gross RN  Outcome: Ongoing  8/31/2020 1648 by Elis Bacon RN  Outcome: Ongoing     Problem: Nutrition Deficits  Goal: Optimize nutrtional status  9/1/2020 0145 by Musa Gross RN  Outcome: Ongoing  8/31/2020 1648 by Elis Bacon RN  Outcome: Ongoing     Problem: Risk for Fluid Volume Deficit  Goal: Maintain normal heart rhythm  9/1/2020 0145 by Musa Gross RN  Outcome: Ongoing  8/31/2020 1648 by Elis Bacon RN  Outcome: Ongoing  Goal: Maintain absence of muscle cramping  9/1/2020 0145 by Martinez Lopez RN  Outcome: Ongoing  8/31/2020 1648 by Marcia Valenzuela RN  Outcome: Ongoing  Goal: Maintain normal serum potassium, sodium, calcium, phosphorus, and pH  9/1/2020 0145 by Martinez Lopez RN  Outcome: Ongoing  8/31/2020 1648 by Marcia Valenzuela RN  Outcome: Ongoing     Problem: Loneliness or Risk for Loneliness  Goal: Demonstrate positive use of time alone when socialization is not possible  9/1/2020 0145 by Martinez Lopez RN  Outcome: Ongoing  8/31/2020 1648 by Marcia Valenzuela RN  Outcome: Ongoing     Problem: Fatigue  Goal: Verbalize increase energy and improved vitality  9/1/2020 0145 by Martinez Lopez RN  Outcome: Ongoing  8/31/2020 1648 by Marcia Valenzuela RN  Outcome: Ongoing     Problem: Patient Education: Go to Patient Education Activity  Goal: Patient/Family Education  9/1/2020 0145 by Martinez Lopez RN  Outcome: Ongoing  8/31/2020 1648 by Marcia Valenzuela RN  Outcome: Ongoing     Problem: Falls - Risk of:  Goal: Will remain free from falls  Description: Will remain free from falls  9/1/2020 0145 by Martinez Lopez RN  Outcome: Ongoing  8/31/2020 1648 by Marcia Valenzuela RN  Outcome: Ongoing  Goal: Absence of physical injury  Description: Absence of physical injury  9/1/2020 0145 by Martinez Lopez RN  Outcome: Ongoing  8/31/2020 1648 by Marcia Valenzuela RN  Outcome: Ongoing

## 2020-09-01 NOTE — PROGRESS NOTES
rashes, good skin turgor. Extremities:  No edema. Labs      Recent Labs     08/30/20  0556   WBC 6.3   RBC 4.02*   HGB 13.5   HCT 41.6   .5*   MCH 33.6*   MCHC 32.5   RDW 15.1*      MPV 10.1      BMP:   Recent Labs     08/30/20  0556 08/31/20  0837 09/01/20  0603   * 132* 132*   K 4.3 4.5 5.1   CL 88* 87* 87*   CO2 26 25 20   BUN 32* 50* 64*   CREATININE 9.74* 12.63* 15.00*   GLUCOSE 84 92 67*   CALCIUM 8.5* 8.4* 8.7      BNP:      Lab Results   Component Value Date     02/03/2014     PTH:     Lab Results   Component Value Date    IPTH 321.5 09/25/2018     Urinalysis/Chemistries      Lab Results   Component Value Date    NITRU NEGATIVE 12/20/2013    COLORU CATHY 12/20/2013    PHUR 5.0 12/20/2013    WBCUA 50  12/20/2013    RBCUA 10 TO 20 12/20/2013    MUCUS 1+ 12/20/2013    TRICHOMONAS NOT REPORTED 12/20/2013    YEAST NOT REPORTED 12/20/2013    BACTERIA FEW 12/20/2013    SPECGRAV 1.016 12/20/2013    LEUKOCYTESUR MOD 12/20/2013    UROBILINOGEN Normal 12/20/2013    BILIRUBINUR NEGATIVE 12/20/2013    GLUCOSEU NEGATIVE 12/20/2013    KETUA NEGATIVE 12/20/2013    AMORPHOUS NOT REPORTED 12/20/2013       Radiology    Reviewed as available. Assessment    1. ESRD on Hemodialysis. His regular HD days are Tuesday Thursday Saturday at [facility] hemod[facility] facility using right AV fistula under [place]ola. His dry weight is 115 kg.   2. Anemia of chronic disease stable  3. Secondary hyperparathyroidism  4. Hypertension  5. New onset syncope preceded by vague systemic symptoms work-up in progress cardiology evaluation noted, they do not feel any further work-up is necessary  6. COVID-19 pneumonia without any overt respiratory failure  7.  1 out of 2 blood culture positive for coag negative staph likely contaminant. Plan   1. Patient was seen and examined on HD at bedside. Orders were confirmed with the HD nurse. 2.  COVID-19 treatment per ID.  3.  BMP in a.m.  4.  Will follow.   Patient's nurse was updated. Nutrition   Renal Diet/TF    Thank you. Please call with any questions. Milo Diaz MD   Nephrology [address] [place] [address] Drive    This note is created with the assistance of a speech-recognition program. While intending to generate a document that actually reflects the content of the visit, no guarantees can be provided that every mistake has been identified and corrected by editing.

## 2020-09-01 NOTE — PROGRESS NOTES
one week period of time. He also described nausea, vomiting and decreased appetite over one week. When evaluated in ER he was found to be COVID 19 positive. Temp was 100.7 F. He was hypoxic and required Nasal 02 at 3 L/min. Lactic acid was elevated at 4.3. Troponin also was elevated. Patient admitted because of concerns with COVID 19.    CURRENT EVALUATION : 9/1/2020    Patient evaluated and examined in the ICU. Afebrile to low-grade fevers  VS stable    Not a candidate for Remdesvir due to dialysis  HD schedule is TThF    Has improved after fluid removal with HD on 8-29-20  Received convalescent plasma 8-29-20    Patient exhibiting respiratory distress. Yes  Respiratory secretions: No    Patient receiving supplemental oxygen.  No  02 sat 97-->91-->94 on room air  RR 14  -->13>18-->20    QTc: 477      NEWS Score: 0-4 Low risk group; 5-6: Medium risk group; 7 or above: High risk group  Parameters 3 2 1 0 1 2 3   Age    < 65   = 65   RR = 8  9-11 12-20  21-24 = 25   O2 Sats = 91 92-93 94-95 = 96      Suppl O2  Yes  No      SBP = 90  101-110 111-219   = 220   HR = 40  41-50 51-90  111-130 = 131   Consciousness    Alert   Drowsiness, lethargy, or confusion   Temperature = 35.0 C (95.0 F)  35.1-36.0 C 95.1-96.9 F 36.1-38.0 C 97.0-100.4 F 38.1-39.0 C 100.5-102.3 F = 39.1 C = 102.4 F      NEWS Score:  8-28-20: 2 Low risk    Overall Daily Picture:   Unchanged    Presence of secondary bacterial Infection:  Yes  Additional antibiotics: No    Labs, X rays reviewed: 9/1/2020    BUN:32  Cr:10.1-->9.7    WBC:3.0-->6.3  Hb:13.5  Plat: 259-->301    Absolute Neutrophils:3.5  Absolute Lymphocytes:2.26  Neutrophil/Lymphocyte Ratio: 1.54 Low risk    CRP:  Ferritin:  LDH:     Pro Calcitonin:  Troponin 87-->98-->86    Cultures:  Urine:    Blood:  Eight 2920 x 2 no growth  8-27-20: Coagulase negative Staphylococci  X 1  Sputum :    Wound:      COVID-19 8/27/2020 positive    CXR:   8-27-20 Patchy infiltrates bilaterally  CAT:  8-27-20: Multifocal ground glass opacities compatible with COVID      Discussed with patient, RN, CC, IM. I have personally reviewed the past medical history, past surgical history, medications, social history, and family history, and I have updated the database accordingly. Past Medical History:     Past Medical History:   Diagnosis Date    Anemia     Arthritis     right shoulder/ PCP Dr. Odessa Perez    Atrial fibrillation ([facility]     [address] BPH (benign prostatic hypertrophy)     CAD (coronary artery disease)     Dr. Vishnu Bond    Caffeine use     1 coffee, 2 tea/day    Cellulitis of lower leg     right    Chronic back pain     Colon cancer ([facility]     colon    Cor pulmonale, acute ([facility] 12/30/2014    CRF (chronic renal failure)     dr. Joselin Li on [facility]. Tues/ thurs/ sat/ right arm fistula    Erectile dysfunction     Gout     Hemodialysis patient ([facility]     tues/ thurs/ sat/ DR. Petersen/ fistula right lower arm    History of blood transfusion     no reaction    Hyperkalemia 12/24/2013    Hyperlipidemia     Hypertension     Hypotension     Hypothyroidism     Lymphedema of leg     right    Obesity     Thyroid disease        Past Surgical  History:     Past Surgical History:   Procedure Laterality Date    ABDOMINAL EXPLORATION SURGERY  01/22/2019    ABDOMINAL EXPLORATION, LEFT HEMICOLECTOMY, MOBILIZATION OF SPLENIC FLEXURE     ABSCESS DRAINAGE Right 01/20/2014    I&D Right Shoulder, Right shoulder arthroscopy, I&D AC Joint    APPENDECTOMY      COLONOSCOPY      COLONOSCOPY  01/22/2019    COLONOSCOPY N/A 1/22/2019    COLONOSCOPY- GI UNIT SCHEDULED performed by Pete Aguilar MD at [address] [place] [address] Right 3/27/15    rt wrist    ENDOSCOPY, COLON, DIAGNOSTIC      UGI    GASTRIC BAND REMOVAL  01/17/2017    subcutaneous port    HC CATH POWER PICC SINGLE  1/24/2014         LAP BAND  2007    NE COLSC FLX W/RMVL OF TUMOR Oral 79 19 95 % --     General Appearance: Awake, alert, and in no apparent distress  Head:  Normocephalic, no trauma  Eyes: Pupils equal, round, reactive to light and accommodation; extraocular movements intact; sclera anicteric; conjunctivae pink. No embolic phenomena. ENT: Oropharynx clear, without erythema, exudate, or thrush. No tenderness of sinuses. Mouth/throat: mucosa pink and moist. No lesions. Dentition in good repair. Neck:Supple, without lymphadenopathy. Thyroid normal, No bruits. Pulmonary/Chest: Clear to auscultation, without wheezes, rales, or rhonchi. No dullness to percussion. Cardiovascular: Regular rate and rhythm without murmurs, rubs, or gallops. Abdomen: Soft, non tender. Bowel sounds normal. No organomegaly  All four Extremities: No cyanosis, clubbing, edema, or effusions. Neurologic: No gross sensory or motor deficits. Skin: Warm and dry with good turgor. No signs of peripheral arterial or venous insufficiency. No ulcerations. No open wounds. Medical Decision Making -Laboratory:   I have independently reviewed/ordered the following labs:    CBC with Differential:   Recent Labs     08/30/20  0556   WBC 6.3   HGB 13.5   HCT 41.6        BMP:   Recent Labs     08/31/20  0837 09/01/20  0603   * 132*   K 4.5 5.1   CL 87* 87*   CO2 25 20   BUN 50* 64*   CREATININE 12.63* 15.00*     Hepatic Function Panel:   No results for input(s): PROT, LABALBU, BILIDIR, IBILI, BILITOT, ALKPHOS, ALT, AST in the last 72 hours. No results for input(s): RPR in the last 72 hours. No results for input(s): HIV in the last 72 hours. No results for input(s): BC in the last 72 hours.   Lab Results   Component Value Date    MUCUS 1+ 12/20/2013    RBC 4.02 08/30/2020    TRICHOMONAS NOT REPORTED 12/20/2013    WBC 6.3 08/30/2020    YEAST NOT REPORTED 12/20/2013    TURBIDITY CLOUDY 12/20/2013     Lab Results   Component Value Date    CREATININE 15.00 09/01/2020    GLUCOSE 67 09/01/2020    GLUCOSE 91 10/04/2011       Medical Decision Making-Imaging:     EXAMINATION:    ONE XRAY VIEW OF THE CHEST         8/27/2020 1:59 pm         COMPARISON:    February 1, 2019, chest examination         HISTORY:    ORDERING SYSTEM PROVIDED HISTORY: syncope    TECHNOLOGIST PROVIDED HISTORY:    syncope    Reason for Exam: Syncope/  AP erect/  gp used/  port. Acuity: Unknown    Type of Exam: Unknown         FINDINGS:    Borderline cardiomegaly         Moderate patchy right basilar infiltrate with mild left lingular and possible    left basilar infiltrate.  Limited penetration of the left lower lung.  Clear    upper lungs         No significant pleural process         Degenerative changes of the right acromioclavicular joint and thoracic spine              Impression    Limited left retrocardiac assessment.  Mild streaky lingular atelectasis         Moderate patchy right basilar infiltrate, pneumonia the likely etiology    follow changes to resolution         RECOMMENDATION:    Upright well penetrated PA view of the chest for more accurate assessment of    the left lung base                EXAMINATION:    CTA OF THE CHEST 8/28/2020 3:55 pm         TECHNIQUE:    CTA of the chest was performed after the administration of intravenous    contrast.  Multiplanar reformatted images are provided for review.  MIP    images are provided for review.  Dose modulation, iterative reconstruction,    and/or weight based adjustment of the mA/kV was utilized to reduce the    radiation dose to as low as reasonably achievable.         COMPARISON:    None         HISTORY:    ORDERING SYSTEM PROVIDED HISTORY: Hypoxic, COVID, rule out PE    TECHNOLOGIST PROVIDED HISTORY:    Hypoxic, COVID, rule out PE         FINDINGS:    Pulmonary Arteries: Pulmonary arteries are adequately opacified for    evaluation.  No evidence of intraluminal filling defect to suggest pulmonary    embolism.  Main pulmonary artery is normal in caliber.         Mediastinum: No

## 2020-09-01 NOTE — PROGRESS NOTES
Writer spoke with pt's sister Alejandro Hernandez to provide an update of current plan of care. All questions answered.

## 2020-09-02 LAB
ABSOLUTE EOS #: 0.04 K/UL (ref 0–0.44)
ABSOLUTE IMMATURE GRANULOCYTE: 0.04 K/UL (ref 0–0.3)
ABSOLUTE LYMPH #: 1.74 K/UL (ref 1.1–3.7)
ABSOLUTE MONO #: 0.76 K/UL (ref 0.1–1.2)
ANION GAP SERPL CALCULATED.3IONS-SCNC: 21 MMOL/L (ref 9–17)
BASOPHILS # BLD: 1 % (ref 0–2)
BASOPHILS ABSOLUTE: 0.03 K/UL (ref 0–0.2)
BUN BLDV-MCNC: 43 MG/DL (ref 6–20)
BUN/CREAT BLD: ABNORMAL (ref 9–20)
CALCIUM SERPL-MCNC: 8.7 MG/DL (ref 8.6–10.4)
CHLORIDE BLD-SCNC: 90 MMOL/L (ref 98–107)
CO2: 21 MMOL/L (ref 20–31)
CREAT SERPL-MCNC: 11.66 MG/DL (ref 0.7–1.2)
CULTURE: NORMAL
DIFFERENTIAL TYPE: ABNORMAL
EOSINOPHILS RELATIVE PERCENT: 1 % (ref 1–4)
GFR AFRICAN AMERICAN: 6 ML/MIN
GFR NON-AFRICAN AMERICAN: 5 ML/MIN
GFR SERPL CREATININE-BSD FRML MDRD: ABNORMAL ML/MIN/{1.73_M2}
GFR SERPL CREATININE-BSD FRML MDRD: ABNORMAL ML/MIN/{1.73_M2}
GLUCOSE BLD-MCNC: 95 MG/DL (ref 70–99)
HCT VFR BLD CALC: 43.8 % (ref 40.7–50.3)
HEMOGLOBIN: 14.1 G/DL (ref 13–17)
IMMATURE GRANULOCYTES: 1 %
INR BLD: 3.1
LYMPHOCYTES # BLD: 39 % (ref 24–43)
Lab: NORMAL
MCH RBC QN AUTO: 34.6 PG (ref 25.2–33.5)
MCHC RBC AUTO-ENTMCNC: 32.2 G/DL (ref 28.4–34.8)
MCV RBC AUTO: 107.6 FL (ref 82.6–102.9)
MONOCYTES # BLD: 17 % (ref 3–12)
NRBC AUTOMATED: 0.7 PER 100 WBC
PDW BLD-RTO: 15.6 % (ref 11.8–14.4)
PLATELET # BLD: 307 K/UL (ref 138–453)
PLATELET ESTIMATE: ABNORMAL
PMV BLD AUTO: 9.9 FL (ref 8.1–13.5)
POTASSIUM SERPL-SCNC: 4 MMOL/L (ref 3.7–5.3)
PROTHROMBIN TIME: 30.9 SEC (ref 9–12)
RBC # BLD: 4.07 M/UL (ref 4.21–5.77)
RBC # BLD: ABNORMAL 10*6/UL
SEG NEUTROPHILS: 41 % (ref 36–65)
SEGMENTED NEUTROPHILS ABSOLUTE COUNT: 1.91 K/UL (ref 1.5–8.1)
SODIUM BLD-SCNC: 132 MMOL/L (ref 135–144)
SPECIMEN DESCRIPTION: NORMAL
TROPONIN INTERP: ABNORMAL
TROPONIN T: ABNORMAL NG/ML
TROPONIN, HIGH SENSITIVITY: 102 NG/L (ref 0–22)
WBC # BLD: 4.5 K/UL (ref 3.5–11.3)
WBC # BLD: ABNORMAL 10*3/UL

## 2020-09-02 PROCEDURE — 6360000002 HC RX W HCPCS: Performed by: NURSE PRACTITIONER

## 2020-09-02 PROCEDURE — 99233 SBSQ HOSP IP/OBS HIGH 50: CPT | Performed by: INTERNAL MEDICINE

## 2020-09-02 PROCEDURE — 1200000000 HC SEMI PRIVATE

## 2020-09-02 PROCEDURE — 2580000003 HC RX 258: Performed by: NURSE PRACTITIONER

## 2020-09-02 PROCEDURE — 85610 PROTHROMBIN TIME: CPT

## 2020-09-02 PROCEDURE — 36415 COLL VENOUS BLD VENIPUNCTURE: CPT

## 2020-09-02 PROCEDURE — 84484 ASSAY OF TROPONIN QUANT: CPT

## 2020-09-02 PROCEDURE — 99231 SBSQ HOSP IP/OBS SF/LOW 25: CPT | Performed by: INTERNAL MEDICINE

## 2020-09-02 PROCEDURE — 85025 COMPLETE CBC W/AUTO DIFF WBC: CPT

## 2020-09-02 PROCEDURE — 99232 SBSQ HOSP IP/OBS MODERATE 35: CPT | Performed by: INTERNAL MEDICINE

## 2020-09-02 PROCEDURE — 80048 BASIC METABOLIC PNL TOTAL CA: CPT

## 2020-09-02 PROCEDURE — 6370000000 HC RX 637 (ALT 250 FOR IP): Performed by: GENERAL PRACTICE

## 2020-09-02 RX ORDER — WARFARIN SODIUM 2.5 MG/1
2.5 TABLET ORAL DAILY
Status: DISCONTINUED | OUTPATIENT
Start: 2020-09-03 | End: 2020-09-03 | Stop reason: HOSPADM

## 2020-09-02 RX ADMIN — ACETAMINOPHEN 650 MG: 325 TABLET ORAL at 16:38

## 2020-09-02 RX ADMIN — HEPARIN SODIUM 7500 UNITS: 5000 INJECTION INTRAVENOUS; SUBCUTANEOUS at 21:00

## 2020-09-02 RX ADMIN — AMIODARONE HYDROCHLORIDE 200 MG: 200 TABLET ORAL at 09:19

## 2020-09-02 RX ADMIN — MIDODRINE HYDROCHLORIDE 10 MG: 5 TABLET ORAL at 16:30

## 2020-09-02 RX ADMIN — SODIUM CHLORIDE, PRESERVATIVE FREE 10 ML: 5 INJECTION INTRAVENOUS at 21:00

## 2020-09-02 RX ADMIN — HEPARIN SODIUM 7500 UNITS: 5000 INJECTION INTRAVENOUS; SUBCUTANEOUS at 06:04

## 2020-09-02 RX ADMIN — SODIUM CHLORIDE, PRESERVATIVE FREE 10 ML: 5 INJECTION INTRAVENOUS at 09:04

## 2020-09-02 RX ADMIN — HEPARIN SODIUM 7500 UNITS: 5000 INJECTION INTRAVENOUS; SUBCUTANEOUS at 14:21

## 2020-09-02 RX ADMIN — MIDODRINE HYDROCHLORIDE 10 MG: 5 TABLET ORAL at 12:21

## 2020-09-02 RX ADMIN — MIDODRINE HYDROCHLORIDE 10 MG: 5 TABLET ORAL at 09:19

## 2020-09-02 ASSESSMENT — PAIN SCALES - GENERAL
PAINLEVEL_OUTOF10: 0

## 2020-09-02 NOTE — PLAN OF CARE
Problem: Falls - Risk of:  Goal: Will remain free from falls  Description: Will remain free from falls  9/2/2020 1750 by Sagar De La Vega RN  Outcome: Met This Shift  9/2/2020 0931 by Sagar De La Vega RN  Outcome: Ongoing  9/2/2020 0509 by Shannan Bashir RN  Outcome: Ongoing  Goal: Absence of physical injury  Description: Absence of physical injury  9/2/2020 1750 by Sagar DeL a Vega RN  Outcome: Met This Shift  9/2/2020 0931 by Sagar De La Vega RN  Outcome: Ongoing  9/2/2020 0509 by Shannan Bashir RN  Outcome: Ongoing       Pt remained free of falls this shift. Bed remains in lowest position, with 2/4 side rails up and all wheels locked. Non skid socks on. Bedside table and call light within reach. Pt calls out appropriately. Will continue to monitor.     Electronically signed by Sagar De La Vega RN on 9/2/2020 at 5:50 PM

## 2020-09-02 NOTE — PROGRESS NOTES
[facility] [address] [place] [address] [address] Patient  Today's Date and Time: 9/2/2020, 6:35 PM    Impression :     COVID 19 Confirmed Infection 8-27-20. Syncope  Respiratory distress  Single blood culture with Coagulase negative Staphylococci 8-27-20. Repeat cultures 8-29-20 x 2 : No growth  Contamination- No  Rx needed. S/P convalescent plasma infusion 8-29-20. No AEs    Recommendations:   Monitor off antibiotics  Repeat blood cultures x 2 8-29-20: No growth   Clinical Research will approach patient to explore if he qualifies for any of the COVID 19 treatment protocols. Remdesivir excluded because of renal failure  Received Plasma 8-29-20      Medical Decision Making/Summary/Discussion:9/2/2020     Patient admitted with suspected COVID 19 infection  Covid test confirmed positive. Single blood culture with Coagulase negative Staphylococci . 8-27-20. Repeat cultures 8-29-20 x 2 : No growth  Contamination- No  Rx needed. Received convalescent plasma 8-29-20    Infection Control Recommendations   [place] Precautions  Airborne isolation  Droplet Isolation    Antimicrobial Stewardship Recommendations     Discontinuation of therapy  Coordination of Outpatient Care:   Estimated Length of IV antimicrobials:TBD  Patient will need Midline Catheter Insertion: TBD  Patient will need PICC line Insertion: No  Patient will need: Home IV , [place],  SNF,  LTAC:TBD  Patient will need outpatient wound care:No    Chief complaint/reason for consultation:   Concern for COVID infection    History of Present Illness:   Shazia Cortez is a 44y.o.-year-old  male who was initially admitted on 8/27/2020. Patient seen at the request of . INITIAL HISTORY:    Patient presented through ER with complaints of syncope. He suffers from ESRD and had gone to HD. Returned home where his neighbor found him in his car after not getting out of the car.     Patient complained of SOB over a bilaterally  CAT:  8-27-20: Multifocal ground glass opacities compatible with COVID      Discussed with patient, RN, CC, IM. I have personally reviewed the past medical history, past surgical history, medications, social history, and family history, and I have updated the database accordingly. Past Medical History:     Past Medical History:   Diagnosis Date    Anemia     Arthritis     right shoulder/ PCP Dr. Willy Villela    Atrial fibrillation ([facility] [facility]     [address] BPH (benign prostatic hypertrophy)     CAD (coronary artery disease)     Dr. Maira Leong    Caffeine use     1 coffee, 2 tea/day    Cellulitis of lower leg     right    Chronic back pain     Colon cancer ([facility]     colon    Cor pulmonale, acute ([facility] 12/30/2014    CRF (chronic renal failure)     dr. Bertha Almazan on [email or web address]. Tues/ thurs/ sat/ right arm fistula    Erectile dysfunction     Gout     Hemodialysis patient ([facility]     tues/ thurs/ sat/ DR. Petersen/ fistula right lower arm    History of blood transfusion     no reaction    Hyperkalemia 12/24/2013    Hyperlipidemia     Hypertension     Hypotension     Hypothyroidism     Lymphedema of leg     right    Obesity     Thyroid disease        Past Surgical  History:     Past Surgical History:   Procedure Laterality Date    ABDOMINAL EXPLORATION SURGERY  01/22/2019    ABDOMINAL EXPLORATION, LEFT HEMICOLECTOMY, MOBILIZATION OF SPLENIC FLEXURE     ABSCESS DRAINAGE Right 01/20/2014    I&D Right Shoulder, Right shoulder arthroscopy, I&D AC Joint    APPENDECTOMY      COLONOSCOPY      COLONOSCOPY  01/22/2019    COLONOSCOPY N/A 1/22/2019    COLONOSCOPY- GI UNIT SCHEDULED performed by Raphael Boyd MD at [address] [place] [address] Right 3/27/15    rt wrist    ENDOSCOPY, COLON, DIAGNOSTIC      UGI    GASTRIC BAND REMOVAL  01/17/2017    subcutaneous port    HC CATH POWER PICC SINGLE  1/24/2014         LAP BAND  2007    AR COLSC FLX W/RMVL OF TUMOR POLYP LESION SNARE TQ N/A 11/15/2018    COLONOSCOPY POLYPECTOMY SNARE/COLD BIOPSY performed by Simon Dawson MD at [facility] Right 2014    SLEEVE GASTRECTOMY N/A 9/25/2017    GASTRECTOMY SLEEVE LAPAROSCOPIC SI ROBOTIC, ENDOSEALER performed by Efrem Rowe MD at [facility] N/A 1/22/2019    ABDOMINAL EXPLORATION, LEFT HEMICOLECTOMY, MOBILIZATION OF SPLENIC FLEXURE performed by Claudine Cuellar MD at [facility] Right     leg tumor removal/ dr. Bharti Santana       Medications:     Balta Dominguez ON 9/3/2020] warfarin  2.5 mg Oral Daily    heparin (porcine)  7,500 Units Subcutaneous 3 times per day    amiodarone  200 mg Oral Daily    midodrine  10 mg Oral TID WC    sodium chloride flush  10 mL Intravenous 2 times per day    warfarin (COUMADIN) daily dosing (placeholder)   Other RX Placeholder       Social History:     Social History     Socioeconomic History    Marital status: Single     Spouse name: Not on file    Number of children: Not on file    Years of education: Not on file    Highest education level: Not on file   Occupational History    Not on file   Social Needs    Financial resource strain: Not on file    Food insecurity     Worry: Not on file     Inability: Not on file    Transportation needs     Medical: Not on file     Non-medical: Not on file   Tobacco Use    Smoking status: Never Smoker    Smokeless tobacco: Never Used   Substance and Sexual Activity    Alcohol use:  Yes     Alcohol/week: 3.0 standard drinks     Types: 3 Glasses of wine per week     Comment: weekend    Drug use: No    Sexual activity: Yes     Partners: Female   Lifestyle    Physical activity     Days per week: Not on file     Minutes per session: Not on file    Stress: Not on file   Relationships    Social connections     Talks on phone: Not on file     Gets together: Not on file     Attends Worship service: Not on file     Active member of club or organization: Not on file     Attends meetings of clubs or organizations: Not on file     Relationship status: Not on file    Intimate partner violence     Fear of current or ex partner: Not on file     Emotionally abused: Not on file     Physically abused: Not on file     Forced sexual activity: Not on file   Other Topics Concern    Not on file   Social History Narrative    Not on file       Family History:     Family History   Problem Relation Age of Onset    Cancer Father         leukemia    Heart Failure Mother     Arthritis Mother     High Blood Pressure Mother     Heart Disease Maternal Grandmother     Stroke Paternal Grandfather         Allergies:   Patient has no known allergies. Review of Systems:     Constitutional: No fevers or chills. No systemic complaints  Head: No headaches  Eyes: No double vision or blurry vision. No conjunctival inflammation. ENT: No sore throat or runny nose. . No hearing loss, tinnitus or vertigo. Cardiovascular: No chest pain or palpitations. shortness of breath. No ROBIN. syncope  Lung: shortness of breath, cough. No sputum production  Abdomen: Nausea, vomiting, no diarrhea, or abdominal pain. Blanchie Bevels No cramps. Genitourinary: ESRD  Musculoskeletal: No muscle aches or pains. No joint effusions, swelling or deformities  Hematologic: No bleeding or bruising. Neurologic: No headache, weakness, numbness, or tingling. Integument: No rash, no ulcers. Psychiatric: No depression. Endocrine: No polyuria, no polydipsia, no polyphagia. Physical Examination :     Patient Vitals for the past 8 hrs:   BP Temp Temp src Pulse Resp SpO2   09/02/20 1638 129/64 100.1 °F (37.8 °C) Oral 76 18 97 %   09/02/20 1215 (!) 126/49 99.5 °F (37.5 °C) Oral 84 17 98 %     General Appearance: Awake, alert, and in no apparent distress  Head:  Normocephalic, no trauma  Eyes: Pupils equal, round, reactive to light and accommodation; extraocular movements intact; sclera anicteric; conjunctivae pink. No embolic phenomena. ENT: Oropharynx clear, without erythema, exudate, or thrush. No tenderness of sinuses. Mouth/throat: mucosa pink and moist. No lesions. Dentition in good repair. Neck:Supple, without lymphadenopathy. Thyroid normal, No bruits. Pulmonary/Chest: Clear to auscultation, without wheezes, rales, or rhonchi. No dullness to percussion. Cardiovascular: Regular rate and rhythm without murmurs, rubs, or gallops. Abdomen: Soft, non tender. Bowel sounds normal. No organomegaly  All four Extremities: No cyanosis, clubbing, edema, or effusions. Neurologic: No gross sensory or motor deficits. Skin: Warm and dry with good turgor. No signs of peripheral arterial or venous insufficiency. No ulcerations. No open wounds. Medical Decision Making -Laboratory:   I have independently reviewed/ordered the following labs:    CBC with Differential:   Recent Labs     09/02/20  1244   WBC 4.5   HGB 14.1   HCT 43.8      LYMPHOPCT 39   MONOPCT 17*     BMP:   Recent Labs     09/01/20  0603 09/02/20  1244   * 132*   K 5.1 4.0   CL 87* 90*   CO2 20 21   BUN 64* 43*   CREATININE 15.00* 11.66*     Hepatic Function Panel:   No results for input(s): PROT, LABALBU, BILIDIR, IBILI, BILITOT, ALKPHOS, ALT, AST in the last 72 hours. No results for input(s): RPR in the last 72 hours. No results for input(s): HIV in the last 72 hours. No results for input(s): BC in the last 72 hours.   Lab Results   Component Value Date    MUCUS 1+ 12/20/2013    RBC 4.07 09/02/2020    TRICHOMONAS NOT REPORTED 12/20/2013    WBC 4.5 09/02/2020    YEAST NOT REPORTED 12/20/2013    TURBIDITY CLOUDY 12/20/2013     Lab Results   Component Value Date    CREATININE 11.66 09/02/2020    GLUCOSE 95 09/02/2020    GLUCOSE 91 10/04/2011       Medical Decision Making-Imaging:     EXAMINATION:    ONE XRAY VIEW OF THE CHEST         8/27/2020 1:59 pm         COMPARISON:    February 1, 2019, chest examination         HISTORY:    [address] PROVIDED HISTORY: syncope    TECHNOLOGIST PROVIDED HISTORY:    syncope    Reason for Exam: Syncope/  AP erect/  gp used/  port. Acuity: Unknown    Type of Exam: Unknown         FINDINGS:    Borderline cardiomegaly         Moderate patchy right basilar infiltrate with mild left lingular and possible    left basilar infiltrate.  Limited penetration of the left lower lung.  Clear    upper lungs         No significant pleural process         Degenerative changes of the right acromioclavicular joint and thoracic spine              Impression    Limited left retrocardiac assessment.  Mild streaky lingular atelectasis         Moderate patchy right basilar infiltrate, pneumonia the likely etiology    follow changes to resolution         RECOMMENDATION:    Upright well penetrated PA view of the chest for more accurate assessment of    the left lung base                EXAMINATION:    CTA OF THE CHEST 8/28/2020 3:55 pm         TECHNIQUE:    CTA of the chest was performed after the administration of intravenous    contrast.  Multiplanar reformatted images are provided for review.  MIP    images are provided for review.  Dose modulation, iterative reconstruction,    and/or weight based adjustment of the mA/kV was utilized to reduce the    radiation dose to as low as reasonably achievable.         COMPARISON:    None         HISTORY:    ORDERING SYSTEM PROVIDED HISTORY: Hypoxic, COVID, rule out PE    TECHNOLOGIST PROVIDED HISTORY:    Hypoxic, COVID, rule out PE         FINDINGS:    Pulmonary Arteries: Pulmonary arteries are adequately opacified for    evaluation.  No evidence of intraluminal filling defect to suggest pulmonary    embolism.  Main pulmonary artery is normal in caliber.         Mediastinum: No mediastinal adenopathy, acute aortic abnormality, or    pericardial effusion.  Mild cardiomegaly is noted.  Coronary artery    calcification is present.  Moderate calcification of the aortic arch is seen.      Lungs/pleura: There has been interval development of right upper lobe solid    nodules measuring 7.5 mm and 4.5 mm image 33 series 3.  Considerable    multifocal scattered areas of consolidation are present throughout the    remainder of the lung fields bilaterally without effusion, consistent with    atypical pneumonitis and diagnosis of COVID-19.  Tracheobronchial tree is    patent.  No effusion is present.         Upper Abdomen: Atherosclerotic calcification of the aorta and branches is    noted.  Included kidneys appear small.  There is a small cyst in the right    kidney.         Soft Tissues/Bones: Multilevel spondylosis in the thoracic spine is noted. No acute osseous or soft tissue abnormality.              Impression    1.  No evidence of pulmonary embolus.         2.  Atherosclerotic disease including coronary arteries.  Mild cardiomegaly.         3.  Extensive multifocal scattered ground-glass areas of consolidation    consistent with COVID-19.         4.  Other findings as above.             Medical Decision [id number]:       Medical Decision Making-Other:     Note:  Labs, medications, radiologic studies were reviewed with personal review of films  Large amounts of data were reviewed  Discussed with nursing Staff, Discharge planner  Infection Control and Prevention measures reviewed  All prior entries were reviewed  Administer medications as ordered  Prognosis: Guarded  Discharge planning reviewed  Follow up as outpatient. Thank you for allowing us to participate in the care of this patient. Please call with questions.     Obie Goodman MD         Pager: [phone] - Office: [phone]

## 2020-09-02 NOTE — PLAN OF CARE
absence of muscle cramping  9/2/2020 0931 by Devonte Cameron RN  Outcome: Ongoing  9/2/2020 0509 by Sharmila Membreno RN  Outcome: Ongoing  Goal: Maintain normal serum potassium, sodium, calcium, phosphorus, and pH  9/2/2020 0931 by Devonte Cameron RN  Outcome: Ongoing  9/2/2020 0509 by Sharmila Membreno RN  Outcome: Ongoing     Problem: Loneliness or Risk for Loneliness  Goal: Demonstrate positive use of time alone when socialization is not possible  9/2/2020 0931 by Devonte Cameron RN  Outcome: Ongoing  9/2/2020 0509 by Sharmila Membreno RN  Outcome: Ongoing     Problem: Fatigue  Goal: Verbalize increase energy and improved vitality  9/2/2020 0931 by Devonte Cameron RN  Outcome: Ongoing  9/2/2020 0509 by Sharmila Membreno RN  Outcome: Ongoing     Problem: Patient Education: Go to Patient Education Activity  Goal: Patient/Family Education  9/2/2020 6423 by Devonte Cameron RN  Outcome: Ongoing  9/2/2020 0509 by Sharmila Membreno RN  Outcome: Ongoing     Problem: Falls - Risk of:  Goal: Will remain free from falls  Description: Will remain free from falls  9/2/2020 0931 by Devonte Cameron RN  Outcome: Ongoing  9/2/2020 0509 by Sharmila Membreno RN  Outcome: Ongoing  Goal: Absence of physical injury  Description: Absence of physical injury  9/2/2020 0931 by Devonte Cameron RN  Outcome: Ongoing  9/2/2020 0509 by Sharmila Membreno RN  Outcome: Ongoing

## 2020-09-02 NOTE — CARE COORDINATION
Attempt made to call patient to review transitional planning, no answer.   Previous plan was home independently

## 2020-09-02 NOTE — PROGRESS NOTES
Renal Progress Note    Patient:  Hamlet Paris; 44 y.o. MRN# [id number]  Location:  6945/3779-11  Attending:  Jason Russell MD  Admit Date:  2020   Hospital Day: 6    Subjective   Patient was seen and examined. No new issues reported overnight. Getting regular dialysis as per TTS schedule. Had regular hemodialysis done yesterday ran for 4 hours and got 1.1 L removed. Did have fever of T-max of 102.8 F overnight. Objective   VS: BP (!) 126/49   Pulse 84   Temp 99.5 °F (37.5 °C) (Oral)   Resp 17   Ht 5' 10\" (1.778 m)   Wt 250 lb 14.1 oz (113.8 kg)   SpO2 98%   BMI 36.00 kg/m²   MAXIMUM TEMPERATURE OVER 24 HRS:  Temp (24hrs), Av.3 °F (37.9 °C), Min:99.3 °F (37.4 °C), Max:102.8 °F (39.3 °C)    24 HR BLOOD PRESSURE RANGE:  Systolic (51EGR), TEB:891 , Min:102 , JSO:239   ; Diastolic (00HZD), LNY:89, Min:47, Max:94    24 HR INTAKE/OUTPUT:      Intake/Output Summary (Last 24 hours) at 2020 1608  Last data filed at 2020 1200  Gross per 24 hour   Intake 550 ml   Output 1500 ml   Net -950 ml     WEIGHT:   Patient Vitals for the past 96 hrs (Last 3 readings):   Weight   20 1853 250 lb 14.1 oz (113.8 kg)   20 1425 253 lb 4.9 oz (114.9 kg)   20 0601 260 lb 5.8 oz (118.1 kg)       Current Medications    Scheduled Meds:    heparin (porcine)  7,500 Units Subcutaneous 3 times per day    amiodarone  200 mg Oral Daily    midodrine  10 mg Oral TID WC    warfarin  2.5 mg Oral Daily    sodium chloride flush  10 mL Intravenous 2 times per day    warfarin (COUMADIN) daily dosing (placeholder)   Other RX Placeholder     Continuous Infusions:     Physical Examination          Due to the current efforts to prevent transmission of COVID-19 and also the need to preserve PPE, a face-to-face encounter with the patient was not performed.  That being said, all relevant records and diagnostic tests were reviewed, including laboratory results and imaging.  I did evaluate the patient through his room window, currently he is was sleeping comfortably, does seem to have lower extremity edema. Shireen Nicely was discussed with involved healthcare workers as required. Labs      Recent Labs     09/02/20  1244   WBC 4.5   RBC 4.07*   HGB 14.1   HCT 43.8   .6*   MCH 34.6*   MCHC 32.2   RDW 15.6*      MPV 9.9      BMP:   Recent Labs     08/31/20  0837 09/01/20  0603 09/02/20  1244   * 132* 132*   K 4.5 5.1 4.0   CL 87* 87* 90*   CO2 25 20 21   BUN 50* 64* 43*   CREATININE 12.63* 15.00* 11.66*   GLUCOSE 92 67* 95   CALCIUM 8.4* 8.7 8.7      BNP:      Lab Results   Component Value Date     02/03/2014     PTH:     Lab Results   Component Value Date    IPTH 321.5 09/25/2018     Urinalysis/Chemistries      Lab Results   Component Value Date    NITRU NEGATIVE 12/20/2013    COLORU CATHY 12/20/2013    PHUR 5.0 12/20/2013    WBCUA 50  12/20/2013    RBCUA 10 TO 20 12/20/2013    MUCUS 1+ 12/20/2013    TRICHOMONAS NOT REPORTED 12/20/2013    YEAST NOT REPORTED 12/20/2013    BACTERIA FEW 12/20/2013    SPECGRAV 1.016 12/20/2013    LEUKOCYTESUR MOD 12/20/2013    UROBILINOGEN Normal 12/20/2013    BILIRUBINUR NEGATIVE 12/20/2013    GLUCOSEU NEGATIVE 12/20/2013    KETUA NEGATIVE 12/20/2013    AMORPHOUS NOT REPORTED 12/20/2013       Radiology    Reviewed as available. Assessment    1. ESRD on Hemodialysis. His regular HD days are Tuesday Thursday Saturday at [facility] facility using right AV fistula under Nicola. His dry weight is 115 kg.   2. Anemia of chronic disease stable  3. Secondary hyperparathyroidism  4. Hypertension  5. New onset syncope preceded by vague systemic symptoms work-up in progress cardiology evaluation noted, they do not feel any further work-up is necessary  6. COVID-19 pneumonia without any overt respiratory failure  7.  1 out of 2 blood culture positive for coag negative staph likely contaminant. Plan   1. No acute need for dialysis today.   Will get regular dialysis

## 2020-09-02 NOTE — PLAN OF CARE
RN  Outcome: Ongoing  Goal: Maintain absence of muscle cramping  9/2/2020 0509 by Kalman Dancer, RN  Outcome: Ongoing  9/1/2020 1742 by Adolfo Green RN  Outcome: Ongoing  Goal: Maintain normal serum potassium, sodium, calcium, phosphorus, and pH  9/2/2020 0509 by Kalman Dancer, RN  Outcome: Ongoing  9/1/2020 1742 by Adolfo Green RN  Outcome: Ongoing     Problem: Loneliness or Risk for Loneliness  Goal: Demonstrate positive use of time alone when socialization is not possible  9/2/2020 0509 by Kalman Dancer, RN  Outcome: Ongoing  9/1/2020 1742 by Adolfo Green RN  Outcome: Ongoing     Problem: Fatigue  Goal: Verbalize increase energy and improved vitality  9/2/2020 0509 by Kalman Dancer, RN  Outcome: Ongoing  9/1/2020 1742 by Adolfo Green RN  Outcome: Ongoing     Problem: Patient Education: Go to Patient Education Activity  Goal: Patient/Family Education  9/2/2020 0509 by Kalman Dancer, RN  Outcome: Ongoing  9/1/2020 1742 by Adolfo Green RN  Outcome: Ongoing     Problem: Falls - Risk of:  Goal: Will remain free from falls  Description: Will remain free from falls  9/2/2020 0509 by Kalman Dancer, RN  Outcome: Ongoing  9/1/2020 1742 by Adolfo Green RN  Outcome: Ongoing  Goal: Absence of physical injury  Description: Absence of physical injury  9/2/2020 0509 by Kalman Dancer, RN  Outcome: Ongoing  9/1/2020 1742 by Adolfo Green RN  Outcome: Ongoing

## 2020-09-02 NOTE — PROGRESS NOTES
[facility]  Office: [address], DO, Suki Jeff, DO, Basil Dineroee, DO, Kelsie Spire Blood, DO, Nick Noonan MD, Kaci Owusu MD, Aaron Proctor MD, Ponce Brand MD, Courtney Chaney MD, Pool Edmonds MD, Nathalie Bernabe MD, Jaden Bautista MD, Shanon Lobo MD, Tavon Em DO, Rose Paris MD, Farida Lowery MD, Romina Vallejo, DO, Bonilla Oconnor MD,  Sima Rota, DO, Lb Betts MD, Jaquan Morin MD, Kailash Chiang, [facility], [facility] ALEJANDRO Cotto, CNP, Tierra Morales, CNP, Rahel Sheffield, CNS, Lalit Patiño, CNP, Aria Ybarra, CNP, Dimitri Aguilar, CNP, Heddy Sacks, CNP, Roselyn Major, CNP, Izzy Romo PA-C, Zion Anand DNP, Steven Lyle, CNP, Caity Astorga, CNP, Vijay Tate, CNP, Reynaldo Leavitt, CNP, Madfroy Comfort, [address]   [facility]    Progress Note    9/2/2020    11:24 AM    Name:   Vijay Davidson  MRN:     [id number]     Acct:      [de-identified]   Room:   301/3017-01   Day:  6  Admit Date:  8/27/2020 12:37 PM    PCP:   Ofelia Thomas MD  Code Status:  Full Code    Subjective:     C/C:   Chief Complaint   Patient presents with    Respiratory Distress     Pt c/o SOB x 5 -7 days, with loss of appetite, nausea, and vomiting. Syncope on found to be positive for COVID  Interval History Status: improved. Pt seen and evaluated at bedside this morning. Patient reports feeling well this morning. He denies any complaints at this time. Brief History:      Mr. Hermelinda Rollins presents to ED today via EMS with complaints of syncopal episode. Patient stated he received hemodialysis this morning, drove home, and awoke to his neighbor at his car door. He he was told by his neighbor that he had been sitting there for \"a little while\", the neighbor was concerned and called EMS for transport to ED for evaluation.   He does complain of some respiratory distress over the last week with shortness of breath but denies cough or fever. He complains of some sensitivity to his scalp, as well as general aching and malaise. He also has had decreased appetite, nausea and vomiting over the past 7 days. He states he was tested for COVID-19 in June and he was found to be negative. He lives at home with his son. He is compliant with his medications. Independent in his ADLs. Takes his prescribed medications. He drives himself to hemodialysis on Tuesdays Thursdays and Saturdays. He is resting at this time without any complaints.     ED course of treatment found him to be COVID-19 positive with an elevated troponin at 87 and a lactic acid at 4.3. He initially was requiring supplemental oxygen at 3 L and had a fever of 100.7 Fahrenheit,    At this time is doing fine,  No chest pain no shortness of breath, mild cough,  Blood cultures are positive for coag negative staph methicillin-resistant,  Repeat blood cultures sent, negative, possibility of contamination as per infectious disease  CT of the chest for PE negative,  Infectious disease managing the treatment,  Patient had fever this morning,    Cardiology did not think the syncope had anything to do with cardiac etiology, could be hypotension due to dialysis and they signed off        Review of Systems:     Constitutional:  fever 101 this morning  Respiratory:  negative for cough, dyspnea on exertion, shortness of breath, wheezing  Cardiovascular:  negative for chest pain, chest pressure/discomfort, lower extremity edema, palpitations  Gastrointestinal:  negative for abdominal pain, constipation, diarrhea, nausea, vomiting  Neurological:  negative for dizziness, headache    Medications:      Allergies:  No Known Allergies    Current Meds:   Scheduled Meds:    heparin (porcine)  7,500 Units Subcutaneous 3 times per day    amiodarone  200 mg Oral Daily    midodrine  10 mg Oral TID WC    warfarin  2.5 mg Oral Daily    sodium chloride flush  10 mL Intravenous 2 times per day    warfarin (COUMADIN) daily dosing (placeholder)   Other RX Placeholder     Continuous Infusions:   PRN Meds: acetaminophen, sodium chloride flush, acetaminophen **OR** acetaminophen, polyethylene glycol, promethazine **OR** ondansetron    Data:     Past Medical History:   has a past medical history of Anemia, Arthritis, Atrial fibrillation ([facility] BPH (benign prostatic hypertrophy), CAD (coronary artery disease), Caffeine use, Cellulitis of lower leg, Chronic back pain, Colon cancer ([facility] Cor pulmonale, acute ([facility] CRF (chronic renal failure), Erectile dysfunction, Gout, Hemodialysis patient ([facility] History of blood transfusion, Hyperkalemia, Hyperlipidemia, Hypertension, Hypotension, Hypothyroidism, Lymphedema of leg, Obesity, and Thyroid disease. Social History:   reports that he has never smoked. He has never used smokeless tobacco. He reports current alcohol use of about 3.0 standard drinks of alcohol per week. He reports that he does not use drugs. Family History:   Family History   Problem Relation Age of Onset    Cancer Father         leukemia    Heart Failure Mother     Arthritis Mother     High Blood Pressure Mother     Heart Disease Maternal Grandmother     Stroke Paternal Grandfather        Vitals:  /68   Pulse 85   Temp 99.3 °F (37.4 °C) (Oral)   Resp 16   Ht 5' 10\" (1.778 m)   Wt 250 lb 14.1 oz (113.8 kg)   SpO2 94%   BMI 36.00 kg/m²   Temp (24hrs), Av.3 °F (37.9 °C), Min:99 °F (37.2 °C), Max:102.8 °F (39.3 °C)    No results for input(s): POCGLU in the last 72 hours. I/O (24Hr):     Intake/Output Summary (Last 24 hours) at 2020 1124  Last data filed at 2020 1853  Gross per 24 hour   Intake 100 ml   Output 1500 ml   Net -1400 ml       Labs:  Hematology:  Recent Labs     20  0522 20  0603   INR 1.6 2.0     Chemistry:  Recent Labs     20  0522 20  0837 20  0603 20  0523   NA  --  132* 132*  --    K  --  4.5 5.1  --    CL  --  87* Yes    S/P laparoscopic sleeve gastrectomy 8/28/2020 Yes    Status post partial colectomy 8/28/2020 Yes    COVID-19 8/27/2020 Yes    Hypoxemia 8/27/2020 Yes    Pulmonary nodules 8/29/2020 Yes    Pneumonia due to COVID-19 virus 8/30/2020 Yes          Plan:        Syncope  -post dialysis most likely due to hypovolemia  -cardiology on board  -troponin rise possible secondary to end-stage renal disease, no further testing per cardiology     COVID-19 pneumonia  -treatment per infectious disease  -1 unit plasma was given August 30th  -Patient developed fevers overnight. Will discuss with ID whether patient will benefit from any other therapy.     ESRD  Appreciate nephrology input for end-stage renal disease on dialysis,    Hyponatremia  - On dialysis    Hyperkalemia  - on Dialysis    AGMA  - On Dialysis    Full CODE STATUS,  Continue home medication    Disposition as per infectious disease      Jaquan Morin MD  9/2/2020  11:24 AM

## 2020-09-03 ENCOUNTER — TELEPHONE (OUTPATIENT)
Dept: PHARMACY | Age: 50
End: 2020-09-03

## 2020-09-03 VITALS
RESPIRATION RATE: 20 BRPM | BODY MASS INDEX: 36.14 KG/M2 | TEMPERATURE: 99.8 F | DIASTOLIC BLOOD PRESSURE: 65 MMHG | SYSTOLIC BLOOD PRESSURE: 165 MMHG | HEIGHT: 70 IN | HEART RATE: 80 BPM | OXYGEN SATURATION: 96 % | WEIGHT: 252.43 LBS

## 2020-09-03 PROBLEM — J12.82 PNEUMONIA DUE TO COVID-19 VIRUS: Status: RESOLVED | Noted: 2020-08-30 | Resolved: 2020-09-03

## 2020-09-03 PROBLEM — R09.02 HYPOXEMIA: Status: RESOLVED | Noted: 2020-08-27 | Resolved: 2020-09-03

## 2020-09-03 PROBLEM — R55 SYNCOPE: Status: RESOLVED | Noted: 2020-08-27 | Resolved: 2020-09-03

## 2020-09-03 PROBLEM — I95.89 CHRONIC HYPOTENSION: Status: RESOLVED | Noted: 2017-05-15 | Resolved: 2020-09-03

## 2020-09-03 PROBLEM — U07.1 PNEUMONIA DUE TO COVID-19 VIRUS: Status: RESOLVED | Noted: 2020-08-30 | Resolved: 2020-09-03

## 2020-09-03 PROBLEM — E44.0 MODERATE MALNUTRITION (HCC): Status: ACTIVE | Noted: 2020-09-03

## 2020-09-03 LAB
-: NORMAL
ABSOLUTE EOS #: 0.05 K/UL (ref 0–0.44)
ABSOLUTE IMMATURE GRANULOCYTE: 0.03 K/UL (ref 0–0.3)
ABSOLUTE LYMPH #: 1.32 K/UL (ref 1.1–3.7)
ABSOLUTE MONO #: 0.64 K/UL (ref 0.1–1.2)
BASOPHILS # BLD: 1 % (ref 0–2)
BASOPHILS ABSOLUTE: 0.03 K/UL (ref 0–0.2)
DIFFERENTIAL TYPE: ABNORMAL
EOSINOPHILS RELATIVE PERCENT: 1 % (ref 1–4)
HCT VFR BLD CALC: 39.8 % (ref 40.7–50.3)
HEMOGLOBIN: 13.2 G/DL (ref 13–17)
IMMATURE GRANULOCYTES: 1 %
INR BLD: 5.3
LYMPHOCYTES # BLD: 26 % (ref 24–43)
MCH RBC QN AUTO: 34 PG (ref 25.2–33.5)
MCHC RBC AUTO-ENTMCNC: 33.2 G/DL (ref 28.4–34.8)
MCV RBC AUTO: 102.6 FL (ref 82.6–102.9)
MONOCYTES # BLD: 13 % (ref 3–12)
NRBC AUTOMATED: 0.4 PER 100 WBC
PDW BLD-RTO: 15.2 % (ref 11.8–14.4)
PLATELET # BLD: 318 K/UL (ref 138–453)
PLATELET ESTIMATE: ABNORMAL
PMV BLD AUTO: 9.8 FL (ref 8.1–13.5)
PROTHROMBIN TIME: 51.9 SEC (ref 9–12)
RBC # BLD: 3.88 M/UL (ref 4.21–5.77)
RBC # BLD: ABNORMAL 10*6/UL
REASON FOR REJECTION: NORMAL
SEG NEUTROPHILS: 58 % (ref 36–65)
SEGMENTED NEUTROPHILS ABSOLUTE COUNT: 3.07 K/UL (ref 1.5–8.1)
TROPONIN INTERP: ABNORMAL
TROPONIN T: ABNORMAL NG/ML
TROPONIN, HIGH SENSITIVITY: 94 NG/L (ref 0–22)
WBC # BLD: 5.1 K/UL (ref 3.5–11.3)
WBC # BLD: ABNORMAL 10*3/UL
ZZ NTE CLEAN UP: ORDERED TEST: NORMAL
ZZ NTE WITH NAME CLEAN UP: SPECIMEN SOURCE: NORMAL

## 2020-09-03 PROCEDURE — 2580000003 HC RX 258: Performed by: NURSE PRACTITIONER

## 2020-09-03 PROCEDURE — 99239 HOSP IP/OBS DSCHRG MGMT >30: CPT | Performed by: INTERNAL MEDICINE

## 2020-09-03 PROCEDURE — 99232 SBSQ HOSP IP/OBS MODERATE 35: CPT | Performed by: INTERNAL MEDICINE

## 2020-09-03 PROCEDURE — 99233 SBSQ HOSP IP/OBS HIGH 50: CPT | Performed by: INTERNAL MEDICINE

## 2020-09-03 PROCEDURE — 84484 ASSAY OF TROPONIN QUANT: CPT

## 2020-09-03 PROCEDURE — 6360000002 HC RX W HCPCS: Performed by: NURSE PRACTITIONER

## 2020-09-03 PROCEDURE — 90935 HEMODIALYSIS ONE EVALUATION: CPT

## 2020-09-03 PROCEDURE — 85610 PROTHROMBIN TIME: CPT

## 2020-09-03 PROCEDURE — 85025 COMPLETE CBC W/AUTO DIFF WBC: CPT

## 2020-09-03 PROCEDURE — 6370000000 HC RX 637 (ALT 250 FOR IP): Performed by: GENERAL PRACTICE

## 2020-09-03 RX ORDER — 0.9 % SODIUM CHLORIDE 0.9 %
250 INTRAVENOUS SOLUTION INTRAVENOUS PRN
Status: DISCONTINUED | OUTPATIENT
Start: 2020-09-03 | End: 2020-09-03 | Stop reason: HOSPADM

## 2020-09-03 RX ORDER — 0.9 % SODIUM CHLORIDE 0.9 %
150 INTRAVENOUS SOLUTION INTRAVENOUS PRN
Status: DISCONTINUED | OUTPATIENT
Start: 2020-09-03 | End: 2020-09-03 | Stop reason: HOSPADM

## 2020-09-03 RX ADMIN — SODIUM CHLORIDE, PRESERVATIVE FREE 10 ML: 5 INJECTION INTRAVENOUS at 08:00

## 2020-09-03 RX ADMIN — HEPARIN SODIUM 7500 UNITS: 5000 INJECTION INTRAVENOUS; SUBCUTANEOUS at 05:58

## 2020-09-03 RX ADMIN — AMIODARONE HYDROCHLORIDE 200 MG: 200 TABLET ORAL at 12:27

## 2020-09-03 ASSESSMENT — PAIN SCALES - GENERAL
PAINLEVEL_OUTOF10: 0
PAINLEVEL_OUTOF10: 0

## 2020-09-03 NOTE — PROGRESS NOTES
Renal Progress Note    Patient :  Kel Perez; 44 y.o. MRN# [id number]  Location:  3057/6641-06  Attending:  Shonna yS MD  Admit Date:  8/27/2020   Hospital Day: 7      Subjective:     Hemodialysis today was uneventful. Half a liter of fluid was removed. Blood pressures remained stable. No shortness of breath orthopnea. Possible discharge today, will likely be going home. Awaiting recommendations from Coumadin clinic as INR was 5.3 today. He will need to get blood drawn again in the next day or so. Next dialysis will be on Saturday. AV fistula works well. Patient is close to his dry weight. No shortness of breath orthopnea no fever chills. Needs ProAmatine on a as needed basis. Outpatient Medications:     Medications Prior to Admission: warfarin (COUMADIN) 2 MG tablet, TAKE 1 AND 1/2 TABLETS OR 2 TABLETS (OR AS DIRECTED BY OFFICE) BY MOUTH ONCE DAILY  acetaminophen (TYLENOL) 500 MG tablet, Take 2 tablets by mouth every 6 hours as needed for Pain or Fever  B Complex-C-Folic Acid (NEPHRO-JENNIFER) 0.8 MG TABS, TAKE ONE TABLET BY MOUTH DAILY WITH BREAKFAST  midodrine (PROAMATINE) 10 MG tablet, TAKE ONE TABLET BY MOUTH THREE TIMES DAILY  vitamin D (ERGOCALCIFEROL) 1.25 MG (41732 UT) CAPS capsule, TAKE ONE CAPSULE BY MOUTH ONCE A MONTH  cinacalcet (SENSIPAR) 30 MG tablet, Take 30 mg by mouth daily  B complex-vitamin C-folic acid (NEPHRO-JENNIFER) 1 MG tablet, TAKE ONE TABLET BY MOUTH DAILY WITH BREAKFAST  amiodarone (CORDARONE) 200 MG tablet, TAKE ONE TABLET BY MOUTH DAILY  ASPIRIN LOW DOSE 81 MG EC tablet, TAKE ONE TABLET BY MOUTH DAILY  HYDROcodone-acetaminophen (NORCO) 5-325 MG per tablet, Take 1 tablet by mouth every 6 hours as needed for Pain. .  pravastatin (PRAVACHOL) 40 MG tablet, Take 40 mg by mouth nightly   Elastic Bandages & Supports ([address]) MISC, 1 each by Does not apply route daily as needed (as needed) Right leg below the knee 20-30 HH MM DX 82.409  Multiple Vitamin (MVI, CELEBRATE, CHEWABLE TABLET), Take 1 tablet by mouth 2 times daily     Current Medications:     Scheduled Meds:    warfarin  2.5 mg Oral Daily    heparin (porcine)  7,500 Units Subcutaneous 3 times per day    amiodarone  200 mg Oral Daily    midodrine  10 mg Oral TID WC    sodium chloride flush  10 mL Intravenous 2 times per day    warfarin (COUMADIN) daily dosing (placeholder)   Other RX Placeholder     Continuous Infusions:   PRN Meds:  sodium chloride, sodium chloride, acetaminophen, sodium chloride flush, acetaminophen **OR** acetaminophen, polyethylene glycol, promethazine **OR** ondansetron    Input/Output:       I/O last 3 completed shifts: In: 450 [P.O.:450]  Out: - .      Patient Vitals for the past 96 hrs (Last 3 readings):   Weight   20 1130 252 lb 6.8 oz (114.5 kg)   20 0800 253 lb 8.5 oz (115 kg)   20 1853 250 lb 14.1 oz (113.8 kg)       Vital Signs:   Temperature:  Temp: 99.8 °F (37.7 °C)  TMax:   Temp (24hrs), Av.6 °F (37.6 °C), Min:99 °F (37.2 °C), Max:100.1 °F (37.8 °C)    Respirations:  Resp: 20  Pulse:   Pulse: 80  BP:    BP: (!) 165/65  BP Range: Systolic (70XLH), CRK:344 , Min:114 , ALZ:050       Diastolic (19DQH), CSP:52, Min:59, Max:90      Physical Examination:     General:  AAO x 3, speaking in full sentences, no accessory muscle use. HEENT: Atraumatic, normocephalic, no throat congestion, moist mucosa. Eyes:   Pupils equal, round and reactive to light, EOMI. Neck:   No JVD, no thyromegaly, no lymphadenopathy. Chest:  Bilateral vesicular breath sounds, no rales or wheezes. Cardiac:  S1 S2 RR, no murmurs, gallops or rubs, JVP not raised. Abdomen: Soft, non-tender, no masses or organomegaly, BS audible. :   No suprapubic or flank tenderness. Neuro:  AAO x 3, No FND. SKIN:  No rashes, good skin turgor. Extremities:  No edema, palpable peripheral pulses, no calf tenderness.     Labs:       Recent Labs     20  1244 20  0814   WBC 4.5 5.1   RBC 4.07* 3.88*   HGB 14.1 13.2   HCT 43.8 39.8*   .6* 102.6   MCH 34.6* 34.0*   MCHC 32.2 33.2   RDW 15.6* 15.2*    318   MPV 9.9 9.8      BMP:   Recent Labs     09/01/20  0603 09/02/20  1244   * 132*   K 5.1 4.0   CL 87* 90*   CO2 20 21   BUN 64* 43*   CREATININE 15.00* 11.66*   GLUCOSE 67* 95   CALCIUM 8.7 8.7      Phosphorus:   No results for input(s): PHOS in the last 72 hours. Magnesium:  No results for input(s): MG in the last 72 hours. Albumin:  No results for input(s): LABALBU in the last 72 hours. BNP:      Lab Results   Component Value Date     02/03/2014     SURINDER:      Lab Results   Component Value Date    SURINDER NEGATIVE 12/29/2014     SPEP:  Lab Results   Component Value Date    PROT 8.2 08/27/2020    PROT 6.6 08/07/2012    ALBCAL 1.8 11/12/2013    ALBPCT 27 11/12/2013    LABALPH 0.4 11/12/2013    LABALPH 0.9 11/12/2013    A1PCT 6 11/12/2013    A2PCT 15 11/12/2013    LABBETA 1.0 11/12/2013    BETAPCT 15 11/12/2013    GAMGLOB 2.5 11/12/2013    GGPCT 38 11/12/2013    PATH ELECTRONICALLY SIGNED. Yasmin Yusuf M.D. 12/29/2014     UPEP:     Lab Results   Component Value Date    LABPE  11/12/2013     ELEVATED PROTEIN CONCENTRATION. MOST SERUM PROTEINS ARE DETECTED IN THIS URINE.      C3:     Lab Results   Component Value Date    C3 116 12/29/2014     C4:     Lab Results   Component Value Date    C4 41 12/29/2014     MPO ANCA:   No results found for: MPO  PR3 ANCA:   No results found for: PR3  Anti-GBM:   No results found for: GBMABIGG  Hep BsAg:         Lab Results   Component Value Date    HEPBSAG NONREACTIVE 09/25/2018     Hep C AB:          Lab Results   Component Value Date    HEPCAB NONREACTIVE 09/25/2018       Urinalysis/Chemistries:      Lab Results   Component Value Date    NITRU NEGATIVE 12/20/2013    COLORU CATHY 12/20/2013    PHUR 5.0 12/20/2013    WBCUA 50  12/20/2013    RBCUA 10 TO 20 12/20/2013    MUCUS 1+ 12/20/2013    TRICHOMONAS NOT REPORTED 12/20/2013

## 2020-09-03 NOTE — PROGRESS NOTES
Dialysis Post Treatment Note              Vitals:    09/03/20 1130   BP: (!) 165/65   Pulse: 80   Resp: 20   Temp: 99.8 °F (37.7 °C)   SpO2:      Pre-Weight = 115Kg  Post-weight = Weight: 252 lb 6.8 oz (114.5 kg)  Total Liters Processed = Total Liters Processed (l/min): 84.6 l/min  Rinseback Volume (mL) = Rinseback Volume (ml): 250 ml  Net Removal (mL) = 500mL  Length of treatment= 210  Access:  Fistula right arm  Dry weight:  118Kg    Patient tolerated treatment fair, patient had complaints of cramping several times during treatment, uf was adjusted accordingly. Patient under outpatient dry weight. Denies complaints at time of discharge.

## 2020-09-03 NOTE — CARE COORDINATION
Called [facility] and spoke to Kin Jeffrey. Notified Kin Jeffrey that pt would be discharged today. She stated that pt can start there on Sat at 8:30am.  Faxed information requested to her.

## 2020-09-03 NOTE — DISCHARGE SUMMARY
[facility]  Office: [address], , Fausto Xiong DO, Rut Morgan DO, Katty Guevara DO, Susan Dougherty MD, Scooby Brar MD, Raad Lopez MD, Basil Morfin MD, Mikey Gray MD, Malini Obrien MD, Kaylah Santizo MD, Susy Lloyd MD, Shanon Mueller MD, Liz Arvizu DO, Dk Lyle MD, Marlon Bhat MD, Mika Harvey DO, Sydney Dia MD,  Jasmyn Campbell DO, Jeremi Mckee MD, Haley Moreno MD, Evans Long, [facility], [facility], CNP, Kem Perdue, CNP, Leatha Khan, CNS, Rima Roberts, CNP, Lidia Hudson, CNP, Sergei Augustine, CNP, Raeann Medley, [facility], Billie Chopra, CNP, Emma Villarreal PA-C, Eve Garcia, [facility], Yasmin Daily, CNP, Megan Johnson, CNP, Ramon Kirk, CNP, Adelina Plunkett, CNP, Sridevi Cesar, [facility]   [facility]    Discharge Summary     Patient ID: Deven Avila  :  1970   MRN: [id number]     ACCOUNT:  [de-identified]   Patient's PCP: Madeline Calderón MD  Admit Date: 2020   Discharge Date: 9/3/2020     Length of Stay: 7  Code Status:  Full Code  Admitting Physician: Marlon Bhat MD  Discharge Physician: Haley Moreno MD     Active Discharge Diagnoses:     Hospital Problem Lists:  Principal Problem (Resolved):    Syncope  Active Problems:    Benign prostatic hyperplasia with lower urinary tract symptoms    ESRD on hemodialysis (HCC)    Chronic atrial fibrillation    DVT (deep venous thrombosis) (HCC)    S/P laparoscopic sleeve gastrectomy    Status post partial colectomy    COVID-19    Pulmonary nodules  Resolved Problems:    Chronic hypotension    Hypoxemia    Pneumonia due to COVID-19 virus      Admission Condition:  poor     Discharged Condition: good    Hospital Stay:     Hospital Course: This is a 49-year-old male with end-stage renal disease, A. fib, DVT who came in after having complaints of syncopal episode.   Patient did complain of mild shortness of breath as well as decreased appetite, nausea and vomiting for last 7 days prior to admission. He was tested positive for COVID-19 during this admission. Patient was also noted to have elevated troponin and lactic acid. Patient did not receive any COVID-19 treatment as he was not able to get Remdisivir due to renal disease. Patient did not have significant symptoms which will cover him for to get any other COVID-19 treatment. Patient continued to get dialysis while he was admitted here. He has been cleared for discharge from nephrology as well as infectious disease. Patient was noted to have elevated INR for which his warfarin has been held for last 2 days. Patient was advised to repeat INR testing again tomorrow. He has been advised to hold off on taking warfarin until INR returns to therapeutic range.       Significant therapeutic interventions: Dialysis    Significant Diagnostic Studies:   Labs / Micro:  CBC:   Lab Results   Component Value Date    WBC 5.1 09/03/2020    RBC 3.88 09/03/2020    HGB 13.2 09/03/2020    HCT 39.8 09/03/2020    .6 09/03/2020    MCH 34.0 09/03/2020    MCHC 33.2 09/03/2020    RDW 15.2 09/03/2020     09/03/2020     BMP:    Lab Results   Component Value Date    GLUCOSE 95 09/02/2020    GLUCOSE 91 10/04/2011     09/02/2020    K 4.0 09/02/2020    CL 90 09/02/2020    CO2 21 09/02/2020    ANIONGAP 21 09/02/2020    BUN 43 09/02/2020    CREATININE 11.66 09/02/2020    BUNCRER NOT REPORTED 09/02/2020    CALCIUM 8.7 09/02/2020    LABGLOM 5 09/02/2020    GFRAA 6 09/02/2020    GFR      09/02/2020    GFR NOT REPORTED 09/02/2020     HFP:    Lab Results   Component Value Date    PROT 8.2 08/27/2020    PROT 6.6 08/07/2012     CMP:    Lab Results   Component Value Date    GLUCOSE 95 09/02/2020    GLUCOSE 91 10/04/2011     09/02/2020    K 4.0 09/02/2020    CL 90 09/02/2020    CO2 21 09/02/2020    BUN 43 09/02/2020    CREATININE 11.66 09/02/2020    ANIONGAP 21 09/02/2020    ALKPHOS 78 08/27/2020    ALT 90 08/27/2020     08/27/2020    BILITOT 0.43 08/27/2020    LABALBU 3.8 08/27/2020    LABALBU 4.2 10/04/2011    ALBUMIN 0.9 08/27/2020    LABGLOM 5 09/02/2020    GFRAA 6 09/02/2020    GFR      09/02/2020    GFR NOT REPORTED 09/02/2020    PROT 8.2 08/27/2020    PROT 6.6 08/07/2012    CALCIUM 8.7 09/02/2020     PT/INR:    Lab Results   Component Value Date    PROTIME 51.9 09/03/2020    PROTIME 25.9 08/19/2020    PROTIME 28.4 06/04/2020    INR 5.3 09/03/2020     PTT:   Lab Results   Component Value Date    APTT 31.2 08/27/2020     FLP:    Lab Results   Component Value Date    CHOL 225 06/08/2020    TRIG 121 06/08/2020    HDL 48 06/08/2020     U/A:    Lab Results   Component Value Date    COLORU CATHY 12/20/2013    TURBIDITY CLOUDY 12/20/2013    SPECGRAV 1.016 12/20/2013    HGBUR MOD 12/20/2013    PHUR 5.0 12/20/2013    PROTEINU 1+ 12/20/2013    GLUCOSEU NEGATIVE 12/20/2013    KETUA NEGATIVE 12/20/2013    BILIRUBINUR NEGATIVE 12/20/2013    UROBILINOGEN Normal 12/20/2013    NITRU NEGATIVE 12/20/2013    LEUKOCYTESUR MOD 12/20/2013     TSH:    Lab Results   Component Value Date    TSH 8.73 08/27/2020        Radiology:  Xr Chest Portable    Result Date: 8/27/2020  Limited left retrocardiac assessment. Mild streaky lingular atelectasis Moderate patchy right basilar infiltrate, pneumonia the likely etiology follow changes to resolution RECOMMENDATION: Upright well penetrated PA view of the chest for more accurate assessment of the left lung base     Ct Chest Pulmonary Embolism W Contrast    Result Date: 8/28/2020  1. No evidence of pulmonary embolus. 2.  Atherosclerotic disease including coronary arteries. Mild cardiomegaly. 3.  Extensive multifocal scattered ground-glass areas of consolidation consistent with COVID-19. 4.  Other findings as above.  RECOMMENDATIONS: Fleischner Society guidelines for follow-up and management of incidentally detected pulmonary nodules: Multiple Solid Nodules: Nodule size equals 6-8 mm In a low-risk patient, CT at 3-6 months, then consider CT at 18-24 months. In a high-risk patient, CT at 3-6 months, then CT at 18-24 months. - Low risk patients include individuals with minimal or absent history of smoking and other known risk factors. - High risk patients include individuals with a history or smoking or known risk factors. Radiology 2017 http://pubs. rsna.org/doi/full/10.1148/radiol. 3713061350       Consultations:    Consults:     Final Specialist Recommendations/Findings:   IP CONSULT TO HOSPITALIST  IP CONSULT TO NEPHROLOGY  IP CONSULT TO NEPHROLOGY  IP CONSULT TO CARDIOLOGY  IP CONSULT TO INFECTIOUS DISEASES  PHARMACY TO DOSE WARFARIN      The patient was seen and examined on day of discharge and this discharge summary is in conjunction with any daily progress note from day of discharge. Discharge plan:     Disposition: Home    Physician Follow Up:     Jessica Cushing, MD  [address] [place] [address] . [address] [facility] [address] [phone]    Tuesdays, Thursdays, Saturdays at 8:30am while Covid positive    Jessica Cushing, MD  [address] [facility]   [phone]    Schedule an appointment as soon as possible for a visit in 1 week         Requiring Further Evaluation/Follow Up POST HOSPITALIZATION/Incidental Findings: Repeat INR testing tomorrow. Hold off on taking on warfarin until INR returns between 2-3.     Diet: renal diet    Activity: As tolerated    Instructions to Patient: Follow-up with your PCP within 1 week after discharge    Discharge Medications:      Medication List      CONTINUE taking these medications    acetaminophen 500 MG tablet  Commonly known as:  TYLENOL  Take 2 tablets by mouth every 6 hours as needed for Pain or Fever     amiodarone 200 MG tablet  Commonly known as:  CORDARONE  TAKE ONE TABLET BY MOUTH DAILY     ASPIRIN LOW DOSE 81 MG EC tablet  Generic drug:  aspirin  TAKE ONE TABLET BY MOUTH DAILY     * B complex-vitamin C-folic acid 1 MG tablet  TAKE ONE TABLET BY MOUTH DAILY WITH BREAKFAST     * Nephro-Orb 0.8 MG Tabs  TAKE ONE TABLET BY MOUTH DAILY WITH BREAKFAST     cinacalcet 30 MG tablet  Commonly known as:  SENSIPAR     HYDROcodone-acetaminophen 5-325 MG per tablet  Commonly known as:  [address] [facility] Compression Stockings Misc  1 each by Does not apply route daily as needed (as needed) Right leg below the knee 20-30 HH MM DX 82.409     midodrine 10 MG tablet  Commonly known as:  PROAMATINE  TAKE ONE TABLET BY MOUTH THREE TIMES DAILY     MVI (CELEBRATE) CHEWABLE TABLET     pravastatin 40 MG tablet  Commonly known as:  PRAVACHOL     vitamin D 1.25 MG (29098 UT) Caps capsule  Commonly known as:  ERGOCALCIFEROL  TAKE ONE CAPSULE BY MOUTH ONCE A MONTH     warfarin 2 MG tablet  Commonly known as:  COUMADIN  Take as directed. If you are unsure how to take this medication, talk to your nurse or doctor. Original instructions:  TAKE 1 AND 1/2 TABLETS OR 2 TABLETS (OR AS DIRECTED BY OFFICE) BY MOUTH ONCE DAILY         * This list has 2 medication(s) that are the same as other medications prescribed for you. Read the directions carefully, and ask your doctor or other care provider to review them with you. Discharge Procedure Orders   Protime-INR   Standing Status: Future Standing Exp. Date: 09/03/21     Order Specific Question Answer Comments   Daily Coumadin Dose? 3 mg        Time Spent on discharge is  40 mins in patient examination, evaluation, counseling as well as medication reconciliation, prescriptions for required medications, discharge plan and follow up. Electronically signed by   Harrison Cleveland MD  9/3/2020  11:59 AM      Thank you Dr. Shabnam Abdullahi MD for the opportunity to be involved in this patient's care.

## 2020-09-03 NOTE — PROGRESS NOTES
Comprehensive Nutrition Assessment    Type and Reason for Visit:  Initial(LOS Day 7)    Nutrition Recommendations/Plan:   -Continue renal diet   -Suggest Nepro supplements BID   -Will monitor po intake, weights and wound healing     Nutrition Assessment:   Pt admitted d/t SOB, loss of appetite, nausea and vomiting 2/2 +COVID-19. RN reported that pt has been consuming an average of 50% of his meals this week. Pt w/ hx of wt flux from 247-272 lbs over 11 mo per EMR. Pt w/ ESRD on HD. Pt meets the criteria for moderate malnutrition. Will add supplements to compliment po intake and to aide in wound healing progression. Malnutrition Assessment:  Malnutrition Status:   Moderate malnutrition    Context:  Acute Illness     Findings of the 6 clinical characteristics of malnutrition:  Energy Intake:  7 - 50% or less of estimated energy requirements for 5 or more days  Weight Loss:  Unable to assess     Body Fat Loss:  Unable to assess     Muscle Mass Loss:  Unable to assess    Fluid Accumulation:  1 - Mild Extremities   Strength:  Not Performed    Estimated Daily Nutrient Needs:  Energy (kcal):  1.2-1.3 ~> 4092-8526 kcals/d; Weight Used for Energy Requirements:  Admission     Protein (g):  1.3-1.5 gm/kg ~>  gms/d; Weight Used for Protein Requirements:  Ideal          Nutrition Related Findings:  Na 132      Wounds:  (non-healing surgical wound)       Current Nutrition Therapies:    DIET GENERAL; Renal    Anthropometric Measures:  · Height: 5' 10\" (177.8 cm)  · Current Body Weight: 252 lb (114.3 kg)   · Admission Body Weight: 256 lb (116.1 kg)    · Ideal Body Weight: 166 lbs; % Ideal Body Weight 151.8 %   · BMI: 36.2  · BMI Categories: Obese Class 2 (BMI 35.0 -39.9)       Nutrition Diagnosis:   · Moderate malnutrition, In context of acute illness or injury related to cardiac dysfunction, inadequate protein-energy intake as evidenced by nausea, vomiting, localized or generalized fluid accumulation(intake 50%, Need for ONS)      Nutrition Interventions:   Food and/or Nutrient Delivery:  Continue Current Diet, Start Oral Nutrition Supplement  Nutrition Education/Counseling:  Education not indicated   Coordination of Nutrition Care:  Continued Inpatient Monitoring    Goals:  Pt to consume >75% of est'd needs via PO     Goals Set     Nutrition Monitoring and Evaluation:   Food/Nutrient Intake Outcomes:  Food and Nutrient Intake, Supplement Intake  Physical Signs/Symptoms Outcomes:  Biochemical Data, Nutrition Focused Physical Findings, Skin, Weight, GI Status     Discharge Planning:     Too soon to determine     Electronically signed by Dagoberto Palacios RD, LD on 9/3/20 at 12:11 PM EDT    Contact: [phone]

## 2020-09-03 NOTE — TELEPHONE ENCOUNTER
Patient had an INR of 5.3 today and is going home. Patient came in with SOB and is COVID 19 +. MD wants an INR check tomorrow. He can NOT come into clinic. I suggested getting a home INR draw due to COVID + status via home care RN. Or he could go to an outpatient lab. RN is calling lab to alert them of COVID + status and to check if he can come into hospital.        Ruma HYMAN  Ph., CACP, Clinical Pharmacist  [facility], [address] [place] [address] [facility]  9/3/2020  3:08 PM

## 2020-09-03 NOTE — CARE COORDINATION
Home with son, HD TTS at 0830 at [address] drawn at home D/T Covid. Referral to [facility], spoke with Huang Villalobos, they can accommodate patient and draw INR tomorrow      [id number] received call from wyatt at [facility] stating they cannot accommodate this patient. [phone] referral to [address] at Home, spoke with Huang Villalobos to see if they can accommodate him, she will call me back    [phone] received call back from Huang Villalobos at [facility] at home, they can have a nurse out to draw INR tomorrow at 12:30, they will call patient when on the way.   This information relayed to AUNG Galaviz, who will instruct the patient    Discharge [address] [place] [address] Case Management Department  Written by: Nilson Maravilla RN    Patient Name: Miles Bennett  Attending Provider: Liza Gutierrez MD  Admit Date: 2020 12:37 PM  MRN: [id number]  Account: [de-identified]                     : 1970  Discharge Date:   9/3/2020      Disposition: home with [facility] at home    Nilson Maravilla RN

## 2020-09-03 NOTE — PROGRESS NOTES
Pharmacy Note  Warfarin Consult    Genesis Mcelroy is a 44 y.o. male for whom pharmacy has been consulted to manage warfarin therapy. Consulting Physician: Khadijah Neal MD  Reason for admission:  SOB / COVID 19    Warfarin dose PTA: 5mg on Thursdays only and 4mg all other days of the week. Indication: Afib  Goal INR: 2-3    Past Medical History:   Diagnosis Date    Anemia     Arthritis     right shoulder/ PCP Dr. Esvin Russell    Atrial fibrillation ([facility]     [address]    BPH (benign prostatic hypertrophy)     CAD (coronary artery disease)     Dr. Thomas Betancur    Caffeine use     1 coffee, 2 tea/day    Cellulitis of lower leg     right    Chronic back pain     Colon cancer ([facility]     colon    Cor pulmonale, acute ([facility] 12/30/2014    CRF (chronic renal failure)     dr. Dianne Arreguin on [place]. Tues/ thurs/ sat/ right arm fistula    Erectile dysfunction     Gout     Hemod[facility] patient ([facility]     tues/ thurs/ sat/ DR. Petersen/ fistula right lower arm    History of blood transfusion     no reaction    Hyperkalemia 12/24/2013    Hyperlipidemia     Hypertension     Hypotension     Hypothyroidism     Lymphedema of leg     right    Obesity     Thyroid disease         Recent Labs     09/03/20  0814   INR 5.3*     Recent Labs     09/02/20  1244 09/03/20  0814   HGB 14.1 13.2   HCT 43.8 39.8*    318     Current warfarin drug-drug interactions: acetaminophen, amiodarone (home med), heparin SC    Date INR Dose   8/29/2020 1.5 2.5mg   8/30/2020 1.5 2.5mg   8/31 1.6 2.5mg   9/1/2020 2.0 2.5mg   9/2/2020 3.1 none   9/3/2020 5.3 HOLD     Notes:  INR at 5.3 today, HOLD warfarin. Daily PT/INR while inpatient. Thank you for the consult. Will continue to follow.     Michael Crenshaw RPh, CACP  Clinical Pharmacist Medication Management  9/3/2020  9:44 AM

## 2020-09-03 NOTE — PROGRESS NOTES
Pt pushed downstairs via wheelchair and picked up by sister. Discharge paperwork provided. Went over discharge instructions, medication administration, follow up appointments, etc;     Patient going home with home care ([facility]) who will be coming to draw his labs tomorrow at 12:30. Pt aware of Dialysis days and chair time. All patient belongings accounted for and went home with patient.     Electronically signed by Fausto Lim RN on 9/3/2020 at 6:23 PM

## 2020-09-03 NOTE — PLAN OF CARE
Problem: Airway Clearance - Ineffective  Goal: Achieve or maintain patent airway  9/3/2020 1045 by Lauren Valente RN  Outcome: Ongoing  9/3/2020 0302 by Safia Frias RN  Outcome: Ongoing     Problem: Gas Exchange - Impaired  Goal: Absence of hypoxia  9/3/2020 1045 by Lauren Valente RN  Outcome: Ongoing  9/3/2020 0302 by Safia Frias RN  Outcome: Ongoing  Goal: Promote optimal lung function  9/3/2020 1045 by Lauren Valente RN  Outcome: Ongoing  9/3/2020 0302 by Safia Frias RN  Outcome: Ongoing     Problem: Breathing Pattern - Ineffective  Goal: Ability to achieve and maintain a regular respiratory rate  9/3/2020 1045 by Lauren Valente RN  Outcome: Ongoing  9/3/2020 0302 by Safia Frias RN  Outcome: Ongoing     Problem:  Body Temperature -  Risk of, Imbalanced  Goal: Ability to maintain a body temperature within defined limits  9/3/2020 1045 by Lauren Valente RN  Outcome: Ongoing  9/3/2020 0302 by Safia Frias RN  Outcome: Ongoing  Goal: Will regain or maintain usual level of consciousness  9/3/2020 1045 by Lauren Valente RN  Outcome: Ongoing  9/3/2020 0302 by Safia Frias RN  Outcome: Ongoing  Goal: Complications related to the disease process, condition or treatment will be avoided or minimized  9/3/2020 1045 by Lauren Valente RN  Outcome: Ongoing  9/3/2020 0302 by Safia Frias RN  Outcome: Ongoing     Problem: Isolation Precautions - Risk of Spread of Infection  Goal: Prevent transmission of infection  9/3/2020 1045 by Lauren Valente RN  Outcome: Ongoing  9/3/2020 0302 by Safia Frias RN  Outcome: Ongoing     Problem: Nutrition Deficits  Goal: Optimize nutrtional status  9/3/2020 1045 by Lauren Valente RN  Outcome: Ongoing  9/3/2020 0302 by Safia Frias RN  Outcome: Ongoing     Problem: Risk for Fluid Volume Deficit  Goal: Maintain normal heart rhythm  9/3/2020 1045 by Lauren Valente RN  Outcome: Ongoing  9/3/2020 0302 by Safia Frias RN  Outcome: Ongoing  Goal: Maintain absence of muscle cramping  9/3/2020 1045 by Sheree Puentes RN  Outcome: Ongoing  9/3/2020 0302 by Alen Lo RN  Outcome: Ongoing  Goal: Maintain normal serum potassium, sodium, calcium, phosphorus, and pH  9/3/2020 1045 by Sheree Puentes RN  Outcome: Ongoing  9/3/2020 0302 by Alen Lo RN  Outcome: Ongoing     Problem: Loneliness or Risk for Loneliness  Goal: Demonstrate positive use of time alone when socialization is not possible  9/3/2020 1045 by Sheree Puentes RN  Outcome: Ongoing  9/3/2020 0302 by Alen Lo RN  Outcome: Ongoing     Problem: Fatigue  Goal: Verbalize increase energy and improved vitality  9/3/2020 1045 by Sheree Puentes RN  Outcome: Ongoing  9/3/2020 0302 by Alen Lo RN  Outcome: Ongoing     Problem: Patient Education: Go to Patient Education Activity  Goal: Patient/Family Education  9/3/2020 1045 by Sheree Puentes RN  Outcome: Ongoing  9/3/2020 0302 by Alen Lo RN  Outcome: Ongoing     Problem: Falls - Risk of:  Goal: Will remain free from falls  Description: Will remain free from falls  9/3/2020 1045 by Sheree Puentes RN  Outcome: Ongoing  9/3/2020 0302 by Alen Lo RN  Outcome: Ongoing  Goal: Absence of physical injury  Description: Absence of physical injury  9/3/2020 1045 by Sheree Puentes RN  Outcome: Ongoing  9/3/2020 0302 by Alen Lo RN  Outcome: Ongoing

## 2020-09-03 NOTE — PROGRESS NOTES
[facility] [address] Patient  Today's Date and Time: 9/3/2020, 10:52 AM    Impression :     COVID 19 Confirmed Infection 8-27-20. Syncope  Respiratory distress  Single blood culture with Coagulase negative Staphylococci 8-27-20. Repeat cultures 8-29-20 x 2 : No growth  Contamination- No  Rx needed. S/P convalescent plasma infusion 8-29-20. No AEs    Recommendations:   Monitor off antibiotics  Remdesivir excluded because of renal failure  Received Plasma 8-29-20  No additional interventions planned ID wise. Patient not sick enough to warrant Decadron or investigational protocols. Home care with quarantine until 9-17-20 at the discretion of Dr Bryant Hernandez and Nephrology. Medical Decision Making/Summary/Discussion:9/3/2020     Patient admitted with suspected COVID 19 infection  Covid test confirmed positive. Single blood culture with Coagulase negative Staphylococci . 8-27-20. Repeat cultures 8-29-20 x 2 : No growth  Contamination- No  Rx needed. Received convalescent plasma 8-29-20    Infection Control Recommendations   [place] Precautions  Airborne isolation  Droplet Isolation    Antimicrobial Stewardship Recommendations     Discontinuation of therapy  Coordination of Outpatient Care:   Estimated Length of IV antimicrobials:TBD  Patient will need Midline Catheter Insertion: TBD  Patient will need PICC line Insertion: No  Patient will need: Home IV , [place],  SNF,  LTAC:TBD  Patient will need outpatient wound care:No    Chief complaint/reason for consultation:   Concern for COVID infection    History of Present Illness:   Abigail Deleon is a 44y.o.-year-old  male who was initially admitted on 8/27/2020. Patient seen at the request of . INITIAL HISTORY:    Patient presented through ER with complaints of syncope. He suffers from ESRD and had gone to HD.  Returned home where his neighbor found him in his car after not getting out of the car. Patient complained of SOB over a one week period of time. He also described nausea, vomiting and decreased appetite over one week. When evaluated in ER he was found to be COVID 19 positive. Temp was 100.7 F. He was hypoxic and required Nasal 02 at 3 L/min. Lactic acid was elevated at 4.3. Troponin also was elevated. Patient admitted because of concerns with COVID 19.    CURRENT EVALUATION : 9/3/2020    Patient evaluated and examined in the ICU. Afebrile to low-grade fevers  VS stable    Not a candidate for Remdesvir due to dialysis  HD schedule is TThF    Has improved after fluid removal with HD on 8-29-20  Received convalescent plasma 8-29-20    Patient exhibiting respiratory distress. No  Respiratory secretions: No    Patient receiving supplemental oxygen.  No  02 sat 97-->91-->96 on room air  RR 14  -->13>18-->20-->18    QTc: 477      NEWS Score: 0-4 Low risk group; 5-6: Medium risk group; 7 or above: High risk group  Parameters 3 2 1 0 1 2 3   Age    < 65   = 65   RR = 8  9-11 12-20  21-24 = 25   O2 Sats = 91 92-93 94-95 = 96      Suppl O2  Yes  No      SBP = 90  101-110 111-219   = 220   HR = 40  41-50 51-90  111-130 = 131   Consciousness    Alert   Drowsiness, lethargy, or confusion   Temperature = 35.0 C (95.0 F)  35.1-36.0 C 95.1-96.9 F 36.1-38.0 C 97.0-100.4 F 38.1-39.0 C 100.5-102.3 F = 39.1 C = 102.4 F      NEWS Score:  8-28-20: 2 Low risk    Overall Daily Picture:   Unchanged    Presence of secondary bacterial Infection:  Yes  Additional antibiotics: No    Labs, X rays reviewed: 9/3/2020    BUN:32-->43  Cr:10.1-->9.7-->11.6    WBC:3.0-->6.3-->5.1  Hb:13_.13.5  Plat: 259-->301-->318    Absolute Neutrophils:3.5  Absolute Lymphocytes:2.26  Neutrophil/Lymphocyte Ratio: 1.54 Low risk    CRP:  Ferritin:  LDH:     Pro Calcitonin:  Troponin 87-->98-->86    Cultures:  Urine:    Blood:  Eight 2920 x 2 no growth  8-27-20: Coagulase negative Staphylococci  X 1  Sputum :    Wound:      COVID-19 8/27/2020 positive    CXR:   8-27-20 Patchy infiltrates bilaterally  CAT:  8-27-20: Multifocal ground glass opacities compatible with COVID      Discussed with patient, RN, CC, IM. I have personally reviewed the past medical history, past surgical history, medications, social history, and family history, and I have updated the database accordingly. Past Medical History:     Past Medical History:   Diagnosis Date    Anemia     Arthritis     right shoulder/ PCP Dr. Dimas Noe    Atrial fibrillation ([facility]     [address] BPH (benign prostatic hypertrophy)     CAD (coronary artery disease)     Dr. Geoff Montenegro    Caffeine use     1 coffee, 2 tea/day    Cellulitis of lower leg     right    Chronic back pain     Colon cancer ([facility]     colon    Cor pulmonale, acute ([facility] 12/30/2014    CRF (chronic renal failure)     dr. Daljit Mckeon on [place]. Tues/ thurs/ sat/ right arm fistula    Erectile dysfunction     Gout     Hemodialysis patient ([facility]     tues/ thurs/ sat/ DR. Petersen/ fistula right lower arm    History of blood transfusion     no reaction    Hyperkalemia 12/24/2013    Hyperlipidemia     Hypertension     Hypotension     Hypothyroidism     Lymphedema of leg     right    Obesity     Thyroid disease        Past Surgical  History:     Past Surgical History:   Procedure Laterality Date    ABDOMINAL EXPLORATION SURGERY  01/22/2019    ABDOMINAL EXPLORATION, LEFT HEMICOLECTOMY, MOBILIZATION OF SPLENIC FLEXURE     ABSCESS DRAINAGE Right 01/20/2014    I&D Right Shoulder, Right shoulder arthroscopy, I&D AC Joint    APPENDECTOMY      COLONOSCOPY      COLONOSCOPY  01/22/2019    COLONOSCOPY N/A 1/22/2019    COLONOSCOPY- GI UNIT SCHEDULED performed by Quinten Morin MD at [address] [place] [address] Right 3/27/15    rt wrist    ENDOSCOPY, COLON, DIAGNOSTIC      UGI    GASTRIC BAND REMOVAL  01/17/2017    subcutaneous port    HC CATH POWER PICC SINGLE  1/24/2014         LAP BAND  2007    [place] COLSC FLX W/RMVL OF TUMOR POLYP LESION SNARE TQ N/A 11/15/2018    COLONOSCOPY POLYPECTOMY SNARE/COLD BIOPSY performed by Harinder Kumar MD at [facility] Right 2014    SLEEVE GASTRECTOMY N/A 9/25/2017    GASTRECTOMY SLEEVE LAPAROSCOPIC SI ROBOTIC, ENDOSEALER performed by Gia Boykin MD at [address] N/A 1/22/2019    ABDOMINAL EXPLORATION, LEFT HEMICOLECTOMY, MOBILIZATION OF SPLENIC FLEXURE performed by Hedy Ross MD at [address] Right     leg tumor removal/ dr. Angelina Alberts       Medications:      warfarin  2.5 mg Oral Daily    heparin (porcine)  7,500 Units Subcutaneous 3 times per day    amiodarone  200 mg Oral Daily    midodrine  10 mg Oral TID WC    sodium chloride flush  10 mL Intravenous 2 times per day    warfarin (COUMADIN) daily dosing (placeholder)   Other RX Placeholder       Social History:     Social History     Socioeconomic History    Marital status: Single     Spouse name: Not on file    Number of children: Not on file    Years of education: Not on file    Highest education level: Not on file   Occupational History    Not on file   Social Needs    Financial resource strain: Not on file    Food insecurity     Worry: Not on file     Inability: Not on file    Transportation needs     Medical: Not on file     Non-medical: Not on file   Tobacco Use    Smoking status: Never Smoker    Smokeless tobacco: Never Used   Substance and Sexual Activity    Alcohol use:  Yes     Alcohol/week: 3.0 standard drinks     Types: 3 Glasses of wine per week     Comment: weekend    Drug use: No    Sexual activity: Yes     Partners: Female   Lifestyle    Physical activity     Days per week: Not on file     Minutes per session: Not on file    Stress: Not on file   Relationships    Social connections     Talks on phone: Not on file     Gets together: Not on file     Attends Latter-day service: Not on file     Active member of club or organization: Not on file     Attends meetings of clubs or organizations: Not on file     Relationship status: Not on file    Intimate partner violence     Fear of current or ex partner: Not on file     Emotionally abused: Not on file     Physically abused: Not on file     Forced sexual activity: Not on file   Other Topics Concern    Not on file   Social History Narrative    Not on file       Family History:     Family History   Problem Relation Age of Onset    Cancer Father         leukemia    Heart Failure Mother     Arthritis Mother     High Blood Pressure Mother     Heart Disease Maternal Grandmother     Stroke Paternal Grandfather         Allergies:   Patient has no known allergies. Review of Systems:     Constitutional: No fevers or chills. No systemic complaints  Head: No headaches  Eyes: No double vision or blurry vision. No conjunctival inflammation. ENT: No sore throat or runny nose. . No hearing loss, tinnitus or vertigo. Cardiovascular: No chest pain or palpitations. shortness of breath. No ROBIN. syncope  Lung: shortness of breath, cough. No sputum production  Abdomen: Nausea, vomiting, no diarrhea, or abdominal pain. Tessie  No cramps. Genitourinary: ESRD  Musculoskeletal: No muscle aches or pains. No joint effusions, swelling or deformities  Hematologic: No bleeding or bruising. Neurologic: No headache, weakness, numbness, or tingling. Integument: No rash, no ulcers. Psychiatric: No depression. Endocrine: No polyuria, no polydipsia, no polyphagia.     Physical Examination :     Patient Vitals for the past 8 hrs:   BP Temp Temp src Pulse Resp SpO2 Weight   09/03/20 1030 (!) 152/90 -- -- 77 -- -- --   09/03/20 1000 (!) 167/72 -- -- 81 -- -- --   09/03/20 0930 (!) 160/59 -- -- 74 -- -- --   09/03/20 0900 (!) 173/64 -- -- 82 -- -- --   09/03/20 0815 (!) 159/60 -- -- 76 -- -- --   09/03/20 0800 114/86 100 °F (37.8 °C) Oral 83 20 96 % 253 lb 8.5 oz (115 kg)   09/03/20 0545 -- 99 °F (37.2 °C) -- 78 15 96 % --     General Appearance: Awake, alert, and in no apparent distress  Head:  Normocephalic, no trauma  Eyes: Pupils equal, round, reactive to light and accommodation; extraocular movements intact; sclera anicteric; conjunctivae pink. No embolic phenomena. ENT: Oropharynx clear, without erythema, exudate, or thrush. No tenderness of sinuses. Mouth/throat: mucosa pink and moist. No lesions. Dentition in good repair. Neck:Supple, without lymphadenopathy. Thyroid normal, No bruits. Pulmonary/Chest: Clear to auscultation, without wheezes, rales, or rhonchi. No dullness to percussion. Cardiovascular: Regular rate and rhythm without murmurs, rubs, or gallops. Abdomen: Soft, non tender. Bowel sounds normal. No organomegaly  All four Extremities: No cyanosis, clubbing, edema, or effusions. Neurologic: No gross sensory or motor deficits. Skin: Warm and dry with good turgor. No signs of peripheral arterial or venous insufficiency. No ulcerations. No open wounds. Medical Decision Making -Laboratory:   I have independently reviewed/ordered the following labs:    CBC with Differential:   Recent Labs     09/02/20  1244 09/03/20  0814   WBC 4.5 5.1   HGB 14.1 13.2   HCT 43.8 39.8*    318   LYMPHOPCT 39 26   MONOPCT 17* 13*     BMP:   Recent Labs     09/01/20  0603 09/02/20  1244   * 132*   K 5.1 4.0   CL 87* 90*   CO2 20 21   BUN 64* 43*   CREATININE 15.00* 11.66*     Hepatic Function Panel:   No results for input(s): PROT, LABALBU, BILIDIR, IBILI, BILITOT, ALKPHOS, ALT, AST in the last 72 hours. No results for input(s): RPR in the last 72 hours. No results for input(s): HIV in the last 72 hours. No results for input(s): BC in the last 72 hours.   Lab Results   Component Value Date    MUCUS 1+ 12/20/2013    RBC 3.88 09/03/2020    TRICHOMONAS NOT REPORTED 12/20/2013    WBC 5.1 09/03/2020    YEAST NOT REPORTED 12/20/2013    TURBIDITY CLOUDY 12/20/2013     Lab Results   Component Value Date    CREATININE 11.66 09/02/2020    GLUCOSE 95 09/02/2020    GLUCOSE 91 10/04/2011       Medical Decision Making-Imaging:     EXAMINATION:    ONE XRAY VIEW OF THE CHEST         8/27/2020 1:59 pm         COMPARISON:    February 1, 2019, chest examination         HISTORY:    ORDERING SYSTEM PROVIDED HISTORY: syncope    TECHNOLOGIST PROVIDED HISTORY:    syncope    Reason for Exam: Syncope/  AP erect/  gp used/  port. Acuity: Unknown    Type of Exam: Unknown         FINDINGS:    Borderline cardiomegaly         Moderate patchy right basilar infiltrate with mild left lingular and possible    left basilar infiltrate.  Limited penetration of the left lower lung.  Clear    upper lungs         No significant pleural process         Degenerative changes of the right acromioclavicular joint and thoracic spine              Impression    Limited left retrocardiac assessment.  Mild streaky lingular atelectasis         Moderate patchy right basilar infiltrate, pneumonia the likely etiology    follow changes to resolution         RECOMMENDATION:    Upright well penetrated PA view of the chest for more accurate assessment of    the left lung base                EXAMINATION:    CTA OF THE CHEST 8/28/2020 3:55 pm         TECHNIQUE:    CTA of the chest was performed after the administration of intravenous    contrast.  Multiplanar reformatted images are provided for review.  MIP    images are provided for review.  Dose modulation, iterative reconstruction,    and/or weight based adjustment of the mA/kV was utilized to reduce the    radiation dose to as low as reasonably achievable.         COMPARISON:    None         HISTORY:    ORDERING SYSTEM PROVIDED HISTORY: Hypoxic, COVID, rule out PE    TECHNOLOGIST PROVIDED HISTORY:    Hypoxic, COVID, rule out PE         FINDINGS:    Pulmonary Arteries: Pulmonary arteries are adequately opacified for    evaluation.  No evidence of intraluminal filling defect to suggest pulmonary    embolism.  Main pulmonary artery is normal in caliber.         Mediastinum: No mediastinal adenopathy, acute aortic abnormality, or    pericardial effusion.  Mild cardiomegaly is noted.  Coronary artery    calcification is present.  Moderate calcification of the aortic arch is seen.         Lungs/pleura: There has been interval development of right upper lobe solid    nodules measuring 7.5 mm and 4.5 mm image 33 series 3.  Considerable    multifocal scattered areas of consolidation are present throughout the    remainder of the lung fields bilaterally without effusion, consistent with    atypical pneumonitis and diagnosis of COVID-19.  Tracheobronchial tree is    patent.  No effusion is present.         Upper Abdomen: Atherosclerotic calcification of the aorta and branches is    noted.  Included kidneys appear small.  There is a small cyst in the right    kidney.         Soft Tissues/Bones: Multilevel spondylosis in the thoracic spine is noted. No acute osseous or soft tissue abnormality.              Impression    1.  No evidence of pulmonary embolus.         2.  Atherosclerotic disease including coronary arteries.  Mild cardiomegaly.         3.  Extensive multifocal scattered ground-glass areas of consolidation    consistent with COVID-19.         4.  Other findings as above.             Medical Decision [id number]:       Medical Decision Making-Other:     Note:  Labs, medications, radiologic studies were reviewed with personal review of films  Large amounts of data were reviewed  Discussed with nursing Staff, Discharge planner  Infection Control and Prevention measures reviewed  All prior entries were reviewed  Administer medications as ordered  Prognosis: Guarded  Discharge planning reviewed  Follow up as outpatient. Thank you for allowing us to participate in the care of this patient. Please call with questions.     Taina Koo MD         Pager: [phone] - Office: [phone]

## 2020-09-03 NOTE — PLAN OF CARE
Problem: Airway Clearance - Ineffective  Goal: Achieve or maintain patent airway  Outcome: Ongoing     Problem: Gas Exchange - Impaired  Goal: Absence of hypoxia  Outcome: Ongoing  Goal: Promote optimal lung function  Outcome: Ongoing     Problem: Breathing Pattern - Ineffective  Goal: Ability to achieve and maintain a regular respiratory rate  Outcome: Ongoing     Problem:  Body Temperature -  Risk of, Imbalanced  Goal: Ability to maintain a body temperature within defined limits  Outcome: Ongoing  Goal: Will regain or maintain usual level of consciousness  Outcome: Ongoing  Goal: Complications related to the disease process, condition or treatment will be avoided or minimized  Outcome: Ongoing     Problem: Isolation Precautions - Risk of Spread of Infection  Goal: Prevent transmission of infection  Outcome: Ongoing     Problem: Nutrition Deficits  Goal: Optimize nutrtional status  Outcome: Ongoing     Problem: Risk for Fluid Volume Deficit  Goal: Maintain normal heart rhythm  Outcome: Ongoing  Goal: Maintain absence of muscle cramping  Outcome: Ongoing  Goal: Maintain normal serum potassium, sodium, calcium, phosphorus, and pH  Outcome: Ongoing     Problem: Loneliness or Risk for Loneliness  Goal: Demonstrate positive use of time alone when socialization is not possible  Outcome: Ongoing     Problem: Fatigue  Goal: Verbalize increase energy and improved vitality  Outcome: Ongoing     Problem: Patient Education: Go to Patient Education Activity  Goal: Patient/Family Education  Outcome: Ongoing     Problem: Falls - Risk of:  Goal: Will remain free from falls  Description: Will remain free from falls  9/3/2020 0302 by Rasheeda Russell RN  Outcome: Ongoing    Goal: Absence of physical injury  Description: Absence of physical injury  9/3/2020 0302 by Rasheeda Russell RN  Outcome: Ongoing

## 2020-09-03 NOTE — DISCHARGE INSTR - COC
Continuity of Care Form    Patient Name: Ceferino Nye   :  1970  MRN:  [id number]    Admit date:  2020  Discharge date:  ***    Code Status Order: Full Code   Advance Directives:   Advance Care Flowsheet Documentation     Date/Time Healthcare Directive Type of Healthcare Directive Copy in [address] Agent's Name Healthcare Agent's Phone Number    20  No, patient does not have an advance directive for healthcare treatment -- -- -- -- --          Admitting Physician:  Benjamin Noble MD  PCP: Carla Owusu MD    Discharging Nurse: [facility] Unit/Room#: 8918/7825-83  Discharging Unit Phone Number: ***    Emergency Contact:   Extended Emergency Contact Information  Primary Emergency Contact: Mikaela Templede  Address: N/A   [address] Phone: [phone]  Work Phone: [phone]  Mobile Phone: [phone]  Relation: Brother/Sister    Past Surgical History:  Past Surgical History:   Procedure Laterality Date    ABDOMINAL EXPLORATION SURGERY  2019    ABDOMINAL EXPLORATION, LEFT HEMICOLECTOMY, MOBILIZATION OF SPLENIC FLEXURE     ABSCESS DRAINAGE Right 2014    I&D Right Shoulder, Right shoulder arthroscopy, I&D AC Joint    APPENDECTOMY      COLONOSCOPY      COLONOSCOPY  2019    COLONOSCOPY N/A 2019    COLONOSCOPY- GI UNIT SCHEDULED performed by Alisia Hicks MD at [address] [place] [address] Right 3/27/15    rt wrist    ENDOSCOPY, COLON, DIAGNOSTIC      UGI    GASTRIC BAND REMOVAL  2017    subcutaneous port    HC CATH POWER PICC SINGLE  2014         LAP BAND  2007    [place] COLSC FLX W/RMVL OF TUMOR POLYP LESION SNARE TQ N/A 11/15/2018    COLONOSCOPY POLYPECTOMY SNARE/COLD BIOPSY performed by Sj Garza MD at [address] [place] [address] Right     SLEEVE GASTRECTOMY N/A 2017    GASTRECTOMY SLEEVE LAPAROSCOPIC SI ROBOTIC, ENDOSEALER performed by Bridger Olmedo MD Rule Out 08/27/20 08/27/20 08/27/20 COVID-19 (Ordered)   08/27/20 Rule-Out Test Resulted          Nurse Assessment:  Last Vital Signs: BP (!) 165/65   Pulse 80   Temp 99.8 °F (37.7 °C)   Resp 20   Ht 5' 10\" (1.778 m)   Wt 252 lb 6.8 oz (114.5 kg)   SpO2 96%   BMI 36.22 kg/m²     Last documented pain score (0-10 scale): Pain Level: 0  Last Weight:   Wt Readings from Last 1 Encounters:   09/03/20 252 lb 6.8 oz (114.5 kg)     Mental Status:  oriented and alert    IV Access:  -FISTULA Right ARM     Nursing Mobility/ADLs:  Walking   Independent  Transfer  Independent  Bathing  Independent  Dressing  Independent  Toileting  Independent  Feeding  Independent  Med Admin  Independent  Med Delivery   whole    Wound Care Documentation and Therapy:  Wound 08/28/20 Buttocks Left s/p abscess I&D (Active)   Wound Image   08/28/20 1423   Wound Non-Healing Surgical 09/01/20 2000   Dressing Status Clean;Dry; Intact 09/03/20 1200   Dressing Changed Dressing reinforced 09/03/20 1200   Dressing/Treatment Other (comment) 09/03/20 1200   Wound Cleansed Rinsed/Irrigated with saline 09/02/20 1200   Dressing Change Due 09/04/20 09/03/20 1200   Wound Length (cm) 1.7 cm 08/28/20 1423   Wound Width (cm) 0.8 cm 08/28/20 1423   Wound Depth (cm) 0.3 cm 08/28/20 1423   Wound Surface Area (cm^2) 1.36 cm^2 08/28/20 1423   Wound Volume (cm^3) 0.41 cm^3 08/28/20 1423   Wound Assessment Drainage;Fragile;Red;Pink;Slough 09/03/20 1200   Drainage Amount Small 09/03/20 1200   Drainage Description Serosanguinous 09/03/20 1200   Odor None 09/03/20 1200   [place]-wound Assessment Dry;Clean; Intact 09/03/20 1200   Number of days: 6        Elimination:  Continence:   · Bowel:  Yes  · Bladder: Yes  Urinary Catheter: None   Colostomy/Ileostomy/Ileal Conduit: No       Date of Last BM: 9/3/2020    Intake/Output Summary (Last 24 hours) at 9/3/2020 1518  Last data filed at 9/3/2020 1130  Gross per 24 hour   Intake 0 ml   Output 750 ml   Net -750 ml     I/O last 3 Update Admission H&P: No change in H&P    PHYSICIAN SIGNATURE:  Electronically signed by Justo Nino MD on 9/3/20 at 11:57 AM EDT

## 2020-09-03 NOTE — PLAN OF CARE
Nutrition Problem #1: Moderate malnutrition, In context of acute illness or injury  Intervention: Food and/or Nutrient Delivery: Continue Current Diet, Start Oral Nutrition Supplement  Nutritional Goals: Pt to consume >75% of est'd needs via PO

## 2020-09-03 NOTE — PROGRESS NOTES
Discharge order placed for patient. [facility] to make sure he could get an appointment for his PT/INR tomorrow as his INR was 5.3 today. Patient is unable to go to normal clinic as he is covid positive. They referred me to outpatient lab. Outpatient lab doesn't want to draw labs on patient either. Messaged Chaimcrystal Coreasyoel and let him know that patient will need home care. He said he will place order. Called Case Management to let her know what is going on.     Electronically signed by Tom Dominguez RN on 9/3/2020 at 3:14 PM

## 2020-09-03 NOTE — PROGRESS NOTES
Grazyna served Dr. Melrose Shone. Writer wrote, \"pts INR came back at 5.3\"    Dr. Melrose Shone wrote, \"Ok. Any signs of bleeding? \"    Writer wrote, \"no. he is currently getting dialysis\"    Dr. Melrose Shone wrote, \"Ok. Will watch. Consulted pharmacy to dose warfarin. We held his warfarin yesterday\"    No new orders placed at this time. Will continue to monitor closely.        Electronically signed by Stephanie Shah RN on 9/3/2020 at 8:38 AM

## 2020-09-04 ENCOUNTER — CARE COORDINATION (OUTPATIENT)
Dept: CASE MANAGEMENT | Age: 50
End: 2020-09-04

## 2020-09-04 LAB
CULTURE: NORMAL
CULTURE: NORMAL
Lab: NORMAL
Lab: NORMAL
SPECIMEN DESCRIPTION: NORMAL
SPECIMEN DESCRIPTION: NORMAL

## 2020-09-04 NOTE — CARE COORDINATION
[facility] Transitions Initial Follow Up Call    Call within 2 business days of discharge: Yes    Patient: Jeramie Avila Patient : 1970   MRN: [id number]  Reason for Admission: COVID 19 +  Discharge Date: 9/3/20 RARS: Readmission Risk Score: 21      Last Discharge [facility]       Complaint Diagnosis Description Type Department Provider    20 Respiratory Distress COVID-19 . .. ED to Hosp-Admission (Discharged) (ADMITTED) [facility] 3 Huang Anders MD; Nehemiah Wu. .. Spoke with: [address]: [facility]    Spoke with patient who said he did not do well coming home yesterday. He states his sister brought him home, he passed out in the driveway and was incontinent of stool. He states he is currently at [facility]. Episode resolved.      Alesha Saavedra RN

## 2020-09-08 ENCOUNTER — TELEPHONE (OUTPATIENT)
Dept: PHARMACY | Age: 50
End: 2020-09-08

## 2020-09-08 NOTE — TELEPHONE ENCOUNTER
Patient called to determine his next steps to set up INR monitoring. He is still having a cough associated with COVID 19. I advises calling the home care agency that was supposed to check hisINR last Friday. I advised getting an INR check either Wednesday or Thursday of this week. Patient is still under quarantine and can NOT come into clinic. [address] [place] [address]  Ph., CACP, Clinical Pharmacist  Anticoagulation Services, [address] [place] [address] [facility]  9/8/2020  2:08 PM

## 2020-09-09 ENCOUNTER — APPOINTMENT (OUTPATIENT)
Dept: PHARMACY | Age: 50
End: 2020-09-09
Payer: COMMERCIAL

## 2020-09-11 ENCOUNTER — TELEPHONE (OUTPATIENT)
Dept: BARIATRICS/WEIGHT MGMT | Age: 50
End: 2020-09-11

## 2020-09-23 ENCOUNTER — HOSPITAL ENCOUNTER (OUTPATIENT)
Dept: PHARMACY | Age: 50
Setting detail: THERAPIES SERIES
Discharge: HOME | End: 2020-09-23
Payer: COMMERCIAL

## 2020-09-23 VITALS — TEMPERATURE: 96.9 F

## 2020-09-23 LAB
INR BLD: 3.7
PROTIME: 44.7 SECONDS

## 2020-09-23 PROCEDURE — 99212 OFFICE O/P EST SF 10 MIN: CPT

## 2020-09-23 PROCEDURE — 85610 PROTHROMBIN TIME: CPT

## 2020-09-23 NOTE — PROGRESS NOTES
Medication Management Service, Warfarin Management  RAUL GARCIA [facility], [phone]  Visit Date: 9/23/2020   Subjective:   Gustavo Cadet is a 44 y.o. male who presents to clinic today for anticoagulation monitoring and adjustment. Patient seen in clinic for warfarin management due to  Indication:   atrial fibrillation and DVT. INR goal: of 2.0-3.0. Duration of therapy: indefinite. Assessment and PLAN   PT/INR done in office per protocol. INR today is 3.7, supratherapeutic, cause not identified but may be related recent health issues and decline in overall dietary intake, but improving. Plan: Will reduce regimen by 10% to 2mg on Wed. Only and 4mg all other days of the week. May need a slight increase as intake continues to improve. Using warfarin 2 mg tablets. Recheck INR in 2 week(s). Patient seen in room # 3. COVID screening complete and temperature recorded. Patient verbalized understanding of dosing directions and information discussed. Dosing schedule given to patient. Progress note sent to referring office. Patient acknowledges working in consult agreement with pharmacist as referred by his/her physician.       Electronically signed by Bernadine Rowland RPh, ARSENIO  Clinical Pharmacist Medication Management  9/23/2020  11:35 AM      CLINICAL PHARMACY CONSULT: MED RECONCILIATION/REVIEW ADDENDUM    For Pharmacy Admin Tracking Only    PHSO: No  Total # of Interventions Recommended: 1  - Decreased Dose #: 1  - Maintenance Safety Lab Monitoring #: 1  Total Interventions Accepted: 1  Time Spent (min): [address] [place], ARSENIO  Clinical Pharmacist Medication Management  9/23/2020  11:35 AM

## 2020-10-07 ENCOUNTER — HOSPITAL ENCOUNTER (OUTPATIENT)
Dept: PHARMACY | Age: 50
Setting detail: THERAPIES SERIES
Discharge: HOME | End: 2020-10-07
Payer: COMMERCIAL

## 2020-10-07 VITALS — TEMPERATURE: 97.5 F

## 2020-10-07 LAB
INR BLD: 3
PROTIME: 35.5 SECONDS

## 2020-10-07 PROCEDURE — 85610 PROTHROMBIN TIME: CPT

## 2020-10-07 PROCEDURE — 99211 OFF/OP EST MAY X REQ PHY/QHP: CPT

## 2020-10-07 NOTE — PROGRESS NOTES
Medication Management Service, Warfarin Management  [address], [phone]  Visit Date: 10/7/2020   Subjective:   Demarcus Cabello is a 44 y.o. male who presents to clinic today for anticoagulation monitoring and adjustment. Patient seen in clinic for warfarin management due to  Indication:   atrial fibrillation and DVT. INR goal: of 2.0-3.0. Duration of therapy: indefinite. Assessment and PLAN   PT/INR done in office per protocol. INR today is 3.0, therapeutic. Plan: Will continue current regimen of warfarin 2 mg every Wednesday' and 4 mg all other days of the week. Using warfarin 2 mg tablets. Recheck INR in 3 week(s). Patient seen in room # 1. COVID screening complete and temperature recorded. Patient verbalized understanding of dosing directions and information discussed. Dosing schedule given to patient. Progress note sent to referring office. Patient acknowledges working in consult agreement with pharmacist as referred by his/her physician. [address] [place] [address]  Ph., CACP, Clinical Pharmacist  Anticoagulation Services, [address] [facility] Coumadin [facility]  10/7/2020  9:01 AM    CLINICAL PHARMACY CONSULT: MED RECONCILIATION/REVIEW ADDENDUM    For Pharmacy Admin Tracking Only    PHSO: No  Total # of Interventions Recommended: 0    - Maintenance Safety Lab Monitoring #: 1  Total Interventions Accepted: 0  Time Spent (min): 30    Electronically signed by VIELKA Mojica [facility] on 10/7/20 at 9:01 AM EDT

## 2020-10-08 ENCOUNTER — OFFICE VISIT (OUTPATIENT)
Dept: INTERNAL MEDICINE CLINIC | Age: 50
End: 2020-10-08
Payer: COMMERCIAL

## 2020-10-08 VITALS
SYSTOLIC BLOOD PRESSURE: 88 MMHG | RESPIRATION RATE: 18 BRPM | DIASTOLIC BLOOD PRESSURE: 50 MMHG | TEMPERATURE: 97 F | BODY MASS INDEX: 36.71 KG/M2 | OXYGEN SATURATION: 91 % | HEIGHT: 70 IN | HEART RATE: 89 BPM | WEIGHT: 256.4 LBS

## 2020-10-08 PROBLEM — B95.62 BACTEREMIA DUE TO METHICILLIN RESISTANT STAPHYLOCOCCUS AUREUS: Status: RESOLVED | Noted: 2020-08-28 | Resolved: 2020-10-08

## 2020-10-08 PROBLEM — U07.1 COVID-19 WITH PULMONARY COMORBIDITY: Status: ACTIVE | Noted: 2020-10-08

## 2020-10-08 PROBLEM — E44.0 MODERATE MALNUTRITION (HCC): Status: RESOLVED | Noted: 2020-09-03 | Resolved: 2020-10-08

## 2020-10-08 PROBLEM — R55 NEUROCARDIOGENIC SYNCOPE: Status: ACTIVE | Noted: 2020-10-08

## 2020-10-08 PROBLEM — U07.1 COVID-19: Status: RESOLVED | Noted: 2020-08-27 | Resolved: 2020-10-08

## 2020-10-08 PROBLEM — J98.4 COVID-19 WITH PULMONARY COMORBIDITY: Status: ACTIVE | Noted: 2020-10-08

## 2020-10-08 PROBLEM — R78.81 BACTEREMIA DUE TO METHICILLIN RESISTANT STAPHYLOCOCCUS AUREUS: Status: RESOLVED | Noted: 2020-08-28 | Resolved: 2020-10-08

## 2020-10-08 PROCEDURE — 99214 OFFICE O/P EST MOD 30 MIN: CPT | Performed by: FAMILY MEDICINE

## 2020-10-08 RX ORDER — ERGOCALCIFEROL 1.25 MG/1
CAPSULE ORAL
Qty: 3 CAPSULE | Refills: 3 | Status: SHIPPED | OUTPATIENT
Start: 2020-10-08 | End: 2022-01-19 | Stop reason: SDUPTHER

## 2020-10-08 RX ORDER — WARFARIN SODIUM 4 MG/1
4 TABLET ORAL
COMMUNITY
End: 2020-12-10 | Stop reason: ALTCHOICE

## 2020-10-08 ASSESSMENT — ENCOUNTER SYMPTOMS
ALLERGIC/IMMUNOLOGIC NEGATIVE: 1
EYES NEGATIVE: 1
GASTROINTESTINAL NEGATIVE: 1
SHORTNESS OF BREATH: 1
BACK PAIN: 1

## 2020-10-08 NOTE — PROGRESS NOTES
Subjective:      Patient ID: Theresa Adam is a 44 y.o. male. Congestive Heart Failure   Presents for follow-up visit. Associated symptoms include shortness of breath. The symptoms have been improving. Compliance with total regimen is %. Compliance with diet is 51-75%. Compliance with exercise is 51-75%. Compliance with medications is %. Review of Systems   Constitutional: Negative. HENT: Negative. Eyes: Negative. Respiratory: Positive for shortness of breath. Cardiovascular: Negative. Gastrointestinal: Negative. Endocrine: Negative. Musculoskeletal: Positive for arthralgias and back pain. Skin: Negative. Allergic/Immunologic: Negative. Neurological: Negative. Hematological: Negative. Psychiatric/Behavioral: The patient is nervous/anxious. Past family and social history unremarkable. Diagnosis Orders   1. COVID-19 with pulmonary comorbidity     2. Hospital discharge follow-up     3. Benign prostatic hyperplasia with lower urinary tract symptoms, symptom details unspecified     4. ESRD on hemodialysis ([address]     5. Erectile dysfunction due to arterial insufficiency     6. Chronic atrial fibrillation     7. Warfarin-induced coagulopathy ([address]     8. Deep vein thrombosis (DVT) of right lower extremity, unspecified chronicity, unspecified vein (HCC)     9. S/P laparoscopic sleeve gastrectomy     10. Vitamin D deficiency     11. Lymphedema     12. Chronic right-sided heart failure ([address]     13. Peripheral vascular disease ([address]     14. Malignant neoplasm of transverse colon ([address]     15. Status post partial colectomy     16. Rotator cuff arthropathy of right shoulder     17. Neurocardiogenic syncope           Objective:   Physical Exam  Vitals signs and nursing note reviewed. Constitutional:       Appearance: He is well-developed. HENT:      Head: Normocephalic and atraumatic.       Right Ear: External ear normal.      Left Ear: External ear normal. transverse colon ([facility]     15. Status post partial colectomy     16. Rotator cuff arthropathy of right shoulder     17. Neurocardiogenic syncope             Plan: This is a hospital follow-up. 78-year-old -American male was admitted to [place] for COVID-19 infection. He was discharged and started to have syncope. Patient states that he was transported to [facility] where he spent another 1 week and discharged in stable condition. Ever since he has been asymptomatic. He states that he has been in quarantine for 2 weeks  History of neurocardiogenic syncope on ProAmatine. His systolic blood pressure is less than 19. However he does not express any orthostatic sign. He is advised to continue ProAmatine as per nephrology and caution fall. End-stage renal disease hemodialysis dependent on 3/week the last one was done yesterday  Anemia of chronic disease H&H is stable at baseline  Secondary hyperparathyroidism  History of morbid obesity status post sleeve gastrectomy with loss of 100 pounds. Degenerative polyarthralgia. Avoid nephrotoxic drugs. May take over-the-counter Tylenol as needed  History of malignant neoplasm of transverse colon status post partial colectomy. He is advised to follow-up with colorectal surgeon  History of congestive heart failure, dialysis. No recent decompensation. He is established with cardiology  Stable pulmonary nodule. He is non-smoker  Med list and available labs reviewed, discussed with patient, questions answered  History of chronic A. fib. Rate controlled. He is on amiodarone per cardiology. He is on Coumadin titrated by Coumadin clinic. He is counseled on Coumadin compatible diet  This note is created with a voice recognition program and while intend to generate a document that accurately reflects the content of the visit, no guarantee can be provided that every mistake has been identified and corrected by editing.           Evans Talley, MD

## 2020-10-28 ENCOUNTER — HOSPITAL ENCOUNTER (OUTPATIENT)
Dept: PHARMACY | Age: 50
Setting detail: THERAPIES SERIES
Discharge: HOME | End: 2020-10-28
Payer: COMMERCIAL

## 2020-10-28 ENCOUNTER — APPOINTMENT (OUTPATIENT)
Dept: GENERAL RADIOLOGY | Age: 50
End: 2020-10-28
Payer: COMMERCIAL

## 2020-10-28 ENCOUNTER — HOSPITAL ENCOUNTER (EMERGENCY)
Age: 50
Discharge: HOME | End: 2020-10-28
Attending: EMERGENCY MEDICINE
Payer: COMMERCIAL

## 2020-10-28 VITALS
DIASTOLIC BLOOD PRESSURE: 70 MMHG | RESPIRATION RATE: 16 BRPM | BODY MASS INDEX: 45.1 KG/M2 | WEIGHT: 315 LBS | SYSTOLIC BLOOD PRESSURE: 104 MMHG | HEART RATE: 115 BPM | OXYGEN SATURATION: 97 % | HEIGHT: 70 IN | TEMPERATURE: 98.4 F

## 2020-10-28 VITALS — TEMPERATURE: 96.9 F

## 2020-10-28 LAB
INR BLD: 1.8
PROTIME: 22.1 SECONDS

## 2020-10-28 PROCEDURE — 73630 X-RAY EXAM OF FOOT: CPT

## 2020-10-28 PROCEDURE — 85610 PROTHROMBIN TIME: CPT

## 2020-10-28 PROCEDURE — 73610 X-RAY EXAM OF ANKLE: CPT

## 2020-10-28 PROCEDURE — 99283 EMERGENCY DEPT VISIT LOW MDM: CPT

## 2020-10-28 PROCEDURE — 6370000000 HC RX 637 (ALT 250 FOR IP): Performed by: EMERGENCY MEDICINE

## 2020-10-28 PROCEDURE — 99212 OFFICE O/P EST SF 10 MIN: CPT

## 2020-10-28 RX ORDER — ACETAMINOPHEN 325 MG/1
650 TABLET ORAL ONCE
Status: COMPLETED | OUTPATIENT
Start: 2020-10-28 | End: 2020-10-28

## 2020-10-28 RX ADMIN — ACETAMINOPHEN 650 MG: 325 TABLET ORAL at 12:50

## 2020-10-28 ASSESSMENT — PAIN DESCRIPTION - LOCATION: LOCATION: ANKLE;FOOT

## 2020-10-28 ASSESSMENT — PAIN DESCRIPTION - ORIENTATION: ORIENTATION: LEFT

## 2020-10-28 ASSESSMENT — ENCOUNTER SYMPTOMS
ABDOMINAL PAIN: 0
SHORTNESS OF BREATH: 0
BACK PAIN: 0

## 2020-10-28 ASSESSMENT — PAIN SCALES - GENERAL
PAINLEVEL_OUTOF10: 7
PAINLEVEL_OUTOF10: 7

## 2020-10-28 ASSESSMENT — PAIN DESCRIPTION - PAIN TYPE: TYPE: ACUTE PAIN

## 2020-10-28 NOTE — PROGRESS NOTES
Medication Management Service, Warfarin Management  RAUL GARCIA [facility], [phone]  Visit Date: 10/28/2020   Subjective:   Miles Bennett is a 44 y.o. male who presents to clinic today for anticoagulation monitoring and adjustment. Patient seen in clinic for warfarin management due to  Indication:   atrial fibrillation and DVT. INR goal: of 2.0-3.0. Duration of therapy: indefinite. Assessment and PLAN   PT/INR done in office per protocol. INR today is 1.8, subtherapeutic. Patient denies all usual causes for drop in INR today. Plan: Will increase current regimen of warfarin by 7.7 % or 2 mg weekly. New regimen will be warfarin 4 mg daily. Using warfarin 2 mg tablets. Recheck INR in 2 week(s). Patient seen in room # 1. COVID screening complete and temperature recorded. Patient verbalized understanding of dosing directions and information discussed. Dosing schedule given to patient. Progress note sent to referring office. Patient acknowledges working in consult agreement with pharmacist as referred by his/her physician. [address] [place] [address]  Ph., CACP, Clinical Pharmacist  Anticoagulation Services, [address] [facility] Coumadin [facility]  10/28/2020  8:37 AM    CLINICAL PHARMACY CONSULT: MED RECONCILIATION/REVIEW ADDENDUM    For Pharmacy Admin Tracking Only    PHSO: No  Total # of Interventions Recommended: 1  - Increased Dose #: 1  - Maintenance Safety Lab Monitoring #: 1  Total Interventions Accepted: 1  Time Spent (min): 30    Electronically signed by Christianne Wallace RPH on 10/28/20 at 8:39 AM EDT

## 2020-10-28 NOTE — ED TRIAGE NOTES
Pt arrived to the ED with c/o left foot pain that started this am . Pt states he was getting off the elevator and felt a pop and the pain became worse after that. Pt states he thought the pain was from riding his bike but after the pop it was a different feeling of pain. Pt is alert and oriented x4.

## 2020-10-28 NOTE — ED PROVIDER NOTES
[facility] ED  EMERGENCY DEPARTMENT ENCOUNTER    Pt Name: Ceferino Nye  MRN: [id number]  Birthdate1970  Date of evaluation: 10/28/2020      CHIEF COMPLAINT       Chief Complaint   Patient presents with    Foot Pain     left          HISTORY OF PRESENT ILLNESS    Ceferino Nye is a 44 y.o. male who presents left foot and ankle pain. States has been hurting for about a week and a half no specific injury can recall. However this morning stepped out of an elevator and felt a \"pop\" in his foot. Has been ambulatory since despite pain. No prior injuries to this foot or ankle. No other arthralgias myalgias. No history of DVT. Has not taken anything for pain prior to arrival.  Is a dialysis patient last run yesterday no complications        REVIEW OF SYSTEMS       Review of Systems   Constitutional: Negative for fever. Respiratory: Negative for shortness of breath. Cardiovascular: Negative for chest pain. Gastrointestinal: Negative for abdominal pain. Musculoskeletal: Positive for arthralgias and joint swelling. Negative for back pain and myalgias. Neurological: Negative for weakness and numbness. PAST MEDICAL HISTORY    has a past medical history of Anemia, Arthritis, Atrial fibrillation ([address] BPH (benign prostatic hypertrophy), CAD (coronary artery disease), Caffeine use, Cellulitis of lower leg, Chronic back pain, Colon cancer ([address] Cor pulmonale, acute ([address] CRF (chronic renal failure), Erectile dysfunction, Gout, Hemodialysis patient ([address] History of blood transfusion, Hyperkalemia, Hyperlipidemia, Hypertension, Hypotension, Hypothyroidism, Lymphedema of leg, Obesity, and Thyroid disease. SURGICAL HISTORY      has a past surgical history that includes Appendectomy; Lap Band (2007); Abscess Drainage (Right, 01/20/2014); hc cath power picc single (1/24/2014); Dialysis fistula creation (Right, 3/27/15); shoulder surgery (Right, 2014);  Bariatric Surgery (2017); Endoscopy, colon, diagnostic; Sleeve Gastrectomy (N/A, 2017); pr colsc flx w/rmvl of tumor polyp lesion snare tq (N/A, 11/15/2018); vascular surgery (Right); Colonoscopy; Abdominal exploration surgery (2019); Colonoscopy (2019); Small intestine surgery (N/A, 2019); and Colonoscopy (N/A, 2019). CURRENT MEDICATIONS       Previous Medications    ACETAMINOPHEN (TYLENOL) 500 MG TABLET    Take 2 tablets by mouth every 6 hours as needed for Pain or Fever    AMIODARONE (CORDARONE) 200 MG TABLET    TAKE ONE TABLET BY MOUTH DAILY    ASPIRIN LOW DOSE 81 MG EC TABLET    TAKE ONE TABLET BY MOUTH DAILY    B COMPLEX-C-FOLIC ACID (NEPHRO-JENNIFER) 0.8 MG TABS    TAKE ONE TABLET BY MOUTH DAILY WITH BREAKFAST    B COMPLEX-VITAMIN C-FOLIC ACID (NEPHRO-JENNIFER) 1 MG TABLET    TAKE ONE TABLET BY MOUTH DAILY WITH BREAKFAST    CINACALCET (SENSIPAR) 30 MG TABLET    Take 30 mg by mouth daily    ELASTIC BANDAGES & SUPPORTS (MEDICAL COMPRESSION STOCKINGS) MISC    1 each by Does not apply route daily as needed (as needed) Right leg below the knee 20-30 HH MM DX 82.409    HYDROCODONE-ACETAMINOPHEN (NORCO) 5-325 MG PER TABLET    Take 1 tablet by mouth every 6 hours as needed for Pain. [place] Glow MIDODRINE (PROAMATINE) 10 MG TABLET    TAKE ONE TABLET BY MOUTH THREE TIMES DAILY    MULTIPLE VITAMIN (MVI, CELEBRATE, CHEWABLE TABLET)    Take 1 tablet by mouth 2 times daily     PRAVASTATIN (PRAVACHOL) 40 MG TABLET    Take 40 mg by mouth nightly     VITAMIN D (ERGOCALCIFEROL) 1.25 MG (57968 UT) CAPS CAPSULE    TAKE ONE CAPSULE BY MOUTH ONCE A MONTH    WARFARIN (COUMADIN) 2 MG TABLET    TAKE 1 AND 1/2 TABLETS OR 2 TABLETS (OR AS DIRECTED BY OFFICE) BY MOUTH ONCE DAILY    WARFARIN (COUMADIN) 4 MG TABLET    Take 4 mg by mouth       ALLERGIES     has No Known Allergies. FAMILY HISTORY     He indicated that his mother is . He indicated that his father is .  He indicated that the status of his maternal grandmother is unknown. He indicated that the status of his paternal grandfather is unknown.     family history includes Arthritis in his mother; Cancer in his father; Heart Disease in his maternal grandmother; Heart Failure in his mother; High Blood Pressure in his mother; Stroke in his paternal grandfather. SOCIAL HISTORY      reports that he has never smoked. He has never used smokeless tobacco. He reports current alcohol use of about 3.0 standard drinks of alcohol per week. He reports that he does not use drugs. PHYSICAL EXAM     INITIAL VITALS:  height is 5' 10\" (1.778 m) and weight is 330 lb (149.7 kg) (abnormal). His oral temperature is 98.4 °F (36.9 °C). His blood pressure is 104/70 and his pulse is 115. His respiration is 16 and oxygen saturation is 97%. Physical Exam  Constitutional:       Appearance: Normal appearance. Cardiovascular:      Pulses: Normal pulses. Pulmonary:      Effort: Pulmonary effort is normal.   Musculoskeletal:      Right lower leg: No edema. Left lower leg: No edema. Comments: Patient has nonfocal tenderness of the left lateral ankle. Also fifth metatarsal tenderness. No midfoot tenderness. No step-offs deformities no open injury. No proximal fibular tenderness   Skin:     General: Skin is warm. Capillary Refill: Capillary refill takes less than 2 seconds. Neurological:      General: No focal deficit present. Mental Status: He is alert and oriented to person, place, and time. DIFFERENTIAL DIAGNOSIS/ MDM:     Sprain, strain, fracture left ankle and/or foot. Will image.   Pain control    DIAGNOSTIC RESULTS     EKG: All EKG's are interpreted by the Emergency Department Physician who either signs or Co-signs this chart in the [address] a cardiologist.    none    RADIOLOGY:   I directly visualized the following  images and reviewed theradiologist interpretations:     XR ANKLE LEFT (MIN 3 VIEWS)   Final Result   Left ankle: Mild ventral soft

## 2020-11-11 ENCOUNTER — HOSPITAL ENCOUNTER (OUTPATIENT)
Dept: PHARMACY | Age: 50
Setting detail: THERAPIES SERIES
Discharge: HOME | End: 2020-11-11
Payer: COMMERCIAL

## 2020-11-11 VITALS — TEMPERATURE: 96.4 F

## 2020-11-11 LAB
INR BLD: 2.1
PROTIME: 25.1 SECONDS

## 2020-11-11 PROCEDURE — 85610 PROTHROMBIN TIME: CPT

## 2020-11-11 PROCEDURE — 99211 OFF/OP EST MAY X REQ PHY/QHP: CPT

## 2020-11-11 NOTE — PROGRESS NOTES
Medication Management Service, Warfarin Management  RAUL GARCIA [facility], [phone]  Visit Date: 11/11/2020   Subjective:   Alda Maher is a 44 y.o. male who presents to clinic today for anticoagulation monitoring and adjustment. Patient seen in clinic for warfarin management due to  Indication:   Atrial fibrillation and DVT  INR goal: of 2.0-3.0. Duration of therapy: indefinite. Assessment and PLAN   PT/INR done in office per protocol. INR today is 2.1, therapeutic. Plan: Will continue current regimen of warfarin 4 mg every day. Using warfarin 2 mg tablets. Recheck INR in 3 week(s). Patient seen in room # 1. COVID screening complete and temperature recorded. Patient verbalized understanding of dosing directions and information discussed. Dosing schedule given to patient. Progress note sent to referring office. Patient acknowledges working in consult agreement with pharmacist as referred by his/her physician.       Electronically signed by James Torres on 11/11/20 at 8:31 AM EST    CLINICAL PHARMACY CONSULT: MED RECONCILIATION/REVIEW [address]. Tracking Only    PHSO: No  Total # of Interventions Recommended: 0  - Maintenance Safety Lab Monitoring #: 1  Total Interventions Accepted: 0  Time Spent (min): 30    Electronically signed by James Torres on 11/11/20 at 8:32 AM EST

## 2020-12-02 ENCOUNTER — HOSPITAL ENCOUNTER (OUTPATIENT)
Dept: PHARMACY | Age: 50
Setting detail: THERAPIES SERIES
Discharge: HOME | End: 2020-12-02
Payer: COMMERCIAL

## 2020-12-02 VITALS — TEMPERATURE: 97.3 F

## 2020-12-02 LAB
INR BLD: 1.7
PROTIME: 20.3 SECONDS

## 2020-12-02 PROCEDURE — 85610 PROTHROMBIN TIME: CPT

## 2020-12-02 PROCEDURE — 99212 OFFICE O/P EST SF 10 MIN: CPT

## 2020-12-02 NOTE — PROGRESS NOTES
Medication Management Service, Warfarin Management  RAUL GARCIA [facility]], [phone]  Visit Date: 12/2/2020   Subjective:   Jaja Rhodes is a 44 y.o. male who presents to clinic today for anticoagulation monitoring and adjustment. Patient seen in clinic for warfarin management due to  Indication:   atrial fibrillation and DVT. INR goal: of 2.0-3.0. Duration of therapy: indefinite. Assessment and PLAN   PT/INR done in office per protocol. INR today is 1.7, subtherapeutic. Drop in INR due to an increase in vegetables including \"greens\" and cabbage over the holiday. Plan: Will increase dose from 4 mg to 6 mg for today only. Then continue current regimen of warfarin 4 mg daily. Using warfarin 2 mg tablets. Recheck INR in 2 week(s). Patient seen in room # 1. COVID screening complete and temperature recorded. Patient verbalized understanding of dosing directions and information discussed. Dosing schedule given to patient. Progress note sent to referring office. Patient acknowledges working in consult agreement with pharmacist as referred by his/her physician. [address] [place] [address] Ph., CACP, Clinical Pharmacist  Anticoagulation Services, [address] [facility] Coumadin [facility]  12/2/2020  8:40 AM    CLINICAL PHARMACY CONSULT: MED RECONCILIATION/REVIEW ADDENDUM    For Pharmacy Admin Tracking Only    PHSO: No  Total # of Interventions Recommended: 1  - Increased Dose #: 1  - Maintenance Safety Lab Monitoring #: 1  Total Interventions Accepted: 1  Time Spent (min): 30  Leonila THOMAS.  Ph., CACP, Clinical Pharmacist  Anticoagulation Services, [address] [place] [address] Coumadin [facility]  12/2/2020  8:42 AM

## 2020-12-10 RX ORDER — WARFARIN SODIUM 4 MG/1
TABLET ORAL
Qty: 90 TABLET | Refills: 1 | Status: SHIPPED | OUTPATIENT
Start: 2020-12-10 | End: 2021-05-14 | Stop reason: SDUPTHER

## 2020-12-16 ENCOUNTER — HOSPITAL ENCOUNTER (OUTPATIENT)
Dept: PHARMACY | Age: 50
Setting detail: THERAPIES SERIES
Discharge: HOME | End: 2020-12-16
Payer: COMMERCIAL

## 2020-12-16 VITALS — TEMPERATURE: 96.4 F

## 2020-12-16 LAB
INR BLD: 1.7
PROTIME: 20 SECONDS

## 2020-12-16 PROCEDURE — 85610 PROTHROMBIN TIME: CPT

## 2020-12-16 PROCEDURE — 99212 OFFICE O/P EST SF 10 MIN: CPT

## 2020-12-30 ENCOUNTER — HOSPITAL ENCOUNTER (OUTPATIENT)
Dept: PHARMACY | Age: 50
Setting detail: THERAPIES SERIES
Discharge: HOME | End: 2020-12-30
Payer: COMMERCIAL

## 2020-12-30 VITALS — TEMPERATURE: 96.4 F

## 2020-12-30 LAB
INR BLD: 1.7
PROTIME: 20.4 SECONDS

## 2020-12-30 PROCEDURE — 85610 PROTHROMBIN TIME: CPT

## 2020-12-30 PROCEDURE — 99212 OFFICE O/P EST SF 10 MIN: CPT

## 2020-12-30 NOTE — PROGRESS NOTES
Medication Management Service, Warfarin Management  RAUL GARCIA [facility], [phone]  Visit Date: 12/30/2020   Subjective:   Carol Ann Brian is a 44 y.o. male who presents to clinic today for anticoagulation monitoring and adjustment. Patient seen in clinic for warfarin management due to  Indication:   atrial fibrillation and DVT. INR goal: of 2.0-3.0. Duration of therapy: indefinite. Assessment and PLAN   PT/INR done in office per protocol. INR today is 1.7, subtherapeutic. Patient denies all usual causes of low INR. Plan: Will increase current regimen of warfarin by 6.7%. New regimen will be warfarin 6 mg every Wednesday, Saturday; 4 mg all other days. Using warfarin 4 mg tablets. Recheck INR in 2 week(s). Patient seen in room # 1. COVID screening complete and temperature recorded. Patient verbalized understanding of dosing directions and information discussed. Dosing schedule given to patient. Progress note sent to referring office. Patient acknowledges working in consult agreement with pharmacist as referred by his/her physician.       Electronically signed by Vj Baron on 12/30/20 at 8:22 AM EST    CLINICAL PHARMACY CONSULT: MED RECONCILIATION/REVIEW [address] Tracking Only    PHSO: No  Total # of Interventions Recommended: 1  - Increased Dose #: 1  - Maintenance Safety Lab Monitoring #: 1  Total Interventions Accepted: 1  Time Spent (min): 30    Electronically signed by Vj Baron on 12/30/20 at 10:23 AM EST

## 2021-01-08 ENCOUNTER — OFFICE VISIT (OUTPATIENT)
Dept: INTERNAL MEDICINE CLINIC | Age: 51
End: 2021-01-08
Payer: COMMERCIAL

## 2021-01-08 VITALS
HEART RATE: 100 BPM | HEIGHT: 70 IN | RESPIRATION RATE: 18 BRPM | DIASTOLIC BLOOD PRESSURE: 60 MMHG | WEIGHT: 267 LBS | TEMPERATURE: 97.1 F | SYSTOLIC BLOOD PRESSURE: 90 MMHG | BODY MASS INDEX: 38.22 KG/M2

## 2021-01-08 DIAGNOSIS — C18.4 MALIGNANT NEOPLASM OF TRANSVERSE COLON (HCC): ICD-10-CM

## 2021-01-08 DIAGNOSIS — N18.6 ESRD ON HEMODIALYSIS (HCC): ICD-10-CM

## 2021-01-08 DIAGNOSIS — E55.9 VITAMIN D DEFICIENCY: ICD-10-CM

## 2021-01-08 DIAGNOSIS — I82.90 DEEP VEIN THROMBOSIS (DVT) OF NON-EXTREMITY VEIN, UNSPECIFIED CHRONICITY: ICD-10-CM

## 2021-01-08 DIAGNOSIS — N52.01 ERECTILE DYSFUNCTION DUE TO ARTERIAL INSUFFICIENCY: ICD-10-CM

## 2021-01-08 DIAGNOSIS — I73.9 PERIPHERAL VASCULAR DISEASE (HCC): ICD-10-CM

## 2021-01-08 DIAGNOSIS — Z86.16 HISTORY OF COVID-19: ICD-10-CM

## 2021-01-08 DIAGNOSIS — Z99.2 ESRD ON HEMODIALYSIS (HCC): ICD-10-CM

## 2021-01-08 DIAGNOSIS — Z23 NEED FOR PNEUMOCOCCAL VACCINATION: ICD-10-CM

## 2021-01-08 DIAGNOSIS — N40.1 BENIGN PROSTATIC HYPERPLASIA WITH LOWER URINARY TRACT SYMPTOMS, SYMPTOM DETAILS UNSPECIFIED: ICD-10-CM

## 2021-01-08 DIAGNOSIS — Z98.84 S/P LAPAROSCOPIC SLEEVE GASTRECTOMY: ICD-10-CM

## 2021-01-08 DIAGNOSIS — I48.20 CHRONIC ATRIAL FIBRILLATION (HCC): Primary | ICD-10-CM

## 2021-01-08 DIAGNOSIS — D68.32 WARFARIN-INDUCED COAGULOPATHY (HCC): ICD-10-CM

## 2021-01-08 DIAGNOSIS — I89.0 LYMPHEDEMA: ICD-10-CM

## 2021-01-08 DIAGNOSIS — Z90.49 STATUS POST PARTIAL COLECTOMY: ICD-10-CM

## 2021-01-08 DIAGNOSIS — M12.811 ROTATOR CUFF ARTHROPATHY OF RIGHT SHOULDER: ICD-10-CM

## 2021-01-08 DIAGNOSIS — T45.515A WARFARIN-INDUCED COAGULOPATHY (HCC): ICD-10-CM

## 2021-01-08 DIAGNOSIS — I50.810 RIGHT-SIDED HEART FAILURE, UNSPECIFIED HF CHRONICITY (HCC): ICD-10-CM

## 2021-01-08 PROBLEM — J98.4 COVID-19 WITH PULMONARY COMORBIDITY: Status: RESOLVED | Noted: 2020-10-08 | Resolved: 2021-01-08

## 2021-01-08 PROBLEM — R55 NEUROCARDIOGENIC SYNCOPE: Status: RESOLVED | Noted: 2020-10-08 | Resolved: 2021-01-08

## 2021-01-08 PROBLEM — U07.1 COVID-19 WITH PULMONARY COMORBIDITY: Status: RESOLVED | Noted: 2020-10-08 | Resolved: 2021-01-08

## 2021-01-08 PROBLEM — R91.8 PULMONARY NODULES: Status: RESOLVED | Noted: 2020-08-29 | Resolved: 2021-01-08

## 2021-01-08 PROCEDURE — 99214 OFFICE O/P EST MOD 30 MIN: CPT | Performed by: FAMILY MEDICINE

## 2021-01-08 PROCEDURE — 1036F TOBACCO NON-USER: CPT | Performed by: FAMILY MEDICINE

## 2021-01-08 PROCEDURE — 90732 PPSV23 VACC 2 YRS+ SUBQ/IM: CPT | Performed by: FAMILY MEDICINE

## 2021-01-08 PROCEDURE — G0009 ADMIN PNEUMOCOCCAL VACCINE: HCPCS | Performed by: FAMILY MEDICINE

## 2021-01-08 PROCEDURE — G8427 DOCREV CUR MEDS BY ELIG CLIN: HCPCS | Performed by: FAMILY MEDICINE

## 2021-01-08 PROCEDURE — G8484 FLU IMMUNIZE NO ADMIN: HCPCS | Performed by: FAMILY MEDICINE

## 2021-01-08 PROCEDURE — 3017F COLORECTAL CA SCREEN DOC REV: CPT | Performed by: FAMILY MEDICINE

## 2021-01-08 PROCEDURE — G8417 CALC BMI ABV UP PARAM F/U: HCPCS | Performed by: FAMILY MEDICINE

## 2021-01-08 ASSESSMENT — PATIENT HEALTH QUESTIONNAIRE - PHQ9
1. LITTLE INTEREST OR PLEASURE IN DOING THINGS: 0
SUM OF ALL RESPONSES TO PHQ9 QUESTIONS 1 & 2: 0
SUM OF ALL RESPONSES TO PHQ QUESTIONS 1-9: 0
SUM OF ALL RESPONSES TO PHQ QUESTIONS 1-9: 0

## 2021-01-08 NOTE — PROGRESS NOTES
Subjective:      Patient ID: Stefan Flores is a 44 y.o. male. Hyperlipidemia  This is a chronic problem. The current episode started more than 1 month ago. The problem is controlled. Exacerbating diseases include obesity. Factors aggravating his hyperlipidemia include fatty foods. Current antihyperlipidemic treatment includes statins. The current treatment provides moderate improvement of lipids. Compliance problems include adherence to diet. Risk factors for coronary artery disease include dyslipidemia and obesity. Review of Systems   Constitutional: Negative. HENT: Negative. Eyes: Negative. Respiratory: Negative. Cardiovascular: Negative. Gastrointestinal: Negative. Endocrine: Negative. Musculoskeletal: Negative. Skin: Negative. Allergic/Immunologic: Negative. Neurological: Negative. Hematological: Negative. Psychiatric/Behavioral: The patient is nervous/anxious. Past family and social history unremarkable. Diagnosis Orders   1. Chronic atrial fibrillation     2. Benign prostatic hyperplasia with lower urinary tract symptoms, symptom details unspecified     3. ESRD on hemodialysis (HCC)  CBC    Comprehensive Metabolic Panel    Hemoglobin A1C    Lipid Panel    TSH without Reflex   4. Erectile dysfunction due to arterial insufficiency     5. Need for pneumococcal vaccination  PNEUMOVAX 23 subcutaneous/IM (Pneumococcal polysaccharide vaccine 23-valent >= 3yo)   6. Warfarin-induced coagulopathy ([address]     7. Deep vein thrombosis (DVT) of non-extremity vein, unspecified chronicity     8. S/P laparoscopic sleeve gastrectomy     9. Vitamin D deficiency     10. Lymphedema     11. Right-sided heart failure, unspecified HF chronicity (HCC)  CBC    Comprehensive Metabolic Panel    Hemoglobin A1C    Lipid Panel    TSH without Reflex   12. Peripheral vascular disease ([address]     13.  Malignant neoplasm of transverse colon (HCC)  CBC    Comprehensive Metabolic Panel    Hemoglobin A1C    Lipid Panel    TSH without Reflex   14. Status post partial colectomy     15. Rotator cuff arthropathy of right shoulder     16. History of COVID-19           Objective:   Physical Exam  Vitals signs and nursing note reviewed. Constitutional:       Appearance: He is well-developed. HENT:      Head: Normocephalic and atraumatic. Right Ear: External ear normal.      Left Ear: External ear normal.      Nose: Nose normal.   Eyes:      Conjunctiva/sclera: Conjunctivae normal.      Pupils: Pupils are equal, round, and reactive to light. Neck:      Musculoskeletal: Normal range of motion and neck supple. Cardiovascular:      Rate and Rhythm: Normal rate and regular rhythm. Heart sounds: Normal heart sounds. Comments: Neurocardiogenic syncope  Hyperlipidemia  ESRD  Pulmonary:      Effort: Pulmonary effort is normal.      Breath sounds: Normal breath sounds. Abdominal:      General: Bowel sounds are normal.      Palpations: Abdomen is soft. Genitourinary:     Comments: ESRD HD x3/week  Musculoskeletal: Normal range of motion. Comments: Clean and patent AV fistula right upper extremity   Skin:     General: Skin is warm and dry. Neurological:      Mental Status: He is alert and oriented to person, place, and time. Deep Tendon Reflexes: Reflexes are normal and symmetric. Psychiatric:         Behavior: Behavior normal.         Thought Content: Thought content normal.         Assessment:       Diagnosis Orders   1. Chronic atrial fibrillation     2. Benign prostatic hyperplasia with lower urinary tract symptoms, symptom details unspecified     3. ESRD on hemodialysis (HCC)  CBC    Comprehensive Metabolic Panel    Hemoglobin A1C    Lipid Panel    TSH without Reflex   4. Erectile dysfunction due to arterial insufficiency     5. Need for pneumococcal vaccination  PNEUMOVAX 23 subcutaneous/IM (Pneumococcal polysaccharide vaccine 23-valent >= 3yo)   6.  Warfarin-induced coagulopathy ([facility]     7. Deep vein thrombosis (DVT) of non-extremity vein, unspecified chronicity     8. S/P laparoscopic sleeve gastrectomy     9. Vitamin D deficiency     10. Lymphedema     11. Right-sided heart failure, unspecified HF chronicity (HCC)  CBC    Comprehensive Metabolic Panel    Hemoglobin A1C    Lipid Panel    TSH without Reflex   12. Peripheral vascular disease ([address]     13. Malignant neoplasm of transverse colon (HCC)  CBC    Comprehensive Metabolic Panel    Hemoglobin A1C    Lipid Panel    TSH without Reflex   14. Status post partial colectomy     15. Rotator cuff arthropathy of right shoulder     16. History of COVID-19             Plan:      57-year-old -American male returns for follow-up. He is afebrile on, clinical examination is stable at baseline  ESRD, hemodialysis dependent on 3/week that he is tolerating well  Neurocardiogenic syncope on midodrine during dialysis. He denies orthostatic signs. Continue compression stocking. Fall precaution is advised  Relative hypotensionasymptomatic with systolic blood pressure in 90s. Recent past history of COVID-19recovered uneventfully  Hyperlipidemia on statin that he is tolerating well   Anemia secondary to ESRD. H&H is stable at baseline  History of congestive heart failure. He is on hemodialysis. He denies dyspnea orthopnea paroxysmal nocturnal dyspnea or lower extremity edema  Secondary hyperparathyroidism  Med list and available labs reviewed, discussed with patient, questions answered  Further recommendations to follow updated labs  History of chronic A. fib. Rate controlled he is on Coumadin titrated by Coumadin clinic. His INR is 1.7. He is counseled on Coumadin compatible diet  This note is created with a voice recognition program and while intend to generate a document that accurately reflects the content of the visit, no guarantee can be provided that every mistake has been identified and corrected by editing.          Bruce Mehrdad Seymour MD

## 2021-01-11 ENCOUNTER — HOSPITAL ENCOUNTER (OUTPATIENT)
Age: 51
Setting detail: SPECIMEN
Discharge: HOME | End: 2021-01-11
Payer: COMMERCIAL

## 2021-01-11 ENCOUNTER — TELEPHONE (OUTPATIENT)
Dept: INTERNAL MEDICINE CLINIC | Age: 51
End: 2021-01-11

## 2021-01-11 DIAGNOSIS — N18.6 ESRD ON HEMODIALYSIS (HCC): ICD-10-CM

## 2021-01-11 DIAGNOSIS — Z99.2 ESRD ON HEMODIALYSIS (HCC): ICD-10-CM

## 2021-01-11 DIAGNOSIS — C18.4 MALIGNANT NEOPLASM OF TRANSVERSE COLON (HCC): ICD-10-CM

## 2021-01-11 DIAGNOSIS — I50.810 RIGHT-SIDED HEART FAILURE, UNSPECIFIED HF CHRONICITY (HCC): ICD-10-CM

## 2021-01-11 LAB
ALBUMIN SERPL-MCNC: 4.2 G/DL (ref 3.5–5.2)
ALBUMIN/GLOBULIN RATIO: 1.1 (ref 1–2.5)
ALP BLD-CCNC: 104 U/L (ref 40–129)
ALT SERPL-CCNC: 20 U/L (ref 5–41)
ANION GAP SERPL CALCULATED.3IONS-SCNC: 19 MMOL/L (ref 9–17)
AST SERPL-CCNC: 22 U/L
BILIRUB SERPL-MCNC: 0.32 MG/DL (ref 0.3–1.2)
BUN BLDV-MCNC: 59 MG/DL (ref 6–20)
BUN/CREAT BLD: ABNORMAL (ref 9–20)
CALCIUM SERPL-MCNC: 10.1 MG/DL (ref 8.6–10.4)
CHLORIDE BLD-SCNC: 94 MMOL/L (ref 98–107)
CHOLESTEROL/HDL RATIO: 4.6
CHOLESTEROL: 246 MG/DL
CO2: 27 MMOL/L (ref 20–31)
CREAT SERPL-MCNC: 12.21 MG/DL (ref 0.7–1.2)
ESTIMATED AVERAGE GLUCOSE: 128 MG/DL
GFR AFRICAN AMERICAN: 5 ML/MIN
GFR NON-AFRICAN AMERICAN: 4 ML/MIN
GFR SERPL CREATININE-BSD FRML MDRD: ABNORMAL ML/MIN/{1.73_M2}
GFR SERPL CREATININE-BSD FRML MDRD: ABNORMAL ML/MIN/{1.73_M2}
GLUCOSE BLD-MCNC: 92 MG/DL (ref 70–99)
HBA1C MFR BLD: 6.1 % (ref 4–6)
HCT VFR BLD CALC: 41.3 % (ref 40.7–50.3)
HDLC SERPL-MCNC: 53 MG/DL
HEMOGLOBIN: 12.9 G/DL (ref 13–17)
LDL CHOLESTEROL: 165 MG/DL (ref 0–130)
MCH RBC QN AUTO: 32.2 PG (ref 25.2–33.5)
MCHC RBC AUTO-ENTMCNC: 31.2 G/DL (ref 28.4–34.8)
MCV RBC AUTO: 103 FL (ref 82.6–102.9)
NRBC AUTOMATED: 0 PER 100 WBC
PDW BLD-RTO: 15.3 % (ref 11.8–14.4)
PLATELET # BLD: 336 K/UL (ref 138–453)
PMV BLD AUTO: 10.2 FL (ref 8.1–13.5)
POTASSIUM SERPL-SCNC: 4.9 MMOL/L (ref 3.7–5.3)
RBC # BLD: 4.01 M/UL (ref 4.21–5.77)
SODIUM BLD-SCNC: 140 MMOL/L (ref 135–144)
TOTAL PROTEIN: 8 G/DL (ref 6.4–8.3)
TRIGL SERPL-MCNC: 140 MG/DL
TSH SERPL DL<=0.05 MIU/L-ACNC: 3.66 MIU/L (ref 0.3–5)
VLDLC SERPL CALC-MCNC: ABNORMAL MG/DL (ref 1–30)
WBC # BLD: 8.5 K/UL (ref 3.5–11.3)

## 2021-01-11 NOTE — TELEPHONE ENCOUNTER
I called patient he states he goes to dialysis three times and week and nephrologist sees him once a month and should be in to see him in a week or two

## 2021-01-13 ENCOUNTER — HOSPITAL ENCOUNTER (OUTPATIENT)
Dept: PHARMACY | Age: 51
Setting detail: THERAPIES SERIES
Discharge: HOME | End: 2021-01-13
Payer: COMMERCIAL

## 2021-01-13 VITALS — TEMPERATURE: 96.4 F

## 2021-01-13 DIAGNOSIS — I82.90 DEEP VEIN THROMBOSIS (DVT) OF NON-EXTREMITY VEIN, UNSPECIFIED CHRONICITY: ICD-10-CM

## 2021-01-13 LAB
INR BLD: 2
PROTIME: 23.8 SECONDS

## 2021-01-13 PROCEDURE — 99211 OFF/OP EST MAY X REQ PHY/QHP: CPT

## 2021-01-13 PROCEDURE — 85610 PROTHROMBIN TIME: CPT

## 2021-01-13 NOTE — PROGRESS NOTES
Medication Management Service, Warfarin Management  RAUL GARCIA [facility], [phone]  Visit Date: 1/13/2021   Subjective:   Shira Ruiz is a 44 y.o. male who presents to clinic today for anticoagulation monitoring and adjustment. Patient seen in clinic for warfarin management due to  Indication:   atrial fibrillation and DVT. INR goal: of 2.0-3.0. Duration of therapy: indefinite. Assessment and PLAN   PT/INR done in office per protocol. INR today is 2.0, therapeutic. Plan:   Continue current regimen of warfarin 6 mg Wednesday, Saturday and 4 mg all other days of the week. Using warfarin 4 mg  tablets. Recheck INR in 3  week(s). Patient seen in room # 1. COVID screening complete and temperature recorded. Patient verbalized understanding of dosing directions and information discussed. Dosing schedule given to patient. Progress note sent to referring office. Patient acknowledges working in consult agreement with pharmacist as referred by his/her physician. [address] [place] [address]  Ph., CACP, Clinical Pharmacist  Anticoagulation Services, [address] [facility]  1/13/2021  10:32 AM    CLINICAL PHARMACY CONSULT: MED RECONCILIATION/REVIEW ADDENDUM    For Pharmacy Admin Tracking Only    PHSO: No  Total # of Interventions Recommended: 0  - Maintenance Safety Lab Monitoring #: 1  Total Interventions Accepted: 0  Time Spent (min): 30    Electronically signed by Jan Montez RPH on 1/13/21 at 10:33 AM EST

## 2021-01-19 ENCOUNTER — TELEPHONE (OUTPATIENT)
Dept: BARIATRICS/WEIGHT MGMT | Age: 51
End: 2021-01-19

## 2021-02-01 RX ORDER — FOLIC ACID/VIT B COMPLEX AND C 0.8 MG
TABLET ORAL
Qty: 90 TABLET | Refills: 1 | Status: SHIPPED | OUTPATIENT
Start: 2021-02-01 | End: 2021-08-06

## 2021-02-01 NOTE — TELEPHONE ENCOUNTER
Elizabeth Reddy is calling to request a refill on the following medication(s):    Medication Request:  Requested Prescriptions     Pending Prescriptions Disp Refills    B Complex-C-Folic Acid (NEPHRO-JENNIFER) 0.8 MG TABS [Pharmacy Med Name: Joy Wall 90 tablet 0     Sig: TAKE ONE TABLET BY MOUTH DAILY WITH BREAKFAST     Last filled 6/24/20 90 day 1 refill  Order pending 90 day 1 refill    Last Visit Date (If Applicable):  3/5/4559    Next Visit Date:    5/14/2021

## 2021-02-03 ENCOUNTER — HOSPITAL ENCOUNTER (OUTPATIENT)
Dept: PHARMACY | Age: 51
Setting detail: THERAPIES SERIES
Discharge: HOME | End: 2021-02-03
Payer: COMMERCIAL

## 2021-02-03 VITALS — TEMPERATURE: 96.8 F

## 2021-02-03 DIAGNOSIS — I82.90 DEEP VEIN THROMBOSIS (DVT) OF NON-EXTREMITY VEIN, UNSPECIFIED CHRONICITY: ICD-10-CM

## 2021-02-03 LAB
INR BLD: 2.2
PROTIME: 26.1 SECONDS

## 2021-02-03 PROCEDURE — 85610 PROTHROMBIN TIME: CPT

## 2021-02-03 PROCEDURE — 99211 OFF/OP EST MAY X REQ PHY/QHP: CPT

## 2021-02-03 NOTE — PROGRESS NOTES
Medication Management Service, Warfarin Management  RAUL GARCIA [facility], [phone]  Visit Date: 2/3/2021   Subjective:   Radha Mosley is a 44 y.o. male who presents to clinic today for anticoagulation monitoring and adjustment. Patient seen in clinic for warfarin management due to  Indication:   atrial fibrillation and DVT. INR goal: of 2.0-3.0. Duration of therapy: indefinite. Assessment and PLAN   PT/INR done in office per protocol. INR today is 2.2, therapeutic. Plan:   Continue current regimen of warfarin 6 mg Wednesday, Saturday and 4 mg all other days of the week. Using warfarin 4 mg  tablets. Recheck INR in 4 week(s). Patient seen in room # 1. COVID screening complete and temperature recorded. Patient verbalized understanding of dosing directions and information discussed. Dosing schedule given to patient. Progress note sent to referring office. Patient acknowledges working in consult agreement with pharmacist as referred by his/her physician. [address] [place] [address]  Ph., CACP, Clinical Pharmacist  Anticoagulation Services, [address] [facility] Coumadin Clinic  2/3/2021  8:30 AM    CLINICAL PHARMACY CONSULT: MED RECONCILIATION/REVIEW ADDENDUM    For Pharmacy Admin Tracking Only    PHSO: No  Total # of Interventions Recommended: 0  - Maintenance Safety Lab Monitoring #: 1  Total Interventions Accepted: 0  Time Spent (min): 30    Electronically signed by Shola Rodriguez RPH on 2/3/21 at 8:31 AM EST

## 2021-03-03 ENCOUNTER — HOSPITAL ENCOUNTER (OUTPATIENT)
Dept: PHARMACY | Age: 51
Setting detail: THERAPIES SERIES
Discharge: HOME | End: 2021-03-03
Payer: COMMERCIAL

## 2021-03-03 VITALS — TEMPERATURE: 97.3 F

## 2021-03-03 DIAGNOSIS — I82.90 DEEP VEIN THROMBOSIS (DVT) OF NON-EXTREMITY VEIN, UNSPECIFIED CHRONICITY: ICD-10-CM

## 2021-03-03 LAB
INR BLD: 2
PROTIME: 24.5 SECONDS

## 2021-03-03 PROCEDURE — 85610 PROTHROMBIN TIME: CPT

## 2021-03-03 PROCEDURE — 99211 OFF/OP EST MAY X REQ PHY/QHP: CPT

## 2021-03-10 ENCOUNTER — TELEPHONE (OUTPATIENT)
Dept: INTERNAL MEDICINE CLINIC | Age: 51
End: 2021-03-10

## 2021-03-10 NOTE — TELEPHONE ENCOUNTER
Patient had a cyst removed at [place]. 's ED in Feb.    He went to [facility] recently due to having pain there. They advised him to see a surgeon. He is asking for you to refer him to one?     Please advise

## 2021-03-12 ENCOUNTER — OFFICE VISIT (OUTPATIENT)
Dept: INTERNAL MEDICINE CLINIC | Age: 51
End: 2021-03-12
Payer: COMMERCIAL

## 2021-03-12 VITALS
HEART RATE: 114 BPM | SYSTOLIC BLOOD PRESSURE: 112 MMHG | TEMPERATURE: 98.1 F | OXYGEN SATURATION: 98 % | DIASTOLIC BLOOD PRESSURE: 60 MMHG | BODY MASS INDEX: 38.8 KG/M2 | RESPIRATION RATE: 16 BRPM | WEIGHT: 271 LBS | HEIGHT: 70 IN

## 2021-03-12 DIAGNOSIS — I73.9 PERIPHERAL VASCULAR DISEASE (HCC): ICD-10-CM

## 2021-03-12 DIAGNOSIS — Z90.49 STATUS POST PARTIAL COLECTOMY: ICD-10-CM

## 2021-03-12 DIAGNOSIS — E55.9 VITAMIN D DEFICIENCY: ICD-10-CM

## 2021-03-12 DIAGNOSIS — I50.812 CHRONIC RIGHT-SIDED HEART FAILURE (HCC): ICD-10-CM

## 2021-03-12 DIAGNOSIS — Z86.16 HISTORY OF COVID-19: ICD-10-CM

## 2021-03-12 DIAGNOSIS — L02.31 LEFT BUTTOCK ABSCESS: Primary | ICD-10-CM

## 2021-03-12 DIAGNOSIS — Z99.2 ESRD ON HEMODIALYSIS (HCC): ICD-10-CM

## 2021-03-12 DIAGNOSIS — N40.1 BENIGN PROSTATIC HYPERPLASIA WITH LOWER URINARY TRACT SYMPTOMS, SYMPTOM DETAILS UNSPECIFIED: ICD-10-CM

## 2021-03-12 DIAGNOSIS — N52.01 ERECTILE DYSFUNCTION DUE TO ARTERIAL INSUFFICIENCY: ICD-10-CM

## 2021-03-12 DIAGNOSIS — C18.4 MALIGNANT NEOPLASM OF TRANSVERSE COLON (HCC): ICD-10-CM

## 2021-03-12 DIAGNOSIS — Z98.84 S/P LAPAROSCOPIC SLEEVE GASTRECTOMY: ICD-10-CM

## 2021-03-12 DIAGNOSIS — N18.6 ESRD ON HEMODIALYSIS (HCC): ICD-10-CM

## 2021-03-12 DIAGNOSIS — I48.20 CHRONIC ATRIAL FIBRILLATION (HCC): ICD-10-CM

## 2021-03-12 DIAGNOSIS — I82.401 DEEP VEIN THROMBOSIS (DVT) OF RIGHT LOWER EXTREMITY, UNSPECIFIED CHRONICITY, UNSPECIFIED VEIN (HCC): ICD-10-CM

## 2021-03-12 DIAGNOSIS — M12.811 ROTATOR CUFF ARTHROPATHY OF RIGHT SHOULDER: ICD-10-CM

## 2021-03-12 DIAGNOSIS — I89.0 LYMPHEDEMA: ICD-10-CM

## 2021-03-12 DIAGNOSIS — D68.32 WARFARIN-INDUCED COAGULOPATHY (HCC): ICD-10-CM

## 2021-03-12 DIAGNOSIS — T45.515A WARFARIN-INDUCED COAGULOPATHY (HCC): ICD-10-CM

## 2021-03-12 PROCEDURE — 3017F COLORECTAL CA SCREEN DOC REV: CPT | Performed by: FAMILY MEDICINE

## 2021-03-12 PROCEDURE — G8484 FLU IMMUNIZE NO ADMIN: HCPCS | Performed by: FAMILY MEDICINE

## 2021-03-12 PROCEDURE — 1036F TOBACCO NON-USER: CPT | Performed by: FAMILY MEDICINE

## 2021-03-12 PROCEDURE — G8427 DOCREV CUR MEDS BY ELIG CLIN: HCPCS | Performed by: FAMILY MEDICINE

## 2021-03-12 PROCEDURE — G8417 CALC BMI ABV UP PARAM F/U: HCPCS | Performed by: FAMILY MEDICINE

## 2021-03-12 PROCEDURE — 99214 OFFICE O/P EST MOD 30 MIN: CPT | Performed by: FAMILY MEDICINE

## 2021-03-12 RX ORDER — DOXYCYCLINE HYCLATE 100 MG
100 TABLET ORAL 2 TIMES DAILY
Qty: 28 TABLET | Refills: 0 | Status: SHIPPED | OUTPATIENT
Start: 2021-03-12 | End: 2021-03-26

## 2021-03-12 ASSESSMENT — ENCOUNTER SYMPTOMS
BACK PAIN: 1
EYES NEGATIVE: 1
RESPIRATORY NEGATIVE: 1
GASTROINTESTINAL NEGATIVE: 1
ALLERGIC/IMMUNOLOGIC NEGATIVE: 1

## 2021-03-12 NOTE — PROGRESS NOTES
Subjective:      Patient ID: Elizabeth Reddy is a 44 y.o. male. Hyperlipidemia  This is a chronic problem. The current episode started more than 1 month ago. The problem is controlled. Recent lipid tests were reviewed and are variable. Exacerbating diseases include obesity. Factors aggravating his hyperlipidemia include fatty foods. Current antihyperlipidemic treatment includes statins. Compliance problems include adherence to diet. Risk factors for coronary artery disease include a sedentary lifestyle, male sex, dyslipidemia, diabetes mellitus and obesity. Review of Systems   Constitutional: Negative. HENT: Negative. Eyes: Negative. Respiratory: Negative. Cardiovascular: Negative. Gastrointestinal: Negative. Endocrine: Negative. Musculoskeletal: Positive for arthralgias and back pain. Skin: Negative. Allergic/Immunologic: Negative. Neurological: Negative. Hematological: Negative. Psychiatric/Behavioral: The patient is nervous/anxious. Past family and social history unremarkable. Diagnosis Orders   1. Left buttock abscess  [address]   2. Benign prostatic hyperplasia with lower urinary tract symptoms, symptom details unspecified     3. ESRD on hemodialysis ([address]     4. Erectile dysfunction due to arterial insufficiency     5. Chronic atrial fibrillation     6. Warfarin-induced coagulopathy ([address]     7. S/P laparoscopic sleeve gastrectomy     8. Vitamin D deficiency     9. Lymphedema     10. Chronic right-sided heart failure ([address]     11. Peripheral vascular disease ([address]     12. Malignant neoplasm of transverse colon ([address]     13. Status post partial colectomy     14. Rotator cuff arthropathy of right shoulder     15. History of COVID-19     16. Deep vein thrombosis (DVT) of right lower extremity, unspecified chronicity, unspecified vein (HCC)           Objective:   Physical Exam  Vitals signs and nursing note reviewed. vascular disease ([facility]     12. Malignant neoplasm of transverse colon ([facility]     13. Status post partial colectomy     14. Rotator cuff arthropathy of right shoulder     15. History of COVID-19     16. Deep vein thrombosis (DVT) of right lower extremity, unspecified chronicity, unspecified vein (HCC)             Plan: This is ER follow-up. 47-year-old -American male was recently seen in the emergency room with abscess to left buttock status post incision and drainage. No packing was done and he was discharged on doxycycline. He returns with signs of persistent significant edema and swelling. No apparent sanguinous discharge. He denies fever and chills. I gave him doxycycline for another 2-week pending evaluation by wound care clinic. Apply moist heat  ESRDhemodialysis x3/week that he is tolerating well  History of neurocardiogenic syncope on ProAmatine. Maintain oral hydration. Continue compression stockings  History of DVT/PE. He is on Coumadin titrated by Coumadin clinic. INR is 2. He is counseled on Coumadin compatible diet   Hyperlipidemia and statin that he is tolerating well  Impaired fasting glucose with A1c of 6.1. He would benefit from ADA 1800 diet, daily moderate exercise and significant weight reduction to keep BMI around 25  Patient is advised to consider sleep study  Secondary hyperparathyroidism with underlying history of ESRD  Chronic anemia H&H is stable at baseline  He appears anxious however denies being depressed  He denies tobacco, excessive alcohol or illicit drug use  Morbid obesity. Remote past history of sleeve gastrectomy with progressive weight gain. Patient is counseled. He is advised to reestablish with bariatric service  Med list and available labs reviewed, discussed with patient, questions answered  He is encouraged to call for any concern.   Advised to comply with wound care clinic  This note is created with a voice recognition program and while intend to generate a document that accurately reflects the content of the visit, no guarantee can be provided that every mistake has been identified and corrected by editing.        Shannan Mata MD

## 2021-03-15 RX ORDER — MIDODRINE HYDROCHLORIDE 10 MG/1
TABLET ORAL
Qty: 270 TABLET | Refills: 1 | Status: SHIPPED | OUTPATIENT
Start: 2021-03-15 | End: 2022-09-23 | Stop reason: ALTCHOICE

## 2021-03-15 NOTE — TELEPHONE ENCOUNTER
Heidi Schultz is calling to request a refill on the following medication(s):    Medication Request:  Requested Prescriptions     Pending Prescriptions Disp Refills    midodrine (PROAMATINE) 10 MG tablet [Pharmacy Med Name: *MIDODRINE 10MG] 270 tablet 0     Sig: TAKE ONE TABLET BY MOUTH THREE TIMES DAILY     Last filled 4/30/20 90 day no refill  Order pending if ok    Last Visit Date (If Applicable):  3/71/6987    Next Visit Date:    5/14/2021

## 2021-03-17 ENCOUNTER — HOSPITAL ENCOUNTER (OUTPATIENT)
Dept: WOUND CARE | Age: 51
Discharge: HOME | End: 2021-03-17
Payer: COMMERCIAL

## 2021-03-17 VITALS
WEIGHT: 271 LBS | TEMPERATURE: 97.9 F | DIASTOLIC BLOOD PRESSURE: 87 MMHG | HEIGHT: 70 IN | RESPIRATION RATE: 17 BRPM | SYSTOLIC BLOOD PRESSURE: 124 MMHG | HEART RATE: 98 BPM | BODY MASS INDEX: 38.8 KG/M2

## 2021-03-17 DIAGNOSIS — N18.6 ESRD ON HEMODIALYSIS (HCC): ICD-10-CM

## 2021-03-17 DIAGNOSIS — L02.31 ABSCESS OF RIGHT BUTTOCK: ICD-10-CM

## 2021-03-17 DIAGNOSIS — Z99.2 ESRD ON HEMODIALYSIS (HCC): ICD-10-CM

## 2021-03-17 DIAGNOSIS — S31.819A BUTTOCK WOUND, RIGHT, INITIAL ENCOUNTER: Primary | ICD-10-CM

## 2021-03-17 PROBLEM — I12.9 HYPERTENSIVE CHRONIC KIDNEY DISEASE WITH STAGE 1 THROUGH STAGE 4 CHRONIC KIDNEY DISEASE, OR UNSPECIFIED CHRONIC KIDNEY DISEASE: Status: ACTIVE | Noted: 2020-06-12

## 2021-03-17 PROCEDURE — 99203 OFFICE O/P NEW LOW 30 MIN: CPT | Performed by: NURSE PRACTITIONER

## 2021-03-17 PROCEDURE — 87075 CULTR BACTERIA EXCEPT BLOOD: CPT

## 2021-03-17 PROCEDURE — 87205 SMEAR GRAM STAIN: CPT

## 2021-03-17 PROCEDURE — 99213 OFFICE O/P EST LOW 20 MIN: CPT

## 2021-03-17 PROCEDURE — 87070 CULTURE OTHR SPECIMN AEROBIC: CPT

## 2021-03-17 RX ORDER — LIDOCAINE HYDROCHLORIDE 20 MG/ML
JELLY TOPICAL ONCE
Status: CANCELLED | OUTPATIENT
Start: 2021-03-17 | End: 2021-03-17

## 2021-03-17 RX ORDER — LIDOCAINE 50 MG/G
OINTMENT TOPICAL ONCE
Status: CANCELLED | OUTPATIENT
Start: 2021-03-17 | End: 2021-03-17

## 2021-03-17 RX ORDER — LIDOCAINE HYDROCHLORIDE 20 MG/ML
JELLY TOPICAL ONCE
Status: DISCONTINUED | OUTPATIENT
Start: 2021-03-17 | End: 2021-03-18 | Stop reason: HOSPADM

## 2021-03-17 RX ORDER — LIDOCAINE 40 MG/G
CREAM TOPICAL ONCE
Status: CANCELLED | OUTPATIENT
Start: 2021-03-17 | End: 2021-03-17

## 2021-03-17 RX ORDER — LIDOCAINE HYDROCHLORIDE 40 MG/ML
SOLUTION TOPICAL ONCE
Status: CANCELLED | OUTPATIENT
Start: 2021-03-17 | End: 2021-03-17

## 2021-03-17 ASSESSMENT — PAIN - FUNCTIONAL ASSESSMENT: PAIN_FUNCTIONAL_ASSESSMENT: PREVENTS OR INTERFERES SOME ACTIVE ACTIVITIES AND ADLS

## 2021-03-17 ASSESSMENT — PAIN DESCRIPTION - FREQUENCY: FREQUENCY: INTERMITTENT

## 2021-03-17 ASSESSMENT — PAIN DESCRIPTION - LOCATION: LOCATION: BUTTOCKS

## 2021-03-17 NOTE — PROGRESS NOTES
[address]   Progress Note and Procedure Note      Katrina Avila  MEDICAL RECORD NUMBER:  [id number]  AGE: 44 y.o. GENDER: male  : 1970  EPISODE DATE:  3/17/2021    Subjective:     Chief Complaint   Patient presents with    Wound Check     buttock         HISTORY of PRESENT ILLNESS HPI     Elizabeth Reddy is a 44 y.o. male who presents today for wound/ulcer evaluation. History of Wound Context: presents to wound clinic for evaluation of right buttock wound / abscess that has been present for last month. Is tender and has been draining. He saw PCP 3/12/2021 and started on doxycycline which he is taking as prescribed. Had abscess that was drained at end of last year. History of ESRD on hemodialysis. Wound/Ulcer Pain Timing/Severity: constant  Quality of pain: aching  Severity:  2 / 10   Modifying Factors: Pain worsens with touching  Associated Signs/Symptoms: erythema, drainage and pain    Ulcer Identification:  Ulcer Type: other - abscess   Contributing Factors: decreased mobility, obesity and end stage renal disease     Wound: N/A        PAST MEDICAL HISTORY        Diagnosis Date    Anemia     Arthritis     right shoulder/ PCP Dr. Socorro Perez    Atrial fibrillation ([facility]     [place] [facility]    BPH (benign prostatic hypertrophy)     CAD (coronary artery disease)     Dr. Anurag Hood    Caffeine use     1 coffee, 2 tea/day    Cellulitis of lower leg     right    Chronic back pain     Colon cancer ([address]     colon    Cor pulmonale, acute ([address] 2014    CRF (chronic renal failure)     dr. Mirna Rojo on [employer]. Tues/ thurs/ sat/ right arm fistula    Erectile dysfunction     Gout     Hemodialysis patient ([address]     tues/ thurs/ sat/ DR. Petersen/ fistula right lower arm    History of blood transfusion     no reaction    Hyperkalemia 2013    Hyperlipidemia     Hypertension     Hypotension     Hypothyroidism     Lymphedema of leg     right    Obesity     Thyroid disease        PAST SURGICAL HISTORY    Past Surgical History:   Procedure Laterality Date    ABDOMINAL EXPLORATION SURGERY  01/22/2019    ABDOMINAL EXPLORATION, LEFT HEMICOLECTOMY, MOBILIZATION OF SPLENIC FLEXURE     ABSCESS DRAINAGE Right 01/20/2014    I&D Right Shoulder, Right shoulder arthroscopy, I&D AC Joint    APPENDECTOMY      COLONOSCOPY      COLONOSCOPY  01/22/2019    COLONOSCOPY N/A 1/22/2019    COLONOSCOPY- GI UNIT SCHEDULED performed by Jose Saez MD at [facility] Right 3/27/15    rt wrist    ENDOSCOPY, COLON, DIAGNOSTIC      UGI    GASTRIC BAND REMOVAL  01/17/2017    subcutaneous port    HC CATH POWER PICC SINGLE  1/24/2014         LAP BAND  2007    [place] COLSC FLX W/RMVL OF TUMOR POLYP LESION SNARE TQ N/A 11/15/2018    COLONOSCOPY POLYPECTOMY SNARE/COLD BIOPSY performed by Perla Gomez MD at [facility] Right 2014    SLEEVE GASTRECTOMY N/A 9/25/2017    GASTRECTOMY SLEEVE LAPAROSCOPIC SI ROBOTIC, ENDOSEALER performed by Dominga Redman MD at [facility] N/A 1/22/2019    ABDOMINAL EXPLORATION, LEFT HEMICOLECTOMY, MOBILIZATION OF SPLENIC FLEXURE performed by Jose Saez MD at [facility] Right     leg tumor removal/ dr. Adler Sender    Family History   Problem Relation Age of Onset    Cancer Father         leukemia    Heart Failure Mother     Arthritis Mother     High Blood Pressure Mother     Heart Disease Maternal Grandmother     Stroke Paternal Grandfather        SOCIAL HISTORY    Social History     Tobacco Use    Smoking status: Never Smoker    Smokeless tobacco: Never Used   Substance Use Topics    Alcohol use:  Yes     Alcohol/week: 3.0 standard drinks     Types: 3 Glasses of wine per week     Comment: weekend    Drug use: No       ALLERGIES    No Known Allergies    MEDICATIONS    Current Outpatient Medications on File Prior to Encounter   Medication Sig Dispense Refill    midodrine (PROAMATINE) 10 MG tablet TAKE ONE TABLET BY MOUTH THREE TIMES DAILY 270 tablet 1    doxycycline hyclate (VIBRA-TABS) 100 MG tablet Take 1 tablet by mouth 2 times daily for 14 days 28 tablet 0    B Complex-C-Folic Acid (NEPHRO-JENNIFER) 0.8 MG TABS TAKE ONE TABLET BY MOUTH DAILY WITH BREAKFAST 90 tablet 1    warfarin (COUMADIN) 4 MG tablet Take one tablet (or as directed by Medication Management) by mouth once daily. 90 DS *new tab strength* 90 tablet 1    vitamin D (ERGOCALCIFEROL) 1.25 MG (71350 UT) CAPS capsule TAKE ONE CAPSULE BY MOUTH ONCE A MONTH 3 capsule 3    cinacalcet (SENSIPAR) 30 MG tablet Take 30 mg by mouth daily      ASPIRIN LOW DOSE 81 MG EC tablet TAKE ONE TABLET BY MOUTH DAILY 90 tablet 1    pravastatin (PRAVACHOL) 40 MG tablet Take 40 mg by mouth nightly       Elastic Bandages & Supports (MEDICAL COMPRESSION STOCKINGS) MISC 1 each by Does not apply route daily as needed (as needed) Right leg below the knee 20-30 HH MM DX 82.409 1 each 0    Multiple Vitamin (MVI, CELEBRATE, CHEWABLE TABLET) Take 1 tablet by mouth 2 times daily        No current facility-administered medications on file prior to encounter.         REVIEW OF SYSTEMS    Constitutional: negative  Eyes: negative  Ears, nose, mouth, throat, and face: negative  Respiratory: negative  Cardiovascular: negative  Gastrointestinal: negative  Genitourinary:negative  Integument/breast: negative except for right buttock wound  Hematologic/lymphatic: negative  Musculoskeletal:negative  Neurological: negative  Behavioral/Psych: negative  Endocrine: negative  Allergic/Immunologic: negative    Objective:      /87   Pulse 98   Temp 97.9 °F (36.6 °C) (Oral)   Resp 17   Ht 5' 10\" (1.778 m)   Wt 271 lb (122.9 kg)   BMI 38.88 kg/m²     Wt Readings from Last 3 Encounters:   03/17/21 271 lb (122.9 kg)   03/12/21 271 lb (122.9 kg)   01/08/21 267 lb (121.1 kg)       PHYSICAL EXAM    General Appearance: alert and oriented to person, place and time, well developed and obese, in no acute distress  Skin: warm and dry, no rash or erythema, right buttock wound  Head: normocephalic and atraumatic  Eyes: pupils equal, round, extraocular eye movements intact, and conjunctivae normal  Pulmonary/Chest: clear to auscultation bilaterally- no wheezes, rales or rhonchi, normal air movement, no respiratory distress  Cardiovascular: normal rate, regular rhythm, normal S1 and S2, no murmurs  Abdomen: soft, non-tender, non-distended, normal bowel sounds  Extremities: no cyanosis, clubbing or edema  Musculoskeletal: no joint swelling, deformity or tenderness  Neurologic: gait, coordination and speech normal      Assessment:     Problem List Items Addressed This Visit     Abscess of right buttock    Relevant Medications    lidocaine (XYLOCAINE) 2 % uro-jet (Start on 3/17/2021  4:00 PM)    Other Relevant Orders    Initiate Outpatient Wound Care Protocol    CT PELVIS WO CONTRAST Additional Contrast? None    BMI 38.0-38.9,adult    Relevant Medications    lidocaine (XYLOCAINE) 2 % uro-jet (Start on 3/17/2021  4:00 PM)    Other Relevant Orders    Initiate Outpatient Wound Care Protocol    Buttock wound, right, initial encounter - Primary    Relevant Medications    lidocaine (XYLOCAINE) 2 % uro-jet (Start on 3/17/2021  4:00 PM)    Other Relevant Orders    Initiate Outpatient Wound Care Protocol    ESRD on hemodialysis ([facility] [address]    Relevant Medications    lidocaine (XYLOCAINE) 2 % uro-jet (Start on 3/17/2021  4:00 PM)    Other Relevant Orders    Initiate Outpatient Wound Care Protocol    CT PELVIS WO CONTRAST Additional Contrast? None           Procedure Note  Indications:  Based on my examination of this patient's wound(s)/ulcer(s) today, debridement is not required to promote healing and evaluate the wound base.     Post Debridement Measurements:  Wound/Ulcer Descriptions are Pre Debridement except measurements:    Wound 03/17/21 Buttocks Right #1  Cluster (Active)   Wound Image   03/17/21 1450   Wound Etiology Other 03/17/21 1450   Dressing Status New drainage noted; Old drainage noted 03/17/21 1450   Wound Cleansed Irrigated with saline 03/17/21 1450   Dressing/Treatment Other (comment) 03/17/21 1540   Wound Length (cm) 7 cm 03/17/21 1450   Wound Width (cm) 1.5 cm 03/17/21 1450   Wound Depth (cm) 0.4 cm 03/17/21 1450   Wound Surface Area (cm^2) 10.5 cm^2 03/17/21 1450   Wound Volume (cm^3) 4.2 cm^3 03/17/21 1450   Post-Procedure Length (cm) 7 cm 03/17/21 1450   Post-Procedure Width (cm) 1.5 cm 03/17/21 1450   Post-Procedure Depth (cm) 0.4 cm 03/17/21 1450   Post-Procedure Surface Area (cm^2) 10.5 cm^2 03/17/21 1450   Post-Procedure Volume (cm^3) 4.2 cm^3 03/17/21 1450   Wound Assessment Pink/red;Slough 03/17/21 1450   Drainage Amount Moderate 03/17/21 1450   Drainage Description Serosanguinous 03/17/21 1450   Odor None 03/17/21 1450   Carlota-wound Assessment Fragile 03/17/21 1450   Margins Defined edges; Attached edges 03/17/21 1450   Wound Thickness Description not for Pressure Injury Full thickness 03/17/21 1450   Number of days: 0        Plan:     Treatment Note please see attached Discharge Instructions    Written patient dismissal instructions given to patient and signed by patient or POA. Discharge Instructions          Manuel Keymar -Phone: [phone] Fax: [phone]   Visit  Discharge Instructions / Physician Orders    DATE: 3/17/2021     Home Care:      SUPPLIES ORDERED THRU:      Wound Location:  Right Buttock     Cleanse with: Liquid antibacterial soap and water, rinse well      Dressing Orders:   [place], ABD     Frequency:  Daily     Additional Orders: Increase protein to diet (meat, cheese, eggs, fish, peanut butter, nuts and beans)  Get CT scan done- Thursday March 18 at 1:30pm-arrive 1 hour prior.  Do not eat or drink 2 hours prior to scan  Culture Taken     Your next appointment with [address] [place] [address] is in 1 week with Dr. Dai Current   [] CHAIR     [x] STRETCHER  [] EITHER             (Please note your next appointment above and if you are unable to keep, kindly give a 24 hour notice. Thank you.)     If you experience any of the following, please call the [address] [place] [address] during business hours:  [phone]  Your Phone call may be forwarded to [address] during business hours that [address] is closed. * Increase in Pain  * Temperature over 101  * Increase in drainage from your wound  * Drainage with a foul odor  * Bleeding  * Increase in swelling  * Need for compression bandage changes due to slippage, breakthrough drainage. If you need medical attention outside of the business hours of the [address] [place] [address] please contact your PCP or go to the nearest emergency room. The information contained in the After Visit Summary has been reviewed with me, the patient and/or responsible adult, by my health care provider(s). I had the opportunity to ask questions regarding this information.  I have elected to receive;      []After Visit Summary  [x]Comprehensive Discharge Instruction      Patient signature______________________________________Date:________  Electronically signed by Joselin Roberts RN on 3/17/2021 at 3:34 PM  Electronically signed by KVNG Martin CNP on 3/17/2021 at 3:41 PM          Electronically signed by KVNG Martin CNP on 3/17/2021 at 3:57 PM

## 2021-03-18 ENCOUNTER — HOSPITAL ENCOUNTER (OUTPATIENT)
Dept: CT IMAGING | Age: 51
Discharge: HOME | End: 2021-03-20
Payer: COMMERCIAL

## 2021-03-18 DIAGNOSIS — N18.6 ESRD ON HEMODIALYSIS (HCC): ICD-10-CM

## 2021-03-18 DIAGNOSIS — L02.31 ABSCESS OF RIGHT BUTTOCK: ICD-10-CM

## 2021-03-18 DIAGNOSIS — Z99.2 ESRD ON HEMODIALYSIS (HCC): ICD-10-CM

## 2021-03-18 PROCEDURE — 72192 CT PELVIS W/O DYE: CPT

## 2021-03-21 LAB
CULTURE: ABNORMAL
CULTURE: ABNORMAL
DIRECT EXAM: ABNORMAL
DIRECT EXAM: ABNORMAL
Lab: ABNORMAL
SPECIMEN DESCRIPTION: ABNORMAL

## 2021-03-22 ENCOUNTER — HOSPITAL ENCOUNTER (OUTPATIENT)
Dept: WOUND CARE | Age: 51
Discharge: HOME | End: 2021-03-22
Payer: COMMERCIAL

## 2021-03-22 VITALS
RESPIRATION RATE: 16 BRPM | SYSTOLIC BLOOD PRESSURE: 111 MMHG | TEMPERATURE: 97.3 F | DIASTOLIC BLOOD PRESSURE: 76 MMHG | HEART RATE: 85 BPM

## 2021-03-22 DIAGNOSIS — L73.2 HIDRADENITIS: ICD-10-CM

## 2021-03-22 DIAGNOSIS — Z99.2 ESRD ON HEMODIALYSIS (HCC): ICD-10-CM

## 2021-03-22 DIAGNOSIS — L02.31 ABSCESS OF RIGHT BUTTOCK: Primary | ICD-10-CM

## 2021-03-22 DIAGNOSIS — N18.6 ESRD ON HEMODIALYSIS (HCC): ICD-10-CM

## 2021-03-22 DIAGNOSIS — S31.819A BUTTOCK WOUND, RIGHT, INITIAL ENCOUNTER: ICD-10-CM

## 2021-03-22 PROCEDURE — 99213 OFFICE O/P EST LOW 20 MIN: CPT

## 2021-03-22 PROCEDURE — 99213 OFFICE O/P EST LOW 20 MIN: CPT | Performed by: PLASTIC SURGERY

## 2021-03-22 RX ORDER — LIDOCAINE HYDROCHLORIDE 40 MG/ML
SOLUTION TOPICAL ONCE
Status: CANCELLED | OUTPATIENT
Start: 2021-03-22 | End: 2021-03-22

## 2021-03-22 RX ORDER — LIDOCAINE 50 MG/G
OINTMENT TOPICAL ONCE
Status: CANCELLED | OUTPATIENT
Start: 2021-03-22 | End: 2021-03-22

## 2021-03-22 RX ORDER — LIDOCAINE HYDROCHLORIDE 20 MG/ML
JELLY TOPICAL ONCE
Status: CANCELLED | OUTPATIENT
Start: 2021-03-22 | End: 2021-03-22

## 2021-03-22 RX ORDER — LIDOCAINE HYDROCHLORIDE 20 MG/ML
JELLY TOPICAL ONCE
Status: COMPLETED | OUTPATIENT
Start: 2021-03-22 | End: 2021-03-22

## 2021-03-22 RX ORDER — DOXYCYCLINE HYCLATE 100 MG
100 TABLET ORAL 2 TIMES DAILY
Qty: 28 TABLET | Refills: 0 | Status: SHIPPED | OUTPATIENT
Start: 2021-03-22 | End: 2021-04-05

## 2021-03-22 RX ORDER — CLINDAMYCIN PHOSPHATE 10 MG/G
GEL TOPICAL
Qty: 60 G | Refills: 2 | Status: SHIPPED | OUTPATIENT
Start: 2021-03-22 | End: 2021-03-29

## 2021-03-22 RX ORDER — LIDOCAINE 40 MG/G
CREAM TOPICAL ONCE
Status: CANCELLED | OUTPATIENT
Start: 2021-03-22 | End: 2021-03-22

## 2021-03-22 RX ADMIN — LIDOCAINE HYDROCHLORIDE: 20 JELLY TOPICAL at 10:30

## 2021-03-22 NOTE — PROGRESS NOTES
[address]       Progress Note and Procedure Note      Progress note     Chief Complaint   Patient presents with    Wound Check     buttocks        HPI:   Radha Mosley is a 44 y.o. male who presents for a wound evaluation. Patient has had a wound on the buttocks. It has been present for over a month. Patient has pain associated with it. There is abscess drainage. Patient does not have this type of wound in any other areas. Patient has a history of end-stage renal disease and he is on hemodialysis. Patient's pain is 2 out of 10. It is worsened by touching it and or when the patient sits on it. There is erythema and drainage. Patient had a CT scan which did not show an abscess collection. Patient is here for evaluation and treatment. Medications:     Current Outpatient Medications   Medication Sig Dispense Refill    doxycycline hyclate (VIBRA-TABS) 100 MG tablet Take 1 tablet by mouth 2 times daily for 14 days 28 tablet 0    clindamycin (CLEOCIN-T) 1 % gel Apply topically 2 times daily. 60 g 2    midodrine (PROAMATINE) 10 MG tablet TAKE ONE TABLET BY MOUTH THREE TIMES DAILY 270 tablet 1    doxycycline hyclate (VIBRA-TABS) 100 MG tablet Take 1 tablet by mouth 2 times daily for 14 days 28 tablet 0    B Complex-C-Folic Acid (NEPHRO-JENNIFER) 0.8 MG TABS TAKE ONE TABLET BY MOUTH DAILY WITH BREAKFAST 90 tablet 1    warfarin (COUMADIN) 4 MG tablet Take one tablet (or as directed by Medication Management) by mouth once daily.  90 DS *new tab strength* 90 tablet 1    vitamin D (ERGOCALCIFEROL) 1.25 MG (65609 UT) CAPS capsule TAKE ONE CAPSULE BY MOUTH ONCE A MONTH 3 capsule 3    cinacalcet (SENSIPAR) 30 MG tablet Take 30 mg by mouth daily      ASPIRIN LOW DOSE 81 MG EC tablet TAKE ONE TABLET BY MOUTH DAILY 90 tablet 1    pravastatin (PRAVACHOL) 40 MG tablet Take 40 mg by mouth nightly       Elastic Bandages & Supports (MEDICAL COMPRESSION STOCKINGS) MISC 1 each by Does not apply route daily as needed (as needed) Right leg below the knee 20-30 HH MM DX 82.409 1 each 0    Multiple Vitamin (MVI, CELEBRATE, CHEWABLE TABLET) Take 1 tablet by mouth 2 times daily        No current facility-administered medications for this encounter. Allergies:   No Known Allergies  Review of Systems:   Constitutional: Negative for fever, chills, fatigue and unexpected weight change. HENT: Negative for hearing loss, sore throat and facial swelling. Eyes: Negative for pain and discharge. Respiratory: Patient has a history of cor pulmonale. .    Cardiovascular: Negative for chest pain. Gastrointestinal: Negative for nausea, vomiting, diarrhea and constipation. Skin: Negative for pallor and rash. Neurological: Negative for seizures, syncope and numbness. Hematological: Patient has a history of anemia. Psychiatric/Behavioral: Negative for behavioral problems. The patient is not nervous/anxious. Musculoskeletal: Patient has a history of arthritis. : Patient has chronic renal failure. Musculoskeletal: Patient has history of gout. Past Medical History:   Diagnosis Date    Anemia     Arthritis     right shoulder/ PCP Dr. Jordan Yun    Atrial fibrillation ([facility] [address]     [address] BPH (benign prostatic hypertrophy)     CAD (coronary artery disease)     Dr. Jordana Underwood    Caffeine use     1 coffee, 2 tea/day    Cellulitis of lower leg     right    Chronic back pain     Colon cancer ([facility] [address]     colon    Cor pulmonale, acute ([facility] [address] 12/30/2014    CRF (chronic renal failure)     dr. Ila Triana on [employer]. Tues/ thurs/ sat/ right arm fistula    Erectile dysfunction     Gout     Hemodialysis patient ([facility] [address]     tues/ thurs/ sat/ DR. Petersen/ fistula right lower arm    History of blood transfusion     no reaction    Hyperkalemia 12/24/2013    Hyperlipidemia     Hypertension     Hypotension     Hypothyroidism     Lymphedema of leg     right    Obesity     Thyroid disease      Past Surgical History:   Procedure Laterality Date    ABDOMINAL EXPLORATION SURGERY  01/22/2019    ABDOMINAL EXPLORATION, LEFT HEMICOLECTOMY, MOBILIZATION OF SPLENIC FLEXURE     ABSCESS DRAINAGE Right 01/20/2014    I&D Right Shoulder, Right shoulder arthroscopy, I&D AC Joint    APPENDECTOMY      COLONOSCOPY      COLONOSCOPY  01/22/2019    COLONOSCOPY N/A 1/22/2019    COLONOSCOPY- GI UNIT SCHEDULED performed by Emily Price MD at [facility] Right 3/27/15    rt wrist    ENDOSCOPY, COLON, DIAGNOSTIC      UGI    GASTRIC BAND REMOVAL  01/17/2017    subcutaneous port    HC CATH POWER PICC SINGLE  1/24/2014         LAP BAND  2007    [place] COLSC FLX W/RMVL OF TUMOR POLYP LESION SNARE TQ N/A 11/15/2018    COLONOSCOPY POLYPECTOMY SNARE/COLD BIOPSY performed by Amari Evans MD at [facility] Right 2014    SLEEVE GASTRECTOMY N/A 9/25/2017    GASTRECTOMY SLEEVE LAPAROSCOPIC SI ROBOTIC, ENDOSEALER performed by Chavez Orantes MD at [facility] N/A 1/22/2019    ABDOMINAL EXPLORATION, LEFT HEMICOLECTOMY, MOBILIZATION OF SPLENIC FLEXURE performed by Emily Price MD at [facility] Right     leg tumor removal/ dr. Josef Yadav History     Socioeconomic History    Marital status: Single     Spouse name: Not on file    Number of children: Not on file    Years of education: Not on file    Highest education level: Not on file   Occupational History    Not on file   Social Needs    Financial resource strain: Not on file    Food insecurity     Worry: Not on file     Inability: Not on file    Transportation needs     Medical: Not on file     Non-medical: Not on file   Tobacco Use    Smoking status: Never Smoker    Smokeless tobacco: Never Used   Substance and Sexual Activity    Alcohol use:  Yes     Alcohol/week: 3.0 standard drinks     Types: 3 Glasses of wine per week     Comment: weekend    Drug use: No    Sexual activity: Yes     Partners: Female   Lifestyle    Physical activity     Days per week: Not on file     Minutes per session: Not on file    Stress: Not on file   Relationships    Social connections     Talks on phone: Not on file     Gets together: Not on file     Attends Gnosticism service: Not on file     Active member of club or organization: Not on file     Attends meetings of clubs or organizations: Not on file     Relationship status: Not on file    Intimate partner violence     Fear of current or ex partner: Not on file     Emotionally abused: Not on file     Physically abused: Not on file     Forced sexual activity: Not on file   Other Topics Concern    Not on file   Social History Narrative    Not on file     Family History   Problem Relation Age of Onset    Cancer Father         leukemia    Heart Failure Mother     Arthritis Mother     High Blood Pressure Mother     Heart Disease Maternal Grandmother     Stroke Paternal Grandfather      Physical Exam:   /76   Pulse 85   Temp 97.3 °F (36.3 °C)   Resp 16    There is no height or weight on file to calculate BMI. Physical Exam   Nursing note and vitals reviewed. Constitutional: Oriented to person, place, and time. Appears well-developed and well-nourished. No distress. Head: Normocephalic and atraumatic. Eyes: Conjunctivae and EOM are normal.   Pulmonary/Chest: Effort normal. No respiratory distress. Neurological: Alert and oriented to person, place, and time. Nga Dorado Psychiatric: Normal mood and affect.  Behavior is normal      Post Debridement Measurements:  Wound/Ulcer Descriptions are Pre Debridement except measurements:    Wound 03/17/21 Buttocks Right #1  Cluster (Active)   Wound Image   03/17/21 1450   Wound Etiology Other 03/22/21 1023   Dressing Status Old drainage noted;New drainage noted 03/22/21 1023   Wound Cleansed Cleansed with saline 03/22/21 1023   Dressing/Treatment Other (comment) 03/22/21 1123   Wound Length (cm) 6.5 cm 03/22/21 1023   Wound Width (cm) 2 cm 03/22/21 1023   Wound Depth (cm) 0.2 cm 03/22/21 1023   Wound Surface Area (cm^2) 13 cm^2 03/22/21 1023   Change in Wound Size % (l*w) -23.81 03/22/21 1023   Wound Volume (cm^3) 2.6 cm^3 03/22/21 1023   Wound Healing % 38 03/22/21 1023   Post-Procedure Length (cm) 6.5 cm 03/22/21 1023   Post-Procedure Width (cm) 2 cm 03/22/21 1023   Post-Procedure Depth (cm) 0.2 cm 03/22/21 1023   Post-Procedure Surface Area (cm^2) 13 cm^2 03/22/21 1023   Post-Procedure Volume (cm^3) 2.6 cm^3 03/22/21 1023   Wound Assessment Pink/red 03/22/21 1023   Drainage Amount Moderate 03/22/21 1023   Drainage Description Serosanguinous 03/22/21 1023   Odor None 03/22/21 1023   Carlota-wound Assessment Fragile 03/22/21 1023   Margins Defined edges 03/22/21 1023   Wound Thickness Description not for Pressure Injury Full thickness 03/22/21 1023   Number of days: 4                  Imaging:   [unfilled]        Impression/Plan:     Problem List Items Addressed This Visit     ESRD on hemodialysis [facility])    Relevant Orders    Initiate Outpatient Wound Care Protocol    Abscess of right buttock - Primary    Relevant Orders    Initiate Outpatient Wound Care Protocol    Buttock wound, right, initial encounter    Relevant Orders    Initiate Outpatient Wound Care Protocol    BMI 38.0-38.9,adult    Relevant Orders    Initiate Outpatient Wound Care Protocol          Patient Active Problem List   Diagnosis    Obesity, Class II, BMI 35-39.9, with comorbidity    Benign prostatic hyperplasia with lower urinary tract symptoms    ESRD on hemodialysis ([address]    Erectile dysfunction due to arterial insufficiency    Chronic atrial fibrillation    Warfarin-induced coagulopathy ([address]    DVT (deep venous thrombosis) (HCC)    S/P laparoscopic sleeve gastrectomy    Vitamin D deficiency    Lymphedema    Right heart failure ([address]    Peripheral vascular disease ([address]    Malignant neoplasm of transverse colon ([facility]    Status post partial colectomy    Rotator cuff arthropathy of right shoulder    History of COVID-19    Abscess of right buttock    Buttock wound, right, initial encounter    Hypertensive chronic kidney disease with stage 1 through stage 4 chronic kidney disease, or unspecified chronic kidney disease    BMI 38.0-38.9,adult     Plan:  The area appears to be hidradenitis. We will plan to institute the hidradenitis regiment. I will refer patient to dermatology. Patient is to follow-up in 1 to 2 weeks.   Hidradenitis regiment:    Doxycycline 100 mg BID x 3 months   Benzoyl peroxide wash daily, leave on 5 minutes before washing off   Clindamycin lotion daily       Electronically signed by:  Calderon Almodovar MD 3/22/2021

## 2021-03-22 NOTE — PLAN OF CARE
Problem: Pain:  Description: Pain management should include both nonpharmacologic and pharmacologic interventions.   Goal: Pain level will decrease  Description: Pain level will decrease  Outcome: Ongoing  Goal: Control of acute pain  Description: Control of acute pain  Outcome: Ongoing  Goal: Control of chronic pain  Description: Control of chronic pain  Outcome: Ongoing     Problem: Wound:  Goal: Will show signs of wound healing; wound closure and no evidence of infection  Description: Will show signs of wound healing; wound closure and no evidence of infection  Outcome: Ongoing

## 2021-03-24 ENCOUNTER — TELEPHONE (OUTPATIENT)
Dept: PHARMACY | Age: 51
End: 2021-03-24

## 2021-03-24 NOTE — TELEPHONE ENCOUNTER
Patient called to report he is having a colonoscopy April 9. He has been instructed by his physician to hold warfarin 5 days prior to procedure. I advised increasing his warfarin from 4 mg to 6 mg Friday only then follow maintenance regimen. [address] [place] [address]  Ph., CACP, Clinical Pharmacist  Anticoagulation Services, [address] [place] [address] [facility]  3/24/2021  12:00 PM

## 2021-03-26 NOTE — PROGRESS NOTES
[address]:     [facility], Alphonso Cabezas [address] p: [phone] f: [phone]     Ordering Center:     [facility]  DkEdward Demarvinny [address] [facility]  [phone]  WOUND CARE Dept: [address] [place] [address] NUMBER [phone]    Patient Information:      Bret Kuo 20 [id number]   [phone]   : 1970  AGE: 44 y.o.      GENDER: male   TODAYS DATE:  3/26/2021    Insurance:      PRIMARY INSURANCE:  Plan: Shawn Standard DUAL  Coverage: Shawn Standard DUAL BENEFITS  Effective Date: 2019  [id number] - (Medicare Managed)    SECONDARY INSURANCE:  Plan:   Coverage:   Effective Date:   [unfilled]    [unfilled]   [unfilled]     Patient Wound Information:      Problem List Items Addressed This Visit     ESRD on hemodialysis ([facility] [address]    Abscess of right buttock - Primary    Buttock wound, right, initial encounter    BMI 38.0-38.9,adult      Other Visit Diagnoses     Hidradenitis        Relevant Orders    Kathy Anthony MD, Dermatology, [address]:     Wound 21 Buttocks Right #1  Cluster (Active)   Wound Image   21 1450   Wound Etiology Other 21 1023   Dressing Status Old drainage noted;New drainage noted 21 1023   Wound Cleansed Cleansed with saline 21 1023   Dressing/Treatment Other (comment) 21 1123   Wound Length (cm) 6.5 cm 21 1023   Wound Width (cm) 2 cm 21 1023   Wound Depth (cm) 0.2 cm 21 1023   Wound Surface Area (cm^2) 13 cm^2 21 1023   Change in Wound Size % (l*w) -23.81 21 1023   Wound Volume (cm^3) 2.6 cm^3 21 1023   Wound Healing % 38 21 1023   Post-Procedure Length (cm) 6.5 cm 21 1023   Post-Procedure Width (cm) 2 cm 21 1023   Post-Procedure Depth (cm) 0.2 cm 21 1023   Post-Procedure Surface Area (cm^2) 13 cm^2 21 1023   Post-Procedure Volume (cm^3) 2.6 cm^3 03/22/21 1023   Wound Assessment Pink/red 03/22/21 1023   Drainage Amount Moderate 03/22/21 1023   Drainage Description Serosanguinous 03/22/21 1023   Odor None 03/22/21 1023   Carlota-wound Assessment Fragile 03/22/21 1023   Margins Defined edges 03/22/21 1023   Wound Thickness Description not for Pressure Injury Full thickness 03/22/21 1023   Number of days: 8          Supplies Requested :      WOUND #: 1   PRIMARY DRESSING:  Alginate with silver pad   Cover and Secure with: ABD pad     FREQUENCY OF DRESSING CHANGES:  Daily     ADDITIONAL ITEMS:  [] Gloves Small  [x] Gloves Medium [] Gloves Large [] Gloves XLarge  [] Tape 1\" [x] Tape 2\" [] Tape 3\"  [x] MefixTape  [x] Saline  [] Skin Prep   [] Adhesive Remover   [] Cotton Tip Applicators   [] Other:    Patient Wound(s) Debrided: [] Yes   [x] No    Debribement Type:     Debridement Date:    Patient currently being seen by Home Health: [] Yes   [x] No    Duration for needed supplies:  []15  [x]30  []60  []90 Days    Provider Information:      Wale Wilcox MD  [id number]

## 2021-04-05 ENCOUNTER — HOSPITAL ENCOUNTER (OUTPATIENT)
Dept: WOUND CARE | Age: 51
Discharge: HOME | End: 2021-04-05
Payer: COMMERCIAL

## 2021-04-05 ENCOUNTER — HOSPITAL ENCOUNTER (OUTPATIENT)
Dept: LAB | Age: 51
Setting detail: SPECIMEN
Discharge: HOME | End: 2021-04-05
Payer: COMMERCIAL

## 2021-04-05 VITALS
SYSTOLIC BLOOD PRESSURE: 118 MMHG | DIASTOLIC BLOOD PRESSURE: 83 MMHG | HEART RATE: 94 BPM | RESPIRATION RATE: 16 BRPM | TEMPERATURE: 96.7 F

## 2021-04-05 DIAGNOSIS — Z20.822 COVID-19 RULED OUT BY LABORATORY TESTING: Primary | ICD-10-CM

## 2021-04-05 DIAGNOSIS — L73.2 HIDRADENITIS: ICD-10-CM

## 2021-04-05 DIAGNOSIS — L02.31 ABSCESS OF RIGHT BUTTOCK: Primary | ICD-10-CM

## 2021-04-05 DIAGNOSIS — S31.819A BUTTOCK WOUND, RIGHT, INITIAL ENCOUNTER: ICD-10-CM

## 2021-04-05 DIAGNOSIS — Z99.2 ESRD ON HEMODIALYSIS (HCC): ICD-10-CM

## 2021-04-05 DIAGNOSIS — N18.6 ESRD ON HEMODIALYSIS (HCC): ICD-10-CM

## 2021-04-05 PROCEDURE — 99213 OFFICE O/P EST LOW 20 MIN: CPT | Performed by: PLASTIC SURGERY

## 2021-04-05 PROCEDURE — U0003 INFECTIOUS AGENT DETECTION BY NUCLEIC ACID (DNA OR RNA); SEVERE ACUTE RESPIRATORY SYNDROME CORONAVIRUS 2 (SARS-COV-2) (CORONAVIRUS DISEASE [COVID-19]), AMPLIFIED PROBE TECHNIQUE, MAKING USE OF HIGH THROUGHPUT TECHNOLOGIES AS DESCRIBED BY CMS-2020-01-R: HCPCS

## 2021-04-05 PROCEDURE — U0005 INFEC AGEN DETEC AMPLI PROBE: HCPCS

## 2021-04-05 PROCEDURE — 99213 OFFICE O/P EST LOW 20 MIN: CPT

## 2021-04-05 RX ORDER — LIDOCAINE 40 MG/G
CREAM TOPICAL ONCE
Status: CANCELLED | OUTPATIENT
Start: 2021-04-05 | End: 2021-04-05

## 2021-04-05 RX ORDER — LIDOCAINE HYDROCHLORIDE 20 MG/ML
JELLY TOPICAL ONCE
Status: CANCELLED | OUTPATIENT
Start: 2021-04-05 | End: 2021-04-05

## 2021-04-05 RX ORDER — LIDOCAINE HYDROCHLORIDE 20 MG/ML
JELLY TOPICAL ONCE
Status: COMPLETED | OUTPATIENT
Start: 2021-04-05 | End: 2021-04-05

## 2021-04-05 RX ORDER — LIDOCAINE HYDROCHLORIDE 40 MG/ML
SOLUTION TOPICAL ONCE
Status: CANCELLED | OUTPATIENT
Start: 2021-04-05 | End: 2021-04-05

## 2021-04-05 RX ORDER — DOXYCYCLINE HYCLATE 100 MG
100 TABLET ORAL 2 TIMES DAILY
Qty: 28 TABLET | Refills: 2 | Status: SHIPPED | OUTPATIENT
Start: 2021-04-05 | End: 2021-04-19

## 2021-04-05 RX ORDER — LIDOCAINE 50 MG/G
OINTMENT TOPICAL ONCE
Status: CANCELLED | OUTPATIENT
Start: 2021-04-05 | End: 2021-04-05

## 2021-04-05 RX ADMIN — LIDOCAINE HYDROCHLORIDE: 20 JELLY TOPICAL at 08:37

## 2021-04-05 NOTE — PROGRESS NOTES
[address]       Progress Note and Procedure Note      Progress note     Chief Complaint   Patient presents with    Wound Check     buttocks        HPI:   Sussy Almazan is a 44 y.o. male who presents for a wound evaluation. Patient has had a wound on the buttocks. It has been present for over a month. Patient has pain associated with it. There is abscess drainage. Patient does not have this type of wound in any other areas. Patient has a history of end-stage renal disease and he is on hemodialysis. Patient's pain is 2 out of 10. It is worsened by touching it and or when the patient sits on it. There is erythema and drainage. Patient had a CT scan which did not show an abscess collection. Patient is here for evaluation and treatment. I reviewed the CT results with the patient. Also provided a copy to the patient. Medications:     Current Outpatient Medications   Medication Sig Dispense Refill    doxycycline hyclate (VIBRA-TABS) 100 MG tablet Take 1 tablet by mouth 2 times daily for 14 days 28 tablet 2    doxycycline hyclate (VIBRA-TABS) 100 MG tablet Take 1 tablet by mouth 2 times daily for 14 days 28 tablet 0    midodrine (PROAMATINE) 10 MG tablet TAKE ONE TABLET BY MOUTH THREE TIMES DAILY 270 tablet 1    B Complex-C-Folic Acid (NEPHRO-JENNIFER) 0.8 MG TABS TAKE ONE TABLET BY MOUTH DAILY WITH BREAKFAST 90 tablet 1    warfarin (COUMADIN) 4 MG tablet Take one tablet (or as directed by Medication Management) by mouth once daily.  90 DS *new tab strength* 90 tablet 1    vitamin D (ERGOCALCIFEROL) 1.25 MG (79289 UT) CAPS capsule TAKE ONE CAPSULE BY MOUTH ONCE A MONTH 3 capsule 3    cinacalcet (SENSIPAR) 30 MG tablet Take 30 mg by mouth daily      ASPIRIN LOW DOSE 81 MG EC tablet TAKE ONE TABLET BY MOUTH DAILY 90 tablet 1    pravastatin (PRAVACHOL) 40 MG tablet Take 40 mg by mouth nightly       Elastic Bandages & Supports (MEDICAL COMPRESSION STOCKINGS) MISC 1 each by Does not apply route daily as needed (as needed) Right leg below the knee 20-30 HH MM DX 82.409 1 each 0    Multiple Vitamin (MVI, CELEBRATE, CHEWABLE TABLET) Take 1 tablet by mouth 2 times daily        No current facility-administered medications for this encounter. Allergies:   No Known Allergies  Review of Systems:   Constitutional: Negative for fever, chills, fatigue and unexpected weight change. HENT: Negative for hearing loss, sore throat and facial swelling. Eyes: Negative for pain and discharge. Respiratory: Patient has a history of cor pulmonale. .    Cardiovascular: Negative for chest pain. Gastrointestinal: Negative for nausea, vomiting, diarrhea and constipation. Skin: Negative for pallor and rash. Neurological: Negative for seizures, syncope and numbness. Hematological: Patient has a history of anemia. Psychiatric/Behavioral: Negative for behavioral problems. The patient is not nervous/anxious. Musculoskeletal: Patient has a history of arthritis. : Patient has chronic renal failure. Musculoskeletal: Patient has history of gout. Past Medical History:   Diagnosis Date    Anemia     Arthritis     right shoulder/ PCP Dr. Fletcher Marks    Atrial fibrillation ([facility] [address]     [address] BPH (benign prostatic hypertrophy)     CAD (coronary artery disease)     Dr. Paul Simple    Caffeine use     1 coffee, 2 tea/day    Cellulitis of lower leg     right    Chronic back pain     Colon cancer ([facility] [address]     colon    Cor pulmonale, acute ([facility] [address] 12/30/2014    CRF (chronic renal failure)     dr. Darline Sommers on [place]. Tues/ thurs/ sat/ right arm fistula    Erectile dysfunction     Gout     Hemodialysis patient ([facility] [address]     tues/ thurs/ sat/ DR. Petersen/ fistula right lower arm    History of blood transfusion     no reaction    Hyperkalemia 12/24/2013    Hyperlipidemia     Hypertension     Hypotension     Hypothyroidism     Lymphedema of leg     right  Obesity     Thyroid disease      Past Surgical History:   Procedure Laterality Date    ABDOMINAL EXPLORATION SURGERY  01/22/2019    ABDOMINAL EXPLORATION, LEFT HEMICOLECTOMY, MOBILIZATION OF SPLENIC FLEXURE     ABSCESS DRAINAGE Right 01/20/2014    I&D Right Shoulder, Right shoulder arthroscopy, I&D AC Joint    APPENDECTOMY      COLONOSCOPY      COLONOSCOPY  01/22/2019    COLONOSCOPY N/A 1/22/2019    COLONOSCOPY- GI UNIT SCHEDULED performed by Foreign Holcomb MD at [address] [facility] Right 3/27/15    rt wrist    ENDOSCOPY, COLON, DIAGNOSTIC      UGI    GASTRIC BAND REMOVAL  01/17/2017    subcutaneous port    HC CATH POWER PICC SINGLE  1/24/2014         LAP BAND  2007    HI COLSC FLX W/RMVL OF TUMOR POLYP LESION SNARE TQ N/A 11/15/2018    COLONOSCOPY POLYPECTOMY SNARE/COLD BIOPSY performed by Lulú Marin MD at [address] Right 2014    SLEEVE GASTRECTOMY N/A 9/25/2017    GASTRECTOMY SLEEVE LAPAROSCOPIC SI ROBOTIC, ENDOSEALER performed by John Goldman MD at [address] [facility] N/A 1/22/2019    ABDOMINAL EXPLORATION, LEFT HEMICOLECTOMY, MOBILIZATION OF SPLENIC FLEXURE performed by Foreign Holcomb MD at [address] [facility] Right     leg tumor removal/ dr. Arcadio Goldberg History     Socioeconomic History    Marital status: Single     Spouse name: Not on file    Number of children: Not on file    Years of education: Not on file    Highest education level: Not on file   Occupational History    Not on file   Social Needs    Financial resource strain: Not on file    Food insecurity     Worry: Not on file     Inability: Not on file    Transportation needs     Medical: Not on file     Non-medical: Not on file   Tobacco Use    Smoking status: Never Smoker    Smokeless tobacco: Never Used   Substance and Sexual Activity    Alcohol use:  Yes     Alcohol/week: 3.0 standard drinks     Types: 3 Glasses of wine per week     Comment: weekend    Drug use: No    Sexual activity: Yes     Partners: Female   Lifestyle    Physical activity     Days per week: Not on file     Minutes per session: Not on file    Stress: Not on file   Relationships    Social connections     Talks on phone: Not on file     Gets together: Not on file     Attends [employer] service: Not on file     Active member of club or organization: Not on file     Attends meetings of clubs or organizations: Not on file     Relationship status: Not on file    Intimate partner violence     Fear of current or ex partner: Not on file     Emotionally abused: Not on file     Physically abused: Not on file     Forced sexual activity: Not on file   Other Topics Concern    Not on file   Social History Narrative    Not on file     Family History   Problem Relation Age of Onset    Cancer Father         leukemia    Heart Failure Mother     Arthritis Mother     High Blood Pressure Mother     Heart Disease Maternal Grandmother     Stroke Paternal Grandfather      Physical Exam:   /83   Pulse 94   Temp 96.7 °F (35.9 °C)   Resp 16    There is no height or weight on file to calculate BMI. Physical Exam   Nursing note and vitals reviewed. Constitutional: Oriented to person, place, and time. Appears well-developed and well-nourished. No distress. Head: Normocephalic and atraumatic. Eyes: Conjunctivae and EOM are normal.   Pulmonary/Chest: Effort normal. No respiratory distress. Neurological: Alert and oriented to person, place, and time. Araceli Ni Psychiatric: Normal mood and affect.  Behavior is normal      Post Debridement Measurements:  Wound/Ulcer Descriptions are Pre Debridement except measurements:    Wound 03/17/21 Buttocks Right #1  Cluster (Active)   Wound Image   03/17/21 1450   Wound Etiology Other 04/05/21 0832   Dressing Status Old drainage noted;New drainage noted 04/05/21 4261   Wound Cleansed Irrigated with saline 04/05/21 3397   Dressing/Treatment Other (comment) 03/22/21 1123   Wound Length (cm) 7.2 cm 04/05/21 0832   Wound Width (cm) 2.5 cm 04/05/21 0832   Wound Depth (cm) 0.2 cm 04/05/21 0832   Wound Surface Area (cm^2) 18 cm^2 04/05/21 0832   Change in Wound Size % (l*w) -71.43 04/05/21 0832   Wound Volume (cm^3) 3.6 cm^3 04/05/21 0832   Wound Healing % 14 04/05/21 0832   Post-Procedure Length (cm) 7.2 cm 04/05/21 0832   Post-Procedure Width (cm) 2.5 cm 04/05/21 9264   Post-Procedure Depth (cm) 0.2 cm 04/05/21 0832   Post-Procedure Surface Area (cm^2) 18 cm^2 04/05/21 0832   Post-Procedure Volume (cm^3) 3.6 cm^3 04/05/21 0832   Wound Assessment Pink/red 04/05/21 0832   Drainage Amount Moderate 04/05/21 0832   Drainage Description Serosanguinous 04/05/21 0832   Odor None 04/05/21 0832   Carlota-wound Assessment Fragile 04/05/21 0832   Margins Defined edges 04/05/21 0832   Wound Thickness Description not for Pressure Injury Full thickness 04/05/21 8307   Number of days: 18                  Imaging:           Impression/Plan:     Problem List Items Addressed This Visit     ESRD on hemodialysis [facility])    Relevant Orders    Initiate Outpatient Wound Care Protocol    Abscess of right buttock - Primary    Relevant Orders    Initiate Outpatient Wound Care Protocol    Buttock wound, right, initial encounter    Relevant Orders    Initiate Outpatient Wound Care Protocol    BMI 38.0-38.9,adult    Relevant Orders    Initiate Outpatient Wound Care Protocol          Patient Active Problem List   Diagnosis    Obesity, Class II, BMI 35-39.9, with comorbidity    Benign prostatic hyperplasia with lower urinary tract symptoms    ESRD on hemodialysis ([address]    Erectile dysfunction due to arterial insufficiency    Chronic atrial fibrillation    Warfarin-induced coagulopathy ([address]    DVT (deep venous thrombosis) (HCC)    S/P laparoscopic sleeve gastrectomy    Vitamin D deficiency    Lymphedema    Right heart failure ([address]    Peripheral vascular disease ([address]    Malignant neoplasm of transverse colon ([facility] [address]    Status post partial colectomy    Rotator cuff arthropathy of right shoulder    History of COVID-19    Abscess of right buttock    Buttock wound, right, initial encounter    Hypertensive chronic kidney disease with stage 1 through stage 4 chronic kidney disease, or unspecified chronic kidney disease    BMI 38.0-38.9,adult     Plan:  The area appears to be hidradenitis. We will plan to institute the hidradenitis regiment. I will refer patient to dermatology. Patient is to follow-up in  2 weeks. I renewed his hidradenitis. We will continue the silver cell.   Hidradenitis regiment:    Doxycycline 100 mg BID x 3 months   Benzoyl peroxide wash daily, leave on 5 minutes before washing off   Clindamycin lotion daily       Electronically signed by:  Sabine Ivy MD 4/5/2021

## 2021-04-05 NOTE — PLAN OF CARE
Problem: Pain:  Description: Pain management should include both nonpharmacologic and pharmacologic interventions. Goal: Pain level will decrease  Description: Pain level will decrease  Outcome: Ongoing  Goal: Control of acute pain  Description: Control of acute pain  Outcome: Ongoing  Goal: Control of chronic pain  Description: Control of chronic pain  Outcome: Ongoing     Problem: Pain:  Description: Pain management should include both nonpharmacologic and pharmacologic interventions.   Goal: Pain level will decrease  Description: Pain level will decrease  Outcome: Ongoing  Goal: Control of acute pain  Description: Control of acute pain  Outcome: Ongoing  Goal: Control of chronic pain  Description: Control of chronic pain  Outcome: Ongoing     Problem: Wound:  Goal: Will show signs of wound healing; wound closure and no evidence of infection  Description: Will show signs of wound healing; wound closure and no evidence of infection  Outcome: Ongoing

## 2021-04-06 LAB
SARS-COV-2: NORMAL
SARS-COV-2: NOT DETECTED
SOURCE: NORMAL

## 2021-04-07 ENCOUNTER — TELEPHONE (OUTPATIENT)
Dept: PRIMARY CARE CLINIC | Age: 51
End: 2021-04-07

## 2021-04-09 ENCOUNTER — HOSPITAL ENCOUNTER (OUTPATIENT)
Age: 51
Setting detail: OUTPATIENT SURGERY
Discharge: HOME | End: 2021-04-09
Attending: COLON & RECTAL SURGERY | Admitting: COLON & RECTAL SURGERY
Payer: COMMERCIAL

## 2021-04-09 ENCOUNTER — ANESTHESIA EVENT (OUTPATIENT)
Dept: ENDOSCOPY | Age: 51
End: 2021-04-09
Payer: COMMERCIAL

## 2021-04-09 ENCOUNTER — ANESTHESIA (OUTPATIENT)
Dept: ENDOSCOPY | Age: 51
End: 2021-04-09
Payer: COMMERCIAL

## 2021-04-09 VITALS
DIASTOLIC BLOOD PRESSURE: 67 MMHG | SYSTOLIC BLOOD PRESSURE: 84 MMHG | RESPIRATION RATE: 31 BRPM | OXYGEN SATURATION: 100 %

## 2021-04-09 VITALS
HEART RATE: 97 BPM | HEIGHT: 70 IN | DIASTOLIC BLOOD PRESSURE: 71 MMHG | RESPIRATION RATE: 22 BRPM | TEMPERATURE: 95.6 F | SYSTOLIC BLOOD PRESSURE: 96 MMHG | BODY MASS INDEX: 37.87 KG/M2 | OXYGEN SATURATION: 95 % | WEIGHT: 264.55 LBS

## 2021-04-09 LAB
INR BLD: 1.3
POTASSIUM SERPL-SCNC: 5 MMOL/L (ref 3.7–5.3)
PROTHROMBIN TIME: 13.2 SEC (ref 9.1–12.3)

## 2021-04-09 PROCEDURE — 88305 TISSUE EXAM BY PATHOLOGIST: CPT

## 2021-04-09 PROCEDURE — 2580000003 HC RX 258: Performed by: COLON & RECTAL SURGERY

## 2021-04-09 PROCEDURE — 6360000002 HC RX W HCPCS

## 2021-04-09 PROCEDURE — 84132 ASSAY OF SERUM POTASSIUM: CPT

## 2021-04-09 PROCEDURE — 7100000010 HC PHASE II RECOVERY - FIRST 15 MIN: Performed by: COLON & RECTAL SURGERY

## 2021-04-09 PROCEDURE — 36415 COLL VENOUS BLD VENIPUNCTURE: CPT

## 2021-04-09 PROCEDURE — 3700000000 HC ANESTHESIA ATTENDED CARE: Performed by: COLON & RECTAL SURGERY

## 2021-04-09 PROCEDURE — 2500000003 HC RX 250 WO HCPCS

## 2021-04-09 PROCEDURE — 7100000011 HC PHASE II RECOVERY - ADDTL 15 MIN: Performed by: COLON & RECTAL SURGERY

## 2021-04-09 PROCEDURE — 3700000001 HC ADD 15 MINUTES (ANESTHESIA): Performed by: COLON & RECTAL SURGERY

## 2021-04-09 PROCEDURE — 85610 PROTHROMBIN TIME: CPT

## 2021-04-09 PROCEDURE — 3609010600 HC COLONOSCOPY POLYPECTOMY SNARE/COLD BIOPSY: Performed by: COLON & RECTAL SURGERY

## 2021-04-09 PROCEDURE — 2709999900 HC NON-CHARGEABLE SUPPLY: Performed by: COLON & RECTAL SURGERY

## 2021-04-09 RX ORDER — PHENYLEPHRINE HYDROCHLORIDE 10 MG/ML
INJECTION INTRAVENOUS PRN
Status: DISCONTINUED | OUTPATIENT
Start: 2021-04-09 | End: 2021-04-09 | Stop reason: SDUPTHER

## 2021-04-09 RX ORDER — SODIUM CHLORIDE 9 MG/ML
INJECTION, SOLUTION INTRAVENOUS CONTINUOUS
Status: DISCONTINUED | OUTPATIENT
Start: 2021-04-09 | End: 2021-04-09 | Stop reason: HOSPADM

## 2021-04-09 RX ORDER — PROPOFOL 10 MG/ML
INJECTION, EMULSION INTRAVENOUS CONTINUOUS PRN
Status: DISCONTINUED | OUTPATIENT
Start: 2021-04-09 | End: 2021-04-09 | Stop reason: SDUPTHER

## 2021-04-09 RX ORDER — PROPOFOL 10 MG/ML
INJECTION, EMULSION INTRAVENOUS PRN
Status: DISCONTINUED | OUTPATIENT
Start: 2021-04-09 | End: 2021-04-09 | Stop reason: SDUPTHER

## 2021-04-09 RX ORDER — LIDOCAINE HYDROCHLORIDE 10 MG/ML
INJECTION, SOLUTION EPIDURAL; INFILTRATION; INTRACAUDAL; PERINEURAL PRN
Status: DISCONTINUED | OUTPATIENT
Start: 2021-04-09 | End: 2021-04-09 | Stop reason: SDUPTHER

## 2021-04-09 RX ADMIN — SODIUM CHLORIDE: 9 INJECTION, SOLUTION INTRAVENOUS at 07:14

## 2021-04-09 RX ADMIN — PHENYLEPHRINE HYDROCHLORIDE 100 MCG: 10 INJECTION INTRAVENOUS at 08:28

## 2021-04-09 RX ADMIN — PROPOFOL 20 MG: 10 INJECTION, EMULSION INTRAVENOUS at 08:00

## 2021-04-09 RX ADMIN — PROPOFOL 20 MG: 10 INJECTION, EMULSION INTRAVENOUS at 07:56

## 2021-04-09 RX ADMIN — PHENYLEPHRINE HYDROCHLORIDE 100 MCG: 10 INJECTION INTRAVENOUS at 08:06

## 2021-04-09 RX ADMIN — LIDOCAINE HYDROCHLORIDE 50 MG: 10 INJECTION, SOLUTION EPIDURAL; INFILTRATION; INTRACAUDAL; PERINEURAL at 07:55

## 2021-04-09 RX ADMIN — PHENYLEPHRINE HYDROCHLORIDE 100 MCG: 10 INJECTION INTRAVENOUS at 08:14

## 2021-04-09 RX ADMIN — PHENYLEPHRINE HYDROCHLORIDE 100 MCG: 10 INJECTION INTRAVENOUS at 08:02

## 2021-04-09 RX ADMIN — PROPOFOL 20 MG: 10 INJECTION, EMULSION INTRAVENOUS at 07:55

## 2021-04-09 RX ADMIN — PROPOFOL 20 MG: 10 INJECTION, EMULSION INTRAVENOUS at 07:57

## 2021-04-09 RX ADMIN — PROPOFOL 100 MCG/KG/MIN: 10 INJECTION, EMULSION INTRAVENOUS at 07:55

## 2021-04-09 RX ADMIN — PROPOFOL 20 MG: 10 INJECTION, EMULSION INTRAVENOUS at 07:58

## 2021-04-09 RX ADMIN — PHENYLEPHRINE HYDROCHLORIDE 100 MCG: 10 INJECTION INTRAVENOUS at 08:21

## 2021-04-09 NOTE — H&P
History and Physical    Pt Name: Jess Su  MRN: [id number]  YOB: 1970  Date of evaluation: 4/9/2021  Primary Care Physician: Yuridia Lobo MD    SUBJECTIVE:   History of Chief Complaint:    Jess Su is a 44 y.o. male who is scheduled today for colonoscopy. He reports today's procedure is for kidney transplant clearance. He has history of colon cancer in 2019, s/p left hemicolectomy. He says he must be \"cancer free for 2 years\" for transplant. He is following with [facility]. He denies any GI complaints. Hemodialysis since 2015, goes T, R, Saturday,  Using right AVF without issues he reports. Allergies  has No Known Allergies. Medications  Prior to Admission medications    Medication Sig Start Date End Date Taking? Authorizing Provider   doxycycline hyclate (VIBRA-TABS) 100 MG tablet Take 1 tablet by mouth 2 times daily for 14 days 4/5/21 4/19/21 Yes Greg Aguirre MD   midodrine (PROAMATINE) 10 MG tablet TAKE ONE TABLET BY MOUTH THREE TIMES DAILY 3/15/21  Yes Yuridia Lobo MD   B Complex-C-Folic Acid (NEPHRO-JENNIFER) 0.8 MG TABS TAKE ONE TABLET BY MOUTH DAILY WITH BREAKFAST 2/1/21  Yes Yuridia Lobo MD   warfarin (COUMADIN) 4 MG tablet Take one tablet (or as directed by Medication Management) by mouth once daily.  90 DS *new tab strength* 12/10/20  Yes Yuridia Lobo MD   vitamin D (ERGOCALCIFEROL) 1.25 MG (99538 UT) CAPS capsule TAKE ONE CAPSULE BY MOUTH ONCE A MONTH 10/8/20  Yes Yuridia Lobo MD   cinacalcet (SENSIPAR) 30 MG tablet Take 30 mg by mouth daily   Yes Historical Provider, MD   ASPIRIN LOW DOSE 81 MG EC tablet TAKE ONE TABLET BY MOUTH DAILY 3/28/19  Yes Yuridia Lobo MD   pravastatin (PRAVACHOL) 40 MG tablet Take 40 mg by mouth nightly    Yes Historical Provider, MD   Multiple Vitamin (MVI, CELEBRATE, CHEWABLE TABLET) Take 1 tablet by mouth 2 times daily    Yes Historical Provider, MD   Elastic Bandages & Supports ([address]) [address] 1 each by Does not apply route daily as needed (as needed) Right leg below the knee 20-30 HH MM DX 82.409 3/19/18   Niya Kent MD     Past Medical History    has a past medical history of Anemia, Anticoagulated on Coumadin, Arthritis, Atrial fibrillation ([facility] [address] BPH (benign prostatic hypertrophy), CAD (coronary artery disease), Caffeine use, Cellulitis of lower leg, Chronic back pain, Colon cancer ([facility] [address] Cor pulmonale, acute ([facility] [address] COVID-19, CRF (chronic renal failure), Erectile dysfunction, Gout, Hemodialysis patient ([facility] [address] History of blood transfusion, Hyperkalemia, Hyperlipidemia, Hypertension, Hypotension, Hypothyroidism, Kidney transplant candidate, Lymphedema of leg, Obesity, and Thyroid disease. Past Surgical History   has a past surgical history that includes Appendectomy; Lap Band (); Abscess Drainage (Right, 2014); hc cath power picc single (2014); Dialysis fistula creation (Right, 3/27/15); shoulder surgery (Right, ); Bariatric Surgery (2017); Endoscopy, colon, diagnostic; Sleeve Gastrectomy (N/A, 2017); pr colsc flx w/rmvl of tumor polyp lesion snare tq (N/A, 11/15/2018); vascular surgery (Right); Colonoscopy; Abdominal exploration surgery (2019); Colonoscopy (2019); Small intestine surgery (N/A, 2019); and Colonoscopy (N/A, 2019). Social History   reports that he has never smoked. He has never used smokeless tobacco.   reports current alcohol use of about 3.0 standard drinks of alcohol per week. reports no history of drug use. Marital Status single  Occupation disabled   Family History  Family Status   Relation Name Status    Father      Mother      MGM  (Not Specified)    PGF  (Not Specified)     family history includes Arthritis in his mother; Cancer in his father; Heart Disease in his maternal grandmother; Heart Failure in his mother; High Blood Pressure in his mother; Stroke in his paternal grandfather.       OBJECTIVE:   VITALS:  height is 5' 10\" (1.778 m) and weight is 264 lb 8.8 oz (120 kg). His blood pressure is 83/64 and his pulse is 103. His respiration is 16 and oxygen saturation is 100%. CONSTITUTIONAL:alert & oriented x 3, no acute distress. Friendly. Very pleasant. SKIN:  Warm and dry, no rashes on exposed areas of skin   HEAD:  Normocephalic, atraumatic   EYES: PERRL. EOMs intact. EARS:  Equal bilaterally, no edema or thickening, skin is intact without lumps or lesions. No discharge. Hearing grossly WNL. NOSE:  Nares patent. No rhinorrhea   MOUTH/THROAT:  Mucous membranes moist, tongue is pink, uvula midline, teeth appear to be intact, large tongue  NECK:supple, no lymphadenopathy  LUNGS: Respirations even and non-labored. Clear to auscultation bilaterally, no wheezes, rales, or rhonchi. CARDIOVASCULAR: Regular rate and rhythm, no murmurs/rubs/gallops   ABDOMEN: soft, non-tender, non-distended, bowel sounds active x 4, rotund  EXTREMITIES: BLE with compression stocking, some skin laxity noted on RLE (says history of lymphedema), Right arm AVF +thrill and bruit  NEUROLOGIC: CN II-XII are grossly intact. Gait not assessed. IMPRESSIONS:   Surveillance  History of left colon cancer      Diagnosis Date    Anemia     Anticoagulated on Coumadin     Arthritis     right shoulder/ PCP Dr. Lissette Das    Atrial fibrillation ([facility] [address]     [address] BPH (benign prostatic hypertrophy)     CAD (coronary artery disease)     Dr. Leo Rodgers    Caffeine use     1 coffee, 2 tea/day    Cellulitis of lower leg     right    Chronic back pain     Colon cancer ([facility] [address]     colon    Cor pulmonale, acute ([facility] [address] 12/30/2014    COVID-19 08/2020    says hospitalized at [facility]. Vs for one week, discharged, \"passed out\" then re-admitted to [facility]. Reports he received antibody treatment, no intubation, as of 4/2021 reports has not regained smell or taste back    CRF (chronic renal failure)     dr. Nia Kerr on [place].  Tues/ thurs/

## 2021-04-09 NOTE — PROGRESS NOTES
Abd. Soft, non. Tender the patient. Passing air . Clean dry dressing applied Rt. Buttocks at cyst draining site. Pt. Emanuel.  Well

## 2021-04-09 NOTE — ANESTHESIA PRE PROCEDURE
Department of Anesthesiology  Preprocedure Note       Name:  Breanne Griffith   Age:  44 y.o.  :  1970                                          MRN:  [id number]         Date:  2021      Surgeon: Jessica Nicole):  Mario Rodriguez MD    Procedure:   Procedure: COLONOSCOPY DIAGNOSTIC (N/A )   Anesthesia type: Monitor Anesthesia Care   Pre-op diagnosis: SURVEILLANCE, HISTORY OF LEFT COLON CANCER         Medications prior to admission:   Prior to Admission medications    Medication Sig Start Date End Date Taking? Authorizing Provider   doxycycline hyclate (VIBRA-TABS) 100 MG tablet Take 1 tablet by mouth 2 times daily for 14 days 21  Manasa Moe MD   midodrine (PROAMATINE) 10 MG tablet TAKE ONE TABLET BY MOUTH THREE TIMES DAILY 3/15/21   Tao Whitaker MD   B Complex-C-Folic Acid (NEPHRO-JENNIFER) 0.8 MG TABS TAKE ONE TABLET BY MOUTH DAILY WITH BREAKFAST 21   Tao Whitaker MD   warfarin (COUMADIN) 4 MG tablet Take one tablet (or as directed by Medication Management) by mouth once daily. 90 DS *new tab strength* 12/10/20   Tao Whitaker MD   vitamin D (ERGOCALCIFEROL) 1.25 MG (79162 UT) CAPS capsule TAKE ONE CAPSULE BY MOUTH ONCE A MONTH 10/8/20   Tao Whitaker MD   cinacalcet (SENSIPAR) 30 MG tablet Take 30 mg by mouth daily    Historical Provider, MD   ASPIRIN LOW DOSE 81 MG EC tablet TAKE ONE TABLET BY MOUTH DAILY 3/28/19   Tao Whitaker MD   pravastatin (PRAVACHOL) 40 MG tablet Take 40 mg by mouth nightly     Historical Provider, MD   Elastic Bandages & Supports ([address] ) [address] 1 each by Does not apply route daily as needed (as needed) Right leg below the knee 20-30 HH MM DX 82.409 3/19/18   Tao Whitaker MD   Multiple Vitamin (MVI, CELEBRATE, CHEWABLE TABLET) Take 1 tablet by mouth 2 times daily     Historical Provider, MD       Current medications:    No current outpatient medications on file. No current facility-administered medications for this visit.         Allergies:  No Known Obesity     Thyroid disease        Past Surgical History:        Procedure Laterality Date    ABDOMINAL EXPLORATION SURGERY  01/22/2019    ABDOMINAL EXPLORATION, LEFT HEMICOLECTOMY, MOBILIZATION OF SPLENIC FLEXURE     ABSCESS DRAINAGE Right 01/20/2014    I&D Right Shoulder, Right shoulder arthroscopy, I&D AC Joint    APPENDECTOMY      COLONOSCOPY      COLONOSCOPY  01/22/2019    COLONOSCOPY N/A 1/22/2019    COLONOSCOPY- GI UNIT SCHEDULED performed by Pastora Mcgee MD at [facility] Right 3/27/15    rt wrist    ENDOSCOPY, COLON, DIAGNOSTIC      UGI    GASTRIC BAND REMOVAL  01/17/2017    subcutaneous port    HC CATH POWER PICC SINGLE  1/24/2014         LAP BAND  2007    [place] COLSC FLX W/RMVL OF TUMOR POLYP LESION SNARE TQ N/A 11/15/2018    COLONOSCOPY POLYPECTOMY SNARE/COLD BIOPSY performed by Tricia Silva MD at [facility] Right 2014    SLEEVE GASTRECTOMY N/A 9/25/2017    GASTRECTOMY SLEEVE LAPAROSCOPIC SI ROBOTIC, ENDOSEALER performed by Denise Alanis MD at [facility] N/A 1/22/2019    ABDOMINAL EXPLORATION, LEFT HEMICOLECTOMY, MOBILIZATION OF SPLENIC FLEXURE performed by Pastora Mcgee MD at [facility] Right     leg tumor removal/ dr. Garth Cummins       Social History:    Social History     Tobacco Use    Smoking status: Never Smoker    Smokeless tobacco: Never Used   Substance Use Topics    Alcohol use: Yes     Alcohol/week: 3.0 standard drinks     Types: 3 Glasses of wine per week     Comment: weekend                                Counseling given: Not Answered      Vital Signs (Current): There were no vitals filed for this visit.                                            BP Readings from Last 3 Encounters:   04/05/21 118/83   03/22/21 111/76   03/17/21 124/87       NPO Status:                                                                                 BMI:   Wt Readings from Last 3 Encounters:   03/17/21 271 lb (122.9 kg)   03/12/21 271 lb (122.9 kg)   01/08/21 267 lb (121.1 kg)     There is no height or weight on file to calculate BMI.    CBC:   Lab Results   Component Value Date    WBC 8.5 01/11/2021    RBC 4.01 01/11/2021    HGB 12.9 01/11/2021    HCT 41.3 01/11/2021    .0 01/11/2021    RDW 15.3 01/11/2021     01/11/2021       CMP:   Lab Results   Component Value Date     01/11/2021    K 4.9 01/11/2021    CL 94 01/11/2021    CO2 27 01/11/2021    BUN 59 01/11/2021    CREATININE 12.21 01/11/2021    GFRAA 5 01/11/2021    LABGLOM 4 01/11/2021    GLUCOSE 92 01/11/2021    GLUCOSE 91 10/04/2011    PROT 8.0 01/11/2021    PROT 6.6 08/07/2012    CALCIUM 10.1 01/11/2021    BILITOT 0.32 01/11/2021    ALKPHOS 104 01/11/2021    AST 22 01/11/2021    ALT 20 01/11/2021       POC Tests:   No results for input(s): POCGLU, POCNA, POCK, POCCL, POCBUN, POCHEMO, POCHCT in the last 72 hours. Coags:   Lab Results   Component Value Date    PROTIME 24.5 03/03/2021    PROTIME 28.4 06/04/2020    INR 2 03/03/2021    APTT 31.2 08/27/2020       HCG (If Applicable): No results found for: PREGTESTUR, PREGSERUM, HCG, HCGQUANT     ABGs: No results found for: PHART, PO2ART, TGF4QED, EDQ8MEI, BEART, N3ENRXEQ     Type & Screen (If Applicable):  No results found for: LABABO, [address]    Anesthesia Evaluation  Patient summary reviewed no history of anesthetic complications:   Airway: Mallampati: III  TM distance: >3 FB   Neck ROM: full  Mouth opening: > = 3 FB Dental: normal exam         Pulmonary:Negative Pulmonary ROS breath sounds clear to auscultation                             Cardiovascular:  Exercise tolerance: good (>4 METS),   (+) hypertension: mild, CAD: no interval change, dysrhythmias: atrial fibrillation,       ECG reviewed  Rhythm: irregular  Rate: abnormal  Echocardiogram reviewed         Beta Blocker:  Not on Beta Blocker      ROS comment: Summary  Left ventricle is normal in size.  Global left ventricular systolic function  is normal. Estimated ejection fraction is 55 % . Left ventricular wall  thickness is at the upper limits of normal in size. Left atrium is mildly dilated. Aortic valve is sclerotic but opens well. Mild to moderate aortic insufficiency. Trivial mitral regurgitation. Trivial tricuspid regurgitation. Trivial pulmonic insufficiency. Aortic root is mildly dilated at 3.9 cm. The ascending aorta is normal in  size.        Neuro/Psych:   Negative Neuro/Psych ROS              GI/Hepatic/Renal:   (+) GERD: no interval change, renal disease: ESRD and dialysis, morbid obesity          Endo/Other:    (+) hypothyroidism, blood dyscrasia: anticoagulation therapy:., malignancy/cancer. Abdominal:   (+) obese,     Abdomen: soft. Vascular:   + DVT, . Anesthesia Plan      MAC     ASA 4       Induction: intravenous. Anesthetic plan and risks discussed with patient. Plan discussed with CRNA.                   Reji Rodney MD   4/9/2021

## 2021-04-12 LAB — SURGICAL PATHOLOGY REPORT: NORMAL

## 2021-04-12 NOTE — OP NOTE
Operative Note      Patient: Aaron Avila  YOB: 1970  MRN: [id number]    Date of Procedure: 4/9/2021    Pre-Op Diagnosis:   1. Hx of distal transverse colon cancer  2. S/p left hemicolectomy     Post-Op Diagnosis:   1. Same as above  2. Sigmoid colon polyp       Procedure:   1. Screening surveillance colonoscopy   2. Hot snare polypectomy     Surgeon: Mariano Davis MD    Assistant: Beverly Linn DO, PGY-5    Anesthesia: Monitor Anesthesia Care    Estimated Blood Loss (mL): <5LA    Complications: None    Specimens: Benign appearing sigmoid polyp     Findings: Polypectomy of benign appearing polyp in the sigmoid. No recurrence of colon cancer identified. Anastomosis patent. Proximal colon prep poor. Indications: The patient is a 44y.o. year old male with a history of colon cancer in the distal transverse colon, s/p left hemicolectomy with proximal transverse colon to distal descending colon anastomosis back in 1/2019 by Dr. Pavel Leon. Today he presents for surveillance screening colonoscopy. We explained the risks, benefits, expected outcome, and alternatives to the procedure. Risks included but are not limited to bleeding, infection, respiratory distress, hypotension, and perforation of the colon. The patient understands and is in agreement. Procedure Details: The patient was given IV conscious sedation per anesthesia. The patient was given 4 L of O2 /minute by nasal cannula. The patient's SPO2 remained above 90% throughout the procedure. The colonoscope was inserted per rectum and advanced under direct vision to the cecum without difficulty. The prep was adequate until arriving at the proximal colon where the prep was quite poor due to significant stool remaining throughout. Findings:  Cecum/Ascending colon: No gross abnormalities noted. Stool present and unable to irrigate and suction all of the feculent material present in cecum to entirely evaluate mucosa. Transverse colon:  Only small amount of proximal transverse colon remaining per previous operative record. Distal 2/3 absent from previous resection. No masses or polyps identified. Descending/Sigmoid colon: Small 5mm polyp noted in the sigmoid colon which was benign in appearance. Hot snare polypectomy utilized to remove polyp which was suctioned, passed from the field and sent for analysis. Rectum/Anus: examined in normal and retroflexed positions and was normal.     Gluteal area noted to have a perianal/gluteal incision site from abscess which has been draining since October per family. No evidence of fistula during colonoscopy but will need further evaluation with EUA and anoscopy and possibly additional formal drainage depending on exam.    The colon was decompressed and the scope was removed. The patient tolerated the procedure well. IMPRESSION/PLAN:   1. Follow up to be arranged with Dr. Tiffany Chavez office regarding timing for repeat colonoscopy in the future. 2. Will plan to bring patient back to OR for exam under anesthesia regarding perianal/gluteal abscess site which has been draining since October. May need additional formal drainage and needs thorough eval for fistula. 3. Await biopsy results from polypectomy although suspect benign. Electronically signed by Juani Luicano DO on 4/12/2021 at 5:47 PM        I Dr. Gokul Lopez was present throughout and performed the entire procedure. Garcia Taveras  Colorectal Surgery

## 2021-04-16 ENCOUNTER — HOSPITAL ENCOUNTER (OUTPATIENT)
Dept: PHARMACY | Age: 51
Setting detail: THERAPIES SERIES
Discharge: HOME | End: 2021-04-16
Payer: COMMERCIAL

## 2021-04-16 DIAGNOSIS — I82.401 DEEP VEIN THROMBOSIS (DVT) OF RIGHT LOWER EXTREMITY, UNSPECIFIED CHRONICITY, UNSPECIFIED VEIN (HCC): ICD-10-CM

## 2021-04-16 LAB
INR BLD: 1.5
PROTIME: 17.7 SECONDS

## 2021-04-16 PROCEDURE — 85610 PROTHROMBIN TIME: CPT

## 2021-04-16 PROCEDURE — 99212 OFFICE O/P EST SF 10 MIN: CPT

## 2021-04-19 ENCOUNTER — HOSPITAL ENCOUNTER (OUTPATIENT)
Dept: WOUND CARE | Age: 51
Discharge: HOME | End: 2021-04-19
Payer: COMMERCIAL

## 2021-04-19 VITALS
HEART RATE: 88 BPM | TEMPERATURE: 96.5 F | SYSTOLIC BLOOD PRESSURE: 118 MMHG | RESPIRATION RATE: 18 BRPM | DIASTOLIC BLOOD PRESSURE: 82 MMHG

## 2021-04-19 DIAGNOSIS — L02.31 ABSCESS OF RIGHT BUTTOCK: Primary | ICD-10-CM

## 2021-04-19 DIAGNOSIS — Z99.2 ESRD ON HEMODIALYSIS (HCC): ICD-10-CM

## 2021-04-19 DIAGNOSIS — S31.819A BUTTOCK WOUND, RIGHT, INITIAL ENCOUNTER: ICD-10-CM

## 2021-04-19 DIAGNOSIS — N18.6 ESRD ON HEMODIALYSIS (HCC): ICD-10-CM

## 2021-04-19 LAB
CULTURE: NORMAL
DIRECT EXAM: NORMAL
Lab: NORMAL
SPECIMEN DESCRIPTION: NORMAL

## 2021-04-19 PROCEDURE — 99213 OFFICE O/P EST LOW 20 MIN: CPT | Performed by: PLASTIC SURGERY

## 2021-04-19 PROCEDURE — 87205 SMEAR GRAM STAIN: CPT

## 2021-04-19 PROCEDURE — 87070 CULTURE OTHR SPECIMN AEROBIC: CPT

## 2021-04-19 PROCEDURE — 99213 OFFICE O/P EST LOW 20 MIN: CPT

## 2021-04-19 RX ORDER — LIDOCAINE HYDROCHLORIDE 20 MG/ML
JELLY TOPICAL ONCE
Status: CANCELLED | OUTPATIENT
Start: 2021-04-19 | End: 2021-04-19

## 2021-04-19 RX ORDER — LIDOCAINE HYDROCHLORIDE 20 MG/ML
JELLY TOPICAL ONCE
Status: COMPLETED | OUTPATIENT
Start: 2021-04-19 | End: 2021-04-19

## 2021-04-19 RX ORDER — LIDOCAINE HYDROCHLORIDE 40 MG/ML
SOLUTION TOPICAL ONCE
Status: CANCELLED | OUTPATIENT
Start: 2021-04-19 | End: 2021-04-19

## 2021-04-19 RX ORDER — LIDOCAINE 50 MG/G
OINTMENT TOPICAL ONCE
Status: CANCELLED | OUTPATIENT
Start: 2021-04-19 | End: 2021-04-19

## 2021-04-19 RX ORDER — CLINDAMYCIN PHOSPHATE 10 MG/G
GEL TOPICAL
Qty: 60 G | Refills: 2 | Status: SHIPPED | OUTPATIENT
Start: 2021-04-19 | End: 2021-04-26

## 2021-04-19 RX ORDER — PRAVASTATIN SODIUM 40 MG
40 TABLET ORAL NIGHTLY
Qty: 90 TABLET | Refills: 1 | Status: SHIPPED | OUTPATIENT
Start: 2021-04-19 | End: 2022-03-14

## 2021-04-19 RX ORDER — LIDOCAINE 40 MG/G
CREAM TOPICAL ONCE
Status: CANCELLED | OUTPATIENT
Start: 2021-04-19 | End: 2021-04-19

## 2021-04-19 RX ADMIN — LIDOCAINE HYDROCHLORIDE: 20 JELLY TOPICAL at 08:27

## 2021-04-19 NOTE — TELEPHONE ENCOUNTER
Rosalba Gowers is calling to request a refill on the following medication(s):    Medication Request:  Requested Prescriptions     Pending Prescriptions Disp Refills    pravastatin (PRAVACHOL) 40 MG tablet 30 tablet 3     Sig: Take 1 tablet by mouth nightly       Last Visit Date (If Applicable):  1/63/6577    Next Visit Date:    5/14/2021

## 2021-04-19 NOTE — PROGRESS NOTES
[address]       Progress Note and Procedure Note      Progress note     Chief Complaint   Patient presents with    Wound Check     buttocks        HPI:   Griselda Pollack is a 44 y.o. male who presents for a wound evaluation. Patient has had a wound on the buttocks. It has been present for over a month. Patient has pain associated with it. There is abscess drainage. Patient does not have this type of wound in any other areas. Patient has a history of end-stage renal disease and he is on hemodialysis. Patient's pain is 2 out of 10. It is worsened by touching it and or when the patient sits on it. There is erythema and drainage. Patient had a CT scan which did not show an abscess collection. Patient is here for evaluation and treatment. I reviewed the CT results with the patient. Also provided a copy to the patient. Medications:     Current Outpatient Medications   Medication Sig Dispense Refill    doxycycline hyclate (VIBRA-TABS) 100 MG tablet Take 1 tablet by mouth 2 times daily for 14 days 28 tablet 2    midodrine (PROAMATINE) 10 MG tablet TAKE ONE TABLET BY MOUTH THREE TIMES DAILY 270 tablet 1    B Complex-C-Folic Acid (NEPHRO-JENNIFER) 0.8 MG TABS TAKE ONE TABLET BY MOUTH DAILY WITH BREAKFAST 90 tablet 1    warfarin (COUMADIN) 4 MG tablet Take one tablet (or as directed by Medication Management) by mouth once daily.  90 DS *new tab strength* 90 tablet 1    vitamin D (ERGOCALCIFEROL) 1.25 MG (02250 UT) CAPS capsule TAKE ONE CAPSULE BY MOUTH ONCE A MONTH 3 capsule 3    cinacalcet (SENSIPAR) 30 MG tablet Take 30 mg by mouth daily      ASPIRIN LOW DOSE 81 MG EC tablet TAKE ONE TABLET BY MOUTH DAILY 90 tablet 1    pravastatin (PRAVACHOL) 40 MG tablet Take 40 mg by mouth nightly       Elastic Bandages & Supports (MEDICAL COMPRESSION STOCKINGS) MISC 1 each by Does not apply route daily as needed (as needed) Right leg below the knee 20-30 HH MM DX 82.409 1 each 0    Multiple Vitamin (MVI, CELEBRATE, CHEWABLE TABLET) Take 1 tablet by mouth 2 times daily        No current facility-administered medications for this encounter. Allergies:   No Known Allergies  Review of Systems:   Constitutional: Negative for fever, chills, fatigue and unexpected weight change. HENT: Negative for hearing loss, sore throat and facial swelling. Eyes: Negative for pain and discharge. Respiratory: Patient has a history of cor pulmonale. .    Cardiovascular: Negative for chest pain. Gastrointestinal: Negative for nausea, vomiting, diarrhea and constipation. Skin: Negative for pallor and rash. Neurological: Negative for seizures, syncope and numbness. Hematological: Patient has a history of anemia. Psychiatric/Behavioral: Negative for behavioral problems. The patient is not nervous/anxious. Musculoskeletal: Patient has a history of arthritis. : Patient has chronic renal failure. Musculoskeletal: Patient has history of gout. Past Medical History:   Diagnosis Date    Anemia     Anticoagulated on Coumadin     Arthritis     right shoulder/ PCP Dr. Kylie Sebastian    [facility]     [address] BPH (benign prostatic hypertrophy)     CAD (coronary artery disease)     Dr. Phil Castro    Caffeine use     1 coffee, 2 tea/day    Cellulitis of lower leg     right    Chronic back pain     Colon cancer [facility]     colon    Cor pulmonale, acute ([facility] 12/30/2014    COVID-19 08/2020    says hospitalized at [facility]. Vs for one week, discharged, \"passed out\" then re-admitted to [facility]. Reports he received antibody treatment, no intubation, as of 4/2021 reports has not regained smell or taste back    CRF (chronic renal failure)     dr. El Quintero [place]. Tues/ thurs/ sat/ right arm fistula    Erectile dysfunction     Gout     Hemodialysis patient ([facility]     tues/ thurs/ sat/ DR. Petersen/ fistula right lower arm    History of blood transfusion     no reaction    Hyperkalemia 12/24/2013    Hyperlipidemia     Hypertension     Hypotension     Hypothyroidism     Kidney transplant candidate     following with [facility]    Lymphedema of leg     right    Obesity     Thyroid disease      Past Surgical History:   Procedure Laterality Date    ABDOMINAL EXPLORATION SURGERY  01/22/2019    ABDOMINAL EXPLORATION, LEFT HEMICOLECTOMY, MOBILIZATION OF SPLENIC FLEXURE     ABSCESS DRAINAGE Right 01/20/2014    I&D Right Shoulder, Right shoulder arthroscopy, I&D AC Joint    APPENDECTOMY      COLONOSCOPY      COLONOSCOPY  01/22/2019    COLONOSCOPY N/A 1/22/2019    COLONOSCOPY- GI UNIT SCHEDULED performed by Randolph Resendiz MD at [address] COLONOSCOPY N/A 4/9/2021    COLONOSCOPY POLYPECTOMY SNARE/COLD BIOPSY performed by Randolph Resendiz MD at [address] Right 3/27/15    rt wrist    ENDOSCOPY, COLON, DIAGNOSTIC      UGI    GASTRIC BAND REMOVAL  01/17/2017    subcutaneous port    HC CATH POWER PICC SINGLE  1/24/2014         LAP BAND  2007    ID COLSC FLX W/RMVL OF TUMOR POLYP LESION SNARE TQ N/A 11/15/2018    COLONOSCOPY POLYPECTOMY SNARE/COLD BIOPSY performed by Heike Lorenzo MD at [facility] Right 2014    SLEEVE GASTRECTOMY N/A 9/25/2017    GASTRECTOMY SLEEVE LAPAROSCOPIC SI ROBOTIC, ENDOSEALER performed by Josué Nielson MD at [facility] N/A 1/22/2019    ABDOMINAL EXPLORATION, LEFT HEMICOLECTOMY, MOBILIZATION OF SPLENIC FLEXURE performed by Randolph Resendiz MD at [facility] Right     leg tumor removal/ dr. John Vera History     Socioeconomic History    Marital status: Single     Spouse name: Not on file    Number of children: Not on file    Years of education: Not on file    Highest education level: Not on file   Occupational History    Not on file   Social Needs    Financial resource strain: Not on file    Food insecurity     Worry: Not on file     Inability: Not on file    Transportation needs     Medical: Not on file     Non-medical: Not on file   Tobacco Use    Smoking status: Never Smoker    Smokeless tobacco: Never Used   Substance and Sexual Activity    Alcohol use: Yes     Alcohol/week: 3.0 standard drinks     Types: 3 Glasses of wine per week     Comment: weekend    Drug use: No    Sexual activity: Yes     Partners: Female   Lifestyle    Physical activity     Days per week: Not on file     Minutes per session: Not on file    Stress: Not on file   Relationships    Social connections     Talks on phone: Not on file     Gets together: Not on file     Attends Anabaptism service: Not on file     Active member of club or organization: Not on file     Attends meetings of clubs or organizations: Not on file     Relationship status: Not on file    Intimate partner violence     Fear of current or ex partner: Not on file     Emotionally abused: Not on file     Physically abused: Not on file     Forced sexual activity: Not on file   Other Topics Concern    Not on file   Social History Narrative    Not on file     Family History   Problem Relation Age of Onset    Cancer Father         leukemia    Heart Failure Mother     Arthritis Mother     High Blood Pressure Mother     Heart Disease Maternal Grandmother     Stroke Paternal Grandfather      Physical Exam:   /82   Pulse 88   Temp 96.5 °F (35.8 °C)   Resp 18    There is no height or weight on file to calculate BMI. Physical Exam   Nursing note and vitals reviewed. Constitutional: Oriented to person, place, and time. Appears well-developed and well-nourished. No distress. Head: Normocephalic and atraumatic. Eyes: Conjunctivae and EOM are normal.   Pulmonary/Chest: Effort normal. No respiratory distress. Neurological: Alert and oriented to person, place, and time. Josiane [facility] Psychiatric: Normal mood and affect.  Behavior is normal      Post Debridement Measurements:  Wound/Ulcer Descriptions are Pre Debridement except measurements:    Wound 03/17/21 Buttocks Right #1 CLUSTER (Active)   Wound Image   03/17/21 1450   Wound Etiology Other 04/19/21 0822   Dressing Status Old drainage noted;New drainage noted 04/19/21 3461   Wound Cleansed Irrigated with saline 04/19/21 0822   Dressing/Treatment Other (comment) 03/22/21 1123   Wound Length (cm) 7 cm 04/19/21 0822   Wound Width (cm) 2.5 cm 04/19/21 1791   Wound Depth (cm) 0.2 cm 04/19/21 0822   Wound Surface Area (cm^2) 17.5 cm^2 04/19/21 0822   Change in Wound Size % (l*w) -66.67 04/19/21 0822   Wound Volume (cm^3) 3.5 cm^3 04/19/21 0822   Wound Healing % 17 04/19/21 0822   Post-Procedure Length (cm) 7.2 cm 04/05/21 0832   Post-Procedure Width (cm) 2.5 cm 04/05/21 1896   Post-Procedure Depth (cm) 0.2 cm 04/05/21 0832   Post-Procedure Surface Area (cm^2) 18 cm^2 04/05/21 0832   Post-Procedure Volume (cm^3) 3.6 cm^3 04/05/21 0832   Wound Assessment Pink/red 04/19/21 0822   Drainage Amount Moderate 04/19/21 0822   Drainage Description Serosanguinous 04/19/21 0822   Odor None 04/19/21 0822   Carlota-wound Assessment Fragile 04/19/21 0822   Margins Defined edges 04/19/21 0822   Wound Thickness Description not for Pressure Injury Full thickness 04/19/21 3964   Number of days: 32                  Imaging:           Impression/Plan:     Problem List Items Addressed This Visit     ESRD on hemodialysis [facility])    Relevant Orders    Initiate Outpatient Wound Care Protocol    Abscess of right buttock - Primary    Relevant Orders    Initiate Outpatient Wound Care Protocol    Buttock wound, right, initial encounter    Relevant Orders    Initiate Outpatient Wound Care Protocol    BMI 38.0-38.9,adult    Relevant Orders    Initiate Outpatient Wound Care Protocol          Patient Active Problem List   Diagnosis    Obesity, Class II, BMI 35-39.9, with comorbidity    Benign prostatic hyperplasia with lower urinary tract symptoms    [facility]D [facility]    Erectile dysfunction due to arterial insufficiency    Chronic atrial fibrillation    Warfarin-induced coagulopathy (HCC)    DVT (deep venous thrombosis) (HCC)    S/P laparoscopic sleeve gastrectomy    Vitamin D deficiency    Lymphedema    Right heart failure (HCC)    Peripheral vascular disease (HCC)    Malignant neoplasm of transverse colon (HCC)    Status post partial colectomy    Rotator cuff arthropathy of right shoulder    History of COVID-19    Abscess of right buttock    Buttock wound, right, initial encounter    Hypertensive chronic kidney disease with stage 1 through stage 4 chronic kidney disease, or unspecified chronic kidney disease    BMI 38.0-38.9,adult     Plan:  The area appears to be hidradenitis. We will plan to institute the hidradenitis regiment. Patient's hidradenitis is worse. The number for dermatology was given to the patient so he can call. Patient is to follow-up in 1 week. We will continue the silver cell.     Hidradenitis regiment:    Doxycycline 100 mg BID x 3 months   Benzoyl peroxide wash daily, leave on 5 minutes before washing off   Clindamycin lotion daily       Electronically signed by:  Bryan Morales MD 4/19/2021

## 2021-04-19 NOTE — PLAN OF CARE
Problem: Pain:  Description: Pain management should include both nonpharmacologic and pharmacologic interventions. Goal: Pain level will decrease  Description: Pain level will decrease  Outcome: Ongoing  Goal: Control of acute pain  Description: Control of acute pain  Outcome: Ongoing  Goal: Control of chronic pain  Description: Control of chronic pain  Outcome: Ongoing     Problem: Wound:  Goal: Will show signs of wound healing; wound closure and no evidence of infection  Description: Will show signs of wound healing; wound closure and no evidence of infection  Outcome: Ongoing     Problem: Pain:  Description: Pain management should include both nonpharmacologic and pharmacologic interventions.   Goal: Pain level will decrease  Description: Pain level will decrease  Outcome: Ongoing  Goal: Control of acute pain  Description: Control of acute pain  Outcome: Ongoing  Goal: Control of chronic pain  Description: Control of chronic pain  Outcome: Ongoing

## 2021-04-20 LAB
CULTURE: NO GROWTH
DIRECT EXAM: ABNORMAL
DIRECT EXAM: ABNORMAL
Lab: ABNORMAL
SPECIMEN DESCRIPTION: ABNORMAL

## 2021-04-26 ENCOUNTER — HOSPITAL ENCOUNTER (OUTPATIENT)
Dept: WOUND CARE | Age: 51
Discharge: HOME | End: 2021-04-26
Payer: COMMERCIAL

## 2021-04-26 VITALS
DIASTOLIC BLOOD PRESSURE: 76 MMHG | SYSTOLIC BLOOD PRESSURE: 109 MMHG | TEMPERATURE: 96 F | RESPIRATION RATE: 16 BRPM | HEART RATE: 88 BPM

## 2021-04-26 DIAGNOSIS — S31.819A BUTTOCK WOUND, RIGHT, INITIAL ENCOUNTER: ICD-10-CM

## 2021-04-26 DIAGNOSIS — L02.31 ABSCESS OF RIGHT BUTTOCK: Primary | ICD-10-CM

## 2021-04-26 DIAGNOSIS — Z99.2 ESRD ON HEMODIALYSIS (HCC): ICD-10-CM

## 2021-04-26 DIAGNOSIS — N18.6 ESRD ON HEMODIALYSIS (HCC): ICD-10-CM

## 2021-04-26 PROCEDURE — 99213 OFFICE O/P EST LOW 20 MIN: CPT

## 2021-04-26 PROCEDURE — 99213 OFFICE O/P EST LOW 20 MIN: CPT | Performed by: PLASTIC SURGERY

## 2021-04-26 RX ORDER — LIDOCAINE HYDROCHLORIDE 20 MG/ML
JELLY TOPICAL ONCE
Status: COMPLETED | OUTPATIENT
Start: 2021-04-26 | End: 2021-04-26

## 2021-04-26 RX ORDER — LIDOCAINE HYDROCHLORIDE 20 MG/ML
JELLY TOPICAL ONCE
Status: CANCELLED | OUTPATIENT
Start: 2021-04-26 | End: 2021-04-26

## 2021-04-26 RX ORDER — SODIUM CHLORIDE, SODIUM LACTATE, POTASSIUM CHLORIDE, CALCIUM CHLORIDE 600; 310; 30; 20 MG/100ML; MG/100ML; MG/100ML; MG/100ML
1000 INJECTION, SOLUTION INTRAVENOUS CONTINUOUS
Status: CANCELLED | OUTPATIENT
Start: 2021-04-26

## 2021-04-26 RX ORDER — LIDOCAINE HYDROCHLORIDE 40 MG/ML
SOLUTION TOPICAL ONCE
Status: CANCELLED | OUTPATIENT
Start: 2021-04-26 | End: 2021-04-26

## 2021-04-26 RX ORDER — LIDOCAINE 40 MG/G
CREAM TOPICAL ONCE
Status: CANCELLED | OUTPATIENT
Start: 2021-04-26 | End: 2021-04-26

## 2021-04-26 RX ORDER — LIDOCAINE 50 MG/G
OINTMENT TOPICAL ONCE
Status: CANCELLED | OUTPATIENT
Start: 2021-04-26 | End: 2021-04-26

## 2021-04-26 RX ADMIN — LIDOCAINE HYDROCHLORIDE: 20 JELLY TOPICAL at 08:37

## 2021-04-26 ASSESSMENT — PAIN SCALES - GENERAL: PAINLEVEL_OUTOF10: 4

## 2021-04-26 NOTE — PROGRESS NOTES
[address]       Progress Note and Procedure Note      Progress note     Chief Complaint   Patient presents with    Wound Check     buttocks        HPI:   Kris Hogan is a 44 y.o. male who presents for a wound evaluation. Patient has had a wound on the buttocks. It has been present for over a month. Patient has pain associated with it. There is abscess drainage. Patient does not have this type of wound in any other areas. Patient has a history of end-stage renal disease and he is on hemodialysis. Patient's pain is 2 out of 10. It is worsened by touching it and or when the patient sits on it. There is erythema and drainage. Patient had a CT scan which did not show an abscess collection. Patient is here for evaluation and treatment. I reviewed the CT results with the patient. Also provided a copy to the patient. Cultures were reviewed with the patient. Medications:     Current Outpatient Medications   Medication Sig Dispense Refill    clindamycin (CLEOCIN-T) 1 % gel Apply topically 2 times daily. 60 g 2    pravastatin (PRAVACHOL) 40 MG tablet Take 1 tablet by mouth nightly 90 tablet 1    midodrine (PROAMATINE) 10 MG tablet TAKE ONE TABLET BY MOUTH THREE TIMES DAILY 270 tablet 1    B Complex-C-Folic Acid (NEPHRO-JENNIFER) 0.8 MG TABS TAKE ONE TABLET BY MOUTH DAILY WITH BREAKFAST 90 tablet 1    warfarin (COUMADIN) 4 MG tablet Take one tablet (or as directed by Medication Management) by mouth once daily.  90 DS *new tab strength* 90 tablet 1    vitamin D (ERGOCALCIFEROL) 1.25 MG (65795 UT) CAPS capsule TAKE ONE CAPSULE BY MOUTH ONCE A MONTH 3 capsule 3    cinacalcet (SENSIPAR) 30 MG tablet Take 30 mg by mouth daily      ASPIRIN LOW DOSE 81 MG EC tablet TAKE ONE TABLET BY MOUTH DAILY 90 tablet 1    Elastic Bandages & Supports (MEDICAL COMPRESSION STOCKINGS) MISC 1 each by Does not apply route daily as needed (as needed) Right leg below the knee 20-30 HH MM DX History of blood transfusion     no reaction    Hyperkalemia 12/24/2013    Hyperlipidemia     Hypertension     Hypotension     Hypothyroidism     Kidney transplant candidate     following with [facility]    Lymphedema of leg     right    Obesity     Thyroid disease      Past Surgical History:   Procedure Laterality Date    ABDOMINAL EXPLORATION SURGERY  01/22/2019    ABDOMINAL EXPLORATION, LEFT HEMICOLECTOMY, MOBILIZATION OF SPLENIC FLEXURE     ABSCESS DRAINAGE Right 01/20/2014    I&D Right Shoulder, Right shoulder arthroscopy, I&D AC Joint    APPENDECTOMY      COLONOSCOPY      COLONOSCOPY  01/22/2019    COLONOSCOPY N/A 1/22/2019    COLONOSCOPY- GI UNIT SCHEDULED performed by Jessica Barros MD at [address] COLONOSCOPY N/A 4/9/2021    COLONOSCOPY POLYPECTOMY SNARE/COLD BIOPSY performed by Jessica Barros MD at [address] Right 3/27/15    rt wrist    ENDOSCOPY, COLON, DIAGNOSTIC      UGI    GASTRIC BAND REMOVAL  01/17/2017    subcutaneous port    HC CATH POWER PICC SINGLE  1/24/2014         LAP BAND  2007    DE COLSC FLX W/RMVL OF TUMOR POLYP LESION SNARE TQ N/A 11/15/2018    COLONOSCOPY POLYPECTOMY SNARE/COLD BIOPSY performed by Ting Dumas MD at [facility] Right 2014    SLEEVE GASTRECTOMY N/A 9/25/2017    GASTRECTOMY SLEEVE LAPAROSCOPIC SI ROBOTIC, ENDOSEALER performed by Gerard Maloney MD at [facility] N/A 1/22/2019    ABDOMINAL EXPLORATION, LEFT HEMICOLECTOMY, MOBILIZATION OF SPLENIC FLEXURE performed by Jessica Barros MD at [facility] Right     leg tumor removal/ dr. Kory Leon History     Socioeconomic History    Marital status: Single     Spouse name: Not on file    Number of children: Not on file    Years of education: Not on file    Highest education level: Not on file   Occupational History    Not on file   Social Needs    Financial resource strain: Not on file   Marycruz-Mariama Debridement Measurements:  Wound/Ulcer Descriptions are Pre Debridement except measurements:    Wound 03/17/21 Buttocks Right #1 CLUSTER (Active)   Wound Image   03/17/21 1450   Wound Etiology Other 04/26/21 0825   Dressing Status Old drainage noted;New drainage noted 04/26/21 0825   Wound Cleansed Irrigated with saline 04/26/21 0825   Dressing/Treatment Other (comment) 04/19/21 0900   Wound Length (cm) 8 cm 04/26/21 0825   Wound Width (cm) 2.5 cm 04/26/21 0825   Wound Depth (cm) 0.4 cm 04/26/21 0825   Wound Surface Area (cm^2) 20 cm^2 04/26/21 0825   Change in Wound Size % (l*w) -90.48 04/26/21 0825   Wound Volume (cm^3) 8 cm^3 04/26/21 0825   Wound Healing % -90 04/26/21 0825   Post-Procedure Length (cm) 7 cm 04/19/21 0822   Post-Procedure Width (cm) 2.5 cm 04/19/21 9698   Post-Procedure Depth (cm) 0.2 cm 04/19/21 0822   Post-Procedure Surface Area (cm^2) 17.5 cm^2 04/19/21 0822   Post-Procedure Volume (cm^3) 3.5 cm^3 04/19/21 0822   Wound Assessment Pink/red;Bleeding 04/26/21 0825   Drainage Amount Moderate 04/26/21 0825   Drainage Description Serosanguinous 04/26/21 0825   Odor None 04/26/21 0825   Carlota-wound Assessment Fragile 04/26/21 0825   Margins Attached edges 04/26/21 0825   Wound Thickness Description not for Pressure Injury Full thickness 04/26/21 0825   Number of days: 44                  Imaging:           Impression/Plan:     Problem List Items Addressed This Visit        Chronic Conditions    ESRD on hemodialysis ([address]       Other    Abscess of right buttock    Buttock wound, right, initial encounter    BMI 38.0-38.9,adult          Patient Active Problem List   Diagnosis    Obesity, Class II, BMI 35-39.9, with comorbidity    Benign prostatic hyperplasia with lower urinary tract symptoms    ESRD on hemodialysis ([address]    Erectile dysfunction due to arterial insufficiency    Chronic atrial fibrillation    Warfarin-induced coagulopathy ([address]    DVT (deep venous thrombosis) (HCC)    S/P laparoscopic sleeve gastrectomy    Vitamin D deficiency    Lymphedema    Right heart failure (HCC)    Peripheral vascular disease (HCC)    Malignant neoplasm of transverse colon (HCC)    Status post partial colectomy    Rotator cuff arthropathy of right shoulder    History of COVID-19    Abscess of right buttock    Buttock wound, right, initial encounter    Hypertensive chronic kidney disease with stage 1 through stage 4 chronic kidney disease, or unspecified chronic kidney disease    BMI 38.0-38.9,adult     Plan:  The area appears to be hidradenitis. We will plan to institute the hidradenitis regiment. Patient's hidradenitis is worse today. The area is expanding. There appears to be some fluctuance. .  Patient has an appointment with Dr. Ronald Godinez tomorrow. We will see the patient next week to determine the course of action. We will continue the silver cell.     Hidradenitis regiment:    Doxycycline 100 mg BID x 3 months   Benzoyl peroxide wash daily, leave on 5 minutes before washing off   Clindamycin lotion daily       Electronically signed by:  Christina Perez MD 4/26/2021

## 2021-04-28 ENCOUNTER — HOSPITAL ENCOUNTER (OUTPATIENT)
Dept: PREADMISSION TESTING | Age: 51
Discharge: HOME | End: 2021-05-02
Payer: COMMERCIAL

## 2021-04-28 VITALS
DIASTOLIC BLOOD PRESSURE: 64 MMHG | TEMPERATURE: 97.3 F | RESPIRATION RATE: 20 BRPM | HEART RATE: 85 BPM | SYSTOLIC BLOOD PRESSURE: 100 MMHG

## 2021-04-28 LAB
ANION GAP SERPL CALCULATED.3IONS-SCNC: 14 MMOL/L (ref 9–17)
BUN BLDV-MCNC: 36 MG/DL (ref 6–20)
CHLORIDE BLD-SCNC: 95 MMOL/L (ref 98–107)
CO2: 30 MMOL/L (ref 20–31)
CREAT SERPL-MCNC: 9.49 MG/DL (ref 0.7–1.2)
GFR AFRICAN AMERICAN: 7 ML/MIN
GFR NON-AFRICAN AMERICAN: 6 ML/MIN
GFR SERPL CREATININE-BSD FRML MDRD: ABNORMAL ML/MIN/{1.73_M2}
GFR SERPL CREATININE-BSD FRML MDRD: ABNORMAL ML/MIN/{1.73_M2}
GLUCOSE BLD-MCNC: 82 MG/DL (ref 70–99)
HCT VFR BLD CALC: 34.3 % (ref 40.7–50.3)
HEMOGLOBIN: 10.8 G/DL (ref 13–17)
INR BLD: 1.6
POTASSIUM SERPL-SCNC: 4.5 MMOL/L (ref 3.7–5.3)
PROTHROMBIN TIME: 16.6 SEC (ref 9.1–12.3)
SODIUM BLD-SCNC: 139 MMOL/L (ref 135–144)

## 2021-04-28 PROCEDURE — 82947 ASSAY GLUCOSE BLOOD QUANT: CPT

## 2021-04-28 PROCEDURE — 80051 ELECTROLYTE PANEL: CPT

## 2021-04-28 PROCEDURE — 85610 PROTHROMBIN TIME: CPT

## 2021-04-28 PROCEDURE — 85014 HEMATOCRIT: CPT

## 2021-04-28 PROCEDURE — 85018 HEMOGLOBIN: CPT

## 2021-04-28 PROCEDURE — 84520 ASSAY OF UREA NITROGEN: CPT

## 2021-04-28 PROCEDURE — 93005 ELECTROCARDIOGRAM TRACING: CPT | Performed by: ANESTHESIOLOGY

## 2021-04-28 PROCEDURE — 82565 ASSAY OF CREATININE: CPT

## 2021-04-28 PROCEDURE — 36415 COLL VENOUS BLD VENIPUNCTURE: CPT

## 2021-04-28 ASSESSMENT — PAIN DESCRIPTION - FREQUENCY: FREQUENCY: CONTINUOUS

## 2021-04-28 ASSESSMENT — PAIN DESCRIPTION - DESCRIPTORS: DESCRIPTORS: PRESSURE

## 2021-04-28 ASSESSMENT — PAIN DESCRIPTION - PAIN TYPE: TYPE: ACUTE PAIN

## 2021-04-28 NOTE — H&P (VIEW-ONLY)
History and Physical    Pt Name: Araceli Martinez  MRN: [id number]  YOB: 1970  Date of evaluation: 4/28/2021    SUBJECTIVE:   History of Chief Complaint:    Patient presents for PAT appointment. He had a colonoscopy recently, working towards renal transplant. He has been diagnosed with perianal/gluteal abscess, possible fistula. He has been scheduled for EUA, I & D, possible Seton. Past Medical History    has a past medical history of Anemia, Anticoagulated on Coumadin, Aortic insufficiency, Arthritis, Atrial fibrillation (HCC), BPH (benign prostatic hypertrophy), CAD (coronary artery disease), Caffeine use, Cellulitis of lower leg, Chronic back pain, Colon cancer (HCC), Cor pulmonale, acute ([facility] COVID-19, CRF (chronic renal failure), Erectile dysfunction, Gout, Hemodialysis patient ([facility] History of blood transfusion, Hyperkalemia, Hyperlipidemia, Hypertension, Hypotension, Hypothyroidism, Kidney transplant candidate, Lymphedema of leg, Obesity, Thyroid disease, and Well adult health check. Past Surgical History   has a past surgical history that includes Appendectomy; Lap Band (2007); Abscess Drainage (Right, 01/20/2014); hc cath power picc single (1/24/2014); Dialysis fistula creation (Right, 3/27/15); shoulder surgery (Right, 2014); Bariatric Surgery (01/17/2017); Endoscopy, colon, diagnostic; Sleeve Gastrectomy (N/A, 9/25/2017); pr colsc flx w/rmvl of tumor polyp lesion snare tq (N/A, 11/15/2018); Colonoscopy; Abdominal exploration surgery (01/22/2019); Colonoscopy (01/22/2019); Small intestine surgery (N/A, 1/22/2019); Colonoscopy (N/A, 1/22/2019); Colonoscopy (N/A, 4/9/2021); and vascular surgery (Right). Medications  Prior to Admission medications    Medication Sig Start Date End Date Taking?  Authorizing Provider   pravastatin (PRAVACHOL) 40 MG tablet Take 1 tablet by mouth nightly 4/19/21  Yes Abiodun Mallory MD   B Complex-C-Folic Acid (NEPHRO-JENNIFER) 0.8 MG TABS TAKE ONE TABLET BY MOUTH DAILY WITH BREAKFAST 2/1/21  Yes Abiodun Mallory MD   warfarin (COUMADIN) 4 MG tablet Take one tablet (or as directed by Medication Management) by mouth once daily. 90 DS *new tab strength* 12/10/20  Yes Abiodun Mallory MD   vitamin D (ERGOCALCIFEROL) 1.25 MG (56920 UT) CAPS capsule TAKE ONE CAPSULE BY MOUTH ONCE A MONTH 10/8/20  Yes Abiodun Mallory MD   cinacalcet (SENSIPAR) 30 MG tablet Take 30 mg by mouth three times a week AT DIALYSIS   Yes Mitra Horvath MD   ASPIRIN LOW DOSE 81 MG EC tablet TAKE ONE TABLET BY MOUTH DAILY 3/28/19  Yes Abiodun Mallory MD   midodrine (PROAMATINE) 10 MG tablet TAKE ONE TABLET BY MOUTH THREE TIMES DAILY  Patient taking differently: Take 10 mg by mouth three times a week ON DIALYSIS DAYS 3/15/21   Abiodun Mallory MD   Elastic Bandages & Supports ([facility]) [address] 1 each by Does not apply route daily as needed (as needed) Right leg below the knee 20-30 HH MM DX 82.409 3/19/18   Abiodun Mallory MD     Allergies  has No Known Allergies. Family History  family history includes Arthritis in his mother; Cancer in his father; Heart Disease in his maternal grandmother; Heart Failure in his mother; High Blood Pressure in his mother; Stroke in his paternal grandfather. Social History   reports that he has never smoked. He has never used smokeless tobacco.   reports previous alcohol use. reports no history of drug use. Marital Status single  Occupation disabled    OBJECTIVE:   VITALS:  temporal temperature is 97.3 °F (36.3 °C). His blood pressure is 100/64 and his pulse is 85. His respiration is 20. CONSTITUTIONAL:alert & oriented x 3, no acute distress. Very pleasant. SKIN:  Warm and dry, no rashes on exposed areas of skin. HEAD:  Normocephalic, atraumatic. EYES: PERRL. EOMs intact. EARS:  Hearing grossly WNL. NOSE:  Nares patent. No rhinorrhea.   MOUTH/THROAT:  benign  NECK:supple, no lymphadenopathy  LUNGS: Clear to auscultation bilaterally, no wheezes. CARDIOVASCULAR: Heart sounds are normal.  Regular rate and rhythm without murmur. ABDOMEN: soft, non tender, non distended. Rotund. EXTREMITIES: no edema bilateral lower extremities. RUE AVF with positive thrill and bruit. IMPRESSIONS:   Perirectal abscess   has a past medical history of Anemia, Anticoagulated on Coumadin, Aortic insufficiency, Arthritis, Atrial fibrillation (HCC), BPH (benign prostatic hypertrophy), CAD (coronary artery disease), Caffeine use, Cellulitis of lower leg, Chronic back pain, Colon cancer ([facility] Cor pulmonale, acute ([facility] (12/30/2014), COVID-19 (08/2020), CRF (chronic renal failure), Erectile dysfunction, Gout, Hemodialysis patient ([facility] History of blood transfusion, Hyperkalemia (12/24/2013), Hyperlipidemia, Hypertension, Hypotension, Hypothyroidism, Kidney transplant candidate, Lymphedema of leg, Obesity, Thyroid disease, and Well adult health check.    PLANS:   EUA, I & D, possible Gavino Escobar PA-C  Electronically signed 4/28/2021 at 10:37 AM

## 2021-04-28 NOTE — H&P
History and Physical    Pt Name: Kalyan Sellers  MRN: [id number]  YOB: 1970  Date of evaluation: 4/28/2021    SUBJECTIVE:   History of Chief Complaint:    Patient presents for PAT appointment. He had a colonoscopy recently, working towards renal transplant. He has been diagnosed with perianal/gluteal abscess, possible fistula. He has been scheduled for EUA, I & D, possible Seton. Past Medical History    has a past medical history of Anemia, Anticoagulated on Coumadin, Aortic insufficiency, Arthritis, Atrial fibrillation (HCC), BPH (benign prostatic hypertrophy), CAD (coronary artery disease), Caffeine use, Cellulitis of lower leg, Chronic back pain, Colon cancer (HCC), Cor pulmonale, acute ([facility] COVID-19, CRF (chronic renal failure), Erectile dysfunction, Gout, Hemodialysis patient ([facility] History of blood transfusion, Hyperkalemia, Hyperlipidemia, Hypertension, Hypotension, Hypothyroidism, Kidney transplant candidate, Lymphedema of leg, Obesity, Thyroid disease, and Well adult health check. Past Surgical History   has a past surgical history that includes Appendectomy; Lap Band (2007); Abscess Drainage (Right, 01/20/2014); hc cath power picc single (1/24/2014); Dialysis fistula creation (Right, 3/27/15); shoulder surgery (Right, 2014); Bariatric Surgery (01/17/2017); Endoscopy, colon, diagnostic; Sleeve Gastrectomy (N/A, 9/25/2017); pr colsc flx w/rmvl of tumor polyp lesion snare tq (N/A, 11/15/2018); Colonoscopy; Abdominal exploration surgery (01/22/2019); Colonoscopy (01/22/2019); Small intestine surgery (N/A, 1/22/2019); Colonoscopy (N/A, 1/22/2019); Colonoscopy (N/A, 4/9/2021); and vascular surgery (Right). Medications  Prior to Admission medications    Medication Sig Start Date End Date Taking?  Authorizing Provider   pravastatin (PRAVACHOL) 40 MG tablet Take 1 tablet by mouth nightly 4/19/21  Yes MD JOSE Herring Complex-C-Folic Acid (NEPHRO-JENNIFER) 0.8 MG TABS TAKE ONE TABLET BY MOUTH DAILY WITH BREAKFAST 2/1/21  Yes Dany Rahman MD   warfarin (COUMADIN) 4 MG tablet Take one tablet (or as directed by Medication Management) by mouth once daily. 90 DS *new tab strength* 12/10/20  Yes Dany Rahman MD   vitamin D (ERGOCALCIFEROL) 1.25 MG (12185 UT) CAPS capsule TAKE ONE CAPSULE BY MOUTH ONCE A MONTH 10/8/20  Yes Dany Rahman MD   cinacalcet (SENSIPAR) 30 MG tablet Take 30 mg by mouth three times a week AT DIALYSIS   Yes Mitra Horvath, MD   ASPIRIN LOW DOSE 81 MG EC tablet TAKE ONE TABLET BY MOUTH DAILY 3/28/19  Yes Dany Rahman MD   midodrine (PROAMATINE) 10 MG tablet TAKE ONE TABLET BY MOUTH THREE TIMES DAILY  Patient taking differently: Take 10 mg by mouth three times a week ON DIALYSIS DAYS 3/15/21   Dany Rahman MD   Elastic Bandages & Supports ([facility]) [address] 1 each by Does not apply route daily as needed (as needed) Right leg below the knee 20-30 HH MM DX 82.409 3/19/18   Dany Rahman MD     Allergies  has No Known Allergies. Family History  family history includes Arthritis in his mother; Cancer in his father; Heart Disease in his maternal grandmother; Heart Failure in his mother; High Blood Pressure in his mother; Stroke in his paternal grandfather. Social History   reports that he has never smoked. He has never used smokeless tobacco.   reports previous alcohol use. reports no history of drug use. Marital Status single  Occupation disabled    OBJECTIVE:   VITALS:  temporal temperature is 97.3 °F (36.3 °C). His blood pressure is 100/64 and his pulse is 85. His respiration is 20. CONSTITUTIONAL:alert & oriented x 3, no acute distress. Very pleasant. SKIN:  Warm and dry, no rashes on exposed areas of skin. HEAD:  Normocephalic, atraumatic. EYES: PERRL. EOMs intact. EARS:  Hearing grossly WNL. NOSE:  Nares patent. No rhinorrhea.   MOUTH/THROAT:  benign  NECK:supple, no lymphadenopathy  LUNGS: Clear to auscultation bilaterally, no wheezes. CARDIOVASCULAR: Heart sounds are normal.  Regular rate and rhythm without murmur. ABDOMEN: soft, non tender, non distended. Rotund. EXTREMITIES: no edema bilateral lower extremities. RUE AVF with positive thrill and bruit. IMPRESSIONS:   Perirectal abscess   has a past medical history of Anemia, Anticoagulated on Coumadin, Aortic insufficiency, Arthritis, Atrial fibrillation (HCC), BPH (benign prostatic hypertrophy), CAD (coronary artery disease), Caffeine use, Cellulitis of lower leg, Chronic back pain, Colon cancer ([facility] Cor pulmonale, acute ([facility] (12/30/2014), COVID-19 (08/2020), CRF (chronic renal failure), Erectile dysfunction, Gout, Hemodialysis patient ([facility] History of blood transfusion, Hyperkalemia (12/24/2013), Hyperlipidemia, Hypertension, Hypotension, Hypothyroidism, Kidney transplant candidate, Lymphedema of leg, Obesity, Thyroid disease, and Well adult health check.    PLANS:   EUA, I & D, possible Varsha Carter PA-C  Electronically signed 4/28/2021 at 10:37 AM

## 2021-04-28 NOTE — PROGRESS NOTES
Anesthesia Focused Assessment    Has patient ever tested positive for COVID? Yes.  8/2020, patient has since been vaccinated. STOP-BANG Sleep Apnea Questionnaire    SNORE loudly (heard through closed doors)? No  TIRED, fatigued, sleepy during daytime? No  OBSERVED stopping breathing during sleep? No  High blood PRESSURE being treated? Yes    BMI over 35? Yes  AGE over 48? Yes  NECK circumference over 16\"? No  GENDER (male)? Yes             Total 4  High risk 5-8  Intermediate risk 3-4  Low risk 0-2    Obstructive Sleep Apnea: denies  If YES, machine used: no     Type 1 DM:   no  T2DM:  no    Coronary Artery Disease:  Yes, follows with [facility]  Hypertension:  yes    Active smoker:  no  Drinks Alcohol:  weekly    Dentition: benign    Defib / AICD / Pacemaker: no      Renal Failure/dialysis:  Yes, Tues/Thurs/Sat [facility]    Patient was evaluated in PAT & anesthesia guidelines were applied. NPO guidelines, medication instructions and scheduled arrival time were reviewed with patient. Hx of anesthesia complications:  no  Family hx of anesthesia complications:  no                                                                                                                     Anesthesia contacted:   no  Medical or cardiac clearance ordered: patient had been cleared by cardiology for colonoscopy last month by [facility]. Will request updated clearance for this procedure.     Cherelle Lara PA-C  4/28/21  10:25 AM

## 2021-04-29 ENCOUNTER — TELEPHONE (OUTPATIENT)
Dept: PHARMACY | Age: 51
End: 2021-04-29

## 2021-04-29 LAB
EKG ATRIAL RATE: 73 BPM
EKG P AXIS: 60 DEGREES
EKG P-R INTERVAL: 178 MS
EKG Q-T INTERVAL: 416 MS
EKG QRS DURATION: 70 MS
EKG QTC CALCULATION (BAZETT): 458 MS
EKG R AXIS: 27 DEGREES
EKG T AXIS: 48 DEGREES
EKG VENTRICULAR RATE: 73 BPM

## 2021-04-29 PROCEDURE — 93010 ELECTROCARDIOGRAM REPORT: CPT | Performed by: INTERNAL MEDICINE

## 2021-04-30 ENCOUNTER — HOSPITAL ENCOUNTER (OUTPATIENT)
Dept: LAB | Age: 51
Setting detail: SPECIMEN
Discharge: HOME | End: 2021-04-30
Payer: COMMERCIAL

## 2021-04-30 ENCOUNTER — TELEPHONE (OUTPATIENT)
Dept: PHARMACY | Age: 51
End: 2021-04-30

## 2021-04-30 DIAGNOSIS — Z20.822 COVID-19 RULED OUT BY LABORATORY TESTING: Primary | ICD-10-CM

## 2021-05-03 ENCOUNTER — HOSPITAL ENCOUNTER (OUTPATIENT)
Dept: WOUND CARE | Age: 51
Discharge: HOME | End: 2021-05-03

## 2021-05-04 ENCOUNTER — ANESTHESIA EVENT (OUTPATIENT)
Dept: OPERATING ROOM | Age: 51
End: 2021-05-04
Payer: COMMERCIAL

## 2021-05-05 ENCOUNTER — ANESTHESIA (OUTPATIENT)
Dept: OPERATING ROOM | Age: 51
End: 2021-05-05
Payer: COMMERCIAL

## 2021-05-05 ENCOUNTER — HOSPITAL ENCOUNTER (OUTPATIENT)
Age: 51
Setting detail: OUTPATIENT SURGERY
Discharge: HOME | End: 2021-05-05
Attending: COLON & RECTAL SURGERY | Admitting: COLON & RECTAL SURGERY
Payer: COMMERCIAL

## 2021-05-05 VITALS
OXYGEN SATURATION: 93 % | RESPIRATION RATE: 26 BRPM | WEIGHT: 268 LBS | SYSTOLIC BLOOD PRESSURE: 137 MMHG | TEMPERATURE: 97.2 F | DIASTOLIC BLOOD PRESSURE: 95 MMHG | HEIGHT: 70 IN | BODY MASS INDEX: 38.37 KG/M2 | HEART RATE: 84 BPM

## 2021-05-05 VITALS — TEMPERATURE: 93.1 F | SYSTOLIC BLOOD PRESSURE: 87 MMHG | OXYGEN SATURATION: 87 % | DIASTOLIC BLOOD PRESSURE: 62 MMHG

## 2021-05-05 DIAGNOSIS — G89.18 POST-OP PAIN: Primary | ICD-10-CM

## 2021-05-05 LAB
POC INR: 1.3
POC POTASSIUM: 3.9 MMOL/L (ref 3.5–4.5)
PROTHROMBIN TIME, POC: 15.7 SEC (ref 10.4–14.2)

## 2021-05-05 PROCEDURE — 7100000000 HC PACU RECOVERY - FIRST 15 MIN: Performed by: COLON & RECTAL SURGERY

## 2021-05-05 PROCEDURE — 85610 PROTHROMBIN TIME: CPT

## 2021-05-05 PROCEDURE — 3600000004 HC SURGERY LEVEL 4 BASE: Performed by: COLON & RECTAL SURGERY

## 2021-05-05 PROCEDURE — 6370000000 HC RX 637 (ALT 250 FOR IP): Performed by: COLON & RECTAL SURGERY

## 2021-05-05 PROCEDURE — 6370000000 HC RX 637 (ALT 250 FOR IP): Performed by: ANESTHESIOLOGY

## 2021-05-05 PROCEDURE — 2709999900 HC NON-CHARGEABLE SUPPLY: Performed by: COLON & RECTAL SURGERY

## 2021-05-05 PROCEDURE — 6360000002 HC RX W HCPCS: Performed by: ANESTHESIOLOGY

## 2021-05-05 PROCEDURE — 88304 TISSUE EXAM BY PATHOLOGIST: CPT

## 2021-05-05 PROCEDURE — 84132 ASSAY OF SERUM POTASSIUM: CPT

## 2021-05-05 PROCEDURE — 2580000003 HC RX 258: Performed by: ANESTHESIOLOGY

## 2021-05-05 PROCEDURE — 2500000003 HC RX 250 WO HCPCS

## 2021-05-05 PROCEDURE — 7100000001 HC PACU RECOVERY - ADDTL 15 MIN: Performed by: COLON & RECTAL SURGERY

## 2021-05-05 PROCEDURE — 7100000040 HC SPAR PHASE II RECOVERY - FIRST 15 MIN: Performed by: COLON & RECTAL SURGERY

## 2021-05-05 PROCEDURE — 6360000002 HC RX W HCPCS: Performed by: COLON & RECTAL SURGERY

## 2021-05-05 PROCEDURE — 3700000000 HC ANESTHESIA ATTENDED CARE: Performed by: COLON & RECTAL SURGERY

## 2021-05-05 PROCEDURE — 6360000002 HC RX W HCPCS

## 2021-05-05 PROCEDURE — 2580000003 HC RX 258

## 2021-05-05 PROCEDURE — 3700000001 HC ADD 15 MINUTES (ANESTHESIA): Performed by: COLON & RECTAL SURGERY

## 2021-05-05 PROCEDURE — C9290 INJ, BUPIVACAINE LIPOSOME: HCPCS | Performed by: COLON & RECTAL SURGERY

## 2021-05-05 PROCEDURE — 3600000014 HC SURGERY LEVEL 4 ADDTL 15MIN: Performed by: COLON & RECTAL SURGERY

## 2021-05-05 PROCEDURE — 2580000003 HC RX 258: Performed by: COLON & RECTAL SURGERY

## 2021-05-05 RX ORDER — GINSENG 100 MG
CAPSULE ORAL PRN
Status: DISCONTINUED | OUTPATIENT
Start: 2021-05-05 | End: 2021-05-05 | Stop reason: ALTCHOICE

## 2021-05-05 RX ORDER — SODIUM CHLORIDE 9 MG/ML
INJECTION INTRAVENOUS PRN
Status: DISCONTINUED | OUTPATIENT
Start: 2021-05-05 | End: 2021-05-05 | Stop reason: ALTCHOICE

## 2021-05-05 RX ORDER — PHENYLEPHRINE HCL IN 0.9% NACL 1 MG/10 ML
SYRINGE (ML) INTRAVENOUS PRN
Status: DISCONTINUED | OUTPATIENT
Start: 2021-05-05 | End: 2021-05-05 | Stop reason: SDUPTHER

## 2021-05-05 RX ORDER — FENTANYL CITRATE 50 UG/ML
50 INJECTION, SOLUTION INTRAMUSCULAR; INTRAVENOUS EVERY 5 MIN PRN
Status: DISCONTINUED | OUTPATIENT
Start: 2021-05-05 | End: 2021-05-05 | Stop reason: HOSPADM

## 2021-05-05 RX ORDER — HYDROCODONE BITARTRATE AND ACETAMINOPHEN 5; 325 MG/1; MG/1
2 TABLET ORAL PRN
Status: COMPLETED | OUTPATIENT
Start: 2021-05-05 | End: 2021-05-05

## 2021-05-05 RX ORDER — PROPOFOL 10 MG/ML
INJECTION, EMULSION INTRAVENOUS PRN
Status: DISCONTINUED | OUTPATIENT
Start: 2021-05-05 | End: 2021-05-05 | Stop reason: SDUPTHER

## 2021-05-05 RX ORDER — NEOSTIGMINE METHYLSULFATE 5 MG/5 ML
SYRINGE (ML) INTRAVENOUS PRN
Status: DISCONTINUED | OUTPATIENT
Start: 2021-05-05 | End: 2021-05-05 | Stop reason: SDUPTHER

## 2021-05-05 RX ORDER — ROCURONIUM BROMIDE 10 MG/ML
INJECTION, SOLUTION INTRAVENOUS PRN
Status: DISCONTINUED | OUTPATIENT
Start: 2021-05-05 | End: 2021-05-05 | Stop reason: SDUPTHER

## 2021-05-05 RX ORDER — ONDANSETRON 4 MG/1
4 TABLET, FILM COATED ORAL EVERY 8 HOURS PRN
Qty: 20 TABLET | Refills: 0 | Status: SHIPPED | OUTPATIENT
Start: 2021-05-05 | End: 2022-01-19 | Stop reason: ALTCHOICE

## 2021-05-05 RX ORDER — GLYCOPYRROLATE 1 MG/5 ML
SYRINGE (ML) INTRAVENOUS PRN
Status: DISCONTINUED | OUTPATIENT
Start: 2021-05-05 | End: 2021-05-05 | Stop reason: SDUPTHER

## 2021-05-05 RX ORDER — FENTANYL CITRATE 50 UG/ML
25 INJECTION, SOLUTION INTRAMUSCULAR; INTRAVENOUS EVERY 5 MIN PRN
Status: DISCONTINUED | OUTPATIENT
Start: 2021-05-05 | End: 2021-05-05 | Stop reason: HOSPADM

## 2021-05-05 RX ORDER — SODIUM CHLORIDE 9 MG/ML
INJECTION, SOLUTION INTRAVENOUS CONTINUOUS PRN
Status: DISCONTINUED | OUTPATIENT
Start: 2021-05-05 | End: 2021-05-05 | Stop reason: SDUPTHER

## 2021-05-05 RX ORDER — OXYCODONE HYDROCHLORIDE AND ACETAMINOPHEN 5; 325 MG/1; MG/1
1 TABLET ORAL EVERY 6 HOURS PRN
Qty: 20 TABLET | Refills: 0 | Status: SHIPPED | OUTPATIENT
Start: 2021-05-05 | End: 2021-05-10

## 2021-05-05 RX ORDER — MAGNESIUM HYDROXIDE 1200 MG/15ML
LIQUID ORAL CONTINUOUS PRN
Status: COMPLETED | OUTPATIENT
Start: 2021-05-05 | End: 2021-05-05

## 2021-05-05 RX ORDER — HYDROCODONE BITARTRATE AND ACETAMINOPHEN 5; 325 MG/1; MG/1
1 TABLET ORAL PRN
Status: COMPLETED | OUTPATIENT
Start: 2021-05-05 | End: 2021-05-05

## 2021-05-05 RX ORDER — ACETAMINOPHEN 160 MG
TABLET,DISINTEGRATING ORAL PRN
Status: DISCONTINUED | OUTPATIENT
Start: 2021-05-05 | End: 2021-05-05 | Stop reason: ALTCHOICE

## 2021-05-05 RX ORDER — FENTANYL CITRATE 50 UG/ML
INJECTION, SOLUTION INTRAMUSCULAR; INTRAVENOUS PRN
Status: DISCONTINUED | OUTPATIENT
Start: 2021-05-05 | End: 2021-05-05 | Stop reason: SDUPTHER

## 2021-05-05 RX ORDER — SODIUM CHLORIDE, SODIUM LACTATE, POTASSIUM CHLORIDE, CALCIUM CHLORIDE 600; 310; 30; 20 MG/100ML; MG/100ML; MG/100ML; MG/100ML
1000 INJECTION, SOLUTION INTRAVENOUS CONTINUOUS
Status: DISCONTINUED | OUTPATIENT
Start: 2021-05-05 | End: 2021-05-05 | Stop reason: HOSPADM

## 2021-05-05 RX ORDER — DOCUSATE SODIUM 100 MG/1
100 CAPSULE, LIQUID FILLED ORAL 2 TIMES DAILY
Qty: 20 CAPSULE | Refills: 0 | Status: SHIPPED | OUTPATIENT
Start: 2021-05-05 | End: 2021-06-04

## 2021-05-05 RX ORDER — LIDOCAINE HYDROCHLORIDE 10 MG/ML
INJECTION, SOLUTION EPIDURAL; INFILTRATION; INTRACAUDAL; PERINEURAL PRN
Status: DISCONTINUED | OUTPATIENT
Start: 2021-05-05 | End: 2021-05-05 | Stop reason: SDUPTHER

## 2021-05-05 RX ADMIN — Medication 0.6 MG: at 08:02

## 2021-05-05 RX ADMIN — SODIUM CHLORIDE, POTASSIUM CHLORIDE, SODIUM LACTATE AND CALCIUM CHLORIDE 1000 ML: 600; 310; 30; 20 INJECTION, SOLUTION INTRAVENOUS at 06:50

## 2021-05-05 RX ADMIN — Medication 100 MCG: at 07:50

## 2021-05-05 RX ADMIN — PROPOFOL 150 MG: 10 INJECTION, EMULSION INTRAVENOUS at 07:09

## 2021-05-05 RX ADMIN — SODIUM CHLORIDE: 9 INJECTION, SOLUTION INTRAVENOUS at 07:04

## 2021-05-05 RX ADMIN — FENTANYL CITRATE 25 MCG: 50 INJECTION, SOLUTION INTRAMUSCULAR; INTRAVENOUS at 08:27

## 2021-05-05 RX ADMIN — Medication 3 MG: at 08:02

## 2021-05-05 RX ADMIN — Medication 200 MCG: at 07:26

## 2021-05-05 RX ADMIN — Medication 200 MCG: at 07:12

## 2021-05-05 RX ADMIN — PHENYLEPHRINE HYDROCHLORIDE 75 MCG/MIN: 10 INJECTION INTRAVENOUS at 07:50

## 2021-05-05 RX ADMIN — FENTANYL CITRATE 50 MCG: 50 INJECTION, SOLUTION INTRAMUSCULAR; INTRAVENOUS at 07:42

## 2021-05-05 RX ADMIN — FENTANYL CITRATE 100 MCG: 50 INJECTION, SOLUTION INTRAMUSCULAR; INTRAVENOUS at 07:09

## 2021-05-05 RX ADMIN — HYDROCODONE BITARTRATE AND ACETAMINOPHEN 1 TABLET: 5; 325 TABLET ORAL at 08:43

## 2021-05-05 RX ADMIN — Medication 200 MCG: at 07:20

## 2021-05-05 RX ADMIN — FENTANYL CITRATE 25 MCG: 50 INJECTION, SOLUTION INTRAMUSCULAR; INTRAVENOUS at 08:33

## 2021-05-05 RX ADMIN — Medication 200 MCG: at 07:34

## 2021-05-05 RX ADMIN — Medication 200 MCG: at 07:09

## 2021-05-05 RX ADMIN — ROCURONIUM BROMIDE 50 MG: 10 INJECTION INTRAVENOUS at 07:09

## 2021-05-05 RX ADMIN — FENTANYL CITRATE 50 MCG: 50 INJECTION, SOLUTION INTRAMUSCULAR; INTRAVENOUS at 08:01

## 2021-05-05 RX ADMIN — LIDOCAINE HYDROCHLORIDE 50 MG: 10 INJECTION, SOLUTION EPIDURAL; INFILTRATION; INTRACAUDAL; PERINEURAL at 07:09

## 2021-05-05 ASSESSMENT — PULMONARY FUNCTION TESTS
PIF_VALUE: 30
PIF_VALUE: 32
PIF_VALUE: 31
PIF_VALUE: 1
PIF_VALUE: 0
PIF_VALUE: 3
PIF_VALUE: 31
PIF_VALUE: 16
PIF_VALUE: 30
PIF_VALUE: 24
PIF_VALUE: 30
PIF_VALUE: 30
PIF_VALUE: 31
PIF_VALUE: 23
PIF_VALUE: 0
PIF_VALUE: 1
PIF_VALUE: 2
PIF_VALUE: 30
PIF_VALUE: 3
PIF_VALUE: 31
PIF_VALUE: 12
PIF_VALUE: 31
PIF_VALUE: 30
PIF_VALUE: 31
PIF_VALUE: 30
PIF_VALUE: 29
PIF_VALUE: 30
PIF_VALUE: 29
PIF_VALUE: 30

## 2021-05-05 ASSESSMENT — PAIN SCALES - GENERAL
PAINLEVEL_OUTOF10: 2
PAINLEVEL_OUTOF10: 5
PAINLEVEL_OUTOF10: 6
PAINLEVEL_OUTOF10: 0
PAINLEVEL_OUTOF10: 4

## 2021-05-05 ASSESSMENT — PAIN DESCRIPTION - LOCATION: LOCATION: BUTTOCKS

## 2021-05-05 ASSESSMENT — PAIN DESCRIPTION - PAIN TYPE: TYPE: ACUTE PAIN

## 2021-05-05 ASSESSMENT — PAIN DESCRIPTION - ORIENTATION: ORIENTATION: RIGHT

## 2021-05-05 ASSESSMENT — PAIN DESCRIPTION - DESCRIPTORS: DESCRIPTORS: ACHING

## 2021-05-05 NOTE — ANESTHESIA POSTPROCEDURE EVALUATION
Department of Anesthesiology  Postprocedure Note    Patient: Medina Londono  MRN: [id number]  YOB: 1970  Date of evaluation: 5/5/2021  Time:  10:54 AM     Procedure Summary     Date: 05/05/21 Room / Location: [address]    Anesthesia Start: 6074 Anesthesia Stop: 0572    Procedure: EUA, EXCISION OF HIDRADENITIS SUPPURATIVA (N/A ) Diagnosis: (RECURRENT PERIRECTAL ABSCESS)    Surgeons: Lars Cesar MD Responsible Provider: Rohit Lozoya MD    Anesthesia Type: general ASA Status: 4          Anesthesia Type: general    Toña Phase I: Toña Score: 10    Toña Phase II: Toña Score: 10    Last vitals: Reviewed and per EMR flowsheets.        Anesthesia Post Evaluation    Patient location during evaluation: PACU  Patient participation: complete - patient participated  Level of consciousness: awake and alert  Pain score: 3  Airway patency: patent  Nausea & Vomiting: no nausea and no vomiting  Complications: no  Cardiovascular status: hemodynamically stable  Respiratory status: acceptable  Hydration status: euvolemic

## 2021-05-05 NOTE — OP NOTE
Operative Note      Patient: Ashly Avila  YOB: 1970  MRN: [id number]    Date of Procedure: 5/5/2021    Pre-Op Diagnosis: RECURRENT PERIRECTAL ABSCESS    Post-Op Diagnosis: Hidradenitis of perianal skin        Procedure(s):  EUA, EXCISION OF HIDRADENITIS SUPPURATIVA    Surgeon(s):  Jordan Wallace MD    Assistant:   Resident: Eleanor Ernandez DO; Tommy Stark DO    Anesthesia: General    Estimated Blood Loss (mL): Minimal    Complications: None    Specimens:   ID Type Source Tests Collected by Time Destination   A : HIDRADENITIS SUPPURATIVA Tissue Buttock SURGICAL PATHOLOGY Garcia Taveras MD 5/5/2021 0746        Implants:  * No implants in log *      Drains: * No LDAs found *    Findings: excision of perianal hidradenitis, injection of exparel wound class 4     Indications: Patient is a 64-year-old male with a perianal/gluteal abscess and possible fistula. Decision was made to undergo an exam under anesthesia, incision and drainage, and possible seton placement. Risks, benefits, and alternatives to surgery discussed with patient, and all questions answered. Written consent was obtained and witnessed. Detailed Description of Procedure:   Patient was brought back to the operating suite, and general anesthesia was induced. The patient was then placed in a prone jackknife position. A formal timeout identifying the correct patient, procedure, surgical personnel and allergies was performed. The patient was then prepped and draped in the usual sterile fashion. The area of concern was identified at the right perianal/gluteal region. Upon examination patient was noted to have several small openings draining purulent drainage in this area. The spontaneously draining, open areas were probed, and found to communicate with one another. The overlying skin between communicating points was incised using Bovie electrocautery and dissected down to the subcutaneous tissue.  No abscess cavity was identified, patient noted to have hidradenitis. At this point, all identified tracts were removed using Bovie electrocautery. Once all affected tissue was excised, the area measured approximately 12 cm x 6 cm. Hemostasis was observed. The area was then injected with Exparel. A dressing of Gelfoam, fluffs, and an ABD was then placed and secured with paper tape. This concluded the case. All sponge, needle, and instrument counts were correct at the conclusion of the case. The patient tolerated the procedure well and was taken to the post-anesthesia care unit in stable condition. Dr. Governor Lima was present and scrubbed for the entire case. Electronically signed by Jerrell Palm DO on 5/5/2021 at 10:05 AM     I Dr. Governor Lima was present throughout and performed the entire procedure. Garcia Taveras  Colorectal Surgery

## 2021-05-05 NOTE — ANESTHESIA PRE PROCEDURE
mL  1,000 mL Intravenous Continuous Art MD Buddy           Allergies:  No Known Allergies    Problem List:    Patient Active Problem List   Diagnosis Code    Obesity, Class II, BMI 35-39.9, with comorbidity [id number]    Benign prostatic hyperplasia with lower urinary tract symptoms N40.1    ESRD on hemodialysis ([facility] N18.6, Z99.2    Erectile dysfunction due to arterial insufficiency N52.01    Chronic atrial fibrillation I48.20    Warfarin-induced coagulopathy (HCC) D68.32, T45.515A    DVT (deep venous thrombosis) (HCC) I82.409    S/P laparoscopic sleeve gastrectomy Z98.84    Vitamin D deficiency E55.9    Lymphedema I89.0    Right heart failure (HCC) I50.810    Peripheral vascular disease (HCC) I73.9    Malignant neoplasm of transverse colon (HCC) C18.4    Status post partial colectomy Z90.49    Rotator cuff arthropathy of right shoulder M12.811    History of COVID-19 Z86.16    Abscess of right buttock L02.31    Buttock wound, right, initial encounter S31.819A    Hypertensive chronic kidney disease with stage 1 through stage 4 chronic kidney disease, or unspecified chronic kidney disease I12.9    BMI 38.0-38.9,adult Z68.38       Past Medical History:        Diagnosis Date    Anemia     Anticoagulated on Coumadin     Aortic insufficiency     Arthritis     right shoulder/ PCP Dr. Dominique Gatica    Atrial fibrillation ([facility]     [facility] LAST VISIT 3/30/21    BPH (benign prostatic hypertrophy)     CAD (coronary artery disease)     Dr. Margie Yanez LAST VISIT - 3/30/21    Caffeine use     1 coffee, 2 tea/day    Cellulitis of lower leg     right    Chronic back pain     Colon cancer ([facility]     colon    Cor pulmonale, acute ([facility] 12/30/2014    COVID-19 08/2020    HOSPITALIZED X2 WEEKS. NOT VENTED    CRF (chronic renal failure)     dr. Fela Tran on [place].  Tues/ thurs/ sat/ right arm fistula    Erectile dysfunction     Gout     Hemodialysis patient ([facility] tues/ thurs/ sat/ DR. Petersen/ fistula right lower arm    History of blood transfusion     no reaction    Hyperkalemia 12/24/2013    Hyperlipidemia     Hypertension     Hypotension     Hypothyroidism     Kidney transplant candidate     [facility]    Lymphedema of leg     right    Obesity     Thyroid disease     Well adult health check     PCP - DR. MARTIN - LAST VISIT -  1/8/2021       Past Surgical History:        Procedure Laterality Date    ABDOMINAL EXPLORATION SURGERY  01/22/2019    ABDOMINAL EXPLORATION, LEFT HEMICOLECTOMY, MOBILIZATION OF SPLENIC FLEXURE     ABSCESS DRAINAGE Right 01/20/2014    I&D Right Shoulder, Right shoulder arthroscopy, I&D AC Joint    APPENDECTOMY      COLONOSCOPY      COLONOSCOPY  01/22/2019    COLONOSCOPY N/A 1/22/2019    COLONOSCOPY- GI UNIT SCHEDULED performed by Selena Diaz MD at [facility] COLONOSCOPY N/A 4/9/2021    COLONOSCOPY POLYPECTOMY SNARE/COLD BIOPSY performed by Selena Diaz MD at [address] Right 3/27/15    rt wrist    ENDOSCOPY, COLON, DIAGNOSTIC      UGI    GASTRIC BAND REMOVAL  01/17/2017    subcutaneous port    HC CATH POWER PICC SINGLE  1/24/2014         LAP BAND  2007    SC COLSC FLX W/RMVL OF TUMOR POLYP LESION SNARE TQ N/A 11/15/2018    COLONOSCOPY POLYPECTOMY SNARE/COLD BIOPSY performed by Parris Duke MD at [facility] Right 2014    SLEEVE GASTRECTOMY N/A 9/25/2017    GASTRECTOMY SLEEVE LAPAROSCOPIC SI ROBOTIC, ENDOSEALER performed by Ronald Stout MD at [facility] N/A 1/22/2019    ABDOMINAL EXPLORATION, LEFT HEMICOLECTOMY, MOBILIZATION OF SPLENIC FLEXURE performed by Selena Diaz MD at [address] Right     leg tumor removal/ dr. Rach Fierro       Social History:    Social History     Tobacco Use    Smoking status: Never Smoker    Smokeless tobacco: Never Used   Substance Use Topics    Alcohol use: Not Currently Counseling given: Not Answered      Vital Signs (Current): There were no vitals filed for this visit.                                            BP Readings from Last 3 Encounters:   05/05/21 107/68   04/28/21 100/64   04/26/21 109/76       NPO Status:                                                                                 BMI:   Wt Readings from Last 3 Encounters:   04/09/21 264 lb 8.8 oz (120 kg)   03/17/21 271 lb (122.9 kg)   03/12/21 271 lb (122.9 kg)     There is no height or weight on file to calculate BMI.    CBC:   Lab Results   Component Value Date    WBC 8.5 01/11/2021    RBC 4.01 01/11/2021    HGB 10.8 04/28/2021    HCT 34.3 04/28/2021    .0 01/11/2021    RDW 15.3 01/11/2021     01/11/2021       CMP:   Lab Results   Component Value Date     04/28/2021    K 4.5 04/28/2021    CL 95 04/28/2021    CO2 30 04/28/2021    BUN 36 04/28/2021    CREATININE 9.49 04/28/2021    GFRAA 7 04/28/2021    LABGLOM 6 04/28/2021    GLUCOSE 82 04/28/2021    GLUCOSE 91 10/04/2011    PROT 8.0 01/11/2021    PROT 6.6 08/07/2012    CALCIUM 10.1 01/11/2021    BILITOT 0.32 01/11/2021    ALKPHOS 104 01/11/2021    AST 22 01/11/2021    ALT 20 01/11/2021       POC Tests:   Recent Labs     05/05/21  0642   POCK 3.9       Coags:   Lab Results   Component Value Date    PROTIME 16.6 04/28/2021    PROTIME 17.7 04/16/2021    PROTIME 28.4 06/04/2020    INR 1.6 04/28/2021    APTT 31.2 08/27/2020       HCG (If Applicable): No results found for: PREGTESTUR, PREGSERUM, HCG, HCGQUANT     ABGs: No results found for: PHART, PO2ART, WIF2BGE, ZQI3ZMA, BEART, O6OMPACT     Type & Screen (If Applicable):  No results found for: LABABO, [address]    Anesthesia Evaluation  Patient summary reviewed no history of anesthetic complications:   Airway: Mallampati: III  TM distance: >3 FB   Neck ROM: full  Mouth opening: > = 3 FB Dental: normal exam         Pulmonary:Negative Pulmonary ROS breath sounds clear to auscultation                             Cardiovascular:  Exercise tolerance: good (>4 METS),   (+) hypertension: mild, CAD: no interval change, dysrhythmias: atrial fibrillation,       ECG reviewed  Rhythm: irregular  Rate: abnormal  Echocardiogram reviewed         Beta Blocker:  Not on Beta Blocker      ROS comment: Summary  Left ventricle is normal in size. Global left ventricular systolic function  is normal. Estimated ejection fraction is 55 % . Left ventricular wall  thickness is at the upper limits of normal in size. Left atrium is mildly dilated. Aortic valve is sclerotic but opens well. Mild to moderate aortic insufficiency. Trivial mitral regurgitation. Trivial tricuspid regurgitation. Trivial pulmonic insufficiency. Aortic root is mildly dilated at 3.9 cm. The ascending aorta is normal in  size.        Neuro/Psych:   Negative Neuro/Psych ROS              GI/Hepatic/Renal:   (+) GERD: no interval change, renal disease: ESRD and dialysis, morbid obesity          Endo/Other:    (+) hypothyroidism, blood dyscrasia: anticoagulation therapy:., malignancy/cancer. Abdominal:   (+) obese,     Abdomen: soft. Vascular:   + DVT, . Anesthesia Plan      general     ASA 4       Induction: intravenous. Anesthetic plan and risks discussed with patient. Plan discussed with CRNA.                   Mica Bryson MD   5/5/2021

## 2021-05-05 NOTE — BRIEF OP NOTE
Brief Postoperative Note      Patient: Brian Avila  YOB: 1970  MRN: [id number]    Date of Procedure: 5/5/2021    Pre-Op Diagnosis: RECURRENT PERIRECTAL ABSCESS    Post-Op Diagnosis: Hidradenitis of perianal skin       Procedure(s):  1. EUA  2.  EXCISION OF HIDRADENITIS SUPPURATIVA    Surgeon(s):  Foreign Holcomb MD    Assistant:  Resident: Jill Lance DO; Gerald Jones DO    Anesthesia: General    Estimated Blood Loss (mL): 15 mL    Complications: None    Specimens:   ID Type Source Tests Collected by Time Destination   A : HIDRADENITIS SUPPURATIVA Tissue Buttock SURGICAL PATHOLOGY Foreign Holcomb MD 5/5/2021 0746        Implants:  * No implants in log *      Drains: * No LDAs found *    Findings: excision of perianal hidradenitis , injection of exparel wound class 4    Electronically signed by Gerald Jones DO on 5/5/2021 at 8:10 AM

## 2021-05-06 LAB — SURGICAL PATHOLOGY REPORT: NORMAL

## 2021-05-10 ENCOUNTER — HOSPITAL ENCOUNTER (OUTPATIENT)
Dept: WOUND CARE | Age: 51
Discharge: HOME | End: 2021-05-10
Payer: COMMERCIAL

## 2021-05-10 VITALS
TEMPERATURE: 96.5 F | HEIGHT: 70 IN | WEIGHT: 268 LBS | RESPIRATION RATE: 20 BRPM | HEART RATE: 85 BPM | DIASTOLIC BLOOD PRESSURE: 77 MMHG | SYSTOLIC BLOOD PRESSURE: 114 MMHG | BODY MASS INDEX: 38.37 KG/M2

## 2021-05-10 DIAGNOSIS — S31.819A BUTTOCK WOUND, RIGHT, INITIAL ENCOUNTER: ICD-10-CM

## 2021-05-10 DIAGNOSIS — N18.6 ESRD ON HEMODIALYSIS (HCC): ICD-10-CM

## 2021-05-10 DIAGNOSIS — L02.31 ABSCESS OF RIGHT BUTTOCK: Primary | ICD-10-CM

## 2021-05-10 DIAGNOSIS — Z99.2 ESRD ON HEMODIALYSIS (HCC): ICD-10-CM

## 2021-05-10 PROCEDURE — 99213 OFFICE O/P EST LOW 20 MIN: CPT | Performed by: PLASTIC SURGERY

## 2021-05-10 PROCEDURE — 99213 OFFICE O/P EST LOW 20 MIN: CPT

## 2021-05-10 RX ORDER — LIDOCAINE HYDROCHLORIDE 40 MG/ML
SOLUTION TOPICAL ONCE
Status: CANCELLED | OUTPATIENT
Start: 2021-05-10 | End: 2021-05-10

## 2021-05-10 RX ORDER — LIDOCAINE HYDROCHLORIDE 20 MG/ML
JELLY TOPICAL ONCE
Status: CANCELLED | OUTPATIENT
Start: 2021-05-10 | End: 2021-05-10

## 2021-05-10 RX ORDER — LIDOCAINE HYDROCHLORIDE 20 MG/ML
JELLY TOPICAL ONCE
Status: COMPLETED | OUTPATIENT
Start: 2021-05-10 | End: 2021-05-10

## 2021-05-10 RX ORDER — LIDOCAINE 50 MG/G
OINTMENT TOPICAL ONCE
Status: CANCELLED | OUTPATIENT
Start: 2021-05-10 | End: 2021-05-10

## 2021-05-10 RX ORDER — LIDOCAINE 40 MG/G
CREAM TOPICAL ONCE
Status: CANCELLED | OUTPATIENT
Start: 2021-05-10 | End: 2021-05-10

## 2021-05-10 RX ADMIN — LIDOCAINE HYDROCHLORIDE 6 ML: 20 JELLY TOPICAL at 08:48

## 2021-05-10 NOTE — PROGRESS NOTES
Post-Procedure Volume (cm^3) 23.97 cm^3 05/10/21 0848   Wound Assessment Pink/red 05/10/21 0848   Drainage Amount Moderate 05/10/21 0848   Drainage Description Serosanguinous 05/10/21 0848   Odor None 05/10/21 0848   Carlota-wound Assessment Intact 05/10/21 0848   Margins Defined edges 05/10/21 0848   Wound Thickness Description not for Pressure Injury Full thickness 05/10/21 0848   Number of days: 53     Incision 05/05/21 Buttocks Right (Active)   Dressing Status Clean;Dry; Intact 05/05/21 0750   Number of days: 5       Supplies Requested :      WOUND #: 1   PRIMARY DRESSING:  Collagen with silver   Cover and Secure with: ABD pad     FREQUENCY OF DRESSING CHANGES:  Daily         ADDITIONAL ITEMS:  [] Gloves Small  [x] Gloves Medium [] Gloves Large [] Gloves XLarge  [] Tape 1\" [] Tape 2\" [] Tape 3\"  [x] Medipore Tape  [x] Saline  [] Skin Prep   [] Adhesive Remover   [] Cotton Tip Applicators   [] Other:     Patient Wound(s) Debrided: [x] Yes   [] No     Debribement Type: Excision and debridement of hidradenitis suppurativa     Debridement Date: 05/05/2021    Patient currently being seen by Home Health: [] Yes   [x] No    Duration for needed supplies:  []15  [x]30  []60  []90 Days    Provider Information:      PROVIDER'S NAME: Dr. Jamarcus Go    NPI: [id number]     Electronically signed by Korey Bhat RN on 5/10/2021 at 11:17 AM

## 2021-05-10 NOTE — PLAN OF CARE
Problem: Wound:  Goal: Will show signs of wound healing; wound closure and no evidence of infection  Description: Will show signs of wound healing; wound closure and no evidence of infection  Outcome: Ongoing     Problem: Pain:  Description: Pain management should include both nonpharmacologic and pharmacologic interventions.   Goal: Pain level will decrease  Description: Pain level will decrease  Outcome: Ongoing  Goal: Control of acute pain  Description: Control of acute pain  Outcome: Ongoing  Goal: Control of chronic pain  Description: Control of chronic pain  Outcome: Ongoing

## 2021-05-10 NOTE — PROGRESS NOTES
[address]       Progress Note and Procedure Note      Progress note     Chief Complaint   Patient presents with    Wound Check     right buttocks        HPI:   Shirin Perez is a 44 y.o. male who presents for a wound evaluation. Patient has had a wound on the buttocks. It has been present for over a month. Patient has pain associated with it. There is abscess drainage. Patient does not have this type of wound in any other areas. Patient has a history of end-stage renal disease and he is on hemodialysis. Patient's pain is 2 out of 10. It is worsened by touching it and or when the patient sits on it. There is erythema and drainage. Patient had a CT scan which did not show an abscess collection. Patient is here for evaluation and treatment. I reviewed the CT results with the patient. Also provided a copy to the patient. Cultures were reviewed with the patient. Patient is status post debridement. Medications:     Current Outpatient Medications   Medication Sig Dispense Refill    oxyCODONE-acetaminophen (PERCOCET) 5-325 MG per tablet Take 1 tablet by mouth every 6 hours as needed for Pain for up to 5 days. Intended supply: 3 days.  Take lowest dose possible to manage pain 20 tablet 0    ondansetron (ZOFRAN) 4 MG tablet Take 1 tablet by mouth every 8 hours as needed for Nausea or Vomiting 20 tablet 0    docusate sodium (COLACE) 100 MG capsule Take 1 capsule by mouth 2 times daily 20 capsule 0    pravastatin (PRAVACHOL) 40 MG tablet Take 1 tablet by mouth nightly 90 tablet 1    midodrine (PROAMATINE) 10 MG tablet TAKE ONE TABLET BY MOUTH THREE TIMES DAILY (Patient taking differently: Take 10 mg by mouth three times a week ON DIALYSIS DAYS) 270 tablet 1    B Complex-C-Folic Acid (NEPHRO-JENNIFER) 0.8 MG TABS TAKE ONE TABLET BY MOUTH DAILY WITH BREAKFAST 90 tablet 1    warfarin (COUMADIN) 4 MG tablet Take one tablet (or as directed by Medication Management) by mouth once daily. 90 DS *new tab strength* 90 tablet 1    vitamin D (ERGOCALCIFEROL) 1.25 MG (80486 UT) CAPS capsule TAKE ONE CAPSULE BY MOUTH ONCE A MONTH 3 capsule 3    cinacalcet (SENSIPAR) 30 MG tablet Take 30 mg by mouth three times a week AT DIALYSIS      ASPIRIN LOW DOSE 81 MG EC tablet TAKE ONE TABLET BY MOUTH DAILY 90 tablet 1    Elastic Bandages & Supports (MEDICAL COMPRESSION STOCKINGS) MISC 1 each by Does not apply route daily as needed (as needed) Right leg below the knee 20-30 HH MM DX 82.409 1 each 0     No current facility-administered medications for this encounter. Allergies:   No Known Allergies  Review of Systems:   Constitutional: Negative for fever, chills, fatigue and unexpected weight change. HENT: Negative for hearing loss, sore throat and facial swelling. Eyes: Negative for pain and discharge. Respiratory: Patient has a history of cor pulmonale. .    Cardiovascular: Negative for chest pain. Gastrointestinal: Negative for nausea, vomiting, diarrhea and constipation. Skin: Negative for pallor and rash. Neurological: Negative for seizures, syncope and numbness. Hematological: Patient has a history of anemia. Psychiatric/Behavioral: Negative for behavioral problems. The patient is not nervous/anxious. Musculoskeletal: Patient has a history of arthritis. : Patient has chronic renal failure. Musculoskeletal: Patient has history of gout.   Past Medical History:   Diagnosis Date    Anemia     Anticoagulated on Coumadin     Aortic insufficiency     Arthritis     right shoulder/ PCP Dr. Davin Kaba    Atrial fibrillation [facility])     [facility] LAST VISIT 3/30/21    BPH (benign prostatic hypertrophy)     CAD (coronary artery disease)     Dr. Ilene Steinberg LAST VISIT - 3/30/21    Caffeine use     1 coffee, 2 tea/day    Cellulitis of lower leg     right    Chronic back pain     Colon cancer ([facility]     colon    Cor pulmonale, acute ([facility] 12/30/2014  COVID-19 08/2020    HOSPITALIZED X2 WEEKS. NOT VENTED    CRF (chronic renal failure)     dr. Darline Sommers on [place]. Tues/ thurs/ sat/ right arm fistula    Erectile dysfunction     Gout     Hemodialysis patient ([address]     tues/ thurs/ sat/ DR. Petersen/ fistula right lower arm    History of blood transfusion     no reaction    Hyperkalemia 12/24/2013    Hyperlipidemia     Hypertension     Hypotension     Hypothyroidism     Kidney transplant candidate     [facility] with [facility]    Lymphedema of leg     right    Obesity     Thyroid disease     Well adult health check     PCP - DR. MARTIN - LAST VISIT -  1/8/2021     Past Surgical History:   Procedure Laterality Date    ABDOMINAL EXPLORATION SURGERY  01/22/2019    ABDOMINAL EXPLORATION, LEFT HEMICOLECTOMY, MOBILIZATION OF SPLENIC FLEXURE     ABSCESS DRAINAGE Right 01/20/2014    I&D Right Shoulder, Right shoulder arthroscopy, I&D AC Joint    ANUS SURGERY N/A 5/5/2021    EUA, EXCISION OF HIDRADENITIS SUPPURATIVA performed by Ildefonso Agarwal MD at [address] [facility] COLONOSCOPY  01/22/2019    COLONOSCOPY N/A 1/22/2019    COLONOSCOPY- GI UNIT SCHEDULED performed by Ildefonso Agarwal MD at [address] COLONOSCOPY N/A 4/9/2021    COLONOSCOPY POLYPECTOMY SNARE/COLD BIOPSY performed by Ildefonso Agarwal MD at [address] Right 3/27/15    rt wrist    ENDOSCOPY, COLON, DIAGNOSTIC      UGI    GASTRIC BAND REMOVAL  01/17/2017    subcutaneous port    HC CATH POWER PICC SINGLE  1/24/2014         LAP BAND  2007    SC COLSC FLX W/RMVL OF TUMOR POLYP LESION SNARE TQ N/A 11/15/2018    COLONOSCOPY POLYPECTOMY SNARE/COLD BIOPSY performed by Amla Rachel MD at [address] [facility]  05/05/2021    EUA, Excision of Hidradenitis Suppurativa     SHOULDER SURGERY Right 2014    SLEEVE GASTRECTOMY N/A 9/25/2017    GASTRECTOMY SLEEVE LAPAROSCOPIC SI ROBOTIC, ENDOSEALER performed by Marissa Andrea MD at [facility]    SMALL INTESTINE SURGERY N/A 1/22/2019    ABDOMINAL EXPLORATION, LEFT HEMICOLECTOMY, MOBILIZATION OF SPLENIC FLEXURE performed by Mario Rodriguez MD at [address] [facility] Right     leg tumor removal/ dr. Warden Sheehan History     Socioeconomic History    Marital status: Single     Spouse name: Not on file    Number of children: Not on file    Years of education: Not on file    Highest education level: Not on file   Occupational History    Not on file   Social Needs    Financial resource strain: Not on file    Food insecurity     Worry: Not on file     Inability: Not on file    Transportation needs     Medical: Not on file     Non-medical: Not on file   Tobacco Use    Smoking status: Never Smoker    Smokeless tobacco: Never Used   Substance and Sexual Activity    Alcohol use: Not Currently    Drug use: No    Sexual activity: Yes     Partners: Female   Lifestyle    Physical activity     Days per week: Not on file     Minutes per session: Not on file    Stress: Not on file   Relationships    Social connections     Talks on phone: Not on file     Gets together: Not on file     Attends Baptism service: Not on file     Active member of club or organization: Not on file     Attends meetings of clubs or organizations: Not on file     Relationship status: Not on file    Intimate partner violence     Fear of current or ex partner: Not on file     Emotionally abused: Not on file     Physically abused: Not on file     Forced sexual activity: Not on file   Other Topics Concern    Not on file   Social History Narrative    Not on file     Family History   Problem Relation Age of Onset    Cancer Father         leukemia    Heart Failure Mother     Arthritis Mother     High Blood Pressure Mother     Heart Disease Maternal Grandmother     Stroke Paternal Grandfather      Physical Exam:   /77   Pulse 85   Temp 96.5 °F (35.8 °C) (Tympanic)   Resp 20   Ht 5' 10\" (1.778 m)   Wt 268 lb (121.6 kg)   BMI 38.45 kg/m²    Body mass index is 38.45 kg/m². Physical Exam   Nursing note and vitals reviewed. Constitutional: Oriented to person, place, and time. Appears well-developed and well-nourished. No distress. Head: Normocephalic and atraumatic. Eyes: Conjunctivae and EOM are normal.   Pulmonary/Chest: Effort normal. No respiratory distress. Neurological: Alert and oriented to person, place, and time. Benson Golas Psychiatric: Normal mood and affect. Behavior is normal      Post Debridement Measurements:  Wound/Ulcer Descriptions are Pre Debridement except measurements:    Wound 03/17/21 Buttocks Right #1 CLUSTER (Active)   Wound Image   04/26/21 0825   Wound Etiology Non-Healing Surgical 05/10/21 0848   Dressing Status Old drainage noted;New drainage noted 05/10/21 0848   Wound Cleansed Irrigated with saline 05/10/21 0848   Dressing/Treatment Other (comment) 04/26/21 0848   Wound Length (cm) 9.4 cm 05/10/21 0848   Wound Width (cm) 8.5 cm 05/10/21 0848   Wound Depth (cm) 0.3 cm 05/10/21 0848   Wound Surface Area (cm^2) 79.9 cm^2 05/10/21 0848   Change in Wound Size % (l*w) -660.95 05/10/21 0848   Wound Volume (cm^3) 23.97 cm^3 05/10/21 0848   Wound Healing % -471 05/10/21 0848   Post-Procedure Length (cm) 8 cm 04/26/21 0825   Post-Procedure Width (cm) 2.5 cm 04/26/21 0825   Post-Procedure Depth (cm) 0.4 cm 04/26/21 0825   Post-Procedure Surface Area (cm^2) 20 cm^2 04/26/21 0825   Post-Procedure Volume (cm^3) 8 cm^3 04/26/21 0825   Wound Assessment Pink/red 05/10/21 0848   Drainage Amount Moderate 05/10/21 0848   Drainage Description Serosanguinous 05/10/21 0848   Odor None 05/10/21 0848   Carlota-wound Assessment Intact 05/10/21 0848   Margins Defined edges 05/10/21 0848   Wound Thickness Description not for Pressure Injury Full thickness 05/10/21 0848   Number of days: 53     Incision 05/05/21 Buttocks Right (Active)   Dressing Status Clean;Dry; Intact 05/05/21 0750   Number of days: 5 Imaging:           Impression/Plan:     Problem List Items Addressed This Visit     ESRD on hemodialysis [facility])    Relevant Orders    Initiate Outpatient Wound Care Protocol    Abscess of right buttock - Primary    Relevant Orders    Initiate Outpatient Wound Care Protocol    Buttock wound, right, initial encounter    Relevant Orders    Initiate Outpatient Wound Care Protocol    BMI 38.0-38.9,adult    Relevant Orders    Initiate Outpatient Wound Care Protocol          Patient Active Problem List   Diagnosis    Obesity, Class II, BMI 35-39.9, with comorbidity    Benign prostatic hyperplasia with lower urinary tract symptoms    ESRD on hemodialysis ([address]    Erectile dysfunction due to arterial insufficiency    Chronic atrial fibrillation    Warfarin-induced coagulopathy ([address]    D[facility]    S/P laparoscopic sleeve gastrectomy    Vitamin D deficiency    Lymphedema    Right heart failure (HCC)    Peripheral vascular disease ([address]    Malignant neoplasm of transverse colon ([address]    Status post partial colectomy    Rotator cuff arthropathy of right shoulder    History of COVID-19    Abscess of right buttock    Buttock wound, right, initial encounter    Hypertensive chronic kidney disease with stage 1 through stage 4 chronic kidney disease, or unspecified chronic kidney disease    BMI 38.0-38.9,adult     Plan:  The area appears to be hidradenitis. Patient is status post debridement. .Patient states that he feels better. Today. The area is expanding. Patient has an appointment with Dr. Sarah Bhatia. We will see the patient next week to determine the course of action. We will continue the silver cell. Change Doxy until completed.        Electronically signed by:  Mica Galindo MD 5/10/2021

## 2021-05-12 ENCOUNTER — HOSPITAL ENCOUNTER (OUTPATIENT)
Dept: PHARMACY | Age: 51
Setting detail: THERAPIES SERIES
Discharge: HOME | End: 2021-05-12
Payer: COMMERCIAL

## 2021-05-12 DIAGNOSIS — I82.401 DEEP VEIN THROMBOSIS (DVT) OF RIGHT LOWER EXTREMITY, UNSPECIFIED CHRONICITY, UNSPECIFIED VEIN (HCC): ICD-10-CM

## 2021-05-12 LAB
INR BLD: 1.7
PROTIME: 20.3 SECONDS

## 2021-05-12 PROCEDURE — 85610 PROTHROMBIN TIME: CPT

## 2021-05-12 PROCEDURE — 99212 OFFICE O/P EST SF 10 MIN: CPT

## 2021-05-14 ENCOUNTER — OFFICE VISIT (OUTPATIENT)
Dept: INTERNAL MEDICINE CLINIC | Age: 51
End: 2021-05-14
Payer: COMMERCIAL

## 2021-05-14 VITALS
OXYGEN SATURATION: 99 % | HEART RATE: 81 BPM | WEIGHT: 264 LBS | SYSTOLIC BLOOD PRESSURE: 100 MMHG | RESPIRATION RATE: 16 BRPM | BODY MASS INDEX: 37.8 KG/M2 | DIASTOLIC BLOOD PRESSURE: 60 MMHG | TEMPERATURE: 97.7 F | HEIGHT: 70 IN

## 2021-05-14 DIAGNOSIS — I82.5Z1 CHRONIC DEEP VEIN THROMBOSIS (DVT) OF DISTAL VEIN OF RIGHT LOWER EXTREMITY (HCC): ICD-10-CM

## 2021-05-14 DIAGNOSIS — N52.01 ERECTILE DYSFUNCTION DUE TO ARTERIAL INSUFFICIENCY: ICD-10-CM

## 2021-05-14 DIAGNOSIS — I73.9 PERIPHERAL VASCULAR DISEASE (HCC): ICD-10-CM

## 2021-05-14 DIAGNOSIS — Z86.16 HISTORY OF COVID-19: ICD-10-CM

## 2021-05-14 DIAGNOSIS — E55.9 VITAMIN D DEFICIENCY: ICD-10-CM

## 2021-05-14 DIAGNOSIS — T45.515A WARFARIN-INDUCED COAGULOPATHY (HCC): ICD-10-CM

## 2021-05-14 DIAGNOSIS — R73.03 PREDIABETES: ICD-10-CM

## 2021-05-14 DIAGNOSIS — Z98.84 S/P LAPAROSCOPIC SLEEVE GASTRECTOMY: ICD-10-CM

## 2021-05-14 DIAGNOSIS — I12.9 HYPERTENSIVE CHRONIC KIDNEY DISEASE WITH STAGE 1 THROUGH STAGE 4 CHRONIC KIDNEY DISEASE, OR UNSPECIFIED CHRONIC KIDNEY DISEASE: ICD-10-CM

## 2021-05-14 DIAGNOSIS — I48.20 CHRONIC ATRIAL FIBRILLATION (HCC): ICD-10-CM

## 2021-05-14 DIAGNOSIS — C18.4 MALIGNANT NEOPLASM OF TRANSVERSE COLON (HCC): ICD-10-CM

## 2021-05-14 DIAGNOSIS — N18.6 ESRD ON HEMODIALYSIS (HCC): Primary | ICD-10-CM

## 2021-05-14 DIAGNOSIS — Z90.49 STATUS POST PARTIAL COLECTOMY: ICD-10-CM

## 2021-05-14 DIAGNOSIS — Z99.2 ESRD ON HEMODIALYSIS (HCC): Primary | ICD-10-CM

## 2021-05-14 DIAGNOSIS — N40.1 BENIGN PROSTATIC HYPERPLASIA WITH LOWER URINARY TRACT SYMPTOMS, SYMPTOM DETAILS UNSPECIFIED: ICD-10-CM

## 2021-05-14 DIAGNOSIS — D68.32 WARFARIN-INDUCED COAGULOPATHY (HCC): ICD-10-CM

## 2021-05-14 DIAGNOSIS — I50.810 RIGHT-SIDED HEART FAILURE, UNSPECIFIED HF CHRONICITY (HCC): ICD-10-CM

## 2021-05-14 DIAGNOSIS — I89.0 LYMPHEDEMA: ICD-10-CM

## 2021-05-14 DIAGNOSIS — M12.811 ROTATOR CUFF ARTHROPATHY OF RIGHT SHOULDER: ICD-10-CM

## 2021-05-14 PROCEDURE — G8417 CALC BMI ABV UP PARAM F/U: HCPCS | Performed by: FAMILY MEDICINE

## 2021-05-14 PROCEDURE — 99214 OFFICE O/P EST MOD 30 MIN: CPT | Performed by: FAMILY MEDICINE

## 2021-05-14 PROCEDURE — 3017F COLORECTAL CA SCREEN DOC REV: CPT | Performed by: FAMILY MEDICINE

## 2021-05-14 PROCEDURE — G8427 DOCREV CUR MEDS BY ELIG CLIN: HCPCS | Performed by: FAMILY MEDICINE

## 2021-05-14 PROCEDURE — 1036F TOBACCO NON-USER: CPT | Performed by: FAMILY MEDICINE

## 2021-05-14 RX ORDER — WARFARIN SODIUM 4 MG/1
TABLET ORAL
Qty: 90 TABLET | Refills: 1 | Status: SHIPPED | OUTPATIENT
Start: 2021-05-14 | End: 2021-10-04

## 2021-05-14 ASSESSMENT — ENCOUNTER SYMPTOMS
EYES NEGATIVE: 1
BACK PAIN: 1
GASTROINTESTINAL NEGATIVE: 1
ALLERGIC/IMMUNOLOGIC NEGATIVE: 1
RESPIRATORY NEGATIVE: 1

## 2021-05-14 NOTE — PROGRESS NOTES
Subjective:      Patient ID: Pranav Doe is a 44 y.o. male. Hyperlipidemia  This is a chronic problem. The current episode started more than 1 month ago. The problem is controlled. Recent lipid tests were reviewed and are normal. Exacerbating diseases include obesity. Current antihyperlipidemic treatment includes statins. The current treatment provides moderate improvement of lipids. Compliance problems include adherence to diet and adherence to exercise. Risk factors for coronary artery disease include diabetes mellitus, dyslipidemia, male sex and obesity. Review of Systems   Constitutional: Negative. HENT: Negative. Eyes: Negative. Respiratory: Negative. Cardiovascular: Negative. Gastrointestinal: Negative. Endocrine: Negative. Musculoskeletal: Positive for arthralgias and back pain. Skin: Negative. Allergic/Immunologic: Negative. Neurological: Negative. Hematological: Negative. Psychiatric/Behavioral: Negative. Past family and social history unremarkable. Diagnosis Orders   1. ESRD on hemodialysis ([address]     2. Benign prostatic hyperplasia with lower urinary tract symptoms, symptom details unspecified     3. Erectile dysfunction due to arterial insufficiency     4. Chronic atrial fibrillation     5. Warfarin-induced coagulopathy ([address]     6. Chronic deep vein thrombosis (DVT) of distal vein of right lower extremity (HCC)     7. S/P laparoscopic sleeve gastrectomy     8. Vitamin D deficiency     9. Lymphedema     10. Right-sided heart failure, unspecified HF chronicity ([address]     11. Peripheral vascular disease ([address]     12. Malignant neoplasm of transverse colon ([address]     13. Status post partial colectomy     14. Rotator cuff arthropathy of right shoulder     15. History of COVID-19     16. Hypertensive chronic kidney disease with stage 1 through stage 4 chronic kidney disease, or unspecified chronic kidney disease     17. BMI 38.0-38.9,adult     18.  Prediabetes Objective:   Physical Exam  Vitals signs and nursing note reviewed. Constitutional:       Appearance: He is well-developed. Comments: Obesity   HENT:      Head: Normocephalic and atraumatic. Right Ear: External ear normal.      Left Ear: External ear normal.      Nose: Nose normal.   Eyes:      Conjunctiva/sclera: Conjunctivae normal.      Pupils: Pupils are equal, round, and reactive to light. Neck:      Musculoskeletal: Normal range of motion and neck supple. Cardiovascular:      Rate and Rhythm: Normal rate and regular rhythm. Heart sounds: Normal heart sounds. Comments: Neurocardiogenic syncope on ProAmatine  Hyperlipidemia  Chronic A. fib. Rate controlled  Heart failure  Pulmonary:      Effort: Pulmonary effort is normal.      Breath sounds: Normal breath sounds. Abdominal:      General: Bowel sounds are normal.      Palpations: Abdomen is soft. Comments: History of colon cancer   Genitourinary:     Comments: ESRDhemodialysis dependent on 3/week  Musculoskeletal: Normal range of motion. Skin:     General: Skin is warm and dry. Neurological:      Mental Status: He is alert and oriented to person, place, and time. Deep Tendon Reflexes: Reflexes are normal and symmetric. Psychiatric:         Behavior: Behavior normal.         Thought Content: Thought content normal.         Assessment:       Diagnosis Orders   1. ESRD on hemodialysis ([address]     2. Benign prostatic hyperplasia with lower urinary tract symptoms, symptom details unspecified     3. Erectile dysfunction due to arterial insufficiency     4. Chronic atrial fibrillation     5. Warfarin-induced coagulopathy ([address]     6. Chronic deep vein thrombosis (DVT) of distal vein of right lower extremity (HCC)     7. S/P laparoscopic sleeve gastrectomy     8. Vitamin D deficiency     9. Lymphedema     10. Right-sided heart failure, unspecified HF chronicity ([address]     11. Peripheral vascular disease ([address]     12. Malignant neoplasm of transverse colon [facility]     13. Status post partial colectomy     14. Rotator cuff arthropathy of right shoulder     15. History of COVID-19     16. Hypertensive chronic kidney disease with stage 1 through stage 4 chronic kidney disease, or unspecified chronic kidney disease     17. BMI 38.0-38.9,adult     18. Prediabetes             Plan:      61-year-old pleasant -American male returns for follow-up. He is afebrile medically stable, clinical examination is stable  ESRDHD x3/week that he is tolerating well  Neurocardiogenic syncope. He is on midodrine. Compression stocking. He is asymptomatic  Continue on statin that he is tolerating well  History of DVT. Lose weight, increase mobility as tolerated. He is on Coumadin titrated by Coumadin clinic. INR is 1.7. He is considered Coumadin compatible diet  History of chronic A. fib. Rate controlled  Heart failure. He is asymptomatic  Morbid obesity. History of sleeve gastrectomy  History of colon cancer. He is established with colorectal surgery. He is asymptomatic  Degenerative polyarthralgia. May take over-the-counter Tylenol avoid anti-inflammatory radiocontrast because of CKD  Depression screen is negative  He denies tobacco, excessive alcohol or illicit drug use  Anemia of chronic disease. Hemoglobin is stable at 10+  Secondary hyperparathyroidism  History of peripheral vascular disease. Risk factor stratification is advised  Lymphedema with morbid obesity and sedentary lifestyle. Continue compression stockings and increase regular as tolerated  He is updated on COVID-19 vaccination that he tolerated well  He is encouraged to call for any concern  This note is created with a voice recognition program and while intend to generate a document that accurately reflects the content of the visit, no guarantee can be provided that every mistake has been identified and corrected by editing.           Karyle Feil, MD

## 2021-05-24 ENCOUNTER — HOSPITAL ENCOUNTER (OUTPATIENT)
Dept: WOUND CARE | Age: 51
Discharge: HOME | End: 2021-05-24
Payer: COMMERCIAL

## 2021-05-24 VITALS
SYSTOLIC BLOOD PRESSURE: 119 MMHG | RESPIRATION RATE: 16 BRPM | DIASTOLIC BLOOD PRESSURE: 77 MMHG | HEART RATE: 83 BPM | TEMPERATURE: 96.4 F

## 2021-05-24 DIAGNOSIS — Z99.2 ESRD ON HEMODIALYSIS (HCC): ICD-10-CM

## 2021-05-24 DIAGNOSIS — N18.6 ESRD ON HEMODIALYSIS (HCC): ICD-10-CM

## 2021-05-24 DIAGNOSIS — L02.91 ABSCESS: Primary | ICD-10-CM

## 2021-05-24 DIAGNOSIS — S31.819A BUTTOCK WOUND, RIGHT, INITIAL ENCOUNTER: ICD-10-CM

## 2021-05-24 DIAGNOSIS — L02.31 ABSCESS OF RIGHT BUTTOCK: Primary | ICD-10-CM

## 2021-05-24 PROCEDURE — 11045 DBRDMT SUBQ TISS EACH ADDL: CPT | Performed by: PLASTIC SURGERY

## 2021-05-24 PROCEDURE — 11042 DBRDMT SUBQ TIS 1ST 20SQCM/<: CPT

## 2021-05-24 PROCEDURE — 11045 DBRDMT SUBQ TISS EACH ADDL: CPT

## 2021-05-24 PROCEDURE — 11042 DBRDMT SUBQ TIS 1ST 20SQCM/<: CPT | Performed by: PLASTIC SURGERY

## 2021-05-24 RX ORDER — LIDOCAINE HYDROCHLORIDE 20 MG/ML
JELLY TOPICAL ONCE
Status: CANCELLED | OUTPATIENT
Start: 2021-05-24 | End: 2021-05-24

## 2021-05-24 RX ORDER — CIPROFLOXACIN 500 MG/1
500 TABLET, FILM COATED ORAL DAILY
Qty: 14 TABLET | Refills: 0 | Status: SHIPPED | OUTPATIENT
Start: 2021-05-24 | End: 2021-05-31

## 2021-05-24 RX ORDER — LIDOCAINE 40 MG/G
CREAM TOPICAL ONCE
Status: CANCELLED | OUTPATIENT
Start: 2021-05-24 | End: 2021-05-24

## 2021-05-24 RX ORDER — LIDOCAINE HYDROCHLORIDE 40 MG/ML
SOLUTION TOPICAL ONCE
Status: CANCELLED | OUTPATIENT
Start: 2021-05-24 | End: 2021-05-24

## 2021-05-24 RX ORDER — LIDOCAINE 50 MG/G
OINTMENT TOPICAL ONCE
Status: CANCELLED | OUTPATIENT
Start: 2021-05-24 | End: 2021-05-24

## 2021-05-24 RX ORDER — LIDOCAINE HYDROCHLORIDE 20 MG/ML
JELLY TOPICAL ONCE
Status: DISCONTINUED | OUTPATIENT
Start: 2021-05-24 | End: 2021-05-25 | Stop reason: HOSPADM

## 2021-05-24 NOTE — PROGRESS NOTES
[facility] called to verify sig on Cipro Script. Writer confirmed with Dr. Prince Briones that the script is for one daily for 7 days. Writer called Marlon back and verified script.

## 2021-05-24 NOTE — PROGRESS NOTES
[address]       Progress Note and Procedure Note      Progress note     Chief Complaint   Patient presents with    Wound Check     buttocks        HPI:   Palma Cohn is a 44 y.o. male who presents for a wound evaluation. Patient has had a wound on the buttocks. It has been present for over a month. Patient has pain associated with it. There is abscess drainage. Patient does not have this type of wound in any other areas. Patient has a history of end-stage renal disease and he is on hemodialysis. Patient's pain is 2 out of 10. It is worsened by touching it and or when the patient sits on it. There is erythema and drainage. Patient had a CT scan which did not show an abscess collection. Patient is here for evaluation and treatment. I reviewed the CT results with the patient. Also provided a copy to the patient. Cultures were reviewed with the patient. Patient is status post debridement. Medications:     Current Outpatient Medications   Medication Sig Dispense Refill    ciprofloxacin (CIPRO) 500 MG tablet Take 1 tablet by mouth daily for 7 days 14 tablet 0    warfarin (COUMADIN) 4 MG tablet Take one tablet (or as directed by Medication Management) by mouth once daily.  90 DS *new tab strength* 90 tablet 1    ondansetron (ZOFRAN) 4 MG tablet Take 1 tablet by mouth every 8 hours as needed for Nausea or Vomiting 20 tablet 0    docusate sodium (COLACE) 100 MG capsule Take 1 capsule by mouth 2 times daily 20 capsule 0    pravastatin (PRAVACHOL) 40 MG tablet Take 1 tablet by mouth nightly 90 tablet 1    midodrine (PROAMATINE) 10 MG tablet TAKE ONE TABLET BY MOUTH THREE TIMES DAILY (Patient taking differently: Take 10 mg by mouth three times a week ON DIALYSIS DAYS) 270 tablet 1    B Complex-C-Folic Acid (NEPHRO-JENNIFER) 0.8 MG TABS TAKE ONE TABLET BY MOUTH DAILY WITH BREAKFAST 90 tablet 1    vitamin D (ERGOCALCIFEROL) 1.25 MG (56932 UT) CAPS capsule TAKE ONE CAPSULE BY MOUTH ONCE A MONTH 3 capsule 3    cinacalcet (SENSIPAR) 30 MG tablet Take 30 mg by mouth three times a week AT DIALYSIS      ASPIRIN LOW DOSE 81 MG EC tablet TAKE ONE TABLET BY MOUTH DAILY 90 tablet 1    Elastic Bandages & Supports (MEDICAL COMPRESSION STOCKINGS) MISC 1 each by Does not apply route daily as needed (as needed) Right leg below the knee 20-30 HH MM DX 82.409 1 each 0     Current Facility-Administered Medications   Medication Dose Route Frequency Provider Last Rate Last Admin    lidocaine (XYLOCAINE) 2 % jelly   Topical Once KVNG Che CNP          Allergies:   No Known Allergies  Review of Systems:   Constitutional: Negative for fever, chills, fatigue and unexpected weight change. HENT: Negative for hearing loss, sore throat and facial swelling. Eyes: Negative for pain and discharge. Respiratory: Patient has a history of cor pulmonale. .    Cardiovascular: Negative for chest pain. Gastrointestinal: Negative for nausea, vomiting, diarrhea and constipation. Skin: Negative for pallor and rash. Neurological: Negative for seizures, syncope and numbness. Hematological: Patient has a history of anemia. Psychiatric/Behavioral: Negative for behavioral problems. The patient is not nervous/anxious. Musculoskeletal: Patient has a history of arthritis. : Patient has chronic renal failure. Musculoskeletal: Patient has history of gout.   Past Medical History:   Diagnosis Date    Anemia     Anticoagulated on Coumadin     Aortic insufficiency     Arthritis     right shoulder/ PCP Dr. Dominique Gatica    Atrial fibrillation [facility])     [facility] LAST VISIT 3/30/21    BPH (benign prostatic hypertrophy)     CAD (coronary artery disease)     Dr. Margie Yanez LAST VISIT - 3/30/21    Caffeine use     1 coffee, 2 tea/day    Cellulitis of lower leg     right    Chronic back pain     [facility]     colon    Cor pulmonale, acute ([facility] 12/30/2014    COVID-19 08/2020    HOSPITALIZED X2 WEEKS. NOT VENTED    CRF (chronic renal failure)     dr. Baljinder Nugent on [place]. Tues/ thurs/ sat/ right arm fistula    Erectile dysfunction     Gout     Hemodialysis patient ([address]     tues/ thurs/ sat/ DR. Petersen/ fistula right lower arm    History of blood transfusion     no reaction    Hyperkalemia 12/24/2013    Hyperlipidemia     Hypertension     Hypotension     Hypothyroidism     Kidney transplant candidate     [facility] with [facility]    Lymphedema of leg     right    Obesity     Thyroid disease     Well adult health check     PCP - DR. MARTIN - LAST VISIT -  1/8/2021     Past Surgical History:   Procedure Laterality Date    ABDOMINAL EXPLORATION SURGERY  01/22/2019    ABDOMINAL EXPLORATION, LEFT HEMICOLECTOMY, MOBILIZATION OF SPLENIC FLEXURE     ABSCESS DRAINAGE Right 01/20/2014    I&D Right Shoulder, Right shoulder arthroscopy, I&D AC Joint    ANUS SURGERY N/A 5/5/2021    EUA, EXCISION OF HIDRADENITIS SUPPURATIVA performed by Lina Aguilar MD at [facility] COLONOSCOPY  01/22/2019    COLONOSCOPY N/A 1/22/2019    COLONOSCOPY- GI UNIT SCHEDULED performed by Lina Aguilar MD at [facility] COLONOSCOPY N/A 4/9/2021    COLONOSCOPY POLYPECTOMY SNARE/COLD BIOPSY performed by Lina Aguilar MD at [address] Right 3/27/15    rt wrist    ENDOSCOPY, COLON, DIAGNOSTIC      UGI    GASTRIC BAND REMOVAL  01/17/2017    subcutaneous port    HC CATH POWER PICC SINGLE  1/24/2014         LAP BAND  2007    NH COLSC FLX W/RMVL OF TUMOR POLYP LESION SNARE TQ N/A 11/15/2018    COLONOSCOPY POLYPECTOMY SNARE/COLD BIOPSY performed by Paradise Treadwell MD at [address] [facility]  05/05/2021    EUA, Excision of Hidradenitis Suppurativa     SHOULDER SURGERY Right 2014    SLEEVE GASTRECTOMY N/A 9/25/2017    GASTRECTOMY SLEEVE LAPAROSCOPIC SI ROBOTIC, ENDOSEALER performed by Jordi Lebron Physical Exam:   /77   Pulse 83   Temp 96.4 °F (35.8 °C)   Resp 16    There is no height or weight on file to calculate BMI. Physical Exam   Nursing note and vitals reviewed. Constitutional: Oriented to person, place, and time. Appears well-developed and well-nourished. No distress. Head: Normocephalic and atraumatic. Eyes: Conjunctivae and EOM are normal.   Pulmonary/Chest: Effort normal. No respiratory distress. Neurological: Alert and oriented to person, place, and time. Zulema Brill Psychiatric: Normal mood and affect.  Behavior is normal      Post Debridement Measurements:  Wound/Ulcer Descriptions are Pre Debridement except measurements:    Wound 03/17/21 Buttocks Right #1 CLUSTER (Active)   Wound Image   04/26/21 0825   Wound Etiology Non-Healing Surgical 05/24/21 0847   Dressing Status Old drainage noted;New drainage noted 05/24/21 0847   Wound Cleansed Cleansed with saline 05/24/21 0847   Dressing/Treatment Other (comment) 05/24/21 0932   Wound Length (cm) 8 cm 05/24/21 0847   Wound Width (cm) 4.3 cm 05/24/21 0847   Wound Depth (cm) 0.2 cm 05/24/21 0847   Wound Surface Area (cm^2) 34.4 cm^2 05/24/21 0847   Change in Wound Size % (l*w) -227.62 05/24/21 0847   Wound Volume (cm^3) 6.88 cm^3 05/24/21 0847   Wound Healing % -64 05/24/21 0847   Post-Procedure Length (cm) 8 cm 05/24/21 0847   Post-Procedure Width (cm) 4.3 cm 05/24/21 0847   Post-Procedure Depth (cm) 0.2 cm 05/24/21 0847   Post-Procedure Surface Area (cm^2) 34.4 cm^2 05/24/21 0847   Post-Procedure Volume (cm^3) 6.88 cm^3 05/24/21 0847   Wound Assessment Pink/red 05/24/21 0847   Drainage Amount Moderate 05/24/21 0847   Drainage Description Serosanguinous 05/24/21 0847   Odor None 05/24/21 0847   Carlota-wound Assessment Intact 05/24/21 0847   Margins Defined edges 05/24/21 0847   Wound Thickness Description not for Pressure Injury Full thickness 05/24/21 0847   Number of days: 67            Procedure Note  Indications:  Based on my Warfarin-induced coagulopathy (HCC)    DVT (deep venous thrombosis) (HCC)    S/P laparoscopic sleeve gastrectomy    Vitamin D deficiency    Lymphedema    Right heart failure (HCC)    Peripheral vascular disease (HCC)    Malignant neoplasm of transverse colon (HCC)    Status post partial colectomy    Rotator cuff arthropathy of right shoulder    History of COVID-19    Abscess of right buttock    Buttock wound, right, initial encounter    Hypertensive chronic kidney disease with stage 1 through stage 4 chronic kidney disease, or unspecified chronic kidney disease    BMI 38.0-38.9,adult    Prediabetes     Plan:  The area appears to be hidradenitis. Patient is status post debridement. .Patient states that he feels better. Today. The area is expanding. Patient has an appointment with Dr. Sabina Izaguirre. We will continue the silver cell. Change Doxy until completed.        Electronically signed by:  Lolly Figueroa MD 5/24/2021

## 2021-05-26 ENCOUNTER — HOSPITAL ENCOUNTER (OUTPATIENT)
Dept: PHARMACY | Age: 51
Setting detail: THERAPIES SERIES
Discharge: HOME | End: 2021-05-26
Payer: COMMERCIAL

## 2021-05-26 DIAGNOSIS — I82.4Z9 DEEP VEIN THROMBOSIS (DVT) OF DISTAL VEIN OF LOWER EXTREMITY, UNSPECIFIED CHRONICITY, UNSPECIFIED LATERALITY (HCC): Primary | ICD-10-CM

## 2021-05-26 LAB
INR BLD: 2
PROTIME: 24.5 SECONDS

## 2021-05-26 PROCEDURE — 99211 OFF/OP EST MAY X REQ PHY/QHP: CPT

## 2021-05-26 PROCEDURE — 85610 PROTHROMBIN TIME: CPT

## 2021-05-26 NOTE — PROGRESS NOTES
Medication Management Service, Warfarin Management  RAUL GARCIA [facility], [phone]  Visit Date: 5/26/2021   Subjective:   Kera Kauffman is a 44 y.o. male who presents to clinic today for anticoagulation monitoring and adjustment. Patient seen in clinic for warfarin management due to  Indication:   atrial fibrillation. INR goal: of 2.0-3.0. Duration of therapy: indefinite. Assessment and PLAN   PT/INR done in office per protocol. INR today is 2.0, therapeutic. Plan: Will continue current regimen of warfarin 6 mg every Wed and Sat and 4 mg all other days of the week. Using warfarin 4 mg tablets. Recheck INR in 3 week(s). Patient seen in room # 1. Patient verbally attests to completing the COVID 19 self screen. Patient verbalized understanding of dosing directions and information discussed. Dosing schedule given to patient. Progress note sent to referring office. Patient acknowledges working in consult agreement with pharmacist as referred by his/her physician. [address] [place] [address]  Ph., CACP, Clinical Pharmacist  Anticoagulation Services, [address] [place] [address] Coumadin Clinic  5/26/2021  8:38 AM    For Pharmacy Admin Tracking Only     Intervention Detail:    Total # of Interventions Recommended: 0   Total # of Interventions Accepted: 0   Time Spent (min): 20

## 2021-05-27 ENCOUNTER — OFFICE VISIT (OUTPATIENT)
Dept: DERMATOLOGY | Age: 51
End: 2021-05-27
Payer: COMMERCIAL

## 2021-05-27 VITALS
DIASTOLIC BLOOD PRESSURE: 68 MMHG | WEIGHT: 260 LBS | HEART RATE: 89 BPM | BODY MASS INDEX: 37.22 KG/M2 | HEIGHT: 70 IN | OXYGEN SATURATION: 98 % | SYSTOLIC BLOOD PRESSURE: 98 MMHG

## 2021-05-27 DIAGNOSIS — L73.2 HIDRADENITIS SUPPURATIVA: Primary | ICD-10-CM

## 2021-05-27 PROCEDURE — 3017F COLORECTAL CA SCREEN DOC REV: CPT | Performed by: DERMATOLOGY

## 2021-05-27 PROCEDURE — 1036F TOBACCO NON-USER: CPT | Performed by: DERMATOLOGY

## 2021-05-27 PROCEDURE — G8427 DOCREV CUR MEDS BY ELIG CLIN: HCPCS | Performed by: DERMATOLOGY

## 2021-05-27 PROCEDURE — 99204 OFFICE O/P NEW MOD 45 MIN: CPT | Performed by: DERMATOLOGY

## 2021-05-27 PROCEDURE — G8417 CALC BMI ABV UP PARAM F/U: HCPCS | Performed by: DERMATOLOGY

## 2021-05-27 RX ORDER — DOXYCYCLINE HYCLATE 100 MG/1
CAPSULE ORAL
Qty: 60 CAPSULE | Refills: 1 | Status: SHIPPED | OUTPATIENT
Start: 2021-05-27 | End: 2021-08-06

## 2021-05-27 RX ORDER — CHLORHEXIDINE GLUCONATE 4 G/100ML
SOLUTION TOPICAL
Qty: 236 ML | Refills: 2 | Status: SHIPPED | OUTPATIENT
Start: 2021-05-27 | End: 2021-06-10

## 2021-05-27 RX ORDER — CLINDAMYCIN PHOSPHATE 10 UG/ML
LOTION TOPICAL
Qty: 60 ML | Refills: 3 | Status: SHIPPED | OUTPATIENT
Start: 2021-05-27 | End: 2021-09-01 | Stop reason: SDUPTHER

## 2021-05-27 NOTE — PROGRESS NOTES
Dermatology Patient Note  [address]  [address]  [address] [facility] [address]  Dept: [phone]  Dept Fax: [phone]      VISITDATE: 5/27/2021   REFERRING PROVIDER: Nancy Ordoñez MD      Sher Harvey is a 44 y.o. male  who presents today in the office for:    New Patient (possible HS on bottom since October; PCP rx BPO wash; Dr. Jesse Nguyen performed surgery--he called it dead tissue)      PERTINENT HISTORY NOT LISTED ABOVE:  Patient presents for evaluation of boils on right buttock  - has had tissue excised and since then has had no pain or symptoms  - using BPO wash with no change  - several years ago he had a mass of lymphedematous tissue excised from right thigh    CURRENT MEDICATIONS:   Current Outpatient Medications   Medication Sig Dispense Refill    benzoyl peroxide 5 % external liquid Use as wash every other day 227 g 3    chlorhexidine (HIBICLENS) 4 % external liquid Use as wash every other day 236 mL 2    clindamycin (CLEOCIN T) 1 % lotion Apply to affected areas daily 60 mL 3    doxycycline hyclate (VIBRAMYCIN) 100 MG capsule Take 1 pill twice daily with food 60 capsule 1    warfarin (COUMADIN) 4 MG tablet Take one tablet (or as directed by Medication Management) by mouth once daily.  90 DS *new tab strength* 90 tablet 1    docusate sodium (COLACE) 100 MG capsule Take 1 capsule by mouth 2 times daily 20 capsule 0    pravastatin (PRAVACHOL) 40 MG tablet Take 1 tablet by mouth nightly 90 tablet 1    midodrine (PROAMATINE) 10 MG tablet TAKE ONE TABLET BY MOUTH THREE TIMES DAILY (Patient taking differently: Take 10 mg by mouth three times a week ON DIALYSIS DAYS) 270 tablet 1    B Complex-C-Folic Acid (NEPHRO-JENNIFER) 0.8 MG TABS TAKE ONE TABLET BY MOUTH DAILY WITH BREAKFAST 90 tablet 1    vitamin D (ERGOCALCIFEROL) 1.25 MG (22154 UT) CAPS capsule TAKE ONE CAPSULE BY MOUTH ONCE A MONTH 3 capsule 3    cinacalcet (SENSIPAR) 30 MG tablet Take 30 mg by mouth three chemical esophagitis, teratogenicity and severe drug reaction including pseudotumor cerebri. Patient also counseled to take doxycycline with a full glass of water. - benzoyl peroxide 5 % external liquid; Use as wash every other day  Dispense: 227 g; Refill: 3  - chlorhexidine (HIBICLENS) 4 % external liquid; Use as wash every other day  Dispense: 236 mL; Refill: 2  - clindamycin (CLEOCIN T) 1 % lotion; Apply to affected areas daily  Dispense: 60 mL; Refill: 3  - doxycycline hyclate (VIBRAMYCIN) 100 MG capsule; Take 1 pill twice daily with food  Dispense: 60 capsule; Refill: 1          RTC 3 months    Patient Instructions   - Continue doxycycline 100 mg twice daily  - Alternate benzoyl peroxide wash and hibiclens to HS prone areas  - Apply clindamycin lotion daily  - Follow up in 3 months      This note was created with the assistance of a speech-recognition program.  Although the intention is to generate a document that actually reflects the content of the visit, no guarantees can be provided that every mistake has been identified and corrected byediting.     Electronically signed by Drea Sigala MD on 5/27/21 at 3:18 PM EDT

## 2021-06-07 ENCOUNTER — HOSPITAL ENCOUNTER (OUTPATIENT)
Dept: WOUND CARE | Age: 51
Discharge: HOME | End: 2021-06-07
Payer: COMMERCIAL

## 2021-06-07 VITALS
HEIGHT: 70 IN | DIASTOLIC BLOOD PRESSURE: 89 MMHG | RESPIRATION RATE: 16 BRPM | SYSTOLIC BLOOD PRESSURE: 141 MMHG | BODY MASS INDEX: 37.22 KG/M2 | HEART RATE: 82 BPM | TEMPERATURE: 97.3 F | WEIGHT: 260 LBS

## 2021-06-07 DIAGNOSIS — Z99.2 ESRD ON HEMODIALYSIS (HCC): ICD-10-CM

## 2021-06-07 DIAGNOSIS — T81.89XD NONHEALING SURGICAL WOUND, SUBSEQUENT ENCOUNTER: ICD-10-CM

## 2021-06-07 DIAGNOSIS — S31.819A BUTTOCK WOUND, RIGHT, INITIAL ENCOUNTER: Primary | ICD-10-CM

## 2021-06-07 DIAGNOSIS — N18.6 ESRD ON HEMODIALYSIS (HCC): ICD-10-CM

## 2021-06-07 DIAGNOSIS — L02.31 ABSCESS OF RIGHT BUTTOCK: ICD-10-CM

## 2021-06-07 PROCEDURE — 11042 DBRDMT SUBQ TIS 1ST 20SQCM/<: CPT

## 2021-06-07 PROCEDURE — 11042 DBRDMT SUBQ TIS 1ST 20SQCM/<: CPT | Performed by: NURSE PRACTITIONER

## 2021-06-07 RX ORDER — LIDOCAINE HYDROCHLORIDE 40 MG/ML
SOLUTION TOPICAL ONCE
Status: CANCELLED | OUTPATIENT
Start: 2021-06-07 | End: 2021-06-07

## 2021-06-07 RX ORDER — LIDOCAINE HYDROCHLORIDE 20 MG/ML
JELLY TOPICAL ONCE
Status: COMPLETED | OUTPATIENT
Start: 2021-06-07 | End: 2021-06-07

## 2021-06-07 RX ORDER — LIDOCAINE 50 MG/G
OINTMENT TOPICAL ONCE
Status: CANCELLED | OUTPATIENT
Start: 2021-06-07 | End: 2021-06-07

## 2021-06-07 RX ORDER — LIDOCAINE HYDROCHLORIDE 20 MG/ML
JELLY TOPICAL ONCE
Status: CANCELLED | OUTPATIENT
Start: 2021-06-07 | End: 2021-06-07

## 2021-06-07 RX ORDER — LIDOCAINE 40 MG/G
CREAM TOPICAL ONCE
Status: CANCELLED | OUTPATIENT
Start: 2021-06-07 | End: 2021-06-07

## 2021-06-07 RX ADMIN — LIDOCAINE HYDROCHLORIDE 6 ML: 20 JELLY TOPICAL at 08:41

## 2021-06-07 ASSESSMENT — PAIN SCALES - GENERAL: PAINLEVEL_OUTOF10: 0

## 2021-06-07 NOTE — PROGRESS NOTES
right    Obesity     Thyroid disease     Well adult health check     PCP - DR. MARTIN - LAST VISIT -  1/8/2021       PAST SURGICAL HISTORY    Past Surgical History:   Procedure Laterality Date    ABDOMINAL EXPLORATION SURGERY  01/22/2019    ABDOMINAL EXPLORATION, LEFT HEMICOLECTOMY, MOBILIZATION OF SPLENIC FLEXURE     ABSCESS DRAINAGE Right 01/20/2014    I&D Right Shoulder, Right shoulder arthroscopy, I&D AC Joint    ANUS SURGERY N/A 5/5/2021    EUA, EXCISION OF HIDRADENITIS SUPPURATIVA performed by Pastora Mcgee MD at [address] [facility] COLONOSCOPY  01/22/2019    COLONOSCOPY N/A 1/22/2019    COLONOSCOPY- GI UNIT SCHEDULED performed by Pastora Mcgee MD at [facility] COLONOSCOPY N/A 4/9/2021    COLONOSCOPY POLYPECTOMY SNARE/COLD BIOPSY performed by Pastora Mcgee MD at [address] Right 3/27/15    rt wrist    ENDOSCOPY, COLON, DIAGNOSTIC      UGI    GASTRIC BAND REMOVAL  01/17/2017    subcutaneous port    HC CATH POWER PICC SINGLE  1/24/2014         LAP BAND  2007    CO COLSC FLX W/RMVL OF TUMOR POLYP LESION SNARE TQ N/A 11/15/2018    COLONOSCOPY POLYPECTOMY SNARE/COLD BIOPSY performed by Tricia Silva MD at [address]  05/05/2021    EUA, Excision of Hidradenitis Suppurativa     SHOULDER SURGERY Right 2014    SLEEVE GASTRECTOMY N/A 9/25/2017    GASTRECTOMY SLEEVE LAPAROSCOPIC SI ROBOTIC, ENDOSEALER performed by Denise Alanis MD at [address] [facility] N/A 1/22/2019    ABDOMINAL EXPLORATION, LEFT HEMICOLECTOMY, MOBILIZATION OF SPLENIC FLEXURE performed by Pastora Mcgee MD at [address] [facility] Right     leg tumor removal/ dr. Daniela Sanders    Family History   Problem Relation Age of Onset    Cancer Father         leukemia    Heart Failure Mother     Arthritis Mother     High Blood Pressure Mother     Heart Disease Maternal Grandmother     Stroke Paternal Grandfather SOCIAL HISTORY    Social History     Tobacco Use    Smoking status: Never Smoker    Smokeless tobacco: Never Used   Vaping Use    Vaping Use: Never used   Substance Use Topics    Alcohol use: Not Currently    Drug use: No       ALLERGIES    No Known Allergies    MEDICATIONS    Current Outpatient Medications on File Prior to Encounter   Medication Sig Dispense Refill    benzoyl peroxide 5 % external liquid Use as wash every other day 227 g 3    chlorhexidine (HIBICLENS) 4 % external liquid Use as wash every other day 236 mL 2    clindamycin (CLEOCIN T) 1 % lotion Apply to affected areas daily 60 mL 3    doxycycline hyclate (VIBRAMYCIN) 100 MG capsule Take 1 pill twice daily with food 60 capsule 1    warfarin (COUMADIN) 4 MG tablet Take one tablet (or as directed by Medication Management) by mouth once daily. 90 DS *new tab strength* 90 tablet 1    ondansetron (ZOFRAN) 4 MG tablet Take 1 tablet by mouth every 8 hours as needed for Nausea or Vomiting 20 tablet 0    pravastatin (PRAVACHOL) 40 MG tablet Take 1 tablet by mouth nightly 90 tablet 1    midodrine (PROAMATINE) 10 MG tablet TAKE ONE TABLET BY MOUTH THREE TIMES DAILY (Patient taking differently: Take 10 mg by mouth three times a week ON DIALYSIS DAYS) 270 tablet 1    B Complex-C-Folic Acid (NEPHRO-JENNIFER) 0.8 MG TABS TAKE ONE TABLET BY MOUTH DAILY WITH BREAKFAST 90 tablet 1    vitamin D (ERGOCALCIFEROL) 1.25 MG (60350 UT) CAPS capsule TAKE ONE CAPSULE BY MOUTH ONCE A MONTH 3 capsule 3    cinacalcet (SENSIPAR) 30 MG tablet Take 30 mg by mouth three times a week AT DIALYSIS      ASPIRIN LOW DOSE 81 MG EC tablet TAKE ONE TABLET BY MOUTH DAILY 90 tablet 1    Elastic Bandages & Supports (MEDICAL COMPRESSION STOCKINGS) MISC 1 each by Does not apply route daily as needed (as needed) Right leg below the knee 20-30 HH MM DX 82.409 1 each 0     No current facility-administered medications on file prior to encounter.        REVIEW OF SYSTEMS    Constitutional: negative  Eyes: negative  Ears, nose, mouth, throat, and face: negative  Respiratory: negative  Cardiovascular: negative  Gastrointestinal: negative  Genitourinary:negative  Integument/breast: negative except for right buttock wound  Hematologic/lymphatic: negative  Musculoskeletal:negative  Neurological: negative  Behavioral/Psych: negative  Endocrine: negative  Allergic/Immunologic: negative    Objective:      BP (!) 141/89   Pulse 82   Temp 97.3 °F (36.3 °C) (Tympanic)   Resp 16   Ht 5' 10\" (1.778 m)   Wt 260 lb (117.9 kg)   BMI 37.31 kg/m²     Wt Readings from Last 3 Encounters:   06/07/21 260 lb (117.9 kg)   05/27/21 260 lb (117.9 kg)   05/14/21 264 lb (119.7 kg)       PHYSICAL EXAM    General Appearance: alert and oriented to person, place and time, well-developed and obese, in no acute distress  Skin: warm and dry, no rash or erythema, right buttock wound   Head: normocephalic and atraumatic  Eyes: pupils equal, round, extraocular eye movements intact, and conjunctivae normal  Pulmonary/Chest: normal air movement, no respiratory distress  Extremities: no cyanosis and no clubbing or edema   Musculoskeletal: no joint swelling, deformity or tenderness  Neurologic: gait, coordination normal and speech normal      Assessment:     Problem List Items Addressed This Visit     Abscess of right buttock - Primary    Relevant Orders    Initiate Outpatient Wound Care Protocol    BMI 38.0-38.9,adult    Relevant Orders    Initiate Outpatient Wound Care Protocol    Buttock wound, right, initial encounter    Relevant Orders    Initiate Outpatient Wound Care Protocol    ESRD on hemodialysis [facility])    Relevant Orders    Initiate Outpatient Wound Care Protocol           Procedure Note  Indications:  Based on my examination of this patient's wound(s)/ulcer(s) today, debridement is required to promote healing and evaluate the wound base.     Performed by: KVNG Tobar - CNP    Consent obtained:  Yes    Time out taken:  Yes    Pain Control: Anesthetic  Anesthetic: 2% Lidocaine Gel Topical       Debridement:Excisional Debridement    Using curette the wound(s)/ulcer(s) was/were sharply debrided down through and including the removal of subcutaneous tissue. Devitalized Tissue Debrided:  fibrin, biofilm and slough    Pre Debridement Measurements:  Are located in the [facility]  Documentation Flow Sheet    Wound/Ulcer #: 1    Post Debridement Measurements:  Wound/Ulcer Descriptions are Pre Debridement except measurements:    Wound 03/17/21 Buttocks Right #1 CLUSTER (Active)   Wound Image   04/26/21 0825   Wound Etiology Non-Healing Surgical 06/07/21 0840   Dressing Status New drainage noted; Old drainage noted 06/07/21 0840   Wound Cleansed Soap and water 06/07/21 0840   Dressing/Treatment Other (comment) 05/24/21 0932   Wound Length (cm) 6 cm 06/07/21 0840   Wound Width (cm) 1.6 cm 06/07/21 0840   Wound Depth (cm) 0.1 cm 06/07/21 0840   Wound Surface Area (cm^2) 9.6 cm^2 06/07/21 0840   Change in Wound Size % (l*w) 8.57 06/07/21 0840   Wound Volume (cm^3) 0.96 cm^3 06/07/21 0840   Wound Healing % 77 06/07/21 0840   Post-Procedure Length (cm) 6 cm 06/07/21 0840   Post-Procedure Width (cm) 1.6 cm 06/07/21 0840   Post-Procedure Depth (cm) 0.1 cm 06/07/21 0840   Post-Procedure Surface Area (cm^2) 9.6 cm^2 06/07/21 0840   Post-Procedure Volume (cm^3) 0.96 cm^3 06/07/21 0840   Wound Assessment Granulation tissue;Pink/red 06/07/21 0840   Drainage Amount Moderate 06/07/21 0840   Drainage Description Serosanguinous 06/07/21 0840   Odor None 06/07/21 0840   Carlota-wound Assessment Intact 06/07/21 0840   Margins Defined edges 06/07/21 0840   Wound Thickness Description not for Pressure Injury Full thickness 05/24/21 0847   Number of days: 81          Percent of Wound(s)/Ulcer(s) Debrided: 100%    Total Surface Area Debrided:  9.60 sq cm     Diabetic/Pressure/Non Pressure Ulcers only:  Ulcer: Non-Pressure

## 2021-06-18 ENCOUNTER — HOSPITAL ENCOUNTER (OUTPATIENT)
Dept: PHARMACY | Age: 51
Setting detail: THERAPIES SERIES
Discharge: HOME | End: 2021-06-18
Payer: COMMERCIAL

## 2021-06-18 LAB
INR BLD: 1.5
PROTIME: 17.5 SECONDS

## 2021-06-18 PROCEDURE — 99212 OFFICE O/P EST SF 10 MIN: CPT

## 2021-06-18 PROCEDURE — 85610 PROTHROMBIN TIME: CPT

## 2021-06-18 NOTE — PROGRESS NOTES
Medication Management Service, Warfarin Management  RAUL GARCIA [facility], [phone]  Visit Date: 6/18/2021   Subjective:   Ghada Arciniega is a 44 y.o. male who presents to clinic today for anticoagulation monitoring and adjustment. Patient seen in clinic for warfarin management due to  Indication:   atrial fibrillation. INR goal: of 2.0-3.0. Duration of therapy: indefinite. Assessment and PLAN   PT/INR done in office per protocol. INR today is 1.5, subtherapeutic. Pt reports eating coleslaw and a salad in the past 2 days. Plan: Will do a one time 6.2% boost today and have pt take warfarin 6mg, then continue current regimen of warfarin 6mg on Sat, Weds and 4mg AOD. Using warfarin 4 mg tablets. Recheck INR in 2 week(s). Patient seen in room # 3. ED. OP. = Consistency of green vegetables. Pt stated this was a one time occurrence and does not want to incorporate more vegetables into his diet at this time. Patient attested to self screening for COVID - 19. Patient verbalized understanding of dosing directions and information discussed. Dosing schedule given to patient. Progress note sent to referring office. Patient acknowledges working in consult agreement with pharmacist as referred by his/her physician.       Electronically signed by VIELKA Deluca [facility] on 6/18/21 at 8:21 AM [address] Intervention Detail: Dose Adjustment: 1, reason: Therapy Optimization   Total # of Interventions Recommended: 1   Total # of Interventions Accepted: 1   Time Spent (min): 20

## 2021-06-21 ENCOUNTER — HOSPITAL ENCOUNTER (OUTPATIENT)
Dept: WOUND CARE | Age: 51
Discharge: HOME | End: 2021-06-21

## 2021-06-21 ENCOUNTER — TELEPHONE (OUTPATIENT)
Dept: WOUND CARE | Age: 51
End: 2021-06-21

## 2021-06-28 ENCOUNTER — HOSPITAL ENCOUNTER (OUTPATIENT)
Dept: WOUND CARE | Age: 51
Discharge: HOME | End: 2021-06-28
Payer: COMMERCIAL

## 2021-06-28 VITALS
HEART RATE: 90 BPM | RESPIRATION RATE: 18 BRPM | HEIGHT: 70 IN | SYSTOLIC BLOOD PRESSURE: 138 MMHG | WEIGHT: 260 LBS | DIASTOLIC BLOOD PRESSURE: 84 MMHG | TEMPERATURE: 97.4 F | BODY MASS INDEX: 37.22 KG/M2

## 2021-06-28 DIAGNOSIS — N18.6 ESRD ON HEMODIALYSIS (HCC): ICD-10-CM

## 2021-06-28 DIAGNOSIS — S31.819A BUTTOCK WOUND, RIGHT, INITIAL ENCOUNTER: ICD-10-CM

## 2021-06-28 DIAGNOSIS — T81.89XD NONHEALING SURGICAL WOUND, SUBSEQUENT ENCOUNTER: Primary | ICD-10-CM

## 2021-06-28 DIAGNOSIS — Z99.2 ESRD ON HEMODIALYSIS (HCC): ICD-10-CM

## 2021-06-28 PROCEDURE — 99213 OFFICE O/P EST LOW 20 MIN: CPT | Performed by: PLASTIC SURGERY

## 2021-06-28 PROCEDURE — 99212 OFFICE O/P EST SF 10 MIN: CPT

## 2021-06-28 RX ORDER — LIDOCAINE HYDROCHLORIDE 20 MG/ML
JELLY TOPICAL ONCE
Status: CANCELLED | OUTPATIENT
Start: 2021-06-28 | End: 2021-06-28

## 2021-06-28 RX ORDER — LIDOCAINE 40 MG/G
CREAM TOPICAL ONCE
Status: CANCELLED | OUTPATIENT
Start: 2021-06-28 | End: 2021-06-28

## 2021-06-28 RX ORDER — LIDOCAINE HYDROCHLORIDE 20 MG/ML
JELLY TOPICAL ONCE
Status: COMPLETED | OUTPATIENT
Start: 2021-06-28 | End: 2021-06-28

## 2021-06-28 RX ORDER — LIDOCAINE HYDROCHLORIDE 40 MG/ML
SOLUTION TOPICAL ONCE
Status: CANCELLED | OUTPATIENT
Start: 2021-06-28 | End: 2021-06-28

## 2021-06-28 RX ORDER — LIDOCAINE 50 MG/G
OINTMENT TOPICAL ONCE
Status: CANCELLED | OUTPATIENT
Start: 2021-06-28 | End: 2021-06-28

## 2021-06-28 RX ADMIN — LIDOCAINE HYDROCHLORIDE 6 ML: 20 JELLY TOPICAL at 09:59

## 2021-06-28 NOTE — PROGRESS NOTES
vitamin D (ERGOCALCIFEROL) 1.25 MG (98332 UT) CAPS capsule TAKE ONE CAPSULE BY MOUTH ONCE A MONTH 3 capsule 3    cinacalcet (SENSIPAR) 30 MG tablet Take 30 mg by mouth three times a week AT DIALYSIS      ASPIRIN LOW DOSE 81 MG EC tablet TAKE ONE TABLET BY MOUTH DAILY 90 tablet 1    Elastic Bandages & Supports (MEDICAL COMPRESSION STOCKINGS) MISC 1 each by Does not apply route daily as needed (as needed) Right leg below the knee 20-30 HH MM DX 82.409 1 each 0     No current facility-administered medications for this encounter. Allergies:   No Known Allergies  Review of Systems:   Constitutional: Negative for fever, chills, fatigue and unexpected weight change. HENT: Negative for hearing loss, sore throat and facial swelling. Eyes: Negative for pain and discharge. Respiratory: Patient has a history of cor pulmonale. .    Cardiovascular: Negative for chest pain. Gastrointestinal: Negative for nausea, vomiting, diarrhea and constipation. Skin: Negative for pallor and rash. Neurological: Negative for seizures, syncope and numbness. Hematological: Patient has a history of anemia. Psychiatric/Behavioral: Negative for behavioral problems. The patient is not nervous/anxious. Musculoskeletal: Patient has a history of arthritis. : Patient has chronic renal failure. Musculoskeletal: Patient has history of gout.   Past Medical History:   Diagnosis Date    Abscess of right buttock 3/17/2021    Anemia     Anticoagulated on Coumadin     Aortic insufficiency     Arthritis     right shoulder/ PCP Dr. Fletcher Marks    Atrial fibrillation [facility])     [facility] LAST VISIT 3/30/21    BPH (benign prostatic hypertrophy)     CAD (coronary artery disease)     Dr. Beverly Calle LAST VISIT - 3/30/21    Caffeine use     1 coffee, 2 tea/day    Cellulitis of lower leg     right    Chronic back pain     Colon cancer ([facility]     colon    Cor pulmonale, acute ([facility] 12/30/2014    COVID-19 08/2020    HOSPITALIZED X2 WEEKS. NOT VENTED    CRF (chronic renal failure)     dr. Enid Nath on [place]. Tues/ thurs/ sat/ right arm fistula    Erectile dysfunction     Gout     Hemodialysis patient ([address]     tues/ thurs/ sat/ DR. Petersen/ fistula right lower arm    History of blood transfusion     no reaction    Hyperkalemia 12/24/2013    Hyperlipidemia     Hypertension     Hypotension     Hypothyroidism     Kidney transplant candidate     [facility]    Lymphedema of leg     right    Obesity     Thyroid disease     Well adult health check     PCP - DR. MARTIN - LAST VISIT -  1/8/2021     Past Surgical History:   Procedure Laterality Date    ABDOMINAL EXPLORATION SURGERY  01/22/2019    ABDOMINAL EXPLORATION, LEFT HEMICOLECTOMY, MOBILIZATION OF SPLENIC FLEXURE     ABSCESS DRAINAGE Right 01/20/2014    I&D Right Shoulder, Right shoulder arthroscopy, I&D AC Joint    ANUS SURGERY N/A 5/5/2021    EUA, EXCISION OF HIDRADENITIS SUPPURATIVA performed by Izabel Renee MD at [address] [facility] COLONOSCOPY  01/22/2019    COLONOSCOPY N/A 1/22/2019    COLONOSCOPY- GI UNIT SCHEDULED performed by Izabel Renee MD at [address] [place] [address] COLONOSCOPY N/A 4/9/2021    COLONOSCOPY POLYPECTOMY SNARE/COLD BIOPSY performed by Izabel Renee MD at [address] Right 3/27/15    rt wrist    ENDOSCOPY, COLON, DIAGNOSTIC      UGI    GASTRIC BAND REMOVAL  01/17/2017    subcutaneous port    HC CATH POWER PICC SINGLE  1/24/2014         LAP BAND  2007    [place] COLSC FLX W/RMVL OF TUMOR POLYP LESION SNARE TQ N/A 11/15/2018    COLONOSCOPY POLYPECTOMY SNARE/COLD BIOPSY performed by Eliazar Guzman MD at [address] [facility]  05/05/2021    EUA, Excision of Hidradenitis Suppurativa     SHOULDER SURGERY Right 2014    SLEEVE GASTRECTOMY N/A 9/25/2017    GASTRECTOMY SLEEVE LAPAROSCOPIC SI ROBOTIC, ENDOSEALER performed by Ludivina Lance MD at [address] [place] [address] SMALL INTESTINE SURGERY N/A 1/22/2019    ABDOMINAL EXPLORATION, LEFT HEMICOLECTOMY, MOBILIZATION OF SPLENIC FLEXURE performed by Anay Troncoso MD at [facility] Right     leg tumor removal/ dr. Todd Bahena History     Socioeconomic History    Marital status: Single     Spouse name: Not on file    Number of children: Not on file    Years of education: Not on file    Highest education level: Not on file   Occupational History    Not on file   Tobacco Use    Smoking status: Never Smoker    Smokeless tobacco: Never Used   Vaping Use    Vaping Use: Never used   Substance and Sexual Activity    Alcohol use: Not Currently    Drug use: No    Sexual activity: Yes     Partners: Female   Other Topics Concern    Not on file   Social History Narrative    Not on file     Social Determinants of Health     Financial Resource Strain:     Difficulty of Paying Living Expenses:    Food Insecurity:     Worried About Running Out of Food in the Last Year:     [address] in the Last Year:    Transportation Needs:     Lack of Transportation (Medical):      Lack of Transportation (Non-Medical):    Physical Activity:     Days of Exercise per Week:     Minutes of Exercise per Session:    Stress:     Feeling of Stress :    Social Connections:     Frequency of Communication with Friends and Family:     Frequency of Social Gatherings with Friends and Family:     Attends [employer] Services:     Active Member of Clubs or Organizations:     Attends Club or Organization Meetings:     Marital Status:    Intimate Partner Violence:     Fear of Current or Ex-Partner:     Emotionally Abused:     Physically Abused:     Sexually Abused:      Family History   Problem Relation Age of Onset    Cancer Father         leukemia    Heart Failure Mother     Arthritis Mother     High Blood Pressure Mother     Heart Disease Maternal Grandmother     Stroke Paternal Grandfather      Physical Exam:   /84 Pulse 90   Temp 97.4 °F (36.3 °C) (Tympanic)   Resp 18   Ht 5' 10\" (1.778 m)   Wt 260 lb (117.9 kg)   BMI 37.31 kg/m²    Body mass index is 37.31 kg/m². Physical Exam   Nursing note and vitals reviewed. Constitutional: Oriented to person, place, and time. Appears well-developed and well-nourished. No distress. Head: Normocephalic and atraumatic. Eyes: Conjunctivae and EOM are normal.   Pulmonary/Chest: Effort normal. No respiratory distress. Neurological: Alert and oriented to person, place, and time. Sueellen Payment Psychiatric: Normal mood and affect. Behavior is normal      Post Debridement Measurements:  Wound/Ulcer Descriptions are Pre Debridement except measurements:    Wound 03/17/21 Buttocks Right #1 CLUSTER (Active)   Wound Image   04/26/21 0825   Wound Etiology Non-Healing Surgical 06/07/21 0840   Dressing Status New drainage noted; Old drainage noted 06/07/21 0840   Wound Cleansed Soap and water 06/07/21 0840   Dressing/Treatment Other (comment) 05/24/21 0932   Wound Length (cm) 0 cm 06/28/21 0957   Wound Width (cm) 0 cm 06/28/21 0957   Wound Depth (cm) 0 cm 06/28/21 0957   Wound Surface Area (cm^2) 0 cm^2 06/28/21 0957   Change in Wound Size % (l*w) 100 06/28/21 0957   Wound Volume (cm^3) 0 cm^3 06/28/21 0957   Wound Healing % 100 06/28/21 0957   Post-Procedure Length (cm) 0 cm 06/28/21 0957   Post-Procedure Width (cm) 0 cm 06/28/21 0957   Post-Procedure Depth (cm) 0 cm 06/28/21 0957   Post-Procedure Surface Area (cm^2) 0 cm^2 06/28/21 0957   Post-Procedure Volume (cm^3) 0 cm^3 06/28/21 0957   Wound Assessment Granulation tissue;Pink/red 06/07/21 0840   Drainage Amount Moderate 06/07/21 0840   Drainage Description Serosanguinous 06/07/21 0840   Odor None 06/07/21 0840   Carlota-wound Assessment Intact 06/07/21 0840   Margins Defined edges 06/07/21 0840   Wound Thickness Description not for Pressure Injury Full thickness 05/24/21 0847   Number of days: 102            Imaging:           Impression/Plan: Problem List Items Addressed This Visit     ESRD on hemodialysis [facility])    Relevant Orders    Initiate Outpatient Wound Care Protocol    Buttock wound, right, initial encounter    Relevant Orders    Initiate Outpatient Wound Care Protocol    BMI 38.0-38.9,adult    Relevant Orders    Initiate Outpatient Wound Care Protocol    Nonhealing surgical wound, subsequent encounter - Primary    Relevant Orders    Initiate Outpatient Wound Care Protocol          Patient Active Problem List   Diagnosis    Benign prostatic hyperplasia with lower urinary tract symptoms    ESRD on hemodialysis ([address]    Erectile dysfunction due to arterial insufficiency    Chronic atrial fibrillation    Warfarin-induced coagulopathy ([address]    DVT (deep venous thrombosis) (HCC)    S/P laparoscopic sleeve gastrectomy    Vitamin D deficiency    Lymphedema    Right heart failure (HCC)    Peripheral vascular disease ([address]    Malignant neoplasm of transverse colon ([address]    Status post partial colectomy    Rotator cuff arthropathy of right shoulder    History of COVID-19    Buttock wound, right, initial encounter    Hypertensive chronic kidney disease with stage 1 through stage 4 chronic kidney disease, or unspecified chronic kidney disease    BMI 38.0-38.9,adult    Prediabetes    Nonhealing surgical wound, subsequent encounter     Plan:  The area appears to be hidradenitis. Patient is status post debridement. .Patient states that he feels better. Today. Patient is doing very well.   We will place Triad cream.  Patient is to follow-up in 2 weeks       Electronically signed by:  Aurelia Patino MD 6/28/2021

## 2021-07-02 ENCOUNTER — HOSPITAL ENCOUNTER (OUTPATIENT)
Dept: PHARMACY | Age: 51
Setting detail: THERAPIES SERIES
Discharge: HOME | End: 2021-07-02
Payer: COMMERCIAL

## 2021-07-02 DIAGNOSIS — I82.409 DEEP VEIN THROMBOSIS (DVT) OF LOWER EXTREMITY, UNSPECIFIED CHRONICITY, UNSPECIFIED LATERALITY, UNSPECIFIED VEIN (HCC): Primary | ICD-10-CM

## 2021-07-02 LAB
INR BLD: 1.3
PROTIME: 16 SECONDS

## 2021-07-02 PROCEDURE — 99212 OFFICE O/P EST SF 10 MIN: CPT

## 2021-07-02 PROCEDURE — 85610 PROTHROMBIN TIME: CPT

## 2021-07-02 NOTE — PROGRESS NOTES
Medication Management Service, Warfarin Management  RAUL GARCIA [facility], [phone]  Visit Date: 7/2/2021   Subjective:   Melinda Luis is a 46 y.o. male who presents to clinic today for anticoagulation monitoring and adjustment. Patient seen in clinic for warfarin management due to  Indication:   atrial fibrillation. INR goal: of 2.0-3.0. Duration of therapy: indefinite. Assessment and PLAN   PT/INR done in office per protocol. INR today is 1.3, subtherapeutic. Patient denies all usual causes for low INR today. INRs since April have all been below goal or at 2.0. Plan: Will boost from warfarin 4 mg to 6 mg today only. Then increase current regimen by 6.2 % weekly starting next week. (Total increase 12.5%) New regimen will be warfarin 6 mg every Mon, Wed , Fri and 4 mg all other days of the week. Using warfarin 4 mg tablets. Recheck INR in 1 week(s). Patient seen in room # 1. Patient verbally attests to completing the COVID 19 self screen. Patient verbalized understanding of dosing directions and information discussed. Dosing schedule given to patient. Progress note sent to referring office. Patient acknowledges working in consult agreement with pharmacist as referred by his/her physician. [address] [place] [address]  Ph., CACP, Clinical Pharmacist  Anticoagulation Services, [address] [place] [address] Coumadin Clinic  7/2/2021  8:18 AM    For Pharmacy Admin Tracking Only     Intervention Detail: Dose Adjustment: 2, reason: Therapy Optimization   Total # of Interventions Recommended: 2   Total # of Interventions Accepted: 2   Time Spent (min): 30

## 2021-07-09 ENCOUNTER — HOSPITAL ENCOUNTER (OUTPATIENT)
Dept: PHARMACY | Age: 51
Setting detail: THERAPIES SERIES
Discharge: HOME | End: 2021-07-09
Payer: COMMERCIAL

## 2021-07-09 DIAGNOSIS — I82.409 DEEP VEIN THROMBOSIS (DVT) OF LOWER EXTREMITY, UNSPECIFIED CHRONICITY, UNSPECIFIED LATERALITY, UNSPECIFIED VEIN (HCC): Primary | ICD-10-CM

## 2021-07-09 LAB
INR BLD: 1.2
PROTIME: 14.5 SECONDS

## 2021-07-09 PROCEDURE — 99212 OFFICE O/P EST SF 10 MIN: CPT

## 2021-07-09 PROCEDURE — 85610 PROTHROMBIN TIME: CPT

## 2021-07-09 NOTE — PROGRESS NOTES
Medication Management Service, Warfarin Management  RAUL GARCIA [facility], [phone]  Visit Date: 7/9/2021   Subjective:   Salvatore Obando is a 46 y.o. male who presents to clinic today for anticoagulation monitoring and adjustment. Patient seen in clinic for warfarin management due to  Indication:   atrial fibrillation. INR goal: of 2.0-3.0. Duration of therapy: indefinite. Assessment and PLAN   PT/INR done in office per protocol. INR today is 1.2, subtherapeutic despite a boost dose given and a regimen  Increase this past week. Patient again denies all usual causes for low INR today. Patient got his warfarin refilled in May. I asked that he call me with the markings on his tablet when he gets home to verify correct medication was filled. Patient was agreeable to this. Plan: Will boost  from warfarin 6 mg to 8 mg today only. Then increase current regimen by  11.8 % weekly starting next week. (Total increase 17.6%) New regimen will be warfarin 4 mg every Tues, Thurs and 6 mg all other days of the week. Using warfarin 4 mg tablets. Recheck INR in 1 week(s). Patient seen in room # 1. UPDATE:  Patient called and tablet has AN on one side and 765/4 on the other,  is blue in color and matches warfarin 4 mg tablets in [facility]. Patient verbally attests to completing the COVID 19 self screen. Patient verbalized understanding of dosing directions and information discussed. Dosing schedule given to patient. Progress note sent to referring office. Patient acknowledges working in consult agreement with pharmacist as referred by his/her physician. [address] [place] [address]  Ph., CACP, Clinical Pharmacist  Anticoagulation Services, [address] [place] [address] Coumadin Clinic  7/9/2021  8:37 AM    For Pharmacy Admin Tracking Only     Intervention Detail: Dose Adjustment: 2, reason: Therapy Optimization   Total # of Interventions Recommended: 2   Total # of Interventions Accepted: 2   Time Spent (min): 20

## 2021-07-12 ENCOUNTER — HOSPITAL ENCOUNTER (OUTPATIENT)
Dept: WOUND CARE | Age: 51
Discharge: HOME | End: 2021-07-12
Payer: COMMERCIAL

## 2021-07-12 VITALS
SYSTOLIC BLOOD PRESSURE: 141 MMHG | DIASTOLIC BLOOD PRESSURE: 92 MMHG | TEMPERATURE: 97.2 F | BODY MASS INDEX: 37.31 KG/M2 | WEIGHT: 260 LBS | RESPIRATION RATE: 18 BRPM | HEART RATE: 76 BPM

## 2021-07-12 DIAGNOSIS — T81.89XD NONHEALING SURGICAL WOUND, SUBSEQUENT ENCOUNTER: Primary | ICD-10-CM

## 2021-07-12 DIAGNOSIS — N18.6 ESRD ON HEMODIALYSIS (HCC): ICD-10-CM

## 2021-07-12 DIAGNOSIS — S31.819A BUTTOCK WOUND, RIGHT, INITIAL ENCOUNTER: ICD-10-CM

## 2021-07-12 DIAGNOSIS — Z99.2 ESRD ON HEMODIALYSIS (HCC): ICD-10-CM

## 2021-07-12 PROCEDURE — 99213 OFFICE O/P EST LOW 20 MIN: CPT | Performed by: PLASTIC SURGERY

## 2021-07-12 PROCEDURE — 99212 OFFICE O/P EST SF 10 MIN: CPT

## 2021-07-12 RX ORDER — LIDOCAINE 40 MG/G
CREAM TOPICAL ONCE
Status: CANCELLED | OUTPATIENT
Start: 2021-07-12 | End: 2021-07-12

## 2021-07-12 RX ORDER — LIDOCAINE HYDROCHLORIDE 20 MG/ML
JELLY TOPICAL ONCE
Status: CANCELLED | OUTPATIENT
Start: 2021-07-12 | End: 2021-07-12

## 2021-07-12 RX ORDER — LIDOCAINE HYDROCHLORIDE 20 MG/ML
JELLY TOPICAL ONCE
Status: DISCONTINUED | OUTPATIENT
Start: 2021-07-12 | End: 2021-07-12

## 2021-07-12 RX ORDER — LIDOCAINE HYDROCHLORIDE 40 MG/ML
SOLUTION TOPICAL ONCE
Status: CANCELLED | OUTPATIENT
Start: 2021-07-12 | End: 2021-07-12

## 2021-07-12 RX ORDER — LIDOCAINE 50 MG/G
OINTMENT TOPICAL ONCE
Status: CANCELLED | OUTPATIENT
Start: 2021-07-12 | End: 2021-07-12

## 2021-07-12 ASSESSMENT — PAIN SCALES - GENERAL: PAINLEVEL_OUTOF10: 0

## 2021-07-12 NOTE — PROGRESS NOTES
[address]   Progress Note and Procedure Note      Hedy Avila  MEDICAL RECORD NUMBER:  [id number]  AGE: 46 y.o. GENDER: male  : 1970  EPISODE DATE:  2021    Subjective:     Chief Complaint   Patient presents with    Wound Check     R buttock         HISTORY of PRESENT ILLNESS HPI     Franca Ulloa is a 46 y.o. male who presents today for wound/ulcer evaluation. History of Wound Context: Hidradenitis with abscess  Wound/Ulcer Pain Timing/Severity: none  Quality of pain: N/A  Severity:  0 / 10   Modifying Factors: None  Associated Signs/Symptoms: none    Ulcer Identification:  Ulcer Type: Hidradenitis  Contributing Factors: obesity    Wound: Hidradenitis status post debridement        PAST MEDICAL HISTORY        Diagnosis Date    Abscess of right buttock 3/17/2021    Anemia     Anticoagulated on Coumadin     Aortic insufficiency     Arthritis     right shoulder/ PCP Dr. Riccardo Plascencia    Atrial fibrillation ([facility]     [facility] LAST VISIT 3/30/21    BPH (benign prostatic hypertrophy)     CAD (coronary artery disease)     Dr. Pearl Cottrell LAST VISIT - 3/30/21    Caffeine use     1 coffee, 2 tea/day    Cellulitis of lower leg     right    Chronic back pain     Colon cancer ([facility]     colon    Cor pulmonale, acute ([facility] 2014    COVID-19 2020    HOSPITALIZED X2 WEEKS. NOT VENTED    CRF (chronic renal failure)     dr. Rosio Akins on [place]. Tues/ thurs/ sat/ right arm fistula    Erectile dysfunction     Gout     Hemodialysis patient ([facility]     tues/ thurs/ sat/ DR. Petersen/ fistula right lower arm    History of blood transfusion     no reaction    Hyperkalemia 2013    Hyperlipidemia     Hypertension     Hypotension     Hypothyroidism     Kidney transplant candidate     following with [facility]    Lymphedema of leg     right    Obesity     Thyroid disease     Well adult health check     PCP - DR. MARTIN - JUAN VISIT -  1/8/2021       PAST SURGICAL HISTORY    Past Surgical History:   Procedure Laterality Date    ABDOMINAL EXPLORATION SURGERY  01/22/2019    ABDOMINAL EXPLORATION, LEFT HEMICOLECTOMY, MOBILIZATION OF SPLENIC FLEXURE     ABSCESS DRAINAGE Right 01/20/2014    I&D Right Shoulder, Right shoulder arthroscopy, I&D AC Joint    ANUS SURGERY N/A 5/5/2021    EUA, EXCISION OF HIDRADENITIS SUPPURATIVA performed by Minerva Bailey MD at [address] COLONOSCOPY  01/22/2019    COLONOSCOPY N/A 1/22/2019    COLONOSCOPY- GI UNIT SCHEDULED performed by Minerva Bailey MD at [address] COLONOSCOPY N/A 4/9/2021    COLONOSCOPY POLYPECTOMY SNARE/COLD BIOPSY performed by Minerva Bailey MD at [address] Right 3/27/15    rt wrist    ENDOSCOPY, COLON, DIAGNOSTIC      UGI    GASTRIC BAND REMOVAL  01/17/2017    subcutaneous port    HC CATH POWER PICC SINGLE  1/24/2014         LAP BAND  2007    MA COLSC FLX W/RMVL OF TUMOR POLYP LESION SNARE TQ N/A 11/15/2018    COLONOSCOPY POLYPECTOMY SNARE/COLD BIOPSY performed by Micky Traore MD at [address] [facility]  05/05/2021    EUA, Excision of Hidradenitis Suppurativa     SHOULDER SURGERY Right 2014    SLEEVE GASTRECTOMY N/A 9/25/2017    GASTRECTOMY SLEEVE LAPAROSCOPIC SI ROBOTIC, ENDOSEALER performed by Christopher Hensley MD at [facility] N/A 1/22/2019    ABDOMINAL EXPLORATION, LEFT HEMICOLECTOMY, MOBILIZATION OF SPLENIC FLEXURE performed by Minerva Bailey MD at [address] [facility] Right     leg tumor removal/ dr. Jesus Falk    Family History   Problem Relation Age of Onset    Cancer Father         leukemia    Heart Failure Mother     Arthritis Mother     High Blood Pressure Mother     Heart Disease Maternal Grandmother     Stroke Paternal Grandfather        SOCIAL HISTORY    Social History     Tobacco Use    Smoking status: Never Smoker    Smokeless tobacco: Never Used   Vaping Use    Vaping Use: Never used   Substance Use Topics    Alcohol use: Not Currently    Drug use: No       ALLERGIES    No Known Allergies    MEDICATIONS    Current Outpatient Medications on File Prior to Encounter   Medication Sig Dispense Refill    benzoyl peroxide 5 % external liquid Use as wash every other day 227 g 3    clindamycin (CLEOCIN T) 1 % lotion Apply to affected areas daily 60 mL 3    doxycycline hyclate (VIBRAMYCIN) 100 MG capsule Take 1 pill twice daily with food 60 capsule 1    warfarin (COUMADIN) 4 MG tablet Take one tablet (or as directed by Medication Management) by mouth once daily. 90 DS *new tab strength* 90 tablet 1    ondansetron (ZOFRAN) 4 MG tablet Take 1 tablet by mouth every 8 hours as needed for Nausea or Vomiting 20 tablet 0    pravastatin (PRAVACHOL) 40 MG tablet Take 1 tablet by mouth nightly 90 tablet 1    midodrine (PROAMATINE) 10 MG tablet TAKE ONE TABLET BY MOUTH THREE TIMES DAILY (Patient taking differently: Take 10 mg by mouth three times a week ON DIALYSIS DAYS) 270 tablet 1    B Complex-C-Folic Acid (NEPHRO-JENNIFER) 0.8 MG TABS TAKE ONE TABLET BY MOUTH DAILY WITH BREAKFAST 90 tablet 1    vitamin D (ERGOCALCIFEROL) 1.25 MG (30671 UT) CAPS capsule TAKE ONE CAPSULE BY MOUTH ONCE A MONTH 3 capsule 3    cinacalcet (SENSIPAR) 30 MG tablet Take 30 mg by mouth three times a week AT DIALYSIS      ASPIRIN LOW DOSE 81 MG EC tablet TAKE ONE TABLET BY MOUTH DAILY 90 tablet 1    Elastic Bandages & Supports (MEDICAL COMPRESSION STOCKINGS) MISC 1 each by Does not apply route daily as needed (as needed) Right leg below the knee 20-30 HH MM DX 82.409 1 each 0     No current facility-administered medications on file prior to encounter. REVIEW OF SYSTEMS    Pertinent items are noted in HPI.     Objective:      BP (!) 141/92   Pulse 76   Temp 97.2 °F (36.2 °C) (Tympanic)   Resp 18   Wt 260 lb (117.9 kg)   BMI 37.31 kg/m²     Wt Readings from Last 3 Encounters:   07/12/21 260 lb (117.9 kg)   06/28/21 260 lb (117.9 kg)   06/07/21 260 lb (117.9 kg)       PHYSICAL EXAM    Skin: warm and dry, no rash or erythema wound is healed  Head: normocephalic and atraumatic  Eyes: extraocular eye movements intact and conjunctivae normal  Extremities: Moves all extremities      Assessment:      Problem List Items Addressed This Visit     ESRD on hemodialysis ([address]    Relevant Medications    lidocaine (XYLOCAINE) 2 % uro-jet (Start on 7/12/2021 10:45 AM)    Other Relevant Orders    Initiate Outpatient Wound Care Protocol    Buttock wound, right, initial encounter    Relevant Medications    lidocaine (XYLOCAINE) 2 % uro-jet (Start on 7/12/2021 10:45 AM)    Other Relevant Orders    Initiate Outpatient Wound Care Protocol    BMI 38.0-38.9,adult    Relevant Medications    lidocaine (XYLOCAINE) 2 % uro-jet (Start on 7/12/2021 10:45 AM)    Other Relevant Orders    Initiate Outpatient Wound Care Protocol    Nonhealing surgical wound, subsequent encounter - Primary    Relevant Medications    lidocaine (XYLOCAINE) 2 % uro-jet (Start on 7/12/2021 10:45 AM)    Other Relevant Orders    Initiate Outpatient Wound Care Protocol                   Wound is healed    Plan:     Treatment Note please see attached Discharge Instructions    Written patient dismissal instructions given to patient and signed by patient or POA.          Discharge Instructions          [facility] -Phone: [phone] Fax: [phone]             Visit Sruthi Carbajal Instructions / Physician Orders     DATE: 7/12/2021     Home Care: Not needed at this time     SUPPLIES ORDERED THRU: [employer]     Wound Location:  Right Buttock     Cleanse with: Liquid antibacterial soap and water, rinse well      Dressing Orders:   Triad Cream     Frequency:  DAILY     Additional Orders: Increase protein to diet (meat, cheese, eggs, fish, peanut butter, nuts and beans)     Your next appointment with [address] [place] [address] is in 2 carli with Dr. Radha Park  ROOM TYPE   []?????????? CHAIR     [x]?????????? STRETCHER  []?????????? EITHER             (Please note your next appointment above and if you are unable to keep, kindly give a 24 hour notice. Thank you.)     If you experience any of the following, please call the [address] [place] [address] during business hours:  [phone]  Your Phone call may be forwarded to [facility] during business hours that [facility] is closed.     * Increase in Pain  * Temperature over 101  * Increase in drainage from your wound  * Drainage with a foul odor  * Bleeding  * Increase in swelling  * Need for compression bandage changes due to slippage, breakthrough drainage.     If you need medical attention outside of the business hours of the [address] [place] [address] please contact your PCP or go to the nearest emergency room.     The information contained in the After Visit Summary has been reviewed with me, the patient and/or responsible adult, by my health care provider(s). I had the opportunity to ask questions regarding this information. I have elected to receive;      []???????? ?? After Visit Summary  [x]?????????? Comprehensive Discharge Instruction     Patient signature______________________________________Date:________    Electronically signed by Rosa Lopez MD on 7/12/2021 at 10:15 AM          Electronically signed by Rosa Lopez MD on 7/12/2021 at 10:31 AM

## 2021-07-16 ENCOUNTER — HOSPITAL ENCOUNTER (OUTPATIENT)
Dept: PHARMACY | Age: 51
Setting detail: THERAPIES SERIES
Discharge: HOME | End: 2021-07-16
Payer: COMMERCIAL

## 2021-07-16 DIAGNOSIS — I82.409 DEEP VEIN THROMBOSIS (DVT) OF LOWER EXTREMITY, UNSPECIFIED CHRONICITY, UNSPECIFIED LATERALITY, UNSPECIFIED VEIN (HCC): Primary | ICD-10-CM

## 2021-07-16 LAB
INR BLD: 1.5
PROTIME: 18.6 SECONDS

## 2021-07-16 PROCEDURE — 85610 PROTHROMBIN TIME: CPT

## 2021-07-16 PROCEDURE — 99212 OFFICE O/P EST SF 10 MIN: CPT

## 2021-07-16 NOTE — PROGRESS NOTES
Medication Management Service, Warfarin Management  RAUL GARCIA [facility], [phone]  Visit Date: 7/16/2021   Subjective:   Hubert Flynn is a 46 y.o. male who presents to clinic today for anticoagulation monitoring and adjustment. Patient seen in clinic for warfarin management due to  Indication:   atrial fibrillation. INR goal: of 2.0-3.0. Duration of therapy: indefinite. Assessment and PLAN   PT/INR done in office per protocol. INR today is 1.5, subtherapeutic but up from 1.2 last week. We have had  In- depth conversations about possible causes for the low INRs the past several weeks and still can not determine the cause. Plan: Will boost  from warfarin 6 mg to 8 mg today only. Then increase current regimen by  10.5 % weekly. New regimen will be warfarin 6 mg daily. Using warfarin 4 mg tablets. Recheck INR in 1 week(s). Patient seen in room # 1. Patient verbally attests to completing the COVID 19 self screen. Patient verbalized understanding of dosing directions and information discussed. Dosing schedule given to patient. Progress note sent to referring office. Patient acknowledges working in consult agreement with pharmacist as referred by his/her physician. [address] [place] [address]  Ph., CACP, Clinical Pharmacist  Anticoagulation Services, [address] [place] [address] Coumadin Clinic  7/16/2021  8:32 AM    For Pharmacy Admin Tracking Only     Intervention Detail: Dose Adjustment: 2, reason: Therapy Optimization   Total # of Interventions Recommended: 2   Total # of Interventions Accepted: 2   Time Spent (min): 30

## 2021-07-23 ENCOUNTER — TELEPHONE (OUTPATIENT)
Dept: PHARMACY | Age: 51
End: 2021-07-23

## 2021-07-23 NOTE — TELEPHONE ENCOUNTER
Patient was a No Call No Show for [facility] appointment today. Called to reschedule, left voicemail.     Idalia Rizzo, PharmD  PGY2 Ambulatory Care Pharmacy Resident    7/23/2021 9:51 AM

## 2021-07-26 ENCOUNTER — TELEPHONE (OUTPATIENT)
Dept: PHARMACY | Age: 51
End: 2021-07-26

## 2021-08-02 ENCOUNTER — HOSPITAL ENCOUNTER (OUTPATIENT)
Dept: PHARMACY | Age: 51
Setting detail: THERAPIES SERIES
Discharge: HOME | End: 2021-08-02
Payer: COMMERCIAL

## 2021-08-02 DIAGNOSIS — I82.409 DEEP VEIN THROMBOSIS (DVT) OF LOWER EXTREMITY, UNSPECIFIED CHRONICITY, UNSPECIFIED LATERALITY, UNSPECIFIED VEIN (HCC): Primary | ICD-10-CM

## 2021-08-02 LAB
INR BLD: 2.7
PROTIME: 32.3 SECONDS

## 2021-08-02 PROCEDURE — 99211 OFF/OP EST MAY X REQ PHY/QHP: CPT

## 2021-08-02 PROCEDURE — 85610 PROTHROMBIN TIME: CPT

## 2021-08-02 NOTE — PROGRESS NOTES
Medication Management Service, Warfarin Management  RAUL GARCIA [facility], [phone]  Visit Date: 8/2/2021   Subjective:   Sun Castro is a 46 y.o. male who presents to clinic today for anticoagulation monitoring and adjustment. Patient seen in clinic for warfarin management due to  Indication:   atrial fibrillation. INR goal: of 2.0-3.0. Duration of therapy: indefinite. Assessment and PLAN   PT/INR done in office per protocol. INR today is 2.7, therapeutic. Plan: Will continue current regimen of warfarin 6 mg daily. He has been taking this regimen the past two weeks. Using warfarin 4 mg tablets. Recheck INR in 3 & 1/2 week(s). Appointment moved back to Friday per patient preference. Patient seen in room # 1. Patient verbally attests to completing the COVID 19 self screen. Patient verbalized understanding of dosing directions and information discussed. Dosing schedule given to patient. Progress note sent to referring office. Patient acknowledges working in consult agreement with pharmacist as referred by his/her physician. [address] [place] [address]  Ph., CACP, Clinical Pharmacist  Anticoagulation Services, [address] [place] [address] Coumadin Clinic  8/2/2021  10:53 AM      For Pharmacy Admin Tracking Only     Intervention Detail:    Total # of Interventions Recommended: 0   Total # of Interventions Accepted: 0   Time Spent (min): 20

## 2021-08-06 DIAGNOSIS — L73.2 HIDRADENITIS SUPPURATIVA: ICD-10-CM

## 2021-08-06 RX ORDER — FOLIC ACID/VIT B COMPLEX AND C 0.8 MG
TABLET ORAL
Qty: 90 TABLET | Refills: 0 | Status: SHIPPED | OUTPATIENT
Start: 2021-08-06 | End: 2021-11-08

## 2021-08-06 RX ORDER — DOXYCYCLINE HYCLATE 100 MG/1
CAPSULE ORAL
Qty: 60 CAPSULE | Refills: 0 | Status: SHIPPED | OUTPATIENT
Start: 2021-08-06 | End: 2022-01-19 | Stop reason: ALTCHOICE

## 2021-08-10 ENCOUNTER — TELEPHONE (OUTPATIENT)
Dept: PHARMACY | Age: 51
End: 2021-08-10

## 2021-08-10 ENCOUNTER — TELEPHONE (OUTPATIENT)
Dept: INTERNAL MEDICINE CLINIC | Age: 51
End: 2021-08-10

## 2021-08-10 DIAGNOSIS — T45.515A WARFARIN-INDUCED COAGULOPATHY (HCC): Primary | ICD-10-CM

## 2021-08-10 DIAGNOSIS — D68.32 WARFARIN-INDUCED COAGULOPATHY (HCC): Primary | ICD-10-CM

## 2021-08-10 DIAGNOSIS — I48.20 CHRONIC ATRIAL FIBRILLATION (HCC): ICD-10-CM

## 2021-08-10 NOTE — TELEPHONE ENCOUNTER
Called office and spoke to staff. Patient is currently taking warfarin and therapy is being managed by [facility] Med[facility]. Current regulations mandate periodic review and re-evaluation of therapy, frequency determined by diagnosis for use of warfarin. At this time we need a new referral for this patient. Per current state regulations all referrals must be authorized by a physician. Please review therapy and if continued therapy is warranted, please send a new electronic referral to [facility] Medication Management clinic ([address]). Thank you.      Electronically signed by Homero Valladares RPH on 8/10/21 at 3:00 PM EDT

## 2021-08-24 ENCOUNTER — TELEPHONE (OUTPATIENT)
Dept: INTERNAL MEDICINE CLINIC | Age: 51
End: 2021-08-24

## 2021-08-24 ENCOUNTER — TELEPHONE (OUTPATIENT)
Dept: PHARMACY | Age: 51
End: 2021-08-24

## 2021-08-24 DIAGNOSIS — I82.5Z1 CHRONIC DEEP VEIN THROMBOSIS (DVT) OF DISTAL VEIN OF RIGHT LOWER EXTREMITY (HCC): ICD-10-CM

## 2021-08-24 DIAGNOSIS — T45.515A WARFARIN-INDUCED COAGULOPATHY (HCC): Primary | ICD-10-CM

## 2021-08-24 DIAGNOSIS — D68.32 WARFARIN-INDUCED COAGULOPATHY (HCC): Primary | ICD-10-CM

## 2021-08-24 NOTE — TELEPHONE ENCOUNTER
Called Dr Sadiq Davalos office again today and was told the order was placed on 8/10. Reviewed chart and a lab order was placed for PT/INR but not a new referral.  Office staff will have new referral placed. [address] [place] [address]  Ph., CACP, Clinical Pharmacist  Anticoagulation Services, [address] [place] [address] Coumadin [facility]  8/24/2021  11:24 AM        For Pharmacy Admin Tracking Only     Intervention Detail:    Total # of Interventions Recommended: 0   Total # of Interventions Accepted: 0   Time Spent (min): 5

## 2021-08-27 ENCOUNTER — HOSPITAL ENCOUNTER (OUTPATIENT)
Dept: PHARMACY | Age: 51
Setting detail: THERAPIES SERIES
Discharge: HOME | End: 2021-08-27
Payer: COMMERCIAL

## 2021-08-27 DIAGNOSIS — I82.90 DEEP VEIN THROMBOSIS (DVT) OF NON-EXTREMITY VEIN, UNSPECIFIED CHRONICITY: Primary | ICD-10-CM

## 2021-08-27 LAB
INR BLD: 1.9
PROTIME: 22.7 SECONDS

## 2021-08-27 PROCEDURE — 85610 PROTHROMBIN TIME: CPT

## 2021-08-27 PROCEDURE — 99211 OFF/OP EST MAY X REQ PHY/QHP: CPT

## 2021-08-27 NOTE — PROGRESS NOTES
Medication Management Service, Warfarin Management  RAUL GARCIA [facility], [phone]  Visit Date: 8/27/2021   Subjective:   Franca Ulloa is a 46 y.o. male who presents to clinic today for anticoagulation monitoring and adjustment. Patient seen in clinic for warfarin management due to  Indication:   atrial fibrillation. INR goal: of 2.0-3.0. Duration of therapy: indefinite. Assessment and PLAN   PT/INR done in office per protocol. INR today is 1.9, just below goal     Plan: Will continue current regimen of warfarin 6 mg every day. Using warfarin 4 mg tablets. Recheck INR in 4 week(s). Patient seen in room # 1. Patient attested to self screening for COVID - 19. Patient verbalized understanding of dosing directions and information discussed. Dosing schedule given to patient. Progress note sent to referring office. Patient acknowledges working in consult agreement with pharmacist as referred by his/her physician.       Electronically signed by Felicita Harada on 7/62/14 at 1:99 AM EDT    For Pharmacy Admin Tracking Only     Intervention Detail:    Total # of Interventions Recommended: 0   Total # of Interventions Accepted: 0   Time Spent (min): 20

## 2021-08-30 ENCOUNTER — TELEPHONE (OUTPATIENT)
Dept: PHARMACY | Age: 51
End: 2021-08-30

## 2021-08-30 NOTE — TELEPHONE ENCOUNTER
Called MD office to clarify referral. Patient's previous referral was for warfarin management with indication of Chronic DVT and atrial fibrillation. Current ref came with just chronic DVT. Confirmed with Yannick Barr at MD office patient's last chart note has atrial fibrillation as a diagnosis. Updated referral form in [address]  Ph., CACP, Clinical Pharmacist  Anticoagulation Services, [address] [place] [address] Coumadin [facility]  8/30/2021  2:46 PM     For Pharmacy Admin Tracking Only     Intervention Detail:    Total # of Interventions Recommended: 0   Total # of Interventions Accepted: 0   Time Spent (min): 7

## 2021-09-01 ENCOUNTER — OFFICE VISIT (OUTPATIENT)
Dept: DERMATOLOGY | Age: 51
End: 2021-09-01
Payer: COMMERCIAL

## 2021-09-01 VITALS
OXYGEN SATURATION: 97 % | HEART RATE: 76 BPM | WEIGHT: 269.2 LBS | HEIGHT: 70 IN | BODY MASS INDEX: 38.54 KG/M2 | SYSTOLIC BLOOD PRESSURE: 104 MMHG | DIASTOLIC BLOOD PRESSURE: 70 MMHG | TEMPERATURE: 97.1 F

## 2021-09-01 DIAGNOSIS — L73.2 HIDRADENITIS SUPPURATIVA: Primary | ICD-10-CM

## 2021-09-01 PROCEDURE — 1036F TOBACCO NON-USER: CPT | Performed by: DERMATOLOGY

## 2021-09-01 PROCEDURE — 99213 OFFICE O/P EST LOW 20 MIN: CPT | Performed by: DERMATOLOGY

## 2021-09-01 PROCEDURE — G8417 CALC BMI ABV UP PARAM F/U: HCPCS | Performed by: DERMATOLOGY

## 2021-09-01 PROCEDURE — 3017F COLORECTAL CA SCREEN DOC REV: CPT | Performed by: DERMATOLOGY

## 2021-09-01 PROCEDURE — G8427 DOCREV CUR MEDS BY ELIG CLIN: HCPCS | Performed by: DERMATOLOGY

## 2021-09-01 RX ORDER — DOXYCYCLINE HYCLATE 100 MG/1
CAPSULE ORAL
Qty: 30 CAPSULE | Refills: 0 | Status: SHIPPED | OUTPATIENT
Start: 2021-09-01 | End: 2022-01-19 | Stop reason: ALTCHOICE

## 2021-09-01 RX ORDER — CLINDAMYCIN PHOSPHATE 10 UG/ML
LOTION TOPICAL
Qty: 60 ML | Refills: 3 | Status: SHIPPED | OUTPATIENT
Start: 2021-09-01 | End: 2021-12-01 | Stop reason: SDUPTHER

## 2021-09-01 NOTE — PROGRESS NOTES
Dermatology Patient Note  [facility] [address]  [address]  [place] [address]  Dept: [phone]  Dept Fax: [phone]      VISITDATE: 9/1/2021   REFERRING PROVIDER: No ref. provider found      Haritha Rand is a 46 y.o. male  who presents today in the office for:    Other (Pt has not had any new flares. He states that his HS is under control w/ current treatment. )      HISTORY OF PRESENT ILLNESS:  As above. Currently using BPO wash, hibiclens, clindamycin lotion, and doxycycline.      MEDICAL PROBLEMS:  Patient Active Problem List    Diagnosis Date Noted    Nonhealing surgical wound, subsequent encounter 06/07/2021    Prediabetes 05/14/2021    Buttock wound, right, initial encounter 03/17/2021    BMI 38.0-38.9,adult 03/17/2021    History of COVID-19 01/08/2021    Hypertensive chronic kidney disease with stage 1 through stage 4 chronic kidney disease, or unspecified chronic kidney disease 06/12/2020    Rotator cuff arthropathy of right shoulder 10/14/2019    Status post partial colectomy 01/22/2019    Malignant neoplasm of transverse colon ([facility] 12/10/2018    Peripheral vascular disease ([facility] 08/02/2018    Lymphedema 06/18/2018    Right heart failure ([facility] 06/18/2018    Vitamin D deficiency 04/03/2018    S/P laparoscopic sleeve gastrectomy 12/18/2017    DVT (deep venous thrombosis) ([facility] 11/21/2017    Chronic atrial fibrillation 09/18/2017    Warfarin-induced coagulopathy ([facility] 09/18/2017    Erectile dysfunction due to arterial insufficiency 03/22/2017    ESRD on hemodialysis ([facility] 12/02/2016    Benign prostatic hyperplasia with lower urinary tract symptoms 09/07/2016       CURRENT MEDICATIONS:   Current Outpatient Medications   Medication Sig Dispense Refill    B Complex-C-Folic Acid (NEPHRO-JENNIFER) 0.8 MG TABS TAKE ONE TABLET BY MOUTH DAILY WITH BREAKFAST 90 tablet 0    doxycycline hyclate (VIBRAMYCIN) 100 MG capsule TAKE 1 CAPSULE BY MOUTH TWICE nourished, alert and conversational. Affect is normal.    Cutaneous Exam:  Physical Exam  Focused exam of buttocks was performed    Facial covering was not removed during examination. Diagnoses/exam findings/medical history pertinent to this visit are listed below:    Assessment:   Diagnosis Orders   1. Hidradenitis suppurativa          Plan:  Hidradenitis suppurativa, buttock  - stable chronic illness  - taper doxycycline to once daily for one month then discontinue  - continue BPO wash, hibiclens, and clindamycin    RTC 3 months     Future Appointments   Date Time Provider Jonathan Castro   9/15/2021  8:30 AM Giulia Norwood MD [facility]   9/24/2021  8:20 AM [facility]         There are no Patient Instructions on file for this visit. This note was created with the assistance of a speech-recognition program.  Although the intention is to generate a document that actually reflects the content of the visit, no guarantees can be provided that every mistake has been identified and corrected by editing. I, Dr. Kailey Carbajal, personally performed the services described in this documentation, as scribed by [facility] in my presence, and it is both accurate and complete.      Electronically signed by Shereen Romo MD on 9/1/21 at 8:34 AM EDT

## 2021-09-13 ENCOUNTER — TELEPHONE (OUTPATIENT)
Dept: FAMILY MEDICINE CLINIC | Age: 51
End: 2021-09-13

## 2021-09-15 ENCOUNTER — HOSPITAL ENCOUNTER (EMERGENCY)
Age: 51
Discharge: HOME | End: 2021-09-15
Attending: EMERGENCY MEDICINE
Payer: COMMERCIAL

## 2021-09-15 ENCOUNTER — APPOINTMENT (OUTPATIENT)
Dept: CT IMAGING | Age: 51
End: 2021-09-15
Payer: COMMERCIAL

## 2021-09-15 ENCOUNTER — OFFICE VISIT (OUTPATIENT)
Dept: INTERNAL MEDICINE CLINIC | Age: 51
End: 2021-09-15
Payer: COMMERCIAL

## 2021-09-15 ENCOUNTER — APPOINTMENT (OUTPATIENT)
Dept: GENERAL RADIOLOGY | Age: 51
End: 2021-09-15
Payer: COMMERCIAL

## 2021-09-15 VITALS
SYSTOLIC BLOOD PRESSURE: 100 MMHG | WEIGHT: 268 LBS | BODY MASS INDEX: 38.37 KG/M2 | RESPIRATION RATE: 20 BRPM | HEART RATE: 91 BPM | OXYGEN SATURATION: 98 % | HEIGHT: 70 IN | DIASTOLIC BLOOD PRESSURE: 60 MMHG | TEMPERATURE: 97.7 F

## 2021-09-15 VITALS
HEIGHT: 70 IN | DIASTOLIC BLOOD PRESSURE: 77 MMHG | RESPIRATION RATE: 15 BRPM | OXYGEN SATURATION: 99 % | TEMPERATURE: 97.9 F | BODY MASS INDEX: 37.22 KG/M2 | WEIGHT: 260 LBS | HEART RATE: 95 BPM | SYSTOLIC BLOOD PRESSURE: 115 MMHG
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
## 2021-09-15 DIAGNOSIS — M12.811 ROTATOR CUFF ARTHROPATHY OF RIGHT SHOULDER: ICD-10-CM

## 2021-09-15 DIAGNOSIS — Z99.2 ESRD ON HEMODIALYSIS (HCC): ICD-10-CM

## 2021-09-15 DIAGNOSIS — N40.1 BENIGN PROSTATIC HYPERPLASIA WITH LOWER URINARY TRACT SYMPTOMS, SYMPTOM DETAILS UNSPECIFIED: Primary | ICD-10-CM

## 2021-09-15 DIAGNOSIS — N52.01 ERECTILE DYSFUNCTION DUE TO ARTERIAL INSUFFICIENCY: ICD-10-CM

## 2021-09-15 DIAGNOSIS — Z90.49 STATUS POST PARTIAL COLECTOMY: ICD-10-CM

## 2021-09-15 DIAGNOSIS — R73.03 PREDIABETES: ICD-10-CM

## 2021-09-15 DIAGNOSIS — C18.4 MALIGNANT NEOPLASM OF TRANSVERSE COLON (HCC): ICD-10-CM

## 2021-09-15 DIAGNOSIS — E55.9 VITAMIN D DEFICIENCY: ICD-10-CM

## 2021-09-15 DIAGNOSIS — Z86.16 HISTORY OF COVID-19: ICD-10-CM

## 2021-09-15 DIAGNOSIS — I50.812 CHRONIC RIGHT-SIDED HEART FAILURE (HCC): ICD-10-CM

## 2021-09-15 DIAGNOSIS — T45.515A WARFARIN-INDUCED COAGULOPATHY (HCC): ICD-10-CM

## 2021-09-15 DIAGNOSIS — N18.6 ESRD ON HEMODIALYSIS (HCC): ICD-10-CM

## 2021-09-15 DIAGNOSIS — Z98.84 S/P LAPAROSCOPIC SLEEVE GASTRECTOMY: ICD-10-CM

## 2021-09-15 DIAGNOSIS — I48.20 CHRONIC ATRIAL FIBRILLATION (HCC): ICD-10-CM

## 2021-09-15 DIAGNOSIS — I82.729: ICD-10-CM

## 2021-09-15 DIAGNOSIS — I73.9 PERIPHERAL VASCULAR DISEASE (HCC): ICD-10-CM

## 2021-09-15 DIAGNOSIS — I89.0 LYMPHEDEMA: ICD-10-CM

## 2021-09-15 DIAGNOSIS — T81.89XD NONHEALING SURGICAL WOUND, SUBSEQUENT ENCOUNTER: ICD-10-CM

## 2021-09-15 DIAGNOSIS — D68.32 WARFARIN-INDUCED COAGULOPATHY (HCC): ICD-10-CM

## 2021-09-15 PROBLEM — I12.9 HYPERTENSIVE CHRONIC KIDNEY DISEASE WITH STAGE 1 THROUGH STAGE 4 CHRONIC KIDNEY DISEASE, OR UNSPECIFIED CHRONIC KIDNEY DISEASE: Status: RESOLVED | Noted: 2020-06-12 | Resolved: 2021-09-15

## 2021-09-15 PROBLEM — S31.819A BUTTOCK WOUND, RIGHT, INITIAL ENCOUNTER: Status: RESOLVED | Noted: 2021-03-17 | Resolved: 2021-09-15

## 2021-09-15 PROCEDURE — 99214 OFFICE O/P EST MOD 30 MIN: CPT | Performed by: FAMILY MEDICINE

## 2021-09-15 PROCEDURE — 73562 X-RAY EXAM OF KNEE 3: CPT

## 2021-09-15 PROCEDURE — 99283 EMERGENCY DEPT VISIT LOW MDM: CPT

## 2021-09-15 PROCEDURE — 6370000000 HC RX 637 (ALT 250 FOR IP): Performed by: STUDENT IN AN ORGANIZED HEALTH CARE EDUCATION/TRAINING PROGRAM

## 2021-09-15 PROCEDURE — G8427 DOCREV CUR MEDS BY ELIG CLIN: HCPCS | Performed by: FAMILY MEDICINE

## 2021-09-15 PROCEDURE — 3017F COLORECTAL CA SCREEN DOC REV: CPT | Performed by: FAMILY MEDICINE

## 2021-09-15 PROCEDURE — 70450 CT HEAD/BRAIN W/O DYE: CPT

## 2021-09-15 PROCEDURE — 1036F TOBACCO NON-USER: CPT | Performed by: FAMILY MEDICINE

## 2021-09-15 PROCEDURE — 73610 X-RAY EXAM OF ANKLE: CPT

## 2021-09-15 PROCEDURE — G8417 CALC BMI ABV UP PARAM F/U: HCPCS | Performed by: FAMILY MEDICINE

## 2021-09-15 RX ORDER — ACETAMINOPHEN 325 MG/1
650 TABLET ORAL ONCE
Status: COMPLETED | OUTPATIENT
Start: 2021-09-15 | End: 2021-09-15

## 2021-09-15 RX ORDER — ACETAMINOPHEN 325 MG/1
650 TABLET ORAL EVERY 6 HOURS PRN
Qty: 60 TABLET | Refills: 0 | Status: SHIPPED | OUTPATIENT
Start: 2021-09-15

## 2021-09-15 RX ADMIN — ACETAMINOPHEN 650 MG: 325 TABLET ORAL at 14:34

## 2021-09-15 SDOH — ECONOMIC STABILITY: FOOD INSECURITY: WITHIN THE PAST 12 MONTHS, YOU WORRIED THAT YOUR FOOD WOULD RUN OUT BEFORE YOU GOT MONEY TO BUY MORE.: NEVER TRUE

## 2021-09-15 SDOH — ECONOMIC STABILITY: FOOD INSECURITY: WITHIN THE PAST 12 MONTHS, THE FOOD YOU BOUGHT JUST DIDN'T LAST AND YOU DIDN'T HAVE MONEY TO GET MORE.: NEVER TRUE

## 2021-09-15 ASSESSMENT — ENCOUNTER SYMPTOMS
GASTROINTESTINAL NEGATIVE: 1
BLOOD IN STOOL: 0
DIARRHEA: 0
VOMITING: 0
WHEEZING: 0
RHINORRHEA: 0
BACK PAIN: 1
RESPIRATORY NEGATIVE: 1
SHORTNESS OF BREATH: 0
ABDOMINAL PAIN: 0
ALLERGIC/IMMUNOLOGIC NEGATIVE: 1
COUGH: 0
NAUSEA: 0
EYES NEGATIVE: 1
CONSTIPATION: 0
SORE THROAT: 0

## 2021-09-15 ASSESSMENT — SOCIAL DETERMINANTS OF HEALTH (SDOH): HOW HARD IS IT FOR YOU TO PAY FOR THE VERY BASICS LIKE FOOD, HOUSING, MEDICAL CARE, AND HEATING?: NOT HARD AT ALL

## 2021-09-15 ASSESSMENT — PAIN SCALES - GENERAL: PAINLEVEL_OUTOF10: 4

## 2021-09-15 NOTE — ED PROVIDER NOTES
[facility]  ED  Emergency Department Encounter  EmergencyMedicine Resident     Pt Name:Hugh Avila  MRN: [id number]  Birthdate 1970  Date of evaluation: 9/15/21  PCP:  Curt Murrell MD    This patient was evaluated in the Emergency Department for symptoms described in the history of present illness. The patient was evaluated in the context of the global COVID-19 pandemic, which necessitated consideration that the patient might be at risk for infection with the SARS-CoV-2 virus that causes COVID-19. Institutional protocols and algorithms that pertain to the evaluation of patients at risk for COVID-19 are in a state of rapid change based on information released by regulatory bodies including the CDC and federal and state organizations. These policies and algorithms were followed during the patient's care in the ED. CHIEF COMPLAINT       Chief Complaint   Patient presents with    Fall     fell sunday having pain        HISTORY OF PRESENT ILLNESS  (Location/Symptom, Timing/Onset, Context/Setting, Quality, Duration, Modifying Factors, Severity.)      Alda Maher is a 46 y.o. male who presents with fall. Patient reports having a fall down 3 steps 5 days ago, had a mechanical fall, misstep with his right foot, rolled his ankle, fell on his right knee, right shoulder, hit the back of his head, possible loss of consciousness, patient is on Coumadin for atrial fibrillation. Patient has been taking Tylenol ibuprofen with minimal relief of symptoms. Patient is able to walk. Patient denies headaches, visual changes, focal weaknesses, sensory deficits, paresthesias, chest pain, shortness breath, abdominal pain, nausea, vomiting, skin changes.     PAST MEDICAL / SURGICAL / SOCIAL / FAMILY HISTORY      has a past medical history of Abscess of right buttock, Anemia, Anticoagulated on Coumadin, Aortic insufficiency, Arthritis, Atrial fibrillation ([address] BPH (benign prostatic hypertrophy), CAD (coronary artery disease), Caffeine use, Cellulitis of lower leg, Chronic back pain, Colon cancer (HCC), Cor pulmonale, acute ([facility] COVID-19, CRF (chronic renal failure), Erectile dysfunction, Gout, Hemodialysis patient ([facility] [address] History of blood transfusion, Hyperkalemia, Hyperlipidemia, Hypertension, Hypotension, Hypothyroidism, Kidney transplant candidate, Lymphedema of leg, Obesity, Thyroid disease, and Well adult health check. has a past surgical history that includes Appendectomy; Lap Band (2007); Abscess Drainage (Right, 01/20/2014); hc cath power picc single (1/24/2014); Dialysis fistula creation (Right, 3/27/15); shoulder surgery (Right, 2014); Bariatric Surgery (01/17/2017); Endoscopy, colon, diagnostic; Sleeve Gastrectomy (N/A, 9/25/2017); pr colsc flx w/rmvl of tumor polyp lesion snare tq (N/A, 11/15/2018); Colonoscopy; Abdominal exploration surgery (01/22/2019); Colonoscopy (01/22/2019); Small intestine surgery (N/A, 1/22/2019); Colonoscopy (N/A, 1/22/2019); Colonoscopy (N/A, 4/9/2021); vascular surgery (Right); Rectal surgery (05/05/2021); and Anus surgery (N/A, 5/5/2021).       Social History     Socioeconomic History    Marital status: Single     Spouse name: Not on file    Number of children: Not on file    Years of education: Not on file    Highest education level: Not on file   Occupational History    Not on file   Tobacco Use    Smoking status: Never Smoker    Smokeless tobacco: Never Used   Vaping Use    Vaping Use: Never used   Substance and Sexual Activity    Alcohol use: Not Currently    Drug use: No    Sexual activity: Yes     Partners: Female   Other Topics Concern    Not on file   Social History Narrative    Not on file     Social Determinants of Health     Financial Resource Strain: Low Risk     Difficulty of Paying Living Expenses: Not hard at all   Food Insecurity: No Food Insecurity    Worried About [address] in the Last Year: Never true    [employer]ika [employer] in the Last Year: Never true   Transportation Needs:     Lack of Transportation (Medical):  Lack of Transportation (Non-Medical):    Physical Activity:     Days of Exercise per Week:     Minutes of Exercise per Session:    Stress:     Feeling of Stress :    Social Connections:     Frequency of Communication with Friends and Family:     Frequency of Social Gatherings with Friends and Family:     Attends [employer] Services:     Active Member of Clubs or Organizations:     Attends Club or Organization Meetings:     Marital Status:    Intimate Partner Violence:     Fear of Current or Ex-Partner:     Emotionally Abused:     Physically Abused:     Sexually Abused:        Family History   Problem Relation Age of Onset    Cancer Father         leukemia    Heart Failure Mother     Arthritis Mother     High Blood Pressure Mother     Heart Disease Maternal Grandmother     Stroke Paternal Grandfather        Allergies:  Patient has no known allergies. Home Medications:  Prior to Admission medications    Medication Sig Start Date End Date Taking? Authorizing Provider   acetaminophen (TYLENOL) 325 MG tablet Take 2 tablets by mouth every 6 hours as needed for Pain 9/15/21  Yes Nargis Pierson MD   doxycycline hyclate (VIBRAMYCIN) 100 MG capsule Take one tablet PO daily 9/1/21   Anish Oropeza MD   benzoyl peroxide 5 % external liquid Use as wash every other day 9/1/21   Anish Oropeza MD   clindamycin (CLEOCIN T) 1 % lotion Apply to affected areas daily 9/1/21   Anish Oropeza MD   B Complex-C-Folic Acid (NEPHRO-JENNIFER) 0.8 MG TABS TAKE ONE TABLET BY MOUTH DAILY WITH BREAKFAST 8/6/21   Vania Mckeon MD   doxycycline hyclate (VIBRAMYCIN) 100 MG capsule TAKE 1 CAPSULE BY MOUTH TWICE DAILY WITH FOOD 8/6/21   Yassine Navas MD   warfarin (COUMADIN) 4 MG tablet Take one tablet (or as directed by Medication Management) by mouth once daily.  90 DS *new tab strength* 5/14/21   Vania Mckeon MD   ondansetron (ZOFRAN) 4 MG tablet Take 1 tablet by mouth every 8 hours as needed for Nausea or Vomiting 5/5/21   Rimma Lira DO   pravastatin (PRAVACHOL) 40 MG tablet Take 1 tablet by mouth nightly 4/19/21   Giulia Norwood MD   midodrine (PROAMATINE) 10 MG tablet TAKE ONE TABLET BY MOUTH THREE TIMES DAILY  Patient taking differently: Take 10 mg by mouth three times a week ON DIALYSIS DAYS 3/15/21   Giulia Norwood MD   vitamin D (ERGOCALCIFEROL) 1.25 MG (72241 UT) CAPS capsule TAKE ONE CAPSULE BY MOUTH ONCE A MONTH 10/8/20   Giulia Norwood MD   cinacalcet (SENSIPAR) 30 MG tablet Take 30 mg by mouth three times a week AT DIALYSIS    Historical Provider, MD   ASPIRIN LOW DOSE 81 MG EC tablet TAKE ONE TABLET BY MOUTH DAILY 3/28/19   Giulia Norwood MD   Elastic Bandages & Supports ([facility]) [address] 1 each by Does not apply route daily as needed (as needed) Right leg below the knee 20-30 HH MM DX 82.409 3/19/18   Giulia Norwood MD       REVIEW OF SYSTEMS    (2-9 systems for level 4, 10 or more for level 5)      Review of Systems   Constitutional: Negative for chills and fever. HENT: Negative for congestion, rhinorrhea and sore throat. Eyes: Negative for visual disturbance. Respiratory: Negative for cough, shortness of breath and wheezing. Cardiovascular: Negative for chest pain, palpitations and leg swelling. Gastrointestinal: Negative for abdominal pain, blood in stool, constipation, diarrhea, nausea and vomiting. Musculoskeletal: Positive for arthralgias. Negative for neck pain and neck stiffness. Skin: Negative for pallor, rash and wound. Neurological: Negative for dizziness, syncope, weakness, light-headedness and headaches. Hematological: Does not bruise/bleed easily. Psychiatric/Behavioral: Negative for confusion.        PHYSICAL EXAM   (up to 7 for level 4, 8 or more for level 5)      INITIAL VITALS:   /77   Pulse 95   Temp 97.9 °F (36.6 °C) (Oral)   Resp 15   Ht 5' 10\" (1.778 m)   Wt 260 lb (117.9 kg)   SpO2 99%   BMI 37.31 kg/m²     Physical Exam  Constitutional:       General: He is not in acute distress. Appearance: Normal appearance. He is well-developed. He is not ill-appearing, toxic-appearing or diaphoretic. HENT:      Head: Normocephalic and atraumatic. Right Ear: Tympanic membrane, ear canal and external ear normal. There is no impacted cerumen. Left Ear: Tympanic membrane, ear canal and external ear normal. There is no impacted cerumen. Nose: Nose normal. No congestion or rhinorrhea. Eyes:      General:         Right eye: No discharge. Left eye: No discharge. Extraocular Movements: Extraocular movements intact. Pupils: Pupils are equal, round, and reactive to light. Neck:      Vascular: No JVD. Trachea: No tracheal deviation. Cardiovascular:      Rate and Rhythm: Normal rate and regular rhythm. Pulses: Normal pulses. Heart sounds: Normal heart sounds. No murmur heard. No friction rub. No gallop. Pulmonary:      Effort: Pulmonary effort is normal. No respiratory distress. Breath sounds: Normal breath sounds. No stridor. No wheezing, rhonchi or rales. Chest:      Chest wall: No tenderness. Abdominal:      General: There is no distension. Palpations: Abdomen is soft. There is no mass. Tenderness: There is no abdominal tenderness. There is no right CVA tenderness, left CVA tenderness or guarding. Musculoskeletal:         General: Tenderness present. No swelling or deformity. Normal range of motion. Cervical back: Normal range of motion and neck supple. No rigidity or tenderness. Right lower leg: No edema. Left lower leg: No edema.       Comments: Patient has tenderness over the right lateral malleolus, no foot tenderness, tenderness over the lateral aspect of the right knee, no patellar tenderness, no proximal tib-fib tenderness, no overlying skin changes, full range of motion, patient able to walk with slight limp, sensation light touch intact, distal pulses intact and equal bilaterally, patient has pain over the right trapezius, no pain over the scapula, shoulder, clavicle   Skin:     General: Skin is warm. Capillary Refill: Capillary refill takes less than 2 seconds. Findings: No lesion or rash. Neurological:      General: No focal deficit present. Mental Status: He is alert and oriented to person, place, and time. Cranial Nerves: No cranial nerve deficit. Sensory: No sensory deficit. Motor: No weakness. Coordination: Coordination normal.      Gait: Gait normal.   Psychiatric:         Mood and Affect: Mood normal.         Behavior: Behavior normal.         DIFFERENTIAL  DIAGNOSIS     PLAN (LABS / IMAGING / EKG):  Orders Placed This Encounter   Procedures    CT Head WO Contrast    XR ANKLE RIGHT (MIN 3 VIEWS)    XR KNEE RIGHT (3 VIEWS)       MEDICATIONS ORDERED:  Orders Placed This Encounter   Medications    acetaminophen (TYLENOL) tablet 650 mg    acetaminophen (TYLENOL) 325 MG tablet     Sig: Take 2 tablets by mouth every 6 hours as needed for Pain     Dispense:  60 tablet     Refill:  0       DDX: Subdural, epidural, intracranial hemorrhage, ankle fracture, avulsion fracture, soft tissue injury    DIAGNOSTIC RESULTS / EMERGENCY DEPARTMENT COURSE / MDM   LAB RESULTS:  No results found for this visit on 09/15/21. IMPRESSION: 78-year-old male on warfarin for atrial fibrillation had a mechanical fall down 3 steps, hit his head, will obtain CT head to evaluate subdural hematoma. Patient also having ankle and knee pain, will obtain x-rays to evaluate for fractures. Patient has trapezius muscle pain, no bony tenderness to the shoulder, no indication for shoulder imaging at this time. Will administer Tylenol, reassess.     RADIOLOGY:  XR KNEE RIGHT (3 VIEWS)    Result Date: 9/15/2021  EXAMINATION: THREE XRAY VIEWS OF THE RIGHT KNEE 9/15/2021 1:44 extra-axial fluid collection. The gray-white differentiation is maintained without evidence of an acute infarct. There is no evidence of hydrocephalus. Benign, idiopathic physiologic calcification right basal ganglia. ORBITS: The visualized portion of the orbits demonstrate no acute abnormality. SINUSES: The visualized paranasal sinuses and mastoid air cells demonstrate no acute abnormality. SOFT TISSUES/SKULL:  No acute abnormality of the visualized skull. Small scalp contusion/hematoma right parietal region. No acute intracranial abnormality. Small scalp contusion/hematoma right parietal region. EKG      All EKG's are interpreted by the Emergency Department Physician who either signs or Co-signs this chart in the absence of a cardiologist.    EMERGENCY DEPARTMENT COURSE:  Patient came to emergency department, HPI and physical exam were conducted. All nursing notes were reviewed. Imaging negative for any acute abnormality. Patient is able to walk with slight limp, declining any crutches or boot. Patient remained stable in the emerge department with normal vital signs. Gave strict return precautions to the emergency department and discharge patient home.   Recommended patient follow-up with PCP for further management      MIPS 415     A head CT was ordered, but not by an emergency care provider: Yes    A head CT was ordered by an emergency care provider, and some of the indications for ordering the head CT included  Measure Exclusions:  Patient has a ventricular shunt: No  Patient has a brain tumor: No  Patient has multi-system trauma: No  Patient is pregnant: No  Patient is taking an antiplatelet medication (excluding aspirin): No  Patient is 72years old or older: No    Signs and Symptoms:  Patients GCS was less than 15: No  Focal neurological deficit: No  Severe Headache: No  Vomiting: No  Physical signs of a basilar skull fracture: No  Coagulopathy: Yes  Thrombocytopenia: No  Patient suspected of taking an anticoagulant medication: Yes  Dangerous mechanism of injury: Yes      Patient had loss of consciousness OR posttraumatic amnesia AND:   Headache: No  Patient is 61years old or older: No  Drug or Alcohol intoxication: No  Short-term memory deficits: No  Evidence of trauma above the clavicles (any visible or detected trauma to the head or neck, including lacerations, abrasions, bruising, swelling or fracture): No  Post-traumatic seizure: No          PROCEDURES:      CONSULTS:  None    CRITICAL CARE:      FINAL IMPRESSION      1.  Fall, initial encounter          DISPOSITION / PLAN     DISPOSITION Decision To Discharge 09/15/2021 03:53:44 PM      PATIENT REFERRED TO:  Evans Talley MD  [address] [address]  [phone]    Schedule an appointment as soon as possible for a visit in 3 days  For reassessment    [facility]  [address] [address]  [phone]  Go to   As needed, If symptoms worsen      DISCHARGE MEDICATIONS:  Discharge Medication List as of 9/15/2021  3:54 PM      START taking these medications    Details   acetaminophen (TYLENOL) 325 MG tablet Take 2 tablets by mouth every 6 hours as needed for Pain, Disp-60 tablet, R-0Print             Heriberto Goodwin MD  Emergency Medicine Resident    (Please note that portions of thisnote were completed with a voice recognition program.  Efforts were made to edit the dictations but occasionally words are mis-transcribed.)        Heriberto Goodwin MD  Resident  09/15/21 1715

## 2021-09-15 NOTE — ED PROVIDER NOTES
[facility] ED     Emergency Department     Faculty Attestation    I performed a history and physical examination of the patient and discussed management with the resident. I reviewed the residents note and agree with the documented findings and plan of care. Any areas of disagreement are noted on the chart. I was personally present for the key portions of any procedures. I have documented in the chart those procedures where I was not present during the key portions. I have reviewed the emergency nurses triage note. I agree with the chief complaint, past medical history, past surgical history, allergies, medications, social and family history as documented unless otherwise noted below. For Physician Assistant/ Nurse Practitioner cases/documentation I have personally evaluated this patient and have completed at least one if not all key elements of the E/M (history, physical exam, and MDM). Additional findings are as noted. Patient presents with knee and ankle pain after he had a fall. He says he slipped when he was going down the stairs. He says he did hit his head but did not have loss of consciousness. Patient is on Coumadin for history of A. fib. On exam, patient is sitting on the side the bed and appears well. He is alert and oriented and answering questions appropriately. There is no cervical midline tenderness. Breath sounds are equal bilaterally. Strength and sensation is intact to all extremities. There is mild tenderness to the right trapezius muscle. There is also tenderness to the right anterior knee and right lateral ankle. We will get CT scan of the head and x-rays and treat patient's pain.       Joanne Garcias MD  Attending Emergency  Physician              Woodrow Fatima MD  09/15/21 0859

## 2021-09-15 NOTE — PROGRESS NOTES
I did not see, evaluate, or participate in the care of this patient. I signed up for this patient in error. Godwin Contreras [facility]  Emergency Medicine Resident Physician, PGY-2  09/15/21 2:04 PM

## 2021-09-15 NOTE — PROGRESS NOTES
Subjective:      Patient ID: Kel Perez is a 46 y.o. male. Hyperlipidemia  This is a chronic problem. The current episode started more than 1 month ago. The problem is controlled. Recent lipid tests were reviewed and are normal. Exacerbating diseases include obesity. Current antihyperlipidemic treatment includes statins. The current treatment provides moderate improvement of lipids. Risk factors for coronary artery disease include diabetes mellitus, dyslipidemia, obesity, male sex and a sedentary lifestyle. Review of Systems   Constitutional: Negative. HENT: Negative. Eyes: Negative. Respiratory: Negative. Cardiovascular: Negative. Gastrointestinal: Negative. Endocrine: Negative. Musculoskeletal: Positive for arthralgias and back pain. Skin: Negative. Allergic/Immunologic: Negative. Neurological: Negative. Hematological: Negative. Psychiatric/Behavioral: The patient is nervous/anxious. Past family and social history unremarkable. Diagnosis Orders   1. Benign prostatic hyperplasia with lower urinary tract symptoms, symptom details unspecified     2. ESRD on hemodialysis ([address]     3. Erectile dysfunction due to arterial insufficiency     4. Chronic atrial fibrillation     5. Warfarin-induced coagulopathy ([address]     6. Chronic deep vein thrombosis (DVT) of ulnar vein, unspecified laterality (HCC)     7. S/P laparoscopic sleeve gastrectomy     8. Vitamin D deficiency     9. Lymphedema     10. Chronic right-sided heart failure ([address]     11. Peripheral vascular disease ([address]     12. Malignant neoplasm of transverse colon ([address]     13. Status post partial colectomy     14. Rotator cuff arthropathy of right shoulder     15. History of COVID-19     16. BMI 38.0-38.9,adult     17. Prediabetes     18. Nonhealing surgical wound, subsequent encounter           Objective:   Physical Exam  Vitals and nursing note reviewed. Constitutional:       Appearance: He is well-developed. Comments: Obesity. Negative sleep study   HENT:      Head: Normocephalic and atraumatic. Right Ear: External ear normal.      Left Ear: External ear normal.      Nose: Nose normal.   Eyes:      Conjunctiva/sclera: Conjunctivae normal.      Pupils: Pupils are equal, round, and reactive to light. Cardiovascular:      Rate and Rhythm: Normal rate and regular rhythm. Heart sounds: Normal heart sounds. Comments: Chronic A. fib  Hyperlipidemia  Chronic anticoagulationCoumadin  Pulmonary:      Effort: Pulmonary effort is normal.      Breath sounds: Normal breath sounds. Abdominal:      General: Bowel sounds are normal.      Palpations: Abdomen is soft. Comments: History of sleeve gastrectomy  History of colon cancer status post partial colectomy without colostomy   Genitourinary:     Comments: ESRDhemodialysis x3/week  Musculoskeletal:         General: Normal range of motion. Cervical back: Normal range of motion and neck supple. Comments: Degenerative polyarthralgia  Dependent pedal edema   Skin:     General: Skin is warm and dry. Comments: Buttock wound resolved   Neurological:      Mental Status: He is alert and oriented to person, place, and time. Deep Tendon Reflexes: Reflexes are normal and symmetric. Psychiatric:         Behavior: Behavior normal.         Thought Content: Thought content normal.         Assessment:       Diagnosis Orders   1. Benign prostatic hyperplasia with lower urinary tract symptoms, symptom details unspecified     2. ESRD on hemodialysis ([address]     3. Erectile dysfunction due to arterial insufficiency     4. Chronic atrial fibrillation     5. Warfarin-induced coagulopathy ([address]     6. Chronic deep vein thrombosis (DVT) of ulnar vein, unspecified laterality (HCC)     7. S/P laparoscopic sleeve gastrectomy     8. Vitamin D deficiency     9. Lymphedema     10. Chronic right-sided heart failure ([address]     11.  Peripheral vascular disease ([address] 12. Malignant neoplasm of transverse colon ([facility]     13. Status post partial colectomy     14. Rotator cuff arthropathy of right shoulder     15. History of COVID-19     16. BMI 38.0-38.9,adult     17. Prediabetes     18. Nonhealing surgical wound, subsequent encounter             Plan:      57-year-old -American male returns for follow-up. He is afebrile hemodynamically stable  ESRD hemodialysis dependent on 3/week that he is tolerating well. He is anuric  Neurocardiogenic syncope. He is on midodrine  Chronic A. fib. Rate controlled. He is established with cardiology  Chronic anticoagulation on Coumadin with INR of 2.5 as titrated by Coumadin clinic. He is counseled on Coumadin compatible diet  History of cor pulmonale. He is asymptomatic. Patient states that he is on the transplant list  History of Covid infection resolved uneventfully  Rotator cuff arthropathy. May take over-the-counter Tylenol as needed. Consider pain management consultation  Morbid obesity however, sleep study was negative  Prediabetes with A1c of 6.2. Advised low-fat/renal diet, daily moderate exercise as tolerated  History of buttock woundresolved  He appears anxious however denies being depressed  Dependent pedal edema. Leg elevation is advised, lose weight, increase mobility as tolerated, compression stockings  History of DVT. Continue chronic anticoagulation, weight reduction  History of colon cancer status post partial gastrectomy without colostomy. He is established with colorectal surgery. History of sleeve gastrectomy.   He is updated on COVID-19 vaccination  Med list and available labs reviewed, discussed with patient, questions answered  He is encouraged to call for any concern  This note is created with a voice recognition program and while intend to generate a document that accurately reflects the content of the visit, no guarantee can be provided that every mistake has been identified and corrected by editing.           Baljeet Garg MD

## 2021-09-15 NOTE — ED NOTES
This patient was assessed by the doctor only. Nurse processed and completed the orders from this doctor ie labs, meds, and/or EKG.         Roger Crews RN  09/15/21 [id number]

## 2021-09-24 ENCOUNTER — HOSPITAL ENCOUNTER (OUTPATIENT)
Dept: PHARMACY | Age: 51
Setting detail: THERAPIES SERIES
Discharge: HOME | End: 2021-09-24
Payer: COMMERCIAL

## 2021-09-24 DIAGNOSIS — I82.409 DEEP VEIN THROMBOSIS (DVT) OF LOWER EXTREMITY, UNSPECIFIED CHRONICITY, UNSPECIFIED LATERALITY, UNSPECIFIED VEIN (HCC): Primary | ICD-10-CM

## 2021-09-24 LAB
INR BLD: 2.4
PROTIME: 29 SECONDS

## 2021-09-24 PROCEDURE — 85610 PROTHROMBIN TIME: CPT

## 2021-09-24 PROCEDURE — 99211 OFF/OP EST MAY X REQ PHY/QHP: CPT

## 2021-09-24 NOTE — PROGRESS NOTES
Medication Management Service, Warfarin Management  RAUL GARCIA [facility], [phone]  Visit Date: 2021   Subjective:   Shazia Cortez is a 46 y.o. male who presents to clinic today for anticoagulation monitoring and adjustment. Patient seen in clinic for warfarin management due to  Indication:   atrial fibrillation and DVT. INR goal: of 2.0-3.0. Duration of therapy: indefinite. Assessment and PLAN   PT/INR done in office per protocol. INR today is 2.4, therapeutic. Plan:    · Will continue current regimen of warfarin 6 mg daily    · Using warfarin 4 mg tablets. · Patient reports a sufficient quantity of medication at home. No refills provided at this visit  Recheck INR in 4 week(s). Patient seen in room # 1. ED. OP. = Patient taking Acetaminophen 325 m tablets by mouth three times daily as needed for pain due to a recent fall that resulted in an emergency department visit on 09/15/2021. Educated patient to reduce dose as pain becomes more manageable. Encouraged patient to contact Medication Management at [phone] if he increases daily dose of Acetaminophen as > 2 grams per day may lead to an elevated INR. Patient attested to self screening for COVID - 19. Patient verbalized understanding of dosing directions and information discussed. Dosing schedule given to patient. Progress note sent to referring office. Patient acknowledges working in consult agreement with pharmacist as referred by his/her physician.       Electronically signed by VIELKA Mancilla [facility] on 21 at 8:39 AM EDT    Yonny Green PharmD  PGY2 Ambulatory Care Pharmacy Resident    2021 8:43 AM    =======================================================================    For Pharmacy Admin Tracking Only     Intervention Detail:    Total # of Interventions Recommended: 1   Total # of Interventions Accepted: 1   Time Spent (min): 30

## 2021-10-04 ENCOUNTER — TELEPHONE (OUTPATIENT)
Dept: PHARMACY | Age: 51
End: 2021-10-04

## 2021-10-04 RX ORDER — WARFARIN SODIUM 6 MG/1
TABLET ORAL
Qty: 90 TABLET | Refills: 1 | Status: SHIPPED | OUTPATIENT
Start: 2021-10-04 | End: 2022-04-21

## 2021-10-13 ENCOUNTER — OFFICE VISIT (OUTPATIENT)
Dept: INTERNAL MEDICINE CLINIC | Age: 51
End: 2021-10-13
Payer: COMMERCIAL

## 2021-10-13 ENCOUNTER — HOSPITAL ENCOUNTER (OUTPATIENT)
Dept: GENERAL RADIOLOGY | Age: 51
Discharge: HOME | End: 2021-10-15
Payer: COMMERCIAL

## 2021-10-13 ENCOUNTER — HOSPITAL ENCOUNTER (OUTPATIENT)
Age: 51
Discharge: HOME | End: 2021-10-15
Payer: COMMERCIAL

## 2021-10-13 ENCOUNTER — TELEPHONE (OUTPATIENT)
Dept: INTERNAL MEDICINE CLINIC | Age: 51
End: 2021-10-13

## 2021-10-13 ENCOUNTER — HOSPITAL ENCOUNTER (OUTPATIENT)
Age: 51
Setting detail: SPECIMEN
Discharge: HOME | End: 2021-10-13
Payer: COMMERCIAL

## 2021-10-13 VITALS
RESPIRATION RATE: 20 BRPM | SYSTOLIC BLOOD PRESSURE: 122 MMHG | HEIGHT: 70 IN | DIASTOLIC BLOOD PRESSURE: 72 MMHG | HEART RATE: 77 BPM | OXYGEN SATURATION: 99 % | BODY MASS INDEX: 38.65 KG/M2 | WEIGHT: 270 LBS | TEMPERATURE: 98.1 F

## 2021-10-13 DIAGNOSIS — T45.515A WARFARIN-INDUCED COAGULOPATHY (HCC): ICD-10-CM

## 2021-10-13 DIAGNOSIS — Z86.16 HISTORY OF COVID-19: ICD-10-CM

## 2021-10-13 DIAGNOSIS — Z99.2 ESRD ON HEMODIALYSIS (HCC): ICD-10-CM

## 2021-10-13 DIAGNOSIS — C18.4 MALIGNANT NEOPLASM OF TRANSVERSE COLON (HCC): ICD-10-CM

## 2021-10-13 DIAGNOSIS — R73.03 PREDIABETES: ICD-10-CM

## 2021-10-13 DIAGNOSIS — I73.9 PERIPHERAL VASCULAR DISEASE (HCC): ICD-10-CM

## 2021-10-13 DIAGNOSIS — N40.1 BENIGN PROSTATIC HYPERPLASIA WITH LOWER URINARY TRACT SYMPTOMS, SYMPTOM DETAILS UNSPECIFIED: ICD-10-CM

## 2021-10-13 DIAGNOSIS — Z90.49 STATUS POST PARTIAL COLECTOMY: ICD-10-CM

## 2021-10-13 DIAGNOSIS — I82.509 CHRONIC DEEP VEIN THROMBOSIS (DVT) OF LOWER EXTREMITY, UNSPECIFIED LATERALITY, UNSPECIFIED VEIN (HCC): ICD-10-CM

## 2021-10-13 DIAGNOSIS — D68.32 WARFARIN-INDUCED COAGULOPATHY (HCC): ICD-10-CM

## 2021-10-13 DIAGNOSIS — I50.812 CHRONIC RIGHT-SIDED HEART FAILURE (HCC): ICD-10-CM

## 2021-10-13 DIAGNOSIS — I48.20 CHRONIC ATRIAL FIBRILLATION (HCC): ICD-10-CM

## 2021-10-13 DIAGNOSIS — N18.6 ESRD ON HEMODIALYSIS (HCC): ICD-10-CM

## 2021-10-13 DIAGNOSIS — E55.9 VITAMIN D DEFICIENCY: ICD-10-CM

## 2021-10-13 DIAGNOSIS — M12.811 ROTATOR CUFF ARTHROPATHY OF RIGHT SHOULDER: ICD-10-CM

## 2021-10-13 DIAGNOSIS — Z00.00 ROUTINE GENERAL MEDICAL EXAMINATION AT A HEALTH CARE FACILITY: Primary | ICD-10-CM

## 2021-10-13 DIAGNOSIS — N52.01 ERECTILE DYSFUNCTION DUE TO ARTERIAL INSUFFICIENCY: ICD-10-CM

## 2021-10-13 DIAGNOSIS — Z98.84 S/P LAPAROSCOPIC SLEEVE GASTRECTOMY: ICD-10-CM

## 2021-10-13 PROBLEM — T81.89XD NONHEALING SURGICAL WOUND, SUBSEQUENT ENCOUNTER: Status: RESOLVED | Noted: 2021-06-07 | Resolved: 2021-10-13

## 2021-10-13 PROBLEM — I89.0 LYMPHEDEMA: Status: RESOLVED | Noted: 2018-06-18 | Resolved: 2021-10-13

## 2021-10-13 LAB
ALBUMIN SERPL-MCNC: 4.4 G/DL (ref 3.5–5.2)
ALBUMIN/GLOBULIN RATIO: 1.1 (ref 1–2.5)
ALP BLD-CCNC: 141 U/L (ref 40–129)
ALT SERPL-CCNC: 17 U/L (ref 5–41)
ANION GAP SERPL CALCULATED.3IONS-SCNC: 21 MMOL/L (ref 9–17)
AST SERPL-CCNC: 22 U/L
BILIRUB SERPL-MCNC: 0.33 MG/DL (ref 0.3–1.2)
BUN BLDV-MCNC: 57 MG/DL (ref 6–20)
BUN/CREAT BLD: ABNORMAL (ref 9–20)
CALCIUM SERPL-MCNC: 9.6 MG/DL (ref 8.6–10.4)
CHLORIDE BLD-SCNC: 92 MMOL/L (ref 98–107)
CHOLESTEROL/HDL RATIO: 4.5
CHOLESTEROL: 281 MG/DL
CO2: 26 MMOL/L (ref 20–31)
CREAT SERPL-MCNC: 11.06 MG/DL (ref 0.7–1.2)
ESTIMATED AVERAGE GLUCOSE: 105 MG/DL
GFR AFRICAN AMERICAN: 6 ML/MIN
GFR NON-AFRICAN AMERICAN: 5 ML/MIN
GFR SERPL CREATININE-BSD FRML MDRD: ABNORMAL ML/MIN/{1.73_M2}
GFR SERPL CREATININE-BSD FRML MDRD: ABNORMAL ML/MIN/{1.73_M2}
GLUCOSE BLD-MCNC: 94 MG/DL (ref 70–99)
HBA1C MFR BLD: 5.3 % (ref 4–6)
HCT VFR BLD CALC: 36.4 % (ref 40.7–50.3)
HDLC SERPL-MCNC: 62 MG/DL
HEMOGLOBIN: 11.4 G/DL (ref 13–17)
LDL CHOLESTEROL: 188 MG/DL (ref 0–130)
MCH RBC QN AUTO: 34.2 PG (ref 25.2–33.5)
MCHC RBC AUTO-ENTMCNC: 31.3 G/DL (ref 28.4–34.8)
MCV RBC AUTO: 109.3 FL (ref 82.6–102.9)
NRBC AUTOMATED: 0 PER 100 WBC
PDW BLD-RTO: 15.6 % (ref 11.8–14.4)
PLATELET # BLD: 370 K/UL (ref 138–453)
PMV BLD AUTO: 9.8 FL (ref 8.1–13.5)
POTASSIUM SERPL-SCNC: 4.5 MMOL/L (ref 3.7–5.3)
PROSTATE SPECIFIC ANTIGEN: 0.51 UG/L
RBC # BLD: 3.33 M/UL (ref 4.21–5.77)
SODIUM BLD-SCNC: 139 MMOL/L (ref 135–144)
TOTAL PROTEIN: 8.3 G/DL (ref 6.4–8.3)
TRIGL SERPL-MCNC: 157 MG/DL
TSH SERPL DL<=0.05 MIU/L-ACNC: 3.92 MIU/L (ref 0.3–5)
VLDLC SERPL CALC-MCNC: ABNORMAL MG/DL (ref 1–30)
WBC # BLD: 8.8 K/UL (ref 3.5–11.3)

## 2021-10-13 PROCEDURE — 73030 X-RAY EXAM OF SHOULDER: CPT

## 2021-10-13 PROCEDURE — G0438 PPPS, INITIAL VISIT: HCPCS | Performed by: FAMILY MEDICINE

## 2021-10-13 PROCEDURE — G8484 FLU IMMUNIZE NO ADMIN: HCPCS | Performed by: FAMILY MEDICINE

## 2021-10-13 PROCEDURE — 3017F COLORECTAL CA SCREEN DOC REV: CPT | Performed by: FAMILY MEDICINE

## 2021-10-13 ASSESSMENT — LIFESTYLE VARIABLES
AUDIT-C TOTAL SCORE: 2
HOW OFTEN DURING THE LAST YEAR HAVE YOU FOUND THAT YOU WERE NOT ABLE TO STOP DRINKING ONCE YOU HAD STARTED: 0
HOW OFTEN DURING THE LAST YEAR HAVE YOU HAD A FEELING OF GUILT OR REMORSE AFTER DRINKING: 0
HAS A RELATIVE, FRIEND, DOCTOR, OR ANOTHER HEALTH PROFESSIONAL EXPRESSED CONCERN ABOUT YOUR DRINKING OR SUGGESTED YOU CUT DOWN: 0
HOW OFTEN DO YOU HAVE A DRINK CONTAINING ALCOHOL: 2
HOW OFTEN DURING THE LAST YEAR HAVE YOU NEEDED AN ALCOHOLIC DRINK FIRST THING IN THE MORNING TO GET YOURSELF GOING AFTER A NIGHT OF HEAVY DRINKING: 0
HOW OFTEN DO YOU HAVE SIX OR MORE DRINKS ON ONE OCCASION: 0
HOW OFTEN DURING THE LAST YEAR HAVE YOU BEEN UNABLE TO REMEMBER WHAT HAPPENED THE NIGHT BEFORE BECAUSE YOU HAD BEEN DRINKING: 0
HOW OFTEN DURING THE LAST YEAR HAVE YOU FAILED TO DO WHAT WAS NORMALLY EXPECTED FROM YOU BECAUSE OF DRINKING: 0
AUDIT TOTAL SCORE: 2
HAVE YOU OR SOMEONE ELSE BEEN INJURED AS A RESULT OF YOUR DRINKING: 0
HOW MANY STANDARD DRINKS CONTAINING ALCOHOL DO YOU HAVE ON A TYPICAL DAY: 0

## 2021-10-13 ASSESSMENT — PATIENT HEALTH QUESTIONNAIRE - PHQ9
2. FEELING DOWN, DEPRESSED OR HOPELESS: 0
SUM OF ALL RESPONSES TO PHQ QUESTIONS 1-9: 0
SUM OF ALL RESPONSES TO PHQ9 QUESTIONS 1 & 2: 0
1. LITTLE INTEREST OR PLEASURE IN DOING THINGS: 0

## 2021-10-13 NOTE — PROGRESS NOTES
Subjective:      Patient ID: Abelino Baez is a 46 y.o. male.     HPI    Review of Systems    Objective:   Physical Exam    Assessment:            Plan:              Marleny Huertas MD

## 2021-10-13 NOTE — PATIENT INSTRUCTIONS
Personalized Preventive Plan for Dominique Avila - 10/13/2021  Medicare offers a range of preventive health benefits. Some of the tests and screenings are paid in full while other may be subject to a deductible, co-insurance, and/or copay. Some of these benefits include a comprehensive review of your medical history including lifestyle, illnesses that may run in your family, and various assessments and screenings as appropriate. After reviewing your medical record and screening and assessments performed today your provider may have ordered immunizations, labs, imaging, and/or referrals for you. A list of these orders (if applicable) as well as your Preventive Care list are included within your After Visit Summary for your review. Other Preventive Recommendations:    · A preventive eye exam performed by an eye specialist is recommended every 1-2 years to screen for glaucoma; cataracts, macular degeneration, and other eye disorders. · A preventive dental visit is recommended every 6 months. · Try to get at least 150 minutes of exercise per week or 10,000 steps per day on a pedometer . · Order or download the FREE \"Exercise & Physical Activity: Your Everyday Guide\" from [employer]. Call 3-605.185.4274 or search [employer] online. · You need 0133-3612 mg of calcium and 0485-0590 IU of vitamin D per day. It is possible to meet your calcium requirement with diet alone, but a vitamin D supplement is usually necessary to meet this goal.  · When exposed to the sun, use a sunscreen that protects against both UVA and UVB radiation with an SPF of 30 or greater. Reapply every 2 to 3 hours or after sweating, drying off with a towel, or swimming. · Always wear a seat belt when traveling in a car. Always wear a helmet when riding a bicycle or motorcycle.

## 2021-10-13 NOTE — PROGRESS NOTES
Subjective:      Patient ID: Caron Stevenson is a 46 y.o. male. Shoulder Injury   The incident occurred at home. The right shoulder is affected. The incident occurred more than 1 week ago. The injury mechanism was a fall. The quality of the pain is described as aching and cramping. The pain does not radiate. The pain is at a severity of 6/10. The pain is severe. Associated symptoms include muscle weakness. The symptoms are aggravated by palpation, overhead lifting and movement. He has tried non-weight bearing, rest and acetaminophen for the symptoms. The treatment provided no relief. Review of Systems   Constitutional: Negative. HENT: Negative. Eyes: Negative. Respiratory: Negative. Cardiovascular: Negative. Gastrointestinal: Negative. Endocrine: Negative. Musculoskeletal: Positive for arthralgias. Skin: Negative. Allergic/Immunologic: Negative. Neurological: Negative. Hematological: Negative. Psychiatric/Behavioral: The patient is nervous/anxious. Past family and social history unremarkable. Diagnosis Orders   1. Routine general medical examination at a health care facility     2. Benign prostatic hyperplasia with lower urinary tract symptoms, symptom details unspecified     3. ESRD on hemodialysis (HCC)  CBC    Comprehensive Metabolic Panel    Hemoglobin A1C    Lipid Panel    TSH without Reflex    PSA screening   4. Erectile dysfunction due to arterial insufficiency     5. Chronic atrial fibrillation     6. Warfarin-induced coagulopathy (HCC)  CBC    Comprehensive Metabolic Panel    Hemoglobin A1C    Lipid Panel    TSH without Reflex    PSA screening   7. Chronic deep vein thrombosis (DVT) of lower extremity, unspecified laterality, unspecified vein ([facility])     8. S/P laparoscopic sleeve gastrectomy     9. Vitamin D deficiency     10.  Chronic right-sided heart failure (HCC)  CBC    Comprehensive Metabolic Panel    Hemoglobin A1C    Lipid Panel    TSH without Reflex PSA screening   11. Peripheral vascular disease ([facility] [address]     12. Malignant neoplasm of transverse colon (HCC)  CBC    Comprehensive Metabolic Panel    Hemoglobin A1C    Lipid Panel    TSH without Reflex    PSA screening   13. Status post partial colectomy     14. Rotator cuff arthropathy of right shoulder  [facility]    XR SHOULDER RIGHT (MIN 2 VIEWS)   15. History of COVID-19     16. BMI 38.0-38.9,adult     17. Prediabetes  CBC    Comprehensive Metabolic Panel    Hemoglobin A1C    Lipid Panel    TSH without Reflex    PSA screening         Objective:   Physical Exam  Vitals and nursing note reviewed. Constitutional:       Appearance: He is well-developed. Comments: Obesity   HENT:      Head: Normocephalic and atraumatic. Right Ear: External ear normal.      Left Ear: External ear normal.      Nose: Nose normal.   Eyes:      Conjunctiva/sclera: Conjunctivae normal.      Pupils: Pupils are equal, round, and reactive to light. Cardiovascular:      Rate and Rhythm: Normal rate and regular rhythm. Heart sounds: Normal heart sounds. Comments: Chronic A. fib. Rate controlled  Chronic anticoagulation  Right heart failure  History of DVT  Pulmonary:      Effort: Pulmonary effort is normal.      Breath sounds: Normal breath sounds. Abdominal:      General: Bowel sounds are normal.      Palpations: Abdomen is soft. Comments: History of sleeve gastrectomy  History of colon cancer status post partial colectomy. He is established with colorectal surgery   Genitourinary:     Comments: ESRDhemodialysis x3/week  Musculoskeletal:         General: Normal range of motion. Cervical back: Normal range of motion and neck supple. Comments: Right rotator cuff tendinitis. 1 month status post fall. Sulcus sign is negative. Neurologically intact   Skin:     General: Skin is warm and dry. Neurological:      Mental Status: He is alert and oriented to person, place, and time. have ordered x-rays. He'll be scheduled with physical therapy. Increase range of motion as demonstrated to avoid adhesive capsulitis. May take over-the-counter Tylenol. Alternating heat and ice pack. Morbid obesity history of sleeve gastrectomy with regain of 15 to 20 pounds. Advised to comply with diet, lifestyle change and daily moderate exercise  ESRD hemodialysis dependent on 3/week that he is tolerating well  Anemia of chronic disease H&H is stable at baseline  Secondary hyperparathyroidism  History of colon malignancy status post partial colectomy. He has regular follow-up with colorectal surgery  BPH clinically stable. He is established with urology. History of DVT. He is on chronic anticoagulation. INR is 2.4 titrated by Coumadin clinic  Right heart failure. He is hemodialysis dependent. No recent cardiac decompensation  Prediabetes with A1c of 6.1. He is advised to comply with diet lifestyle change and weight reduction  Med list and available labs reviewed, discussed with patient, questions answered  Further recommendations to follow updated labs  He appears anxious heart denies being depressed  He denies tobacco, excessive alcohol or illicit drug use  He has good family support  This note is created with a voice recognition program and while intend to generate a document that accurately reflects the content of the visit, no guarantee can be provided that every mistake has been identified and corrected by editing.           Marleny Huertas MD

## 2021-10-20 ENCOUNTER — HOSPITAL ENCOUNTER (OUTPATIENT)
Dept: PHYSICAL THERAPY | Facility: CLINIC | Age: 51
Setting detail: THERAPIES SERIES
Discharge: HOME | End: 2021-10-20
Payer: COMMERCIAL

## 2021-10-20 PROCEDURE — 97161 PT EVAL LOW COMPLEX 20 MIN: CPT

## 2021-10-20 NOTE — FLOWSHEET NOTE
Tristan Fall Risk Assessment    Patient Name:  Mami Avila  : 1970        Risk Factor Scale  Score   History of Falls [] Yes  [x] No - 1 fall d/t missing step 25  0 0   Secondary Diagnosis [] Yes  [x] No 15  0 0   Ambulatory Aid [] Furniture  [] Crutches/cane/walker  [x] None/bedrest/wheelchair/nurse 30  15  0 0   IV/Heparin Lock [] Yes  [x] No 20  0 0   Gait/Transferring [] Impaired  [] Weak  [x] Normal/bedrest/immobile 20  10  0 0   Mental Status [] Forgets limitations  [x] Oriented to own ability 15  0 0      Total: 0     Based on the Assessment score: check the appropriate box.     [x]  No intervention needed   Low =   Score of 0-24    []  Use standard prevention interventions Moderate =  Score of 24-44   [] Give patient handout and discuss fall prevention strategies   [] Establish goal of education for patient/family RE: fall prevention strategies    []  Use high risk prevention interventions High = Score of 45 and higher   [] Give patient handout and discuss fall prevention strategies   [] Establish goal of education for patient/family Re: fall prevention strategies   [] Discuss lifeline / other resources    Electronically signed by:   Brian Hutton PT  Date: 10/20/2021

## 2021-10-20 NOTE — CONSULTS
[x] [address] [facility]  Outpatient Rehabilitation &  Therapy  [place] [address]   [address]  P: [phone]  F: [phone] [] [address] [facility]  [address] [facility]   [address]  P: [phone]  F: [phone]       Physical Therapy Upper Extremity Extremity Evaluation    Date:  10/20/2021  Patient: Mami Avila  : 1970  MRN: [id number]  Physician: Dr. Michelle Treviño MD   Insurance: [employer] (Auth after eval)  Medical Diagnosis: M12.811 - Rotator cuff arthropathy of right shoulder    Rehab Codes: M25.511, M62.81  Onset date: 9/10/2021  Next 's appt. : 2022     Subjective:   CC: R shoulder pain  HPI: Pt reports that he fell on his R shoulder on 9/10/2021. Pt reports that he was going down cement steps and missed a step and fell down onto his R side. Pt reports that shoulder pain lingered for awhile and he had difficulty with mobility. Pt reports that he saw his PCP on 10/13/2021 whom gave him stretches and he reports that he feels significantly better. Pt reports that he had surgery on his R shoulder approximately 15 years ago. Pt denies neck pain or numbness and tingling.      PMHx: [] Unremarkable [] Diabetes [x] HTN  [] Pacemaker   [x] MI/Heart Problems [x] Cancer (colon) [x] Arthritis [x] Other: HLD, hypothyroidism              [] Refer to full medical chart  In EPIC       Comorbidities:   [x] Obesity [x] Dialysis  [] N/A   [] Asthma/COPD [] Dementia [] Other:   [] Stroke [] Sleep apnea [] Other:   [] Vascular disease [] Rheumatic disease [] Other:     Tests: [x] X-Ray: [] MRI:  [] Other:    Medications: [x] Refer to full medical record  Allergies:      [x] Refer to full medical record     Function:  Hand Dominance  [x] Right   Employement N/A   Job status --   Work Activities/duties  --   Recreational Activities Riding bike     ADL/IADL Previous level of function Current level of function Who currently assists the patient with task   All ADL's  [x] Independent  [] Assist [x] Independent  [] Assist      Gait Prior level of function Current level of function    [x] Independent  [] Assist [x] Independent  [] Assist   Device: [x] Independent [x] Independent       Pain present? No   Location R shoulder   Pain Rating currently 0/10   Pain altered treatment N/A   Pain at worse 2/10   Pain at best 0/10   Description of pain Intermittent, dull   Altered Sensation N/A   What makes it worse N/A   What makes it better Exercises   Symptom progression Improving   Sleep Not disturbed           Objective:       ROM  °A/P END FEEL STRENGTH TESTS (+/-) Left Right Not Tested    Left Right  Left Right Drop Arm   []   Sit Shld Flex 170 175  5 5 Sulcus Sign   []   Sit Shld Abd 153 152  5- 5- Apprehension   []   Sit Shld IR T2 T2  5 5 Yergasons   []   Shoulder Flex      Speeds - - []   Ext      Neer - - []   ABD      Hills  - - []   ER @ 0  90 90  5 5 Painful Arc - - []   IR      Tinel   []   Elbow Flex. 5 5       Elbow Ext.    5 5           110# 86#       Scapular strength 4/5 globally    OBSERVATION No Deficit Deficit Not Tested Comments   Forward Head [] [x] []    Rounded Shoulders [] [x] []    Kyphosis [x] [] []    Scap Height/Position [x] [] []    Winging [] [x] []    SH Rhythm [x] [] []    INSPECTION/PALPATION       SC/AC Joint [x] [] []    Supraspinatus [x] [] []    Biceps tendon/groove [x] [] []    Posterior shld [x] [] []    Subscapularis [x] [] []    NEUROLOGICAL       Cervical ROM/Quadrant [x] [] []    Reflexes [x] [] []    Compression/Distraction [x] [] []    Sensation [x] [] []      Functional Test: Upper Extremity Functional Scale (UEFS) Score:11% functionally impaired     Comments:    Assessment:  Pt reports that on 9/10/2021 he missed a step and fell onto his R side. Pt reports significant shoulder pain and mobility impairments that lasted a few weeks.  Pt reports that he saw his PCP on 10/13/2021 and was given stretches and told to increase his R shoulder mobility. Pt reports compliance to stretches and notes significant improvements in R shoulder function. Pt reports that he has minimal difficulty with activities with his R shoulder at this time. Pt presents with slight impairments in R shoulder strength and global scapular stability weakness. Patient would benefit from skilled physical therapy services in order to: increase scapular and R UE strength in order to improve tolerance to repetitive, heavier activities without increases in pain levels noted in order to tolerate all daily and household tasks without increased pain or difficulty. Problems:    [x] ? Pain: R shoulder, 2/10  [] ? ROM:  [x] ? Strength: R UE weakness and scapular strength impairments  [x] ? Function: UEFS = 11% functional impairment  [] Other:       STG: (to be met in 5 treatments)  1. ? Pain: Pt will report maximal pain levels of <2/10 with repetitive OH and lifting activities. 2. ? ROM: Pt will continue to demonstrate full R shoulder AROM in order to improve use of R UE with all daily tasks. 3. ? Strength:   a. Pt will increase scapular strength to 4+/5 globally in order to decrease risk of further R UE impairments with repetitive household tasks. b. Pt will increase R  strength by 10 pounds in order to improve tolerance to lifting and carrying tasks. 4. Patient to be independent with home exercise program as demonstrated by performance with correct form without cues. Patient goals: \"pain free\"    Rehab Potential:  [x] Good  [] Fair  [] Poor   Suggested Professional Referral:  [x] No  [] Yes:  Barriers to Goal Achievement:  [x] No  [] Yes:  Domestic Concerns:  [x] No  [] Yes:    Pt. Education:  [x] Plans/Goals, Risks/Benefits discussed  [x] Home exercise program    Method of Education: [x] Verbal  [x] Demo  [x] Written  10/20/21 reviewed HEP patient performing from PCP and gave patient resistive shoulder exercises;  [employer]: [id number] Comprehension of Education:  [x] Verbalizes understanding. [x] Demonstrates understanding. [x] Needs Review. [] Demonstrates/verbalizes understanding of HEP/Ed previously given. Treatment Plan:  [x] Therapeutic Exercise   64651  [] Iontophoresis: 4 mg/mL Dexamethasone Sodium Phosphate  mAmin  04879   [] Therapeutic Activity  47325 [x] Vasopneumatic cold with compression  99882    [] Gait Training   68692 [] Ultrasound   02216   [x] Neuromuscular Re-education  01107 [] Electrical Stimulation Unattended  16501   [x] Manual Therapy  78851 [] Electrical Stimulation Attended  36011   [x] Instruction in HEP  [] Lumbar/Cervical Traction  72081   [] Aquatic Therapy   50290 [x] Cold/hotpack    [] Massage   84042      [] Dry Needling, 1 or 2 muscles  18902   [] Biofeedback, first 15 minutes   27988  [] Biofeedback, additional 15 minutes   78916 [] Dry Needling, 3 or more muscles  68682     []  Medication allergies reviewed for use of    Dexamethasone Sodium Phosphate 4mg/ml     with iontophoresis treatments. Pt is not allergic.     Frequency:  1-2 x/week for 5 visits        Todays Treatment:  Modalities:   Precautions:  Exercises:  Exercise Reps/ Time Weight/ Level Comments   Resistive shoulder HEP x Lime/  purple IR, ER, bilat ER, abduction, extension, row                           Other:    Specific Instructions for next treatment: scapular strengthening      Evaluation Complexity:  History (Personal factors, comorbidities) [] 0 [] 1-2 [x] 3+   Exam (limitations, restrictions) [x] 1-2 [] 3 [] 4+   Clinical presentation (progression) [x] Stable [] Evolving  [] Unstable   Decision Making [x] Low [] Moderate [] High    [x] Low Complexity [] Moderate Complexity [] High Complexity       Treatment Charges: Mins Units   [x] Evaluation       [x]  Low       []  Moderate       []  High 30 1   []  Modalities     [x]  Ther Exercise 5 --   []  Manual Therapy     []  Ther Activities     []  Aquatics     [] Vasocompression     []  Other       TOTAL TREATMENT TIME: 35    Time in: 7:50 am   Time Out: 8:30 am    Electronically signed by: Neha Ovalles PT        Physician Signature:________________________________Date:__________________  By signing above or cosigning this note, I have reviewed this plan of care and certify a need for medically necessary rehabilitation services.      *PLEASE SIGN ABOVE AND FAX BACK ALL PAGES*

## 2021-10-22 ENCOUNTER — HOSPITAL ENCOUNTER (OUTPATIENT)
Dept: PHARMACY | Age: 51
Setting detail: THERAPIES SERIES
Discharge: HOME | End: 2021-10-22
Payer: COMMERCIAL

## 2021-10-22 DIAGNOSIS — I82.409 DEEP VEIN THROMBOSIS (DVT) OF LOWER EXTREMITY, UNSPECIFIED CHRONICITY, UNSPECIFIED LATERALITY, UNSPECIFIED VEIN (HCC): Primary | ICD-10-CM

## 2021-10-22 LAB
INR BLD: 3.1
PROTIME: 36.9 SECONDS

## 2021-10-22 PROCEDURE — 99211 OFF/OP EST MAY X REQ PHY/QHP: CPT

## 2021-10-22 PROCEDURE — 85610 PROTHROMBIN TIME: CPT

## 2021-10-22 NOTE — PROGRESS NOTES
Medication Management Service, Warfarin Management  RAUL GARCIA [facility], [phone]  Visit Date: 10/22/2021   Subjective:   Jorge Justice is a 46 y.o. male who presents to clinic today for anticoagulation monitoring and adjustment. Patient seen in clinic for warfarin management due to  Indication:   atrial fibrillation and DVT. INR goal: of 2.0-3.0. Duration of therapy: indefinite. Assessment and PLAN   PT/INR done in office per protocol. INR today is 3.1, supratherapeutic. Patient wilfred any changes affecting his INR. Will not change his regimen at this time. Plan: Will continue current regimen of warfarin 6 mg every day. Using warfarin 6 mg tablets. Recheck INR in 5 weeks. Patient seen in room # 1. ED. OP. = Changed warfarin's tablet strength to 6 mg instead of 4 mg. Patient attested to self screening for COVID - 19. Patient verbalized understanding of dosing directions and information discussed. Dosing schedule given to patient. Progress note sent to referring office. Patient acknowledges working in consult agreement with pharmacist as referred by his/her physician.       Electronically signed by Migue Aguero on 10/22/21 at 8:17 AM EDT  =====================    For Pharmacy Admin Tracking Only     Intervention Detail:    Total # of Interventions Recommended: 0   Total # of Interventions Accepted: 0   Time Spent (min): 20

## 2021-10-28 RX ORDER — CHOLECALCIFEROL (VITAMIN D3) 1250 MCG
CAPSULE ORAL
Qty: 3 CAPSULE | Refills: 2 | Status: SHIPPED | OUTPATIENT
Start: 2021-10-28 | End: 2022-09-23 | Stop reason: SDUPTHER

## 2021-10-28 NOTE — TELEPHONE ENCOUNTER
Hannah Lennox is calling to request a refill on the following medication(s):    Medication Request:    Last filled 10/8/2021 #3 with 3 RF    Requested Prescriptions     Pending Prescriptions Disp Refills    Cholecalciferol (VITAMIN D3) 1.25 MG (64428 UT) CAPS [Pharmacy Med Name: VITAMIN D3 50,000 CAPSULES] 3 capsule 2     Sig: TAKE ONE CAPSULE BY MOUTH ONCE A MONTH       Last Visit Date (If Applicable):  61/11/2305    Next Visit Date:    1/17/2022

## 2021-11-08 RX ORDER — FOLIC ACID/VIT B COMPLEX AND C 0.8 MG
TABLET ORAL
Qty: 90 TABLET | Refills: 0 | Status: SHIPPED | OUTPATIENT
Start: 2021-11-08 | End: 2022-02-07

## 2021-11-08 NOTE — TELEPHONE ENCOUNTER
Villa Yee is calling to request a refill on the following medication(s):    Medication Request:    Last filled 8/6/21 #90 with 0 RF    Requested Prescriptions     Pending Prescriptions Disp Refills    B Complex-C-Folic Acid (NEPHRO-JENNIFER) 0.8 MG TABS [Pharmacy Med Name: Korin Dense 90 tablet 0     Sig: TAKE ONE TABLET BY MOUTH DAILY WITH BREAKFAST       Last Visit Date (If Applicable):  46/09/4232    Next Visit Date:    1/17/2022

## 2021-11-29 ENCOUNTER — HOSPITAL ENCOUNTER (OUTPATIENT)
Dept: PHARMACY | Age: 51
Setting detail: THERAPIES SERIES
Discharge: HOME | End: 2021-11-29
Payer: COMMERCIAL

## 2021-11-29 DIAGNOSIS — I82.409 DEEP VEIN THROMBOSIS (DVT) OF LOWER EXTREMITY, UNSPECIFIED CHRONICITY, UNSPECIFIED LATERALITY, UNSPECIFIED VEIN (HCC): Primary | ICD-10-CM

## 2021-11-29 LAB
INR BLD: 3.1
PROTIME: 37.3 SECONDS

## 2021-11-29 PROCEDURE — 85610 PROTHROMBIN TIME: CPT

## 2021-11-29 PROCEDURE — 99212 OFFICE O/P EST SF 10 MIN: CPT

## 2021-11-29 NOTE — PROGRESS NOTES
Medication Management Service, Warfarin Management  RAUL GARCIA [facility], [phone]  Visit Date: 11/29/2021   Subjective:   Krzysztof Tena is a 46 y.o. male who presents to clinic today for anticoagulation monitoring and adjustment. Patient seen in clinic for warfarin management due to  Indication:   atrial fibrillation and DVT. INR goal: of 2.0-3.0. Duration of therapy: indefinite. Assessment and PLAN   PT/INR done in office per protocol. INR today is 3.1, supratherapeutic. Patient started amiodarone 2 weeks ago       Plan: Will decrease current regimen of warfarin by 7.1%. New regimen of warfarin  3 mg Mon, and 6 mg all other days of the week. Using warfarin 6 mg tablets. Recheck INR in 1.5 week(s). Patient seen in room # 1. ED. OP. = discussed the importance of more frequent checks the next 2 months. Patient attested to self screening for COVID - 19. Patient verbalized understanding of dosing directions and information discussed. Dosing schedule given to patient. Progress note sent to referring office. Patient acknowledges working in consult agreement with pharmacist as referred by his/her physician.       Electronically signed by Kendal Alicea on 11/29/21 at 8:22 AM EST    For Pharmacy Admin Tracking Only     Intervention Detail: Dose Adjustment: 1, reason: Interaction   Total # of Interventions Recommended: 1   Total # of Interventions Accepted: 1   Time Spent (min): 20

## 2021-12-01 ENCOUNTER — OFFICE VISIT (OUTPATIENT)
Dept: DERMATOLOGY | Age: 51
End: 2021-12-01
Payer: COMMERCIAL

## 2021-12-01 VITALS
HEIGHT: 70 IN | BODY MASS INDEX: 38.25 KG/M2 | TEMPERATURE: 97.9 F | HEART RATE: 93 BPM | OXYGEN SATURATION: 98 % | DIASTOLIC BLOOD PRESSURE: 57 MMHG | SYSTOLIC BLOOD PRESSURE: 84 MMHG | WEIGHT: 267.2 LBS

## 2021-12-01 DIAGNOSIS — L73.2 HIDRADENITIS SUPPURATIVA: Primary | ICD-10-CM

## 2021-12-01 PROCEDURE — G8417 CALC BMI ABV UP PARAM F/U: HCPCS | Performed by: DERMATOLOGY

## 2021-12-01 PROCEDURE — 99213 OFFICE O/P EST LOW 20 MIN: CPT | Performed by: DERMATOLOGY

## 2021-12-01 PROCEDURE — G8484 FLU IMMUNIZE NO ADMIN: HCPCS | Performed by: DERMATOLOGY

## 2021-12-01 PROCEDURE — 3017F COLORECTAL CA SCREEN DOC REV: CPT | Performed by: DERMATOLOGY

## 2021-12-01 PROCEDURE — 1036F TOBACCO NON-USER: CPT | Performed by: DERMATOLOGY

## 2021-12-01 PROCEDURE — G8427 DOCREV CUR MEDS BY ELIG CLIN: HCPCS | Performed by: DERMATOLOGY

## 2021-12-01 RX ORDER — CLINDAMYCIN PHOSPHATE 10 UG/ML
LOTION TOPICAL
Qty: 60 ML | Refills: 11 | Status: SHIPPED | OUTPATIENT
Start: 2021-12-01

## 2021-12-01 NOTE — PROGRESS NOTES
Dermatology Patient Note  [facility] [address]  [address]  [place] [address]  Dept: [phone]  Dept Fax: [phone]      VISITDATE: 12/1/2021   REFERRING PROVIDER: No ref. provider found      Lisa Chanel is a 46 y.o. male  who presents today in the office for:    Follow-up (HS- using  BPO wash, hibiclens, clindamycin lotion, and doxycycline. No areas of break out)      HISTORY OF PRESENT ILLNESS:  As above. Patient is no longer on doxycycline. MEDICAL PROBLEMS:  Patient Active Problem List    Diagnosis Date Noted    Prediabetes 05/14/2021    BMI 38.0-38.9,adult 03/17/2021    History of COVID-19 01/08/2021    Rotator cuff arthropathy of right shoulder 10/14/2019    Status post partial colectomy 01/22/2019    Malignant neoplasm of transverse colon ([facility] 12/10/2018    Right heart failure ([facility] 06/18/2018    Vitamin D deficiency 04/03/2018    S/P laparoscopic sleeve gastrectomy 12/18/2017    DVT (deep venous thrombosis) (HCC) 11/21/2017    Chronic atrial fibrillation 09/18/2017    Warfarin-induced coagulopathy ([facility] 09/18/2017    Erectile dysfunction due to arterial insufficiency 03/22/2017    ESRD on hemodialysis ([facility] 12/02/2016    Benign prostatic hyperplasia with lower urinary tract symptoms 09/07/2016       CURRENT MEDICATIONS:   Current Outpatient Medications   Medication Sig Dispense Refill    benzoyl peroxide 5 % external liquid Use as wash every other day 227 g 11    clindamycin (CLEOCIN T) 1 % lotion Apply to affected areas daily 60 mL 11    B Complex-C-Folic Acid (NEPHRO-JENNIFER) 0.8 MG TABS TAKE ONE TABLET BY MOUTH DAILY WITH BREAKFAST 90 tablet 0    Cholecalciferol (VITAMIN D3) 1.25 MG (21694 UT) CAPS TAKE ONE CAPSULE BY MOUTH ONCE A MONTH 3 capsule 2    warfarin (COUMADIN) 6 MG tablet Take one tablet (or as directed by Medication Management) by mouth once daily.  90 DS *new tab strength* 90 tablet 1    acetaminophen (TYLENOL) 325 MG tablet Take 2 tablets by mouth every 6 hours as needed for Pain 60 tablet 0    doxycycline hyclate (VIBRAMYCIN) 100 MG capsule TAKE 1 CAPSULE BY MOUTH TWICE DAILY WITH FOOD 60 capsule 0    pravastatin (PRAVACHOL) 40 MG tablet Take 1 tablet by mouth nightly 90 tablet 1    midodrine (PROAMATINE) 10 MG tablet TAKE ONE TABLET BY MOUTH THREE TIMES DAILY (Patient taking differently: Take 10 mg by mouth three times a week ON DIALYSIS DAYS) 270 tablet 1    vitamin D (ERGOCALCIFEROL) 1.25 MG (43439 UT) CAPS capsule TAKE ONE CAPSULE BY MOUTH ONCE A MONTH 3 capsule 3    cinacalcet (SENSIPAR) 30 MG tablet Take 30 mg by mouth three times a week AT DIALYSIS      ASPIRIN LOW DOSE 81 MG EC tablet TAKE ONE TABLET BY MOUTH DAILY 90 tablet 1    doxycycline hyclate (VIBRAMYCIN) 100 MG capsule Take one tablet PO daily (Patient not taking: Reported on 12/1/2021) 30 capsule 0    ondansetron (ZOFRAN) 4 MG tablet Take 1 tablet by mouth every 8 hours as needed for Nausea or Vomiting (Patient not taking: Reported on 12/1/2021) 20 tablet 0    Elastic Bandages & Supports (MEDICAL COMPRESSION STOCKINGS) MISC 1 each by Does not apply route daily as needed (as needed) Right leg below the knee 20-30 HH MM DX 82.409 (Patient not taking: Reported on 12/1/2021) 1 each 0     No current facility-administered medications for this visit. ALLERGIES:   No Known Allergies    SOCIAL HISTORY:  Social History     Tobacco Use    Smoking status: Never Smoker    Smokeless tobacco: Never Used   Substance Use Topics    Alcohol use: Not Currently       Pertinent ROS:  Review of Systems  Skin: Denies any new changing, growing or bleeding lesions or rashes except as described in the HPI   Constitutional: Denies fevers, chills, and malaise.     PHYSICAL EXAM:   BP (!) 84/57   Pulse 93   Temp 97.9 °F (36.6 °C)   Ht 5' 10\" (1.778 m)   Wt 267 lb 3.2 oz (121.2 kg)   SpO2 98%   BMI 38.34 kg/m²     The patient is generally well appearing, well nourished, alert and conversational. Affect is normal.    Cutaneous Exam:  Physical Exam  Focused exam of buttocks was performed    Facial covering was not removed during examination. Diagnoses/exam findings/medical history pertinent to this visit are listed below:    Assessment:   Diagnosis Orders   1. Hidradenitis suppurativa  benzoyl peroxide 5 % external liquid    clindamycin (CLEOCIN T) 1 % lotion        Plan:  Hidradenitis suppurativa, buttocks  - stable chronic illness, no new breakouts  - refills of bpo wash and clindamycin lotion    RTC 1 year     Future Appointments   Date Time Provider Jonathan Gloria   12/8/2021  8:20 AM [facility]   1/17/2022  9:15 AM Sheridan Murphy MD [facility]   12/5/2022  9:15 AM Richard Eldridge MD  [facility]         There are no Patient Instructions on file for this visit. This note was created with the assistance of a speech-recognition program.  Although the intention is to generate a document that actually reflects the content of the visit, no guarantees can be provided that every mistake has been identified and corrected by editing. I, Dr. Khoi Hastings, personally performed the services described in this documentation, as scribed by [facility] in my presence, and it is both accurate and complete.      Electronically signed by Richard Eldridge MD on 12/1/21 at 9:00 AM EST

## 2021-12-08 ENCOUNTER — HOSPITAL ENCOUNTER (OUTPATIENT)
Dept: PHARMACY | Age: 51
Setting detail: THERAPIES SERIES
Discharge: HOME | End: 2021-12-08
Payer: COMMERCIAL

## 2021-12-08 LAB
INR BLD: 2.1
PROTIME: 25.1 SECONDS

## 2021-12-08 PROCEDURE — 85610 PROTHROMBIN TIME: CPT

## 2021-12-08 PROCEDURE — 99211 OFF/OP EST MAY X REQ PHY/QHP: CPT

## 2021-12-08 RX ORDER — AMIODARONE HYDROCHLORIDE 200 MG/1
200 TABLET ORAL DAILY
COMMUNITY

## 2021-12-08 NOTE — PROGRESS NOTES
Medication Management Service, Warfarin Management  [facility], [phone]  Visit Date: 12/8/2021   Subjective:   Ursula Ramirez is a 46 y.o. male who presents to clinic today for anticoagulation monitoring and adjustment. Patient seen in clinic for warfarin management due to  Indication:   atrial fibrillation and DVT. INR goal: of 2.0-3.0. Duration of therapy: indefinite. Assessment and PLAN   PT/INR done in office per protocol. INR today is 2.1, therapeutic. Patient reports continued use of amiodarone, added med to list, now at 3 week point. Plan: Will continue current regimen of warfarin  3 mg Mondays only and 6 mg all other days of the week. Using warfarin 6 mg tablets. Recheck INR in 2 week(s). Patient seen in room # 3. ED. OP. = reviewed warfarin and amiodarone interaction, and updated med list to include amiodarone. Patient attested to self screening for COVID - 19. Patient verbalized understanding of dosing directions and information discussed. Dosing schedule given to patient. Progress note sent to referring office. Patient acknowledges working in consult agreement with pharmacist as referred by his/her physician.       Anaya Valdovinos RPh, CACP  Clinical Pharmacist Medication Management  12/8/2021  8:50 AM      For Pharmacy Admin Tracking Only     Total # of Interventions Recommended: 0   Total # of Interventions Accepted: 0   Time Spent (min): 15

## 2021-12-20 ENCOUNTER — HOSPITAL ENCOUNTER (OUTPATIENT)
Dept: PHARMACY | Age: 51
Setting detail: THERAPIES SERIES
Discharge: HOME | End: 2021-12-20
Payer: COMMERCIAL

## 2021-12-20 LAB
INR BLD: 2.6
PROTIME: 31.5 SECONDS

## 2021-12-20 PROCEDURE — 99211 OFF/OP EST MAY X REQ PHY/QHP: CPT

## 2021-12-20 PROCEDURE — 85610 PROTHROMBIN TIME: CPT

## 2021-12-20 NOTE — PROGRESS NOTES
Medication Management Service, Warfarin Management  [facility], [phone]  Visit Date: 12/20/2021   Subjective:   Caron Stevenson is a 46 y.o. male who presents to clinic today for anticoagulation monitoring and adjustment. Patient seen in clinic for warfarin management due to  Indication:   atrial fibrillation and DVT. INR goal: of 2.0-3.0. Duration of therapy: indefinite. Assessment and PLAN   PT/INR done in office per protocol. INR today is 2.6, therapeutic. Patient reports continued use of amiodarone, now at 5 week point. Plan: Will continue current regimen of warfarin  3 mg Mondays only and 6 mg all other days of the week. Using warfarin 6 mg tablets. Recheck INR in 2 week(s). Patient seen in room # 3. ED. OP. = reminded Taylor Stabs to be consistent with green vegetable intake, may increase slightly but more important to not decrease. Patient attested to self screening for COVID - 19. Patient verbalized understanding of dosing directions and information discussed. Dosing schedule given to patient. Progress note sent to referring office. Patient acknowledges working in consult agreement with pharmacist as referred by his/her physician.       Anaya Tricia, RPh, CACP  Clinical Pharmacist Medication Management  12/20/2021  8:48 AM      For Pharmacy Admin Tracking Only     Total # of Interventions Recommended: 0   Total # of Interventions Accepted: 0   Time Spent (min): 15

## 2022-01-03 ENCOUNTER — HOSPITAL ENCOUNTER (OUTPATIENT)
Dept: PHARMACY | Age: 52
Setting detail: THERAPIES SERIES
Discharge: HOME | End: 2022-01-03
Payer: COMMERCIAL

## 2022-01-03 LAB
INR BLD: 2.6
PROTIME: 31 SECONDS

## 2022-01-03 PROCEDURE — 99211 OFF/OP EST MAY X REQ PHY/QHP: CPT

## 2022-01-03 PROCEDURE — 85610 PROTHROMBIN TIME: CPT

## 2022-01-03 NOTE — PROGRESS NOTES
Medication Management Service, Warfarin Management  [facility], [phone]  Visit Date: 1/3/2022   Subjective:   Jovanny Ackerman is a 46 y.o. male who presents to clinic today for anticoagulation monitoring and adjustment. Patient seen in clinic for warfarin management due to  Indication:   atrial fibrillation and DVT. INR goal: of 2.0-3.0. Duration of therapy: indefinite. Assessment and PLAN   PT/INR done in office per protocol. INR today is 2.6, therapeutic. Patient reports continued use of amiodarone, now at 8 week point. Plan: Will continue current regimen of warfarin  3 mg Mondays only and 6 mg all other days of the week. Using warfarin 6 mg tablets. Recheck INR in 4 week(s). Patient seen in room # 3. ED. OP. = will continue with the same plan for green vegetables;  OK for slight increase but more important to not decrease. Patient attested to self screening for COVID - 19. Patient verbalized understanding of dosing directions and information discussed. Dosing schedule given to patient. Progress note sent to referring office. Patient acknowledges working in consult agreement with pharmacist as referred by his/her physician.       Janene Mcfarland RPh, CACP  Clinical Pharmacist Medication Management  1/3/2022  8:34 AM      For Pharmacy Admin Tracking Only     Total # of Interventions Recommended: 0   Total # of Interventions Accepted: 0   Time Spent (min): 15

## 2022-01-19 ENCOUNTER — OFFICE VISIT (OUTPATIENT)
Dept: INTERNAL MEDICINE CLINIC | Age: 52
End: 2022-01-19
Payer: COMMERCIAL

## 2022-01-19 VITALS
DIASTOLIC BLOOD PRESSURE: 78 MMHG | HEIGHT: 70 IN | TEMPERATURE: 97.9 F | RESPIRATION RATE: 20 BRPM | BODY MASS INDEX: 38.8 KG/M2 | HEART RATE: 75 BPM | SYSTOLIC BLOOD PRESSURE: 118 MMHG | WEIGHT: 271 LBS | OXYGEN SATURATION: 99 %

## 2022-01-19 DIAGNOSIS — D68.32 WARFARIN-INDUCED COAGULOPATHY (HCC): ICD-10-CM

## 2022-01-19 DIAGNOSIS — N18.6 ESRD ON HEMODIALYSIS (HCC): ICD-10-CM

## 2022-01-19 DIAGNOSIS — E55.9 VITAMIN D DEFICIENCY: ICD-10-CM

## 2022-01-19 DIAGNOSIS — N52.01 ERECTILE DYSFUNCTION DUE TO ARTERIAL INSUFFICIENCY: ICD-10-CM

## 2022-01-19 DIAGNOSIS — I82.401 DEEP VEIN THROMBOSIS (DVT) OF RIGHT LOWER EXTREMITY, UNSPECIFIED CHRONICITY, UNSPECIFIED VEIN (HCC): ICD-10-CM

## 2022-01-19 DIAGNOSIS — I50.812 CHRONIC RIGHT-SIDED HEART FAILURE (HCC): ICD-10-CM

## 2022-01-19 DIAGNOSIS — N40.1 BENIGN PROSTATIC HYPERPLASIA WITH LOWER URINARY TRACT SYMPTOMS, SYMPTOM DETAILS UNSPECIFIED: Primary | ICD-10-CM

## 2022-01-19 DIAGNOSIS — Z99.2 ESRD ON HEMODIALYSIS (HCC): ICD-10-CM

## 2022-01-19 DIAGNOSIS — Z98.84 S/P LAPAROSCOPIC SLEEVE GASTRECTOMY: ICD-10-CM

## 2022-01-19 DIAGNOSIS — M12.811 ROTATOR CUFF ARTHROPATHY OF RIGHT SHOULDER: ICD-10-CM

## 2022-01-19 DIAGNOSIS — Z86.16 HISTORY OF COVID-19: ICD-10-CM

## 2022-01-19 DIAGNOSIS — T45.515A WARFARIN-INDUCED COAGULOPATHY (HCC): ICD-10-CM

## 2022-01-19 DIAGNOSIS — C18.4 MALIGNANT NEOPLASM OF TRANSVERSE COLON (HCC): ICD-10-CM

## 2022-01-19 DIAGNOSIS — R73.03 PREDIABETES: ICD-10-CM

## 2022-01-19 DIAGNOSIS — Z90.49 STATUS POST PARTIAL COLECTOMY: ICD-10-CM

## 2022-01-19 DIAGNOSIS — I48.20 CHRONIC ATRIAL FIBRILLATION (HCC): ICD-10-CM

## 2022-01-19 PROCEDURE — 1036F TOBACCO NON-USER: CPT | Performed by: FAMILY MEDICINE

## 2022-01-19 PROCEDURE — 3017F COLORECTAL CA SCREEN DOC REV: CPT | Performed by: FAMILY MEDICINE

## 2022-01-19 PROCEDURE — 99214 OFFICE O/P EST MOD 30 MIN: CPT | Performed by: FAMILY MEDICINE

## 2022-01-19 PROCEDURE — G8417 CALC BMI ABV UP PARAM F/U: HCPCS | Performed by: FAMILY MEDICINE

## 2022-01-19 PROCEDURE — G8427 DOCREV CUR MEDS BY ELIG CLIN: HCPCS | Performed by: FAMILY MEDICINE

## 2022-01-19 PROCEDURE — G8484 FLU IMMUNIZE NO ADMIN: HCPCS | Performed by: FAMILY MEDICINE

## 2022-01-19 ASSESSMENT — ENCOUNTER SYMPTOMS
EYES NEGATIVE: 1
RESPIRATORY NEGATIVE: 1
ALLERGIC/IMMUNOLOGIC NEGATIVE: 1
BACK PAIN: 1
GASTROINTESTINAL NEGATIVE: 1

## 2022-01-19 NOTE — PROGRESS NOTES
Subjective:      Patient ID: Star Wiggins is a 46 y.o. male. Hyperlipidemia  This is a chronic problem. The current episode started more than 1 month ago. The problem is uncontrolled. Recent lipid tests were reviewed and are high. Exacerbating diseases include obesity. Factors aggravating his hyperlipidemia include fatty foods. Current antihyperlipidemic treatment includes statins. The current treatment provides no improvement of lipids. Compliance problems include adherence to exercise and adherence to diet. Risk factors for coronary artery disease include a sedentary lifestyle, male sex, hypertension, diabetes mellitus, dyslipidemia, obesity and stress. Review of Systems   Constitutional: Negative. HENT: Negative. Eyes: Negative. Respiratory: Negative. Cardiovascular: Negative. Gastrointestinal: Negative. Endocrine: Negative. Musculoskeletal: Positive for arthralgias and back pain. Skin: Negative. Allergic/Immunologic: Negative. Neurological: Negative. Hematological: Negative. Psychiatric/Behavioral: The patient is nervous/anxious. Past family and social history unremarkable. Diagnosis Orders   1. Benign prostatic hyperplasia with lower urinary tract symptoms, symptom details unspecified     2. ESRD on hemodialysis ([address]     3. Erectile dysfunction due to arterial insufficiency     4. Chronic atrial fibrillation     5. Warfarin-induced coagulopathy ([address]     6. Deep vein thrombosis (DVT) of right lower extremity, unspecified chronicity, unspecified vein (HCC)     7. S/P laparoscopic sleeve gastrectomy     8. Vitamin D deficiency     9. Chronic right-sided heart failure ([address]     10. Malignant neoplasm of transverse colon ([address]     11. Status post partial colectomy     12. Rotator cuff arthropathy of right shoulder     13. History of COVID-19     14. BMI 38.0-38.9,adult     15. Prediabetes           Objective:   Physical Exam  Vitals and nursing note reviewed. Constitutional:       Appearance: He is well-developed. Comments: Obesity with weight gain   HENT:      Head: Normocephalic and atraumatic. Right Ear: External ear normal.      Left Ear: External ear normal.      Nose: Nose normal.   Eyes:      Conjunctiva/sclera: Conjunctivae normal.      Pupils: Pupils are equal, round, and reactive to light. Cardiovascular:      Rate and Rhythm: Normal rate and regular rhythm. Heart sounds: Normal heart sounds. Comments: Chronic A. fib  Right heart failure  Hypertension  Hyperlipidemia  Neurocardiogenic syncope  Chronic anticoagulation  Pulmonary:      Effort: Pulmonary effort is normal.      Breath sounds: Normal breath sounds. Abdominal:      General: Bowel sounds are normal.      Palpations: Abdomen is soft. Comments: Anemia of chronic disease  History of sleeve gastrectomy with regain of weight secondary to noncompliance   Genitourinary:     Comments: ESRD hemodialysis dependent on 3/week  Musculoskeletal:         General: Normal range of motion. Cervical back: Normal range of motion and neck supple. Skin:     General: Skin is warm and dry. Neurological:      Mental Status: He is alert and oriented to person, place, and time. Deep Tendon Reflexes: Reflexes are normal and symmetric. Psychiatric:      Comments: Anxious         Assessment:       Diagnosis Orders   1. Benign prostatic hyperplasia with lower urinary tract symptoms, symptom details unspecified     2. ESRD on hemodialysis ([address]     3. Erectile dysfunction due to arterial insufficiency     4. Chronic atrial fibrillation     5. Warfarin-induced coagulopathy ([address]     6. Deep vein thrombosis (DVT) of right lower extremity, unspecified chronicity, unspecified vein (HCC)     7. S/P laparoscopic sleeve gastrectomy     8. Vitamin D deficiency     9. Chronic right-sided heart failure ([address]     10. Malignant neoplasm of transverse colon ([address]     11.  Status post partial colectomy     12. Rotator cuff arthropathy of right shoulder     13. History of COVID-19     14. BMI 38.0-38.9,adult     15. Prediabetes             Plan:      53-year-old overweight -American male returns for follow-up. He is afebrile hemodynamically stable  ESRD hemodialysis dependent on 3/week that he is tolerating well  Anemia of chronic disease. H&H is stable at baseline  Secondary hyperparathyroidism  Chronic A. fib. Rate controlled he is on amiodarone and Coumadin 2.6. He is counseled Coumadin compatible diet. He is established with cardiology  Hyperlipidemia with significant worsening secondary to noncompliance with diet and weight loss. He is advised to continue statin that he is tolerating well, lifestyle change  History of right heart failure. Weight reduction is advised. He is diuresing well in negative fluid balance. He is established with cardiology. No recent decompensation  Acne on benzyl peroxide and Cleocin topical  Low vitamin D continue replacement  Degenerative polyarthralgia. May take over-the-counter Tylenol as needed  He appears anxious or denies being depressed  Neurocardiogenic syncope. He is on ProAmatine  He is updated on influenza and COVID vaccination  History of sleeve gastrectomy. Patient stated that initially he lost weight however regained secondary to noncompliance. Compliance is advised. He is advised to follow-up with bariatric service   Med list and available labs reviewed, discussed with patient, questions answered  He is encouraged to call for any concern  This note is created with a voice recognition program and while intend to generate a document that accurately reflects the content of the visit, no guarantee can be provided that every mistake has been identified and corrected by editing.        Rolanda Knutson MD

## 2022-01-20 ENCOUNTER — TELEPHONE (OUTPATIENT)
Dept: INTERNAL MEDICINE CLINIC | Age: 52
End: 2022-01-20

## 2022-01-20 RX ORDER — ATORVASTATIN CALCIUM 40 MG/1
TABLET, FILM COATED ORAL
COMMUNITY

## 2022-01-20 NOTE — TELEPHONE ENCOUNTER
----- Message from Linden Foy sent at 1/20/2022  1:47 PM EST -----  Subject: Message to Provider    QUESTIONS  Information for Provider? pt returned call with name of medicine taken for   cholesterol Atorvastatin 40 mg 1 tablet by mouth daily  ---------------------------------------------------------------------------  --------------  CALL BACK INFO  What is the best way for the office to contact you? OK to leave message on   voicemail  Preferred Call Back Phone Number?  [phone]  ---------------------------------------------------------------------------  --------------  SCRIPT ANSWERS  undefined

## 2022-01-28 ENCOUNTER — TELEPHONE (OUTPATIENT)
Dept: BARIATRICS/WEIGHT MGMT | Age: 52
End: 2022-01-28

## 2022-01-31 ENCOUNTER — HOSPITAL ENCOUNTER (OUTPATIENT)
Dept: PHARMACY | Age: 52
Setting detail: THERAPIES SERIES
Discharge: HOME | End: 2022-01-31
Payer: COMMERCIAL

## 2022-01-31 DIAGNOSIS — I82.4Y9 DEEP VEIN THROMBOSIS (DVT) OF PROXIMAL LOWER EXTREMITY, UNSPECIFIED CHRONICITY, UNSPECIFIED LATERALITY (HCC): Primary | ICD-10-CM

## 2022-01-31 LAB
INR BLD: 3.2
PROTIME: 38.3 SECONDS

## 2022-01-31 PROCEDURE — 99211 OFF/OP EST MAY X REQ PHY/QHP: CPT

## 2022-01-31 PROCEDURE — 85610 PROTHROMBIN TIME: CPT

## 2022-01-31 NOTE — PROGRESS NOTES
Medication Management Service, Warfarin Management  [facility]], [phone]  Visit Date: 1/31/2022   Subjective:   Tom Ortiz is a 46 y.o. male who presents to clinic today for anticoagulation monitoring and adjustment. Patient seen in clinic for warfarin management due to  Indication:   atrial fibrillation and DVT. INR goal: of 2.0-3.0. Duration of therapy: indefinite. Assessment and PLAN   PT/INR done in office per protocol. INR today is 3.2, supratherapeutic. Elevated INR due to alcohol intake during the weekend. Patient drinks beer occassional especially during football games on the weekend. Patient reports continued use of amiodarone, now at 12 weeks point.       Plan: Will continue current regimen of warfarin 3 mg on Mon; 6 mg all other days of the week since the cause of elevated INR is known and a little above goal.  Using warfarin 6 mg tablets. Recheck INR in 2 week(s). Patient seen in room # 1. ED. OP. = Educated patient on the impact of alcohol on INR. Patient attested to self screening for COVID - 19. Patient verbalized understanding of dosing directions and information discussed. Dosing schedule given to patient. Progress note sent to referring office. Patient acknowledges working in consult agreement with pharmacist as referred by his/her physician.       Electronically signed by Mamie Borrero on 1/31/22 at 7:58 AM EST    For Pharmacy Admin Tracking Only     Intervention Detail:    Total # of Interventions Recommended: 0   Total # of Interventions Accepted: 0   Time Spent (min): 15

## 2022-02-07 ENCOUNTER — HOSPITAL ENCOUNTER (EMERGENCY)
Age: 52
Discharge: HOME | End: 2022-02-07
Attending: EMERGENCY MEDICINE
Payer: COMMERCIAL

## 2022-02-07 ENCOUNTER — APPOINTMENT (OUTPATIENT)
Dept: GENERAL RADIOLOGY | Age: 52
End: 2022-02-07
Payer: COMMERCIAL

## 2022-02-07 VITALS
TEMPERATURE: 98.1 F | SYSTOLIC BLOOD PRESSURE: 135 MMHG | OXYGEN SATURATION: 100 % | DIASTOLIC BLOOD PRESSURE: 90 MMHG | HEART RATE: 88 BPM | RESPIRATION RATE: 16 BRPM

## 2022-02-07 DIAGNOSIS — M25.561 ACUTE PAIN OF RIGHT KNEE: Primary | ICD-10-CM

## 2022-02-07 PROCEDURE — 73564 X-RAY EXAM KNEE 4 OR MORE: CPT

## 2022-02-07 PROCEDURE — 90715 TDAP VACCINE 7 YRS/> IM: CPT | Performed by: STUDENT IN AN ORGANIZED HEALTH CARE EDUCATION/TRAINING PROGRAM

## 2022-02-07 PROCEDURE — 99283 EMERGENCY DEPT VISIT LOW MDM: CPT

## 2022-02-07 PROCEDURE — 90471 IMMUNIZATION ADMIN: CPT | Performed by: STUDENT IN AN ORGANIZED HEALTH CARE EDUCATION/TRAINING PROGRAM

## 2022-02-07 PROCEDURE — 6360000002 HC RX W HCPCS: Performed by: STUDENT IN AN ORGANIZED HEALTH CARE EDUCATION/TRAINING PROGRAM

## 2022-02-07 RX ORDER — FOLIC ACID/VIT B COMPLEX AND C 0.8 MG
TABLET ORAL
Qty: 90 TABLET | Refills: 0 | Status: SHIPPED | OUTPATIENT
Start: 2022-02-07 | End: 2022-05-11

## 2022-02-07 RX ADMIN — TETANUS TOXOID, REDUCED DIPHTHERIA TOXOID AND ACELLULAR PERTUSSIS VACCINE, ADSORBED 0.5 ML: 5; 2.5; 8; 8; 2.5 SUSPENSION INTRAMUSCULAR at 18:22

## 2022-02-07 ASSESSMENT — PAIN DESCRIPTION - LOCATION: LOCATION: KNEE

## 2022-02-07 ASSESSMENT — PAIN DESCRIPTION - DESCRIPTORS: DESCRIPTORS: ACHING;DISCOMFORT;SORE
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
## 2022-02-07 NOTE — TELEPHONE ENCOUNTER
Melanie Garcia is calling to request a refill on the following medication(s):    Medication Request:  Requested Prescriptions     Pending Prescriptions Disp Refills    B Complex-C-Folic Acid (NEPHRO-JENNIFER) 0.8 MG TABS [Pharmacy Med Name: Monserrat Frankel 90 tablet 0     Sig: TAKE 1 TABLET BY MOUTH DAILY WITH BREAKFAST     Last filled 11/8/21 90 day no refill  Order pending    Last Visit Date (If Applicable):  8/83/9332    Next Visit Date:    5/20/2022

## 2022-02-08 ENCOUNTER — TELEPHONE (OUTPATIENT)
Dept: INTERNAL MEDICINE CLINIC | Age: 52
End: 2022-02-08

## 2022-02-08 ASSESSMENT — ENCOUNTER SYMPTOMS
SHORTNESS OF BREATH: 0
CONSTIPATION: 0
COUGH: 0
VOMITING: 0
CHEST TIGHTNESS: 0
BACK PAIN: 0
DIARRHEA: 0
ABDOMINAL PAIN: 0
WHEEZING: 0
NAUSEA: 0

## 2022-02-08 NOTE — TELEPHONE ENCOUNTER
[facility] ([facility]) ED Follow up Call    Reason for ED visit:  Knee pain     2/8/2022     VOICEMAIL DOCUMENTATION - ERASE IF NOT USED  Hi, this message is for [place]. This is Soni Bronson from [employer] office. Just calling to see how you are doing after your recent visit to the Emergency Room. [employer] wants to make sure you were able to fill any prescriptions and that you understand your discharge instructions. Please return our call if you need to make a follow up appointment with your provider or have any further needs. Our phone number is [phone]. Have a great day.

## 2022-02-13 ENCOUNTER — HOSPITAL ENCOUNTER (EMERGENCY)
Age: 52
Discharge: HOME | End: 2022-02-14
Attending: EMERGENCY MEDICINE
Payer: COMMERCIAL

## 2022-02-13 VITALS
DIASTOLIC BLOOD PRESSURE: 86 MMHG | BODY MASS INDEX: 38.19 KG/M2 | HEART RATE: 72 BPM | RESPIRATION RATE: 18 BRPM | OXYGEN SATURATION: 97 % | WEIGHT: 266.76 LBS | HEIGHT: 70 IN | SYSTOLIC BLOOD PRESSURE: 131 MMHG | TEMPERATURE: 98.1 F

## 2022-02-13 DIAGNOSIS — S01.511A LIP LACERATION, INITIAL ENCOUNTER: Primary | ICD-10-CM

## 2022-02-13 LAB
ABSOLUTE EOS #: 0.33 K/UL (ref 0–0.44)
ABSOLUTE IMMATURE GRANULOCYTE: 0.04 K/UL (ref 0–0.3)
ABSOLUTE LYMPH #: 3.19 K/UL (ref 1.1–3.7)
ABSOLUTE MONO #: 1.41 K/UL (ref 0.1–1.2)
BASOPHILS # BLD: 1 % (ref 0–2)
BASOPHILS ABSOLUTE: 0.09 K/UL (ref 0–0.2)
DIFFERENTIAL TYPE: ABNORMAL
EOSINOPHILS RELATIVE PERCENT: 3 % (ref 1–4)
HCT VFR BLD CALC: 32.2 % (ref 40.7–50.3)
HEMOGLOBIN: 10.3 G/DL (ref 13–17)
IMMATURE GRANULOCYTES: 0 %
INR BLD: 4.5
LYMPHOCYTES # BLD: 28 % (ref 24–43)
MCH RBC QN AUTO: 34.8 PG (ref 25.2–33.5)
MCHC RBC AUTO-ENTMCNC: 32 G/DL (ref 28.4–34.8)
MCV RBC AUTO: 108.8 FL (ref 82.6–102.9)
MONOCYTES # BLD: 12 % (ref 3–12)
NRBC AUTOMATED: 0 PER 100 WBC
PDW BLD-RTO: 15 % (ref 11.8–14.4)
PLATELET # BLD: 303 K/UL (ref 138–453)
PLATELET ESTIMATE: ABNORMAL
PMV BLD AUTO: 9.7 FL (ref 8.1–13.5)
PROTHROMBIN TIME: 42.6 SEC (ref 9.1–12.3)
RBC # BLD: 2.96 M/UL (ref 4.21–5.77)
RBC # BLD: ABNORMAL 10*6/UL
SEG NEUTROPHILS: 56 % (ref 36–65)
SEGMENTED NEUTROPHILS ABSOLUTE COUNT: 6.29 K/UL (ref 1.5–8.1)
WBC # BLD: 11.4 K/UL (ref 3.5–11.3)
WBC # BLD: ABNORMAL 10*3/UL

## 2022-02-13 PROCEDURE — 85610 PROTHROMBIN TIME: CPT

## 2022-02-13 PROCEDURE — 85025 COMPLETE CBC W/AUTO DIFF WBC: CPT

## 2022-02-13 PROCEDURE — 6370000000 HC RX 637 (ALT 250 FOR IP): Performed by: PEDIATRICS

## 2022-02-13 PROCEDURE — 99283 EMERGENCY DEPT VISIT LOW MDM: CPT

## 2022-02-13 PROCEDURE — 2500000003 HC RX 250 WO HCPCS: Performed by: PEDIATRICS

## 2022-02-13 RX ORDER — OXYCODONE HYDROCHLORIDE AND ACETAMINOPHEN 5; 325 MG/1; MG/1
2 TABLET ORAL ONCE
Status: COMPLETED | OUTPATIENT
Start: 2022-02-13 | End: 2022-02-13

## 2022-02-13 RX ORDER — TRANEXAMIC ACID 100 MG/ML
5 INJECTION, SOLUTION INTRAVENOUS ONCE
Status: COMPLETED | OUTPATIENT
Start: 2022-02-13 | End: 2022-02-13

## 2022-02-13 RX ORDER — THROMBIN/CAL/CMC/GEL/DRESS,HEM 40 SQ CM
1 PADS, MEDICATED (EA) TOPICAL ONCE
Status: COMPLETED | OUTPATIENT
Start: 2022-02-13 | End: 2022-02-13

## 2022-02-13 RX ADMIN — Medication 1 PATCH: at 22:28

## 2022-02-13 RX ADMIN — TRANEXAMIC ACID 605 MG: 100 INJECTION, SOLUTION INTRAVENOUS at 23:37

## 2022-02-13 RX ADMIN — OXYCODONE HYDROCHLORIDE AND ACETAMINOPHEN 2 TABLET: 5; 325 TABLET ORAL at 20:34

## 2022-02-13 ASSESSMENT — PAIN SCALES - GENERAL: PAINLEVEL_OUTOF10: 8
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
## 2022-02-14 ENCOUNTER — HOSPITAL ENCOUNTER (OUTPATIENT)
Dept: PHARMACY | Age: 52
Setting detail: THERAPIES SERIES
Discharge: HOME | End: 2022-02-14
Payer: COMMERCIAL

## 2022-02-14 LAB
INR BLD: 5.8
PROTIME: 69.8 SECONDS

## 2022-02-14 PROCEDURE — 85610 PROTHROMBIN TIME: CPT

## 2022-02-14 PROCEDURE — 99212 OFFICE O/P EST SF 10 MIN: CPT

## 2022-02-14 ASSESSMENT — ENCOUNTER SYMPTOMS
CHOKING: 0
WHEEZING: 0
RHINORRHEA: 0
SORE THROAT: 0
EYE REDNESS: 0
DIARRHEA: 0
COUGH: 0
VOMITING: 0
EYE DISCHARGE: 0
SHORTNESS OF BREATH: 0
CONSTIPATION: 0
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
## 2022-02-14 NOTE — ED NOTES
Report received from [address]. Pt resting on cot, showing no s/s of distress at this time. Writer will continue to monitor.       Jelena Fitzpatrick, AVTAR  39/49/99 [id number]

## 2022-02-15 ENCOUNTER — TELEPHONE (OUTPATIENT)
Dept: INTERNAL MEDICINE CLINIC | Age: 52
End: 2022-02-15
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
## 2022-02-18 ENCOUNTER — HOSPITAL ENCOUNTER (OUTPATIENT)
Dept: PHARMACY | Age: 52
Setting detail: THERAPIES SERIES
Discharge: HOME | End: 2022-02-18
Payer: COMMERCIAL

## 2022-02-18 DIAGNOSIS — I82.409 DEEP VEIN THROMBOSIS (DVT) OF LOWER EXTREMITY, UNSPECIFIED CHRONICITY, UNSPECIFIED LATERALITY, UNSPECIFIED VEIN (HCC): Primary | ICD-10-CM

## 2022-02-18 LAB
INR BLD: 2.9
PROTIME: 34.8 SECONDS

## 2022-02-18 PROCEDURE — 99211 OFF/OP EST MAY X REQ PHY/QHP: CPT

## 2022-02-18 PROCEDURE — 85610 PROTHROMBIN TIME: CPT

## 2022-02-18 NOTE — PROGRESS NOTES
Medication Management Service, Warfarin Management  RAUL GARCIA [facility], [phone]  Visit Date: 2/18/2022   Subjective:   Jesusita Seymour is a 46 y.o. male who presents to clinic today for anticoagulation monitoring and adjustment. Patient seen in clinic for warfarin management due to  Indication:   atrial fibrillation and DVT. INR goal: of 2.0-3.0. Duration of therapy: indefinite. Assessment and PLAN   PT/INR done in office per protocol. INR today is 2.9, therapeutic  Plan: Will continue current regimen of warfarin 3 mg on Mon; 6 mg all other days of the week. Using warfarin 6 mg tablets. Recheck INR in 4 week(s). Patient seen in room # 2. ED. OP. =Emphasized importance of call the clinic with any new medications started between appointments. Patient attested to self screening for COVID - 19. Patient verbalized understanding of dosing directions and information discussed. Dosing schedule given to patient. Progress note sent to referring office. Patient acknowledges working in consult agreement with pharmacist as referred by his/her physician. [address] [place] [address]  Ph., CACP, Clinical Pharmacist  Anticoagulation Services, [address] [place] [address] Coumadin Clinic  2/18/2022  8:35 AM      For Pharmacy Admin Tracking Only     Intervention Detail:    Total # of Interventions Recommended: 0   Total # of Interventions Accepted: 0   Time Spent (min): 20

## 2022-03-14 ENCOUNTER — HOSPITAL ENCOUNTER (OUTPATIENT)
Dept: PHARMACY | Age: 52
Setting detail: THERAPIES SERIES
Discharge: HOME | End: 2022-03-14
Payer: COMMERCIAL

## 2022-03-14 LAB
INR BLD: 3.9
PROTIME: 46.7 SECONDS

## 2022-03-14 PROCEDURE — 85610 PROTHROMBIN TIME: CPT

## 2022-03-14 PROCEDURE — 99212 OFFICE O/P EST SF 10 MIN: CPT

## 2022-03-14 NOTE — PROGRESS NOTES
Medication Management Service, Warfarin Management  [address], [phone]  Visit Date: 3/14/2022   Subjective:   Sofia Arroyo is a 46 y.o. male who presents to clinic today for anticoagulation monitoring and adjustment. Patient seen in clinic for warfarin management due to  Indication:   atrial fibrillation and DVT. INR goal: of 2.0-3.0. Duration of therapy: indefinite. Assessment and PLAN   PT/INR done in office per protocol. INR today is 3.9, supratherapeutic; patient denies any diet changes although may have eaten fewer green vegetables, had \"a couple of shots of alcohol\" last night which is likely cause for elevated INR today. Plan:  Patient will hold warfarin dose today then continue current regimen of warfarin 3 mg every Monday; 6 mg all other days of the week. Using warfarin 6 mg tablets. Recheck INR in 2 weeks. Patient seen in room # 3. ED OP - encouraged extra green vegetable today or tomorrow. Patient attested to self screening for COVID - 19. Patient verbalized understanding of dosing directions and information discussed. Dosing schedule given to patient. Progress note sent to referring office. Patient acknowledges working in consult agreement with pharmacist as referred by his/her physician.       Saurabh Arroyo RPh, CACP  Clinical Pharmacist Medication Management  3/14/2022  8:24 AM    For Pharmacy Admin Tracking Only     Intervention Detail: Dose Adjustment: 1, reason: Therapy De-escalation   Total # of Interventions Recommended: 1   Total # of Interventions Accepted: 1   Time Spent (min): 20

## 2022-03-30 ENCOUNTER — HOSPITAL ENCOUNTER (OUTPATIENT)
Dept: PHARMACY | Age: 52
Setting detail: THERAPIES SERIES
Discharge: HOME | End: 2022-03-30
Payer: COMMERCIAL

## 2022-03-30 LAB
INR BLD: 4.2
PROTIME: 50.7 SECONDS

## 2022-03-30 PROCEDURE — 85610 PROTHROMBIN TIME: CPT

## 2022-03-30 PROCEDURE — 99212 OFFICE O/P EST SF 10 MIN: CPT

## 2022-03-30 NOTE — PROGRESS NOTES
Medication Management Service, Warfarin Management  [facility], [phone]  Visit Date: 3/30/2022   Subjective:   Jovanny Ackerman is a 46 y.o. male who presents to clinic today for anticoagulation monitoring and adjustment. Patient seen in clinic for warfarin management due to  Indication:   atrial fibrillation and DVT. INR goal: of 2.0-3.0. Duration of therapy: indefinite. Assessment and PLAN   PT/INR done in office per protocol. INR today is 4.2, supratherapeutic; patient denies all causes of high INR. Plan: Will decrease dose of warfarin today to 3 mg, and decrease regimen by by 7.7%. New regimen;  warfarin 3 mg Mon and Fri; 6 mg all other days of the week.   Using warfarin 6 mg tablets. Recheck INR in 2 week(s). Patient seen in room # 1. ED. OP. = Patient reports he has about 2-3 servings of vegetables per week but did not disclose which vegetables he eats. Encouraged extra green vegetable intake today after visit. Patient attested to self screening for COVID - 19. Patient verbalized understanding of dosing directions and information discussed. Dosing schedule given to patient. Progress note sent to referring office. Patient acknowledges working in consult agreement with pharmacist as referred by his/her physician.       Electronically signed by Karla Tamayo on 3/30/22 at 8:53 AM EDT    For Pharmacy Admin Tracking Only     · Intervention Detail: Adherence Monitorin and Dose Adjustment: 1, reason: Therapy Optimization  · Total # of Interventions Recommended: 2  · Total # of Interventions Accepted: 2  · Time Spent (min): 20

## 2022-04-11 ENCOUNTER — HOSPITAL ENCOUNTER (OUTPATIENT)
Dept: PHARMACY | Age: 52
Setting detail: THERAPIES SERIES
Discharge: HOME | End: 2022-04-11
Payer: COMMERCIAL

## 2022-04-11 DIAGNOSIS — I82.4Y9 DEEP VEIN THROMBOSIS (DVT) OF PROXIMAL LOWER EXTREMITY, UNSPECIFIED CHRONICITY, UNSPECIFIED LATERALITY (HCC): Primary | ICD-10-CM

## 2022-04-11 LAB
INR BLD: 4
PROTIME: 48.2 SECONDS

## 2022-04-11 PROCEDURE — 85610 PROTHROMBIN TIME: CPT

## 2022-04-11 PROCEDURE — 99212 OFFICE O/P EST SF 10 MIN: CPT

## 2022-04-11 PROCEDURE — 99213 OFFICE O/P EST LOW 20 MIN: CPT

## 2022-04-11 NOTE — PROGRESS NOTES
Medication Management Service, Warfarin Management  [facility]ARC[facility], [phone]  Visit Date: 2022   Subjective:   Alda Maher is a 46 y.o. male who presents to clinic today for anticoagulation monitoring and adjustment. Patient seen in clinic for warfarin management due to  Indication:   atrial fibrillation and DVT. INR goal: of 2.0-3.0. Duration of therapy: indefinite. Assessment and PLAN   PT/INR done in office per protocol. INR today is 4.0, supratherapeutic. Patient states that he has not taken any extra doses. He had 3-4 shots last night, which he states is pretty normal for him. States he has been taking Gracie Anawalt gel caps last week about every 3 hours, those gel caps contain 325 mg of APAP. Plan: Will hold warfarin today and then decrease weekly dose by 8.3%. New regimen will be warfarin 3 mg on , , and ; and 6 mg all other days of the week. . Using warfarin 6 mg tablets. Recheck INR in 2 week(s). Patient seen in room # 1. ED. OP. = Stressed the importance of following the back of the box on OTC products and not exceeding the max required product amount per day. Explained that doses of acetaminophen 2 grams and higher can impact the INR. Patient attested to self screening for COVID - 19. Patient verbalized understanding of dosing directions and information discussed. Dosing schedule given to patient. Progress note sent to referring office. Patient acknowledges working in consult agreement with pharmacist as referred by his/her physician.       Electronically signed by Elizabet Martínez on 22 at 8:54 AM EDT  For Pharmacy Admin Tracking Only     Intervention Detail: Adherence Monitorin and Dose Adjustment: 2, reason: Therapy Optimization   Total # of Interventions Recommended: 3   Total # of Interventions Accepted: 3   Time Spent (min): 20

## 2022-04-15 ENCOUNTER — HOSPITAL ENCOUNTER (OUTPATIENT)
Age: 52
Setting detail: SPECIMEN
Discharge: HOME | End: 2022-04-15

## 2022-04-15 LAB
ESTRADIOL LEVEL: 20.6 PG/ML (ref 27–52)
LH: 8.1 MIU/ML (ref 1.7–8.6)
PROLACTIN: 162.1 NG/ML (ref 4.04–15.2)
SEX HORMONE BINDING GLOBULIN: 55 NMOL/L (ref 11–80)
TESTOSTERONE FREE-NONMALE: 21.7 PG/ML (ref 47–244)
TESTOSTERONE TOTAL: 162 NG/DL (ref 220–1000)
TSH SERPL DL<=0.05 MIU/L-ACNC: 6.06 UIU/ML (ref 0.3–5)

## 2022-04-21 RX ORDER — WARFARIN SODIUM 6 MG/1
TABLET ORAL
Qty: 90 TABLET | Refills: 0 | Status: SHIPPED | OUTPATIENT
Start: 2022-04-21 | End: 2022-08-03 | Stop reason: SDUPTHER

## 2022-04-21 NOTE — TELEPHONE ENCOUNTER
Hubert Flynn is calling to request a refill on the following medication(s):    Last Visit Date (If Applicable):  9/95/2974    Next Visit Date:    5/20/2022    Medication Request:  Requested Prescriptions     Pending Prescriptions Disp Refills    warfarin (COUMADIN) 6 MG tablet [Pharmacy Med Name: WARFARIN 6MG] 90 tablet 0     Sig: TAKE 1 TABLET BY MOUTH DAILY AS DIRECTED

## 2022-04-22 ENCOUNTER — HOSPITAL ENCOUNTER (OUTPATIENT)
Dept: PHARMACY | Age: 52
Setting detail: THERAPIES SERIES
Discharge: HOME | End: 2022-04-22
Payer: COMMERCIAL

## 2022-04-22 DIAGNOSIS — I82.409 DEEP VEIN THROMBOSIS (DVT) OF LOWER EXTREMITY, UNSPECIFIED CHRONICITY, UNSPECIFIED LATERALITY, UNSPECIFIED VEIN (HCC): Primary | ICD-10-CM

## 2022-04-22 LAB
INR BLD: 3.2
PROTIME: 37.9 SECONDS

## 2022-04-22 PROCEDURE — 85610 PROTHROMBIN TIME: CPT

## 2022-04-22 PROCEDURE — 99211 OFF/OP EST MAY X REQ PHY/QHP: CPT

## 2022-04-22 NOTE — PROGRESS NOTES
Medication Management Service, Warfarin Management  [facility], [phone]  Visit Date: 4/22/2022   Subjective:   Carol Ann Brian is a 46 y.o. male who presents to clinic today for anticoagulation monitoring and adjustment. Patient seen in clinic for warfarin management due to  Indication:   atrial fibrillation and DVT. INR goal: of 2.0-3.0. Duration of therapy: indefinite. Assessment and PLAN   PT/INR done in office per protocol. INR today is 3.2, supratherapeutic. Patient denies any causes of elevated INR. States he drinks 3-4 shots per day, which is normal for him. No longer taking any OTC products. Plan: No reduction was made due to the smallest decrease being a 9.1% decrease in his weekly regimen. Will continue current regimen of warfarin 3 mg Monday, Wednesday, and Friday then 6 mg on the remaining days. Using warfarin 6 mg tablets. Recheck INR in 2 week(s). Patient seen in room # 1. ED. OP. = Reviewed dosing regimen with patient. Patient verbalized understanding of dosing directions and information discussed. Dosing schedule given to patient. Progress note sent to referring office. Patient acknowledges working in consult agreement with pharmacist as referred by his/her physician.       Electronically signed by León Johnson on 4/22/22 at 8:46 AM EDT    For Pharmacy Admin Tracking Only     Intervention Detail:    Total # of Interventions Recommended: 0   Total # of Interventions Accepted: 0   Time Spent (min): 20

## 2022-05-06 ENCOUNTER — HOSPITAL ENCOUNTER (OUTPATIENT)
Dept: PHARMACY | Age: 52
Setting detail: THERAPIES SERIES
Discharge: HOME | End: 2022-05-06
Payer: COMMERCIAL

## 2022-05-06 LAB
INR BLD: 3.3
PROTIME: 39.5 SECONDS

## 2022-05-06 PROCEDURE — 85610 PROTHROMBIN TIME: CPT

## 2022-05-06 PROCEDURE — 99212 OFFICE O/P EST SF 10 MIN: CPT

## 2022-05-06 NOTE — PROGRESS NOTES
Medication Management Service, Warfarin Management  [address] [facility], [phone]  Visit Date: 5/6/2022   Subjective:   Magdaleno Goldman is a 46 y.o. male who presents to clinic today for anticoagulation monitoring and adjustment. Patient seen in clinic for warfarin management due to  Indication:   atrial fibrillation and DVT. INR goal: of 2.0-3.0. Duration of therapy: indefinite. Assessment and PLAN   PT/INR done in office per protocol. INR today is 3.3, supratherapeutic. Patient denies any causes for elevated INR. Plan: Will decrease regimen by 9.1% for a planned weekly total of 30 mg. New regimen will be 6 mg Sun/Tue/Th and 3 mg all other days of the week. Using warfarin 6 mg tablets. Recheck INR in 3 week(s). Requested patient come back in 2 weeks, but due to conflicting appointment, moved patient out to 3 weeks. Patient seen in room # 1. Patient attested to self screening for COVID - 19. Patient verbalized understanding of dosing directions and information discussed. Dosing schedule given to patient. Progress note sent to referring office. Patient acknowledges working in consult agreement with pharmacist as referred by his/her physician.       Electronically signed by Adal Jacobson, [facility] on 5/6/22 at 8:47 AM EDT    For Pharmacy Admin Tracking Only     Intervention Detail: Dose Adjustment: 1, reason: Therapy Optimization   Total # of Interventions Recommended: 1   Total # of Interventions Accepted: 1   Time Spent (min): 20

## 2022-05-09 ENCOUNTER — TELEPHONE (OUTPATIENT)
Dept: INTERNAL MEDICINE CLINIC | Age: 52
End: 2022-05-09

## 2022-05-09 ENCOUNTER — TELEPHONE (OUTPATIENT)
Dept: PHARMACY | Age: 52
End: 2022-05-09

## 2022-05-09 NOTE — TELEPHONE ENCOUNTER
Patient's current referral is for warfarin management for indications of atrial fibrillation and DVT. Patient reports that he has never had a blood clot in his leg. He had a tumor previously but no blood clot in his leg. Called patient's pcp to clarify indication. [address] [place] [address]  Ph., CACP, Clinical Pharmacist  Anticoagulation Services, [facility]  5/9/2022  9:19 AM      For Pharmacy Admin Tracking Only     Intervention Detail:    Total # of Interventions Recommended: 0   Total # of Interventions Accepted: 0   Time Spent (min): 10

## 2022-05-11 NOTE — TELEPHONE ENCOUNTER
Seema Cardoso is calling to request a refill on the following medication(s):    Medication Request:  Requested Prescriptions     Pending Prescriptions Disp Refills    B Complex-C-Folic Acid (NEPHRO-JENNIFER) 0.8 MG TABS [Pharmacy Med Name: Amilcar Merlin 90 tablet 0     Sig: TAKE 1 TABLET BY MOUTH DAILY WITH BREAKFAST     Last filled 2/7/22 90 day no refill    Last Visit Date (If Applicable):  1/92/5445    Next Visit Date:    5/20/2022

## 2022-05-12 RX ORDER — FOLIC ACID/VIT B COMPLEX AND C 0.8 MG
TABLET ORAL
Qty: 200 TABLET | Refills: 0 | Status: SHIPPED | OUTPATIENT
Start: 2022-05-12

## 2022-05-20 ENCOUNTER — HOSPITAL ENCOUNTER (OUTPATIENT)
Age: 52
Setting detail: SPECIMEN
Discharge: HOME | End: 2022-05-20

## 2022-05-20 ENCOUNTER — OFFICE VISIT (OUTPATIENT)
Dept: INTERNAL MEDICINE CLINIC | Age: 52
End: 2022-05-20
Payer: COMMERCIAL

## 2022-05-20 VITALS
WEIGHT: 275 LBS | DIASTOLIC BLOOD PRESSURE: 72 MMHG | TEMPERATURE: 97.2 F | OXYGEN SATURATION: 96 % | SYSTOLIC BLOOD PRESSURE: 118 MMHG | HEART RATE: 75 BPM | HEIGHT: 70 IN | RESPIRATION RATE: 18 BRPM | BODY MASS INDEX: 39.37 KG/M2

## 2022-05-20 DIAGNOSIS — I82.402 DEEP VEIN THROMBOSIS (DVT) OF LEFT LOWER EXTREMITY, UNSPECIFIED CHRONICITY, UNSPECIFIED VEIN (HCC): ICD-10-CM

## 2022-05-20 DIAGNOSIS — Z99.2 ESRD ON HEMODIALYSIS (HCC): ICD-10-CM

## 2022-05-20 DIAGNOSIS — E55.9 VITAMIN D DEFICIENCY: ICD-10-CM

## 2022-05-20 DIAGNOSIS — M12.811 ROTATOR CUFF ARTHROPATHY OF RIGHT SHOULDER: ICD-10-CM

## 2022-05-20 DIAGNOSIS — E78.2 MIXED HYPERLIPIDEMIA: ICD-10-CM

## 2022-05-20 DIAGNOSIS — Z86.16 HISTORY OF COVID-19: ICD-10-CM

## 2022-05-20 DIAGNOSIS — I48.20 CHRONIC ATRIAL FIBRILLATION (HCC): ICD-10-CM

## 2022-05-20 DIAGNOSIS — I50.810 RIGHT-SIDED HEART FAILURE, UNSPECIFIED HF CHRONICITY (HCC): ICD-10-CM

## 2022-05-20 DIAGNOSIS — C18.4 MALIGNANT NEOPLASM OF TRANSVERSE COLON (HCC): ICD-10-CM

## 2022-05-20 DIAGNOSIS — N18.6 ESRD ON HEMODIALYSIS (HCC): ICD-10-CM

## 2022-05-20 DIAGNOSIS — N40.1 BENIGN PROSTATIC HYPERPLASIA WITH LOWER URINARY TRACT SYMPTOMS, SYMPTOM DETAILS UNSPECIFIED: Primary | ICD-10-CM

## 2022-05-20 DIAGNOSIS — Z90.49 STATUS POST PARTIAL COLECTOMY: ICD-10-CM

## 2022-05-20 DIAGNOSIS — Z98.84 S/P LAPAROSCOPIC SLEEVE GASTRECTOMY: ICD-10-CM

## 2022-05-20 DIAGNOSIS — D68.32 WARFARIN-INDUCED COAGULOPATHY (HCC): ICD-10-CM

## 2022-05-20 DIAGNOSIS — N52.01 ERECTILE DYSFUNCTION DUE TO ARTERIAL INSUFFICIENCY: ICD-10-CM

## 2022-05-20 DIAGNOSIS — T45.515A WARFARIN-INDUCED COAGULOPATHY (HCC): ICD-10-CM

## 2022-05-20 DIAGNOSIS — R73.03 PREDIABETES: ICD-10-CM

## 2022-05-20 LAB
ALBUMIN SERPL-MCNC: 4.4 G/DL (ref 3.5–5.2)
ALBUMIN/GLOBULIN RATIO: 1.2 (ref 1–2.5)
ALP BLD-CCNC: 89 U/L (ref 40–129)
ALT SERPL-CCNC: 19 U/L (ref 5–41)
ANION GAP SERPL CALCULATED.3IONS-SCNC: 21 MMOL/L (ref 9–17)
AST SERPL-CCNC: 27 U/L
BILIRUB SERPL-MCNC: 0.19 MG/DL (ref 0.3–1.2)
BUN BLDV-MCNC: 22 MG/DL (ref 6–20)
CALCIUM SERPL-MCNC: 8.3 MG/DL (ref 8.6–10.4)
CHLORIDE BLD-SCNC: 90 MMOL/L (ref 98–107)
CHOLESTEROL/HDL RATIO: 3.6
CHOLESTEROL: 226 MG/DL
CO2: 26 MMOL/L (ref 20–31)
CREAT SERPL-MCNC: 8.08 MG/DL (ref 0.7–1.2)
ESTIMATED AVERAGE GLUCOSE: 97 MG/DL
GFR AFRICAN AMERICAN: 9 ML/MIN
GFR NON-AFRICAN AMERICAN: 7 ML/MIN
GFR SERPL CREATININE-BSD FRML MDRD: ABNORMAL ML/MIN/{1.73_M2}
GLUCOSE BLD-MCNC: 76 MG/DL (ref 70–99)
HBA1C MFR BLD: 5 % (ref 4–6)
HCT VFR BLD CALC: 35.5 % (ref 40.7–50.3)
HDLC SERPL-MCNC: 63 MG/DL
HEMOGLOBIN: 11.4 G/DL (ref 13–17)
LDL CHOLESTEROL: 120 MG/DL (ref 0–130)
MCH RBC QN AUTO: 34.3 PG (ref 25.2–33.5)
MCHC RBC AUTO-ENTMCNC: 32.1 G/DL (ref 28.4–34.8)
MCV RBC AUTO: 106.9 FL (ref 82.6–102.9)
NRBC AUTOMATED: 0.7 PER 100 WBC
PDW BLD-RTO: 16 % (ref 11.8–14.4)
PLATELET # BLD: 399 K/UL (ref 138–453)
PMV BLD AUTO: 9.5 FL (ref 8.1–13.5)
POTASSIUM SERPL-SCNC: 3.6 MMOL/L (ref 3.7–5.3)
PROSTATE SPECIFIC ANTIGEN: 0.66 NG/ML
RBC # BLD: 3.32 M/UL (ref 4.21–5.77)
SODIUM BLD-SCNC: 137 MMOL/L (ref 135–144)
TOTAL PROTEIN: 8.1 G/DL (ref 6.4–8.3)
TRIGL SERPL-MCNC: 217 MG/DL
TSH SERPL DL<=0.05 MIU/L-ACNC: 5.98 UIU/ML (ref 0.3–5)
WBC # BLD: 6.1 K/UL (ref 3.5–11.3)

## 2022-05-20 PROCEDURE — 3017F COLORECTAL CA SCREEN DOC REV: CPT | Performed by: FAMILY MEDICINE

## 2022-05-20 PROCEDURE — G8417 CALC BMI ABV UP PARAM F/U: HCPCS | Performed by: FAMILY MEDICINE

## 2022-05-20 PROCEDURE — G8427 DOCREV CUR MEDS BY ELIG CLIN: HCPCS | Performed by: FAMILY MEDICINE

## 2022-05-20 PROCEDURE — 1036F TOBACCO NON-USER: CPT | Performed by: FAMILY MEDICINE

## 2022-05-20 PROCEDURE — 99214 OFFICE O/P EST MOD 30 MIN: CPT | Performed by: FAMILY MEDICINE

## 2022-05-20 ASSESSMENT — ENCOUNTER SYMPTOMS
ALLERGIC/IMMUNOLOGIC NEGATIVE: 1
RESPIRATORY NEGATIVE: 1
GASTROINTESTINAL NEGATIVE: 1
BACK PAIN: 1
EYES NEGATIVE: 1

## 2022-05-20 ASSESSMENT — PATIENT HEALTH QUESTIONNAIRE - PHQ9
SUM OF ALL RESPONSES TO PHQ9 QUESTIONS 1 & 2: 0
SUM OF ALL RESPONSES TO PHQ QUESTIONS 1-9: 0
1. LITTLE INTEREST OR PLEASURE IN DOING THINGS: 0
SUM OF ALL RESPONSES TO PHQ QUESTIONS 1-9: 0
2. FEELING DOWN, DEPRESSED OR HOPELESS: 0

## 2022-05-20 NOTE — PROGRESS NOTES
Subjective:      Patient ID: Teo Mendenhall is a 46 y.o. male. Hyperlipidemia  This is a chronic problem. The current episode started more than 1 month ago. The problem is uncontrolled. Recent lipid tests were reviewed and are high. Exacerbating diseases include obesity. Factors aggravating his hyperlipidemia include fatty foods. Current antihyperlipidemic treatment includes statins. The current treatment provides moderate improvement of lipids. Compliance problems include adherence to exercise, adherence to diet and psychosocial issues. Risk factors for coronary artery disease include dyslipidemia, hypertension, male sex, a sedentary lifestyle and obesity. Review of Systems   Constitutional: Negative. HENT: Negative. Eyes: Negative. Respiratory: Negative. Cardiovascular: Negative. Gastrointestinal: Negative. Endocrine: Negative. Musculoskeletal: Positive for arthralgias and back pain. Skin: Negative. Allergic/Immunologic: Negative. Neurological: Negative. Hematological: Negative. Psychiatric/Behavioral: The patient is nervous/anxious. Past family and social history unremarkable. Diagnosis Orders   1. Benign prostatic hyperplasia with lower urinary tract symptoms, symptom details unspecified     2. ESRD on hemodialysis (HCC)  CBC    Comprehensive Metabolic Panel    Hemoglobin A1C    Lipid Panel    TSH    PSA Screening   3. Erectile dysfunction due to arterial insufficiency     4. Chronic atrial fibrillation  CBC    Comprehensive Metabolic Panel    Hemoglobin A1C    Lipid Panel    TSH    PSA Screening   5. Warfarin-induced coagulopathy ([address]     6. Deep vein thrombosis (DVT) of left lower extremity, unspecified chronicity, unspecified vein (HCC)     7. S/P laparoscopic sleeve gastrectomy     8. Vitamin D deficiency     9. Right-sided heart failure, unspecified HF chronicity ([address]     10. Malignant neoplasm of transverse colon ([address]     11.  Status post partial colectomy     12. Rotator cuff arthropathy of right shoulder     13. History of COVID-19     14. BMI 38.0-38.9,adult     15. Prediabetes  CBC    Comprehensive Metabolic Panel    Hemoglobin A1C    Lipid Panel    TSH    PSA Screening   16. Mixed hyperlipidemia  CBC    Comprehensive Metabolic Panel    Hemoglobin A1C    Lipid Panel    TSH    PSA Screening         Objective:   Physical Exam  Vitals and nursing note reviewed. Constitutional:       Appearance: He is well-developed. HENT:      Head: Normocephalic and atraumatic. Right Ear: External ear normal.      Left Ear: External ear normal.      Nose: Nose normal.   Eyes:      Conjunctiva/sclera: Conjunctivae normal.      Pupils: Pupils are equal, round, and reactive to light. Cardiovascular:      Rate and Rhythm: Normal rate and regular rhythm. Heart sounds: Normal heart sounds. Comments: Chronic A. fib  History of DVT  Chronic anticoagulation  Hypertension  Hyperlipidemia  Right heart failure  Pulmonary:      Effort: Pulmonary effort is normal.      Breath sounds: Normal breath sounds. Abdominal:      General: Bowel sounds are normal.      Palpations: Abdomen is soft. Comments: History of sleeve gastrectomy with regain of weight  History of malignant neoplasm of transverse colon with history of partial colectomy with end-to-end anastomosis. He is established and follow closely with colorectal surgery   Genitourinary:     Comments: ESRD hemodialysis x3/week  BPH  Musculoskeletal:         General: Normal range of motion. Cervical back: Normal range of motion and neck supple. Skin:     General: Skin is warm and dry. Neurological:      Mental Status: He is alert and oriented to person, place, and time. Deep Tendon Reflexes: Reflexes are normal and symmetric. Psychiatric:         Behavior: Behavior normal.         Thought Content: Thought content normal.         Assessment:       Diagnosis Orders   1.  Benign prostatic hyperplasia with lower urinary tract symptoms, symptom details unspecified     2. ESRD on hemodialysis (HCC)  CBC    Comprehensive Metabolic Panel    Hemoglobin A1C    Lipid Panel    TSH    PSA Screening   3. Erectile dysfunction due to arterial insufficiency     4. Chronic atrial fibrillation  CBC    Comprehensive Metabolic Panel    Hemoglobin A1C    Lipid Panel    TSH    PSA Screening   5. Warfarin-induced coagulopathy ([address]     6. Deep vein thrombosis (DVT) of left lower extremity, unspecified chronicity, unspecified vein (HCC)     7. S/P laparoscopic sleeve gastrectomy     8. Vitamin D deficiency     9. Right-sided heart failure, unspecified HF chronicity ([address]     10. Malignant neoplasm of transverse colon ([address]     11. Status post partial colectomy     12. Rotator cuff arthropathy of right shoulder     13. History of COVID-19     14. BMI 38.0-38.9,adult     15. Prediabetes  CBC    Comprehensive Metabolic Panel    Hemoglobin A1C    Lipid Panel    TSH    PSA Screening   16. Mixed hyperlipidemia  CBC    Comprehensive Metabolic Panel    Hemoglobin A1C    Lipid Panel    TSH    PSA Screening           Plan:      68-year-old pleasant -American male returns for follow-up. He is afebrile hemodynamically stable  ESRDhemodialysis dependent 3/week. Patient states that he is placed on transplant list  Anemia of chronic disease H&H is stable at baseline  Secondary hyperparathyroidism. He is established with nephrology and follow closely  Morbid obesity history of sleeve gastrectomy with regain of weight. He is cautioned against weight gain, low-fat high-fiber diet, daily moderate exercise and lifestyle change  Hyperlipidemia on statin that he is tolerating well. History of DVT. He is on Coumadin titrated by Coumadin clinic. He is counseled on Coumadin compatible diet  History of transverse colon malignancy status post partial colectomy with end-to-end anastomosis. He has regular bowel movement.   He is established with colorectal surgery and follow closely  Chronic A. fib. Rate controlled. He is on amiodarone and Coumadin  Low vitamin D on replacement  Acne vulgaris on benzyl peroxide topical, topical Cleocin  History of neurocardiogenic syncope on midodrine. He is asymptomatic continue compression stockings  He is updated on influenza, pneumococcal vaccine and Tdap  Medically stable advised to continue  Further recommendations to follow updated labs  Call for any concern  This note is created with a voice recognition program and while intend to generate a document that accurately reflects the content of the visit, no guarantee can be provided that every mistake has been identified and corrected by editing.             Maryann Zuniga MD

## 2022-05-27 ENCOUNTER — HOSPITAL ENCOUNTER (OUTPATIENT)
Dept: PHARMACY | Age: 52
Setting detail: THERAPIES SERIES
Discharge: HOME | End: 2022-05-27
Payer: COMMERCIAL

## 2022-05-27 LAB
INR BLD: 3.1
PROTIME: 37.6 SECONDS

## 2022-05-27 PROCEDURE — 99212 OFFICE O/P EST SF 10 MIN: CPT

## 2022-05-27 PROCEDURE — 85610 PROTHROMBIN TIME: CPT

## 2022-05-27 NOTE — PROGRESS NOTES
Medication Management Service, Warfarin Management  [facility], [phone]  Visit Date: 5/27/2022   Subjective:   Kel Perez is a 46 y.o. male who presents to clinic today for anticoagulation monitoring and adjustment. Patient seen in clinic for warfarin management due to  Indication:   atrial fibrillation. INR goal: of 2.0-3.0. Duration of therapy: indefinite. Assessment and PLAN   PT/INR done in office per protocol. INR today is 3.1, supratherapeutic. · Patient reports recent back pain for which he has been treating with over the counter pain medication. Patient was unable to report the name of over the counter product      Plan:    · INR most likely slightly supratherapeutic due to over the counter pain medication, therefore will continue current regimen of warfarin 6 mg on Sundays, Tuesdays, and Thursdays and warfarin 3 mg all other days of the week     · Using warfarin 6 mg tablets. Patient does not require refills of prescription today  Recheck INR in 3 week(s). Patient seen in room # 3. ED. OP. = Educated patient to utilize Tylenol (acetaminophen) for back pain and encouraged patient to make appointment with Tanika Rodriguez MD if symptoms persist or worsen. Patient attested to self screening for COVID - 19. Patient verbalized understanding of dosing directions and information discussed. Dosing schedule given to patient. Progress note sent to referring office. Patient acknowledges working in consult agreement with pharmacist as referred by his/her physician.       Electronically signed by Enedina Lawton, [address] [place] [address] on 5/27/22 at 8:44 AM EDT    Renetta RodríguezD  PGY2 Ambulatory Care Pharmacy Resident    5/27/2022 8:48 AM    ===========================================================  For Pharmacy Admin Tracking Only     Intervention Detail: Dose Adjustment: 1, reason: Therapy Optimization   Total # of Interventions Recommended: 1   Total # of Interventions Accepted: 1   Time Spent (min): 20

## 2022-06-08 RX ORDER — MIDODRINE HYDROCHLORIDE 5 MG/1
TABLET ORAL
Qty: 270 TABLET | Refills: 0 | Status: SHIPPED | OUTPATIENT
Start: 2022-06-08 | End: 2022-09-23 | Stop reason: ALTCHOICE

## 2022-06-08 NOTE — TELEPHONE ENCOUNTER
Magdaleno Goldman is calling to request a refill on the following medication(s):    Medication Request:    Last filled 3/15/21 #270 with 0 RF    Requested Prescriptions     Pending Prescriptions Disp Refills    midodrine (PROAMATINE) 5 MG tablet [Pharmacy Med Name: *MIDODRINE 5MG] 90 tablet 0     Sig: TAKE 1 TABLET BY MOUTH THREE TIMES DAILY       Last Visit Date (If Applicable):  7/74/4619    Next Visit Date:    9/23/2022

## 2022-06-17 ENCOUNTER — TELEPHONE (OUTPATIENT)
Dept: PHARMACY | Age: 52
End: 2022-06-17

## 2022-06-17 NOTE — TELEPHONE ENCOUNTER
Patient canceled appointment on the phone system. He is hospitalized for a kidney transplant. He will call later closer to discharge to get INR check rescheduled. Currently he is not taking warfarin, it is on hold. [address] [place] [address]  Ph., CACP, Clinical Pharmacist  Anticoagulation Services, [address] [place] [address] Coumadin Clinic  6/17/2022  11:19 AM

## 2022-07-28 ENCOUNTER — TELEPHONE (OUTPATIENT)
Dept: PHARMACY | Age: 52
End: 2022-07-28

## 2022-07-28 NOTE — TELEPHONE ENCOUNTER
Patient is overdue for INR appointment. Last visit was 5/27/22. Left VM for the patient to reschedule.

## 2022-08-03 ENCOUNTER — HOSPITAL ENCOUNTER (OUTPATIENT)
Dept: PHARMACY | Age: 52
Setting detail: THERAPIES SERIES
Discharge: HOME | End: 2022-08-03
Payer: COMMERCIAL

## 2022-08-03 LAB
INR BLD: 2.4
PROTIME: 28.3 SECONDS

## 2022-08-03 PROCEDURE — 85610 PROTHROMBIN TIME: CPT

## 2022-08-03 PROCEDURE — 99212 OFFICE O/P EST SF 10 MIN: CPT

## 2022-08-03 RX ORDER — WARFARIN SODIUM 6 MG/1
TABLET ORAL
Qty: 80 TABLET | Refills: 1 | Status: SHIPPED | OUTPATIENT
Start: 2022-08-03

## 2022-08-03 NOTE — PROGRESS NOTES
Medication Management Service, Warfarin Management  RAUL GARCIA [facility], [phone]  Visit Date: 8/3/2022     Subjective:  Antonio Ybarra is a 45 y.o. male who presents to clinic today for anticoagulation monitoring and adjustment. Patient seen in clinic for warfarin management due to  Indication:   atrial fibrillation. INR goal: of 2.0-3.0. Duration of therapy: indefinite. Assessment and PLAN:  INR today is 2.4, therapeutic. Patient had kidney transplant on 22 and reports no issues following surgery. He reports daily INRs were taken post surgery and all were within range. He has started new anti-rejection medications and will fax them over later today. Patient also needs refill of warfarin 6mg tablets. Plan:  Continue his current regimen of 3mg of warfarin on Mon, Wed, Fri, and Sat and 6mg of warfarin all other days of the week. Using 6mg warfarin tablets. Sent refill of warfarin 6 mg tablets over to [facility]. Recheck in 4 weeks. Patient seen in room #2. ED. OP. = Educated patient to continue to follow the current callander regimen and to fax us over all his new anti-rejection medications following transplant. Instructed to keep diet as consistent as possible. Patient attested to self screening for COVID - 19. Patient verbalized understanding of dosing directions and information discussed. Dosing schedule given to patient. Progress note sent to referring office. Patient acknowledges working in consult agreement with pharmacist as referred by his/her physician.       Abran [employer] student   8/3/22 9:14 AM  =========================================================    For Pharmacy Admin Tracking Only    Intervention Detail: Adherence Monitorin and Refill(s) Provided  Total # of Interventions Recommended: 2  Total # of Interventions Accepted: 2  Time Spent (min): 20

## 2022-08-04 ENCOUNTER — TELEPHONE (OUTPATIENT)
Dept: PHARMACY | Age: 52
End: 2022-08-04

## 2022-08-04 ENCOUNTER — TELEPHONE (OUTPATIENT)
Dept: INTERNAL MEDICINE CLINIC | Age: 52
End: 2022-08-04

## 2022-08-04 DIAGNOSIS — R79.89 ELEVATED TSH: Primary | ICD-10-CM

## 2022-08-04 RX ORDER — FUROSEMIDE 80 MG
80 TABLET ORAL 3 TIMES DAILY
COMMUNITY

## 2022-08-04 RX ORDER — SULFAMETHOXAZOLE AND TRIMETHOPRIM 800; 160 MG/1; MG/1
1 TABLET ORAL SEE ADMIN INSTRUCTIONS
COMMUNITY

## 2022-08-04 RX ORDER — ERGOCALCIFEROL (VITAMIN D2) 1250 MCG
50000 CAPSULE ORAL
COMMUNITY
End: 2022-09-23 | Stop reason: SDUPTHER

## 2022-08-04 RX ORDER — PRAVASTATIN SODIUM 40 MG
40 TABLET ORAL NIGHTLY
COMMUNITY

## 2022-08-04 RX ORDER — MYCOPHENOLIC ACID 180 MG/1
720 TABLET, DELAYED RELEASE ORAL 2 TIMES DAILY
COMMUNITY

## 2022-08-04 RX ORDER — CLOTRIMAZOLE 10 MG/1
10 LOZENGE ORAL; TOPICAL 4 TIMES DAILY
COMMUNITY

## 2022-08-04 RX ORDER — TACROLIMUS 5 MG/1
5 CAPSULE ORAL DAILY
COMMUNITY

## 2022-08-04 RX ORDER — VIT B COMP NO.3/FOLIC/C/BIOTIN 1 MG-60 MG
1 TABLET ORAL
COMMUNITY

## 2022-08-04 RX ORDER — FAMOTIDINE 20 MG/1
20 TABLET, FILM COATED ORAL DAILY
COMMUNITY

## 2022-08-04 RX ORDER — DOCUSATE SODIUM 100 MG/1
100 CAPSULE, LIQUID FILLED ORAL 2 TIMES DAILY
COMMUNITY

## 2022-08-04 RX ORDER — PREDNISONE 10 MG/1
10 TABLET ORAL DAILY
COMMUNITY

## 2022-08-04 RX ORDER — VALGANCICLOVIR 450 MG/1
450 TABLET, FILM COATED ORAL SEE ADMIN INSTRUCTIONS
COMMUNITY

## 2022-08-04 NOTE — TELEPHONE ENCOUNTER
Pt called stating his PSH were high 8.91, was advised to speak to pcp on what to do concerning this. Please advise.

## 2022-08-04 NOTE — TELEPHONE ENCOUNTER
Apologies, I heard pt wrong his TSH levels were high. I called pt again and he did say his urologist did advise him to see his pcp.

## 2022-08-04 NOTE — TELEPHONE ENCOUNTER
Review of epic record shows that he is established with his urologist Dr. Floyd Huber. He is advised to contact his office.   New consult may be ordered as necessary

## 2022-08-05 NOTE — TELEPHONE ENCOUNTER
Called patient to inform him of msg  Patient  informed me that he had lab work done at the transplant center   I called for lab results and his TSH is elevated at 8.91 (scanned in)  Please advise

## 2022-08-05 NOTE — TELEPHONE ENCOUNTER
Per Dr. James Pierson TSH 5.98 patient is to repeat TSH in 6 wks   Patient informed  See note in media

## 2022-08-07 RX ORDER — LEVOTHYROXINE SODIUM 0.03 MG/1
25 TABLET ORAL DAILY
Qty: 60 TABLET | Refills: 0 | Status: SHIPPED | OUTPATIENT
Start: 2022-08-07 | End: 2022-09-23

## 2022-08-31 ENCOUNTER — HOSPITAL ENCOUNTER (OUTPATIENT)
Age: 52
Discharge: HOME | End: 2022-08-31
Payer: COMMERCIAL

## 2022-08-31 ENCOUNTER — HOSPITAL ENCOUNTER (OUTPATIENT)
Dept: PHARMACY | Age: 52
Setting detail: THERAPIES SERIES
Discharge: HOME | End: 2022-08-31
Payer: COMMERCIAL

## 2022-08-31 LAB
ALT SERPL-CCNC: 17 U/L (ref 5–41)
AST SERPL-CCNC: 21 U/L
INR BLD: 1.7
PROTIME: 20.1 SECONDS
T3 FREE: 1.68 PG/ML (ref 2.02–4.43)
THYROXINE, FREE: 1.03 NG/DL (ref 0.93–1.7)
TSH SERPL DL<=0.05 MIU/L-ACNC: 6.7 UIU/ML (ref 0.3–5)

## 2022-08-31 PROCEDURE — 84460 ALANINE AMINO (ALT) (SGPT): CPT

## 2022-08-31 PROCEDURE — 84439 ASSAY OF FREE THYROXINE: CPT

## 2022-08-31 PROCEDURE — 84443 ASSAY THYROID STIM HORMONE: CPT

## 2022-08-31 PROCEDURE — 99212 OFFICE O/P EST SF 10 MIN: CPT

## 2022-08-31 PROCEDURE — 85610 PROTHROMBIN TIME: CPT

## 2022-08-31 PROCEDURE — 84450 TRANSFERASE (AST) (SGOT): CPT

## 2022-08-31 PROCEDURE — 36415 COLL VENOUS BLD VENIPUNCTURE: CPT

## 2022-08-31 PROCEDURE — 84481 FREE ASSAY (FT-3): CPT

## 2022-08-31 NOTE — PROGRESS NOTES
Medication Management Service, Warfarin Management  [address], [phone]  Visit Date: 8/3/2022     Subjective:  Monik Rizzo is a 45 y.o. male who presents to clinic today for anticoagulation monitoring and adjustment. Patient seen in clinic for warfarin management due to  Indication:   atrial fibrillation. INR goal: of 2.0-3.0. Duration of therapy: indefinite. Assessment and PLAN:  INR today is  1.7, subtherapeutic. INR has been on a downward trend but today patient reports having greens and coleslaw over the past week in addition to fewer days with alcoholic beverages. Plan: Will boost dose today to 6mg then continue current regimen of warfarin 6mg on Tues, Thurs, Sun. only; 3mg all other days of the week. Using 6mg warfarin tablets. Recheck in 3 weeks, if low will need increase in regimen. Patient seen in room #3. ED. OP. = Reviewed impact of alcohol on INR. Patient verbalized understanding of dosing directions and information discussed. Dosing schedule given to patient. Progress note sent to referring office. Patient acknowledges working in consult agreement with pharmacist as referred by his/her physician.       Adele Wilkins, MAGALY, CACP  Clinical Pharmacist Medication Management  2022  9:27 AM    For Pharmacy Admin Tracking Only    Intervention Detail: Adherence Monitorin and Dose Adjustment: 1, reason: Improve Adherence, Therapy Optimization  Total # of Interventions Recommended: 2  Total # of Interventions Accepted: 2  Time Spent (min): 20

## 2022-09-21 ENCOUNTER — HOSPITAL ENCOUNTER (OUTPATIENT)
Dept: PHARMACY | Age: 52
Setting detail: THERAPIES SERIES
Discharge: HOME | End: 2022-09-21
Payer: COMMERCIAL

## 2022-09-21 LAB
INR BLD: 3.5
PROTIME: 42.3 SECONDS

## 2022-09-21 PROCEDURE — 85610 PROTHROMBIN TIME: CPT

## 2022-09-21 PROCEDURE — 99212 OFFICE O/P EST SF 10 MIN: CPT

## 2022-09-21 NOTE — PROGRESS NOTES
Medication Management Service, Warfarin Management  RAUL GARCIA [facility], [phone]  Visit Date: 8/3/2022     Subjective:  Tiarra Adam is a 45 y.o. male who presents to clinic today for anticoagulation monitoring and adjustment. Patient seen in clinic for warfarin management due to  Indication:   atrial fibrillation. INR goal: of 2.0-3.0. Duration of therapy: indefinite. Assessment and PLAN:  INR today is 3.5, supratherapeutic. Patient reports only one beer yesterday and the day before but also a decrease in green vegetables. Plan: Will hold dose today, continue current regimen of warfarin 6mg on Tues, Th, Sun. only; 3mg all other days of the week. Using 6mg warfarin tablets. Recheck in 2 weeks. Patient seen in room #3. ED. OP. = Reviewed impact of alcohol on INR, stressed and reviewed importance of consistency with green vegetables. Reviewed fluctuation of INR results going back to April with patient. Patient verbalized understanding of dosing directions and information discussed. Dosing schedule given to patient. Progress note sent to referring office. Patient acknowledges working in consult agreement with pharmacist as referred by his/her physician.       Clay Lopes RPh, CACP  Clinical Pharmacist Medication Management  2022  9:12 AM    For Pharmacy Admin Tracking Only    Intervention Detail: Adherence Monitorin and Dose Adjustment: 1, reason: Improve Adherence, Therapy Optimization  Total # of Interventions Recommended: 2  Total # of Interventions Accepted: 2  Time Spent (min): 20

## 2022-09-23 ENCOUNTER — OFFICE VISIT (OUTPATIENT)
Dept: INTERNAL MEDICINE CLINIC | Age: 52
End: 2022-09-23
Payer: COMMERCIAL

## 2022-09-23 VITALS
WEIGHT: 265 LBS | TEMPERATURE: 97.9 F | RESPIRATION RATE: 20 BRPM | BODY MASS INDEX: 37.94 KG/M2 | OXYGEN SATURATION: 99 % | DIASTOLIC BLOOD PRESSURE: 80 MMHG | HEART RATE: 70 BPM | SYSTOLIC BLOOD PRESSURE: 124 MMHG | HEIGHT: 70 IN

## 2022-09-23 DIAGNOSIS — Z23 NEED FOR INFLUENZA VACCINATION: ICD-10-CM

## 2022-09-23 DIAGNOSIS — T45.515A WARFARIN-INDUCED COAGULOPATHY (HCC): ICD-10-CM

## 2022-09-23 DIAGNOSIS — E55.9 VITAMIN D DEFICIENCY: ICD-10-CM

## 2022-09-23 DIAGNOSIS — Z86.16 HISTORY OF COVID-19: ICD-10-CM

## 2022-09-23 DIAGNOSIS — I48.20 CHRONIC ATRIAL FIBRILLATION (HCC): ICD-10-CM

## 2022-09-23 DIAGNOSIS — D68.32 WARFARIN-INDUCED COAGULOPATHY (HCC): ICD-10-CM

## 2022-09-23 DIAGNOSIS — Z90.49 STATUS POST PARTIAL COLECTOMY: ICD-10-CM

## 2022-09-23 DIAGNOSIS — N18.6 ESRD ON HEMODIALYSIS (HCC): ICD-10-CM

## 2022-09-23 DIAGNOSIS — R73.03 PREDIABETES: ICD-10-CM

## 2022-09-23 DIAGNOSIS — N40.1 BENIGN PROSTATIC HYPERPLASIA WITH LOWER URINARY TRACT SYMPTOMS, SYMPTOM DETAILS UNSPECIFIED: Primary | ICD-10-CM

## 2022-09-23 DIAGNOSIS — C18.4 MALIGNANT NEOPLASM OF TRANSVERSE COLON (HCC): ICD-10-CM

## 2022-09-23 DIAGNOSIS — Z98.84 S/P LAPAROSCOPIC SLEEVE GASTRECTOMY: ICD-10-CM

## 2022-09-23 DIAGNOSIS — Z99.2 ESRD ON HEMODIALYSIS (HCC): ICD-10-CM

## 2022-09-23 DIAGNOSIS — N52.01 ERECTILE DYSFUNCTION DUE TO ARTERIAL INSUFFICIENCY: ICD-10-CM

## 2022-09-23 DIAGNOSIS — I82.402 DEEP VEIN THROMBOSIS (DVT) OF LEFT LOWER EXTREMITY, UNSPECIFIED CHRONICITY, UNSPECIFIED VEIN (HCC): ICD-10-CM

## 2022-09-23 PROBLEM — I50.810 RIGHT HEART FAILURE (HCC): Status: RESOLVED | Noted: 2018-06-18 | Resolved: 2022-09-23

## 2022-09-23 PROBLEM — M12.811 ROTATOR CUFF ARTHROPATHY OF RIGHT SHOULDER: Status: RESOLVED | Noted: 2019-10-14 | Resolved: 2022-09-23

## 2022-09-23 PROCEDURE — 99214 OFFICE O/P EST MOD 30 MIN: CPT | Performed by: FAMILY MEDICINE

## 2022-09-23 PROCEDURE — 90674 CCIIV4 VAC NO PRSV 0.5 ML IM: CPT | Performed by: FAMILY MEDICINE

## 2022-09-23 PROCEDURE — G8417 CALC BMI ABV UP PARAM F/U: HCPCS | Performed by: FAMILY MEDICINE

## 2022-09-23 PROCEDURE — 1036F TOBACCO NON-USER: CPT | Performed by: FAMILY MEDICINE

## 2022-09-23 PROCEDURE — G0008 ADMIN INFLUENZA VIRUS VAC: HCPCS | Performed by: FAMILY MEDICINE

## 2022-09-23 PROCEDURE — 3017F COLORECTAL CA SCREEN DOC REV: CPT | Performed by: FAMILY MEDICINE

## 2022-09-23 PROCEDURE — G8427 DOCREV CUR MEDS BY ELIG CLIN: HCPCS | Performed by: FAMILY MEDICINE

## 2022-09-23 RX ORDER — POTASSIUM CHLORIDE 20 MEQ/1
TABLET, EXTENDED RELEASE ORAL
COMMUNITY

## 2022-09-23 RX ORDER — POLYETHYLENE GLYCOL 3350 17 G/17G
POWDER, FOR SOLUTION ORAL
COMMUNITY
Start: 2022-06-17

## 2022-09-23 RX ORDER — LANOLIN ALCOHOL/MO/W.PET/CERES
CREAM (GRAM) TOPICAL
COMMUNITY
Start: 2022-07-01

## 2022-09-23 RX ORDER — EPOETIN ALFA-EPBX 10000 [IU]/ML
INJECTION, SOLUTION INTRAVENOUS; SUBCUTANEOUS
COMMUNITY
Start: 2022-07-05

## 2022-09-23 RX ORDER — LEVOTHYROXINE SODIUM 0.03 MG/1
25 TABLET ORAL DAILY
COMMUNITY

## 2022-09-23 RX ORDER — AMLODIPINE BESYLATE 5 MG/1
TABLET ORAL
COMMUNITY
Start: 2022-08-09

## 2022-09-23 RX ORDER — CLOTRIMAZOLE 10 MG/1
LOZENGE ORAL; TOPICAL
COMMUNITY
End: 2022-09-23 | Stop reason: SDUPTHER

## 2022-09-23 RX ORDER — FAMOTIDINE 20 MG/1
TABLET, FILM COATED ORAL
COMMUNITY
End: 2022-09-23

## 2022-09-23 RX ORDER — FERRIC CITRATE 210 MG/1
TABLET, COATED ORAL
COMMUNITY

## 2022-09-23 RX ORDER — CALCIUM ACETATE 667 MG/1
CAPSULE ORAL
COMMUNITY

## 2022-09-23 SDOH — ECONOMIC STABILITY: FOOD INSECURITY: WITHIN THE PAST 12 MONTHS, YOU WORRIED THAT YOUR FOOD WOULD RUN OUT BEFORE YOU GOT MONEY TO BUY MORE.: NEVER TRUE

## 2022-09-23 SDOH — ECONOMIC STABILITY: FOOD INSECURITY: WITHIN THE PAST 12 MONTHS, THE FOOD YOU BOUGHT JUST DIDN'T LAST AND YOU DIDN'T HAVE MONEY TO GET MORE.: NEVER TRUE

## 2022-09-23 ASSESSMENT — SOCIAL DETERMINANTS OF HEALTH (SDOH): HOW HARD IS IT FOR YOU TO PAY FOR THE VERY BASICS LIKE FOOD, HOUSING, MEDICAL CARE, AND HEATING?: NOT HARD AT ALL

## 2022-09-23 ASSESSMENT — ENCOUNTER SYMPTOMS
EYES NEGATIVE: 1
ALLERGIC/IMMUNOLOGIC NEGATIVE: 1
GASTROINTESTINAL NEGATIVE: 1
RESPIRATORY NEGATIVE: 1

## 2022-09-23 NOTE — PROGRESS NOTES
Subjective:      Patient ID: Phillip Juárez is a 45 y.o. male. 3-month status post cadaveric kidney transplant. He is established with transplant team at [address] [place] [address] and follows regularly and compliant with medications. Review of Systems   Constitutional: Negative. HENT: Negative. Eyes: Negative. Respiratory: Negative. Cardiovascular: Negative. Gastrointestinal: Negative. Endocrine: Negative. Musculoskeletal:  Positive for arthralgias. Skin: Negative. Allergic/Immunologic: Negative. Neurological: Negative. Hematological: Negative. Psychiatric/Behavioral: Negative. Past family and social history unremarkable. Diagnosis Orders   1. Benign prostatic hyperplasia with lower urinary tract symptoms, symptom details unspecified        2. ESRD on hemodialysis ([facility] [address]        3. Erectile dysfunction due to arterial insufficiency        4. Chronic atrial fibrillation        5. Warfarin-induced coagulopathy ([facility] [address]        6. S/P laparoscopic sleeve gastrectomy        7. Vitamin D deficiency        8. Malignant neoplasm of transverse colon ([facility] [address]        9. Status post partial colectomy        10. History of COVID-19        11. BMI 38.0-38.9,adult        12. Prediabetes        13. Deep vein thrombosis (DVT) of left lower extremity, unspecified chronicity, unspecified vein (HCC)        14. Need for influenza vaccination  Influenza, FLUCELVAX, (age 10 mo+), IM, PF, 0.5 mL          Objective:   Physical Exam  Vitals and nursing note reviewed. Constitutional:       Appearance: He is well-developed. HENT:      Head: Normocephalic and atraumatic. Right Ear: External ear normal.      Left Ear: External ear normal.      Nose: Nose normal.      Mouth/Throat:      Comments: Hypothyroidism  Eyes:      Conjunctiva/sclera: Conjunctivae normal.      Pupils: Pupils are equal, round, and reactive to light.    Cardiovascular:      Rate and Rhythm: Normal rate and regular rhythm. Heart sounds: Normal heart sounds. Comments: Hypertension  Dyslipidemia  Chronic anticoagulation on Coumadin INR of 3.5  Pulmonary:      Effort: Pulmonary effort is normal.      Breath sounds: Normal breath sounds. Abdominal:      General: Bowel sounds are normal.      Palpations: Abdomen is soft. Comments: History of sleeve gastrectomy  Anemia of chronic disease  History of partial colectomy secondary to transverse colon malignancy. Advised to follow-up with oncology   Genitourinary:     Comments: Status post kidney transplant  Musculoskeletal:         General: Normal range of motion. Cervical back: Normal range of motion and neck supple. Skin:     General: Skin is warm and dry. Neurological:      Mental Status: He is alert and oriented to person, place, and time. Deep Tendon Reflexes: Reflexes are normal and symmetric. Psychiatric:         Behavior: Behavior normal.         Thought Content: Thought content normal.       Assessment:       Diagnosis Orders   1. Benign prostatic hyperplasia with lower urinary tract symptoms, symptom details unspecified        2. ESRD on hemodialysis ([facility] [address]        3. Erectile dysfunction due to arterial insufficiency        4. Chronic atrial fibrillation        5. Warfarin-induced coagulopathy ([address]        6. S/P laparoscopic sleeve gastrectomy        7. Vitamin D deficiency        8. Malignant neoplasm of transverse colon ([address]        9. Status post partial colectomy        10. History of COVID-19        11. BMI 38.0-38.9,adult        12. Prediabetes        13. Deep vein thrombosis (DVT) of left lower extremity, unspecified chronicity, unspecified vein (HCC)        14. Need for influenza vaccination  Influenza, FLUCELVAX, (age 10 mo+), IM, PF, 0.5 mL              Plan:      75-year-old -American male returns for follow-up.   He is afebrile hemodynamically stable  3-month status post cadaveric kidney transplant done at [facility]. He is compliant with all his rejection medications and coordination with transplant team.  He is doing well  Hemodynamically stable on amlodipine, Lasix titrated by nephrology  Hyperlipidemia on statin that he is tolerating well  History of DVT on Coumadin with INR of 3.5. Titrated with Coumadin clinic. He is counseled on Coumadin compatible diet  Anemia of chronic disease H&H is stable at baseline  Secondary hyper parathyroidism  Depression screen is negative  History of transverse colon malignancy status post partial colectomy. He has regular bowel movement. Advised to follow-up with oncology/surgery  Hypothyroidism on levothyroxine. Depression screen is negative  History of sleeve gastrectomy with loss of 100 pounds however regain of 30 pounds. Dietary discretion is advised, increase activity as tolerated  Depression screen is negative  He denies tobacco, excessive alcohol or illicit drug use  Advised to update COVID vaccination  Med list and available labs reviewed, discussed with patient, questions answered  He is encouraged to call for any concern  This note is created with a voice recognition program and while intend to generate a document that accurately reflects the content of the visit, no guarantee can be provided that every mistake has been identified and corrected by editing.           Gretchen Baumgarten, MD

## 2022-09-27 NOTE — TELEPHONE ENCOUNTER
[facility] is calling to request a refill on the following medication(s):    Medication Request:  Requested Prescriptions     Pending Prescriptions Disp Refills    Cholecalciferol (VITAMIN D3) 1.25 MG (81093 UT) CAPS [Pharmacy Med Name: VITAMIN D3 50,000 CAPSULES] 3 capsule 1     Sig: TAKE 1 CAPSULE BY MOUTH ONCE A MONTH       Last Visit Date (If Applicable):  5/45/1505    Next Visit Date:    1/24/2023

## 2022-09-28 RX ORDER — CHOLECALCIFEROL (VITAMIN D3) 1250 MCG
CAPSULE ORAL
Qty: 3 CAPSULE | Refills: 1 | Status: SHIPPED | OUTPATIENT
Start: 2022-09-28

## 2022-10-05 ENCOUNTER — HOSPITAL ENCOUNTER (OUTPATIENT)
Dept: PHARMACY | Age: 52
Setting detail: THERAPIES SERIES
Discharge: HOME | End: 2022-10-05
Payer: COMMERCIAL

## 2022-10-05 LAB
INR BLD: 3.9
PROTIME: 46.7 SECONDS

## 2022-10-05 PROCEDURE — 85610 PROTHROMBIN TIME: CPT

## 2022-10-05 PROCEDURE — 99212 OFFICE O/P EST SF 10 MIN: CPT

## 2022-10-05 NOTE — PROGRESS NOTES
Medication Management Service, Warfarin Management  [facility], [phone]  Visit Date: 8/3/2022     Subjective:  Jayla Roman is a 45 y.o. male who presents to clinic today for anticoagulation monitoring and adjustment. Patient seen in clinic for warfarin management due to  Indication:   atrial fibrillation. INR goal: of 2.0-3.0. Duration of therapy: indefinite. Assessment and PLAN:  INR today is 3.9, patient reports having beers over the weekend, and routine use of acetaminophen as well (reported after INR resulted). Could not confirm consistency with green vegetables. Plan: Will hold dose today and reduce regimen by 10% to warfarin  6mg on Tues, Thurs only; 3mg all other days of the week. Using 6mg warfarin tablets, potentially will change tablet strength to 3 or 4mg tablets. Recheck in 2 weeks. Patient seen in room #3. Patient verbalized understanding of dosing directions and information discussed. Dosing schedule given to patient. Progress note sent to referring office. Patient acknowledges working in consult agreement with pharmacist as referred by his/her physician.       Zelia Meigs, RP, CACP  Clinical Pharmacist Medication Management  10/5/2022  9:20 AM    For Pharmacy Admin Tracking Only    Intervention Detail: Adherence Monitorin and Dose Adjustment: 1, reason: Improve Adherence, Therapy De-escalation  Total # of Interventions Recommended: 2  Total # of Interventions Accepted: 2  Time Spent (min):  25

## 2022-10-19 ENCOUNTER — HOSPITAL ENCOUNTER (OUTPATIENT)
Dept: PHARMACY | Age: 52
Setting detail: THERAPIES SERIES
Discharge: HOME | End: 2022-10-19
Payer: COMMERCIAL

## 2022-10-19 LAB
INR BLD: 1.5
PROTIME: 18.2 SECONDS

## 2022-10-19 PROCEDURE — 99212 OFFICE O/P EST SF 10 MIN: CPT

## 2022-10-19 PROCEDURE — 85610 PROTHROMBIN TIME: CPT

## 2022-10-19 NOTE — PROGRESS NOTES
Medication Management Service, Warfarin Management  [facility]UL GARCIA [facility], [phone]  Visit Date: 10/19/2022   Subjective:   Laura Torres is a 45 y.o. male who presents to clinic today for anticoagulation monitoring and adjustment. Patient seen in clinic for warfarin management due to  Indication:   atrial fibrillation. INR goal: of 2.0-3.0. Duration of therapy: indefinite. Assessment and PLAN   PT/INR done in office per protocol. INR today is 1.5, subtherapeutic, patient reports having spinach and cabbage this past week, but normally has head lettuce salads and maybe some green beans a couple times per week. Plan: Will increase warfarin to 6mg today only then  continue current regimen of warfarin 6mg on Tues, Thurs only; 3mg all other days of the week. Using warfarin 6 mg tablets. Consider switching patient to 4mg tablets for easier dosing regimen. Recheck INR in 2 week(s). Patient seen in room # 1. ED. OP. = Patient expresses he doesn't expect to continue eating a lot of cabbage and spinach moving forward. Patient verbalized understanding of dosing directions and information discussed. Dosing schedule given to patient. Progress note sent to referring office. Patient acknowledges working in consult agreement with pharmacist as referred by his/her physician.       Electronically signed by Juve King on 10/19/22 at 8:56 AM EDT     For Pharmacy Admin Tracking Only    Intervention Detail: Adherence Monitorin and Dose Adjustment: 1, reason: Therapy Optimization  Total # of Interventions Recommended: 2  Total # of Interventions Accepted: 2  Time Spent (min): 20

## 2022-11-07 ENCOUNTER — HOSPITAL ENCOUNTER (OUTPATIENT)
Dept: PHARMACY | Age: 52
Setting detail: THERAPIES SERIES
Discharge: HOME | End: 2022-11-07
Payer: COMMERCIAL

## 2022-11-07 LAB
INR BLD: 2.3
PROTIME: 27.4 SECONDS

## 2022-11-07 PROCEDURE — 85610 PROTHROMBIN TIME: CPT

## 2022-11-07 PROCEDURE — 99211 OFF/OP EST MAY X REQ PHY/QHP: CPT

## 2022-11-07 NOTE — PROGRESS NOTES
Medication Management Service, Warfarin Management  RAUL GARCIA [facility], [phone]  Visit Date: 11/7/2022   Subjective:   Asiya Li is a 45 y.o. male who presents to clinic today for anticoagulation monitoring and adjustment. Patient seen in clinic for warfarin management due to  Indication:   atrial fibrillation. INR goal: of 2.0-3.0. Duration of therapy: indefinite. Assessment and PLAN   PT/INR done in office per protocol. INR today is 2.3 therapeutic. Plan: Will continue current regimen of warfarin 6 mg Tues, Thurs, and 3 mg all other days of the week. Using warfarin 6 mg tablets. Consider switching to 4 mg tablets when patient needs a new RX. Recheck INR in 2 week(s). Patient seen in room # 2. ED. OP. = Patient has bright red blood in the white of his right eye after a fall last week. He hit his eye on a door handle during the fall. I advised carefull monitoring and that I would have had him call his physician when this event occurred. He reports that his sister tried to convince him to call his doctor but, he did not. Patient attested to self screening for COVID - 19. Patient verbalized understanding of dosing directions and information discussed. Dosing schedule given to patient. Progress note sent to referring office. Patient acknowledges working in consult agreement with pharmacist as referred by his/her physician. [address] [place] [address]  Ph., CACP, Clinical Pharmacist  Anticoagulation Services, [facility]  11/7/2022  9:08 AM      For Pharmacy Admin Tracking Only    Intervention Detail:   Total # of Interventions Recommended: 0  Total # of Interventions Accepted: 0  Time Spent (min): 20

## 2022-11-11 ENCOUNTER — OFFICE VISIT (OUTPATIENT)
Dept: INTERNAL MEDICINE CLINIC | Age: 52
End: 2022-11-11
Payer: COMMERCIAL

## 2022-11-11 VITALS
HEIGHT: 70 IN | RESPIRATION RATE: 16 BRPM | DIASTOLIC BLOOD PRESSURE: 80 MMHG | HEART RATE: 71 BPM | TEMPERATURE: 98 F | SYSTOLIC BLOOD PRESSURE: 138 MMHG | WEIGHT: 281 LBS | OXYGEN SATURATION: 97 % | BODY MASS INDEX: 40.23 KG/M2

## 2022-11-11 DIAGNOSIS — E03.8 OTHER SPECIFIED HYPOTHYROIDISM: ICD-10-CM

## 2022-11-11 DIAGNOSIS — N40.1 BENIGN PROSTATIC HYPERPLASIA WITH LOWER URINARY TRACT SYMPTOMS, SYMPTOM DETAILS UNSPECIFIED: ICD-10-CM

## 2022-11-11 DIAGNOSIS — N52.01 ERECTILE DYSFUNCTION DUE TO ARTERIAL INSUFFICIENCY: ICD-10-CM

## 2022-11-11 DIAGNOSIS — T45.515A WARFARIN-INDUCED COAGULOPATHY (HCC): ICD-10-CM

## 2022-11-11 DIAGNOSIS — C18.4 MALIGNANT NEOPLASM OF TRANSVERSE COLON (HCC): ICD-10-CM

## 2022-11-11 DIAGNOSIS — D68.32 WARFARIN-INDUCED COAGULOPATHY (HCC): ICD-10-CM

## 2022-11-11 DIAGNOSIS — Z90.49 STATUS POST PARTIAL COLECTOMY: ICD-10-CM

## 2022-11-11 DIAGNOSIS — I48.20 CHRONIC ATRIAL FIBRILLATION (HCC): ICD-10-CM

## 2022-11-11 DIAGNOSIS — Z00.00 MEDICARE ANNUAL WELLNESS VISIT, SUBSEQUENT: Primary | ICD-10-CM

## 2022-11-11 DIAGNOSIS — Z94.0 DECEASED-DONOR KIDNEY TRANSPLANT: ICD-10-CM

## 2022-11-11 DIAGNOSIS — E55.9 VITAMIN D DEFICIENCY: ICD-10-CM

## 2022-11-11 DIAGNOSIS — I82.402 DEEP VEIN THROMBOSIS (DVT) OF LEFT LOWER EXTREMITY, UNSPECIFIED CHRONICITY, UNSPECIFIED VEIN (HCC): ICD-10-CM

## 2022-11-11 DIAGNOSIS — Z98.84 S/P LAPAROSCOPIC SLEEVE GASTRECTOMY: ICD-10-CM

## 2022-11-11 PROCEDURE — 3017F COLORECTAL CA SCREEN DOC REV: CPT | Performed by: FAMILY MEDICINE

## 2022-11-11 PROCEDURE — G0439 PPPS, SUBSEQ VISIT: HCPCS | Performed by: FAMILY MEDICINE

## 2022-11-11 PROCEDURE — G8482 FLU IMMUNIZE ORDER/ADMIN: HCPCS | Performed by: FAMILY MEDICINE

## 2022-11-11 RX ORDER — CHOLECALCIFEROL (VITAMIN D3) 1250 MCG
CAPSULE ORAL
Qty: 4 CAPSULE | Refills: 2 | Status: SHIPPED | OUTPATIENT
Start: 2022-11-11

## 2022-11-11 RX ORDER — VIT B COMP NO.3/FOLIC/C/BIOTIN 1 MG-60 MG
1 TABLET ORAL
Qty: 200 TABLET | Refills: 1 | Status: SHIPPED | OUTPATIENT
Start: 2022-11-11

## 2022-11-11 RX ORDER — LEVOTHYROXINE SODIUM 0.03 MG/1
25 TABLET ORAL DAILY
Qty: 90 TABLET | Refills: 1 | Status: SHIPPED | OUTPATIENT
Start: 2022-11-11

## 2022-11-11 ASSESSMENT — PATIENT HEALTH QUESTIONNAIRE - PHQ9
SUM OF ALL RESPONSES TO PHQ QUESTIONS 1-9: 0
SUM OF ALL RESPONSES TO PHQ9 QUESTIONS 1 & 2: 0
SUM OF ALL RESPONSES TO PHQ QUESTIONS 1-9: 0
1. LITTLE INTEREST OR PLEASURE IN DOING THINGS: 0
2. FEELING DOWN, DEPRESSED OR HOPELESS: 0

## 2022-11-11 ASSESSMENT — LIFESTYLE VARIABLES
HOW OFTEN DO YOU HAVE A DRINK CONTAINING ALCOHOL: 2-3 TIMES A WEEK
HOW MANY STANDARD DRINKS CONTAINING ALCOHOL DO YOU HAVE ON A TYPICAL DAY: 1 OR 2

## 2022-11-11 NOTE — PROGRESS NOTES
Subjective:      Patient ID: Raphael Becerril is a 45 y.o. male. HPI  -year-old pleasant -American male is presented requesting Medicare annual exam.  He denies any distress  Review of Systems  All 12 systems reviewed  Objective:   Physical Exam  HEENT unremarkable  Neck unremarkable  Lungs bilateral CTA  CVS S1-S2 regular rate and rhythm  Abdomen obese and soft abdomen. Lower extremities stable lymphedema right lower extremity. Left side significantly improved. No calf tenderness  Upper extremity patent right upper extremity AV fistula with positive bruit  Assessment:       Diagnosis Orders   1. Medicare annual wellness visit, subsequent        2. Benign prostatic hyperplasia with lower urinary tract symptoms, symptom details unspecified        3. Erectile dysfunction due to arterial insufficiency        4. Chronic atrial fibrillation  CBC    Comprehensive Metabolic Panel    Hemoglobin A1C    Lipid Panel    TSH    PSA Screening    Urinalysis with Microscopic      5. Warfarin-induced coagulopathy ([address]        6. Deep vein thrombosis (DVT) of left lower extremity, unspecified chronicity, unspecified vein (HCC)        7. S/P laparoscopic sleeve gastrectomy        8. Vitamin D deficiency        9. Malignant neoplasm of transverse colon (HCC)  CBC    Comprehensive Metabolic Panel    Hemoglobin A1C    Lipid Panel    TSH    PSA Screening      10. Status post partial colectomy        11. BMI 38.0-38.9,adult        12. Other specified hypothyroidism  CBC    Comprehensive Metabolic Panel    Hemoglobin A1C    Lipid Panel    TSH    PSA Screening      13. -donor kidney transplant  Urinalysis with Microscopic              Plan:      55-year-old -American male with history of ESRD. Patient is status post donor kidney transplant and follows regularly with nephrology at [facility].   He is compliant with all his rejection medications-Prograf, Myfortic, Valcyte, prednisone, Septra, Mycelex. He is making good amount of urine without any distress. Levothyroxine. For some unknown reason he states that he took levothyroxine for 1 month and did not continue. We will recheck his levels and put him back on levothyroxine. Lymphedema with significant improvement post kidney transplant. Continue compression stockings  Hemodynamically stable with random blood pressure less than 130/80. Continue Lasix, amlodipine  History of congestive heart failure. Stable. He is diuresing well in negative fluid balance  Chronic A. fib. Rate controlled with underlying history of DVT. Continue Coumadin titrated by Coumadin clinic. Continue amiodarone. He is counseled on Coumadin compatible diet  Anemia of chronic disease. He continues with Retacrit, ferric citrate    Hyperlipidemia on statin that he is tolerating well  Depression screen is negative  He denies tobacco, excessive alcohol or illicit drug use  He is updated on influenza, pneumococcal vaccine, COVID-vaccine, Tdap  History of colon cancer status post partial colectomy. Advised to follow-up with GI/surgery per plan he has regular bowel movement  History of sleeve gastrectomy with modest regain of weight. Dietary discretion is advised. Daily moderate exercise and follow-up with bariatric service  Further recommendations to follow updated labs      Hussain Brooks MD  Medicare Annual Wellness Visit    [address] [place] [address]. is here for Medicare AWV    Assessment & Plan   Benign prostatic hyperplasia with lower urinary tract symptoms, symptom details unspecified  ESRD on hemodialysis ([facility] [address]  -     CBC; Future  -     Comprehensive Metabolic Panel; Future  -     Hemoglobin A1C; Future  -     Lipid Panel; Future  -     TSH; Future  -     PSA Screening; Future  Erectile dysfunction due to arterial insufficiency  Chronic atrial fibrillation  -     CBC; Future  -     Comprehensive Metabolic Panel; Future  -     Hemoglobin A1C; Future  -     Lipid Panel;  Future  - TSH; Future  -     PSA Screening; Future  Warfarin-induced coagulopathy (HCC)  Deep vein thrombosis (DVT) of left lower extremity, unspecified chronicity, unspecified vein (HCC)  S/P laparoscopic sleeve gastrectomy  Vitamin D deficiency  Malignant neoplasm of transverse colon (HCC)  -     CBC; Future  -     Comprehensive Metabolic Panel; Future  -     Hemoglobin A1C; Future  -     Lipid Panel; Future  -     TSH; Future  -     PSA Screening; Future  Status post partial colectomy  BMI 38.0-38.9,adult  Other specified hypothyroidism  -     CBC; Future  -     Comprehensive Metabolic Panel; Future  -     Hemoglobin A1C; Future  -     Lipid Panel; Future  -     TSH; Future  -     PSA Screening; Future  Medicare annual wellness visit, subsequent    Recommendations for Preventive Services Due: see orders and patient instructions/AVS.  Recommended screening schedule for the next 5-10 years is provided to the patient in written form: see Patient Instructions/AVS.     Return for Medicare Annual Wellness Visit in 1 year. Subjective   The following acute and/or chronic problems were also addressed today:      Patient's complete Health Risk Assessment and screening values have been reviewed and are found in Flowsheets. The following problems were reviewed today and where indicated follow up appointments were made and/or referrals ordered.     Positive Risk Factor Screenings with Interventions:    Fall Risk:  Do you feel unsteady or are you worried about falling? : no  2 or more falls in past year?: no  Fall with injury in past year?: (!) yes   Fall Risk Interventions:                General Health and ACP:  General  In general, how would you say your health is?: Good  In the past 7 days, have you experienced any of the following: New or Increased Pain, New or Increased Fatigue, Loneliness, Social Isolation, Stress or Anger?: No  Do you get the social and emotional support that you need?: Yes  Do you have a Living Will?: (!) No    Advance Directives       Power of  Living Will ACP-Advance Directive ACP-Power of     Not on File Not on File Not on File Not on File          General Health Risk Interventions:       Health Habits/Nutrition:  Physical Activity: Insufficiently Active    Days of Exercise per Week: 4 days    Minutes of Exercise per Session: 20 min     Have you lost any weight without trying in the past 3 months?: No  Body mass index: (!) 40.31  Have you seen the dentist within the past year?: Yes  Health Habits/Nutrition Interventions:       Hearing/Vision:  Do you or your family notice any trouble with your hearing that hasn't been managed with hearing aids?: (!) Yes  Do you have difficulty driving, watching TV, or doing any of your daily activities because of your eyesight?: No  Have you had an eye exam within the past year?: (!) No  No results found. Hearing/Vision Interventions:       Safety:  Do you have working smoke detectors?: Yes  Do you have any tripping hazards - loose or unsecured carpets or rugs?: No  Do you have any tripping hazards - clutter in doorways, halls, or stairs?: No  Do you have either shower bars, grab bars, non-slip mats or non-slip surfaces in your shower or bathtub?: (!) No  Do all of your stairways have a railing or banister?: Yes  Do you always fasten your seatbelt when you are in a car?: (!) No  Safety Interventions:              Objective   Vitals:    11/11/22 0811   BP: 138/80   Site: Left Upper Arm   Position: Sitting   Cuff Size: Large Adult   Pulse: 71   Resp: 16   Temp: 98 °F (36.7 °C)   TempSrc: Temporal   SpO2: 97%   Weight: 281 lb (127.5 kg)   Height: 5' 10\" (1.778 m)      Body mass index is 40.32 kg/m². No Known Allergies  Prior to Visit Medications    Medication Sig Taking?  Authorizing Provider   Cholecalciferol (VITAMIN D3) 1.25 MG (45498 UT) CAPS TAKE 1 CAPSULE BY MOUTH ONCE A MONTH Yes Carlyle Oglesby MD   amLODIPine (NORVASC) 5 MG tablet  Yes Historical Provider, MD   belatacept (NULOJIX) 250 MG SOLR belatacept 250 mg intravenous solution   INFUSE 5 MG/KG OVER 30 MINUTE(S) BY INTRAVENOUS ROUTE EVERY 4 WEEKS Yes Historical Provider, MD   calcium acetate (PHOSLO) 667 MG CAPS capsule calcium acetate(phosphate binders) 667 mg capsule   TAKE 1 CAPSULE BY MOUTH THREE TIMES DAILY WITH MEALS Yes Historical Provider, MD   epoetin bethanie-epbx (RETACRIT) 59106 UNIT/ML SOLN injection Take 1 mL every week by injection route as directed. Yes Historical Provider, MD   ferric citrate (AURYXIA) 1  MG(Fe) TABS tablet Auryxia 210 mg iron tablet   TAKE 4 TABLETS BY MOUTH THREE TIMES DAILY WITH MEALS AND 1 TABLET WITH A SNACK   SWALLOW WHOLE, DO NOT CHEW OR CRUSH MEDICATION Yes Historical Provider, MD   magnesium oxide (MAG-OX) 400 (240 Mg) MG tablet  Yes Historical Provider, MD   polyethylene glycol (GLYCOLAX) 17 GM/SCOOP powder DISSOLVE 1 CAPFUL (17 GM) IN 8 OZ JUICE OR WATER AND DRINK ONCE A DAY AS NEEDED FOR CONSTIPATION. DO NOT USE LONGER THAN 2 WEEKS. Yes Historical Provider, MD   potassium chloride (KLOR-CON M) 20 MEQ extended release tablet potassium chloride ER 20 mEq tablet,extended release   Take 1 tablet every day by oral route. Yes Historical Provider, MD   levothyroxine (SYNTHROID) 25 MCG tablet Take 25 mcg by mouth Daily Yes Historical Provider, MD   clotrimazole (MYCELEX) 10 MG radha Take 10 mg by mouth in the morning and 10 mg at noon and 10 mg in the evening and 10 mg before bedtime. Yes Historical Provider, MD   docusate sodium (COLACE) 100 MG capsule Take 100 mg by mouth in the morning and 100 mg before bedtime. Yes Historical Provider, MD   famotidine (PEPCID) 20 MG tablet Take 20 mg by mouth in the morning. Yes Historical Provider, MD   furosemide (LASIX) 80 MG tablet Take 80 mg by mouth in the morning, at noon, and at bedtime Yes Historical Provider, MD   mycophenolate (MYFORTIC) 180 MG DR tablet Take 720 mg by mouth in the morning and 720 mg before bedtime. Yes Historical Provider, MD GARCIA complex-vitamin C-folic acid (NEPHRO-JENNIFER) 1 MG tablet Take 1 tablet by mouth daily (with breakfast) Yes Historical Provider, MD   pravastatin (PRAVACHOL) 40 MG tablet Take 40 mg by mouth nightly Yes Historical Provider, MD   tacrolimus (PROGRAF) 5 MG capsule Take 5 mg by mouth in the morning. Yes Historical Provider, MD   valGANciclovir (VALCYTE) 450 MG tablet Take 450 mg by mouth See Admin Instructions Take 1 tablet by mouth Monday, Wednesday, Friday with breakfast. Yes Historical Provider, MD   predniSONE (DELTASONE) 10 MG tablet Take 10 mg by mouth in the morning. Yes Historical Provider, MD   sulfamethoxazole-trimethoprim (BACTRIM DS;SEPTRA DS) 800-160 MG per tablet Take 1 tablet by mouth See Admin Instructions Take 1 tablet by mouth every Monday, Wednesday and Friday. Yes Historical Provider, MD   warfarin (COUMADIN) 6 MG tablet Take 1/2 to 1 tablet (or as directed by Medication Management) by mouth once daily.  Yes MD JOSE Hutchison Complex-C-Folic Acid (NEPHRO-JENNIFER) 0.8 MG TABS TAKE 1 TABLET BY MOUTH DAILY WITH BREAKFAST Yes Pool Melgar MD   atorvastatin (LIPITOR) 40 MG tablet atorvastatin 40 mg tablet Yes Historical Provider, MD   amiodarone (CORDARONE) 200 MG tablet Take 200 mg by mouth daily Yes Historical Provider, MD   benzoyl peroxide 5 % external liquid Use as wash every other day Yes Davis Butler MD   clindamycin (CLEOCIN T) 1 % lotion Apply to affected areas daily Yes Davis Butler MD   acetaminophen (TYLENOL) 325 MG tablet Take 2 tablets by mouth every 6 hours as needed for Pain Yes Lauro Verma MD   cinacalcet (SENSIPAR) 30 MG tablet Take 30 mg by mouth three times a week AT DIALYSIS Yes Historical Provider, MD   ASPIRIN LOW DOSE 81 MG EC tablet TAKE ONE TABLET BY MOUTH DAILY Yes Pool Melgar MD   Elastic Bandages & Supports ([address]) [address] 1 each by Does not apply route daily as needed (as needed) Right leg below the knee 20-30 [facility] MM DX 82.409 Yes Kyle Carpio MD       CareTeam (Including outside providers/suppliers regularly involved in providing care):   Patient Care Team:  Kyle Carpio MD as PCP - General (Family Medicine)  Kyle Carpio MD as PCP - [facility] Empaneled Provider  [facility] as Pharmacist  Kwame Maravilla MD as Consulting Physician (Urology)  Azucena Spaulding MD as Consulting Physician (Nephrology)  Es Bauer MD as Consulting Physician (Cardiology)  Carla Gowers, MD as Surgeon (Colon and Rectal Surgery)     Reviewed and updated this visit:  Tobacco  Allergies  Meds  Med Hx  Surg Hx  Soc Hx  Fam Hx      This note is created with a voice recognition program and while intend to generate a document that accurately reflects the content of the visit, no guarantee can be provided that every mistake has been identified and corrected by editing.

## 2022-11-11 NOTE — PATIENT INSTRUCTIONS
Personalized Preventive Plan for Dre Avila - 11/11/2022  Medicare offers a range of preventive health benefits. Some of the tests and screenings are paid in full while other may be subject to a deductible, co-insurance, and/or copay. Some of these benefits include a comprehensive review of your medical history including lifestyle, illnesses that may run in your family, and various assessments and screenings as appropriate. After reviewing your medical record and screening and assessments performed today your provider may have ordered immunizations, labs, imaging, and/or referrals for you. A list of these orders (if applicable) as well as your Preventive Care list are included within your After Visit Summary for your review. Other Preventive Recommendations:    A preventive eye exam performed by an eye specialist is recommended every 1-2 years to screen for glaucoma; cataracts, macular degeneration, and other eye disorders. A preventive dental visit is recommended every 6 months. Try to get at least 150 minutes of exercise per week or 10,000 steps per day on a pedometer . Order or download the FREE \"Exercise & Physical Activity: Your Everyday Guide\" from [employer]. Call 7-602.491.3468 or search [employer] online. You need 1735-9353 mg of calcium and 2507-5844 IU of vitamin D per day. It is possible to meet your calcium requirement with diet alone, but a vitamin D supplement is usually necessary to meet this goal.  When exposed to the sun, use a sunscreen that protects against both UVA and UVB radiation with an SPF of 30 or greater. Reapply every 2 to 3 hours or after sweating, drying off with a towel, or swimming. Always wear a seat belt when traveling in a car. Always wear a helmet when riding a bicycle or motorcycle.

## 2022-12-02 ENCOUNTER — HOSPITAL ENCOUNTER (OUTPATIENT)
Dept: PHARMACY | Age: 52
Setting detail: THERAPIES SERIES
Discharge: HOME | End: 2022-12-02
Payer: COMMERCIAL

## 2022-12-02 LAB
INR BLD: 3.5
PROTIME: 41.9 SECONDS

## 2022-12-02 PROCEDURE — 85610 PROTHROMBIN TIME: CPT

## 2022-12-02 PROCEDURE — 99212 OFFICE O/P EST SF 10 MIN: CPT

## 2022-12-02 NOTE — PROGRESS NOTES
Medication Management Service, Warfarin Management  RAUL GARCIA [facility], [phone]  Visit Date: 12/2/2022   Subjective:   Felipa Zhuo is a 45 y.o. male who presents to clinic today for anticoagulation monitoring and adjustment. Patient seen in clinic for warfarin management due to  Indication:   atrial fibrillation. INR goal: of 2.0-3.0. Duration of therapy: indefinite. Assessment and PLAN   PT/INR done in office per protocol. INR today is 3.5,  supratherapeutic. Increase in INR due to a decrease in vitamin K intake yesterday. Patient had stomach upset and vomiting and was not able to eat. Plan: Will hold warfarin today then continue current regimen of warfarin 6 mg Tues, Thurs, and 3 mg all other days of the week. Using warfarin 6 mg tablets. Consider switching to 4 mg tablets when patient needs a new RX. Recheck INR in 2 &1/2 week(s). Patient seen in room # 2. Patient attested to self screening for COVID - 19. Patient verbalized understanding of dosing directions and information discussed. Dosing schedule given to patient. Progress note sent to referring office. Patient acknowledges working in consult agreement with pharmacist as referred by his/her physician. [address] [place] [address]  Ph., CACP, Clinical Pharmacist  Anticoagulation Services, [address] [place] [address] Coumadin Clinic  12/2/2022  8:43 AM      For Pharmacy Admin Tracking Only    Intervention Detail:   Total # of Interventions Recommended: 0  Total # of Interventions Accepted: 0  Time Spent (min): 20

## 2022-12-21 ENCOUNTER — HOSPITAL ENCOUNTER (OUTPATIENT)
Dept: PHARMACY | Age: 52
Setting detail: THERAPIES SERIES
Discharge: HOME | End: 2022-12-21
Payer: COMMERCIAL

## 2022-12-21 LAB
INR BLD: 4.6
PROTIME: 54.9 SECONDS

## 2022-12-21 PROCEDURE — 85610 PROTHROMBIN TIME: CPT

## 2022-12-21 PROCEDURE — 99212 OFFICE O/P EST SF 10 MIN: CPT

## 2022-12-21 NOTE — PROGRESS NOTES
Medication Management Service, Warfarin Management  RAUL GARCIA [facility], [phone]  Visit Date: 2022   Subjective:   Lamine Viera is a 45 y.o. male who presents to clinic today for anticoagulation monitoring and adjustment. Patient seen in clinic for warfarin management due to  Indication:   atrial fibrillation. INR goal: of 2.0-3.0. Duration of therapy: indefinite. Assessment and PLAN   PT/INR done in office per protocol. INR today is 4.6, supratherapeutic. Increase in INR most likely due to alcohol intake and reduction in green vegetables, he reports he played pool last night and really hasn't had an appetite for green vegetables. INR has been on an upward trend on this weekly total.    Plan: Will hold warfarin today then reduce regimen by 11.1% to warfarin 6mg on  only; 3mg all other days of the week. Using warfarin 6 mg tablets. Consider switching to 4 mg or 3mg tablets when patient needs a new RX. Recheck INR in ~ 2 weeks. Patient seen in room # 3. Patient verbalized understanding of dosing directions and information discussed. Dosing schedule given to patient. Progress note sent to referring office. Patient acknowledges working in consult agreement with pharmacist as referred by his/her physician.       Krystyna Wu RPh, CACP  Clinical Pharmacist Medication Management  2022  9:00 AM    For Pharmacy Admin Tracking Only    Intervention Detail: Adherence Monitorin and Dose Adjustment: 1, reason: Improve Adherence, Therapy Optimization  Total # of Interventions Recommended: 2  Total # of Interventions Accepted: 2  Time Spent (min): 20

## 2023-01-11 ENCOUNTER — HOSPITAL ENCOUNTER (OUTPATIENT)
Dept: PHARMACY | Age: 53
Setting detail: THERAPIES SERIES
Discharge: HOME | End: 2023-01-11
Payer: COMMERCIAL

## 2023-01-11 LAB
INR BLD: 3.6
PROTIME: 43.1 SECONDS

## 2023-01-11 PROCEDURE — 99211 OFF/OP EST MAY X REQ PHY/QHP: CPT

## 2023-01-11 PROCEDURE — 85610 PROTHROMBIN TIME: CPT

## 2023-01-11 PROCEDURE — 99212 OFFICE O/P EST SF 10 MIN: CPT

## 2023-01-11 NOTE — PROGRESS NOTES
Medication Management Service, Warfarin Management  RAUL GARCIA [facility], [phone]  Visit Date: 2023   Subjective:   Tevin Gonzalez is a 45 y.o. male who presents to clinic today for anticoagulation monitoring and adjustment. Patient seen in clinic for warfarin management due to  Indication:   atrial fibrillation. INR goal: of 2.0-3.0. Duration of therapy: indefinite. Assessment and PLAN   PT/INR done in office per protocol. INR today is 3.6, supratherapeutic. Increase in INR most likely due to increased alcohol intake. Patient reported having alcohol last night. He states he has been consistent in eating 2 to 3 servings of green vegetables per week. INR has been trending high despite recent decreases in his regimen. Plan: Will reduce regimen of warfarin by 12.5% to warfarin 3 mg on all days of the week. Using warfarin 6 mg tablets. Consider switching to 3 mg tablets when patient needs a new RX. Recheck INR in 2 week(s). Patient seen in room # 3. ED. OP. = Patient counseled to remain consistent with intake of green vegetables each week. Spoke to patient about drinking alcohol in moderation and trying to remain consistent in intake from week to week as this can also effect his INR. Patient verbalized understanding of dosing directions and information discussed. Dosing schedule given to patient. Progress note sent to referring office. Patient acknowledges working in consult agreement with pharmacist as referred by his/her physician.       Electronically signed by Simon Gomez [address] [place] [address] on 23 at 9:28 AM SHERITA Bronson, PharmD, [address] Medication Management Service  [phone]  2023  9:40 AM    For Pharmacy Admin Tracking Only    Intervention Detail: Adherence Monitorin and Dose Adjustment: 1, reason: Therapy Optimization  Total # of Interventions Recommended: 2  Total # of Interventions Accepted: 2  Time Spent (min): 15

## 2023-01-24 ENCOUNTER — OFFICE VISIT (OUTPATIENT)
Dept: INTERNAL MEDICINE CLINIC | Age: 53
End: 2023-01-24
Payer: COMMERCIAL

## 2023-01-24 VITALS
WEIGHT: 283 LBS | RESPIRATION RATE: 20 BRPM | SYSTOLIC BLOOD PRESSURE: 140 MMHG | HEART RATE: 78 BPM | OXYGEN SATURATION: 99 % | HEIGHT: 70 IN | BODY MASS INDEX: 40.52 KG/M2 | TEMPERATURE: 97.7 F | DIASTOLIC BLOOD PRESSURE: 82 MMHG

## 2023-01-24 DIAGNOSIS — D68.32 WARFARIN-INDUCED COAGULOPATHY (HCC): ICD-10-CM

## 2023-01-24 DIAGNOSIS — E03.8 OTHER SPECIFIED HYPOTHYROIDISM: ICD-10-CM

## 2023-01-24 DIAGNOSIS — T45.515A WARFARIN-INDUCED COAGULOPATHY (HCC): ICD-10-CM

## 2023-01-24 DIAGNOSIS — Z86.16 HISTORY OF COVID-19: ICD-10-CM

## 2023-01-24 DIAGNOSIS — N52.01 ERECTILE DYSFUNCTION DUE TO ARTERIAL INSUFFICIENCY: ICD-10-CM

## 2023-01-24 DIAGNOSIS — N40.1 BENIGN PROSTATIC HYPERPLASIA WITH LOWER URINARY TRACT SYMPTOMS, SYMPTOM DETAILS UNSPECIFIED: ICD-10-CM

## 2023-01-24 DIAGNOSIS — Z90.49 STATUS POST PARTIAL COLECTOMY: ICD-10-CM

## 2023-01-24 DIAGNOSIS — E55.9 VITAMIN D DEFICIENCY: ICD-10-CM

## 2023-01-24 DIAGNOSIS — Z94.0 DECEASED-DONOR KIDNEY TRANSPLANT: Primary | ICD-10-CM

## 2023-01-24 DIAGNOSIS — C18.4 MALIGNANT NEOPLASM OF TRANSVERSE COLON (HCC): ICD-10-CM

## 2023-01-24 DIAGNOSIS — I48.20 CHRONIC ATRIAL FIBRILLATION (HCC): ICD-10-CM

## 2023-01-24 DIAGNOSIS — I82.402 DEEP VEIN THROMBOSIS (DVT) OF LEFT LOWER EXTREMITY, UNSPECIFIED CHRONICITY, UNSPECIFIED VEIN (HCC): ICD-10-CM

## 2023-01-24 DIAGNOSIS — Z98.84 S/P LAPAROSCOPIC SLEEVE GASTRECTOMY: ICD-10-CM

## 2023-01-24 PROCEDURE — G8427 DOCREV CUR MEDS BY ELIG CLIN: HCPCS | Performed by: FAMILY MEDICINE

## 2023-01-24 PROCEDURE — 1036F TOBACCO NON-USER: CPT | Performed by: FAMILY MEDICINE

## 2023-01-24 PROCEDURE — G8482 FLU IMMUNIZE ORDER/ADMIN: HCPCS | Performed by: FAMILY MEDICINE

## 2023-01-24 PROCEDURE — 3017F COLORECTAL CA SCREEN DOC REV: CPT | Performed by: FAMILY MEDICINE

## 2023-01-24 PROCEDURE — G8417 CALC BMI ABV UP PARAM F/U: HCPCS | Performed by: FAMILY MEDICINE

## 2023-01-24 PROCEDURE — 99214 OFFICE O/P EST MOD 30 MIN: CPT | Performed by: FAMILY MEDICINE

## 2023-01-24 RX ORDER — ROSUVASTATIN CALCIUM 40 MG/1
TABLET, COATED ORAL
COMMUNITY
Start: 2022-11-11

## 2023-01-24 ASSESSMENT — PATIENT HEALTH QUESTIONNAIRE - PHQ9
SUM OF ALL RESPONSES TO PHQ QUESTIONS 1-9: 0
SUM OF ALL RESPONSES TO PHQ QUESTIONS 1-9: 0
1. LITTLE INTEREST OR PLEASURE IN DOING THINGS: 0
SUM OF ALL RESPONSES TO PHQ QUESTIONS 1-9: 0
SUM OF ALL RESPONSES TO PHQ9 QUESTIONS 1 & 2: 0
SUM OF ALL RESPONSES TO PHQ QUESTIONS 1-9: 0
2. FEELING DOWN, DEPRESSED OR HOPELESS: 0

## 2023-01-24 ASSESSMENT — ENCOUNTER SYMPTOMS
GASTROINTESTINAL NEGATIVE: 1
BACK PAIN: 1
RESPIRATORY NEGATIVE: 1
EYES NEGATIVE: 1
ALLERGIC/IMMUNOLOGIC NEGATIVE: 1

## 2023-01-24 NOTE — PROGRESS NOTES
Subjective:      Patient ID: Fletcher Pretty is a 45 y.o. male. Hypertension  This is a chronic problem. The current episode started more than 1 month ago. The problem has been gradually improving since onset. The problem is controlled. Associated symptoms include anxiety. Risk factors for coronary artery disease include sedentary lifestyle, obesity, male gender and dyslipidemia. Past treatments include diuretics. The current treatment provides moderate improvement. Compliance problems include diet and exercise. Hypertensive end-organ damage includes kidney disease and CAD/MI. Identifiable causes of hypertension include sleep apnea. Review of Systems   Constitutional: Negative. HENT: Negative. Eyes: Negative. Respiratory: Negative. Cardiovascular: Negative. Gastrointestinal: Negative. Endocrine: Negative. Musculoskeletal:  Positive for arthralgias and back pain. Skin: Negative. Allergic/Immunologic: Negative. Neurological: Negative. Hematological: Negative. Psychiatric/Behavioral:  The patient is nervous/anxious. Past family and social history unremarkable. Diagnosis Orders   1. -donor kidney transplant        2. Other specified hypothyroidism  TSH      3. Benign prostatic hyperplasia with lower urinary tract symptoms, symptom details unspecified        4. Erectile dysfunction due to arterial insufficiency        5. Chronic atrial fibrillation        6. Warfarin-induced coagulopathy ([address]        7. Deep vein thrombosis (DVT) of left lower extremity, unspecified chronicity, unspecified vein (HCC)        8. S/P laparoscopic sleeve gastrectomy        9. Vitamin D deficiency        10. Malignant neoplasm of transverse colon ([address]        11. Status post partial colectomy        12. History of COVID-19        13. BMI 38.0-38.9,adult            Objective:   Physical Exam  Vitals and nursing note reviewed. Constitutional:       Appearance: He is well-developed. Comments: Increased BMI with suspected sleep apnea   HENT:      Head: Normocephalic and atraumatic. Right Ear: External ear normal.      Left Ear: External ear normal.      Nose: Nose normal.   Eyes:      Conjunctiva/sclera: Conjunctivae normal.      Pupils: Pupils are equal, round, and reactive to light. Cardiovascular:      Rate and Rhythm: Normal rate and regular rhythm. Heart sounds: Normal heart sounds. Pulmonary:      Effort: Pulmonary effort is normal.      Breath sounds: Normal breath sounds. Abdominal:      General: Bowel sounds are normal.      Palpations: Abdomen is soft. Comments: Obesity. History of sleeve gastrectomy  Chronic anemia   Genitourinary:     Comments: CKD  Disease kidney recipient    Musculoskeletal:         General: Normal range of motion. Cervical back: Normal range of motion and neck supple. Comments: Degenerative polyarthralgia   Skin:     General: Skin is warm and dry. Neurological:      Mental Status: He is alert and oriented to person, place, and time. Deep Tendon Reflexes: Reflexes are normal and symmetric. Psychiatric:         Behavior: Behavior normal.         Thought Content: Thought content normal.       Assessment:       Diagnosis Orders   1. -donor kidney transplant        2. Other specified hypothyroidism  TSH      3. Benign prostatic hyperplasia with lower urinary tract symptoms, symptom details unspecified        4. Erectile dysfunction due to arterial insufficiency        5. Chronic atrial fibrillation        6. Warfarin-induced coagulopathy ([address]        7. Deep vein thrombosis (DVT) of left lower extremity, unspecified chronicity, unspecified vein (HCC)        8. S/P laparoscopic sleeve gastrectomy        9. Vitamin D deficiency        10. Malignant neoplasm of transverse colon ([address]        11. Status post partial colectomy        12. History of COVID-19        13.  BMI 38.0-38.9,adult                Plan:      45year-old overweight -American male is present for follow-up. Does not voice any distress, afebrile hemodynamically stable clinical examination is benign  CKD  History of kidney transplant. He is established with nephrology at [facility]. Creatinine is 8.08. He is on dialysis through right forearm AV fistula that he is tolerating well. He is compliant with Prograf, Myfortic, Lasix  History of chronic A. fib. Rate controlled he is on Coumadin titrated by Coumadin clinic  Hemodynamically stable. Continue Lasix titrated by nephrology  Hypothyroidism on levothyroxine. TSH is within normal limit  Hypothyroidism on levothyroxine. We will recheck his TSH. Persistent obesity with underlying history of sleeve gastrectomy. He is established and follow with bariatric service. Patient states that initially he lost 130 pounds however regained 30 pounds. Low vitamin D continue vitamin D  Depression screen is negative  History of malignant neoplasm of transverse colon status post colectomy. He is established with colorectal surgery last colonoscopy was 2021. He has specular bowel movement. Med list and available labs reviewed, discussed with patient, questions answered  Is encouraged to call for any concern  This note is created with a voice recognition program and while intend to generate a document that accurately reflects the content of the visit, no guarantee can be provided that every mistake has been identified and corrected by editing.           Mele Guerrero MD

## 2023-01-24 NOTE — LETTER
[facility]  [address]  Phone: [phone]  Fax: [phone]    Merry Cam MD         January 24, 2023     Patient: Elizabeth Avila   YOB: 1970   Date of Visit: 1/24/2023       To Whom It May Concern: It is my medical opinion that Arlen Benitez requires a disability parking placard for the following reasons:  He cannot walk 200 feet without stopping to rest.  Duration of need: 1 year    If you have any questions or concerns, please don't hesitate to call.     Sincerely,        Merry Cam MD

## 2023-01-25 ENCOUNTER — HOSPITAL ENCOUNTER (OUTPATIENT)
Dept: PHARMACY | Age: 53
Setting detail: THERAPIES SERIES
Discharge: HOME | End: 2023-01-25
Payer: MEDICARE

## 2023-01-25 LAB
INR BLD: 3.7
PROTIME: 43.8 SECONDS

## 2023-01-25 PROCEDURE — 85610 PROTHROMBIN TIME: CPT

## 2023-01-25 PROCEDURE — 99213 OFFICE O/P EST LOW 20 MIN: CPT

## 2023-01-25 RX ORDER — WARFARIN SODIUM 3 MG/1
TABLET ORAL
Qty: 90 TABLET | Refills: 1 | Status: SHIPPED | OUTPATIENT
Start: 2023-01-25

## 2023-01-25 NOTE — PROGRESS NOTES
Medication Management Service, Warfarin Management  [facility], [phone]  Visit Date: 1/25/2023   Subjective:   Hannah Lennox is a 45 y.o. male who presents to clinic today for anticoagulation monitoring and adjustment. Patient seen in clinic for warfarin management due to  Indication:   atrial fibrillation. INR goal: of 2.0-3.0. Duration of therapy: indefinite. Assessment and PLAN   PT/INR done in office per protocol. INR today is 3.7, supratherapeutic. Increase in INR despite a decrease in warfarin regimen. Patient is drinking more alcohol overall. Plan: Will reduce regimen of warfarin by 7.1% weekly to warfarin  1.5 mg Wednesdays;  3 mg daily all other days of the week. Using warfarin 3 mg tablets. Sent in a new warfarin 3 mg script today. Canceled 6 mg warfarin RX as well. Reviewed change with patient    Recheck INR in 2 week(s). Patient seen in room # 2. ED. OP. = Patient reports consistently having 2-3 servings of green vegetables each week. Advised patient to throw away warfarin 4 mg tablets he has from years ago. Also, advised  the warfarin 6 mg tablets and placing a large X on the bottle (he was resistant to throwing them away). Patient verbalized understanding of dosing directions and information discussed. Dosing schedule given to patient. Progress note sent to referring office. Patient acknowledges working in consult agreement with pharmacist as referred by his/her physician. [address] [place] [address]  Ph., CACP, Clinical Pharmacist  Anticoagulation Services, [address] [place] [address] Coumadin [facility]  1/25/2023  9:02 AM    For Pharmacy Admin Tracking Only    Intervention Detail: Dose Adjustment: 1, reason: Therapy De-escalation and New Rx: 1, reason: Needs Additional Therapy  Total # of Interventions Recommended: 2  Total # of Interventions Accepted: 2  Time Spent (min): 30

## 2023-02-08 ENCOUNTER — HOSPITAL ENCOUNTER (OUTPATIENT)
Dept: PHARMACY | Age: 53
Setting detail: THERAPIES SERIES
Discharge: HOME | End: 2023-02-08
Payer: COMMERCIAL

## 2023-02-08 LAB
INR BLD: 3.6
PROTIME: 43 SECONDS

## 2023-02-08 PROCEDURE — 85610 PROTHROMBIN TIME: CPT

## 2023-02-08 PROCEDURE — 99212 OFFICE O/P EST SF 10 MIN: CPT

## 2023-02-22 ENCOUNTER — APPOINTMENT (OUTPATIENT)
Dept: PHARMACY | Age: 53
End: 2023-02-22
Payer: COMMERCIAL

## 2023-02-27 ENCOUNTER — HOSPITAL ENCOUNTER (OUTPATIENT)
Dept: PHARMACY | Age: 53
Setting detail: THERAPIES SERIES
Discharge: HOME | End: 2023-02-27
Payer: COMMERCIAL

## 2023-02-27 LAB
INR BLD: 4.4
PROTIME: 52.4 SECONDS

## 2023-02-27 PROCEDURE — 99212 OFFICE O/P EST SF 10 MIN: CPT

## 2023-02-27 PROCEDURE — 85610 PROTHROMBIN TIME: CPT

## 2023-02-27 NOTE — PROGRESS NOTES
Medication Management Service, Warfarin Management  [facility]UL [facility], [phone]  Visit Date: 2023   Subjective:   Con Obrien is a 45 y.o. male who presents to clinic today for anticoagulation monitoring and adjustment. Patient seen in clinic for warfarin management due to  Indication:   atrial fibrillation. INR goal: of 2.0-3.0. Duration of therapy: indefinite. Assessment and PLAN   PT/INR done in office per protocol. INR today is 4.4, supratherapeutic and up from 3.6 at last visit when regimen was decreased. Patient reports alcohol use on Saturday, 2 days ago. Additionally reports 3 servings of green vegetables but could only report 2, one of which was salad. Original interview response to dosing plan patient reported 1/2 tab on Wednesday only so not actually sure if he followed the plan or not. Increase in INR most likely due to increased alcohol intake. Plan: Will hold warfarin today only and reduce dose this Friday to 1.5mg, otherwise continue with warfarin 1.5 mg , Wednesday; 3 mg on all other days of the week. Using warfarin 3 mg tablets. May need to change regimen / Friday dose with next INR check. Recheck INR in 1 week(s). Patient seen in room #3. ED. OP. = Encouraged patient to speak with MD about alternative [facility] - DOAC therapy since we cannot get his INR under control secondary to dietary and alcohol related issues. He has a PCP appt on  - suggested he call MD to get in sooner to discuss. Counseled patient on safety precautions associated with elevated INR. Patient verbalized understanding of dosing directions and information discussed. Dosing schedule given to patient. Progress note sent to referring office.     Jazmine Schaefer RP, CACP  Clinical Pharmacist Medication Management  2023  9:02 AM      For Pharmacy Admin Tracking Only    Intervention Detail: Adherence Monitorin and Dose Adjustment: 1, reason: Improve Adherence, Therapy De-escalation  Total # of Interventions Recommended: 3  Total # of Interventions Accepted: 3  Time Spent (min): 20

## 2023-02-28 DIAGNOSIS — I48.20 CHRONIC ATRIAL FIBRILLATION (HCC): ICD-10-CM

## 2023-02-28 DIAGNOSIS — C18.4 MALIGNANT NEOPLASM OF TRANSVERSE COLON (HCC): ICD-10-CM

## 2023-02-28 DIAGNOSIS — E03.8 OTHER SPECIFIED HYPOTHYROIDISM: ICD-10-CM

## 2023-03-08 ENCOUNTER — HOSPITAL ENCOUNTER (OUTPATIENT)
Dept: PHARMACY | Age: 53
Setting detail: THERAPIES SERIES
Discharge: HOME | End: 2023-03-08
Payer: COMMERCIAL

## 2023-03-08 LAB
INR BLD: 4
PROTIME: 48.1 SECONDS

## 2023-03-08 PROCEDURE — 99213 OFFICE O/P EST LOW 20 MIN: CPT

## 2023-03-08 PROCEDURE — 85610 PROTHROMBIN TIME: CPT

## 2023-03-08 RX ORDER — VALGANCICLOVIR 450 MG/1
450 TABLET, FILM COATED ORAL 2 TIMES DAILY
COMMUNITY

## 2023-03-08 NOTE — PROGRESS NOTES
Medication Management Service, Warfarin Management  [facility], [phone]  Visit Date: 3/8/2023   Subjective:   Villa Yee is a 45 y.o. male who presents to clinic today for anticoagulation monitoring and adjustment. Patient seen in clinic for warfarin management due to  Indication:   atrial fibrillation. INR goal: of 2.0-3.0. Duration of therapy: indefinite. Assessment and PLAN   PT/INR done in office per protocol. INR today is 4.0, supratherapeutic. INR has been consistently high over the last few visits. Patient said he drank 2 drinks with Cognac liquor over the weekend. He has also been taking Gracie seltzer gel caps (contain 325 mg acetaminophen per capsule) since Mon - patient is taking 2 capsules every 6 hours. Plan: Will hold dose today, then decrease maintenance dose by 8.3% due to consistently elevated INR over the past few visits. New maintenance will be warfarin 1.5 mg every Sun, Wed, Fri and 3 mg all other days. Using warfarin 3 mg tablets. Recheck INR in 1 week(s). Patient seen in room # 2. ED. OP. = Counseled patient to decrease the amount of Gracie seltzer taken to help with stuffy nose since this may be contributing to an increased INR. Suggested taking benadryl at night instead of Gracie seltzer to decrease acetaminophen intake and still relieve stuffy/runny nose. Of note: Patient encouraged at last visit to talk to his PCP at next appt on 5/24 to switch from warfarin to a DOAC since it has been difficult to get his INR under control. Patient was also reminded at visit today to talk to his PCP about starting a DOAC. Patient stated that he would call today. Of note: Patient started taking Valganciclovir 450 mg BID last week - Valganciclovir does not have any drug interactions with warfarin (per [facility]). Patient verbalized understanding of dosing directions and information discussed. Dosing schedule given to patient.  Progress note sent to referring office. Patient acknowledges working in consult agreement with pharmacist as referred by his/her physician.       Electronically signed by Becky Jara on 3/8/23 at 9:21 AM EST     For Pharmacy Admin Tracking Only    Intervention Detail: Adherence Monitorin and Dose Adjustment: 1, reason: Therapy De-escalation  Total # of Interventions Recommended: 2  Total # of Interventions Accepted: 2  Time Spent (min): 20

## 2023-03-17 RX ORDER — POTASSIUM CHLORIDE 20 MEQ/1
TABLET, EXTENDED RELEASE ORAL
Qty: 90 TABLET | Refills: 1 | Status: SHIPPED | OUTPATIENT
Start: 2023-03-17

## 2023-03-17 NOTE — TELEPHONE ENCOUNTER
General Brood is calling to request a refill on the following medication(s):    Medication Request:  Requested Prescriptions     Pending Prescriptions Disp Refills    potassium chloride (KLOR-CON M) 20 MEQ extended release tablet [Pharmacy Med Name: POTASSIUM CHLORIDE ER 20MEQ] 30 tablet 2     Sig: TAKE 1 TABLET BY MOUTH DAILY       Last Visit Date (If Applicable):  9/47/8957    Next Visit Date:    3/20/2023

## 2023-03-20 ENCOUNTER — OFFICE VISIT (OUTPATIENT)
Dept: INTERNAL MEDICINE CLINIC | Age: 53
End: 2023-03-20
Payer: COMMERCIAL

## 2023-03-20 ENCOUNTER — HOSPITAL ENCOUNTER (OUTPATIENT)
Dept: PHARMACY | Age: 53
Setting detail: THERAPIES SERIES
Discharge: HOME | End: 2023-03-20
Payer: COMMERCIAL

## 2023-03-20 VITALS
HEART RATE: 70 BPM | HEIGHT: 70 IN | TEMPERATURE: 97.7 F | WEIGHT: 289.5 LBS | DIASTOLIC BLOOD PRESSURE: 80 MMHG | OXYGEN SATURATION: 98 % | BODY MASS INDEX: 41.44 KG/M2 | RESPIRATION RATE: 18 BRPM | SYSTOLIC BLOOD PRESSURE: 132 MMHG

## 2023-03-20 DIAGNOSIS — T45.515A WARFARIN-INDUCED COAGULOPATHY (HCC): ICD-10-CM

## 2023-03-20 DIAGNOSIS — E03.8 OTHER SPECIFIED HYPOTHYROIDISM: ICD-10-CM

## 2023-03-20 DIAGNOSIS — Z90.49 STATUS POST PARTIAL COLECTOMY: ICD-10-CM

## 2023-03-20 DIAGNOSIS — I82.A29 CHRONIC DEEP VEIN THROMBOSIS (DVT) OF AXILLARY VEIN, UNSPECIFIED LATERALITY (HCC): ICD-10-CM

## 2023-03-20 DIAGNOSIS — C18.4 MALIGNANT NEOPLASM OF TRANSVERSE COLON (HCC): ICD-10-CM

## 2023-03-20 DIAGNOSIS — Z86.16 HISTORY OF COVID-19: ICD-10-CM

## 2023-03-20 DIAGNOSIS — N40.1 BENIGN PROSTATIC HYPERPLASIA WITH LOWER URINARY TRACT SYMPTOMS, SYMPTOM DETAILS UNSPECIFIED: ICD-10-CM

## 2023-03-20 DIAGNOSIS — Z98.84 S/P LAPAROSCOPIC SLEEVE GASTRECTOMY: ICD-10-CM

## 2023-03-20 DIAGNOSIS — E55.9 VITAMIN D DEFICIENCY: ICD-10-CM

## 2023-03-20 DIAGNOSIS — I48.20 CHRONIC ATRIAL FIBRILLATION (HCC): ICD-10-CM

## 2023-03-20 DIAGNOSIS — D68.32 WARFARIN-INDUCED COAGULOPATHY (HCC): ICD-10-CM

## 2023-03-20 DIAGNOSIS — Z94.0 DECEASED-DONOR KIDNEY TRANSPLANT: Primary | ICD-10-CM

## 2023-03-20 DIAGNOSIS — N52.01 ERECTILE DYSFUNCTION DUE TO ARTERIAL INSUFFICIENCY: ICD-10-CM

## 2023-03-20 LAB
INR BLD: 3.7
PROTIME: 44.2 SECONDS

## 2023-03-20 PROCEDURE — 99212 OFFICE O/P EST SF 10 MIN: CPT

## 2023-03-20 PROCEDURE — G8427 DOCREV CUR MEDS BY ELIG CLIN: HCPCS | Performed by: FAMILY MEDICINE

## 2023-03-20 PROCEDURE — 99214 OFFICE O/P EST MOD 30 MIN: CPT | Performed by: FAMILY MEDICINE

## 2023-03-20 PROCEDURE — 85610 PROTHROMBIN TIME: CPT

## 2023-03-20 PROCEDURE — 3017F COLORECTAL CA SCREEN DOC REV: CPT | Performed by: FAMILY MEDICINE

## 2023-03-20 PROCEDURE — G8417 CALC BMI ABV UP PARAM F/U: HCPCS | Performed by: FAMILY MEDICINE

## 2023-03-20 PROCEDURE — G8482 FLU IMMUNIZE ORDER/ADMIN: HCPCS | Performed by: FAMILY MEDICINE

## 2023-03-20 PROCEDURE — 1036F TOBACCO NON-USER: CPT | Performed by: FAMILY MEDICINE

## 2023-03-20 RX ORDER — WARFARIN SODIUM 2.5 MG/1
TABLET ORAL
Qty: 30 TABLET | Refills: 3 | Status: SHIPPED | OUTPATIENT
Start: 2023-03-20

## 2023-03-20 RX ORDER — CHOLECALCIFEROL (VITAMIN D3) 1250 MCG
CAPSULE ORAL
Qty: 12 CAPSULE | Refills: 2 | Status: SHIPPED | OUTPATIENT
Start: 2023-03-20

## 2023-03-20 SDOH — ECONOMIC STABILITY: FOOD INSECURITY: WITHIN THE PAST 12 MONTHS, THE FOOD YOU BOUGHT JUST DIDN'T LAST AND YOU DIDN'T HAVE MONEY TO GET MORE.: NEVER TRUE

## 2023-03-20 SDOH — ECONOMIC STABILITY: FOOD INSECURITY: WITHIN THE PAST 12 MONTHS, YOU WORRIED THAT YOUR FOOD WOULD RUN OUT BEFORE YOU GOT MONEY TO BUY MORE.: NEVER TRUE

## 2023-03-20 SDOH — ECONOMIC STABILITY: INCOME INSECURITY: HOW HARD IS IT FOR YOU TO PAY FOR THE VERY BASICS LIKE FOOD, HOUSING, MEDICAL CARE, AND HEATING?: NOT HARD AT ALL

## 2023-03-20 SDOH — ECONOMIC STABILITY: HOUSING INSECURITY
IN THE LAST 12 MONTHS, WAS THERE A TIME WHEN YOU DID NOT HAVE A STEADY PLACE TO SLEEP OR SLEPT IN A SHELTER (INCLUDING NOW)?: NO

## 2023-03-20 ASSESSMENT — ENCOUNTER SYMPTOMS
ALLERGIC/IMMUNOLOGIC NEGATIVE: 1
BACK PAIN: 1
RESPIRATORY NEGATIVE: 1
EYES NEGATIVE: 1
GASTROINTESTINAL NEGATIVE: 1

## 2023-03-20 NOTE — PROGRESS NOTES
Medication Management Service, Warfarin Management  [facility] GARC[facility], [phone]  Visit Date: 3/20/2023   Subjective:   Carrington Corea is a 45 y.o. male who presents to clinic today for anticoagulation monitoring and adjustment. Patient seen in clinic for warfarin management due to  Indication:   atrial fibrillation. INR goal: of 2.0-3.0. Duration of therapy: indefinite. Assessment and PLAN   PT/INR done in office per protocol. INR today is 3.7, supratherapeutic. Patient reports drinking 3 beers on Friday and denies any medication or dietary changes. Plan: Will hold dose today only then continue current regimen of warfarin 1.5 mg every Sun, Wed, Fri; 3 mg all other days of the week. Recheck INR in 1.5 week(s). Patient seen in room # 2. Of note = Patient has a visit with his PCP this afternoon and will ask about switching from warfarin to a DOAC. Informed patient to notify our office of which blood thinner he will be taking after today's appt. Patient verbalized understanding of dosing directions and information discussed. Dosing schedule given to patient. Progress note sent to referring office. Patient acknowledges working in consult agreement with pharmacist as referred by his/her physician.       Electronically signed by Erickson Haynes on 3/20/23 at 8:38 AM EDT     For Pharmacy Admin Tracking Only    Intervention Detail: Dose Adjustment: 1, reason: Therapy De-escalation  Total # of Interventions Recommended: 1  Total # of Interventions Accepted: 1  Time Spent (min): 20

## 2023-03-20 NOTE — PROGRESS NOTES
kidney. He is established with nephrology. Hypertension well-controlled to calcium channel blocker and diuretic as suggested by nephrology  A-fib on amiodarone. I have adjusted her Coumadin. He is advised to take 2.5 mg 3 days a week alternating with 1.5 mg. He will be scheduled with [facility] Coumadin clinic. He is counseled on Coumadin compatible diet  Hypothyroidism on levothyroxine. Hyperlipidemia on statin that he is tolerating well. He is compliant with all rejection medication including prednisone, Valcyte belatacept, mycophenolate, Prograf  Iron deficiency anemia on ferric citrate  Depression screen is negative  He denies tobacco, excessive alcohol or illicit drug use  Med list and available labs reviewed, discussed with patient, questions answered  History of negative sleep study. He will benefit from weight reduction, dietary discretion and daily moderate exercise as tolerated  Call for any concern  This note is created with a voice recognition program and while intend to generate a document that accurately reflects the content of the visit, no guarantee can be provided that every mistake has been identified and corrected by editing.           Maria A Johnson MD

## 2023-03-24 ENCOUNTER — TELEPHONE (OUTPATIENT)
Dept: PHARMACY | Age: 53
End: 2023-03-24

## 2023-03-24 NOTE — TELEPHONE ENCOUNTER
Received new referral for Anticoagulation Service - warfarin  from Dr. Randal Butler. Called patient and left voicemail to schedule initial appointment. Indications per chart are DVT and afib, however INR goal is listed as 1.5-2. I will clarify with patient and prescriber. Per Dr. Darylene Prader note he decreased dosage to 2.5mg alternating with 1.5mg due to INR of 3.7 on 3/20.

## 2023-03-24 NOTE — TELEPHONE ENCOUNTER
Juancho Webb is calling to request a refill on the following medication(s):    Last Visit Date (If Applicable):  4/23/1344    Next Visit Date:    4/17/2023    Medication Request:  Requested Prescriptions     Pending Prescriptions Disp Refills    amLODIPine (NORVASC) 5 MG tablet [Pharmacy Med Name: AMLODIPINE 5MG] 30 tablet 4     Sig: TAKE 1 TABLET BY MOUTH DAILY    amiodarone (CORDARONE) 200 MG tablet [Pharmacy Med Name: AMIODARONE 200MG] 90 tablet 0     Sig: TAKE 1 TABLET BY MOUTH DAILY -- PLEASE MAKE APPOINTMENT--

## 2023-03-25 RX ORDER — AMIODARONE HYDROCHLORIDE 200 MG/1
TABLET ORAL
Qty: 90 TABLET | Refills: 0 | Status: SHIPPED | OUTPATIENT
Start: 2023-03-25

## 2023-03-25 RX ORDER — AMLODIPINE BESYLATE 5 MG/1
TABLET ORAL
Qty: 90 TABLET | Refills: 1 | Status: SHIPPED | OUTPATIENT
Start: 2023-03-25

## 2023-03-30 ENCOUNTER — HOSPITAL ENCOUNTER (OUTPATIENT)
Dept: PHARMACY | Age: 53
Setting detail: THERAPIES SERIES
Discharge: HOME | End: 2023-03-30
Payer: COMMERCIAL

## 2023-03-30 LAB
INR BLD: 2.6
PROTIME: 30.6 SECONDS

## 2023-03-30 PROCEDURE — 99211 OFF/OP EST MAY X REQ PHY/QHP: CPT

## 2023-03-30 PROCEDURE — 85610 PROTHROMBIN TIME: CPT

## 2023-03-30 NOTE — PROGRESS NOTES
Medication Management Service, Warfarin Management  RAUL GARCIA [facility], [phone]  Visit Date: 3/30/2023   Subjective:   Rey Ramirez is a 45 y.o. male who presents to clinic today for anticoagulation monitoring and adjustment. Patient seen in clinic for warfarin management due to  Indication:   atrial fibrillation. INR goal: of 2.0-3.0. Duration of therapy: indefinite. Assessment and PLAN   PT/INR done in office per protocol. INR today is 2.6, therapeutic. Patient states he saw Dr Osmar Campos after the last appointment on 3/20 and this physician adjusted his dose of warfarin to be 1.5 mg Tue, Thu, Sat and 2.5 mg on all other days. Plan: Will continue current regimen of warfarin 1.5 mg Tue, Thu, Sat and 2.5 mg on all other days. Using warfarin 2.5 mg and 3 mg tablets. Recheck INR in 2-3 week(s). Patient seen in room # 3. Patient verbalized understanding of dosing directions and information discussed. Dosing schedule given to patient. Progress note sent to referring office. Patient acknowledges working in consult agreement with pharmacist as referred by his/her physician.       Electronically signed by VIELKA So [facility] on 3/30/23 at 10:55 AM EDT    For Pharmacy Admin Tracking Only    Intervention Detail:   Total # of Interventions Recommended: 0  Total # of Interventions Accepted: 0  Time Spent (min): 15

## 2023-04-17 ENCOUNTER — TELEPHONE (OUTPATIENT)
Dept: PHARMACY | Age: 53
End: 2023-04-17

## 2023-04-17 NOTE — TELEPHONE ENCOUNTER
Patient was a No Call No Show for [facility] appointment today. Called to reschedule, and phone rang multiple times then just stopped ringing. Called again and  left voicemail. [address] [place] [address]  Ph., CACP, Clinical Pharmacist  Anticoagulation Services, [address] [place] [address] Coumadin Clinic  4/17/2023  2:56 PM

## 2023-04-25 ENCOUNTER — HOSPITAL ENCOUNTER (EMERGENCY)
Age: 53
Discharge: HOME | End: 2023-04-25
Attending: EMERGENCY MEDICINE
Payer: COMMERCIAL

## 2023-04-25 ENCOUNTER — APPOINTMENT (OUTPATIENT)
Dept: ULTRASOUND IMAGING | Age: 53
End: 2023-04-25
Payer: COMMERCIAL

## 2023-04-25 ENCOUNTER — APPOINTMENT (OUTPATIENT)
Dept: CT IMAGING | Age: 53
End: 2023-04-25
Payer: COMMERCIAL

## 2023-04-25 VITALS
DIASTOLIC BLOOD PRESSURE: 88 MMHG | HEART RATE: 77 BPM | OXYGEN SATURATION: 99 % | RESPIRATION RATE: 18 BRPM | TEMPERATURE: 98 F | SYSTOLIC BLOOD PRESSURE: 153 MMHG

## 2023-04-25 DIAGNOSIS — B35.6 TINEA CRURIS: Primary | ICD-10-CM

## 2023-04-25 LAB
ABSOLUTE EOS #: 0.15 K/UL (ref 0–0.4)
ABSOLUTE IMMATURE GRANULOCYTE: 0 K/UL (ref 0–0.3)
ABSOLUTE LYMPH #: 0.15 K/UL (ref 1–4.8)
ABSOLUTE MONO #: 0.3 K/UL (ref 0.1–0.8)
ANION GAP SERPL CALCULATED.3IONS-SCNC: 10 MMOL/L (ref 9–17)
BASOPHILS # BLD: 0 % (ref 0–2)
BASOPHILS ABSOLUTE: 0 K/UL (ref 0–0.2)
BILIRUBIN URINE: NEGATIVE
BUN SERPL-MCNC: 19 MG/DL (ref 6–20)
CALCIUM SERPL-MCNC: 9.3 MG/DL (ref 8.6–10.4)
CASTS UA: ABNORMAL /LPF (ref 0–8)
CHLORIDE SERPL-SCNC: 101 MMOL/L (ref 98–107)
CO2 SERPL-SCNC: 29 MMOL/L (ref 20–31)
COLOR: YELLOW
CREAT SERPL-MCNC: 2.09 MG/DL (ref 0.7–1.2)
CRYSTALS, UA: ABNORMAL /HPF
EOSINOPHILS RELATIVE PERCENT: 2 % (ref 1–4)
EPITHELIAL CELLS UA: ABNORMAL /HPF (ref 0–5)
GFR SERPL CREATININE-BSD FRML MDRD: 37 ML/MIN/1.73M2
GLUCOSE SERPL-MCNC: 94 MG/DL (ref 70–99)
GLUCOSE UR STRIP.AUTO-MCNC: NEGATIVE MG/DL
HCT VFR BLD AUTO: 42.2 % (ref 40.7–50.3)
HGB BLD-MCNC: 13.6 G/DL (ref 13–17)
IMMATURE GRANULOCYTES: 0 %
INR PPP: 2.4
KETONES UR STRIP.AUTO-MCNC: NEGATIVE MG/DL
LEUKOCYTE ESTERASE UR QL STRIP.AUTO: NEGATIVE
LYMPHOCYTES # BLD: 2 % (ref 24–44)
MCH RBC QN AUTO: 34.5 PG (ref 25.2–33.5)
MCHC RBC AUTO-ENTMCNC: 32.2 G/DL (ref 28.4–34.8)
MCV RBC AUTO: 107.1 FL (ref 82.6–102.9)
MONOCYTES # BLD: 4 % (ref 1–7)
MORPHOLOGY: ABNORMAL
MORPHOLOGY: ABNORMAL
NITRITE UR QL STRIP.AUTO: NEGATIVE
NRBC AUTOMATED: 0.3 PER 100 WBC
PDW BLD-RTO: 16.9 % (ref 11.8–14.4)
PLATELET # BLD AUTO: 308 K/UL (ref 138–453)
PMV BLD AUTO: 9.6 FL (ref 8.1–13.5)
POTASSIUM SERPL-SCNC: 3.5 MMOL/L (ref 3.7–5.3)
PROT UR STRIP.AUTO-MCNC: 6 MG/DL (ref 5–8)
PROT UR STRIP.AUTO-MCNC: ABNORMAL MG/DL
PROTHROMBIN TIME: 26.5 SEC (ref 11.7–14.9)
RBC # BLD: 3.94 M/UL (ref 4.21–5.77)
RBC CLUMPS #/AREA URNS AUTO: ABNORMAL /HPF (ref 0–4)
SEG NEUTROPHILS: 92 % (ref 36–66)
SEGMENTED NEUTROPHILS ABSOLUTE COUNT: 6.8 K/UL (ref 1.8–7.7)
SODIUM SERPL-SCNC: 140 MMOL/L (ref 135–144)
SPECIFIC GRAVITY UA: 1.01 (ref 1–1.03)
TURBIDITY: ABNORMAL
URINE HGB: ABNORMAL
UROBILINOGEN, URINE: NORMAL
WBC # BLD AUTO: 7.4 K/UL (ref 3.5–11.3)
WBC UA: ABNORMAL /HPF (ref 0–5)

## 2023-04-25 PROCEDURE — 81001 URINALYSIS AUTO W/SCOPE: CPT

## 2023-04-25 PROCEDURE — 74177 CT ABD & PELVIS W/CONTRAST: CPT

## 2023-04-25 PROCEDURE — 76870 US EXAM SCROTUM: CPT

## 2023-04-25 PROCEDURE — 6360000004 HC RX CONTRAST MEDICATION: Performed by: STUDENT IN AN ORGANIZED HEALTH CARE EDUCATION/TRAINING PROGRAM

## 2023-04-25 PROCEDURE — 85610 PROTHROMBIN TIME: CPT

## 2023-04-25 PROCEDURE — 80048 BASIC METABOLIC PNL TOTAL CA: CPT

## 2023-04-25 PROCEDURE — 99285 EMERGENCY DEPT VISIT HI MDM: CPT

## 2023-04-25 PROCEDURE — 6370000000 HC RX 637 (ALT 250 FOR IP): Performed by: STUDENT IN AN ORGANIZED HEALTH CARE EDUCATION/TRAINING PROGRAM

## 2023-04-25 PROCEDURE — 85025 COMPLETE CBC W/AUTO DIFF WBC: CPT

## 2023-04-25 RX ORDER — GRISEOFULVIN (MICROSIZE) 125 MG/5ML
250 SUSPENSION ORAL ONCE
Status: COMPLETED | OUTPATIENT
Start: 2023-04-25 | End: 2023-04-25

## 2023-04-25 RX ORDER — FLUCONAZOLE 50 MG/1
150 TABLET ORAL ONCE
Status: CANCELLED | OUTPATIENT
Start: 2023-04-25 | End: 2023-04-25

## 2023-04-25 RX ORDER — ACETAMINOPHEN 500 MG
1000 TABLET ORAL ONCE
Status: COMPLETED | OUTPATIENT
Start: 2023-04-25 | End: 2023-04-25

## 2023-04-25 RX ORDER — GRISEOFULVIN (MICROSIZE) 125 MG/5ML
250 SUSPENSION ORAL 2 TIMES DAILY
Qty: 140 ML | Refills: 0 | Status: SHIPPED | OUTPATIENT
Start: 2023-04-25 | End: 2023-05-02

## 2023-04-25 RX ORDER — DOXYCYCLINE HYCLATE 100 MG
100 TABLET ORAL 2 TIMES DAILY
Qty: 14 TABLET | Refills: 0 | Status: SHIPPED | OUTPATIENT
Start: 2023-04-25 | End: 2023-05-02

## 2023-04-25 RX ORDER — DOXYCYCLINE HYCLATE 100 MG
100 TABLET ORAL ONCE
Status: COMPLETED | OUTPATIENT
Start: 2023-04-25 | End: 2023-04-25

## 2023-04-25 RX ADMIN — GRISOFULVIN 250 MG: 125 SUSPENSION ORAL at 14:46

## 2023-04-25 RX ADMIN — ACETAMINOPHEN 1000 MG: 500 TABLET ORAL at 08:23

## 2023-04-25 RX ADMIN — DOXYCYCLINE HYCLATE 100 MG: 100 TABLET, COATED ORAL at 14:47

## 2023-04-25 RX ADMIN — IOPAMIDOL 75 ML: 755 INJECTION, SOLUTION INTRAVENOUS at 08:55

## 2023-04-25 ASSESSMENT — ENCOUNTER SYMPTOMS
NAUSEA: 0
COUGH: 0
SHORTNESS OF BREATH: 0
ABDOMINAL PAIN: 0
CHEST TIGHTNESS: 0
VOMITING: 0
TROUBLE SWALLOWING: 0
CONSTIPATION: 0
DIARRHEA: 0
BACK PAIN: 0
COLOR CHANGE: 0

## 2023-04-25 ASSESSMENT — PAIN SCALES - GENERAL
PAINLEVEL_OUTOF10: 10
PAINLEVEL_OUTOF10: 10

## 2023-04-25 ASSESSMENT — PAIN - FUNCTIONAL ASSESSMENT: PAIN_FUNCTIONAL_ASSESSMENT: 0-10

## 2023-04-25 NOTE — ED PROVIDER NOTES
[address] [facility]     Emergency Department     Faculty Attestation    I performed a history and physical examination of the patient and discussed management with the resident. I reviewed the residents note and agree with the documented findings and plan of care. Any areas of disagreement are noted on the chart. I was personally present for the key portions of any procedures. I have documented in the chart those procedures where I was not present during the key portions. I have reviewed the emergency nurses triage note. I agree with the chief complaint, past medical history, past surgical history, allergies, medications, social and family history as documented unless otherwise noted below. For Physician Assistant/ Nurse Practitioner cases/documentation I have personally evaluated this patient and have completed at least one if not all key elements of the E/M (history, physical exam, and MDM). Additional findings are as noted.       Primary Care Physician:  Carlyle Oglesby MD    [address] [place] [address]       Chief Complaint   Patient presents with    Groin Swelling     Groin swelling and pain       RECENT VITALS:   Temp: 98 °F (36.7 °C),  Heart Rate: 77, Resp: 18, BP: (!) 153/88    LABS:  Labs Reviewed   CBC WITH AUTO DIFFERENTIAL - Abnormal; Notable for the following components:       Result Value    RBC 3.94 (*)     .1 (*)     MCH 34.5 (*)     RDW 16.9 (*)     NRBC Automated 0.3 (*)     All other components within normal limits   BASIC METABOLIC PANEL - Abnormal; Notable for the following components:    Creatinine 2.09 (*)     Est, Glom Filt Rate 37 (*)     Potassium 3.5 (*)     All other components within normal limits   URINALYSIS WITH REFLEX TO CULTURE - Abnormal; Notable for the following components:    Turbidity UA Cloudy (*)     Urine Hgb MODERATE (*)     Protein, UA 2+ (*)     All other components within normal limits   PROTIME-INR - Abnormal; Notable for the following components:    Protime

## 2023-04-25 NOTE — ED NOTES
Pt back from [address] [facility], [facility]  04/25/23 0406

## 2023-04-25 NOTE — ED PROVIDER NOTES
[facility] ED  Emergency Department Encounter  Emergency Medicine Resident     Pt Name:Hugh Avila  MRN: [id number]  Birthdate 1970  Date of evaluation: 4/25/23  PCP:  Marisol Hurtado MD  Note Started: 8:12 AM EDT      CHIEF COMPLAINT       Chief Complaint   Patient presents with    Groin Swelling     Groin swelling and pain       HISTORY OF PRESENT ILLNESS  (Location/Symptom, Timing/Onset, Context/Setting, Quality, Duration, Modifying Factors, Severity.)      Cedrick Johnson is a 45 y.o. male who presents with worsening rash from his lower abdomen extending down through his groin. He states he is having pain and swelling in his testicles as well. Patient states he had a rash like this before the past but states this is significantly worsened over the last 2 days. Patient is 1 year postop from a kidney transplant and is currently on immunosuppression. Patient is on Coumadin for A-fib. He does not have diabetes. He denies any abdominal pain nausea vomiting fevers or chills    PAST MEDICAL / SURGICAL / SOCIAL / FAMILY HISTORY      has a past medical history of Abscess of right buttock, Anemia, Anticoagulated on Coumadin, Aortic insufficiency, Arthritis, Atrial fibrillation (HCC), BPH (benign prostatic hypertrophy), CAD (coronary artery disease), Caffeine use, Cellulitis of lower leg, Chronic back pain, Colon cancer ([address] Cor pulmonale, acute ([address] COVID-19, CRF (chronic renal failure), Erectile dysfunction, Gout, Hemodialysis patient ([address] History of blood transfusion, Hyperkalemia, Hyperlipidemia, Hypertension, Hypotension, Hypothyroidism, Kidney transplant candidate, Lymphedema of leg, Obesity, Thyroid disease, and Well adult health check. has a past surgical history that includes Appendectomy; Lap Band (2007); Abscess Drainage (Right, 01/20/2014); hc cath power picc single (1/24/2014); Dialysis fistula creation (Right, 3/27/15); shoulder surgery (Right, 2014);  Bariatric Surgery

## 2023-04-25 NOTE — ED TRIAGE NOTES
Patient presents to ED with complaints of groin pain and swelling since Friday. Has not used any new soaps/lotions. States did wear wet clothing for a period of time and since then the swelling and pain has developed and gotten worse. Denies injury to the area. Doesn't think he lifted anything or done any strenuous activity. Call light within reach, will continue to monitor and follow plan of care.

## 2023-04-25 NOTE — DISCHARGE INSTRUCTIONS
You need to take the antifungal twice a day and the antibiotic twice a day. You need to have your rash reevaluated by your primary care physician within 1 week. This rash has potential to come very serious due to the fact that you are on immunosuppressants. You need to have your INR rechecked in 2 days because the medications you were prescribed to interact with Coumadin.     If you develop worsening rash, redness, pain fevers or worsening symptoms in any way may return to emergency part immediately or call [phone]

## 2023-04-27 ENCOUNTER — TELEPHONE (OUTPATIENT)
Dept: PHARMACY | Age: 53
End: 2023-04-27

## 2023-04-27 ENCOUNTER — HOSPITAL ENCOUNTER (OUTPATIENT)
Dept: PHARMACY | Age: 53
Setting detail: THERAPIES SERIES
Discharge: HOME | End: 2023-04-27
Payer: COMMERCIAL

## 2023-04-27 LAB
INR BLD: 2.3
PROTIME: 27.6 SECONDS

## 2023-04-27 PROCEDURE — 85610 PROTHROMBIN TIME: CPT

## 2023-04-27 PROCEDURE — 99212 OFFICE O/P EST SF 10 MIN: CPT

## 2023-04-27 NOTE — PROGRESS NOTES
Medication Management Service, Warfarin Management  [facility], [phone]  Visit Date: 2023   Subjective:   General Felder is a 45 y.o. male who presents to clinic today for anticoagulation monitoring and adjustment. Patient seen in clinic for warfarin management due to  Indication:   atrial fibrillation. INR goal: of 2.0-3.0. Duration of therapy: indefinite. Assessment and PLAN   PT/INR done in office per protocol. INR today is 2.3, therapeutic. INR is in goal despite initiation of doxycycline and griseofulvin therapy on  for seven days of therapy. Griseofulvin has a greater impact to decrease the INR than doxycyline does to increase it. Plan: Will increase warfarin from 1.5 mg to 2.5 mg for today only. Then continue current maintenance regimen of warfarin 1.5 mg every Tuesday, Thursday, Saturday and 2.5 mg  all other days. Using warfarin 2.5 mg and 3 mg tablets. Recheck INR in 2 week(s). Patient seen in room # 2. Of note: Patient  was previously encouraged at last visit to talk to his PCP at next appt on  to switch from warfarin to a DOAC since it has been difficult to get his INR under control likely due to alcohol consumption. Patient conveyed to MD that we couldn't change the dose which is not correct. Some how that conversation resulted in the patient's warfarin dose being adjusted and a referral to [facility] was placed. Patient called and scheduled his INR check here at [facility]. He would like to remain under our care. Patient verbalized understanding of dosing directions and information discussed. Dosing schedule given to patient. Progress note sent to referring office. [address] [place] [address]  Ph., CACP, Clinical Pharmacist  Anticoagulation Services, [facility]  2023  2:38 PM      For Pharmacy Admin Tracking Only    Intervention Detail: Adherence Monitorin and Dose Adjustment: 1, reason: Therapy

## 2023-04-28 ENCOUNTER — OFFICE VISIT (OUTPATIENT)
Dept: INTERNAL MEDICINE CLINIC | Age: 53
End: 2023-04-28
Payer: COMMERCIAL

## 2023-04-28 VITALS
RESPIRATION RATE: 20 BRPM | TEMPERATURE: 97.6 F | WEIGHT: 288 LBS | HEIGHT: 70 IN | OXYGEN SATURATION: 98 % | SYSTOLIC BLOOD PRESSURE: 132 MMHG | HEART RATE: 67 BPM | DIASTOLIC BLOOD PRESSURE: 80 MMHG | BODY MASS INDEX: 41.23 KG/M2

## 2023-04-28 DIAGNOSIS — N40.1 BENIGN PROSTATIC HYPERPLASIA WITH LOWER URINARY TRACT SYMPTOMS, SYMPTOM DETAILS UNSPECIFIED: ICD-10-CM

## 2023-04-28 DIAGNOSIS — Z94.0 DECEASED-DONOR KIDNEY TRANSPLANT: Primary | ICD-10-CM

## 2023-04-28 DIAGNOSIS — Z86.16 HISTORY OF COVID-19: ICD-10-CM

## 2023-04-28 DIAGNOSIS — I48.20 CHRONIC ATRIAL FIBRILLATION (HCC): ICD-10-CM

## 2023-04-28 DIAGNOSIS — T45.515A WARFARIN-INDUCED COAGULOPATHY (HCC): ICD-10-CM

## 2023-04-28 DIAGNOSIS — D68.32 WARFARIN-INDUCED COAGULOPATHY (HCC): ICD-10-CM

## 2023-04-28 DIAGNOSIS — E03.8 OTHER SPECIFIED HYPOTHYROIDISM: ICD-10-CM

## 2023-04-28 DIAGNOSIS — C18.4 MALIGNANT NEOPLASM OF TRANSVERSE COLON (HCC): ICD-10-CM

## 2023-04-28 DIAGNOSIS — Z90.49 STATUS POST PARTIAL COLECTOMY: ICD-10-CM

## 2023-04-28 DIAGNOSIS — N52.01 ERECTILE DYSFUNCTION DUE TO ARTERIAL INSUFFICIENCY: ICD-10-CM

## 2023-04-28 DIAGNOSIS — E55.9 VITAMIN D DEFICIENCY: ICD-10-CM

## 2023-04-28 DIAGNOSIS — I82.401 DEEP VEIN THROMBOSIS (DVT) OF RIGHT LOWER EXTREMITY, UNSPECIFIED CHRONICITY, UNSPECIFIED VEIN (HCC): ICD-10-CM

## 2023-04-28 DIAGNOSIS — Z98.84 S/P LAPAROSCOPIC SLEEVE GASTRECTOMY: ICD-10-CM

## 2023-04-28 PROCEDURE — G8417 CALC BMI ABV UP PARAM F/U: HCPCS | Performed by: FAMILY MEDICINE

## 2023-04-28 PROCEDURE — G8427 DOCREV CUR MEDS BY ELIG CLIN: HCPCS | Performed by: FAMILY MEDICINE

## 2023-04-28 PROCEDURE — 99214 OFFICE O/P EST MOD 30 MIN: CPT | Performed by: FAMILY MEDICINE

## 2023-04-28 PROCEDURE — 3017F COLORECTAL CA SCREEN DOC REV: CPT | Performed by: FAMILY MEDICINE

## 2023-04-28 PROCEDURE — 1036F TOBACCO NON-USER: CPT | Performed by: FAMILY MEDICINE

## 2023-04-28 RX ORDER — ROSUVASTATIN CALCIUM 40 MG/1
20 TABLET, COATED ORAL DAILY
Qty: 90 TABLET | Refills: 1 | COMMUNITY
Start: 2023-04-28

## 2023-04-28 RX ORDER — FOLIC ACID/VIT B COMPLEX AND C 0.8 MG
1 TABLET ORAL
Qty: 200 TABLET | Refills: 1 | Status: SHIPPED | OUTPATIENT
Start: 2023-04-28

## 2023-04-28 ASSESSMENT — ENCOUNTER SYMPTOMS
GASTROINTESTINAL NEGATIVE: 1
RESPIRATORY NEGATIVE: 1
ALLERGIC/IMMUNOLOGIC NEGATIVE: 1
EYES NEGATIVE: 1

## 2023-04-28 NOTE — PROGRESS NOTES
Subjective:      Patient ID: Chuyita Lance is a 45 y.o. male. Hypertension  This is a chronic problem. The current episode started more than 1 month ago. The problem has been gradually improving since onset. The problem is controlled. Risk factors for coronary artery disease include male gender, obesity and dyslipidemia. Past treatments include calcium channel blockers and diuretics. The current treatment provides moderate improvement. There are no compliance problems. Hypertensive end-organ damage includes kidney disease. Identifiable causes of hypertension include chronic renal disease and a thyroid problem. Review of Systems   Constitutional: Negative. HENT: Negative. Eyes: Negative. Respiratory: Negative. Cardiovascular: Negative. Gastrointestinal: Negative. Endocrine: Negative. Musculoskeletal:  Positive for arthralgias. Skin: Negative. Allergic/Immunologic: Negative. Neurological: Negative. Hematological: Negative. Psychiatric/Behavioral: Negative. Past family and social history unremarkable. Diagnosis Orders   1. -donor kidney transplant        2. Other specified hypothyroidism        3. Benign prostatic hyperplasia with lower urinary tract symptoms, symptom details unspecified        4. Chronic atrial fibrillation        5. Erectile dysfunction due to arterial insufficiency        6. Warfarin-induced coagulopathy ([address]        7. Deep vein thrombosis (DVT) of right lower extremity, unspecified chronicity, unspecified vein (HCC)        8. S/P laparoscopic sleeve gastrectomy        9. Vitamin D deficiency        10. Malignant neoplasm of transverse colon ([address]        11. Status post partial colectomy        12. History of COVID-19        13. BMI 38.0-38.9,adult            Objective:   Physical Exam  Vitals and nursing note reviewed. Constitutional:       Appearance: He is well-developed. HENT:      Head: Normocephalic and atraumatic.       Right

## 2023-05-10 ENCOUNTER — TELEPHONE (OUTPATIENT)
Dept: PHARMACY | Age: 53
End: 2023-05-10

## 2023-05-12 NOTE — TELEPHONE ENCOUNTER
Michelle Herbert is calling to request a refill on the following medication(s):    Medication Request:  Requested Prescriptions     Pending Prescriptions Disp Refills    amLODIPine (NORVASC) 5 MG tablet 90 tablet 1     Sig: Take 1 tablet by mouth daily       Last Visit Date (If Applicable):  9/58/7388    Next Visit Date:    8/28/2023

## 2023-05-13 RX ORDER — AMLODIPINE BESYLATE 5 MG/1
5 TABLET ORAL DAILY
Qty: 90 TABLET | Refills: 1 | Status: SHIPPED | OUTPATIENT
Start: 2023-05-13

## 2023-05-17 ENCOUNTER — TELEPHONE (OUTPATIENT)
Dept: PHARMACY | Age: 53
End: 2023-05-17

## 2023-05-17 ENCOUNTER — TELEPHONE (OUTPATIENT)
Dept: INTERNAL MEDICINE CLINIC | Age: 53
End: 2023-05-17

## 2023-05-17 ENCOUNTER — APPOINTMENT (OUTPATIENT)
Dept: GENERAL RADIOLOGY | Age: 53
End: 2023-05-17
Payer: COMMERCIAL

## 2023-05-17 ENCOUNTER — HOSPITAL ENCOUNTER (EMERGENCY)
Age: 53
Discharge: HOME | End: 2023-05-17
Attending: EMERGENCY MEDICINE
Payer: COMMERCIAL

## 2023-05-17 ENCOUNTER — HOSPITAL ENCOUNTER (OUTPATIENT)
Dept: PHARMACY | Age: 53
Setting detail: THERAPIES SERIES
Discharge: HOME | End: 2023-05-17
Payer: COMMERCIAL

## 2023-05-17 VITALS
HEART RATE: 75 BPM | BODY MASS INDEX: 40.18 KG/M2 | OXYGEN SATURATION: 99 % | TEMPERATURE: 98.4 F | SYSTOLIC BLOOD PRESSURE: 148 MMHG | DIASTOLIC BLOOD PRESSURE: 85 MMHG | RESPIRATION RATE: 18 BRPM | WEIGHT: 250 LBS | HEIGHT: 66 IN

## 2023-05-17 DIAGNOSIS — M25.562 LEFT KNEE PAIN, UNSPECIFIED CHRONICITY: Primary | ICD-10-CM

## 2023-05-17 LAB
INR BLD: 2.3
PROTIME: 27.4 SECONDS

## 2023-05-17 PROCEDURE — 85610 PROTHROMBIN TIME: CPT

## 2023-05-17 PROCEDURE — 99211 OFF/OP EST MAY X REQ PHY/QHP: CPT

## 2023-05-17 PROCEDURE — 99283 EMERGENCY DEPT VISIT LOW MDM: CPT

## 2023-05-17 PROCEDURE — 73030 X-RAY EXAM OF SHOULDER: CPT

## 2023-05-17 PROCEDURE — 73080 X-RAY EXAM OF ELBOW: CPT

## 2023-05-17 PROCEDURE — 6370000000 HC RX 637 (ALT 250 FOR IP): Performed by: EMERGENCY MEDICINE

## 2023-05-17 PROCEDURE — 73562 X-RAY EXAM OF KNEE 3: CPT

## 2023-05-17 RX ORDER — HYDROCODONE BITARTRATE AND ACETAMINOPHEN 5; 325 MG/1; MG/1
1 TABLET ORAL EVERY 6 HOURS PRN
Qty: 10 TABLET | Refills: 0 | Status: SHIPPED | OUTPATIENT
Start: 2023-05-17 | End: 2023-05-20

## 2023-05-17 RX ORDER — HYDROCODONE BITARTRATE AND ACETAMINOPHEN 5; 325 MG/1; MG/1
2 TABLET ORAL ONCE
Status: COMPLETED | OUTPATIENT
Start: 2023-05-17 | End: 2023-05-17

## 2023-05-17 RX ADMIN — HYDROCODONE BITARTRATE AND ACETAMINOPHEN 2 TABLET: 5; 325 TABLET ORAL at 08:53

## 2023-05-17 ASSESSMENT — ENCOUNTER SYMPTOMS
SHORTNESS OF BREATH: 0
ABDOMINAL PAIN: 0
WHEEZING: 0
BACK PAIN: 0
ORTHOPNEA: 0
NAUSEA: 0
VOMITING: 0
DIARRHEA: 0
COUGH: 0

## 2023-05-17 ASSESSMENT — PAIN DESCRIPTION - LOCATION
LOCATION: SHOULDER;KNEE
LOCATION: SHOULDER;KNEE

## 2023-05-17 ASSESSMENT — PAIN - FUNCTIONAL ASSESSMENT: PAIN_FUNCTIONAL_ASSESSMENT: 0-10

## 2023-05-17 ASSESSMENT — PAIN DESCRIPTION - ORIENTATION
ORIENTATION: LEFT
ORIENTATION: LEFT

## 2023-05-17 ASSESSMENT — PAIN SCALES - GENERAL
PAINLEVEL_OUTOF10: 8
PAINLEVEL_OUTOF10: 8

## 2023-05-17 ASSESSMENT — LIFESTYLE VARIABLES: HOW OFTEN DO YOU HAVE A DRINK CONTAINING ALCOHOL: NEVER

## 2023-05-17 ASSESSMENT — PAIN DESCRIPTION - PAIN TYPE: TYPE: ACUTE PAIN

## 2023-05-17 ASSESSMENT — PAIN DESCRIPTION - DESCRIPTORS
DESCRIPTORS: DISCOMFORT
DESCRIPTORS: DISCOMFORT

## 2023-05-17 NOTE — PROGRESS NOTES
Medication Management Service, Warfarin Management  [facility], [phone]  Visit Date: 5/17/2023   Subjective:   Chuyita Lance is a 45 y.o. male who presents to clinic today for anticoagulation monitoring and adjustment. Patient seen in clinic for warfarin management due to  Indication:   atrial fibrillation. INR goal: of 2.0-3.0. Duration of therapy: indefinite. Assessment and PLAN   PT/INR done in office per protocol. INR today is 2.3, therapeutic. Plan: Continue current maintenance regimen of warfarin 1.5 mg every Tuesday, Thursday, Saturday and 2.5 mg  all other days. Using warfarin 2.5 mg and 3 mg tablets. Recheck INR in 4 week(s). Patient seen in room # 2. Of note: Patient thought Dr Mani Sabillon was his nephrologist but I was told he was never seen in office there only in the hospital one time 2 years ago. Patient reports following up with the transplant team: [phone]. I will call the tranplant team to discuss switching to a DOAC. Patient verbalized understanding of dosing directions and information discussed. Dosing schedule given to patient. Progress note sent to referring office. [address] [place] [address]  Ph., CACP, Clinical Pharmacist  Anticoagulation Services, [facility] Coumadin Clinic  5/17/2023  7:55 AM    ========================================================================For Pharmacy Admin Tracking Only    Intervention Detail:   Total # of Interventions Recommended: 0  Total # of Interventions Accepted: 0  Time Spent (min): 20

## 2023-05-17 NOTE — TELEPHONE ENCOUNTER
Called patient's transplant team to see if Xarelto or Eliquis would be safe for use in a patient post kidney transplant. Currently Emmie Eisenmenger is on warfarin for atrial fibrillation. Team asked Dr Celine Allison whom did not see a problem with changing the medication but deferred the question to the surgery coordinator. I spoke to Fadi and the Xarelto or Eliquis can used in transplant patients safely. [address] [place] [address] Ph., CACP, Clinical Pharmacist  Anticoagulation Services, [address] [place] [address] Coumadin Clinic  5/17/2023  2:12 PM      For Pharmacy Admin Tracking Only    Intervention Detail:   Total # of Interventions Recommended: 0  Total # of Interventions Accepted: 0  Time Spent (min): 10        .

## 2023-05-17 NOTE — TELEPHONE ENCOUNTER
Then I called Dr Khadra Sosa office (and spoke to Yesika) with the above information. I also called the patient with an update and he confirms that he would like to change to either Xarelto or Eliquis therapy. [address] [place] [address]  Ph., CACP, Clinical Pharmacist  Anticoagulation Services, [address] [facility] Coumadin [facility]  5/17/2023  2:37 PM

## 2023-05-17 NOTE — TELEPHONE ENCOUNTER
Daniel Morfin calling from [employer] ([phone]) regarding switching warfarin to Xarelto or Eliquis   She spoke with patient transplant team and they are ok with him switching if you are are ok

## 2023-05-17 NOTE — ED PROVIDER NOTES
[facility] ED  EMERGENCY DEPARTMENT ENCOUNTER      Pt Name: Hank Pacheco  MRN: [id number]  [place] 1970  Date of evaluation: 5/17/23  PCP:  Kyle Carpio MD    CHIEF COMPLAINT:   Chief Complaint   Patient presents with    Fall     Mechanical about 2 weeks ago    Shoulder Pain     left    Knee Pain     left     HISTORY OF PRESENT ILLNESS   Hank Pacheco is a 45 y.o. male whopresents with with left shoulder left knee and left elbow pain. Patient states 2 days ago ago he had tripped and fell and mechanical fall. He is on Coumadin but he did not hit his head or neck or lose conscious he denies any neck pain, chest pain, cough, shortness of breath abdominal pain or flank pain. Complains of pain and recently mainly he walks with left knee pain. REVIEW OF SYSTEMS       Review of Systems   Constitutional:  Negative for chills and fever. HENT:  Negative for nosebleeds. Respiratory:  Negative for cough, shortness of breath and wheezing. Cardiovascular:  Negative for chest pain, palpitations and orthopnea. Gastrointestinal:  Negative for abdominal pain, diarrhea, nausea and vomiting. Genitourinary:  Negative for dysuria and urgency. Musculoskeletal:  Negative for back pain and neck pain. Skin:  Negative for rash. Neurological:  Negative for dizziness, loss of consciousness and headaches.          PAST MEDICAL HISTORY   PMH:  has a past medical history of Abscess of right buttock, Anemia, Anticoagulated on Coumadin, Aortic insufficiency, Arthritis, Atrial fibrillation (HCC), BPH (benign prostatic hypertrophy), CAD (coronary artery disease), Caffeine use, Cellulitis of lower leg, Chronic back pain, Colon cancer ([facility]), Cor pulmonale, acute ([facility]), COVID-19, CRF (chronic renal failure), Erectile dysfunction, Gout, Hemodialysis patient ([facility]), History of blood transfusion, Hyperkalemia, Hyperlipidemia, Hypertension, Hypotension, Hypothyroidism, Kidney transplant candidate, Lymphedema

## 2023-05-18 ENCOUNTER — TELEPHONE (OUTPATIENT)
Dept: PHARMACY | Age: 53
End: 2023-05-18

## 2023-05-18 NOTE — TELEPHONE ENCOUNTER
MD will switch patient from warfarin to Eliquis. Dr Kristie Bedoya office will be calling the new script into [facility]. I then called patient and left a message asking for a call back and indicated that the Eliquis is being sent in to his pharmacy and that he should not start it until I talk to him about  the switch. Patient will need to discontinue warfarin and initiate apixaban as soon as the INR falls to <2 (US labeling). [address] [place] [address]  Ph., CACP, Clinical Pharmacist  Anticoagulation Services, [address] [place] [address] Coumadin Clinic  5/18/2023  11:28 AM  =============================================================    For Pharmacy Admin Tracking Only    Intervention Detail:   Total # of Interventions Recommended: 0  Total # of Interventions Accepted: 0  Time Spent (min): 10

## 2023-05-22 ENCOUNTER — TELEPHONE (OUTPATIENT)
Dept: PHARMACY | Age: 53
End: 2023-05-22

## 2023-05-22 NOTE — TELEPHONE ENCOUNTER
Patient has picked up his Eliquis RX. He has not started it yet. .     I advised holding warfarin today and an INR check tomorrow morning. Do NOT start Eliquis. Patient verbalized understanding. [address] [place] [address]  Ph., CACP, Clinical Pharmacist  Anticoagulation Services, [address] [facility] Coumadin [facility]  2023  11:15 AM      For Pharmacy Admin Tracking Only    Intervention Detail: Adherence Monitorin and Dose Adjustment: 1, reason: Therapy Optimization  Total # of Interventions Recommended: 2  Total # of Interventions Accepted: 2  Time Spent (min): 5

## 2023-05-23 ENCOUNTER — HOSPITAL ENCOUNTER (OUTPATIENT)
Dept: PHARMACY | Age: 53
Setting detail: THERAPIES SERIES
Discharge: HOME | End: 2023-05-23
Payer: COMMERCIAL

## 2023-05-23 ENCOUNTER — OFFICE VISIT (OUTPATIENT)
Dept: ORTHOPEDIC SURGERY | Age: 53
End: 2023-05-23
Payer: COMMERCIAL

## 2023-05-23 VITALS — BODY MASS INDEX: 37.89 KG/M2 | HEIGHT: 68 IN | WEIGHT: 250 LBS

## 2023-05-23 DIAGNOSIS — S80.02XA CONTUSION OF LEFT KNEE, INITIAL ENCOUNTER: Primary | ICD-10-CM

## 2023-05-23 DIAGNOSIS — S40.022A CONTUSION OF MULTIPLE SITES OF LEFT SHOULDER AND UPPER ARM, INITIAL ENCOUNTER: ICD-10-CM

## 2023-05-23 DIAGNOSIS — S40.012A CONTUSION OF MULTIPLE SITES OF LEFT SHOULDER AND UPPER ARM, INITIAL ENCOUNTER: ICD-10-CM

## 2023-05-23 LAB
INR BLD: 1.4
PROTIME: 17.1 SECONDS

## 2023-05-23 PROCEDURE — G8417 CALC BMI ABV UP PARAM F/U: HCPCS | Performed by: ORTHOPAEDIC SURGERY

## 2023-05-23 PROCEDURE — 1036F TOBACCO NON-USER: CPT | Performed by: ORTHOPAEDIC SURGERY

## 2023-05-23 PROCEDURE — 3017F COLORECTAL CA SCREEN DOC REV: CPT | Performed by: ORTHOPAEDIC SURGERY

## 2023-05-23 PROCEDURE — 99212 OFFICE O/P EST SF 10 MIN: CPT

## 2023-05-23 PROCEDURE — 99203 OFFICE O/P NEW LOW 30 MIN: CPT | Performed by: ORTHOPAEDIC SURGERY

## 2023-05-23 PROCEDURE — G8427 DOCREV CUR MEDS BY ELIG CLIN: HCPCS | Performed by: ORTHOPAEDIC SURGERY

## 2023-05-23 PROCEDURE — 85610 PROTHROMBIN TIME: CPT

## 2023-05-23 RX ORDER — HYDROCODONE BITARTRATE AND ACETAMINOPHEN 5; 325 MG/1; MG/1
1 TABLET ORAL EVERY 6 HOURS PRN
Qty: 56 TABLET | Refills: 0 | Status: SHIPPED | OUTPATIENT
Start: 2023-05-23 | End: 2023-06-06

## 2023-05-23 RX ORDER — FUROSEMIDE 20 MG/1
20 TABLET ORAL 2 TIMES DAILY
COMMUNITY

## 2023-05-23 NOTE — PROGRESS NOTES
Chief Complaint   Patient presents with    Fall     ER F/U ST'VS LEFT SIDED KNEE ELBOW AND SHOULDER PAIN.  DOI-05/15/23

## 2023-05-23 NOTE — PROGRESS NOTES
This 49-year-old patient is seen here because of an injury he sustained to his left shoulder left elbow left knee. This happened on 5/15/2023 tripped over and fell. Patient has not had any problems with his areas before. He says the pain in the left knee and the left elbow has subsided significantly but is difficult as he still continues to have significant pain in the left shoulder. The patient does have significant past medical history which include aortic insufficiency atrial fibrillation, BPH, CAD, cellulitis of the left leg, colon cancer, chronic renal failure, gout, hypertension thyroid disease and renal transplant which was carried out at [facility]. Patient also had sleeve gastrectomy but really did not work out with the bariatric surgery. He has been he did not lose much weight. He has had several colonoscopy. Examination: Knee examination shows he has painless flexion from 0 to 90 degrees. .  There is no effusion. There is no instability. Elbow examination also shows excellent full range of motion. Forearm rotation is normal.  Left shoulder examination shows that he has abduction of only about 30 degrees and flexion to 45 degrees. Rotation is equal to the other side. X-rays of the knee showed mild soft tissue swelling and trace of effusion but no bony injury and the joint spaces are well-maintained. X-rays of the elbow showed no abnormality. X-rays of the left shoulder also showed no abnormality. Diagnosis: Contusion left shoulder and left elbow and left knee. Treatment: I am starting him on physical therapy and put him on hydrocodone and will see him again in 3 weeks time.

## 2023-05-23 NOTE — PROGRESS NOTES
Outpatient Anticoagulation Service -  Apixaban (Eliquis) Management    Initial Outpatient Visit  Started Eliquis therapy: 2023  Estimated duration of therapy: Indefinite. Indication for therapy: Atrial Fibrillation. Any episodes of thrombosis in the past: none per patient -DVT indicated in chart. Prescribed Eliquis Dose: 5 mg twice daily  (For AFib - pts with at least 2 of the following: Age > 80, Weight < 60kg, or SCr > 1.5mg/dL, the recommended dose is 2.5mg BID)    Age: 45  Weight: 113.4 kg on 2023  SCr: 2.05 on 23  Estimated CrCl (Cockcroft-Gault): 66.07  Dose Appropriate: Yes. Hb: 12.4 on 23    Current Meds Reviewed: Yes - Drug Interactions Identified: none  Patient taking medications as prescribed:  Yes. Issues identified: 8 medications removed from medications list that were discontinued after kidney transplant, and one medication dose change updated in EPIC     Counseling points included:  1. Indication for Apixaban:   atrial fibrillation . 2. Explanation of Apixaban including purpose and mechanism of action  3. Importance of compliance Of note patient is on transplant anti- rejection medications so he has a set schedule in place for taking medications at scheduled times each day. 3. Dose and directions, including missed doses and timing of medication  4. Side effects - increase risk of bleeding & bruising, nausea  5. Potential interactions   A. Avoid concomitant use with strong CY inhibitors (Ketoconazole, Ritonavir, Clarithromycin, Diltiazem, Erythromycin, Fluconazole, Verapamil) or strong CY inducers (Carbamazepine, Phenytoin, Rifampin, [place]'s Wort)   B. Grapefruit juice may increase levels/effects of Eliquis - use caution  6. Signs and symptoms of VTE and stroke reviewed  7. Contact prescriber when:   A. Changes made to other medications   B. Signs or symptoms of VTE or stroke   C.  Uncontrolled bleeding or unusual bruising    Answered all medication-related

## 2023-06-21 NOTE — TELEPHONE ENCOUNTER
Dusty Reese is calling to request a refill on the following medication(s):    Last Visit Date (If Applicable):  6/06/4184    Next Visit Date:    8/28/2023    Medication Request:  Requested Prescriptions     Pending Prescriptions Disp Refills    rosuvastatin (CRESTOR) 40 MG tablet [Pharmacy Med Name: ROSUVASTATIN 40MG] 90 tablet 2     Sig: TAKE 1 TABLET BY MOUTH DAILY

## 2023-06-22 RX ORDER — ROSUVASTATIN CALCIUM 40 MG/1
TABLET, COATED ORAL
Qty: 90 TABLET | Refills: 0 | Status: SHIPPED | OUTPATIENT
Start: 2023-06-22

## 2023-07-11 RX ORDER — AMIODARONE HYDROCHLORIDE 200 MG/1
TABLET ORAL
Qty: 90 TABLET | Refills: 1 | Status: SHIPPED | OUTPATIENT
Start: 2023-07-11

## 2023-07-11 NOTE — TELEPHONE ENCOUNTER
Sonia Pineda is calling to request a refill on the following medication(s):    Medication Request:  Requested Prescriptions     Pending Prescriptions Disp Refills    amiodarone (CORDARONE) 200 MG tablet [Pharmacy Med Name: AMIODARONE 200MG] 90 tablet 0     Sig: TAKE 1 TABLET BY MOUTH DAILY -- PLEASE MAKE APPOINTMENT--       Last Visit Date (If Applicable):  6/68/4099    Next Visit Date:    8/28/2023    Last refill 3/25/23.  Prescription pending

## 2023-08-28 ENCOUNTER — OFFICE VISIT (OUTPATIENT)
Dept: INTERNAL MEDICINE CLINIC | Age: 53
End: 2023-08-28
Payer: COMMERCIAL

## 2023-08-28 VITALS
WEIGHT: 284 LBS | BODY MASS INDEX: 43.04 KG/M2 | DIASTOLIC BLOOD PRESSURE: 80 MMHG | HEART RATE: 57 BPM | SYSTOLIC BLOOD PRESSURE: 128 MMHG | HEIGHT: 68 IN | TEMPERATURE: 97 F | OXYGEN SATURATION: 99 % | RESPIRATION RATE: 10 BRPM

## 2023-08-28 DIAGNOSIS — N52.01 ERECTILE DYSFUNCTION DUE TO ARTERIAL INSUFFICIENCY: ICD-10-CM

## 2023-08-28 DIAGNOSIS — D68.32 WARFARIN-INDUCED COAGULOPATHY (HCC): ICD-10-CM

## 2023-08-28 DIAGNOSIS — I48.20 CHRONIC ATRIAL FIBRILLATION (HCC): ICD-10-CM

## 2023-08-28 DIAGNOSIS — E03.8 OTHER SPECIFIED HYPOTHYROIDISM: ICD-10-CM

## 2023-08-28 DIAGNOSIS — H10.13 ALLERGIC CONJUNCTIVITIS OF BOTH EYES: Primary | ICD-10-CM

## 2023-08-28 DIAGNOSIS — I82.401 DEEP VEIN THROMBOSIS (DVT) OF RIGHT LOWER EXTREMITY, UNSPECIFIED CHRONICITY, UNSPECIFIED VEIN (HCC): ICD-10-CM

## 2023-08-28 DIAGNOSIS — N40.1 BENIGN PROSTATIC HYPERPLASIA WITH LOWER URINARY TRACT SYMPTOMS, SYMPTOM DETAILS UNSPECIFIED: ICD-10-CM

## 2023-08-28 DIAGNOSIS — C18.4 MALIGNANT NEOPLASM OF TRANSVERSE COLON (HCC): ICD-10-CM

## 2023-08-28 DIAGNOSIS — Z86.16 HISTORY OF COVID-19: ICD-10-CM

## 2023-08-28 DIAGNOSIS — E55.9 VITAMIN D DEFICIENCY: ICD-10-CM

## 2023-08-28 DIAGNOSIS — Z98.84 S/P LAPAROSCOPIC SLEEVE GASTRECTOMY: ICD-10-CM

## 2023-08-28 DIAGNOSIS — Z94.0 DECEASED-DONOR KIDNEY TRANSPLANT: ICD-10-CM

## 2023-08-28 DIAGNOSIS — T45.515A WARFARIN-INDUCED COAGULOPATHY (HCC): ICD-10-CM

## 2023-08-28 DIAGNOSIS — Z90.49 STATUS POST PARTIAL COLECTOMY: ICD-10-CM

## 2023-08-28 DIAGNOSIS — H04.209 EXCESSIVE TEAR PRODUCTION, UNSPECIFIED LATERALITY: ICD-10-CM

## 2023-08-28 PROCEDURE — 3017F COLORECTAL CA SCREEN DOC REV: CPT | Performed by: FAMILY MEDICINE

## 2023-08-28 PROCEDURE — 99214 OFFICE O/P EST MOD 30 MIN: CPT | Performed by: FAMILY MEDICINE

## 2023-08-28 PROCEDURE — G8427 DOCREV CUR MEDS BY ELIG CLIN: HCPCS | Performed by: FAMILY MEDICINE

## 2023-08-28 PROCEDURE — G8417 CALC BMI ABV UP PARAM F/U: HCPCS | Performed by: FAMILY MEDICINE

## 2023-08-28 PROCEDURE — 1036F TOBACCO NON-USER: CPT | Performed by: FAMILY MEDICINE

## 2023-08-28 RX ORDER — LORATADINE 10 MG/1
10 TABLET ORAL DAILY
Qty: 30 TABLET | Refills: 0 | Status: SHIPPED | OUTPATIENT
Start: 2023-08-28

## 2023-08-28 ASSESSMENT — ENCOUNTER SYMPTOMS
GASTROINTESTINAL NEGATIVE: 1
BACK PAIN: 1
RESPIRATORY NEGATIVE: 1
ALLERGIC/IMMUNOLOGIC NEGATIVE: 1
EYES NEGATIVE: 1

## 2023-08-28 NOTE — PROGRESS NOTES
Conjunctiva/sclera: Conjunctivae normal.      Pupils: Pupils are equal, round, and reactive to light. Comments: Possible conjunctivitis with lacrimation   Cardiovascular:      Rate and Rhythm: Normal rate and regular rhythm. Heart sounds: Normal heart sounds. Pulmonary:      Effort: Pulmonary effort is normal.      Breath sounds: Normal breath sounds. Abdominal:      General: Bowel sounds are normal.      Palpations: Abdomen is soft. Comments: History of sleeve gastrectomy  History of transverse colon malignancy-status post partial colectomy with end-to-end anastomosis   Genitourinary:     Comments: Status post kidney transplant  CKD  Musculoskeletal:         General: Normal range of motion. Cervical back: Normal range of motion and neck supple. Comments: Degenerative polyarthralgia   Skin:     General: Skin is warm and dry. Neurological:      Mental Status: He is alert and oriented to person, place, and time. Deep Tendon Reflexes: Reflexes are normal and symmetric. Psychiatric:         Behavior: Behavior normal.         Thought Content: Thought content normal.       Assessment:       Diagnosis Orders   1. Allergic conjunctivitis of both eyes        2. -donor kidney transplant        3. Other specified hypothyroidism        4. Benign prostatic hyperplasia with lower urinary tract symptoms, symptom details unspecified        5. Erectile dysfunction due to arterial insufficiency        6. Chronic atrial fibrillation        7. Warfarin-induced coagulopathy ([address])        8. Deep vein thrombosis (DVT) of right lower extremity, unspecified chronicity, unspecified vein (HCC)        9. S/P laparoscopic sleeve gastrectomy        10. Vitamin D deficiency        11. Malignant neoplasm of transverse colon ([address])        12. Status post partial colectomy        13. History of COVID-19        14. BMI 38.0-38.9,adult        15.  Excessive tear production, unspecified laterality  AFL - Smith,

## 2023-09-21 RX ORDER — LORATADINE 10 MG/1
10 TABLET ORAL DAILY
Qty: 90 TABLET | Refills: 0 | Status: SHIPPED | OUTPATIENT
Start: 2023-09-21

## 2023-09-21 NOTE — TELEPHONE ENCOUNTER
Taco January is calling to request a refill on the following medication(s):    Medication Request:  Requested Prescriptions     Pending Prescriptions Disp Refills    loratadine (CLARITIN) 10 MG tablet [Pharmacy Med Name: LORATADINE 10MG] 30 tablet 0     Sig: TAKE 1 TABLET BY MOUTH DAILY       Last Visit Date (If Applicable):  4/86/7231    Next Visit Date:    11/14/2023      Last refill 8/28/23. Prescription pending.

## 2023-09-28 ENCOUNTER — HOSPITAL ENCOUNTER (EMERGENCY)
Age: 53
Discharge: HOME | End: 2023-09-28
Attending: EMERGENCY MEDICINE
Payer: COMMERCIAL

## 2023-09-28 VITALS
SYSTOLIC BLOOD PRESSURE: 142 MMHG | OXYGEN SATURATION: 97 % | TEMPERATURE: 97.3 F | HEART RATE: 54 BPM | RESPIRATION RATE: 16 BRPM | DIASTOLIC BLOOD PRESSURE: 74 MMHG

## 2023-09-28 DIAGNOSIS — B35.6 TINEA CRURIS: Primary | ICD-10-CM

## 2023-09-28 PROCEDURE — 99283 EMERGENCY DEPT VISIT LOW MDM: CPT

## 2023-09-28 RX ORDER — GRISEOFULVIN (MICROSIZE) 125 MG/5ML
250 SUSPENSION ORAL DAILY
Qty: 70 ML | Refills: 0 | Status: SHIPPED | OUTPATIENT
Start: 2023-09-28 | End: 2023-10-05

## 2023-09-28 RX ORDER — DOXYCYCLINE HYCLATE 100 MG
100 TABLET ORAL 2 TIMES DAILY
Qty: 14 TABLET | Refills: 0 | Status: SHIPPED | OUTPATIENT
Start: 2023-09-28 | End: 2023-10-05

## 2023-09-28 ASSESSMENT — PAIN DESCRIPTION - LOCATION: LOCATION: GROIN

## 2023-09-28 ASSESSMENT — ENCOUNTER SYMPTOMS
NAUSEA: 0
SHORTNESS OF BREATH: 0
FACIAL SWELLING: 0
BACK PAIN: 0
EYE REDNESS: 0
COUGH: 0
CHEST TIGHTNESS: 0
VOMITING: 0
EYE PAIN: 0
ABDOMINAL PAIN: 0

## 2023-09-28 ASSESSMENT — PAIN DESCRIPTION - DESCRIPTORS: DESCRIPTORS: ACHING

## 2023-09-28 ASSESSMENT — PAIN SCALES - GENERAL: PAINLEVEL_OUTOF10: 6

## 2023-09-28 NOTE — ED PROVIDER NOTES
[address] [place] [address] ED  Emergency Department Encounter  Emergency Medicine Resident     Pt Name:Hugh Avila  MRN: [id number]  Birthdate 1970  Date of evaluation: 9/28/23  PCP:  Alyssa Dunn MD  Note Started: 10:38 AM EDT      CHIEF COMPLAINT       Chief Complaint   Patient presents with    Groin Pain       HISTORY OF PRESENT ILLNESS  (Location/Symptom, Timing/Onset, Context/Setting, Quality, Duration, Modifying Factors, Severity.)      Emmanuel Bonilla is a 52 y.o. male who presents with groin pain. Patient states for the past day he has had groin pain. States he previously had a fungal infection about 5 months ago was seen here. Had extensive work-up was diagnosed with a fungal infection was given a antifungal and an antibiotic for it. He states that he wanted to come to the emergency form before it got worse. He states the last time he waited 4 days and it spread from his groin to his abdomen and to his genitals. He states this time it is not in his abdomen or his genitals. He states though it looks and feels the same as prior fungal infection. Patient states he had a kidney transplant 6/15/2022 at [facility]. States that been taking his antirejection medications. He states he was on Coumadin for a year and then was switched to Eliquis. He states that he has been compliant with all of his medications. Denies any abdominal pain nausea vomiting fevers chills urinary symptoms including frequency burning or blood in his urine.     PAST MEDICAL / SURGICAL / SOCIAL / FAMILY HISTORY      has a past medical history of Abscess of right buttock, Anemia, Anticoagulated on Coumadin, Aortic insufficiency, Arthritis, Atrial fibrillation (HCC), BPH (benign prostatic hypertrophy), CAD (coronary artery disease), Caffeine use, Cellulitis of lower leg, Chronic back pain, Colon cancer ([address]), Cor pulmonale, acute ([address]), COVID-19, CRF (chronic renal failure), Erectile dysfunction, Gout, Hemodialysis patient

## 2023-09-28 NOTE — ED NOTES
Discharge instructions given by Dr. Silvio Ramos who verbalized pt understanding. All questions answered.        Cathi Galindo, AUNG  09/28/23 [id number]

## 2023-09-28 NOTE — DISCHARGE INSTRUCTIONS
Your evaluated in the emergency department for your fungal infection. You are prescribed increasing: Please take as prescribed. We will also give you doxycycline to prevent superficial cellulitis. Please follow-up with your transplant team as soon as you can. I would call them today to tell him that you were seen here in the emergency department and you were given these antifungals and antibiotics. PLEASE RETURN TO THE EMERGENCY DEPARTMENT IMMEDIATELY for worsening symptoms, white drainage from the wound, redness or streaking, or if you develop any concerning symptoms such as: high fever not relieved by acetaminophen (Tylenol) and/or ibuprofen (Motrin / Advil), chills, shortness of breath, chest pain, feeling of your heart fluttering or racing, persistent nausea and/or vomiting, vomiting up blood, blood in your stool, loss of consciousness, numbness, weakness or tingling in the arms or legs or change in color of the extremities, changes in mental status, persistent headache, blurry vision, loss of bladder / bowel control, unable to follow up with your physician, or other any other care or concern.

## 2023-09-28 NOTE — ED NOTES
Pt is A+Ox4  Pt ambulates with a steady gait  Pt denies fever or chills  Pt denies nausea or vomiting  Pt denies burning with urination  Pt denies penile discharge  All questions answered and needs met at this time       Sally Barnes LPN  81/86/01 [id number]

## 2023-10-20 RX ORDER — ROSUVASTATIN CALCIUM 40 MG/1
TABLET, COATED ORAL
Qty: 90 TABLET | Refills: 1 | Status: SHIPPED | OUTPATIENT
Start: 2023-10-20

## 2023-10-20 NOTE — TELEPHONE ENCOUNTER
Javon Dumont is calling to request a refill on the following medication(s):    Medication Request:  Requested Prescriptions     Pending Prescriptions Disp Refills    rosuvastatin (CRESTOR) 40 MG tablet [Pharmacy Med Name: ROSUVASTATIN 40MG] 90 tablet 0     Sig: TAKE 1 TABLET BY MOUTH DAILY       Last Visit Date (If Applicable):  1/99/7977    Next Visit Date:    11/14/2023      Last refill 6/22/23. Prescription pending.

## 2023-11-14 ENCOUNTER — OFFICE VISIT (OUTPATIENT)
Dept: INTERNAL MEDICINE CLINIC | Age: 53
End: 2023-11-14

## 2023-11-14 VITALS
OXYGEN SATURATION: 99 % | RESPIRATION RATE: 10 BRPM | HEART RATE: 77 BPM | HEIGHT: 68 IN | BODY MASS INDEX: 40.47 KG/M2 | TEMPERATURE: 97.4 F | DIASTOLIC BLOOD PRESSURE: 72 MMHG | WEIGHT: 267 LBS | SYSTOLIC BLOOD PRESSURE: 132 MMHG

## 2023-11-14 DIAGNOSIS — I48.20 CHRONIC ATRIAL FIBRILLATION (HCC): ICD-10-CM

## 2023-11-14 DIAGNOSIS — T45.515A WARFARIN-INDUCED COAGULOPATHY (HCC): ICD-10-CM

## 2023-11-14 DIAGNOSIS — D68.32 WARFARIN-INDUCED COAGULOPATHY (HCC): ICD-10-CM

## 2023-11-14 DIAGNOSIS — Z00.00 MEDICARE ANNUAL WELLNESS VISIT, SUBSEQUENT: Primary | ICD-10-CM

## 2023-11-14 DIAGNOSIS — E03.8 OTHER SPECIFIED HYPOTHYROIDISM: ICD-10-CM

## 2023-11-14 DIAGNOSIS — Z86.16 HISTORY OF COVID-19: ICD-10-CM

## 2023-11-14 DIAGNOSIS — C18.4 MALIGNANT NEOPLASM OF TRANSVERSE COLON (HCC): ICD-10-CM

## 2023-11-14 DIAGNOSIS — Z94.0 DECEASED-DONOR KIDNEY TRANSPLANT: ICD-10-CM

## 2023-11-14 DIAGNOSIS — N40.1 BENIGN PROSTATIC HYPERPLASIA WITH LOWER URINARY TRACT SYMPTOMS, SYMPTOM DETAILS UNSPECIFIED: ICD-10-CM

## 2023-11-14 DIAGNOSIS — E55.9 VITAMIN D DEFICIENCY: ICD-10-CM

## 2023-11-14 DIAGNOSIS — Z90.49 STATUS POST PARTIAL COLECTOMY: ICD-10-CM

## 2023-11-14 DIAGNOSIS — R29.818 SUSPECTED SLEEP APNEA: ICD-10-CM

## 2023-11-14 DIAGNOSIS — N52.01 ERECTILE DYSFUNCTION DUE TO ARTERIAL INSUFFICIENCY: ICD-10-CM

## 2023-11-14 DIAGNOSIS — Z23 NEED FOR INFLUENZA VACCINATION: ICD-10-CM

## 2023-11-14 DIAGNOSIS — I82.729 CHRONIC DEEP VEIN THROMBOSIS (DVT) OF OTHER VEIN OF UPPER EXTREMITY, UNSPECIFIED LATERALITY (HCC): ICD-10-CM

## 2023-11-14 DIAGNOSIS — Z98.84 S/P LAPAROSCOPIC SLEEVE GASTRECTOMY: ICD-10-CM

## 2023-11-14 ASSESSMENT — PATIENT HEALTH QUESTIONNAIRE - PHQ9
SUM OF ALL RESPONSES TO PHQ QUESTIONS 1-9: 0
SUM OF ALL RESPONSES TO PHQ QUESTIONS 1-9: 0
SUM OF ALL RESPONSES TO PHQ9 QUESTIONS 1 & 2: 0
SUM OF ALL RESPONSES TO PHQ QUESTIONS 1-9: 0
1. LITTLE INTEREST OR PLEASURE IN DOING THINGS: 0
SUM OF ALL RESPONSES TO PHQ QUESTIONS 1-9: 0
2. FEELING DOWN, DEPRESSED OR HOPELESS: 0

## 2023-11-14 ASSESSMENT — LIFESTYLE VARIABLES
HOW MANY STANDARD DRINKS CONTAINING ALCOHOL DO YOU HAVE ON A TYPICAL DAY: 1 OR 2
HOW OFTEN DO YOU HAVE A DRINK CONTAINING ALCOHOL: 2-4 TIMES A MONTH

## 2023-11-14 NOTE — PROGRESS NOTES
negative  History of colon cancer status post partial colectomy. He is established with colorectal surgery. Last colonoscopy 2021. He is complaining of loose bowel stools that appears to be secondary to short gut syndrome. However he is advised to discuss with his colorectal surgeon. Overall he is well compensated  He is also attributing his diarrhea to mycophenolate that was recently adjusted by his nephrologist.  He is advised to discuss with his nephrologist regarding his rejection medications  History of cadaveric kidney transplant to continue belatacept, mycophenolate, prednisone, Valcyte  Hyperlipidemia on Crestor that he is tolerating well  Hypothyroidism on levothyroxine. TSH is within normal limit  Chronic A-fib with underlying history of DVT. He is on amiodarone, Coumadin was recently switched to Eliquis  Morbid obesity with suspected sleep apnea. History of sleeve gastrectomy. He will be scheduled with [facility] sleep lab emphasized the importance of weight reduction, daily moderate exercise  Hemodynamically stable  Plan he is advised to update COVID/single-lumen vaccine in his pharmacy  Med list and available labs reviewed, discussed with patient    Recommendations for Preventive Services Due: see orders and patient instructions/AVS.  Recommended screening schedule for the next 5-10 years is provided to the patient in written form: see Patient Instructions/AVS.     No follow-ups on file. Subjective       Patient's complete Health Risk Assessment and screening values have been reviewed and are found in Flowsheets. The following problems were reviewed today and where indicated follow up appointments were made and/or referrals ordered.     Positive Risk Factor Screenings with Interventions:                 Weight and Activity:  Physical Activity: Insufficiently Active (11/14/2023)    Exercise Vital Sign     Days of Exercise per Week: 3 days     Minutes of Exercise per Session: 30 min     On average,

## 2023-11-15 ENCOUNTER — APPOINTMENT (OUTPATIENT)
Dept: CT IMAGING | Age: 53
End: 2023-11-15
Payer: MEDICARE

## 2023-11-15 ENCOUNTER — HOSPITAL ENCOUNTER (EMERGENCY)
Age: 53
Discharge: ANOTHER HOSPITAL | End: 2023-11-15
Attending: EMERGENCY MEDICINE
Payer: MEDICARE

## 2023-11-15 VITALS
DIASTOLIC BLOOD PRESSURE: 83 MMHG | SYSTOLIC BLOOD PRESSURE: 129 MMHG | HEART RATE: 88 BPM | TEMPERATURE: 97.7 F | OXYGEN SATURATION: 100 % | RESPIRATION RATE: 18 BRPM

## 2023-11-15 DIAGNOSIS — R10.84 GENERALIZED ABDOMINAL PAIN: Primary | ICD-10-CM

## 2023-11-15 LAB
ALBUMIN SERPL-MCNC: 3.1 G/DL (ref 3.5–5.2)
ALBUMIN/GLOB SERPL: 1.2 {RATIO} (ref 1–2.5)
ALP SERPL-CCNC: 66 U/L (ref 40–129)
ALT SERPL-CCNC: 32 U/L (ref 5–41)
ANION GAP SERPL CALCULATED.3IONS-SCNC: 11 MMOL/L (ref 9–17)
AST SERPL-CCNC: 26 U/L
BACTERIA URNS QL MICRO: ABNORMAL
BASOPHILS # BLD: 0 K/UL (ref 0–0.2)
BASOPHILS NFR BLD: 0 % (ref 0–2)
BILIRUB SERPL-MCNC: 0.6 MG/DL (ref 0.3–1.2)
BILIRUB UR QL STRIP: NEGATIVE
BUN SERPL-MCNC: 85 MG/DL (ref 6–20)
CALCIUM SERPL-MCNC: 8.9 MG/DL (ref 8.6–10.4)
CASTS #/AREA URNS LPF: ABNORMAL /LPF (ref 0–8)
CHLORIDE SERPL-SCNC: 105 MMOL/L (ref 98–107)
CLARITY UR: CLEAR
CO2 SERPL-SCNC: 23 MMOL/L (ref 20–31)
COLOR UR: YELLOW
CREAT SERPL-MCNC: 2.3 MG/DL (ref 0.7–1.2)
EOSINOPHIL # BLD: 0.03 K/UL (ref 0–0.4)
EOSINOPHILS RELATIVE PERCENT: 1 % (ref 1–4)
EPI CELLS #/AREA URNS HPF: ABNORMAL /HPF (ref 0–5)
ERYTHROCYTE [DISTWIDTH] IN BLOOD BY AUTOMATED COUNT: 18.4 % (ref 11.8–14.4)
GFR SERPL CREATININE-BSD FRML MDRD: 33 ML/MIN/1.73M2
GLUCOSE SERPL-MCNC: 98 MG/DL (ref 70–99)
GLUCOSE UR STRIP-MCNC: NEGATIVE MG/DL
HCT VFR BLD AUTO: 31.2 % (ref 40.7–50.3)
HGB BLD-MCNC: 10 G/DL (ref 13–17)
HGB UR QL STRIP.AUTO: NEGATIVE
IMM GRANULOCYTES # BLD AUTO: 0.05 K/UL (ref 0–0.3)
IMM GRANULOCYTES NFR BLD: 2 %
INR PPP: 1.4
KETONES UR STRIP-MCNC: NEGATIVE MG/DL
LEUKOCYTE ESTERASE UR QL STRIP: NEGATIVE
LIPASE SERPL-CCNC: 26 U/L (ref 13–60)
LYMPHOCYTES NFR BLD: 0.62 K/UL (ref 1–4.8)
LYMPHOCYTES RELATIVE PERCENT: 24 % (ref 24–44)
MCH RBC QN AUTO: 35.7 PG (ref 25.2–33.5)
MCHC RBC AUTO-ENTMCNC: 32.1 G/DL (ref 28.4–34.8)
MCV RBC AUTO: 111.4 FL (ref 82.6–102.9)
MONOCYTES NFR BLD: 0.49 K/UL (ref 0.1–0.8)
MONOCYTES NFR BLD: 19 % (ref 1–7)
MORPHOLOGY: ABNORMAL
NEUTROPHILS NFR BLD: 54 % (ref 36–66)
NEUTS SEG NFR BLD: 1.41 K/UL (ref 1.8–7.7)
NITRITE UR QL STRIP: NEGATIVE
NRBC BLD-RTO: 3.1 PER 100 WBC
PH UR STRIP: 5.5 [PH] (ref 5–8)
PLATELET # BLD AUTO: 205 K/UL (ref 138–453)
PMV BLD AUTO: 10.3 FL (ref 8.1–13.5)
POTASSIUM SERPL-SCNC: 4 MMOL/L (ref 3.7–5.3)
PROT SERPL-MCNC: 5.6 G/DL (ref 6.4–8.3)
PROT UR STRIP-MCNC: ABNORMAL MG/DL
PROTHROMBIN TIME: 17.1 SEC (ref 11.7–14.9)
RBC # BLD AUTO: 2.8 M/UL (ref 4.21–5.77)
RBC #/AREA URNS HPF: ABNORMAL /HPF (ref 0–4)
ROTAVIRUS ANTIGEN: NEGATIVE
SODIUM SERPL-SCNC: 139 MMOL/L (ref 135–144)
SOURCE, 60200063: NORMAL
SP GR UR STRIP: 1.02 (ref 1–1.03)
UROBILINOGEN UR STRIP-ACNC: NORMAL EU/DL (ref 0–1)
WBC #/AREA URNS HPF: ABNORMAL /HPF (ref 0–5)
WBC OTHER # BLD: 2.6 K/UL (ref 3.5–11.3)

## 2023-11-15 PROCEDURE — 81001 URINALYSIS AUTO W/SCOPE: CPT

## 2023-11-15 PROCEDURE — 87506 IADNA-DNA/RNA PROBE TQ 6-11: CPT

## 2023-11-15 PROCEDURE — 96375 TX/PRO/DX INJ NEW DRUG ADDON: CPT | Performed by: EMERGENCY MEDICINE

## 2023-11-15 PROCEDURE — 74176 CT ABD & PELVIS W/O CONTRAST: CPT

## 2023-11-15 PROCEDURE — 85610 PROTHROMBIN TIME: CPT

## 2023-11-15 PROCEDURE — 96374 THER/PROPH/DIAG INJ IV PUSH: CPT | Performed by: EMERGENCY MEDICINE

## 2023-11-15 PROCEDURE — 83690 ASSAY OF LIPASE: CPT

## 2023-11-15 PROCEDURE — 2580000003 HC RX 258

## 2023-11-15 PROCEDURE — 87329 GIARDIA AG IA: CPT

## 2023-11-15 PROCEDURE — 80053 COMPREHEN METABOLIC PANEL: CPT

## 2023-11-15 PROCEDURE — 87328 CRYPTOSPORIDIUM AG IA: CPT

## 2023-11-15 PROCEDURE — 2580000003 HC RX 258: Performed by: EMERGENCY MEDICINE

## 2023-11-15 PROCEDURE — 87425 ROTAVIRUS AG IA: CPT

## 2023-11-15 PROCEDURE — 99285 EMERGENCY DEPT VISIT HI MDM: CPT | Performed by: EMERGENCY MEDICINE

## 2023-11-15 PROCEDURE — 85025 COMPLETE CBC W/AUTO DIFF WBC: CPT

## 2023-11-15 PROCEDURE — 6360000002 HC RX W HCPCS: Performed by: EMERGENCY MEDICINE

## 2023-11-15 PROCEDURE — 87086 URINE CULTURE/COLONY COUNT: CPT

## 2023-11-15 RX ORDER — 0.9 % SODIUM CHLORIDE 0.9 %
500 INTRAVENOUS SOLUTION INTRAVENOUS ONCE
Status: COMPLETED | OUTPATIENT
Start: 2023-11-15 | End: 2023-11-15

## 2023-11-15 RX ORDER — ONDANSETRON 2 MG/ML
4 INJECTION INTRAMUSCULAR; INTRAVENOUS ONCE
Status: COMPLETED | OUTPATIENT
Start: 2023-11-15 | End: 2023-11-15

## 2023-11-15 RX ORDER — FENTANYL CITRATE 50 UG/ML
50 INJECTION, SOLUTION INTRAMUSCULAR; INTRAVENOUS ONCE
Status: COMPLETED | OUTPATIENT
Start: 2023-11-15 | End: 2023-11-15

## 2023-11-15 RX ADMIN — SODIUM CHLORIDE 500 ML: 9 INJECTION, SOLUTION INTRAVENOUS at 10:04

## 2023-11-15 RX ADMIN — FENTANYL CITRATE 50 MCG: 50 INJECTION, SOLUTION INTRAMUSCULAR; INTRAVENOUS at 07:20

## 2023-11-15 RX ADMIN — ONDANSETRON 4 MG: 2 INJECTION INTRAMUSCULAR; INTRAVENOUS at 07:20

## 2023-11-15 RX ADMIN — SODIUM CHLORIDE 500 ML: 9 INJECTION, SOLUTION INTRAVENOUS at 07:19

## 2023-11-15 RX ADMIN — SODIUM CHLORIDE 500 ML: 9 INJECTION, SOLUTION INTRAVENOUS at 18:23

## 2023-11-15 ASSESSMENT — ENCOUNTER SYMPTOMS
NAUSEA: 0
ABDOMINAL PAIN: 1
WHEEZING: 0
SHORTNESS OF BREATH: 0
COUGH: 0
ORTHOPNEA: 0
DIARRHEA: 1
BACK PAIN: 0
VOMITING: 0

## 2023-11-15 ASSESSMENT — PAIN DESCRIPTION - ORIENTATION: ORIENTATION: MID;UPPER

## 2023-11-15 ASSESSMENT — PAIN DESCRIPTION - LOCATION: LOCATION: ABDOMEN

## 2023-11-15 ASSESSMENT — PAIN SCALES - GENERAL: PAINLEVEL_OUTOF10: 3

## 2023-11-15 ASSESSMENT — PAIN - FUNCTIONAL ASSESSMENT: PAIN_FUNCTIONAL_ASSESSMENT: 0-10

## 2023-11-15 NOTE — ED NOTES
Patient verbalizes pain has been with him for past month  Gone at this time after medication  Came in today because of diarrhea     Flory Keating  11/15/23 0169

## 2023-11-15 NOTE — ED NOTES
Pt presents to the ED ambulatory through triage with c/o abd pain x1 month and diarrhea. PT denies urinary sx. Pt had a kidney transplant June of 2022. Pt taking meds as prescribed. Fistula to RT arm. Pt no longer receives dialysis. Pt denies chest pain, SOB, N, V, fever, chills. Pt A&Ox4, RR even and unlabored. NAD. Call light within reach. Family at bedside.       Jazmine Arroyo RN  11/15/23 2294

## 2023-11-15 NOTE — ED NOTES
Dr. Coreen Sapp informed of how patient feels  Patient verbalizes diarrhea again and stomach still \"upset\"  Dr. Coreen Sapp to speak with patient     Judy Escamilla RN  11/15/23 7038

## 2023-11-15 NOTE — ED NOTES
The following labs were labeled with appropriate pt sticker and tubed to lab:     [] Blue     [] Lavender   [] on ice  [] Green/yellow  [] Green/black [] on ice  [] [place]  [] on ice  [] Yellow  [] Red  [] Pink  [] Type/ Screen  [] ABG  [] VBG    [] COVID-19 swab    [] Rapid  [] PCR  [] Flu swab  [] Peds Viral Panel     [x] Urine Sample  [x] Fecal Sample  [] Pelvic Cultures  [] Blood Cultures  [] X 2  [] STREP Cultures       Batch, Rafael Coast, RN  11/15/23 8522

## 2023-11-15 NOTE — ED NOTES
The following labs were labeled with appropriate pt sticker and tubed to lab:     [x] Blue     [x] Lavender   [] on ice  [x] Green/yellow  [x] Green/black [] on ice  [] Deetta Mail  [] on ice  [x] Yellow  [] Red  [] Pink  [] Type/ Screen  [] ABG  [] VBG    [] COVID-19 swab    [] Rapid  [] PCR  [] Flu swab  [] Peds Viral Panel     [] Urine Sample  [] Fecal Sample  [] Pelvic Cultures  [] Blood Cultures  [] X 2  [] STREP Cultures       Esthela Rivas RN  11/15/23 4126

## 2023-11-15 NOTE — ED NOTES
Per Dr. Kory Reyes, patient will be transferred to [facility] per patient's physcian's request, due to transplant having been done there     Mayito Jones RN  11/15/23 3303

## 2023-11-15 NOTE — ED NOTES
Continues to rest quietly  Reminded of need for urine and stool sample     [place], [place]  11/15/23 7152

## 2023-11-15 NOTE — ED PROVIDER NOTES
[address] [place] [address] ED  Emergency Department  Emergency Medicine Resident Sign-out     Care of Suzanne Sibley was assumed from Dr. Arcelia Mac and is being seen for Abdominal Pain   . The patient's initial evaluation and plan have been discussed with the prior provider who initially evaluated the patient. EMERGENCY DEPARTMENT COURSE / MEDICAL DECISION MAKING:       MEDICATIONS GIVEN:  Orders Placed This Encounter   Medications    ondansetron (ZOFRAN) injection 4 mg    fentaNYL (SUBLIMAZE) injection 50 mcg    sodium chloride 0.9 % bolus 500 mL    sodium chloride 0.9 % bolus 500 mL    sodium chloride 0.9 % bolus 500 mL       LABS / RADIOLOGY:     Labs Reviewed   CBC WITH AUTO DIFFERENTIAL - Abnormal; Notable for the following components:       Result Value    WBC 2.6 (*)     RBC 2.80 (*)     Hemoglobin 10.0 (*)     Hematocrit 31.2 (*)     .4 (*)     MCH 35.7 (*)     RDW 18.4 (*)     NRBC Automated 3.1 (*)     Immature Granulocytes 2 (*)     Monocytes % 19 (*)     Neutrophils Absolute 1.41 (*)     Lymphocytes Absolute 0.62 (*)     All other components within normal limits   COMPREHENSIVE METABOLIC PANEL - Abnormal; Notable for the following components:    BUN 85 (*)     Creatinine 2.3 (*)     Est, Glom Filt Rate 33 (*)     Total Protein 5.6 (*)     Albumin 3.1 (*)     All other components within normal limits   URINALYSIS WITH MICROSCOPIC - Abnormal; Notable for the following components:    Protein, UA TRACE (*)     All other components within normal limits   PROTIME-INR - Abnormal; Notable for the following components:    Protime 17.1 (*)     All other components within normal limits   CULTURE, URINE   GASTROINTESTINAL PANEL, MOLECULAR   C. DIFFICILE TOXIN MOLECULAR   LIPASE   ROTAVIRUS ANTIGEN, STOOL   GIARDIA / CRYPTOSPORIDUM ANTIGENS       CT ABDOMEN PELVIS WO CONTRAST Additional Contrast? None   Final Result   1. Transplanted kidney right lower quadrant showing no hydronephrosis.    2. Stable
This patient signed out to me by Dr. Ildefonso Castillo at the completion of his shift. Patient is currently awaiting transfer to [facility] at the request of his primary care provider. Patient presented here with complaints of abdominal pain and diarrhea and has a history of a renal allograft which was initially performed at [facility]. We are currently awaiting the opening of appropriate bed at [facility] prior to transfer.      Rafat Moore MD  11/15/23 6299
[facility] ED  EMERGENCY DEPARTMENT ENCOUNTER      Pt Name: Roc Avila  MRN: [id number]  [id number] 1970  Date of evaluation: 11/15/23  PCP:  Grace Love MD    CHIEF COMPLAINT:   Chief Complaint   Patient presents with    Abdominal Pain     HISTORY OF PRESENT ILLNESS   Saúl Varner is a 52 y.o. male whopresents with abdominal pain. Patient had a kidney transplant in the summer 2022. Has been compliant with all his medications. He has a known seroma over surgical site but states he is got some left lower quadrant Jaun C pain with profuse watery diarrhea. He denies any recent antibiotic use or recent surgeries or history of C. difficile. Does have a history of CMV in the past.        REVIEW OF SYSTEMS       Review of Systems   Constitutional:  Negative for chills and fever. HENT:  Negative for nosebleeds. Respiratory:  Negative for cough, shortness of breath and wheezing. Cardiovascular:  Negative for chest pain, palpitations and orthopnea. Gastrointestinal:  Positive for abdominal pain and diarrhea. Negative for nausea and vomiting. Genitourinary:  Negative for dysuria and urgency. Musculoskeletal:  Negative for back pain and neck pain. Skin:  Negative for rash. Neurological:  Negative for dizziness, loss of consciousness and headaches.            PAST MEDICAL HISTORY   PMH:  has a past medical history of Abscess of right buttock, Anemia, Anticoagulated on Coumadin, Aortic insufficiency, Arthritis, Atrial fibrillation (HCC), BPH (benign prostatic hypertrophy), CAD (coronary artery disease), Caffeine use, Cellulitis of lower leg, Chronic back pain, Colon cancer ([address]), Cor pulmonale, acute ([address]), COVID-19, CRF (chronic renal failure), Erectile dysfunction, Gout, Hemodialysis patient ([address]), History of blood transfusion, Hyperkalemia, Hyperlipidemia, Hypertension, Hypotension, Hypothyroidism, Kidney transplant candidate, Lymphedema of leg, Obesity, Thyroid disease, and
performed without the administration of intravenous contrast. Multiplanar reformatted images are provided for review. Automated exposure control, iterative reconstruction, and/or weight based adjustment of the mA/kV was utilized to reduce the radiation dose to as low as reasonably achievable. COMPARISON: 04/25/2023 HISTORY: ORDERING SYSTEM PROVIDED HISTORY: general abd pain, kidney transplant TECHNOLOGIST PROVIDED HISTORY: general abd pain, kidney transplant Decision Support Exception - unselect if not a suspected or confirmed emergency medical condition->Emergency Medical Condition (MA) Reason for Exam: gerneral abd pain , kidney pain FINDINGS: Lower Chest: Cardiomegaly. The visualized heart and lungs show no acute abnormalities. Organs: The unenhanced liver, pancreas, adrenal glands and gallbladder show no acute process. Native kidneys are moderately atrophic demonstrating bilateral cysts. No hydronephrosis. There is right lower quadrant transplanted kidney which shows no calculi or hydronephrosis. Splenectomy versus contracted calcified spleen in the left quadrant on series 2, image 26. Stable. GI/Bowel: There is limited evaluation due to absence of oral contrast. Sleeve gastrectomy. No bowel obstruction. No acute infective process. Pelvis: Urinary bladder grossly normal.  No suspicious pelvic mass. Postsurgical changes of renal transplant in the right lower quadrant. Peritoneum/Retroperitoneum: No ascites or significant lymphadenopathy. Bones/Soft Tissues: Subcutaneous fluid collection overlying area of transplanted kidney 13.8 cm maximal dimension unchanged in size. Some mild surrounding fat stranding also similar to prior. Favor postsurgical seroma. Indeterminate sterility. No acute bony abnormality. 1. Transplanted kidney right lower quadrant showing no hydronephrosis. 2. Stable 13.8 cm subcutaneous fluid collection overlying area of transplanted kidney likely postsurgical seroma.   Sterility is

## 2023-11-16 LAB
C PARVUM AG STL QL IA: NEGATIVE
CAMPYLOBACTER DNA SPEC NAA+PROBE: NORMAL
ETEC ELTA+ESTB GENES STL QL NAA+PROBE: NORMAL
G LAMBLIA AG STL QL IA: NEGATIVE
MICROORGANISM SPEC CULT: NORMAL
P SHIGELLOIDES DNA STL QL NAA+PROBE: NORMAL
SALMONELLA DNA SPEC QL NAA+PROBE: NORMAL
SHIGA TOXIN STX GENE SPEC NAA+PROBE: NORMAL
SHIGELLA DNA SPEC QL NAA+PROBE: NORMAL
SOURCE: NORMAL
SPECIMEN DESCRIPTION: NORMAL
V CHOL+PARA RFBL+TRKH+TNAA STL QL NAA+PR: NORMAL
Y ENTERO RECN STL QL NAA+PROBE: NORMAL

## 2023-11-16 NOTE — ED NOTES
Pt report given to Elder Blood, [address] [place] [address]. All questions answered.       Alisha Jacobs RN  11/15/23 1949

## 2023-11-16 NOTE — ED NOTES
[facility] called, Cecil Rogers was told that stool specimen is not enough for c-diff. Pt has already left for transfer to [facility]. [facility] said they could run only 2 tests on it and suggested of pt is being admitted to [facility] they will also run another test for C-diff as Hospital protocol.          Flory Nguyen  11/15/23 2053

## 2023-11-28 RX ORDER — APIXABAN 5 MG/1
5 TABLET, FILM COATED ORAL 2 TIMES DAILY
Qty: 180 TABLET | Refills: 1 | Status: SHIPPED | OUTPATIENT
Start: 2023-11-28

## 2023-11-28 NOTE — TELEPHONE ENCOUNTER
Vanessa Porter is calling to request a refill on the following medication(s):    Medication Request:  Requested Prescriptions     Pending Prescriptions Disp Refills    ELIQUIS 5 MG TABS tablet [Pharmacy Med Name: Radha [place] 5MG] 180 tablet 0     Sig: TAKE 1 TABLET BY MOUTH TWICE DAILY       Last Visit Date (If Applicable):  43/51/2153    Next Visit Date:    2/28/2024      Last refill 5/18/23. Prescription pending.

## 2024-01-29 RX ORDER — ROSUVASTATIN CALCIUM 40 MG/1
40 TABLET, COATED ORAL DAILY
Qty: 90 TABLET | Refills: 1 | OUTPATIENT
Start: 2024-01-29

## 2024-01-29 NOTE — TELEPHONE ENCOUNTER
Hugh Avila is calling to request a refill on the following medication(s):    Medication Request:  Requested Prescriptions     Pending Prescriptions Disp Refills    rosuvastatin (CRESTOR) 40 MG tablet 90 tablet 1     Sig: Take 1 tablet by mouth daily    apixaban (ELIQUIS) 5 MG TABS tablet 180 tablet 1     Sig: Take 1 tablet by mouth 2 times daily       Last Visit Date (If Applicable):  11/14/2023    Next Visit Date:    2/28/2024      Too soon

## 2024-03-08 RX ORDER — LEVOTHYROXINE SODIUM 0.03 MG/1
25 TABLET ORAL DAILY
Qty: 90 TABLET | Refills: 1 | Status: SHIPPED | OUTPATIENT
Start: 2024-03-08

## 2024-03-08 NOTE — TELEPHONE ENCOUNTER
Hugh Avila is calling to request a refill on the following medication(s):    Medication Request:  Requested Prescriptions     Pending Prescriptions Disp Refills    levothyroxine (SYNTHROID) 25 MCG tablet [Pharmacy Med Name: LEVOTHYROXINE 25MCG] 90 tablet 0     Sig: TAKE 1 TABLET BY MOUTH DAILY       Last Visit Date (If Applicable):  11/14/2023    Next Visit Date:    3/14/2024    Last refill 6/14/23. Prescription pending

## 2024-03-12 RX ORDER — POTASSIUM CHLORIDE 20 MEQ/1
20 TABLET, EXTENDED RELEASE ORAL DAILY
Qty: 90 TABLET | Refills: 0 | Status: SHIPPED | OUTPATIENT
Start: 2024-03-12

## 2024-03-12 NOTE — TELEPHONE ENCOUNTER
Hugh Avila is calling to request a refill on the following medication(s):    Medication Request:  Requested Prescriptions     Pending Prescriptions Disp Refills    potassium chloride (KLOR-CON M) 20 MEQ extended release tablet 90 tablet 1     Sig: Take 1 tablet by mouth daily    apixaban (ELIQUIS) 5 MG TABS tablet 180 tablet 1     Sig: Take 1 tablet by mouth 2 times daily       Last Visit Date (If Applicable):  11/14/2023    Next Visit Date:    3/14/2024    Last refills 3/17 and 11/28/23. Prescriptions pending.

## 2024-03-14 ENCOUNTER — OFFICE VISIT (OUTPATIENT)
Dept: INTERNAL MEDICINE CLINIC | Age: 54
End: 2024-03-14
Payer: MEDICARE

## 2024-03-14 VITALS
WEIGHT: 255 LBS | HEIGHT: 68 IN | BODY MASS INDEX: 38.65 KG/M2 | TEMPERATURE: 98.1 F | RESPIRATION RATE: 10 BRPM | HEART RATE: 59 BPM | OXYGEN SATURATION: 96 % | DIASTOLIC BLOOD PRESSURE: 76 MMHG | SYSTOLIC BLOOD PRESSURE: 116 MMHG

## 2024-03-14 DIAGNOSIS — Z86.16 HISTORY OF COVID-19: ICD-10-CM

## 2024-03-14 DIAGNOSIS — D68.32 WARFARIN-INDUCED COAGULOPATHY (HCC): ICD-10-CM

## 2024-03-14 DIAGNOSIS — N52.01 ERECTILE DYSFUNCTION DUE TO ARTERIAL INSUFFICIENCY: ICD-10-CM

## 2024-03-14 DIAGNOSIS — E03.8 OTHER SPECIFIED HYPOTHYROIDISM: ICD-10-CM

## 2024-03-14 DIAGNOSIS — T45.515A WARFARIN-INDUCED COAGULOPATHY (HCC): ICD-10-CM

## 2024-03-14 DIAGNOSIS — Z98.84 S/P LAPAROSCOPIC SLEEVE GASTRECTOMY: ICD-10-CM

## 2024-03-14 DIAGNOSIS — K21.01 GASTROESOPHAGEAL REFLUX DISEASE WITH ESOPHAGITIS AND HEMORRHAGE: ICD-10-CM

## 2024-03-14 DIAGNOSIS — N40.1 BENIGN PROSTATIC HYPERPLASIA WITH LOWER URINARY TRACT SYMPTOMS, SYMPTOM DETAILS UNSPECIFIED: ICD-10-CM

## 2024-03-14 DIAGNOSIS — I48.20 CHRONIC ATRIAL FIBRILLATION (HCC): ICD-10-CM

## 2024-03-14 DIAGNOSIS — Z90.49 STATUS POST PARTIAL COLECTOMY: ICD-10-CM

## 2024-03-14 DIAGNOSIS — E55.9 VITAMIN D DEFICIENCY: ICD-10-CM

## 2024-03-14 DIAGNOSIS — Z94.0 DECEASED-DONOR KIDNEY TRANSPLANT: Primary | ICD-10-CM

## 2024-03-14 DIAGNOSIS — I82.729 CHRONIC DEEP VEIN THROMBOSIS (DVT) OF OTHER VEIN OF UPPER EXTREMITY, UNSPECIFIED LATERALITY (HCC): ICD-10-CM

## 2024-03-14 DIAGNOSIS — L98.492 NON-PRESSURE CHRONIC ULCER OF SKIN OF OTHER SITES WITH FAT LAYER EXPOSED (HCC): ICD-10-CM

## 2024-03-14 DIAGNOSIS — C18.4 MALIGNANT NEOPLASM OF TRANSVERSE COLON (HCC): ICD-10-CM

## 2024-03-14 PROCEDURE — 3017F COLORECTAL CA SCREEN DOC REV: CPT | Performed by: FAMILY MEDICINE

## 2024-03-14 PROCEDURE — G8482 FLU IMMUNIZE ORDER/ADMIN: HCPCS | Performed by: FAMILY MEDICINE

## 2024-03-14 PROCEDURE — G8427 DOCREV CUR MEDS BY ELIG CLIN: HCPCS | Performed by: FAMILY MEDICINE

## 2024-03-14 PROCEDURE — 99214 OFFICE O/P EST MOD 30 MIN: CPT | Performed by: FAMILY MEDICINE

## 2024-03-14 PROCEDURE — G8417 CALC BMI ABV UP PARAM F/U: HCPCS | Performed by: FAMILY MEDICINE

## 2024-03-14 PROCEDURE — 1036F TOBACCO NON-USER: CPT | Performed by: FAMILY MEDICINE

## 2024-03-14 RX ORDER — PANTOPRAZOLE SODIUM 40 MG/1
40 TABLET, DELAYED RELEASE ORAL
COMMUNITY
Start: 2024-02-14 | End: 2025-02-13

## 2024-03-14 ASSESSMENT — PATIENT HEALTH QUESTIONNAIRE - PHQ9
2. FEELING DOWN, DEPRESSED OR HOPELESS: 0
SUM OF ALL RESPONSES TO PHQ9 QUESTIONS 1 & 2: 0
SUM OF ALL RESPONSES TO PHQ QUESTIONS 1-9: 0
1. LITTLE INTEREST OR PLEASURE IN DOING THINGS: 0
SUM OF ALL RESPONSES TO PHQ QUESTIONS 1-9: 0

## 2024-03-14 ASSESSMENT — ENCOUNTER SYMPTOMS
COUGH: 1
GASTROINTESTINAL NEGATIVE: 1
EYES NEGATIVE: 1

## 2024-03-14 NOTE — PROGRESS NOTES
editing.    History of heart failure.  He is diuresing well in negative fluid balance.  Advised to continue amlodipine,   Bruce Jeff MD

## 2024-03-28 RX ORDER — ROSUVASTATIN CALCIUM 40 MG/1
40 TABLET, COATED ORAL DAILY
Qty: 90 TABLET | Refills: 1 | Status: SHIPPED | OUTPATIENT
Start: 2024-03-28

## 2024-03-28 NOTE — TELEPHONE ENCOUNTER
Hugh Avila is calling to request a refill on the following medication(s):    Medication Request:  Requested Prescriptions     Pending Prescriptions Disp Refills    rosuvastatin (CRESTOR) 40 MG tablet 90 tablet 1     Sig: Take 1 tablet by mouth daily       Last Visit Date (If Applicable):  3/14/2024    Next Visit Date:    9/13/2024    Last refill 10/20/23. Prescription pending.

## 2024-04-10 ENCOUNTER — HOSPITAL ENCOUNTER (EMERGENCY)
Age: 54
Discharge: HOME | End: 2024-04-10
Attending: EMERGENCY MEDICINE
Payer: MEDICARE

## 2024-04-10 ENCOUNTER — APPOINTMENT (OUTPATIENT)
Dept: GENERAL RADIOLOGY | Age: 54
End: 2024-04-10
Payer: MEDICARE

## 2024-04-10 VITALS
HEART RATE: 60 BPM | SYSTOLIC BLOOD PRESSURE: 175 MMHG | TEMPERATURE: 98.8 F | OXYGEN SATURATION: 96 % | DIASTOLIC BLOOD PRESSURE: 84 MMHG | RESPIRATION RATE: 18 BRPM

## 2024-04-10 DIAGNOSIS — J18.9 PNEUMONIA DUE TO INFECTIOUS ORGANISM, UNSPECIFIED LATERALITY, UNSPECIFIED PART OF LUNG: Primary | ICD-10-CM

## 2024-04-10 LAB
FLUAV AG SPEC QL: NEGATIVE
FLUBV AG SPEC QL: NEGATIVE
SARS-COV-2 RDRP RESP QL NAA+PROBE: NOT DETECTED
SPECIMEN DESCRIPTION: NORMAL

## 2024-04-10 PROCEDURE — 93005 ELECTROCARDIOGRAM TRACING: CPT | Performed by: EMERGENCY MEDICINE

## 2024-04-10 PROCEDURE — 99284 EMERGENCY DEPT VISIT MOD MDM: CPT

## 2024-04-10 PROCEDURE — 6370000000 HC RX 637 (ALT 250 FOR IP): Performed by: EMERGENCY MEDICINE

## 2024-04-10 PROCEDURE — 87635 SARS-COV-2 COVID-19 AMP PRB: CPT

## 2024-04-10 PROCEDURE — 71046 X-RAY EXAM CHEST 2 VIEWS: CPT

## 2024-04-10 PROCEDURE — 87804 INFLUENZA ASSAY W/OPTIC: CPT

## 2024-04-10 RX ORDER — BENZONATATE 100 MG/1
100 CAPSULE ORAL ONCE
Status: COMPLETED | OUTPATIENT
Start: 2024-04-10 | End: 2024-04-10

## 2024-04-10 RX ORDER — BENZONATATE 100 MG/1
100 CAPSULE ORAL 3 TIMES DAILY PRN
Qty: 21 CAPSULE | Refills: 0 | Status: SHIPPED | OUTPATIENT
Start: 2024-04-10 | End: 2024-04-17

## 2024-04-10 RX ORDER — AMOXICILLIN AND CLAVULANATE POTASSIUM 875; 125 MG/1; MG/1
1 TABLET, FILM COATED ORAL 2 TIMES DAILY
Qty: 14 TABLET | Refills: 0 | Status: SHIPPED | OUTPATIENT
Start: 2024-04-10 | End: 2024-04-17

## 2024-04-10 RX ORDER — AMOXICILLIN AND CLAVULANATE POTASSIUM 875; 125 MG/1; MG/1
1 TABLET, FILM COATED ORAL ONCE
Status: COMPLETED | OUTPATIENT
Start: 2024-04-10 | End: 2024-04-10

## 2024-04-10 RX ADMIN — AMOXICILLIN AND CLAVULANATE POTASSIUM 1 TABLET: 875; 125 TABLET, FILM COATED ORAL at 11:53

## 2024-04-10 RX ADMIN — BENZONATATE 100 MG: 100 CAPSULE ORAL at 11:53

## 2024-04-10 ASSESSMENT — PAIN - FUNCTIONAL ASSESSMENT: PAIN_FUNCTIONAL_ASSESSMENT: 0-10

## 2024-04-10 ASSESSMENT — PAIN SCALES - GENERAL: PAINLEVEL_OUTOF10: 8

## 2024-04-10 NOTE — DISCHARGE INSTR - COC
Other Feedings:343813853}  Liquids: {Slp liquid thickness:06571}  Daily Fluid Restriction: {CHP DME Yes amt example:264273212}  Last Modified Barium Swallow with Video (Video Swallowing Test): {Done Not Done Date:}    Treatments at the Time of Hospital Discharge:   Respiratory Treatments: ***  Oxygen Therapy:  {Therapy; copd oxygen:76021}  Ventilator:    {[facility] Vent List:060328057}    Rehab Therapies: {THERAPEUTIC INTERVENTION:4969328521}  Weight Bearing Status/Restrictions: {[facility] Weight Bearin}  Other Medical Equipment (for information only, NOT a DME order):  {EQUIPMENT:010614411}  Other Treatments: ***    Patient's personal belongings (please select all that are sent with patient):  {P DME Belongings:062134737}    RN SIGNATURE:  {Esignature:716186916}    CASE MANAGEMENT/SOCIAL WORK SECTION    Inpatient Status Date: ***    Readmission Risk Assessment Score:  Readmission Risk              Risk of Unplanned Readmission:  0           Discharging to Facility/ Agency   Name:   Address:  Phone:  Fax:    Dialysis Facility (if applicable)   Name:  Address:  Dialysis Schedule:  Phone:  Fax:    / signature: {Esignature:165081143}    PHYSICIAN SECTION    Prognosis: {Prognosis:8273720287}    Condition at Discharge: { Patient Condition:981400454}    Rehab Potential (if transferring to Rehab): {Prognosis:0767919747}    Recommended Labs or Other Treatments After Discharge: ***    Physician Certification: I certify the above information and transfer of Hugh Avila  is necessary for the continuing treatment of the diagnosis listed and that he requires {Admit to Appropriate Level of Care:36203} for {GREATER/LESS:495069168} 30 days.     Update Admission H&P: {CHP DME Changes in HandP:058669065}    PHYSICIAN SIGNATURE:  {Esignature:278905225}

## 2024-04-10 NOTE — ED PROVIDER NOTES
Paternal Grandfather        Allergies:  Patient has no known allergies.    Home Medications:  Prior to Admission medications    Medication Sig Start Date End Date Taking? Authorizing Provider   amoxicillin-clavulanate (AUGMENTIN) 875-125 MG per tablet Take 1 tablet by mouth 2 times daily for 7 days 4/10/24 4/17/24 Yes Jessica Maki DO   benzonatate (TESSALON PERLES) 100 MG capsule Take 1 capsule by mouth 3 times daily as needed for Cough 4/10/24 4/17/24 Yes Jessica Maki DO   rosuvastatin (CRESTOR) 40 MG tablet Take 1 tablet by mouth daily 3/28/24   Bruce Jeff MD   pantoprazole (PROTONIX) 40 MG tablet Take 1 tablet by mouth every morning (before breakfast) 2/14/24 2/13/25  Mitra Horvath MD   potassium chloride (KLOR-CON M) 20 MEQ extended release tablet Take 1 tablet by mouth daily 3/12/24   Bruce Jeff MD   apixaban (ELIQUIS) 5 MG TABS tablet Take 1 tablet by mouth 2 times daily 3/12/24   Bruce Jeff MD   levothyroxine (SYNTHROID) 25 MCG tablet TAKE 1 TABLET BY MOUTH DAILY 3/8/24   Bruce Jeff MD   loratadine (CLARITIN) 10 MG tablet TAKE 1 TABLET BY MOUTH DAILY 9/21/23   Bruce Jeff MD   amiodarone (CORDARONE) 200 MG tablet TAKE 1 TABLET BY MOUTH DAILY -- PLEASE MAKE APPOINTMENT-- 7/11/23   Bruce Jeff MD   furosemide (LASIX) 20 MG tablet Take 1 tablet by mouth 2 times daily    Mitra Horvath MD   amLODIPine (NORVASC) 5 MG tablet Take 1 tablet by mouth daily 5/13/23   Bruce Jeff MD   B Complex-C-Folic Acid (NEPHRO-JENNIFER) 0.8 MG TABS Take 1 tablet by mouth daily (with breakfast) 4/28/23   Bruce Jeff MD   Cholecalciferol (VITAMIN D3) 1.25 MG (20481 UT) CAPS TAKE 1 CAPSULE BY MOUTH ONCE A MONTH 3/20/23   Bruce Jeff MD   valGANciclovir (VALCYTE) 450 MG tablet Take 1 tablet by mouth 2 times daily    Provider, MD Mitra   belatacept (NULOJIX) 250 MG SOLR belatacept 250 mg intravenous solution   INFUSE 5 MG/KG OVER 30 MINUTE(S) BY INTRAVENOUS ROUTE EVERY 4

## 2024-04-10 NOTE — DISCHARGE INSTRUCTIONS
Please take antibiotics as prescribed until completed, and Tessalon Perles to help with your cough.  Follow-up with your primary care doctor in 2 days, if you feel worsening symptoms fever, worsening cough or congestion then please return to the emergency room for repeat evaluation.

## 2024-04-11 LAB
EKG ATRIAL RATE: 59 BPM
EKG P AXIS: 76 DEGREES
EKG P-R INTERVAL: 190 MS
EKG Q-T INTERVAL: 434 MS
EKG QRS DURATION: 78 MS
EKG QTC CALCULATION (BAZETT): 429 MS
EKG R AXIS: 27 DEGREES
EKG T AXIS: 79 DEGREES
EKG VENTRICULAR RATE: 59 BPM

## 2024-04-23 RX ORDER — AMIODARONE HYDROCHLORIDE 200 MG/1
TABLET ORAL
Qty: 90 TABLET | Refills: 0 | Status: SHIPPED | OUTPATIENT
Start: 2024-04-23

## 2024-04-23 NOTE — TELEPHONE ENCOUNTER
Hugh Avila is calling to request a refill on the following medication(s):    Medication Request:  Requested Prescriptions     Pending Prescriptions Disp Refills    amiodarone (CORDARONE) 200 MG tablet 90 tablet 1       Last Visit Date (If Applicable):  3/14/2024    Next Visit Date:    9/13/2024        Last refill 7/11/23. Prescription pending.

## 2024-05-09 RX ORDER — AMLODIPINE BESYLATE 5 MG/1
5 TABLET ORAL DAILY
Qty: 90 TABLET | Refills: 1 | Status: SHIPPED | OUTPATIENT
Start: 2024-05-09

## 2024-05-09 NOTE — TELEPHONE ENCOUNTER
Hugh Avila is calling to request a refill on the following medication(s):    Medication Request:  Requested Prescriptions     Pending Prescriptions Disp Refills    amLODIPine (NORVASC) 5 MG tablet 90 tablet 1     Sig: Take 1 tablet by mouth daily       Last Visit Date (If Applicable):  3/14/2024    Next Visit Date:    9/13/2024      Last refill 5/13/23. Prescription pending.

## 2024-06-11 DIAGNOSIS — I48.20 CHRONIC ATRIAL FIBRILLATION (HCC): ICD-10-CM

## 2024-06-11 RX ORDER — CHOLECALCIFEROL (VITAMIN D3) 1250 MCG
CAPSULE ORAL
Qty: 12 CAPSULE | Refills: 2 | Status: SHIPPED | OUTPATIENT
Start: 2024-06-11

## 2024-06-11 RX ORDER — ASPIRIN 81 MG/1
81 TABLET ORAL DAILY
Qty: 90 TABLET | Refills: 1 | Status: SHIPPED | OUTPATIENT
Start: 2024-06-11

## 2024-06-11 RX ORDER — POTASSIUM CHLORIDE 20 MEQ/1
20 TABLET, EXTENDED RELEASE ORAL DAILY
Qty: 90 TABLET | Refills: 1 | Status: SHIPPED | OUTPATIENT
Start: 2024-06-11

## 2024-06-11 RX ORDER — FOLIC ACID/VIT B COMPLEX AND C 0.8 MG
1 TABLET ORAL
Qty: 200 TABLET | Refills: 1 | Status: SHIPPED | OUTPATIENT
Start: 2024-06-11

## 2024-06-11 NOTE — TELEPHONE ENCOUNTER
Hugh Avila is calling to request a refill on the following medication(s):    Medication Request:  Requested Prescriptions     Pending Prescriptions Disp Refills    aspirin (ASPIRIN LOW DOSE) 81 MG EC tablet 90 tablet 1     Sig: Take 1 tablet by mouth daily    Cholecalciferol (VITAMIN D3) 1.25 MG (50656 UT) CAPS 12 capsule 2     Sig: TAKE 1 CAPSULE BY MOUTH ONCE A MONTH    B Complex-C-Folic Acid (NEPHRO-JENNIFER) 0.8 MG TABS 200 tablet 1     Sig: Take 1 tablet by mouth daily (with breakfast)    potassium chloride (KLOR-CON M) 20 MEQ extended release tablet 90 tablet 0     Sig: Take 1 tablet by mouth daily    apixaban (ELIQUIS) 5 MG TABS tablet 180 tablet 0     Sig: Take 1 tablet by mouth 2 times daily       Last Visit Date (If Applicable):  3/14/2024    Next Visit Date:    9/13/2024      Last  refills 3/28/19, 3/12, 3/20 and 4/28/23. Prescriptions pending.       \

## 2024-07-02 RX ORDER — CHOLECALCIFEROL (VITAMIN D3) 1250 MCG
CAPSULE ORAL
Qty: 12 CAPSULE | Refills: 1 | OUTPATIENT
Start: 2024-07-02

## 2024-07-11 ENCOUNTER — TELEPHONE (OUTPATIENT)
Dept: FAMILY MEDICINE CLINIC | Age: 54
End: 2024-07-11

## 2024-07-25 ENCOUNTER — APPOINTMENT (OUTPATIENT)
Dept: CT IMAGING | Age: 54
End: 2024-07-25
Payer: COMMERCIAL

## 2024-07-25 ENCOUNTER — HOSPITAL ENCOUNTER (INPATIENT)
Age: 54
LOS: 1 days | Discharge: HOME | End: 2024-07-26
Attending: EMERGENCY MEDICINE | Admitting: SURGERY
Payer: COMMERCIAL

## 2024-07-25 ENCOUNTER — APPOINTMENT (OUTPATIENT)
Dept: GENERAL RADIOLOGY | Age: 54
End: 2024-07-25
Payer: COMMERCIAL

## 2024-07-25 ENCOUNTER — APPOINTMENT (OUTPATIENT)
Dept: MRI IMAGING | Age: 54
End: 2024-07-25
Payer: COMMERCIAL

## 2024-07-25 DIAGNOSIS — S22.079A CLOSED FRACTURE OF TENTH THORACIC VERTEBRA, UNSPECIFIED FRACTURE MORPHOLOGY, INITIAL ENCOUNTER (HCC): Primary | ICD-10-CM

## 2024-07-25 PROBLEM — S22.008A FRACTURE OF SPINE, THORACIC, WITHOUT SPINAL CORD INJURY, CLOSED, INITIAL ENCOUNTER (HCC): Status: ACTIVE | Noted: 2024-07-25

## 2024-07-25 LAB
ANION GAP SERPL CALCULATED.3IONS-SCNC: 12 MMOL/L (ref 9–16)
BASOPHILS # BLD: 0.06 K/UL (ref 0–0.2)
BASOPHILS NFR BLD: 1 % (ref 0–2)
BUN SERPL-MCNC: 20 MG/DL (ref 6–20)
CALCIUM SERPL-MCNC: 9.8 MG/DL (ref 8.6–10.4)
CHLORIDE SERPL-SCNC: 101 MMOL/L (ref 98–107)
CO2 SERPL-SCNC: 25 MMOL/L (ref 20–31)
CREAT SERPL-MCNC: 2.1 MG/DL (ref 0.7–1.2)
CRP SERPL HS-MCNC: 22.5 MG/L (ref 0–5)
EOSINOPHIL # BLD: 0.12 K/UL (ref 0–0.44)
EOSINOPHILS RELATIVE PERCENT: 2 % (ref 1–4)
ERYTHROCYTE [DISTWIDTH] IN BLOOD BY AUTOMATED COUNT: 16 % (ref 11.8–14.4)
GFR, ESTIMATED: 36 ML/MIN/1.73M2
GLUCOSE SERPL-MCNC: 82 MG/DL (ref 74–99)
HCT VFR BLD AUTO: 45.2 % (ref 40.7–50.3)
HGB BLD-MCNC: 15.1 G/DL (ref 13–17)
IMM GRANULOCYTES # BLD AUTO: 0.06 K/UL (ref 0–0.3)
IMM GRANULOCYTES NFR BLD: 1 %
INR PPP: 1.1
LYMPHOCYTES NFR BLD: 1.56 K/UL (ref 1.1–3.7)
LYMPHOCYTES RELATIVE PERCENT: 26 % (ref 24–43)
MAGNESIUM SERPL-MCNC: 2.4 MG/DL (ref 1.6–2.6)
MCH RBC QN AUTO: 36.9 PG (ref 25.2–33.5)
MCHC RBC AUTO-ENTMCNC: 33.4 G/DL (ref 28.4–34.8)
MCV RBC AUTO: 110.5 FL (ref 82.6–102.9)
MONOCYTES NFR BLD: 0.6 K/UL (ref 0.1–1.2)
MONOCYTES NFR BLD: 10 % (ref 3–12)
MORPHOLOGY: ABNORMAL
MORPHOLOGY: ABNORMAL
NEUTROPHILS NFR BLD: 60 % (ref 36–65)
NEUTS SEG NFR BLD: 3.6 K/UL (ref 1.5–8.1)
NRBC BLD-RTO: 0 PER 100 WBC
PARTIAL THROMBOPLASTIN TIME: 25.4 SEC (ref 23–36.5)
PLATELET # BLD AUTO: 240 K/UL (ref 138–453)
PMV BLD AUTO: 10.6 FL (ref 8.1–13.5)
POTASSIUM SERPL-SCNC: 4.2 MMOL/L (ref 3.7–5.3)
PROCALCITONIN SERPL-MCNC: 0.15 NG/ML (ref 0–0.09)
PROTHROMBIN TIME: 14.2 SEC (ref 11.7–14.9)
RBC # BLD AUTO: 4.09 M/UL (ref 4.21–5.77)
SODIUM SERPL-SCNC: 138 MMOL/L (ref 136–145)
WBC OTHER # BLD: 6 K/UL (ref 3.5–11.3)

## 2024-07-25 PROCEDURE — 85730 THROMBOPLASTIN TIME PARTIAL: CPT

## 2024-07-25 PROCEDURE — 84145 PROCALCITONIN (PCT): CPT

## 2024-07-25 PROCEDURE — 72128 CT CHEST SPINE W/O DYE: CPT

## 2024-07-25 PROCEDURE — 70450 CT HEAD/BRAIN W/O DYE: CPT

## 2024-07-25 PROCEDURE — 85652 RBC SED RATE AUTOMATED: CPT

## 2024-07-25 PROCEDURE — 85025 COMPLETE CBC W/AUTO DIFF WBC: CPT

## 2024-07-25 PROCEDURE — 6360000004 HC RX CONTRAST MEDICATION: Performed by: REGISTERED NURSE

## 2024-07-25 PROCEDURE — 80048 BASIC METABOLIC PNL TOTAL CA: CPT

## 2024-07-25 PROCEDURE — 83735 ASSAY OF MAGNESIUM: CPT

## 2024-07-25 PROCEDURE — 72131 CT LUMBAR SPINE W/O DYE: CPT

## 2024-07-25 PROCEDURE — 73562 X-RAY EXAM OF KNEE 3: CPT

## 2024-07-25 PROCEDURE — 99285 EMERGENCY DEPT VISIT HI MDM: CPT

## 2024-07-25 PROCEDURE — 2580000003 HC RX 258

## 2024-07-25 PROCEDURE — 85610 PROTHROMBIN TIME: CPT

## 2024-07-25 PROCEDURE — 86140 C-REACTIVE PROTEIN: CPT

## 2024-07-25 PROCEDURE — 6370000000 HC RX 637 (ALT 250 FOR IP)

## 2024-07-25 PROCEDURE — 2060000000 HC ICU INTERMEDIATE R&B

## 2024-07-25 PROCEDURE — 73590 X-RAY EXAM OF LOWER LEG: CPT

## 2024-07-25 PROCEDURE — 72157 MRI CHEST SPINE W/O & W/DYE: CPT

## 2024-07-25 PROCEDURE — A9579 GAD-BASE MR CONTRAST NOS,1ML: HCPCS | Performed by: REGISTERED NURSE

## 2024-07-25 PROCEDURE — 87040 BLOOD CULTURE FOR BACTERIA: CPT

## 2024-07-25 RX ORDER — ONDANSETRON 4 MG/1
4 TABLET, ORALLY DISINTEGRATING ORAL EVERY 8 HOURS PRN
Status: DISCONTINUED | OUTPATIENT
Start: 2024-07-25 | End: 2024-07-26 | Stop reason: HOSPADM

## 2024-07-25 RX ORDER — POLYETHYLENE GLYCOL 3350 17 G/17G
17 POWDER, FOR SOLUTION ORAL DAILY
Status: DISCONTINUED | OUTPATIENT
Start: 2024-07-26 | End: 2024-07-26 | Stop reason: HOSPADM

## 2024-07-25 RX ORDER — SENNA AND DOCUSATE SODIUM 50; 8.6 MG/1; MG/1
1 TABLET, FILM COATED ORAL 2 TIMES DAILY
Status: DISCONTINUED | OUTPATIENT
Start: 2024-07-25 | End: 2024-07-26 | Stop reason: HOSPADM

## 2024-07-25 RX ORDER — SODIUM CHLORIDE 9 MG/ML
INJECTION, SOLUTION INTRAVENOUS PRN
Status: DISCONTINUED | OUTPATIENT
Start: 2024-07-25 | End: 2024-07-26 | Stop reason: HOSPADM

## 2024-07-25 RX ORDER — SODIUM CHLORIDE 0.9 % (FLUSH) 0.9 %
10 SYRINGE (ML) INJECTION PRN
Status: DISCONTINUED | OUTPATIENT
Start: 2024-07-25 | End: 2024-07-26 | Stop reason: HOSPADM

## 2024-07-25 RX ORDER — SODIUM CHLORIDE 0.9 % (FLUSH) 0.9 %
5-40 SYRINGE (ML) INJECTION PRN
Status: DISCONTINUED | OUTPATIENT
Start: 2024-07-25 | End: 2024-07-26 | Stop reason: HOSPADM

## 2024-07-25 RX ORDER — SODIUM CHLORIDE, SODIUM LACTATE, POTASSIUM CHLORIDE, CALCIUM CHLORIDE 600; 310; 30; 20 MG/100ML; MG/100ML; MG/100ML; MG/100ML
INJECTION, SOLUTION INTRAVENOUS CONTINUOUS
Status: DISCONTINUED | OUTPATIENT
Start: 2024-07-25 | End: 2024-07-26

## 2024-07-25 RX ORDER — CYCLOBENZAPRINE HCL 10 MG
10 TABLET ORAL ONCE
Status: COMPLETED | OUTPATIENT
Start: 2024-07-25 | End: 2024-07-25

## 2024-07-25 RX ORDER — ACETAMINOPHEN 500 MG
1000 TABLET ORAL EVERY 8 HOURS SCHEDULED
Status: DISCONTINUED | OUTPATIENT
Start: 2024-07-25 | End: 2024-07-26 | Stop reason: HOSPADM

## 2024-07-25 RX ORDER — ACETAMINOPHEN 325 MG/1
650 TABLET ORAL ONCE
Status: COMPLETED | OUTPATIENT
Start: 2024-07-25 | End: 2024-07-25

## 2024-07-25 RX ORDER — OXYCODONE HYDROCHLORIDE 5 MG/1
5 TABLET ORAL EVERY 4 HOURS PRN
Status: DISCONTINUED | OUTPATIENT
Start: 2024-07-25 | End: 2024-07-26 | Stop reason: HOSPADM

## 2024-07-25 RX ORDER — METHOCARBAMOL 750 MG/1
750 TABLET, FILM COATED ORAL EVERY 6 HOURS
Status: DISCONTINUED | OUTPATIENT
Start: 2024-07-25 | End: 2024-07-26 | Stop reason: HOSPADM

## 2024-07-25 RX ORDER — ONDANSETRON 2 MG/ML
4 INJECTION INTRAMUSCULAR; INTRAVENOUS EVERY 6 HOURS PRN
Status: DISCONTINUED | OUTPATIENT
Start: 2024-07-25 | End: 2024-07-26 | Stop reason: HOSPADM

## 2024-07-25 RX ORDER — GABAPENTIN 100 MG/1
100 CAPSULE ORAL EVERY 8 HOURS SCHEDULED
Status: DISCONTINUED | OUTPATIENT
Start: 2024-07-25 | End: 2024-07-26 | Stop reason: HOSPADM

## 2024-07-25 RX ORDER — SODIUM CHLORIDE 0.9 % (FLUSH) 0.9 %
5-40 SYRINGE (ML) INJECTION EVERY 12 HOURS SCHEDULED
Status: DISCONTINUED | OUTPATIENT
Start: 2024-07-25 | End: 2024-07-26 | Stop reason: HOSPADM

## 2024-07-25 RX ADMIN — SODIUM CHLORIDE, POTASSIUM CHLORIDE, SODIUM LACTATE AND CALCIUM CHLORIDE: 600; 310; 30; 20 INJECTION, SOLUTION INTRAVENOUS at 23:43

## 2024-07-25 RX ADMIN — ACETAMINOPHEN 650 MG: 325 TABLET ORAL at 15:32

## 2024-07-25 RX ADMIN — ACETAMINOPHEN 1000 MG: 500 TABLET ORAL at 23:47

## 2024-07-25 RX ADMIN — GABAPENTIN 100 MG: 100 CAPSULE ORAL at 23:48

## 2024-07-25 RX ADMIN — OXYCODONE 5 MG: 5 TABLET ORAL at 23:48

## 2024-07-25 RX ADMIN — GADOTERIDOL 20 ML: 279.3 INJECTION, SOLUTION INTRAVENOUS at 22:31

## 2024-07-25 RX ADMIN — METHOCARBAMOL 750 MG: 750 TABLET ORAL at 23:48

## 2024-07-25 RX ADMIN — CYCLOBENZAPRINE 10 MG: 10 TABLET, FILM COATED ORAL at 15:33

## 2024-07-25 ASSESSMENT — PAIN DESCRIPTION - LOCATION
LOCATION: BACK
LOCATION: KNEE;BACK

## 2024-07-25 ASSESSMENT — PAIN SCALES - GENERAL
PAINLEVEL_OUTOF10: 7
PAINLEVEL_OUTOF10: 7

## 2024-07-25 ASSESSMENT — ENCOUNTER SYMPTOMS
GASTROINTESTINAL NEGATIVE: 1
BACK PAIN: 0
ALLERGIC/IMMUNOLOGIC NEGATIVE: 1
EYES NEGATIVE: 1
RESPIRATORY NEGATIVE: 1

## 2024-07-25 ASSESSMENT — PAIN - FUNCTIONAL ASSESSMENT: PAIN_FUNCTIONAL_ASSESSMENT: 0-10

## 2024-07-25 NOTE — ED PROVIDER NOTES
[facility]  Emergency Department  Faculty Attestation     I performed a history and physical examination of the patient and discussed management with the resident. I reviewed the resident’s note and agree with the documented findings and plan of care. Any areas of disagreement are noted on the chart. I was personally present for the key portions of any procedures. I have documented in the chart those procedures where I was not present during the key portions. I have reviewed the emergency nurses triage note. I agree with the chief complaint, past medical history, past surgical history, allergies, medications, social and family history as documented unless otherwise noted below.    For Physician Assistant/ Nurse Practitioner cases/documentation I have personally evaluated this patient and have completed at least one if not all key elements of the E/M (history, physical exam, and MDM). Additional findings are as noted.    Preliminary note started at 3:30 PM EDT    Primary Care Physician:  Bruce Jeff MD    Screenings:  [unfilled]    CHIEF COMPLAINT       Chief Complaint   Patient presents with    Fall       RECENT VITALS:   BP (!) 180/95   Pulse 64   Temp 97.9 °F (36.6 °C) (Oral)   Resp 19   SpO2 100%     LABS:  Labs Reviewed - No data to display    Radiology  CT HEAD WO CONTRAST    (Results Pending)   CT LUMBAR SPINE WO CONTRAST    (Results Pending)   XR KNEE LEFT (3 VIEWS)    (Results Pending)   XR TIBIA FIBULA LEFT (2 VIEWS)    (Results Pending)         Attending Physician Additional  Notes    Patient fell off a ladder landing on his left side injuring his lower back and his left shin just below the knee.  He denies loss of consciousness or headache but he is on Eliquis.  No neck pain.  No chest pain shortness of breath or rib pain.  No new weakness bowel or bladder symptoms.  He has a history of CKD, no longer on dialysis since he has had a renal transplant,  compliant with his medications.  On exam he is tearful, hypertensive, afebrile, GCS 15.  Neck is supple and nontender.  Lumbar spine does not appear to be tender.  No respiratory distress.  Abdomen is benign.  Pelvis nontender.  Full range of motion left hip and knee.  He does have a bruise and tenderness to the left proximal shin.  There is mild bilateral venous stasis changes with swelling and hyperpigmentation.  Motor strength appears to be intact.  Impression is fall with injuries to his left shin and lower back.  Plan CT head, CT lumbar spine, plain films left knee/tib-fib, analgesics, other imaging as needed.            Han Barnes MD, FACEP  Attending Emergency  Physician                Han Barnes MD  07/25/24 6139

## 2024-07-25 NOTE — CONSULTS
Department of Neurosurgery                                            Nurse Practitioner Consult Note      Reason for Consult:  T 10 fracture   Requesting Physician:  Teto  Neurosurgeon:   [x] Dr. Talavera  [] Dr. Helm  [] Dr. Teague  [] Dr. Cabral    Neurosurgery notified of consult 1800  Neurosurgery arrival to bedside 2015    History Obtained From:  patient, electronic medical record    CHIEF COMPLAINT:         Chief Complaint   Patient presents with    Fall       HISTORY OF PRESENT ILLNESS:       The patient is a 54 y.o. male who presents after a fall off a ladder at approximately 5 feet patient reports that he landed on his butt and then fell onto his back he does report that he did not hit his head or have any loss of consciousness.  Patient does take Eliquis for history of atrial fibrillation.  He also has a history of kidney transplant 6/15/2022 reports compliance with antirejection medication.  CT of the brain was performed that demonstrated a small posterior scalp hematoma with no intracranial abnormality.  CT of the lumbar spine was also obtained that demonstrated a partially visualized fracture of T10 vertebral body with suggestion of underlying osteolysis with mild focal sclerosis, neurosurgery did request dedicated thoracic spine CT which showed fracture of the T10 vertebral body suggesting of underlying osteolysis with associated paraspinal soft tissue fullness findings concerning for osteomyelitis less likely metastatic disease with associated pathological fracture.  We then requested an MRI of the thoracic spine with and without contrast that did demonstrate no evidence of osteomyelitis or abscess formation there was an old comminuted fracture of the anterior aspect of the vertebral body of T10.  I did speak with the trauma team who found a CT of the thoracic spine from 11 of 2023 that showed the fracture was in an acute stage at the time they did speak with radiology who agreed that  this was an old fracture from that time and is now on the healing stages.  CRP is 22.5, sedimentation rate is 50.  White blood cell within normal limits at 6.0.     PAST MEDICAL HISTORY :       Past Medical History:        Diagnosis Date    Abscess of right buttock 3/17/2021    Anemia     Anticoagulated on Coumadin     Aortic insufficiency     Arthritis     right shoulder/ PCP Dr. Ortiz/ sera    Atrial fibrillation (HCC)     [facility] LAST VISIT 3/30/21    BPH (benign prostatic hypertrophy)     CAD (coronary artery disease)     Dr. Thakur/ sera LAST VISIT - 3/30/21    Caffeine use     1 coffee, 2 tea/day    Cellulitis of lower leg     right    Chronic back pain     Colon cancer (HCC)     colon    Cor pulmonale, acute (HCC) 12/30/2014    COVID-19 08/2020    HOSPITALIZED X2 WEEKS.  NOT VENTED    CRF (chronic renal failure)     dr. Petersen/tito on [place]key. Tues/ thurs/ sat/ right arm fistula    Erectile dysfunction     Gout     Hemodialysis patient (HCC)     tues/ thurs/ sat/ DR. Petersen/ fistula right lower arm    History of blood transfusion     no reaction    Hyperkalemia 12/24/2013    Hyperlipidemia     Hypertension     Hypotension     Hypothyroidism     Kidney transplant candidate     following with [facility]    Lymphedema of leg     right    Obesity     Thyroid disease     Well adult health check     PCP - DR. MARTIN - LAST VISIT -  1/8/2021       Past Surgical History:        Procedure Laterality Date    ABDOMINAL EXPLORATION SURGERY  01/22/2019    ABDOMINAL EXPLORATION, LEFT HEMICOLECTOMY, MOBILIZATION OF SPLENIC FLEXURE     ABSCESS DRAINAGE Right 01/20/2014    I&D Right Shoulder, Right shoulder arthroscopy, I&D AC Joint    ANUS SURGERY N/A 5/5/2021    EUA, EXCISION OF HIDRADENITIS SUPPURATIVA performed by Garcia Taveras MD at [facility] OR    APPENDECTOMY      COLONOSCOPY      COLONOSCOPY  01/22/2019    COLONOSCOPY N/A 1/22/2019    COLONOSCOPY- GI UNIT SCHEDULED performed by Garcia Taveras MD at [facility]

## 2024-07-25 NOTE — ED PROVIDER NOTES
evidence of an acute infarct.  There is no evidence of hydrocephalus. ORBITS: The visualized portion of the orbits demonstrate no acute abnormality. SINUSES: The visualized paranasal sinuses and mastoid air cells demonstrate no acute abnormality. SOFT TISSUES/SKULL:  Small posterior scalp hematoma.     Small posterior scalp hematoma. No acute intracranial abnormality.     XR TIBIA FIBULA LEFT (2 VIEWS)    Result Date: 7/25/2024  EXAMINATION: 2 XRAY VIEWS OF THE LEFT TIBIA AND FIBULA 7/25/2024 3:51 pm COMPARISON: None. HISTORY: ORDERING SYSTEM PROVIDED HISTORY: fall TECHNOLOGIST PROVIDED HISTORY: fall FINDINGS: No fracture or malalignment identified.  The joint spaces are maintained. Soft tissue swelling in the lower leg.  Calcified atheromatous plaque.     No acute osseous abnormality identified.     XR KNEE LEFT (3 VIEWS)    Result Date: 7/25/2024  EXAMINATION: THREE XRAY VIEWS OF THE LEFT KNEE 7/25/2024 2:51 pm COMPARISON: Left knee radiographs 05/17/2023 HISTORY: ORDERING SYSTEM PROVIDED HISTORY: fall TECHNOLOGIST PROVIDED HISTORY: fall FINDINGS: Normal alignment is maintained.  No evidence of acute fracture.  No dislocation.  Probable trace joint effusion.  Vascular calcifications are present.     No evidence of acute fracture or dislocation.       RECENT VITALS:     Temp: 98.2 °F (36.8 °C),  Pulse: 59, Respirations: 21, BP: (!) 166/82, SpO2: 100 %    This patient is a 54 y.o. Male with fall from ladder, approximately 5 feet, hit head on Eliquis.  No LOC.  Patient with lower back pain, left knee pain.  CT of the head, CT Lumbar-spine obtained.  CT head negative, however CT lumbar spine showing acute T10 fracture with air in the lumbar spine around the cord.  Neurosurgery consulted, awaiting recommendations.  Obtaining CT thoracic spine.  Will likely need admission, but pending recommendations from neurosurgery.  If needing admission, will need trauma consult.    Of note, patient is a kidney transplant patient on  tacrolimus.  Osteomyelitis is a a possible concern given that patient is immunosuppressed.    ED Course as of 07/26/24 0745   Thu Jul 25, 2024 1944 Imaging showed T10 fracture, not completely visualized as well as air near the spinal cord.  Neurosurgery was consulted at 6 PM.  Have not heard back from them yet.  Because the T10 fracture is not fully visualized will now obtain thoracic images while we await on neurosurgery consultation.  Patient to be signed out to nighttime resident at the completion of my shift. [AK]   2029 Neurosurgery at bedside. [MO]   2040 CT of the thoracic spine imaging sent to neurosurgery attending, states there is concern for osteomyelitis.  Recommending infectious workup and admission.  Given that patient had a traumatic injury, and this is a traumatic fracture, patient will need trauma surgery consult.  Neurosurgery also recommending MRI at this time. [MO]   2100 Pan scans ordered by trauma surgery team [MO]      ED Course User Index  [AK] Casa Irene MD  [MO] Debra Evans MD       OUTSTANDING TASKS / RECOMMENDATIONS:    Follow-up thoracic imaging  Follow-up neurosurgery recs  Dispo     FINAL IMPRESSION:     1. Closed fracture of tenth thoracic vertebra, unspecified fracture morphology, initial encounter (HCC)        DISPOSITION:         DISPOSITION:  []  Discharge   []  Transfer -    [x]  Admission -trauma   []  Against Medical Advice   []  Eloped   FOLLOW-UP: No follow-up provider specified.   DISCHARGE MEDICATIONS: Current Discharge Medication List              Debra Evans MD  Emergency Medicine Resident  [facility]

## 2024-07-25 NOTE — ED NOTES
Pt came into the ed via triage due to having a fall off a ladder   Pt stated it was about 5 feet high   Pt hit his head but denies LOC  Pt stated his left knee also hurts and   Lower back pain   Pt is Aox4 and ambulatory   RR are equal and regular

## 2024-07-25 NOTE — ED PROVIDER NOTES
[facility] ED  Emergency Department Encounter  Emergency Medicine Resident     Pt Name:Hugh Avila  MRN: [id number]  Birthdate 1970  Date of evaluation: 7/25/24  PCP:  Bruce Jeff MD  Note Started: 3:18 PM EDT      CHIEF COMPLAINT       Chief Complaint   Patient presents with    Fall       HISTORY OF PRESENT ILLNESS  (Location/Symptom, Timing/Onset, Context/Setting, Quality, Duration, Modifying Factors, Severity.)      Hugh Avila is a 54 y.o. male who presents with fall off a ladder.  Patient states he fell approximately 5 feet landing on his left leg, lower back and he believes he did hit his head.  He states he did not lose consciousness, patient is on Eliquis for history of A-fib.  Complaining of left leg pain, lower back pain and mild headache.  Denies any changes in vision, numbness, tingling, weakness, chest pain, shortness of breath, abdominal pain, nausea, vomiting or any other complaints.    PAST MEDICAL / SURGICAL / SOCIAL / FAMILY HISTORY      has a past medical history of Abscess of right buttock, Anemia, Anticoagulated on Coumadin, Aortic insufficiency, Arthritis, Atrial fibrillation (HCC), BPH (benign prostatic hypertrophy), CAD (coronary artery disease), Caffeine use, Cellulitis of lower leg, Chronic back pain, Colon cancer (HCC), Cor pulmonale, acute (HCC), COVID-19, CRF (chronic renal failure), Erectile dysfunction, Gout, Hemodialysis patient (HCC), History of blood transfusion, Hyperkalemia, Hyperlipidemia, Hypertension, Hypotension, Hypothyroidism, Kidney transplant candidate, Lymphedema of leg, Obesity, Thyroid disease, and Well adult health check.       has a past surgical history that includes Appendectomy; Lap Band (2007); Abscess Drainage (Right, 01/20/2014); hc cath power picc single (1/24/2014); Dialysis fistula creation (Right, 3/27/15); shoulder surgery (Right, 2014); Bariatric Surgery (01/17/2017); Endoscopy, colon, diagnostic; Sleeve Gastrectomy (N/A,  NIBP=92/44 mmhg

## 2024-07-26 ENCOUNTER — APPOINTMENT (OUTPATIENT)
Dept: CT IMAGING | Age: 54
End: 2024-07-26
Payer: COMMERCIAL

## 2024-07-26 VITALS
HEIGHT: 70 IN | RESPIRATION RATE: 21 BRPM | BODY MASS INDEX: 38.88 KG/M2 | DIASTOLIC BLOOD PRESSURE: 82 MMHG | OXYGEN SATURATION: 100 % | WEIGHT: 271.61 LBS | HEART RATE: 56 BPM | TEMPERATURE: 98.2 F | SYSTOLIC BLOOD PRESSURE: 166 MMHG

## 2024-07-26 LAB
ANION GAP SERPL CALCULATED.3IONS-SCNC: 8 MMOL/L (ref 9–16)
BACTERIA URNS QL MICRO: NORMAL
BASOPHILS # BLD: 0.05 K/UL (ref 0–0.2)
BASOPHILS NFR BLD: 1 % (ref 0–2)
BILIRUB UR QL STRIP: NEGATIVE
BUN SERPL-MCNC: 19 MG/DL (ref 6–20)
CALCIUM SERPL-MCNC: 9.6 MG/DL (ref 8.6–10.4)
CASTS #/AREA URNS LPF: NORMAL /LPF (ref 0–8)
CHLORIDE SERPL-SCNC: 103 MMOL/L (ref 98–107)
CLARITY UR: CLEAR
CO2 SERPL-SCNC: 28 MMOL/L (ref 20–31)
COLOR UR: YELLOW
CREAT SERPL-MCNC: 2.1 MG/DL (ref 0.7–1.2)
EOSINOPHIL # BLD: 0.14 K/UL (ref 0–0.44)
EOSINOPHILS RELATIVE PERCENT: 3 % (ref 1–4)
EPI CELLS #/AREA URNS HPF: NORMAL /HPF (ref 0–5)
ERYTHROCYTE [DISTWIDTH] IN BLOOD BY AUTOMATED COUNT: 16.2 % (ref 11.8–14.4)
ERYTHROCYTE [SEDIMENTATION RATE] IN BLOOD BY PHOTOMETRIC METHOD: 50 MM/HR (ref 0–20)
GFR, ESTIMATED: 37 ML/MIN/1.73M2
GLUCOSE SERPL-MCNC: 77 MG/DL (ref 74–99)
GLUCOSE UR STRIP-MCNC: NEGATIVE MG/DL
HCT VFR BLD AUTO: 41.3 % (ref 40.7–50.3)
HGB BLD-MCNC: 13.8 G/DL (ref 13–17)
HGB UR QL STRIP.AUTO: NEGATIVE
IMM GRANULOCYTES # BLD AUTO: 0 K/UL (ref 0–0.3)
IMM GRANULOCYTES NFR BLD: 0 %
KETONES UR STRIP-MCNC: NEGATIVE MG/DL
LEUKOCYTE ESTERASE UR QL STRIP: NEGATIVE
LYMPHOCYTES NFR BLD: 1.32 K/UL (ref 1.1–3.7)
LYMPHOCYTES RELATIVE PERCENT: 28 % (ref 24–43)
MAGNESIUM SERPL-MCNC: 2.2 MG/DL (ref 1.6–2.6)
MCH RBC QN AUTO: 37 PG (ref 25.2–33.5)
MCHC RBC AUTO-ENTMCNC: 33.4 G/DL (ref 28.4–34.8)
MCV RBC AUTO: 110.7 FL (ref 82.6–102.9)
MONOCYTES NFR BLD: 0.66 K/UL (ref 0.1–1.2)
MONOCYTES NFR BLD: 14 % (ref 3–12)
MORPHOLOGY: ABNORMAL
MORPHOLOGY: ABNORMAL
NEUTROPHILS NFR BLD: 54 % (ref 36–65)
NEUTS SEG NFR BLD: 2.53 K/UL (ref 1.5–8.1)
NITRITE UR QL STRIP: NEGATIVE
NRBC BLD-RTO: 0 PER 100 WBC
PH UR STRIP: 6 [PH] (ref 5–8)
PHOSPHATE SERPL-MCNC: 3.4 MG/DL (ref 2.5–4.5)
PLATELET # BLD AUTO: 270 K/UL (ref 138–453)
PMV BLD AUTO: 9.9 FL (ref 8.1–13.5)
POTASSIUM SERPL-SCNC: 4.3 MMOL/L (ref 3.7–5.3)
PROT UR STRIP-MCNC: ABNORMAL MG/DL
RBC # BLD AUTO: 3.73 M/UL (ref 4.21–5.77)
RBC #/AREA URNS HPF: NORMAL /HPF (ref 0–4)
SODIUM SERPL-SCNC: 139 MMOL/L (ref 136–145)
SP GR UR STRIP: 1.02 (ref 1–1.03)
UROBILINOGEN UR STRIP-ACNC: NORMAL EU/DL (ref 0–1)
WBC #/AREA URNS HPF: NORMAL /HPF (ref 0–5)
WBC OTHER # BLD: 4.7 K/UL (ref 3.5–11.3)

## 2024-07-26 PROCEDURE — 74176 CT ABD & PELVIS W/O CONTRAST: CPT

## 2024-07-26 PROCEDURE — 2580000003 HC RX 258

## 2024-07-26 PROCEDURE — 81001 URINALYSIS AUTO W/SCOPE: CPT

## 2024-07-26 PROCEDURE — 6370000000 HC RX 637 (ALT 250 FOR IP)

## 2024-07-26 PROCEDURE — 72125 CT NECK SPINE W/O DYE: CPT

## 2024-07-26 PROCEDURE — 36415 COLL VENOUS BLD VENIPUNCTURE: CPT

## 2024-07-26 PROCEDURE — 84100 ASSAY OF PHOSPHORUS: CPT

## 2024-07-26 PROCEDURE — 80048 BASIC METABOLIC PNL TOTAL CA: CPT

## 2024-07-26 PROCEDURE — 85025 COMPLETE CBC W/AUTO DIFF WBC: CPT

## 2024-07-26 PROCEDURE — 83735 ASSAY OF MAGNESIUM: CPT

## 2024-07-26 RX ADMIN — GABAPENTIN 100 MG: 100 CAPSULE ORAL at 04:06

## 2024-07-26 RX ADMIN — SODIUM CHLORIDE, PRESERVATIVE FREE 10 ML: 5 INJECTION INTRAVENOUS at 08:27

## 2024-07-26 RX ADMIN — SENNOSIDES AND DOCUSATE SODIUM 1 TABLET: 50; 8.6 TABLET ORAL at 08:25

## 2024-07-26 RX ADMIN — METHOCARBAMOL 750 MG: 750 TABLET ORAL at 08:25

## 2024-07-26 RX ADMIN — ACETAMINOPHEN 1000 MG: 500 TABLET ORAL at 04:06

## 2024-07-26 RX ADMIN — METHOCARBAMOL 750 MG: 750 TABLET ORAL at 04:07

## 2024-07-26 ASSESSMENT — PAIN SCALES - GENERAL: PAINLEVEL_OUTOF10: 5

## 2024-07-26 ASSESSMENT — PAIN DESCRIPTION - LOCATION: LOCATION: BACK;KNEE

## 2024-07-26 NOTE — PROGRESS NOTES
Trauma Tertiary Survey    Admit Date: 7/25/2024  Hospital day 1      Subjective:     Patient seen and examined.  Reports pain only at the hematoma on his left shin.  Negative x-ray.  Otherwise feels good.    Objective:   PHYSICAL EXAM:   Physical Exam  Constitutional:       General: He is not in acute distress.     Appearance: Normal appearance. He is normal weight. He is not ill-appearing, toxic-appearing or diaphoretic.   HENT:      Head: Normocephalic and atraumatic.      Right Ear: External ear normal.      Left Ear: External ear normal.      Nose: Nose normal.      Mouth/Throat:      Mouth: Mucous membranes are moist.      Pharynx: Oropharynx is clear.   Eyes:      General: No scleral icterus.     Conjunctiva/sclera: Conjunctivae normal.   Cardiovascular:      Rate and Rhythm: Normal rate and regular rhythm.      Pulses: Normal pulses.   Pulmonary:      Effort: Pulmonary effort is normal. No respiratory distress.      Breath sounds: No stridor. No wheezing.   Abdominal:      General: Abdomen is flat. There is no distension.      Tenderness: There is no abdominal tenderness. There is no guarding.   Musculoskeletal:         General: No swelling.      Cervical back: Normal range of motion and neck supple. No rigidity or tenderness.      Comments: TTP around left knee and entire left shin with small hematoma. No deformities. Entire spine without TTP, deformity, or step offs.    Skin:     General: Skin is warm and dry.      Coloration: Skin is not jaundiced.      Comments: Small skin tear on left shin ~1 cm. Midline laparotomy scar, well healed . Skin excision scar well healed on right glute   Neurological:      General: No focal deficit present.      Mental Status: He is alert and oriented to person, place, and time. Mental status is at baseline.      Sensory: No sensory deficit.      Motor: No weakness.      Comments: Intact strength 5/5 in bilateral lower extremities for dorsiflexion, plantarflexion of ankle,

## 2024-07-26 NOTE — CARE COORDINATION
Case Management Assessment  Initial Evaluation    Date/Time of Evaluation: 7/26/2024 8:48 AM  Assessment Completed by: Trista Cardona RN    If patient is discharged prior to next notation, then this note serves as note for discharge by case management.    Patient Name: Hugh Avila                   YOB: 1970  Diagnosis: Fracture of spine, thoracic, without spinal cord injury, closed, initial encounter ([facility]) [S22.008A]  Closed fracture of tenth thoracic vertebra, unspecified fracture morphology, initial encounter ([facility]) [S22.079A]                   Date / Time: 7/25/2024  3:12 PM    Patient Admission Status: Inpatient   Readmission Risk (Low < 19, Mod (19-27), High > 27): Readmission Risk Score: 14.3    Current PCP: Bruce Jeff MD  PCP verified by ? Yes    Chart Reviewed: Yes      History Provided by: Patient  Patient Orientation: Alert and Oriented, Person, Place, Situation    Patient Cognition: Alert    Hospitalization in the last 30 days (Readmission):  No    If yes, Readmission Assessment in  Navigator will be completed.    Advance Directives:      Code Status: Full Code   Patient's Primary Decision Maker is: Legal Next of Kin      Discharge Planning:    Patient lives with: Alone Type of Home: House  Primary Care Giver: Self  Patient Support Systems include: Family Members   Current Financial resources: Medicare  Current community resources:    Current services prior to admission: None            Current DME:              Type of Home Care services:  None    ADLS  Prior functional level: Independent in ADLs/IADLs  Current functional level: Independent in ADLs/IADLs    PT AM-PAC:   /24  OT AM-PAC:   /24    Family can provide assistance at DC: Yes  Would you like Case Management to discuss the discharge plan with any other family members/significant others, and if so, who? No  Plans to Return to Present Housing: Yes  Other Identified Issues/Barriers to RETURNING to current housing:  Discharge Plan? Yes    Trista Cardona RN  Case Management Department  Ph: [phone] Fax: na

## 2024-07-26 NOTE — PROGRESS NOTES
Neurosurgery MARGUERITE/Resident    Daily Progress Note   Chief Complaint   Patient presents with    Fall     7/26/2024  7:36 AM    Chart reviewed.  No acute events overnight.  No new complaints.    Vitals:    07/25/24 2347 07/26/24 0000 07/26/24 0357 07/26/24 0732   BP:  (!) 179/96 (!) 174/91 (!) 166/82   Pulse:  64 58 59   Resp:  21     Temp:  98.4 °F (36.9 °C) 98.1 °F (36.7 °C) 98.2 °F (36.8 °C)   TempSrc:  Oral Temporal Temporal   SpO2:  100%     Weight: 123.2 kg (271 lb 9.7 oz)      Height: 1.778 m (5' 10\")            PE:   AOx3   CNII-XII intact   PERRL, EOMI   Motor   L deltoid 5/5; R deltoid 5/5  L biceps 5/5; R biceps 5/5  L triceps 5/5; R triceps 5/5  L wrist extension 5/5; R wrist extension 5/5  L intrinsics 5/5; R intrinsics 5/5      L iliopsoas 5/5 , R iliopsoas 5/5  L quadriceps 5/5; R quadriceps 5/5  L Dorsiflexion 5/5; R dorsiflexion 5/5  L Plantarflexion 5/5; R plantarflexion 5/5  L EHL 5/5; R EHL 5/5    Sensation intact       Lab Results   Component Value Date    WBC 4.7 07/26/2024    HGB 13.8 07/26/2024    HCT 41.3 07/26/2024     07/26/2024    CHOL 160 07/17/2024    TRIG 60 07/17/2024    HDL 73 07/17/2024    ALT 28 07/17/2024    AST 39 07/17/2024     07/26/2024    K 4.3 07/26/2024     07/26/2024    CREATININE 2.1 (H) 07/26/2024    BUN 19 07/26/2024    CO2 28 07/26/2024    TSH 6.70 (H) 08/31/2022    PSA 0.66 05/20/2022    INR 1.1 07/25/2024    LABA1C 5.1 07/17/2024    CRP 22.5 (H) 07/25/2024    SEDRATE 50 (H) 07/25/2024       Radiology       A/P  54 y.o. male who presents presents after 5 foot fall from ladder landing on his buttock and hitting his back with T10 fracture. MRI Thoracic spine revealed Old comminuted fracture of the anterior aspect of the vertebral body of  T10.       - CTLS recommendations: none  - Ok to begin prophylactic anticoagulation from neurosurgery stand point.   - Neuro checks per floor protocol  -MRI confirmed chronic fracture with no neurologic symptoms, no

## 2024-07-26 NOTE — H&P
TRAUMA H&P/CONSULT    PATIENT NAME: Hugh Avila  YOB: 1970  MEDICAL RECORD NO. [id number]   DATE: 7/25/2024  PRIMARY CARE PHYSICIAN: Bruce Jeff MD      ACTIVATION   Trauma Consult-Time at bedside 8:50      IMPRESSION AND PLAN:       Diagnosis:     55 yo male with pathologic fracture of T10 found after fall from ladder, with bruising to left shin.     Plan:     -CT chest abd pelvis   -CT cervical spine  -Admit to step down  -NS following, ordered MRI  -Talked with Radiologist who agrees fracture present in 11/2023 and in healing stages now  -Will reach out to medicine and plan for transfer to medicine team tomorrow since no traumatic injuries, pending CT findings.   -Tert  -MMPT  -NPO pending MRI and CT scans, IV fluids on    If intracranial hemorrhage is present, is it a:  [] BIG 1  [] BIG 2  [] BIG 3  If chest wall injury: Rib score___    CONSULT SERVICES    Neurosurgery      HISTORY:     Chief Complaint:  \"Left Knee pain\"    GENERAL DATA  Patient information was obtained from patient and past medical records.  History/Exam limitations: none.  Injury Date: 7/25/2024   Approximate Injury Time: Daytime            SETTING OF TRAUMATIC EVENT   Location : Home  Specific Details of Location: Other: outside    MECHANISM OF INJURY    Fall From height: 5' off ladder      HISTORY:     Hugh Avila is a male that presented to the Emergency Department following fall off ladder and landing on back and hitting left leg. Patient states he did his his head but did not lose consciousness and is having no head pain. Patient states the only pain is related to his left knee and shin with a bruise on the left shin. A CT was obtained in the ED that showed a pathologic fracture of T10 and XRs of the left leg did not show any bony injury. A CT from November 2023 was reviewed with radiologist who stated that the T10 fracture today appears to be old and healing in the current images and was more of an acute finding in    valGANciclovir (VALCYTE) 450 MG tablet Take 1 tablet by mouth 2 times daily    ProviderMitra MD   belatacept (NULOJIX) 250 MG SOLR belatacept 250 mg intravenous solution   INFUSE 5 MG/KG OVER 30 MINUTE(S) BY INTRAVENOUS ROUTE EVERY 4 WEEKS    ProviderMitra MD   mycophenolate (MYFORTIC) 180 MG DR tablet Take 4 tablets by mouth 2 times daily    ProviderMitra MD   Elastic Bandages & Supports (MEDICAL COMPRESSION STOCKINGS) MISC 1 each by Does not apply route daily as needed (as needed) Right leg below the knee 20-30 HH MM DX 82.409 3/19/18   Bruce Jeff MD       ALLERGIES:   []  None    []   Information not available due to exam limitations documented above     Patient has no known allergies.    PAST MEDICAL/SURGICAL HISTORY: []  None   []   Information not available due to exam limitations documented above      has a past medical history of Abscess of right buttock, Anemia, Anticoagulated on Coumadin, Aortic insufficiency, Arthritis, Atrial fibrillation (HCC), BPH (benign prostatic hypertrophy), CAD (coronary artery disease), Caffeine use, Cellulitis of lower leg, Chronic back pain, Colon cancer (HCC), Cor pulmonale, acute (HCC), COVID-19, CRF (chronic renal failure), Erectile dysfunction, Gout, Hemodialysis patient (HCC), History of blood transfusion, Hyperkalemia, Hyperlipidemia, Hypertension, Hypotension, Hypothyroidism, Kidney transplant candidate, Lymphedema of leg, Obesity, Thyroid disease, and Well adult health check.  has a past surgical history that includes Appendectomy; Lap Band (2007); Abscess Drainage (Right, 01/20/2014); hc cath power picc single (1/24/2014); Dialysis fistula creation (Right, 3/27/15); shoulder surgery (Right, 2014); Bariatric Surgery (01/17/2017); Endoscopy, colon, diagnostic; Sleeve Gastrectomy (N/A, 9/25/2017); pr colsc flx w/rmvl of tumor polyp lesion snare tq (N/A, 11/15/2018); Colonoscopy; Abdominal exploration surgery (01/22/2019); Colonoscopy

## 2024-07-26 NOTE — ED NOTES
[facility] OR    COLONOSCOPY N/A 4/9/2021    COLONOSCOPY POLYPECTOMY SNARE/COLD BIOPSY performed by Garcia Taveras MD at [facility] Endoscopy    DIALYSIS FISTULA CREATION Right 3/27/15    rt wrist    ENDOSCOPY, COLON, DIAGNOSTIC      UGI    GASTRIC BAND REMOVAL  01/17/2017    subcutaneous port    HC CATH POWER PICC SINGLE  1/24/2014         LAP BAND  2007    [facility] COLSC FLX W/RMVL OF TUMOR POLYP LESION SNARE TQ N/A 11/15/2018    COLONOSCOPY POLYPECTOMY SNARE/COLD BIOPSY performed by Han Hou MD at [facility]    RECTAL SURGERY  05/05/2021    EUA, Excision of Hidradenitis Suppurativa     SHOULDER SURGERY Right 2014    SLEEVE GASTRECTOMY N/A 9/25/2017    GASTRECTOMY SLEEVE LAPAROSCOPIC SI ROBOTIC, ENDOSEALER performed by Mac Kemp MD at [facility] OR    SMALL INTESTINE SURGERY N/A 1/22/2019    ABDOMINAL EXPLORATION, LEFT HEMICOLECTOMY, MOBILIZATION OF SPLENIC FLEXURE performed by Garcia Taveras MD at [facility] OR    VASCULAR SURGERY Right     leg tumor removal/ dr. mckinnon       PAST MEDICAL HISTORY       Past Medical History:   Diagnosis Date    Abscess of right buttock 3/17/2021    Anemia     Anticoagulated on Coumadin     Aortic insufficiency     Arthritis     right shoulder/ PCP Dr. Ortiz/ [facility]    Atrial fibrillation (HCC)     [facility] LAST VISIT 3/30/21    BPH (benign prostatic hypertrophy)     CAD (coronary artery disease)     Dr. Thakur/ [facility] LAST VISIT - 3/30/21    Caffeine use     1 coffee, 2 tea/day    Cellulitis of lower leg     right    Chronic back pain     Colon cancer (HCC)     colon    Cor pulmonale, acute (HCC) 12/30/2014    COVID-19 08/2020    HOSPITALIZED X2 WEEKS.  NOT VENTED    CRF (chronic renal failure)     dr. Petersen/tiot on laskey. Tues/ thurs/ sat/ right arm fistula    Erectile dysfunction     Gout     Hemodialysis patient (HCC)     tues/ thurs/ sat/ DR. Petersen/ fistula right lower arm    History of blood transfusion     no reaction    Hyperkalemia 12/24/2013    Hyperlipidemia      Hypertension     Hypotension     Hypothyroidism     Kidney transplant candidate     following with [facility]    Lymphedema of leg     right    Obesity     Thyroid disease     Well adult health check     PCP - DR. MARTIN - LAST VISIT -  1/8/2021       Labs:  Labs Reviewed   CULTURE, BLOOD 1   CULTURE, BLOOD 2   CBC WITH AUTO DIFFERENTIAL   BASIC METABOLIC PANEL   URINALYSIS WITH REFLEX TO CULTURE   MAGNESIUM   PROCALCITONIN   PROTIME-INR   APTT   C-REACTIVE PROTEIN   SEDIMENTATION RATE       Electronically signed by Han Oneil RN on 7/25/2024 at 8:57 PM

## 2024-07-26 NOTE — PLAN OF CARE
Problem: Discharge Planning  Goal: Discharge to home or other facility with appropriate resources  Recent Flowsheet Documentation  Taken 7/26/2024 0800 by Johanna Whittaker RN  Discharge to home or other facility with appropriate resources: Identify barriers to discharge with patient and caregiver  7/26/2024 0008 by Rain Mendenhall RN  Outcome: Progressing     Problem: Pain  Goal: Verbalizes/displays adequate comfort level or baseline comfort level  7/26/2024 0008 by Rain Mendenhall RN  Outcome: Progressing     Problem: Skin/Tissue Integrity  Goal: Absence of new skin breakdown  Description: 1.  Monitor for areas of redness and/or skin breakdown  2.  Assess vascular access sites hourly  3.  Every 4-6 hours minimum:  Change oxygen saturation probe site  4.  Every 4-6 hours:  If on nasal continuous positive airway pressure, respiratory therapy assess nares and determine need for appliance change or resting period.  7/26/2024 0008 by Rain Mendenhall RN  Outcome: Progressing     Problem: Safety - Adult  Goal: Free from fall injury  7/26/2024 0008 by Rain Mendenhall RN  Outcome: Progressing     Problem: ABCDS Injury Assessment  Goal: Absence of physical injury  7/26/2024 0008 by Rain Mendenhall RN  Outcome: Progressing

## 2024-07-26 NOTE — DISCHARGE INSTRUCTIONS
Discharge Instructions for Trauma       What to do after you leave the hospital:      Take Tylenol and ibuprofen as tolerated for pain.  You may alternate these every 4 hours as needed.  You may also apply ice or heat to the area.  Follow-up with the trauma team on an as-needed basis if your symptoms worsen.  Follow-up with your primary care provider for further evaluation.      For resources transitioning back to the community following your trauma, visit [email or web address]    General questions or concerns please call the [facility] at [phone].  If needed, the clinic fax number is [phone].    Trauma is a life-threatening condition. Your doctor will want to closely monitor you. Be sure to go to all of your appointments.

## 2024-07-26 NOTE — PROGRESS NOTES
Physician Progress Note      PATIENT:               RAUL ALCARAZ  CSN #:                  [id number]  :                       1970  ADMIT DATE:       2024 3:12 PM  DISCH DATE:        2024 9:53 AM  RESPONDING  PROVIDER #:        Lizett Johnson CNP          QUERY TEXT:    Patient admitted s/p Fall from ladder, noted to have Chronic atrial   fibrillation and is maintained on Eliquis. If possible, please document in   progress notes and discharge summary if you are evaluating and/or treating any   of the following:?  ?  The medical record reflects the following:  Risk Factors: HTN, CAD  Clinical Indicators: 54 y.o. male who presents after 5 foot fall from ladder   landing on his buttock and hitting his back with T10 fracture, initial concern   for underlying osteomyelitis giving him being immunocompromised.Hx Chronic   Afib with Eliquis 5mg taken BID.  Treatment:  Eliquis held  Options provided:  -- Secondary hypercoagulable state in a patient with atrial fibrillation  -- Other - I will add my own diagnosis  -- Disagree - Not applicable / Not valid  -- Disagree - Clinically unable to determine / Unknown  -- Refer to Clinical Documentation Reviewer    PROVIDER RESPONSE TEXT:    This patient has secondary hypercoagulable state in a patient with atrial   fibrillation.    Query created by: Olena King on 2024 7:39 AM      Electronically signed by:  Lizett Johnson CNP 2024 10:22 AM

## 2024-07-26 NOTE — PROGRESS NOTES
[facility]  PROGRESS NOTE    Shift date: 7/25/24  Shift day: Thursday   Shift # 2    Room # 27/27   Name: Hugh Avila                Restorationist: unknown   Place of Yazidi: unknown    Referral: Routine Visit    Admit Date & Time: 7/25/2024  3:12 PM    Assessment:  Hugh Avila is a 54 y.o. male in the hospital. Upon entering the room writer observes patient in bed receiving medical attention from RN      Intervention:  Writer introduced self and title as  Enmanuel.  Writer offered space for patient  to express feelings, needs, and concerns and provided a ministry presence.     Outcome:  Patient was appreciative of the sustaining presence of this writer.    Plan:  Chaplains will remain available to offer spiritual and emotional support as needed.      Electronically signed by Chaplain Stephanie   07/25/24 2053   Encounter Summary   Encounter Overview/Reason Initial Encounter   Service Provided For Patient   Referral/Consult From Multi-disciplinary team   Support System Unknown   Last Encounter  07/25/24   Complexity of Encounter Low   Begin Time 2046   End Time  2056   Total Time Calculated 10 min   Crisis   Type Trauma   Assessment/Intervention/Outcome   Assessment Calm;Coping   Intervention Active listening;Sustaining Presence/Ministry of presence   Outcome Engaged in conversation     , on 7/25/2024 at 8:54 PM.  [facility]  [phone]

## 2024-07-26 NOTE — DISCHARGE SUMMARY
DISCHARGE SUMMARY:    PATIENT NAME:  Hugh Avila  YOB: 1970  MEDICAL RECORD NO. [id number]  DATE: 24  PRIMARY CARE PHYSICIAN: Bruce Jeff MD  ADMIT DATE:  2024    DISCHARGE DATE:    DISPOSITION:  home  ADMITTING DIAGNOSIS:   Fall from a ladder    DIAGNOSIS:   Patient Active Problem List   Diagnosis    Benign prostatic hyperplasia with lower urinary tract symptoms    Erectile dysfunction due to arterial insufficiency    Chronic atrial fibrillation    Warfarin-induced coagulopathy (HCC)    DVT (deep venous thrombosis) ([facility])    S/P laparoscopic sleeve gastrectomy    Vitamin D deficiency    Malignant neoplasm of transverse colon (HCC)    Status post partial colectomy    History of COVID-19    BMI 38.0-38.9,adult    -donor kidney transplant    Other specified hypothyroidism    Gastroesophageal reflux disease with esophagitis and hemorrhage    Fracture of spine, thoracic, without spinal cord injury, closed, initial encounter ([facility])       CONSULTANTS: Neurosurgery    PROCEDURES:   None    HOSPITAL COURSE:   Hugh Avila is a 54 y.o. male who was admitted on 2024  Hospital Course:      Trauma  CC: fall from ladder 5ft, +Eliquis     Injuries: possible old T10 fx, seen on CT from     Mhx: afib, Dvt, hypothyroid, colon cancer, renal failure,   Shx: renal transplant, left hemicolectomy, sleeve gastrectomy,     Hospital Course:  2024: MRI thoracic spine shows an old fracture at T10 without ligamentous injury  : Negative tertiary exam.  Medically stable for discharge    Labs and imaging were followed daily.      On day of discharge Hugh Avila  was tolerating a regular diet  had adequate analgesia on oral medications  had no signs of complication.  He was deemed medically stable for discharge to Home        PHYSICAL EXAMINATION:        Discharge Vitals:  height is 1.778 m (5' 10\") and weight is 123.2 kg (271 lb 9.7 oz). His temporal temperature is 98.2 °F (36.8 °C). His  HISTORY: trauma Decision Support Exception - unselect if not a suspected or confirmed emergency medical condition->Emergency Medical Condition (MA) FINDINGS: Chest: Mediastinum: Moderate cardiomegaly. Lungs/pleura:  Lungs are clear without focal consolidation or pulmonary edema. Upper Abdomen: This scan is rather abdomen show no definite abnormality. Soft Tissues/Bones: There is no acute abnormality in the chest wall Abdomen/Pelvis: Lower Chest: Cardiomegaly.  The visualized heart and lungs show no acute abnormalities. Organs: The unenhanced liver, pancreas, adrenal glands and gallbladder show no acute process.  Native kidneys are moderately atrophic demonstrating bilateral cysts.  No hydronephrosis.  There is right lower quadrant transplanted kidney which shows no calculi or hydronephrosis.  Splenectomy versus contracted calcified spleen in the left quadrant on series 2, image 26.  Stable. GI/Bowel: There is limited evaluation due to absence of oral contrast. Sleeve gastrectomy.  No bowel obstruction.  No acute infective process. Pelvis: Urinary bladder grossly normal.  No suspicious pelvic mass. Postsurgical changes of renal transplant in the right lower quadrant. Peritoneum/Retroperitoneum: No ascites or significant lymphadenopathy. Bones/Soft Tissues: Subcutaneous fluid collection overlying area of transplanted kidney 13.8 cm maximal dimension unchanged in size.  Some mild surrounding fat stranding also similar to prior.  Favor postsurgical seroma. Indeterminate sterility.  No acute bony abnormality.     1. No acute intrathoracic, intra-abdominal or pelvic abnormality. 2. Cardiomegaly. 3. Postsurgical changes of renal transplant in the right lower quadrant. 4. Subcutaneous fluid collection overlying area of transplanted kidney 13.8 cm maximal dimension unchanged in size. Some mild surrounding fat stranding also similar to prior. Favor postsurgical seroma. Indeterminate sterility.     MRI THORACIC SPINE W WO

## 2024-07-26 NOTE — CARE COORDINATION
SBIRT completed with pt. Pt admitted after fall from [facility].  Pt reports he drinks alcohol 1x week, 2 beers. He denies all drug use. He denies any recent feelings of depression/SI.  SBIRT is negative            Alcohol Screening and Brief Intervention        No results for input(s): \"ALC\" in the last 72 hours.    Alcohol Pre-screening  (MEN ONLY) How many times in the past year have you had 5 or more drinks in a day?: None          Drug Pre-Screening  -none       Drug Screening DAST       Mood Pre-Screening (PHQ-2)  During the past 2 weeks, have you been bothered by, feeling down, depressed or hopeless?  No        I have interviewed Hugh Avila, [phone] regarding  His alcohol consumption/drug use and risk for excessive use. Screenings were negative.  Patient  N/A intervention at this time.   Deferred []    Completed on: 7/26/2024   KEYANA GILLETTE

## 2024-07-28 LAB
MICROORGANISM SPEC CULT: NORMAL
MICROORGANISM SPEC CULT: NORMAL
SERVICE CMNT-IMP: NORMAL
SERVICE CMNT-IMP: NORMAL
SPECIMEN DESCRIPTION: NORMAL
SPECIMEN DESCRIPTION: NORMAL

## 2024-07-29 ENCOUNTER — TELEPHONE (OUTPATIENT)
Dept: FAMILY MEDICINE CLINIC | Age: 54
End: 2024-07-29

## 2024-07-29 NOTE — TELEPHONE ENCOUNTER
Care Transitions Initial Follow Up Call    Outreach made within 2 business days of discharge: Yes    Patient: Hugh Avila Patient : 1970   MRN: [id number]  Reason for Admission: Fracture of spine, thoracic, without spinal cord injury, closed, initial encounter ([facility])   Discharge Date: 24       Spoke with: left message to call office to schedule hospital follow up    Discharge department/facility: [facility]    Follow Up  Future Appointments   Date Time Provider Department Center   2024  8:00 AM Bruce Jeff MD [id number]       Mario Diaz MA

## 2024-08-08 ENCOUNTER — OFFICE VISIT (OUTPATIENT)
Dept: NEUROSURGERY | Age: 54
End: 2024-08-08
Payer: MEDICARE

## 2024-08-08 VITALS
HEART RATE: 71 BPM | DIASTOLIC BLOOD PRESSURE: 91 MMHG | SYSTOLIC BLOOD PRESSURE: 155 MMHG | HEIGHT: 70 IN | WEIGHT: 273.8 LBS | BODY MASS INDEX: 39.2 KG/M2

## 2024-08-08 DIAGNOSIS — S22.070D COMPRESSION FRACTURE OF T10 VERTEBRA WITH ROUTINE HEALING, SUBSEQUENT ENCOUNTER: Primary | ICD-10-CM

## 2024-08-08 PROCEDURE — 1111F DSCHRG MED/CURRENT MED MERGE: CPT | Performed by: NEUROLOGICAL SURGERY

## 2024-08-08 PROCEDURE — G8417 CALC BMI ABV UP PARAM F/U: HCPCS | Performed by: NEUROLOGICAL SURGERY

## 2024-08-08 PROCEDURE — 3017F COLORECTAL CA SCREEN DOC REV: CPT | Performed by: NEUROLOGICAL SURGERY

## 2024-08-08 PROCEDURE — G8427 DOCREV CUR MEDS BY ELIG CLIN: HCPCS | Performed by: NEUROLOGICAL SURGERY

## 2024-08-08 PROCEDURE — 1036F TOBACCO NON-USER: CPT | Performed by: NEUROLOGICAL SURGERY

## 2024-08-08 PROCEDURE — 99214 OFFICE O/P EST MOD 30 MIN: CPT | Performed by: NEUROLOGICAL SURGERY

## 2024-08-08 NOTE — PROGRESS NOTES
Socioeconomic History    Marital status: Single     Spouse name: Not on file    Number of children: Not on file    Years of education: Not on file    Highest education level: Not on file   Occupational History    Not on file   Tobacco Use    Smoking status: Never    Smokeless tobacco: Never   Vaping Use    Vaping status: Never Used   Substance and Sexual Activity    Alcohol use: Yes     Comment: couple beers on the weekend    Drug use: No    Sexual activity: Yes     Partners: Female   Other Topics Concern    Not on file   Social History Narrative    Not on file     Social Determinants of Health     Financial Resource Strain: Low Risk  (11/16/2023)    Received from [facility]    Overall Financial Resource Strain (CARDIA)     Difficulty of Paying Living Expenses: Not hard at all   Food Insecurity: No Food Insecurity (7/25/2024)    Hunger Vital Sign     Worried About Running Out of Food in the Last Year: Never true     Ran Out of Food in the Last Year: Never true   Transportation Needs: No Transportation Needs (7/25/2024)    PRAPARE - Transportation     Lack of Transportation (Medical): No     Lack of Transportation (Non-Medical): No   Physical Activity: Insufficiently Active (11/14/2023)    Exercise Vital Sign     Days of Exercise per Week: 3 days     Minutes of Exercise per Session: 30 min   Stress: Not on file   Social Connections: Not on file   Intimate Partner Violence: Not At Risk (11/16/2023)    Received from [facility]    [place] Safety & Environment     Within the last year, have you been afraid of your partner or ex-partner?: No     Emotionally Abused: Not on file     Physically Abused: Not on file     Sexually Abused: Not on file     In the past year have you been physically or sexually abused?: Not on file   Housing Stability: Low Risk  (7/25/2024)    Housing Stability Vital Sign     Unable to Pay for Housing in the Last Year: No     Number of Places Lived in the Last Year: 1

## 2024-08-12 RX ORDER — LEVOTHYROXINE SODIUM 0.03 MG/1
25 TABLET ORAL DAILY
Qty: 90 TABLET | Refills: 0 | Status: SHIPPED | OUTPATIENT
Start: 2024-08-12

## 2024-08-12 RX ORDER — CHOLECALCIFEROL (VITAMIN D3) 1250 MCG
CAPSULE ORAL
Qty: 12 CAPSULE | Refills: 2 | OUTPATIENT
Start: 2024-08-12

## 2024-08-12 NOTE — TELEPHONE ENCOUNTER
Hugh Avila is calling to request a refill on the following medication(s):    Medication Request:  Requested Prescriptions     Pending Prescriptions Disp Refills    levothyroxine (SYNTHROID) 25 MCG tablet 90 tablet 1     Sig: Take 1 tablet by mouth Daily     Refused Prescriptions Disp Refills    Cholecalciferol (VITAMIN D3) 1.25 MG (32190 UT) CAPS 12 capsule 2     Sig: TAKE 1 CAPSULE BY MOUTH ONCE A MONTH       Last Visit Date (If Applicable):  3/14/2024    Next Visit Date:    9/13/2024      Last refill 3/8/24. Prescription pending.

## 2024-08-15 PROBLEM — S22.079A CLOSED T10 SPINAL FRACTURE (HCC): Status: ACTIVE | Noted: 2024-08-15

## 2024-09-19 ENCOUNTER — OFFICE VISIT (OUTPATIENT)
Dept: FAMILY MEDICINE CLINIC | Age: 54
End: 2024-09-19
Payer: COMMERCIAL

## 2024-09-19 VITALS
BODY MASS INDEX: 38.05 KG/M2 | WEIGHT: 265.8 LBS | SYSTOLIC BLOOD PRESSURE: 138 MMHG | HEIGHT: 70 IN | HEART RATE: 58 BPM | OXYGEN SATURATION: 97 % | DIASTOLIC BLOOD PRESSURE: 80 MMHG | TEMPERATURE: 97.8 F

## 2024-09-19 DIAGNOSIS — Z99.2 ESRD ON HEMODIALYSIS (HCC): ICD-10-CM

## 2024-09-19 DIAGNOSIS — I48.20 CHRONIC ATRIAL FIBRILLATION (HCC): ICD-10-CM

## 2024-09-19 DIAGNOSIS — Z90.3 HISTORY OF SLEEVE GASTRECTOMY: ICD-10-CM

## 2024-09-19 DIAGNOSIS — E03.8 OTHER SPECIFIED HYPOTHYROIDISM: ICD-10-CM

## 2024-09-19 DIAGNOSIS — I89.0 LYMPHEDEMA: ICD-10-CM

## 2024-09-19 DIAGNOSIS — N18.6 ESRD ON HEMODIALYSIS (HCC): ICD-10-CM

## 2024-09-19 DIAGNOSIS — I10 PRIMARY HYPERTENSION: Primary | ICD-10-CM

## 2024-09-19 DIAGNOSIS — I73.9 PERIPHERAL VASCULAR DISEASE (HCC): ICD-10-CM

## 2024-09-19 DIAGNOSIS — D84.821 IMMUNODEFICIENCY DUE TO DRUGS (CODE) (HCC): ICD-10-CM

## 2024-09-19 DIAGNOSIS — G47.33 OBSTRUCTIVE SLEEP APNEA OF ADULT: ICD-10-CM

## 2024-09-19 DIAGNOSIS — I82.401 ACUTE DEEP VEIN THROMBOSIS (DVT) OF RIGHT LOWER EXTREMITY, UNSPECIFIED VEIN (HCC): ICD-10-CM

## 2024-09-19 DIAGNOSIS — I50.810 RIGHT-SIDED HEART FAILURE, UNSPECIFIED HF CHRONICITY (HCC): ICD-10-CM

## 2024-09-19 DIAGNOSIS — E55.9 VITAMIN D DEFICIENCY: ICD-10-CM

## 2024-09-19 DIAGNOSIS — N25.81 SECONDARY HYPERPARATHYROIDISM OF RENAL ORIGIN (HCC): ICD-10-CM

## 2024-09-19 DIAGNOSIS — D68.9 COAGULATION DISORDER (HCC): ICD-10-CM

## 2024-09-19 DIAGNOSIS — Z94.0 KIDNEY TRANSPLANTED: ICD-10-CM

## 2024-09-19 DIAGNOSIS — E66.01 SEVERE OBESITY (BMI 35.0-39.9) WITH COMORBIDITY (HCC): ICD-10-CM

## 2024-09-19 DIAGNOSIS — Z90.49 STATUS POST PARTIAL COLECTOMY: ICD-10-CM

## 2024-09-19 DIAGNOSIS — N40.1 BENIGN PROSTATIC HYPERPLASIA WITH LOWER URINARY TRACT SYMPTOMS, SYMPTOM DETAILS UNSPECIFIED: ICD-10-CM

## 2024-09-19 DIAGNOSIS — C18.9 MALIGNANT NEOPLASM OF COLON, UNSPECIFIED PART OF COLON (HCC): ICD-10-CM

## 2024-09-19 DIAGNOSIS — I20.9 ANGINA PECTORIS (HCC): ICD-10-CM

## 2024-09-19 DIAGNOSIS — E08.00 DIABETES MELLITUS DUE TO UNDERLYING CONDITION WITH HYPEROSMOLARITY WITHOUT COMA, WITHOUT LONG-TERM CURRENT USE OF INSULIN (HCC): ICD-10-CM

## 2024-09-19 PROBLEM — K40.20 BILATERAL INGUINAL HERNIA: Status: RESOLVED | Noted: 2022-03-28 | Resolved: 2024-09-19

## 2024-09-19 PROBLEM — K20.90 ESOPHAGITIS, UNSPECIFIED WITHOUT BLEEDING: Status: ACTIVE | Noted: 2024-02-14

## 2024-09-19 PROBLEM — K22.11 ULCER OF ESOPHAGUS WITH BLEEDING: Status: RESOLVED | Noted: 2024-02-06 | Resolved: 2024-09-19

## 2024-09-19 PROBLEM — Z79.899 IMMUNOSUPPRESSIVE MANAGEMENT ENCOUNTER FOLLOWING KIDNEY TRANSPLANT: Status: RESOLVED | Noted: 2024-02-06 | Resolved: 2024-09-19

## 2024-09-19 PROBLEM — M19.90 ARTHRITIS: Status: RESOLVED | Noted: 2022-08-21 | Resolved: 2024-09-19

## 2024-09-19 PROBLEM — L98.7 EXCESS SKIN OF ABDOMINAL WALL: Status: RESOLVED | Noted: 2021-10-01 | Resolved: 2024-09-19

## 2024-09-19 PROBLEM — D63.1 ANEMIA IN CHRONIC KIDNEY DISEASE: Status: RESOLVED | Noted: 2020-06-12 | Resolved: 2024-09-19

## 2024-09-19 PROBLEM — E87.8 ELECTROLYTE IMBALANCE: Status: ACTIVE | Noted: 2024-02-06

## 2024-09-19 PROBLEM — D50.9 IRON DEFICIENCY ANEMIA, UNSPECIFIED: Status: ACTIVE | Noted: 2020-06-12

## 2024-09-19 PROBLEM — K22.11 ULCER OF ESOPHAGUS WITH BLEEDING: Status: ACTIVE | Noted: 2024-02-06

## 2024-09-19 PROBLEM — Z79.899 IMMUNOSUPPRESSIVE MANAGEMENT ENCOUNTER FOLLOWING KIDNEY TRANSPLANT: Status: ACTIVE | Noted: 2024-02-06

## 2024-09-19 PROBLEM — E78.5 HYPERLIPIDEMIA: Status: RESOLVED | Noted: 2020-09-04 | Resolved: 2024-09-19

## 2024-09-19 PROBLEM — N52.01 ERECTILE DYSFUNCTION DUE TO ARTERIAL INSUFFICIENCY: Status: RESOLVED | Noted: 2017-03-22 | Resolved: 2024-09-19

## 2024-09-19 PROBLEM — K40.20 BILATERAL INGUINAL HERNIA: Status: ACTIVE | Noted: 2022-03-28

## 2024-09-19 PROBLEM — N18.9 ANEMIA IN CHRONIC KIDNEY DISEASE: Status: ACTIVE | Noted: 2020-06-12

## 2024-09-19 PROBLEM — E83.30 DISORDER OF PHOSPHORUS METABOLISM, UNSPECIFIED: Status: RESOLVED | Noted: 2020-06-12 | Resolved: 2024-09-19

## 2024-09-19 PROBLEM — D50.9 IRON DEFICIENCY ANEMIA, UNSPECIFIED: Status: RESOLVED | Noted: 2020-06-12 | Resolved: 2024-09-19

## 2024-09-19 PROBLEM — E83.30 DISORDER OF PHOSPHORUS METABOLISM, UNSPECIFIED: Status: ACTIVE | Noted: 2020-06-12

## 2024-09-19 PROBLEM — B35.6 TINEA CRURIS: Status: ACTIVE | Noted: 2024-09-19

## 2024-09-19 PROBLEM — E87.8 ELECTROLYTE IMBALANCE: Status: RESOLVED | Noted: 2024-02-06 | Resolved: 2024-09-19

## 2024-09-19 PROBLEM — Z86.16 HISTORY OF COVID-19: Status: RESOLVED | Noted: 2021-01-08 | Resolved: 2024-09-19

## 2024-09-19 PROBLEM — N18.9 ANEMIA IN CHRONIC KIDNEY DISEASE: Status: RESOLVED | Noted: 2020-06-12 | Resolved: 2024-09-19

## 2024-09-19 PROBLEM — B25.9 CYTOMEGALOVIRUS (CMV) VIREMIA (HCC): Status: ACTIVE | Noted: 2024-02-06

## 2024-09-19 PROBLEM — B25.9 CYTOMEGALOVIRUS (CMV) VIREMIA (HCC): Status: RESOLVED | Noted: 2024-02-06 | Resolved: 2024-09-19

## 2024-09-19 PROBLEM — M19.90 ARTHRITIS: Status: ACTIVE | Noted: 2022-08-21

## 2024-09-19 PROBLEM — E87.70 FLUID OVERLOAD, UNSPECIFIED: Status: ACTIVE | Noted: 2020-06-12

## 2024-09-19 PROBLEM — D63.1 ANEMIA IN CHRONIC KIDNEY DISEASE: Status: ACTIVE | Noted: 2020-06-12

## 2024-09-19 PROBLEM — S22.079A CLOSED T10 SPINAL FRACTURE (HCC): Status: RESOLVED | Noted: 2024-08-15 | Resolved: 2024-09-19

## 2024-09-19 PROBLEM — K20.90 ESOPHAGITIS, UNSPECIFIED WITHOUT BLEEDING: Status: RESOLVED | Noted: 2024-02-14 | Resolved: 2024-09-19

## 2024-09-19 PROBLEM — B35.6 TINEA CRURIS: Status: RESOLVED | Noted: 2024-09-19 | Resolved: 2024-09-19

## 2024-09-19 PROBLEM — L98.7 EXCESS SKIN OF ABDOMINAL WALL: Status: ACTIVE | Noted: 2021-10-01

## 2024-09-19 PROBLEM — E78.5 HYPERLIPIDEMIA: Status: ACTIVE | Noted: 2020-09-04

## 2024-09-19 PROBLEM — K20.80 OTHER ESOPHAGITIS WITHOUT BLEEDING: Status: RESOLVED | Noted: 2024-02-05 | Resolved: 2024-09-19

## 2024-09-19 PROBLEM — E87.70 FLUID OVERLOAD, UNSPECIFIED: Status: RESOLVED | Noted: 2020-06-12 | Resolved: 2024-09-19

## 2024-09-19 PROBLEM — S22.008A FRACTURE OF SPINE, THORACIC, WITHOUT SPINAL CORD INJURY, CLOSED, INITIAL ENCOUNTER (HCC): Status: RESOLVED | Noted: 2024-07-25 | Resolved: 2024-09-19

## 2024-09-19 PROBLEM — K20.80 OTHER ESOPHAGITIS WITHOUT BLEEDING: Status: ACTIVE | Noted: 2024-02-05

## 2024-09-19 PROBLEM — K21.01 GASTROESOPHAGEAL REFLUX DISEASE WITH ESOPHAGITIS AND HEMORRHAGE: Status: RESOLVED | Noted: 2024-03-14 | Resolved: 2024-09-19

## 2024-09-19 PROCEDURE — 99214 OFFICE O/P EST MOD 30 MIN: CPT | Performed by: FAMILY MEDICINE

## 2024-09-19 PROCEDURE — 1036F TOBACCO NON-USER: CPT | Performed by: FAMILY MEDICINE

## 2024-09-19 PROCEDURE — 3075F SYST BP GE 130 - 139MM HG: CPT | Performed by: FAMILY MEDICINE

## 2024-09-19 PROCEDURE — 3079F DIAST BP 80-89 MM HG: CPT | Performed by: FAMILY MEDICINE

## 2024-09-19 PROCEDURE — G8417 CALC BMI ABV UP PARAM F/U: HCPCS | Performed by: FAMILY MEDICINE

## 2024-09-19 PROCEDURE — G8427 DOCREV CUR MEDS BY ELIG CLIN: HCPCS | Performed by: FAMILY MEDICINE

## 2024-09-19 PROCEDURE — 3017F COLORECTAL CA SCREEN DOC REV: CPT | Performed by: FAMILY MEDICINE

## 2024-09-19 RX ORDER — VALGANCICLOVIR 450 MG/1
450 TABLET, FILM COATED ORAL 2 TIMES DAILY
Qty: 200 TABLET | Refills: 0 | Status: SHIPPED | OUTPATIENT
Start: 2024-09-19

## 2024-09-19 RX ORDER — CHOLECALCIFEROL (VITAMIN D3) 1250 MCG
CAPSULE ORAL
Qty: 12 CAPSULE | Refills: 1 | Status: SHIPPED | OUTPATIENT
Start: 2024-09-19

## 2024-09-19 SDOH — ECONOMIC STABILITY: FOOD INSECURITY: WITHIN THE PAST 12 MONTHS, YOU WORRIED THAT YOUR FOOD WOULD RUN OUT BEFORE YOU GOT MONEY TO BUY MORE.: NEVER TRUE

## 2024-09-19 SDOH — ECONOMIC STABILITY: INCOME INSECURITY: HOW HARD IS IT FOR YOU TO PAY FOR THE VERY BASICS LIKE FOOD, HOUSING, MEDICAL CARE, AND HEATING?: NOT HARD AT ALL

## 2024-09-19 SDOH — ECONOMIC STABILITY: FOOD INSECURITY: WITHIN THE PAST 12 MONTHS, THE FOOD YOU BOUGHT JUST DIDN'T LAST AND YOU DIDN'T HAVE MONEY TO GET MORE.: NEVER TRUE

## 2024-09-19 ASSESSMENT — ENCOUNTER SYMPTOMS
RESPIRATORY NEGATIVE: 1
EYES NEGATIVE: 1
ALLERGIC/IMMUNOLOGIC NEGATIVE: 1
BACK PAIN: 1
GASTROINTESTINAL NEGATIVE: 1

## 2024-10-01 RX ORDER — AMIODARONE HYDROCHLORIDE 200 MG/1
TABLET ORAL
Qty: 90 TABLET | Refills: 0 | Status: SHIPPED | OUTPATIENT
Start: 2024-10-01

## 2024-10-01 NOTE — TELEPHONE ENCOUNTER
Hugh Avila is calling to request a refill on the following medication(s):    Last Visit Date (If Applicable):  9/19/2024    Next Visit Date:    3/19/2025    Medication Request:  Requested Prescriptions     Pending Prescriptions Disp Refills    amiodarone (CORDARONE) 200 MG tablet [Pharmacy Med Name: AMIODARONE 200MG] 90 tablet 0     Sig: TAKE 1 TABLET BY MOUTH DAILY -- PLEASE MAKE APPOINTMENT--

## 2024-10-10 NOTE — TELEPHONE ENCOUNTER
Hugh Avila is calling to request a refill on the following medication(s):    Medication Request:  Requested Prescriptions     Pending Prescriptions Disp Refills    rosuvastatin (CRESTOR) 40 MG tablet [Pharmacy Med Name: ROSUVASTATIN 40MG] 90 tablet 0     Sig: TAKE 1 TABLET BY MOUTH DAILY       Last Visit Date (If Applicable):  9/19/2024    Next Visit Date:    3/19/2025    Last refilled on 3/28/24. Prescription pending.

## 2024-10-12 RX ORDER — ROSUVASTATIN CALCIUM 40 MG/1
40 TABLET, COATED ORAL DAILY
Qty: 90 TABLET | Refills: 0 | Status: SHIPPED | OUTPATIENT
Start: 2024-10-12

## 2024-11-25 ENCOUNTER — HOSPITAL ENCOUNTER (OUTPATIENT)
Dept: CT IMAGING | Age: 54
Discharge: HOME | End: 2024-11-27
Attending: NEUROLOGICAL SURGERY
Payer: COMMERCIAL

## 2024-11-25 DIAGNOSIS — S22.070D COMPRESSION FRACTURE OF T10 VERTEBRA WITH ROUTINE HEALING, SUBSEQUENT ENCOUNTER: ICD-10-CM

## 2024-11-25 PROCEDURE — 72128 CT CHEST SPINE W/O DYE: CPT

## 2024-11-25 RX ORDER — LEVOTHYROXINE SODIUM 25 UG/1
25 TABLET ORAL DAILY
Qty: 90 TABLET | Refills: 1 | Status: SHIPPED | OUTPATIENT
Start: 2024-11-25

## 2024-11-25 NOTE — TELEPHONE ENCOUNTER
Hugh Avila is calling to request a refill on the following medication(s):    Medication Request:  Requested Prescriptions     Pending Prescriptions Disp Refills    levothyroxine (SYNTHROID) 25 MCG tablet [Pharmacy Med Name: LEVOTHYROXINE 25MCG] 90 tablet 0     Sig: TAKE 1 TABLET BY MOUTH DAILY       Last Visit Date (If Applicable):  9/19/2024    Next Visit Date:    3/19/2025    Last refilled on 8/12/24. Prescription pending.

## 2024-11-27 NOTE — PROGRESS NOTES
[facility] Neurosurgery Telehealth Followup Visit    Pt Name: Hugh Avila  MRN: [id number]  YOB: 1970  Date of evaluation: 12/3/2024  Primary Care Physician: Bruce Jeff MD  Reason for Evaluation: TELEHEALTH EVALUATION -- Audio/Visual f/u    TELEHEALTH EVALUATION -- Audio/Visual  HPI:    Hugh Avila (:  1970) has requested an audio/video evaluation for the following concern(s):    Compression fracture of T10 VERTEBRA    Onset:  Provoking factors:  Palliative factors:    Prior to Visit Medications    Medication Sig Taking? Authorizing Provider   levothyroxine (SYNTHROID) 25 MCG tablet TAKE 1 TABLET BY MOUTH DAILY  Bruce Jeff MD   rosuvastatin (CRESTOR) 40 MG tablet TAKE 1 TABLET BY MOUTH DAILY  Bruce Jeff MD   amiodarone (CORDARONE) 200 MG tablet TAKE 1 TABLET BY MOUTH DAILY -- PLEASE MAKE APPOINTMENT--  Bruce Jeff MD   Cholecalciferol (VITAMIN D3) 1.25 MG (85062 UT) CAPS TAKE 1 CAPSULE BY MOUTH ONCE A MONTH  Bruce Jeff MD   apixaban (ELIQUIS) 5 MG TABS tablet Take 1 tablet by mouth 2 times daily  Bruce Jeff MD   valGANciclovir (VALCYTE) 450 MG tablet Take 1 tablet by mouth 2 times daily  Bruce Jeff MD   aspirin (ASPIRIN LOW DOSE) 81 MG EC tablet Take 1 tablet by mouth daily  Bruce Jeff MD   B Complex-C-Folic Acid (NEPHRO-JENNIFER) 0.8 MG TABS Take 1 tablet by mouth daily (with breakfast)  Bruce Jeff MD   potassium chloride (KLOR-CON M) 20 MEQ extended release tablet Take 1 tablet by mouth daily  Bruce eJff MD   amLODIPine (NORVASC) 5 MG tablet Take 1 tablet by mouth daily  Bruce Jeff MD   pantoprazole (PROTONIX) 40 MG tablet Take 1 tablet by mouth every morning (before breakfast)  Mitra Horvath MD   furosemide (LASIX) 20 MG tablet Take 1 tablet by mouth 2 times daily  Mitra Horvath MD   belatacept (NULOJIX) 250 MG SOLR belatacept 250 mg intravenous solution   INFUSE 5 MG/KG OVER 30 MINUTE(S) BY INTRAVENOUS ROUTE EVERY 4

## 2024-12-03 ENCOUNTER — TELEMEDICINE (OUTPATIENT)
Dept: NEUROSURGERY | Age: 54
End: 2024-12-03
Payer: COMMERCIAL

## 2024-12-03 DIAGNOSIS — S22.070D COMPRESSION FRACTURE OF T10 VERTEBRA WITH ROUTINE HEALING, SUBSEQUENT ENCOUNTER: Primary | ICD-10-CM

## 2024-12-03 PROBLEM — S22.000D COMPRESSION FRACTURE OF THORACIC VERTEBRA WITH ROUTINE HEALING: Status: ACTIVE | Noted: 2024-12-03

## 2024-12-03 PROCEDURE — 3017F COLORECTAL CA SCREEN DOC REV: CPT | Performed by: NEUROLOGICAL SURGERY

## 2024-12-03 PROCEDURE — 99213 OFFICE O/P EST LOW 20 MIN: CPT | Performed by: NEUROLOGICAL SURGERY

## 2024-12-03 PROCEDURE — G8428 CUR MEDS NOT DOCUMENT: HCPCS | Performed by: NEUROLOGICAL SURGERY

## 2024-12-30 RX ORDER — POTASSIUM CHLORIDE 1500 MG/1
20 TABLET, EXTENDED RELEASE ORAL DAILY
Qty: 90 TABLET | Refills: 0 | Status: SHIPPED | OUTPATIENT
Start: 2024-12-30

## 2024-12-30 RX ORDER — AMIODARONE HYDROCHLORIDE 200 MG/1
TABLET ORAL
Qty: 90 TABLET | Refills: 0 | Status: SHIPPED | OUTPATIENT
Start: 2024-12-30

## 2024-12-30 NOTE — TELEPHONE ENCOUNTER
Hugh Avila is calling to request a refill on the following medication(s):    Medication Request:  Requested Prescriptions     Pending Prescriptions Disp Refills    potassium chloride (KLOR-CON M) 20 MEQ extended release tablet [Pharmacy Med Name: POTASSIUM CHLORIDE ER 20 MEQ TABLET] 90 tablet 0     Sig: TAKE 1 TABLET BY MOUTH DAILY    amiodarone (CORDARONE) 200 MG tablet [Pharmacy Med Name: AMIODARONE HYDROCHLORIDE 200 MG TABLET] 90 tablet 0     Sig: TAKE 1 TABLET BY MOUTH DAILY -- PLEASE MAKE APPOINTMENT--       Last Visit Date (If Applicable):  9/19/2024    Next Visit Date:    3/19/2025    Last refilled on 6/11 & 10/1/24. Prescription pending.

## 2025-01-09 RX ORDER — ROSUVASTATIN CALCIUM 40 MG/1
40 TABLET, COATED ORAL DAILY
Qty: 90 TABLET | Refills: 0 | Status: SHIPPED | OUTPATIENT
Start: 2025-01-09

## 2025-01-09 NOTE — TELEPHONE ENCOUNTER
Hugh Avila is calling to request a refill on the following medication(s):    Medication Request:  Requested Prescriptions     Pending Prescriptions Disp Refills    rosuvastatin (CRESTOR) 40 MG tablet [Pharmacy Med Name: ROSUVASTATIN CALCIUM 40 MG TABLET] 90 tablet 0     Sig: TAKE 1 TABLET BY MOUTH DAILY       Last Visit Date (If Applicable):  9/19/2024    Next Visit Date:    3/19/2025    Last refilled on 10/12/24. Prescription pending.

## 2025-01-10 ENCOUNTER — APPOINTMENT (OUTPATIENT)
Dept: CT IMAGING | Age: 55
End: 2025-01-10
Payer: COMMERCIAL

## 2025-01-10 ENCOUNTER — HOSPITAL ENCOUNTER (EMERGENCY)
Age: 55
Discharge: HOME | End: 2025-01-10
Attending: EMERGENCY MEDICINE
Payer: COMMERCIAL

## 2025-01-10 VITALS
SYSTOLIC BLOOD PRESSURE: 195 MMHG | DIASTOLIC BLOOD PRESSURE: 109 MMHG | WEIGHT: 240 LBS | HEIGHT: 70 IN | TEMPERATURE: 98.2 F | OXYGEN SATURATION: 99 % | BODY MASS INDEX: 34.36 KG/M2 | HEART RATE: 81 BPM | RESPIRATION RATE: 16 BRPM

## 2025-01-10 DIAGNOSIS — L03.311 CELLULITIS OF ABDOMINAL WALL: Primary | ICD-10-CM

## 2025-01-10 LAB
ALBUMIN SERPL-MCNC: 3.9 G/DL (ref 3.5–5.2)
ALBUMIN/GLOB SERPL: 1.1 {RATIO} (ref 1–2.5)
ALP SERPL-CCNC: 105 U/L (ref 40–129)
ALT SERPL-CCNC: 17 U/L (ref 10–50)
ANION GAP SERPL CALCULATED.3IONS-SCNC: 13 MMOL/L (ref 9–16)
AST SERPL-CCNC: 31 U/L (ref 10–50)
BASOPHILS # BLD: 0.04 K/UL (ref 0–0.2)
BASOPHILS NFR BLD: 1 % (ref 0–2)
BILIRUB SERPL-MCNC: 0.3 MG/DL (ref 0–1.2)
BUN SERPL-MCNC: 15 MG/DL (ref 6–20)
CALCIUM SERPL-MCNC: 9.6 MG/DL (ref 8.6–10.4)
CHLORIDE SERPL-SCNC: 102 MMOL/L (ref 98–107)
CO2 SERPL-SCNC: 23 MMOL/L (ref 20–31)
CREAT SERPL-MCNC: 1.9 MG/DL (ref 0.7–1.2)
EOSINOPHIL # BLD: 0.13 K/UL (ref 0–0.44)
EOSINOPHILS RELATIVE PERCENT: 2 % (ref 1–4)
ERYTHROCYTE [DISTWIDTH] IN BLOOD BY AUTOMATED COUNT: 15.1 % (ref 11.8–14.4)
GFR, ESTIMATED: 41 ML/MIN/1.73M2
GLUCOSE SERPL-MCNC: 100 MG/DL (ref 74–99)
HCT VFR BLD AUTO: 41.4 % (ref 40.7–50.3)
HGB BLD-MCNC: 13.7 G/DL (ref 13–17)
IMM GRANULOCYTES # BLD AUTO: <0.03 K/UL (ref 0–0.3)
IMM GRANULOCYTES NFR BLD: 0 %
LYMPHOCYTES NFR BLD: 2.13 K/UL (ref 1.1–3.7)
LYMPHOCYTES RELATIVE PERCENT: 38 % (ref 24–43)
MCH RBC QN AUTO: 35.1 PG (ref 25.2–33.5)
MCHC RBC AUTO-ENTMCNC: 33.1 G/DL (ref 28.4–34.8)
MCV RBC AUTO: 106.2 FL (ref 82.6–102.9)
MONOCYTES NFR BLD: 0.59 K/UL (ref 0.1–1.2)
MONOCYTES NFR BLD: 11 % (ref 3–12)
NEUTROPHILS NFR BLD: 48 % (ref 36–65)
NEUTS SEG NFR BLD: 2.72 K/UL (ref 1.5–8.1)
NRBC BLD-RTO: 0.4 PER 100 WBC
PLATELET # BLD AUTO: 281 K/UL (ref 138–453)
PMV BLD AUTO: 9.4 FL (ref 8.1–13.5)
POTASSIUM SERPL-SCNC: 3.8 MMOL/L (ref 3.7–5.3)
PROT SERPL-MCNC: 7.4 G/DL (ref 6.6–8.7)
RBC # BLD AUTO: 3.9 M/UL (ref 4.21–5.77)
RBC # BLD: ABNORMAL 10*6/UL
RBC # BLD: ABNORMAL 10*6/UL
SODIUM SERPL-SCNC: 138 MMOL/L (ref 136–145)
WBC OTHER # BLD: 5.6 K/UL (ref 3.5–11.3)

## 2025-01-10 PROCEDURE — 80053 COMPREHEN METABOLIC PANEL: CPT

## 2025-01-10 PROCEDURE — 99284 EMERGENCY DEPT VISIT MOD MDM: CPT

## 2025-01-10 PROCEDURE — 74176 CT ABD & PELVIS W/O CONTRAST: CPT

## 2025-01-10 PROCEDURE — 85025 COMPLETE CBC W/AUTO DIFF WBC: CPT

## 2025-01-10 RX ORDER — METOPROLOL TARTRATE 25 MG/1
25 TABLET, FILM COATED ORAL 2 TIMES DAILY
COMMUNITY
Start: 2024-10-31

## 2025-01-10 RX ORDER — DOXYCYCLINE HYCLATE 100 MG
100 TABLET ORAL 2 TIMES DAILY
Qty: 14 TABLET | Refills: 0 | Status: SHIPPED | OUTPATIENT
Start: 2025-01-10 | End: 2025-01-17

## 2025-01-10 ASSESSMENT — LIFESTYLE VARIABLES
HOW MANY STANDARD DRINKS CONTAINING ALCOHOL DO YOU HAVE ON A TYPICAL DAY: 3 OR 4
HOW OFTEN DO YOU HAVE A DRINK CONTAINING ALCOHOL: 2-4 TIMES A MONTH

## 2025-01-10 ASSESSMENT — PAIN DESCRIPTION - DESCRIPTORS: DESCRIPTORS: BURNING

## 2025-01-10 ASSESSMENT — PAIN DESCRIPTION - LOCATION: LOCATION: ABDOMEN

## 2025-01-10 ASSESSMENT — PAIN DESCRIPTION - PAIN TYPE: TYPE: ACUTE PAIN

## 2025-01-10 ASSESSMENT — PAIN DESCRIPTION - ORIENTATION: ORIENTATION: LEFT;LOWER

## 2025-01-10 ASSESSMENT — PAIN SCALES - GENERAL: PAINLEVEL_OUTOF10: 3

## 2025-01-10 ASSESSMENT — PAIN DESCRIPTION - FREQUENCY: FREQUENCY: CONTINUOUS

## 2025-01-10 NOTE — ED NOTES
The following labs were labeled with appropriate pt sticker and tubed to lab:     [x] Blue     [x] Lavender   [] on ice  [x] Green/yellow  [x] Green/black [x] on ice  [] Grey  [] on ice  [] Yellow  [] Red  [] Pink  [] Type/ Screen  [] ABG  [] VBG    [] COVID-19 swab    [] Rapid  [] PCR  [] Flu swab  [] Peds Viral Panel     [] Urine Sample  [] Fecal Sample  [] Pelvic Cultures  [x] Blood Cultures  [x] X 1  [] STREP Cultures  [] Wound Cultures

## 2025-01-10 NOTE — ED PROVIDER NOTES
[facility] EMERGENCY DEPARTMENT  Emergency Department Encounter  Emergency Medicine Resident     Pt Name:Hugh Avila  MRN: [id number]  Birthdate 1970  Date of evaluation: 1/10/25  PCP:  Bruce Jeff MD  Note Started: 8:04 AM EST      CHIEF COMPLAINT       Chief Complaint   Patient presents with    Wound Check     LLQ abdominal area        HISTORY OF PRESENT ILLNESS  (Location/Symptom, Timing/Onset, Context/Setting, Quality, Duration, Modifying Factors, Severity.)      Hugh Avila is a 54 y.o. male with past medical history of A-fib on Eliquis, CAD, chronic renal failure s/p kidney transplant 2022, HLD, HTN who presents with worsening of a chronic wound in his lower abdomen.  Patient reports wound has been draining purulent yellowish fluid since before Christmas.  Has been applying Neosporin and keeping it covered, changes dressings frequently throughout the day.  Reports his sister noticed a malodorous smell yesterday.  He is also having pain and burning sensation.  Denies fever, chills, nausea, vomiting, diarrhea, chest pain, shortness of breath.    PAST MEDICAL / SURGICAL / SOCIAL / FAMILY HISTORY      has a past medical history of Abscess of right buttock, Anemia, Anticoagulated on Coumadin, Aortic insufficiency, Arthritis, Atrial fibrillation (HCC), BPH (benign prostatic hypertrophy), CAD (coronary artery disease), Caffeine use, Cellulitis of lower leg, Chronic back pain, Colon cancer (HCC), Cor pulmonale, acute (HCC), COVID-19, CRF (chronic renal failure), Erectile dysfunction, Gout, Hemodialysis patient (HCC), History of blood transfusion, Hyperkalemia, Hyperlipidemia, Hypertension, Hypotension, Hypothyroidism, Kidney transplant candidate, Lymphedema of leg, Obesity, Thyroid disease, and Well adult health check.       has a past surgical history that includes Appendectomy; Lap Band (2007); Abscess Drainage (Right, 01/20/2014); hc cath power picc single (1/24/2014); Dialysis fistula  MD Mike  Emergency Medicine Resident    (Please note that portions of this note were completed with a voice recognition program.  Efforts were made to edit the dictations but occasionally words are mis-transcribed.)

## 2025-01-10 NOTE — ED TRIAGE NOTES
Patient reports to the ER from home , for  LLQ lesion that has been draining for some time now   Week before [place] the lesion opened up, and was draining yellowish like  drainage  Yesterday his sister noticed a smell   Reports a burning sensation to the area    Reports he been changing the area, three times a day

## 2025-01-10 NOTE — ED PROVIDER NOTES
[facility] EMERGENCY DEPARTMENT     Emergency Department     Faculty Attestation        I performed a history and physical examination of the patient and discussed management with the resident. I reviewed the resident’s note and agree with the documented findings and plan of care. Any areas of disagreement are noted on the chart. I was personally present for the key portions of any procedures. I have documented in the chart those procedures where I was not present during the key portions. I have reviewed the emergency nurses triage note. I agree with the chief complaint, past medical history, past surgical history, allergies, medications, social and family history as documented unless otherwise noted below.    For mid-level providers such as nurse practitioners as well as physicians assistants:    I have personally seen and evaluated the patient.    I find the patient's history and physical exam are consistent with NP/PA documentation.  I agree with the care provided, treatment rendered, disposition, & follow-up plan.     Additional findings are as noted.    Vital Signs: BP (!) 181/96   Pulse 81   Temp 98.2 °F (36.8 °C)   Resp 16   Ht 1.778 m (5' 10\")   Wt 108.9 kg (240 lb)   SpO2 97%   BMI 34.44 kg/m²   PCP:  Bruce Jeff MD    Pertinent Comments:     Chronic draining wound to the left lower quadrant of the pannus of the abdomen.  Patient is immunosuppressed from a kidney transplant he is afebrile nontoxic there is no crepitance to the genitalia or inguinal area given chronicity and immunocompromise state will obtain labs CT imaging and pelvis pain control and reassessment      Critical Care  None          Jon Linder MD    Attending Emergency Medicine Physician            Hayden Linder MD  01/10/25 0234

## 2025-01-22 ENCOUNTER — OFFICE VISIT (OUTPATIENT)
Dept: FAMILY MEDICINE CLINIC | Age: 55
End: 2025-01-22

## 2025-01-22 ENCOUNTER — HOSPITAL ENCOUNTER (OUTPATIENT)
Age: 55
Setting detail: SPECIMEN
Discharge: HOME | End: 2025-01-22

## 2025-01-22 VITALS
HEART RATE: 64 BPM | TEMPERATURE: 98.5 F | BODY MASS INDEX: 42.23 KG/M2 | WEIGHT: 295 LBS | DIASTOLIC BLOOD PRESSURE: 92 MMHG | RESPIRATION RATE: 12 BRPM | SYSTOLIC BLOOD PRESSURE: 146 MMHG | OXYGEN SATURATION: 97 % | HEIGHT: 70 IN

## 2025-01-22 DIAGNOSIS — D68.9 COAGULATION DISORDER (HCC): ICD-10-CM

## 2025-01-22 DIAGNOSIS — I82.401 ACUTE DEEP VEIN THROMBOSIS (DVT) OF RIGHT LOWER EXTREMITY, UNSPECIFIED VEIN (HCC): ICD-10-CM

## 2025-01-22 DIAGNOSIS — Z90.49 STATUS POST PARTIAL COLECTOMY: ICD-10-CM

## 2025-01-22 DIAGNOSIS — N25.81 SECONDARY HYPERPARATHYROIDISM OF RENAL ORIGIN (HCC): ICD-10-CM

## 2025-01-22 DIAGNOSIS — E66.01 MORBID (SEVERE) OBESITY DUE TO EXCESS CALORIES: ICD-10-CM

## 2025-01-22 DIAGNOSIS — I73.9 PERIPHERAL VASCULAR DISEASE (HCC): ICD-10-CM

## 2025-01-22 DIAGNOSIS — I10 ESSENTIAL (PRIMARY) HYPERTENSION: ICD-10-CM

## 2025-01-22 DIAGNOSIS — Z90.3 HISTORY OF SLEEVE GASTRECTOMY: ICD-10-CM

## 2025-01-22 DIAGNOSIS — L03.311 CELLULITIS, ABDOMINAL WALL: Primary | ICD-10-CM

## 2025-01-22 DIAGNOSIS — I89.0 LYMPHEDEMA: ICD-10-CM

## 2025-01-22 DIAGNOSIS — Z94.0 KIDNEY TRANSPLANTED: ICD-10-CM

## 2025-01-22 DIAGNOSIS — I20.9 ANGINA PECTORIS (HCC): ICD-10-CM

## 2025-01-22 DIAGNOSIS — C18.9 MALIGNANT NEOPLASM OF COLON, UNSPECIFIED PART OF COLON (HCC): ICD-10-CM

## 2025-01-22 DIAGNOSIS — L03.311 CELLULITIS, ABDOMINAL WALL: ICD-10-CM

## 2025-01-22 DIAGNOSIS — N40.1 BENIGN PROSTATIC HYPERPLASIA WITH LOWER URINARY TRACT SYMPTOMS, SYMPTOM DETAILS UNSPECIFIED: ICD-10-CM

## 2025-01-22 DIAGNOSIS — I50.810 RIGHT-SIDED HEART FAILURE, UNSPECIFIED HF CHRONICITY (HCC): ICD-10-CM

## 2025-01-22 DIAGNOSIS — D84.821 IMMUNODEFICIENCY DUE TO DRUGS (CODE) (HCC): ICD-10-CM

## 2025-01-22 DIAGNOSIS — I48.20 CHRONIC ATRIAL FIBRILLATION (HCC): ICD-10-CM

## 2025-01-22 DIAGNOSIS — E08.00 DIABETES MELLITUS DUE TO UNDERLYING CONDITION WITH HYPEROSMOLARITY WITHOUT COMA, WITHOUT LONG-TERM CURRENT USE OF INSULIN (HCC): ICD-10-CM

## 2025-01-22 DIAGNOSIS — E03.8 OTHER SPECIFIED HYPOTHYROIDISM: ICD-10-CM

## 2025-01-22 DIAGNOSIS — E55.9 VITAMIN D DEFICIENCY: ICD-10-CM

## 2025-01-22 PROBLEM — S22.000D COMPRESSION FRACTURE OF THORACIC VERTEBRA WITH ROUTINE HEALING: Status: RESOLVED | Noted: 2024-12-03 | Resolved: 2025-01-22

## 2025-01-22 RX ORDER — CEPHALEXIN 500 MG/1
500 CAPSULE ORAL 3 TIMES DAILY
Qty: 30 CAPSULE | Refills: 0 | Status: SHIPPED | OUTPATIENT
Start: 2025-01-22 | End: 2025-02-01

## 2025-01-22 SDOH — ECONOMIC STABILITY: FOOD INSECURITY: WITHIN THE PAST 12 MONTHS, YOU WORRIED THAT YOUR FOOD WOULD RUN OUT BEFORE YOU GOT MONEY TO BUY MORE.: NEVER TRUE

## 2025-01-22 SDOH — ECONOMIC STABILITY: FOOD INSECURITY: WITHIN THE PAST 12 MONTHS, THE FOOD YOU BOUGHT JUST DIDN'T LAST AND YOU DIDN'T HAVE MONEY TO GET MORE.: NEVER TRUE

## 2025-01-22 ASSESSMENT — PATIENT HEALTH QUESTIONNAIRE - PHQ9
SUM OF ALL RESPONSES TO PHQ QUESTIONS 1-9: 0
SUM OF ALL RESPONSES TO PHQ QUESTIONS 1-9: 0
SUM OF ALL RESPONSES TO PHQ9 QUESTIONS 1 & 2: 0
SUM OF ALL RESPONSES TO PHQ QUESTIONS 1-9: 0
2. FEELING DOWN, DEPRESSED OR HOPELESS: NOT AT ALL
SUM OF ALL RESPONSES TO PHQ QUESTIONS 1-9: 0

## 2025-01-22 ASSESSMENT — ENCOUNTER SYMPTOMS: BACK PAIN: 1

## 2025-01-22 NOTE — PROGRESS NOTES
Subjective:      Patient ID: Hugh Avila is a 54 y.o. male.    Abdominal pannus cellulitis          Review of Systems   Musculoskeletal:  Positive for arthralgias and back pain.   Past family and social history unremarkable.  .iag      Objective:   Physical Exam  Vitals and nursing note reviewed.   Constitutional:       Appearance: He is well-developed. He is obese.   HENT:      Head: Normocephalic and atraumatic.      Right Ear: External ear normal.      Left Ear: External ear normal.      Nose: Nose normal.   Eyes:      Conjunctiva/sclera: Conjunctivae normal.      Pupils: Pupils are equal, round, and reactive to light.   Cardiovascular:      Rate and Rhythm: Normal rate and regular rhythm.      Heart sounds: Normal heart sounds.      Comments: Coronary artery disease  Chronic A-fib  Hypertension  Hyperlipidemia  Impaired fasting glucose  Pulmonary:      Effort: Pulmonary effort is normal.      Breath sounds: Normal breath sounds.   Abdominal:      General: Bowel sounds are normal.      Palpations: Abdomen is soft.      Comments: History of partial colectomy secondary to colon carcinoma  Sleep gastric   Genitourinary:     Comments: History of kidney transplant  Musculoskeletal:         General: Normal range of motion.      Cervical back: Normal range of motion and neck supple.      Comments: Degenerative   Skin:     General: Skin is warm and dry.      Comments: Cellulitis left side of the abdominal pannus with 4 sinus tracts   Neurological:      Mental Status: He is alert and oriented to person, place, and time.      Deep Tendon Reflexes: Reflexes are normal and symmetric.   Psychiatric:         Behavior: Behavior normal.         Thought Content: Thought content normal.         Assessment:       Diagnosis Orders   1. Cellulitis, abdominal wall        2. Malignant neoplasm of colon, unspecified part of colon (HCC)        3. Right-sided heart failure, unspecified HF chronicity (HCC)        4. Chronic atrial

## 2025-01-25 LAB
MICROORGANISM SPEC CULT: ABNORMAL
MICROORGANISM/AGENT SPEC: ABNORMAL
SPECIMEN DESCRIPTION: ABNORMAL

## 2025-02-04 NOTE — DISCHARGE INSTRUCTIONS
[facility] -Phone: [phone] Fax: [phone]   Visit  Discharge Instructions / Physician Orders    DATE: 2/6/2025     Home Care:      SUPPLIES ORDERED THRU:      Wound Location:      Cleanse with: Liquid antibacterial soap and water, rinse well      Dressing Orders:      Frequency:      Additional Orders: Increase protein to diet (meat, cheese, eggs, fish, peanut butter, nuts and beans)  ELEVATE LEGS AS MUCH AS POSSIBLE    Your next appointment with Wound Care Center is in 1 week     (Please note your next appointment above and if you are unable to keep, kindly give a 24 hour notice. Thank you.)  If more than 15 min late we cannot guarantee you will be seen due to clinician schedule  Per Policy, Excessive cancellation will call for dismissal from program.     If you experience any of the following, please call the Wound Care Center during business hours:  [phone]     * Increase in Pain  * Temperature over 101  * Increase in drainage from your wound  * Drainage with a foul odor  * Bleeding  * Increase in swelling  * Need for compression bandage changes due to slippage, breakthrough drainage.     If you need medical attention outside of the business hours of the Wound Care Centers please contact your PCP or go to the an urgent care or emergency department     The information contained in the After Visit Summary has been reviewed with me, the patient and/or responsible adult, by my health care provider(s). I had the opportunity to ask questions regarding this information. I have elected to receive;      []After Visit Summary  [x]Comprehensive Discharge Instruction      Patient signature______________________________________Date:________

## 2025-02-06 ENCOUNTER — HOSPITAL ENCOUNTER (OUTPATIENT)
Dept: WOUND CARE | Age: 55
Discharge: HOME | End: 2025-02-06

## 2025-02-07 NOTE — DISCHARGE INSTRUCTIONS
[facility] -Phone: [phone] Fax: [phone]   Visit  Discharge Instructions / Physician Orders    DATE: 2/10/2025     Home Care: N/A     SUPPLIES ORDERED THRU: [facility]     Wound Location: Abdomen     Cleanse with: Liquid antibacterial soap and water, rinse well      Dressing Orders: Nonadherent Silver Alginate to wound, cover with silicone bandage     Frequency: DAILY     Additional Orders: Increase protein to diet (meat, cheese, eggs, fish, peanut butter, nuts and beans)    Your next appointment with Wound Care Center is in 1 week     (Please note your next appointment above and if you are unable to keep, kindly give a 24 hour notice. Thank you.)  If more than 15 min late we cannot guarantee you will be seen due to clinician schedule  Per Policy, Excessive cancellation will call for dismissal from program.     If you experience any of the following, please call the Wound Care Center during business hours:  [phone]     * Increase in Pain  * Temperature over 101  * Increase in drainage from your wound  * Drainage with a foul odor  * Bleeding  * Increase in swelling  * Need for compression bandage changes due to slippage, breakthrough drainage.     If you need medical attention outside of the business hours of the Wound Care Centers please contact your PCP or go to the an urgent care or emergency department     The information contained in the After Visit Summary has been reviewed with me, the patient and/or responsible adult, by my health care provider(s). I had the opportunity to ask questions regarding this information. I have elected to receive;      []After Visit Summary  [x]Comprehensive Discharge Instruction      Patient signature______________________________________Date:________   Electronically signed by Deirdre Vallejo RN on 2/10/2025 at 3:12 PM   Electronically signed by KVNG ARNOLD - CNP on 2/10/2025 at 3:04 PM

## 2025-02-10 ENCOUNTER — HOSPITAL ENCOUNTER (OUTPATIENT)
Dept: WOUND CARE | Age: 55
Discharge: HOME | End: 2025-02-10
Payer: COMMERCIAL

## 2025-02-10 VITALS
RESPIRATION RATE: 16 BRPM | TEMPERATURE: 98.4 F | SYSTOLIC BLOOD PRESSURE: 182 MMHG | DIASTOLIC BLOOD PRESSURE: 104 MMHG | HEART RATE: 70 BPM

## 2025-02-10 DIAGNOSIS — E66.813 CLASS 3 SEVERE OBESITY DUE TO EXCESS CALORIES WITH SERIOUS COMORBIDITY AND BODY MASS INDEX (BMI) OF 40.0 TO 44.9 IN ADULT: ICD-10-CM

## 2025-02-10 DIAGNOSIS — E66.01 CLASS 3 SEVERE OBESITY DUE TO EXCESS CALORIES WITH SERIOUS COMORBIDITY AND BODY MASS INDEX (BMI) OF 40.0 TO 44.9 IN ADULT: ICD-10-CM

## 2025-02-10 DIAGNOSIS — Z94.0 KIDNEY TRANSPLANTED: ICD-10-CM

## 2025-02-10 DIAGNOSIS — S31.109D OPEN ABDOMINAL WALL WOUND, SUBSEQUENT ENCOUNTER: Primary | ICD-10-CM

## 2025-02-10 PROCEDURE — 11042 DBRDMT SUBQ TIS 1ST 20SQCM/<: CPT

## 2025-02-10 PROCEDURE — 99203 OFFICE O/P NEW LOW 30 MIN: CPT | Performed by: NURSE PRACTITIONER

## 2025-02-10 PROCEDURE — 87070 CULTURE OTHR SPECIMN AEROBIC: CPT

## 2025-02-10 PROCEDURE — 86403 PARTICLE AGGLUT ANTBDY SCRN: CPT

## 2025-02-10 PROCEDURE — 87186 SC STD MICRODIL/AGAR DIL: CPT

## 2025-02-10 PROCEDURE — 87185 SC STD ENZYME DETCJ PER NZM: CPT

## 2025-02-10 PROCEDURE — 87075 CULTR BACTERIA EXCEPT BLOOD: CPT

## 2025-02-10 PROCEDURE — 11042 DBRDMT SUBQ TIS 1ST 20SQCM/<: CPT | Performed by: NURSE PRACTITIONER

## 2025-02-10 PROCEDURE — 87076 CULTURE ANAEROBE IDENT EACH: CPT

## 2025-02-10 PROCEDURE — 87205 SMEAR GRAM STAIN: CPT

## 2025-02-10 PROCEDURE — 87077 CULTURE AEROBIC IDENTIFY: CPT

## 2025-02-10 PROCEDURE — 87176 TISSUE HOMOGENIZATION CULTR: CPT

## 2025-02-10 PROCEDURE — 99213 OFFICE O/P EST LOW 20 MIN: CPT

## 2025-02-10 RX ORDER — LIDOCAINE HYDROCHLORIDE 40 MG/ML
SOLUTION TOPICAL PRN
OUTPATIENT
Start: 2025-02-10

## 2025-02-10 RX ORDER — LIDOCAINE HYDROCHLORIDE 20 MG/ML
JELLY TOPICAL PRN
OUTPATIENT
Start: 2025-02-10

## 2025-02-10 RX ORDER — LIDOCAINE HYDROCHLORIDE 20 MG/ML
JELLY TOPICAL PRN
Status: DISCONTINUED | OUTPATIENT
Start: 2025-02-10 | End: 2025-02-11 | Stop reason: HOSPADM

## 2025-02-10 ASSESSMENT — ENCOUNTER SYMPTOMS
NAUSEA: 0
DIARRHEA: 0
COUGH: 0
RHINORRHEA: 0
SHORTNESS OF BREATH: 0
VOMITING: 0

## 2025-02-10 NOTE — PROGRESS NOTES
Wound Care Supplies      Supply Company:     [employer]  Phone: [phone]  Fax: [phone]     Ordering Center:     [facility]  [address] [place] [address] [id number] [phone]  WOUND CARE Dept: [phone]   FAX NUMBER [phone]    Patient Information:      Hugh Avila  [address] [facility] [address]   [phone]   : 1970  AGE: 54 y.o.     GENDER: male   TODAYS DATE:  2/10/2025    Insurance:      PRIMARY INSURANCE:  Plan: MEDICARE PART A AND B  Coverage: MEDICARE  Effective Date: 2007  [id number] - (Medicare)    SECONDARY INSURANCE:  Plan: [employer] FROM [email or web address]  Coverage: [employer] DUAL BENEFITS  Effective Date: 2024  Subscriber ID: [id number]       Patient Wound Information:      Open abdominal wall wound, subsequent encounter  - Primary S31.109D    Class 3 severe obesity due to excess calories with serious comorbidity and body mass index (BMI) of 40.0 to 44.9 in adult E66.813, E66.01, Z68.41    Kidney transplanted Z94.0        WOUNDS REQUIRING DRESSING SUPPLIES:     Wound 02/10/25 Abdomen Left;Lower #1 (Active)   Wound Image   02/10/25 1439   Wound Etiology Other 02/10/25 1439   Dressing Status Old drainage noted;New drainage noted 02/10/25 1439   Wound Cleansed Cleansed with saline 02/10/25 1439   Wound Length (cm) 4.5 cm 02/10/25 1439   Wound Width (cm) 2.5 cm 02/10/25 1439   Wound Depth (cm) 0.4 cm 02/10/25 1439   Wound Surface Area (cm^2) 11.25 cm^2 02/10/25 1439   Wound Volume (cm^3) 4.5 cm^3 02/10/25 1439   Post-Procedure Length (cm) 4.5 cm 02/10/25 1439   Post-Procedure Width (cm) 2.5 cm 02/10/25 1439   Post-Procedure Depth (cm) 0.4 cm 02/10/25 1439   Post-Procedure Surface Area (cm^2) 11.25 cm^2 02/10/25 1439   Post-Procedure Volume (cm^3) 4.5 cm^3 02/10/25 143   Wound Assessment Pink/red 02/10/25 1439   Drainage Amount Moderate (25-50%) 02/10/25 1439   Drainage Description Serosanguinous 02/10/25 1439   Odor None 02/10/25 1439

## 2025-02-12 LAB
MICROORGANISM SPEC CULT: ABNORMAL
MICROORGANISM/AGENT SPEC: ABNORMAL
MICROORGANISM/AGENT SPEC: ABNORMAL
SERVICE CMNT-IMP: ABNORMAL
SPECIMEN DESCRIPTION: ABNORMAL

## 2025-02-12 RX ORDER — CLINDAMYCIN HYDROCHLORIDE 300 MG/1
300 CAPSULE ORAL 2 TIMES DAILY
Qty: 14 CAPSULE | Refills: 0 | Status: SHIPPED | OUTPATIENT
Start: 2025-02-12 | End: 2025-02-19

## 2025-02-12 NOTE — DISCHARGE INSTRUCTIONS
[facility] -Phone: [phone] Fax: [phone]    Visit  Discharge Instructions / Physician Orders     DATE: 2/17/2025     Home Care: N/A     SUPPLIES ORDERED THRU: [facility]     Wound Location: Abdomen     Cleanse with: Liquid antibacterial soap and water, rinse well      Dressing Orders: Nonadherent Silver Alginate to wound, cover with silicone bandage     Frequency: DAILY     Additional Orders: Increase protein to diet (meat, cheese, eggs, fish, peanut butter, nuts and beans)     Your next appointment with Wound Care Center is in 1 week     (Please note your next appointment above and if you are unable to keep, kindly give a 24 hour notice. Thank you.)  If more than 15 min late we cannot guarantee you will be seen due to clinician schedule  Per Policy, Excessive cancellation will call for dismissal from program.     If you experience any of the following, please call the Wound Care Center during business hours:  [phone]     * Increase in Pain  * Temperature over 101  * Increase in drainage from your wound  * Drainage with a foul odor  * Bleeding  * Increase in swelling  * Need for compression bandage changes due to slippage, breakthrough drainage.     If you need medical attention outside of the business hours of the Wound Care Centers please contact your PCP or go to the an urgent care or emergency department     The information contained in the After Visit Summary has been reviewed with me, the patient and/or responsible adult, by my health care provider(s). I had the opportunity to ask questions regarding this information. I have elected to receive;      []After Visit Summary  [x]Comprehensive Discharge Instruction        Patient signature______________________________________Date:________  Electronically signed by Balbir Silva RN on 2/17/2025 at 3:01 PM  Electronically signed by KVNG ARNOLD - CNP on 2/17/2025 at 3:00 PM

## 2025-02-17 ENCOUNTER — HOSPITAL ENCOUNTER (OUTPATIENT)
Dept: WOUND CARE | Age: 55
Discharge: HOME | End: 2025-02-17
Payer: COMMERCIAL

## 2025-02-17 VITALS
RESPIRATION RATE: 18 BRPM | DIASTOLIC BLOOD PRESSURE: 89 MMHG | HEART RATE: 60 BPM | TEMPERATURE: 97.1 F | HEIGHT: 70 IN | BODY MASS INDEX: 42.23 KG/M2 | SYSTOLIC BLOOD PRESSURE: 168 MMHG | WEIGHT: 295 LBS

## 2025-02-17 DIAGNOSIS — E66.813 CLASS 3 SEVERE OBESITY DUE TO EXCESS CALORIES WITH SERIOUS COMORBIDITY AND BODY MASS INDEX (BMI) OF 40.0 TO 44.9 IN ADULT: ICD-10-CM

## 2025-02-17 DIAGNOSIS — E66.01 CLASS 3 SEVERE OBESITY DUE TO EXCESS CALORIES WITH SERIOUS COMORBIDITY AND BODY MASS INDEX (BMI) OF 40.0 TO 44.9 IN ADULT: ICD-10-CM

## 2025-02-17 DIAGNOSIS — S31.109D OPEN ABDOMINAL WALL WOUND, SUBSEQUENT ENCOUNTER: Primary | ICD-10-CM

## 2025-02-17 DIAGNOSIS — Z94.0 KIDNEY TRANSPLANTED: ICD-10-CM

## 2025-02-17 PROCEDURE — 11042 DBRDMT SUBQ TIS 1ST 20SQCM/<: CPT

## 2025-02-17 PROCEDURE — 11042 DBRDMT SUBQ TIS 1ST 20SQCM/<: CPT | Performed by: NURSE PRACTITIONER

## 2025-02-17 RX ORDER — LIDOCAINE HYDROCHLORIDE 20 MG/ML
JELLY TOPICAL PRN
Status: DISCONTINUED | OUTPATIENT
Start: 2025-02-17 | End: 2025-02-18 | Stop reason: HOSPADM

## 2025-02-17 RX ORDER — LIDOCAINE HYDROCHLORIDE 20 MG/ML
JELLY TOPICAL PRN
OUTPATIENT
Start: 2025-02-17

## 2025-02-17 RX ORDER — LIDOCAINE HYDROCHLORIDE 40 MG/ML
SOLUTION TOPICAL PRN
OUTPATIENT
Start: 2025-02-17

## 2025-02-17 RX ADMIN — LIDOCAINE HYDROCHLORIDE 6 ML: 20 JELLY TOPICAL at 14:32

## 2025-02-17 ASSESSMENT — ENCOUNTER SYMPTOMS
DIARRHEA: 0
COUGH: 0
VOMITING: 0
SHORTNESS OF BREATH: 0
RHINORRHEA: 0
NAUSEA: 0

## 2025-02-17 NOTE — PROGRESS NOTES
[facility]   Progress Note and Procedure Note      Hugh Avila  MEDICAL RECORD NUMBER:  [id number]  AGE: 54 y.o.   GENDER: male  : 1970  EPISODE DATE:  2025    Subjective:     Chief Complaint   Patient presents with    Wound Check     abdomen         HISTORY of PRESENT ILLNESS HPI     Hugh Avila is a 54 y.o. male who presents today for wound/ulcer evaluation.   History of Wound Context: here to follow up on abdominal wound. Tissue culture grew proteus sensitive to clindamycin which he is taking as prescribed.   on immunosuppressive therapy after kidney transplant 3 years ago.   Wound/Ulcer Pain Timing/Severity: none  Quality of pain: N/A  Severity:  0 / 10   Modifying Factors: None  Associated Signs/Symptoms: none    Ulcer Identification:  Ulcer Type: non-healing/non-surgical  Contributing Factors: diabetes, obesity, and immunosuppression         PAST MEDICAL HISTORY        Diagnosis Date    Abscess of right buttock 3/17/2021    Anemia     Anticoagulated on Coumadin     Aortic insufficiency     Arthritis     right shoulder/ PCP Dr. Ortiz/ [facility]    Atrial fibrillation (HCC)     [facility] LAST VISIT 3/30/21    BPH (benign prostatic hypertrophy)     CAD (coronary artery disease)     Dr. Thakur/ [facility] LAST VISIT - 3/30/21    Caffeine use     1 coffee, 2 tea/day    Cellulitis of lower leg     right    Chronic back pain     Colon cancer ([facility])     colon    Cor pulmonale, acute ([facility]) 2014    COVID-19 2020    HOSPITALIZED X2 WEEKS.  NOT VENTED    CRF (chronic renal failure)     dr. Petersen/[facility] on [place]. Tues/ thurs/ sat/ right arm fistula    Erectile dysfunction     Gout     Hemodialysis patient ([facility])     tues/ thurs/ sat/ DR. Petersen/ fistula right lower arm    History of blood transfusion     no reaction    Hyperkalemia 2013    Hyperlipidemia     Hypertension     Hypotension     Hypothyroidism     Kidney transplant candidate

## 2025-02-25 NOTE — DISCHARGE INSTRUCTIONS
[facility] -Phone: [phone] Fax: [phone]    Visit  Discharge Instructions / Physician Orders     DATE: 2/26/2025     Home Care: N/A     SUPPLIES ORDERED THRU: [facility]     Wound Location: Abdomen     Cleanse with: Liquid antibacterial soap and water, rinse well      Dressing Orders: Nonadherent Silver Alginate to wound, cover with silicone bandage     Frequency: DAILY     Additional Orders: Increase protein to diet (meat, cheese, eggs, fish, peanut butter, nuts and beans)     Your next appointment with Wound Care Center is in 1 week     (Please note your next appointment above and if you are unable to keep, kindly give a 24 hour notice. Thank you.)  If more than 15 min late we cannot guarantee you will be seen due to clinician schedule  Per Policy, Excessive cancellation will call for dismissal from program.     If you experience any of the following, please call the Wound Care Center during business hours:  [phone]     * Increase in Pain  * Temperature over 101  * Increase in drainage from your wound  * Drainage with a foul odor  * Bleeding  * Increase in swelling  * Need for compression bandage changes due to slippage, breakthrough drainage.     If you need medical attention outside of the business hours of the Wound Care Centers please contact your PCP or go to the an urgent care or emergency department     The information contained in the After Visit Summary has been reviewed with me, the patient and/or responsible adult, by my health care provider(s). I had the opportunity to ask questions regarding this information. I have elected to receive;      []After Visit Summary  [x]Comprehensive Discharge Instruction        Patient signature______________________________________Date:________

## 2025-02-26 ENCOUNTER — HOSPITAL ENCOUNTER (OUTPATIENT)
Dept: WOUND CARE | Age: 55
Discharge: HOME | End: 2025-02-26

## 2025-03-20 NOTE — DISCHARGE INSTRUCTIONS
[facility] -Phone: [phone] Fax: [phone]    Visit  Discharge Instructions / Physician Orders     DATE: 3/21/2025     Home Care: N/A     SUPPLIES ORDERED THRU: [facility]     Wound Location: Abdomen     Cleanse with: Benzoyl Peroxide Wash to open, let it sit for 5 minutes and rinse off     Dressing Orders: Apply clindamycin gel over wound and then cover with Nonadherent Silver Alginate to wound, cover with silicone bandage     Frequency: DAILY     Additional Orders: Increase protein to diet (meat, cheese, eggs, fish, peanut butter, nuts and beans)     Your next appointment with Wound Care Center is in 2 weeks     (Please note your next appointment above and if you are unable to keep, kindly give a 24 hour notice. Thank you.)  If more than 15 min late we cannot guarantee you will be seen due to clinician schedule  Per Policy, Excessive cancellation will call for dismissal from program.     If you experience any of the following, please call the Wound Care Center during business hours:  [phone]     * Increase in Pain  * Temperature over 101  * Increase in drainage from your wound  * Drainage with a foul odor  * Bleeding  * Increase in swelling  * Need for compression bandage changes due to slippage, breakthrough drainage.     If you need medical attention outside of the business hours of the Wound Care Centers please contact your PCP or go to the an urgent care or emergency department     The information contained in the After Visit Summary has been reviewed with me, the patient and/or responsible adult, by my health care provider(s). I had the opportunity to ask questions regarding this information. I have elected to receive;      []After Visit Summary  [x]Comprehensive Discharge Instruction        Patient signature______________________________________Date:________  Electronically signed by Deirdre Vallejo RN on 3/21/2025 at 8:48 AM   Electronically signed by KVNG ARNOLD - CNP on

## 2025-03-21 ENCOUNTER — HOSPITAL ENCOUNTER (OUTPATIENT)
Dept: WOUND CARE | Age: 55
Discharge: HOME | End: 2025-03-21
Payer: COMMERCIAL

## 2025-03-21 VITALS
HEART RATE: 71 BPM | SYSTOLIC BLOOD PRESSURE: 185 MMHG | BODY MASS INDEX: 42.23 KG/M2 | DIASTOLIC BLOOD PRESSURE: 99 MMHG | HEIGHT: 70 IN | WEIGHT: 295 LBS | TEMPERATURE: 98 F

## 2025-03-21 DIAGNOSIS — Z94.0 KIDNEY TRANSPLANTED: ICD-10-CM

## 2025-03-21 DIAGNOSIS — E66.01 CLASS 3 SEVERE OBESITY DUE TO EXCESS CALORIES WITH SERIOUS COMORBIDITY AND BODY MASS INDEX (BMI) OF 40.0 TO 44.9 IN ADULT: ICD-10-CM

## 2025-03-21 DIAGNOSIS — S31.109D OPEN ABDOMINAL WALL WOUND, SUBSEQUENT ENCOUNTER: Primary | ICD-10-CM

## 2025-03-21 DIAGNOSIS — E66.813 CLASS 3 SEVERE OBESITY DUE TO EXCESS CALORIES WITH SERIOUS COMORBIDITY AND BODY MASS INDEX (BMI) OF 40.0 TO 44.9 IN ADULT: ICD-10-CM

## 2025-03-21 PROCEDURE — 99213 OFFICE O/P EST LOW 20 MIN: CPT | Performed by: NURSE PRACTITIONER

## 2025-03-21 PROCEDURE — 99213 OFFICE O/P EST LOW 20 MIN: CPT

## 2025-03-21 RX ORDER — CLINDAMYCIN PHOSPHATE 10 MG/G
GEL TOPICAL
Qty: 60 G | Refills: 1 | Status: SHIPPED | OUTPATIENT
Start: 2025-03-21 | End: 2025-03-28

## 2025-03-21 RX ORDER — LIDOCAINE HYDROCHLORIDE 20 MG/ML
JELLY TOPICAL PRN
OUTPATIENT
Start: 2025-03-21

## 2025-03-21 RX ORDER — LIDOCAINE HYDROCHLORIDE 20 MG/ML
JELLY TOPICAL PRN
Status: DISCONTINUED | OUTPATIENT
Start: 2025-03-21 | End: 2025-03-22 | Stop reason: HOSPADM

## 2025-03-21 RX ORDER — LIDOCAINE HYDROCHLORIDE 40 MG/ML
SOLUTION TOPICAL PRN
OUTPATIENT
Start: 2025-03-21

## 2025-03-21 ASSESSMENT — ENCOUNTER SYMPTOMS
NAUSEA: 0
RHINORRHEA: 0
SHORTNESS OF BREATH: 0
COUGH: 0
DIARRHEA: 0
VOMITING: 0

## 2025-03-21 NOTE — PROGRESS NOTES
03/21/25 0839   Margins Attached edges 03/21/25 0839   Wound Thickness Description not for Pressure Injury Full thickness 03/21/25 0839   Number of days: 0          Supplies Requested :      WOUND #: 1   PRIMARY DRESSING:  Alginate with silver pad (nonadherent) Silvercel preferred   Cover and Secure with: Other Excel SAP     FREQUENCY OF DRESSING CHANGES:  Daily   [x] Generic Substitution if needed      ADDITIONAL ITEMS:  [] Gloves Small  [] Gloves Medium [x] Gloves Large [] Gloves XLarge  [] Tape 1\" [] Tape 2\" [] Tape 3\"  [] Medipore Tape  [x] Saline  [] Skin Prep   [] Adhesive Remover   [] Cotton Tip Applicators   [] Other:    Patient Wound(s) Debrided: [x] Yes   [] No    Debribement Type: subcutaneous tissue    Debridement Date: 2/17/2025    Patient currently being seen by Home Health: [] Yes   [x] No    Duration for needed supplies:  []15  [x]30  []60  []90 Days    Provider Information:      PROVIDER NAME: KVNG Roper-NICHOLE  NPI: [id number]           
Medications    lidocaine (XYLOCAINE) 2 % uro-jet    Other Relevant Orders    Initiate Outpatient Wound Care Protocol    Initiate Oxygen Therapy Protocol    Open abdominal wall wound, subsequent encounter - Primary    Relevant Medications    lidocaine (XYLOCAINE) 2 % uro-jet    Other Relevant Orders    Initiate Outpatient Wound Care Protocol    Initiate Oxygen Therapy Protocol        Procedure Note  Indications:  Based on my examination of this patient's wound(s)/ulcer(s) today, debridement is not required to promote healing and evaluate the wound base.    Wound Measurements:  Wound/Ulcer Descriptions are Pre Debridement except measurements:    Wound 03/21/25 Abdomen Left;Lower #1 Cluster (Active)   Wound Image   03/21/25 0839   Wound Etiology Other 03/21/25 0839   Dressing Status New drainage noted;Old drainage noted 03/21/25 0839   Wound Cleansed Irrigated with saline 03/21/25 0839   Wound Length (cm) 5 cm 03/21/25 0839   Wound Width (cm) 2.2 cm 03/21/25 0839   Wound Depth (cm) 0.8 cm 03/21/25 0839   Wound Surface Area (cm^2) 11 cm^2 03/21/25 0839   Wound Volume (cm^3) 8.8 cm^3 03/21/25 0839   Post-Procedure Length (cm) 5 cm 03/21/25 0839   Post-Procedure Width (cm) 2.2 cm 03/21/25 0839   Post-Procedure Depth (cm) 0.8 cm 03/21/25 0839   Post-Procedure Surface Area (cm^2) 11 cm^2 03/21/25 0839   Post-Procedure Volume (cm^3) 8.8 cm^3 03/21/25 0839   Wound Assessment Pink/red 03/21/25 0839   Drainage Amount Moderate (25-50%) 03/21/25 0839   Drainage Description Serosanguinous 03/21/25 0839   Odor Moderate 03/21/25 0839   Carlota-wound Assessment Dry/flaky 03/21/25 0839   Margins Attached edges 03/21/25 0839   Wound Thickness Description not for Pressure Injury Full thickness 03/21/25 0839   Number of days: 0              Plan:     Treatment Note please see Discharge Instructions    Written patient dismissal instructions given to patient and signed by patient or POA.           Electronically signed by VENKAT ZARAGOZA

## 2025-03-28 ENCOUNTER — OFFICE VISIT (OUTPATIENT)
Dept: FAMILY MEDICINE CLINIC | Age: 55
End: 2025-03-28

## 2025-03-28 VITALS
HEIGHT: 70 IN | TEMPERATURE: 97.5 F | HEART RATE: 77 BPM | OXYGEN SATURATION: 98 % | SYSTOLIC BLOOD PRESSURE: 134 MMHG | BODY MASS INDEX: 43.84 KG/M2 | WEIGHT: 306.2 LBS | RESPIRATION RATE: 14 BRPM | DIASTOLIC BLOOD PRESSURE: 78 MMHG

## 2025-03-28 DIAGNOSIS — C18.9 MALIGNANT NEOPLASM OF COLON, UNSPECIFIED PART OF COLON (HCC): ICD-10-CM

## 2025-03-28 DIAGNOSIS — E66.01 CLASS 3 SEVERE OBESITY DUE TO EXCESS CALORIES WITH SERIOUS COMORBIDITY AND BODY MASS INDEX (BMI) OF 40.0 TO 44.9 IN ADULT: ICD-10-CM

## 2025-03-28 DIAGNOSIS — I10 ESSENTIAL (PRIMARY) HYPERTENSION: Primary | ICD-10-CM

## 2025-03-28 DIAGNOSIS — D68.9 COAGULATION DISORDER: ICD-10-CM

## 2025-03-28 DIAGNOSIS — I89.0 LYMPHEDEMA: ICD-10-CM

## 2025-03-28 DIAGNOSIS — D84.821 IMMUNODEFICIENCY DUE TO DRUGS (CODE): ICD-10-CM

## 2025-03-28 DIAGNOSIS — E03.8 OTHER SPECIFIED HYPOTHYROIDISM: ICD-10-CM

## 2025-03-28 DIAGNOSIS — E08.00 DIABETES MELLITUS DUE TO UNDERLYING CONDITION WITH HYPEROSMOLARITY WITHOUT COMA, WITHOUT LONG-TERM CURRENT USE OF INSULIN (HCC): ICD-10-CM

## 2025-03-28 DIAGNOSIS — E55.9 VITAMIN D DEFICIENCY: ICD-10-CM

## 2025-03-28 DIAGNOSIS — I73.9 PERIPHERAL VASCULAR DISEASE: ICD-10-CM

## 2025-03-28 DIAGNOSIS — Z94.0 KIDNEY TRANSPLANTED: ICD-10-CM

## 2025-03-28 DIAGNOSIS — Z90.49 STATUS POST PARTIAL COLECTOMY: ICD-10-CM

## 2025-03-28 DIAGNOSIS — E66.813 CLASS 3 SEVERE OBESITY DUE TO EXCESS CALORIES WITH SERIOUS COMORBIDITY AND BODY MASS INDEX (BMI) OF 40.0 TO 44.9 IN ADULT: ICD-10-CM

## 2025-03-28 DIAGNOSIS — N40.1 BENIGN PROSTATIC HYPERPLASIA WITH LOWER URINARY TRACT SYMPTOMS, SYMPTOM DETAILS UNSPECIFIED: ICD-10-CM

## 2025-03-28 DIAGNOSIS — S31.109D OPEN ABDOMINAL WALL WOUND, SUBSEQUENT ENCOUNTER: ICD-10-CM

## 2025-03-28 DIAGNOSIS — I48.20 CHRONIC ATRIAL FIBRILLATION (HCC): ICD-10-CM

## 2025-03-28 DIAGNOSIS — I50.810 RIGHT-SIDED HEART FAILURE, UNSPECIFIED HF CHRONICITY (HCC): ICD-10-CM

## 2025-03-28 DIAGNOSIS — N25.81 SECONDARY HYPERPARATHYROIDISM OF RENAL ORIGIN: ICD-10-CM

## 2025-03-28 DIAGNOSIS — Z90.3 HISTORY OF SLEEVE GASTRECTOMY: ICD-10-CM

## 2025-03-28 PROBLEM — I20.9 ANGINA PECTORIS: Status: RESOLVED | Noted: 2018-06-18 | Resolved: 2025-03-28

## 2025-03-28 ASSESSMENT — ENCOUNTER SYMPTOMS
EYES NEGATIVE: 1
RESPIRATORY NEGATIVE: 1
ALLERGIC/IMMUNOLOGIC NEGATIVE: 1
GASTROINTESTINAL NEGATIVE: 1

## 2025-03-28 NOTE — PROGRESS NOTES
Subjective:      Patient ID: Hugh Avila is a 54 y.o. male.    Hypertension  This is a chronic problem. The current episode started more than 1 month ago. The problem has been waxing and waning since onset. The problem is controlled. Associated symptoms include anxiety. Risk factors for coronary artery disease include diabetes mellitus, dyslipidemia, obesity, male gender and sedentary lifestyle. Past treatments include beta blockers and diuretics. The current treatment provides moderate improvement. Compliance problems include psychosocial issues.  Hypertensive end-organ damage includes kidney disease, CAD/MI and PVD. Identifiable causes of hypertension include chronic renal disease.       Review of Systems   Constitutional: Negative.    HENT: Negative.     Eyes: Negative.    Respiratory: Negative.     Cardiovascular: Negative.    Gastrointestinal: Negative.    Endocrine: Negative.    Musculoskeletal:  Positive for arthralgias.   Skin: Negative.    Allergic/Immunologic: Negative.    Neurological: Negative.    Hematological: Negative.    Psychiatric/Behavioral:  Positive for hallucinations. The patient is nervous/anxious.    Past family and social history unremarkable.   Diagnosis Orders   1. Essential (primary) hypertension        2. Benign prostatic hyperplasia with lower urinary tract symptoms, symptom details unspecified        3. Chronic atrial fibrillation (HCC)        4. Coagulation disorder        5. History of sleeve gastrectomy        6. Vitamin D deficiency        7. Diabetes mellitus due to underlying condition with hyperosmolarity without coma, without long-term current use of insulin (HCC)        8. Lymphedema        9. Right-sided heart failure, unspecified HF chronicity (HCC)        10. Peripheral vascular disease        11. Malignant neoplasm of colon, unspecified part of colon (HCC)        12. Status post partial colectomy        13. Kidney transplanted        14. Other specified hypothyroidism

## 2025-04-01 NOTE — DISCHARGE INSTRUCTIONS
[facility] -Phone: [phone] Fax: [phone]    Visit  Discharge Instructions / Physician Orders     DATE: 4/2/2025     Home Care: N/A     SUPPLIES ORDERED THRU: [facility]     Wound Location: Abdomen     Cleanse with: Benzoyl Peroxide Wash to open, let it sit for 5 minutes and rinse off     Dressing Orders: Apply clindamycin gel over wound and then cover with Nonadherent Silver Alginate to wound, cover with silicone bandage     Frequency: DAILY     Additional Orders: Increase protein to diet (meat, cheese, eggs, fish, peanut butter, nuts and beans)     Your next appointment with Wound Care Center is in 2 weeks     (Please note your next appointment above and if you are unable to keep, kindly give a 24 hour notice. Thank you.)  If more than 15 min late we cannot guarantee you will be seen due to clinician schedule  Per Policy, Excessive cancellation will call for dismissal from program.     If you experience any of the following, please call the Wound Care Center during business hours:  [phone]     * Increase in Pain  * Temperature over 101  * Increase in drainage from your wound  * Drainage with a foul odor  * Bleeding  * Increase in swelling  * Need for compression bandage changes due to slippage, breakthrough drainage.     If you need medical attention outside of the business hours of the Wound Care Centers please contact your PCP or go to the an urgent care or emergency department     The information contained in the After Visit Summary has been reviewed with me, the patient and/or responsible adult, by my health care provider(s). I had the opportunity to ask questions regarding this information. I have elected to receive;      []After Visit Summary  [x]Comprehensive Discharge Instruction        Patient signature______________________________________Date:________

## 2025-04-02 ENCOUNTER — HOSPITAL ENCOUNTER (OUTPATIENT)
Dept: WOUND CARE | Age: 55
Discharge: HOME | End: 2025-04-02

## 2025-04-08 ENCOUNTER — TELEPHONE (OUTPATIENT)
Dept: WOUND CARE | Age: 55
End: 2025-04-08

## 2025-04-08 NOTE — TELEPHONE ENCOUNTER
Writer called and left voicemail to see if follow up wound care appointment is needed. [phone]

## 2025-04-16 RX ORDER — CHOLECALCIFEROL (VITAMIN D3) 1250 MCG
CAPSULE ORAL
Qty: 12 CAPSULE | Refills: 0 | Status: SHIPPED | OUTPATIENT
Start: 2025-04-16

## 2025-04-16 NOTE — TELEPHONE ENCOUNTER
Hugh Avila is calling to request a refill on the following medication(s):    Medication Request:  Requested Prescriptions     Pending Prescriptions Disp Refills    Cholecalciferol (VITAMIN D3) 1.25 MG (79433 UT) CAPS 12 capsule 1     Sig: TAKE 1 CAPSULE BY MOUTH ONCE A MONTH    apixaban (ELIQUIS) 5 MG TABS tablet 200 tablet 1     Sig: Take 1 tablet by mouth 2 times daily       Last Visit Date (If Applicable):  3/28/2025    Next Visit Date:    5/22/2025    Last refilled on 9/19/24. Prescriptions pending.

## 2025-04-21 RX ORDER — POTASSIUM CHLORIDE 1500 MG/1
20 TABLET, EXTENDED RELEASE ORAL DAILY
Qty: 90 TABLET | Refills: 0 | Status: SHIPPED | OUTPATIENT
Start: 2025-04-21

## 2025-04-21 NOTE — TELEPHONE ENCOUNTER
Hugh Avila is calling to request a refill on the following medication(s):    Medication Request:  Requested Prescriptions     Pending Prescriptions Disp Refills    potassium chloride (KLOR-CON M) 20 MEQ extended release tablet 90 tablet 0     Sig: Take 1 tablet by mouth daily       Last Visit Date (If Applicable):  3/28/2025    Next Visit Date:    5/22/2025

## 2025-05-16 RX ORDER — VALGANCICLOVIR 450 MG/1
450 TABLET, FILM COATED ORAL 2 TIMES DAILY
Qty: 180 TABLET | Refills: 1 | Status: SHIPPED | OUTPATIENT
Start: 2025-05-16

## 2025-05-16 NOTE — TELEPHONE ENCOUNTER
Hugh Avila is calling to request a refill on the following medication(s):    Medication Request:  Requested Prescriptions     Pending Prescriptions Disp Refills    valGANciclovir (VALCYTE) 450 MG tablet 200 tablet 0     Sig: Take 1 tablet by mouth 2 times daily    apixaban (ELIQUIS) 5 MG TABS tablet 180 tablet 0     Sig: Take 1 tablet by mouth 2 times daily       Last Visit Date (If Applicable):  3/28/2025    Next Visit Date:    5/22/2025

## 2025-05-22 ENCOUNTER — OFFICE VISIT (OUTPATIENT)
Dept: FAMILY MEDICINE CLINIC | Age: 55
End: 2025-05-22
Payer: MEDICARE

## 2025-05-22 VITALS
RESPIRATION RATE: 12 BRPM | OXYGEN SATURATION: 93 % | SYSTOLIC BLOOD PRESSURE: 132 MMHG | BODY MASS INDEX: 44.78 KG/M2 | WEIGHT: 312.8 LBS | HEART RATE: 61 BPM | TEMPERATURE: 97.5 F | HEIGHT: 70 IN | DIASTOLIC BLOOD PRESSURE: 84 MMHG

## 2025-05-22 DIAGNOSIS — Z94.0 KIDNEY TRANSPLANTED: ICD-10-CM

## 2025-05-22 DIAGNOSIS — D68.9 COAGULATION DISORDER: ICD-10-CM

## 2025-05-22 DIAGNOSIS — I73.9 PERIPHERAL VASCULAR DISEASE: ICD-10-CM

## 2025-05-22 DIAGNOSIS — Z00.00 MEDICARE ANNUAL WELLNESS VISIT, SUBSEQUENT: Primary | ICD-10-CM

## 2025-05-22 DIAGNOSIS — Z90.3 HISTORY OF SLEEVE GASTRECTOMY: ICD-10-CM

## 2025-05-22 DIAGNOSIS — N40.1 BENIGN PROSTATIC HYPERPLASIA WITH LOWER URINARY TRACT SYMPTOMS, SYMPTOM DETAILS UNSPECIFIED: ICD-10-CM

## 2025-05-22 DIAGNOSIS — E66.813 CLASS 3 SEVERE OBESITY DUE TO EXCESS CALORIES WITH SERIOUS COMORBIDITY AND BODY MASS INDEX (BMI) OF 40.0 TO 44.9 IN ADULT (HCC): ICD-10-CM

## 2025-05-22 DIAGNOSIS — N25.81 SECONDARY HYPERPARATHYROIDISM OF RENAL ORIGIN: ICD-10-CM

## 2025-05-22 DIAGNOSIS — D84.821 IMMUNODEFICIENCY DUE TO DRUGS (CODE): ICD-10-CM

## 2025-05-22 DIAGNOSIS — E08.00 DIABETES MELLITUS DUE TO UNDERLYING CONDITION WITH HYPEROSMOLARITY WITHOUT COMA, WITHOUT LONG-TERM CURRENT USE OF INSULIN (HCC): ICD-10-CM

## 2025-05-22 DIAGNOSIS — I50.810 RIGHT-SIDED HEART FAILURE, UNSPECIFIED HF CHRONICITY (HCC): ICD-10-CM

## 2025-05-22 DIAGNOSIS — S31.109D OPEN ABDOMINAL WALL WOUND, SUBSEQUENT ENCOUNTER: ICD-10-CM

## 2025-05-22 DIAGNOSIS — E55.9 VITAMIN D DEFICIENCY: ICD-10-CM

## 2025-05-22 DIAGNOSIS — I82.411 DEEP VEIN THROMBOSIS (DVT) OF FEMORAL VEIN OF RIGHT LOWER EXTREMITY, UNSPECIFIED CHRONICITY (HCC): ICD-10-CM

## 2025-05-22 DIAGNOSIS — E03.8 OTHER SPECIFIED HYPOTHYROIDISM: ICD-10-CM

## 2025-05-22 DIAGNOSIS — Z90.49 STATUS POST PARTIAL COLECTOMY: ICD-10-CM

## 2025-05-22 DIAGNOSIS — I10 ESSENTIAL (PRIMARY) HYPERTENSION: ICD-10-CM

## 2025-05-22 DIAGNOSIS — C18.7 MALIGNANT NEOPLASM OF SIGMOID COLON (HCC): ICD-10-CM

## 2025-05-22 DIAGNOSIS — I89.0 LYMPHEDEMA: ICD-10-CM

## 2025-05-22 DIAGNOSIS — I48.20 CHRONIC ATRIAL FIBRILLATION (HCC): ICD-10-CM

## 2025-05-22 PROCEDURE — 3017F COLORECTAL CA SCREEN DOC REV: CPT | Performed by: FAMILY MEDICINE

## 2025-05-22 PROCEDURE — 3075F SYST BP GE 130 - 139MM HG: CPT | Performed by: FAMILY MEDICINE

## 2025-05-22 PROCEDURE — G0439 PPPS, SUBSEQ VISIT: HCPCS | Performed by: FAMILY MEDICINE

## 2025-05-22 PROCEDURE — 3079F DIAST BP 80-89 MM HG: CPT | Performed by: FAMILY MEDICINE

## 2025-05-22 RX ORDER — MYCOPHENOLIC ACID 180 MG/1
720 TABLET, DELAYED RELEASE ORAL 2 TIMES DAILY
Qty: 200 TABLET | Refills: 3 | Status: SHIPPED | OUTPATIENT
Start: 2025-05-22

## 2025-05-22 ASSESSMENT — PATIENT HEALTH QUESTIONNAIRE - PHQ9
SUM OF ALL RESPONSES TO PHQ QUESTIONS 1-9: 0
1. LITTLE INTEREST OR PLEASURE IN DOING THINGS: NOT AT ALL
SUM OF ALL RESPONSES TO PHQ QUESTIONS 1-9: 0
2. FEELING DOWN, DEPRESSED OR HOPELESS: NOT AT ALL

## 2025-05-22 ASSESSMENT — LIFESTYLE VARIABLES
HOW OFTEN DO YOU HAVE A DRINK CONTAINING ALCOHOL: 2-4 TIMES A MONTH
HOW MANY STANDARD DRINKS CONTAINING ALCOHOL DO YOU HAVE ON A TYPICAL DAY: 1 OR 2

## 2025-05-22 NOTE — PROGRESS NOTES
Medicare Annual Wellness Visit    Hugh Avila is here for Medicare AWV and Diabetes (Patient is due for Diabetic foot exam)    Assessment & Plan   Medicare annual wellness visit, subsequent  Essential (primary) hypertension  Benign prostatic hyperplasia with lower urinary tract symptoms, symptom details unspecified  Chronic atrial fibrillation (HCC)  Coagulation disorder  Deep vein thrombosis (DVT) of femoral vein of right lower extremity, unspecified chronicity (HCC)  History of sleeve gastrectomy  Vitamin D deficiency  Diabetes mellitus due to underlying condition with hyperosmolarity without coma, without long-term current use of insulin (HCC)  Lymphedema  Right-sided heart failure, unspecified HF chronicity (HCC)  Peripheral vascular disease  Malignant neoplasm of sigmoid colon (HCC)  Status post partial colectomy  Kidney transplanted  Other specified hypothyroidism  Immunodeficiency due to drugs (CODE)  Secondary hyperparathyroidism of renal origin  Open abdominal wall wound, subsequent encounter  Class 3 severe obesity due to excess calories with serious comorbidity and body mass index (BMI) of 40.0 to 44.9 in adult ([facility])       Return in about 6 months (around 11/22/2025).     Subjective       Patient's complete Health Risk Assessment and screening values have been reviewed and are found in Flowsheets. The following problems were reviewed today and where indicated follow up appointments were made and/or referrals ordered.    Positive Risk Factor Screenings with Interventions:       Alcohol Screening:  AUDIT-C Score: 2           Interventions:               General HRA Questions:  Select all that apply: (!) New or Increased Pain    Interventions - Pain:        Inactivity:  On average, how many days per week do you engage in moderate to strenuous exercise (like a brisk walk)?: 1 day (!) Abnormal  On average, how many minutes do you engage in exercise at this level?: 20 min    Interventions:       Abnormal BMI

## 2025-06-09 RX ORDER — LEVOTHYROXINE SODIUM 25 UG/1
25 TABLET ORAL DAILY
Qty: 90 TABLET | Refills: 1 | Status: SHIPPED | OUTPATIENT
Start: 2025-06-09

## 2025-06-09 NOTE — TELEPHONE ENCOUNTER
Hugh Avila is calling to request a refill on the following medication(s):    Medication Request:  Requested Prescriptions     Pending Prescriptions Disp Refills    levothyroxine (SYNTHROID) 25 MCG tablet 90 tablet 1     Sig: Take 1 tablet by mouth Daily       Last Visit Date (If Applicable):  5/22/2025    Next Visit Date:    11/24/2025    Last refilled on 11/25/24. Prescription pending.

## 2025-07-15 RX ORDER — AMIODARONE HYDROCHLORIDE 200 MG/1
TABLET ORAL
Qty: 90 TABLET | Refills: 1 | Status: SHIPPED | OUTPATIENT
Start: 2025-07-15

## 2025-07-15 RX ORDER — POTASSIUM CHLORIDE 1500 MG/1
20 TABLET, EXTENDED RELEASE ORAL DAILY
Qty: 90 TABLET | Refills: 1 | Status: SHIPPED | OUTPATIENT
Start: 2025-07-15

## 2025-07-15 RX ORDER — CHOLECALCIFEROL (VITAMIN D3) 1250 MCG
CAPSULE ORAL
Qty: 12 CAPSULE | Refills: 3 | Status: SHIPPED | OUTPATIENT
Start: 2025-07-15

## 2025-07-15 NOTE — TELEPHONE ENCOUNTER
Hugh Avila is calling to request a refill on the following medication(s):    Medication Request:  Requested Prescriptions     Pending Prescriptions Disp Refills    amiodarone (CORDARONE) 200 MG tablet 90 tablet 1     Sig: TAKE 1 TABLET BY MOUTH DAILY -    Cholecalciferol (VITAMIN D3) 1.25 MG (78804 UT) CAPS 12 capsule 3     Sig: TAKE 1 CAPSULE BY MOUTH ONCE A MONTH    potassium chloride (KLOR-CON M) 20 MEQ extended release tablet 90 tablet 1     Sig: Take 1 tablet by mouth daily       Last Visit Date (If Applicable):  5/22/2025    Next Visit Date:    11/24/2025

## 2025-07-29 ENCOUNTER — HOSPITAL ENCOUNTER (EMERGENCY)
Age: 55
Discharge: HOME | End: 2025-07-29
Attending: STUDENT IN AN ORGANIZED HEALTH CARE EDUCATION/TRAINING PROGRAM
Payer: MEDICARE

## 2025-07-29 VITALS
DIASTOLIC BLOOD PRESSURE: 71 MMHG | RESPIRATION RATE: 14 BRPM | TEMPERATURE: 98.4 F | SYSTOLIC BLOOD PRESSURE: 169 MMHG | HEART RATE: 63 BPM | OXYGEN SATURATION: 96 %

## 2025-07-29 DIAGNOSIS — S39.012A STRAIN OF LUMBAR REGION, INITIAL ENCOUNTER: Primary | ICD-10-CM

## 2025-07-29 PROCEDURE — 6370000000 HC RX 637 (ALT 250 FOR IP): Performed by: STUDENT IN AN ORGANIZED HEALTH CARE EDUCATION/TRAINING PROGRAM

## 2025-07-29 PROCEDURE — 99283 EMERGENCY DEPT VISIT LOW MDM: CPT | Performed by: STUDENT IN AN ORGANIZED HEALTH CARE EDUCATION/TRAINING PROGRAM

## 2025-07-29 RX ORDER — AMIODARONE HYDROCHLORIDE 200 MG/1
200 TABLET ORAL DAILY
Status: DISCONTINUED | OUTPATIENT
Start: 2025-07-29 | End: 2025-07-29 | Stop reason: HOSPADM

## 2025-07-29 RX ORDER — METHOCARBAMOL 500 MG/1
750 TABLET, FILM COATED ORAL ONCE
Status: COMPLETED | OUTPATIENT
Start: 2025-07-29 | End: 2025-07-29

## 2025-07-29 RX ORDER — LIDOCAINE 4 G/G
1 PATCH TOPICAL DAILY
Status: DISCONTINUED | OUTPATIENT
Start: 2025-07-29 | End: 2025-07-29 | Stop reason: HOSPADM

## 2025-07-29 RX ORDER — ACETAMINOPHEN 500 MG
1000 TABLET ORAL ONCE
Status: COMPLETED | OUTPATIENT
Start: 2025-07-29 | End: 2025-07-29

## 2025-07-29 RX ORDER — METHOCARBAMOL 750 MG/1
750 TABLET, FILM COATED ORAL 3 TIMES DAILY
Qty: 30 TABLET | Refills: 0 | Status: SHIPPED | OUTPATIENT
Start: 2025-07-29 | End: 2025-08-08

## 2025-07-29 RX ORDER — AMLODIPINE BESYLATE 10 MG/1
5 TABLET ORAL DAILY
Status: DISCONTINUED | OUTPATIENT
Start: 2025-07-29 | End: 2025-07-29 | Stop reason: HOSPADM

## 2025-07-29 RX ADMIN — METHOCARBAMOL 750 MG: 500 TABLET ORAL at 08:03

## 2025-07-29 RX ADMIN — ACETAMINOPHEN 1000 MG: 500 TABLET ORAL at 08:03

## 2025-07-29 RX ADMIN — AMLODIPINE BESYLATE 5 MG: 10 TABLET ORAL at 08:03

## 2025-07-29 ASSESSMENT — PAIN SCALES - GENERAL: PAINLEVEL_OUTOF10: 10

## 2025-07-29 NOTE — ED PROVIDER NOTES
[facility]     Emergency Department     Faculty Attestation    I performed a history and physical examination of the patient and discussed management with the resident. I have reviewed and agree with the resident’s findings including all diagnostic interpretations, and treatment plans as written at the time of my review. Any areas of disagreement are noted on the chart. I was personally present for the key portions of any procedures. I have documented in the chart those procedures where I was not present during the key portions. For Physician Assistant/ Nurse Practitioner cases/documentation I have personally evaluated this patient and have completed at least one if not all key elements of the E/M (history, physical exam, and MDM). Additional findings are as noted.    PtName: Hugh Avila  MRN: [id number]  Birthdate 1970  Date of evaluation: 7/29/25  Note Started: 7:55 AM EDT    Primary Care Physician: Bruce Jeff MD    Brief HPI: lower back pain, this was after moving a refrigerator and now across the lower back there is pain with movement. No numbness or tingling. Able to walk and no saddle anesthesia.        Pertinent Physical Exam Findings:  Vitals:    07/29/25 0721   BP: (!) 207/98   Pulse: 63   Resp: 14   Temp: 98.4 °F (36.9 °C)   SpO2: 96%        Physical exam findings:      Lower back is atraumatic on examination  Plantar and dorsiflexion of the feet are unremarkable  No saddle anesthesia  Muscle pain paraspinal on palpation    Medical Decision Making: Patient is a 55 y.o. male presenting to the emergency department with lower back pain. The chart was reviewed for pertinent history relating to the chief complaint.  The patient appears stable.     ED Course: unlikely to be infectious s/p moving a heavy object. Likely and presumed to be primarily MSk related and treated for such carefully as he is a renal patient. HTN addressed with administering AM

## 2025-07-29 NOTE — ED PROVIDER NOTES
[facility] EMERGENCY DEPARTMENT  Emergency Department Encounter  Emergency Medicine Resident     Pt Name:Hugh Avila  MRN: [id number]  Birthdate 1970  Date of evaluation: 7/29/25  PCP:  Bruce Jeff MD  Note Started: 7:27 AM EDT      CHIEF COMPLAINT       Chief Complaint   Patient presents with    Back Pain     HISTORY OF PRESENT ILLNESS  (Location/Symptom, Timing/Onset, Context/Setting, Quality, Duration, Modifying Factors, Severity.)      Hugh Avila is a 55 y.o. male who presents with lower back pain after lifting a fridge on Friday.  Patient reports that on Saturday, he woke up and his back was stiff, it was more difficult to move.  Patient reports that since that time, he has had increased back pain.  Patient denies paresthesias, sciatica, difficulty ambulating.  Patient denies fevers, incontinence.  Patient denies previous history of back surgery, though has had back pain in the past.  He has taken Tylenol 650 every 8 hours, last taken at 2200 yesterday.  Patient denies nausea, vomiting, headaches, vision changes, urinary changes, abdominal pain.  Patient reports compliance with his medications, to include his antirejection medications.    PAST MEDICAL / SURGICAL / SOCIAL / FAMILY HISTORY      has a past medical history of Abscess of right buttock, Anemia, Anticoagulated on Coumadin, Aortic insufficiency, Arthritis, Atrial fibrillation (HCC), BPH (benign prostatic hypertrophy), CAD (coronary artery disease), Caffeine use, Cellulitis of lower leg, Chronic back pain, Colon cancer (HCC), Cor pulmonale, acute (HCC), COVID-19, CRF (chronic renal failure), Erectile dysfunction, Gout, Hemodialysis patient, History of blood transfusion, Hyperkalemia, Hyperlipidemia, Hypertension, Hypotension, Hypothyroidism, Kidney transplant candidate, Lymphedema of leg, Obesity, Thyroid disease, and Well adult health check.     has a past surgical history that includes Appendectomy; Lap Band (2007); Abscess

## 2025-07-29 NOTE — ED TRIAGE NOTES
Pt reports he moved a refrigerator on Friday and since he has had lower back pain. States he has not had relief from tylanol. No loss of bowel or bladder control. No n/t in BLE.

## 2025-09-04 RX ORDER — LEVOTHYROXINE SODIUM 25 UG/1
25 TABLET ORAL DAILY
Qty: 90 TABLET | Refills: 1 | Status: SHIPPED | OUTPATIENT
Start: 2025-09-04

## 2025-09-04 RX ORDER — AMLODIPINE BESYLATE 5 MG/1
5 TABLET ORAL DAILY
Qty: 90 TABLET | Refills: 1 | Status: SHIPPED | OUTPATIENT
Start: 2025-09-04

## 2025-09-04 RX ORDER — FOLIC ACID/VIT B COMPLEX AND C 0.8 MG
1 TABLET ORAL
Qty: 90 TABLET | Refills: 1 | Status: SHIPPED | OUTPATIENT
Start: 2025-09-04

## (undated) DEVICE — STAPLER CANNULA SEAL: Brand: ENDOWRIST

## (undated) DEVICE — PAD GEN USE BORDERED ADH 14IN 2IN AND 12IN 4IN GZ UNIV ST

## (undated) DEVICE — PACK PROCEDURE SURG GEN MIN SVMMC

## (undated) DEVICE — GLOVE SURG SZ 6 THK91MIL LTX FREE SYN POLYISOPRENE ANTI

## (undated) DEVICE — Device

## (undated) DEVICE — CHLORAPREP 26ML ORANGE

## (undated) DEVICE — COVER LT HNDL BLU PLAS

## (undated) DEVICE — KIT DRP 4 ARM ACC DISP DA VINCI SI ENDOWRIST

## (undated) DEVICE — SNARE ENDOSCP M L240CM LOOP W27MM SHTH DIA2.4MM OVL FLX

## (undated) DEVICE — BINDER ABD XL W15IN 62 74IN CIRC UNISX 5 PNL E CNTCT CLSR

## (undated) DEVICE — TROCAR: Brand: KII FIOS FIRST ENTRY

## (undated) DEVICE — KENDALL SCD EXPRESS SLEEVES, KNEE LENGTH, MEDIUM: Brand: KENDALL SCD

## (undated) DEVICE — GOWN,AURORA,NONRNF,XL,30/CS: Brand: MEDLINE

## (undated) DEVICE — GLOVE ORANGE PI 7   MSG9070

## (undated) DEVICE — SOLUTION PREP POVIDONE IOD FOR SKIN MUCOUS MEM PRIOR TO

## (undated) DEVICE — STAPLER INT STPL LN H1.8-4.8XL60MM THCK TISS 2 ROW 8 FIRING

## (undated) DEVICE — STAPLER 45 RELOAD: Brand: ENDOWRIST

## (undated) DEVICE — GLOVE ORANGE PI 8   MSG9080

## (undated) DEVICE — 3M™ STERI-STRIP™ COMPOUND BENZOIN TINCTURE 40 BAGS/CARTON 4 CARTONS/CASE C1544: Brand: 3M™ STERI-STRIP™

## (undated) DEVICE — SUTURE PERMAHAND SZ 2-0 L30IN NONABSORBABLE BLK L17MM RB-1 K873H

## (undated) DEVICE — NEEDLE SCLERO 25GA L240CM OD0.51MM ID0.24MM EXTN L4MM SHTH

## (undated) DEVICE — FILTER CLP DISP FOR 5513E CLIPVAC

## (undated) DEVICE — DRAPE IRRIG FLD WRM W44XL66IN W/ AORN STD PRTBL INTRATEMP

## (undated) DEVICE — GLOVE SURG SZ 6 L12IN FNGR THK87MIL WHT LTX FREE

## (undated) DEVICE — STAPLER SHEATH: Brand: ENDOWRIST

## (undated) DEVICE — Z DISCONTINUED BY MEDLINE USE 2711682 TRAY SKIN PREP DRY W/ PREM GLV

## (undated) DEVICE — DUP USE 291175 PAD GROUNDING ADULT 10FT CORD

## (undated) DEVICE — TIP COVER ACCESSORY

## (undated) DEVICE — GLOVE SURG SZ 65 THK91MIL LTX FREE SYN POLYISOPRENE

## (undated) DEVICE — COVER,LIGHT HANDLE,FLX,2/PK: Brand: MEDLINE INDUSTRIES, INC.

## (undated) DEVICE — GLOVE EXAM M L95IN FNGR THK35MIL PALM THK24MIL OFF WHT

## (undated) DEVICE — PAD,ABDOMINAL,5"X9",ST,LF,25/BX: Brand: MEDLINE INDUSTRIES, INC.

## (undated) DEVICE — 3M™ IOBAN™ 2 ANTIMICROBIAL INCISE DRAPE 6650EZ: Brand: IOBAN™ 2

## (undated) DEVICE — PACK PROCEDURE SURG FACILITY SPEC GI BWL SPT SVMMC

## (undated) DEVICE — Z DUPLICATE USE 2716240 STAPLER INT CUT LN L40MM STPL L51MM GRN CRV HD B FRM

## (undated) DEVICE — GARMENT COMPR XL BARIATRIC FOR 28IN CALF FLOTRN TRIO

## (undated) DEVICE — BLADE CLIPPER GEN PURP NS

## (undated) DEVICE — GLOVE ORANGE PI 7 1/2   MSG9075

## (undated) DEVICE — SOLUTION ANTIFOG VIS SYS CLEARIFY LAPSCP

## (undated) DEVICE — PACK SURG ABD SVMMC

## (undated) DEVICE — GARMENT,MEDLINE,DVT,INT,CALF,MED, GEN2: Brand: MEDLINE

## (undated) DEVICE — VESSEL SEALER: Brand: ENDOWRIST;DAVINCI SI

## (undated) DEVICE — BINDER ABD 2XL W9IN CIRC 62 73IN 3 PNL E HK AND LOOP CLSR W

## (undated) DEVICE — Device: Brand: SPOT EX ENDOSCOPIC TATTOO

## (undated) DEVICE — PROTECTOR ULN NRV PUR FOAM HK LOOP STRP ANATOMICALLY

## (undated) DEVICE — GOWN,AURORA,NONREINFORCED,LARGE: Brand: MEDLINE

## (undated) DEVICE — Z INACTIVE USE 2527070 DRAPE SURG W40XL44IN UNDERBUTTOCK SMS POLYPR W/ PCH BK DISP

## (undated) DEVICE — OBTURATOR ROBOTIC DIA8MM BLDELSS ENDOSCP DISP DA VINCI SI

## (undated) DEVICE — GAUZE,SPONGE,FLUFF,6"X6.75",STRL,5/TRAY: Brand: MEDLINE

## (undated) DEVICE — SPONGE LAP W18XL18IN WHT COT 4 PLY FLD STRUNG RADPQ DISP ST

## (undated) DEVICE — DISPOSABLE LAPAROSCOPIC CORDS, 1 PER POUCH: Brand: A&E MEDICAL / DISPOSABLE LAPAROSCOPIC CORDS

## (undated) DEVICE — RELOAD STPL L75MM OPN H3.8MM CLS 1.5MM WIRE DIA0.2MM REG

## (undated) DEVICE — SEALER TISS L20CM DIA13MM ADV BPLR L CRV JAW OPN APPRCH

## (undated) DEVICE — COUNTER NDL 10 COUNT HLD 20 FOAM BLK SGL MAG

## (undated) DEVICE — DRAPE,REIN 53X77,STERILE: Brand: MEDLINE

## (undated) DEVICE — DRAPE,UNDRBUT,WHT GRAD PCH,CAPPORT,20/CS: Brand: MEDLINE

## (undated) DEVICE — TRAP SPEC RETRV CLR PLAS POLYP IN LN SUCT QUIK CTCH

## (undated) DEVICE — SYRINGE MED 10ML TRNSLUC BRL PLUNG BLK MRK POLYPR CTRL

## (undated) DEVICE — SUTURE PERMAHAND SZ 4-0 L18IN NONABSORBABLE BLK L26MM SH C014D

## (undated) DEVICE — LEGGINGS, PAIR, 31X48, STERILE: Brand: MEDLINE

## (undated) DEVICE — SKIN AFFIX SURG ADHESIVE 72/CS 0.55ML: Brand: MEDLINE

## (undated) DEVICE — TOWEL,OR,DSP,ST,NATURAL,DLX,4/PK,20PK/CS: Brand: MEDLINE

## (undated) DEVICE — INSUFFLATION TUBING SET WITH FILTER, FUNNEL CONNECTOR AND LUER LOCK: Brand: JOSNOE MEDICAL INC

## (undated) DEVICE — STAPLER INT L75MM CUT LN L73MM STPL LN L77MM BLU B FRM 8

## (undated) DEVICE — VISIGI 3D®  CALIBRATION SYSTEM  SIZE 36FR STD W/ BULB: Brand: BOEHRINGER® VISIGI 3D™ SLEEVE GASTRECTOMY CALIBRATION SYSTEM, SIZE 36FR W/BULB

## (undated) DEVICE — TOWEL SURG W16XL26IN WHT NONFENESTRATED ST 4 PER PK